# Patient Record
Sex: FEMALE | Race: WHITE | Employment: OTHER | URBAN - NONMETROPOLITAN AREA
[De-identification: names, ages, dates, MRNs, and addresses within clinical notes are randomized per-mention and may not be internally consistent; named-entity substitution may affect disease eponyms.]

---

## 2017-01-10 ENCOUNTER — OFFICE VISIT (OUTPATIENT)
Dept: INTERNAL MEDICINE | Age: 82
End: 2017-01-10

## 2017-01-10 VITALS
HEART RATE: 80 BPM | DIASTOLIC BLOOD PRESSURE: 44 MMHG | HEIGHT: 61 IN | SYSTOLIC BLOOD PRESSURE: 156 MMHG | WEIGHT: 216.6 LBS | TEMPERATURE: 97.8 F | BODY MASS INDEX: 40.89 KG/M2

## 2017-01-10 DIAGNOSIS — K20.90 ESOPHAGITIS: ICD-10-CM

## 2017-01-10 DIAGNOSIS — I27.20 PULMONARY HTN (HCC): ICD-10-CM

## 2017-01-10 DIAGNOSIS — I51.89 DIASTOLIC DYSFUNCTION: ICD-10-CM

## 2017-01-10 DIAGNOSIS — I34.0 NON-RHEUMATIC MITRAL REGURGITATION: ICD-10-CM

## 2017-01-10 DIAGNOSIS — R10.11 RIGHT UPPER QUADRANT ABDOMINAL PAIN: Primary | ICD-10-CM

## 2017-01-10 DIAGNOSIS — E11.21 TYPE 2 DIABETES WITH NEPHROPATHY (HCC): ICD-10-CM

## 2017-01-10 DIAGNOSIS — M48.061 NEURAL FORAMINAL STENOSIS OF LUMBAR SPINE: ICD-10-CM

## 2017-01-10 DIAGNOSIS — D50.0 IRON DEFICIENCY ANEMIA DUE TO CHRONIC BLOOD LOSS: ICD-10-CM

## 2017-01-10 DIAGNOSIS — M35.3 PMR (POLYMYALGIA RHEUMATICA) (HCC): ICD-10-CM

## 2017-01-10 DIAGNOSIS — E78.5 DYSLIPIDEMIA: ICD-10-CM

## 2017-01-10 DIAGNOSIS — E66.01 OBESITY, MORBID, BMI 40.0-49.9 (HCC): ICD-10-CM

## 2017-01-10 DIAGNOSIS — G47.33 OSA (OBSTRUCTIVE SLEEP APNEA): ICD-10-CM

## 2017-01-10 DIAGNOSIS — E55.9 VITAMIN D DEFICIENCY: ICD-10-CM

## 2017-01-10 DIAGNOSIS — I10 ESSENTIAL HYPERTENSION: ICD-10-CM

## 2017-01-10 LAB
-: NORMAL
AMORPHOUS: NORMAL
BACTERIA: NORMAL
CASTS UA: NORMAL /LPF (ref 0–2)
CRYSTALS, UA: NORMAL /HPF
EPITHELIAL CELLS UA: NORMAL /HPF (ref 0–5)
MUCUS: NORMAL
OTHER OBSERVATIONS UA: NORMAL
RBC UA: NORMAL /HPF (ref 0–4)
RENAL EPITHELIAL, UA: NORMAL /HPF
TRICHOMONAS: NORMAL
WBC UA: NORMAL /HPF (ref 0–4)
YEAST: NORMAL

## 2017-01-10 PROCEDURE — 99214 OFFICE O/P EST MOD 30 MIN: CPT | Performed by: INTERNAL MEDICINE

## 2017-01-10 ASSESSMENT — ENCOUNTER SYMPTOMS
COUGH: 0
BLOOD IN STOOL: 0
BLURRED VISION: 0
HEARTBURN: 0
SHORTNESS OF BREATH: 0
EYE PAIN: 0
CONSTIPATION: 0
DOUBLE VISION: 0
WHEEZING: 0
DIARRHEA: 1
EYE DISCHARGE: 0
NAUSEA: 0
VOMITING: 0
ABDOMINAL PAIN: 1
SORE THROAT: 0

## 2017-01-12 ENCOUNTER — TELEPHONE (OUTPATIENT)
Dept: INTERNAL MEDICINE | Age: 82
End: 2017-01-12

## 2017-04-04 RX ORDER — INSULIN LISPRO 100 [IU]/ML
INJECTION, SOLUTION INTRAVENOUS; SUBCUTANEOUS
Qty: 75 ML | Refills: 3 | Status: SHIPPED | OUTPATIENT
Start: 2017-04-04 | End: 2018-06-22 | Stop reason: SDUPTHER

## 2017-04-10 RX ORDER — PEN NEEDLE, DIABETIC 31 GX5/16"
NEEDLE, DISPOSABLE MISCELLANEOUS
Qty: 270 EACH | Refills: 3 | Status: SHIPPED | OUTPATIENT
Start: 2017-04-10 | End: 2017-08-30 | Stop reason: SDUPTHER

## 2017-04-28 ENCOUNTER — TELEPHONE (OUTPATIENT)
Dept: INTERNAL MEDICINE | Age: 82
End: 2017-04-28

## 2017-04-28 DIAGNOSIS — D50.0 IRON DEFICIENCY ANEMIA DUE TO CHRONIC BLOOD LOSS: ICD-10-CM

## 2017-04-28 DIAGNOSIS — E11.21 TYPE 2 DIABETES WITH NEPHROPATHY (HCC): ICD-10-CM

## 2017-04-28 DIAGNOSIS — E55.9 VITAMIN D DEFICIENCY: Primary | ICD-10-CM

## 2017-04-28 DIAGNOSIS — E78.5 DYSLIPIDEMIA: ICD-10-CM

## 2017-05-18 RX ORDER — FUROSEMIDE 20 MG/1
TABLET ORAL
Qty: 270 TABLET | Refills: 3 | Status: SHIPPED | OUTPATIENT
Start: 2017-05-18 | End: 2018-06-22 | Stop reason: SDUPTHER

## 2017-06-12 ENCOUNTER — OFFICE VISIT (OUTPATIENT)
Dept: INTERNAL MEDICINE | Age: 82
End: 2017-06-12
Payer: MEDICARE

## 2017-06-12 VITALS
WEIGHT: 222.8 LBS | DIASTOLIC BLOOD PRESSURE: 52 MMHG | TEMPERATURE: 98.1 F | SYSTOLIC BLOOD PRESSURE: 126 MMHG | BODY MASS INDEX: 42.06 KG/M2 | HEART RATE: 76 BPM | HEIGHT: 61 IN

## 2017-06-12 DIAGNOSIS — E55.9 VITAMIN D DEFICIENCY: ICD-10-CM

## 2017-06-12 DIAGNOSIS — E11.21 TYPE 2 DIABETES WITH NEPHROPATHY (HCC): ICD-10-CM

## 2017-06-12 DIAGNOSIS — K20.90 ESOPHAGITIS: ICD-10-CM

## 2017-06-12 DIAGNOSIS — I10 ESSENTIAL HYPERTENSION: ICD-10-CM

## 2017-06-12 DIAGNOSIS — I34.0 NON-RHEUMATIC MITRAL REGURGITATION: ICD-10-CM

## 2017-06-12 DIAGNOSIS — M35.3 PMR (POLYMYALGIA RHEUMATICA) (HCC): ICD-10-CM

## 2017-06-12 DIAGNOSIS — E78.5 DYSLIPIDEMIA: ICD-10-CM

## 2017-06-12 DIAGNOSIS — D50.0 IRON DEFICIENCY ANEMIA DUE TO CHRONIC BLOOD LOSS: ICD-10-CM

## 2017-06-12 DIAGNOSIS — J20.8 ACUTE BRONCHITIS DUE TO OTHER SPECIFIED ORGANISMS: Primary | ICD-10-CM

## 2017-06-12 PROCEDURE — 4040F PNEUMOC VAC/ADMIN/RCVD: CPT | Performed by: INTERNAL MEDICINE

## 2017-06-12 PROCEDURE — G8399 PT W/DXA RESULTS DOCUMENT: HCPCS | Performed by: INTERNAL MEDICINE

## 2017-06-12 PROCEDURE — 1090F PRES/ABSN URINE INCON ASSESS: CPT | Performed by: INTERNAL MEDICINE

## 2017-06-12 PROCEDURE — 1123F ACP DISCUSS/DSCN MKR DOCD: CPT | Performed by: INTERNAL MEDICINE

## 2017-06-12 PROCEDURE — 1036F TOBACCO NON-USER: CPT | Performed by: INTERNAL MEDICINE

## 2017-06-12 PROCEDURE — G8427 DOCREV CUR MEDS BY ELIG CLIN: HCPCS | Performed by: INTERNAL MEDICINE

## 2017-06-12 PROCEDURE — G8417 CALC BMI ABV UP PARAM F/U: HCPCS | Performed by: INTERNAL MEDICINE

## 2017-06-12 PROCEDURE — 99214 OFFICE O/P EST MOD 30 MIN: CPT | Performed by: INTERNAL MEDICINE

## 2017-06-12 RX ORDER — DOXYCYCLINE HYCLATE 50 MG/1
CAPSULE ORAL
Qty: 22 CAPSULE | Refills: 0 | Status: SHIPPED | OUTPATIENT
Start: 2017-06-12 | End: 2017-06-26 | Stop reason: ALTCHOICE

## 2017-06-12 RX ORDER — AMLODIPINE BESYLATE 5 MG/1
TABLET ORAL
Qty: 90 TABLET | Refills: 3 | Status: SHIPPED | OUTPATIENT
Start: 2017-06-12 | End: 2018-06-29 | Stop reason: SDUPTHER

## 2017-06-13 ASSESSMENT — ENCOUNTER SYMPTOMS
BLURRED VISION: 0
CONSTIPATION: 0
SHORTNESS OF BREATH: 1
ABDOMINAL PAIN: 0
DOUBLE VISION: 0
DIARRHEA: 0
EYE PAIN: 0
VOMITING: 0
EYE DISCHARGE: 0
COUGH: 1
WHEEZING: 0
SORE THROAT: 1
SPUTUM PRODUCTION: 1
NAUSEA: 0
BLOOD IN STOOL: 0

## 2017-06-26 ENCOUNTER — OFFICE VISIT (OUTPATIENT)
Dept: INTERNAL MEDICINE | Age: 82
End: 2017-06-26
Payer: MEDICARE

## 2017-06-26 VITALS
SYSTOLIC BLOOD PRESSURE: 152 MMHG | DIASTOLIC BLOOD PRESSURE: 60 MMHG | HEIGHT: 61 IN | TEMPERATURE: 97.8 F | WEIGHT: 219 LBS | BODY MASS INDEX: 41.35 KG/M2 | HEART RATE: 80 BPM

## 2017-06-26 DIAGNOSIS — I10 ESSENTIAL HYPERTENSION: ICD-10-CM

## 2017-06-26 DIAGNOSIS — R05.9 COUGH: Primary | ICD-10-CM

## 2017-06-26 DIAGNOSIS — K20.90 ESOPHAGITIS: ICD-10-CM

## 2017-06-26 DIAGNOSIS — E55.9 VITAMIN D DEFICIENCY: ICD-10-CM

## 2017-06-26 DIAGNOSIS — E66.01 OBESITY, MORBID, BMI 40.0-49.9 (HCC): ICD-10-CM

## 2017-06-26 DIAGNOSIS — I34.0 NON-RHEUMATIC MITRAL REGURGITATION: ICD-10-CM

## 2017-06-26 DIAGNOSIS — D50.0 IRON DEFICIENCY ANEMIA DUE TO CHRONIC BLOOD LOSS: ICD-10-CM

## 2017-06-26 DIAGNOSIS — E11.21 TYPE 2 DIABETES WITH NEPHROPATHY (HCC): ICD-10-CM

## 2017-06-26 DIAGNOSIS — E78.5 DYSLIPIDEMIA: ICD-10-CM

## 2017-06-26 DIAGNOSIS — M35.3 PMR (POLYMYALGIA RHEUMATICA) (HCC): ICD-10-CM

## 2017-06-26 PROCEDURE — G8399 PT W/DXA RESULTS DOCUMENT: HCPCS | Performed by: INTERNAL MEDICINE

## 2017-06-26 PROCEDURE — 1123F ACP DISCUSS/DSCN MKR DOCD: CPT | Performed by: INTERNAL MEDICINE

## 2017-06-26 PROCEDURE — G8427 DOCREV CUR MEDS BY ELIG CLIN: HCPCS | Performed by: INTERNAL MEDICINE

## 2017-06-26 PROCEDURE — 4040F PNEUMOC VAC/ADMIN/RCVD: CPT | Performed by: INTERNAL MEDICINE

## 2017-06-26 PROCEDURE — 1090F PRES/ABSN URINE INCON ASSESS: CPT | Performed by: INTERNAL MEDICINE

## 2017-06-26 PROCEDURE — G8417 CALC BMI ABV UP PARAM F/U: HCPCS | Performed by: INTERNAL MEDICINE

## 2017-06-26 PROCEDURE — 1036F TOBACCO NON-USER: CPT | Performed by: INTERNAL MEDICINE

## 2017-06-26 PROCEDURE — 99214 OFFICE O/P EST MOD 30 MIN: CPT | Performed by: INTERNAL MEDICINE

## 2017-06-27 ASSESSMENT — ENCOUNTER SYMPTOMS
BLURRED VISION: 0
BLOOD IN STOOL: 0
SORE THROAT: 0
EYE DISCHARGE: 0
DOUBLE VISION: 0
COUGH: 1
CONSTIPATION: 0
VOMITING: 0
SPUTUM PRODUCTION: 1
DIARRHEA: 0
SHORTNESS OF BREATH: 0
NAUSEA: 0
WHEEZING: 0
ABDOMINAL PAIN: 0
EYE PAIN: 0

## 2017-06-28 RX ORDER — LOSARTAN POTASSIUM 100 MG/1
TABLET ORAL
Qty: 90 TABLET | Refills: 3 | Status: SHIPPED | OUTPATIENT
Start: 2017-06-28 | End: 2018-06-06 | Stop reason: SDUPTHER

## 2017-07-03 ENCOUNTER — TELEPHONE (OUTPATIENT)
Dept: INTERNAL MEDICINE | Age: 82
End: 2017-07-03

## 2017-07-03 RX ORDER — AZITHROMYCIN 250 MG/1
TABLET, FILM COATED ORAL
Qty: 1 PACKET | Refills: 0 | Status: SHIPPED | OUTPATIENT
Start: 2017-07-03 | End: 2017-07-13

## 2017-07-10 RX ORDER — POTASSIUM CHLORIDE 750 MG/1
TABLET, EXTENDED RELEASE ORAL
Qty: 90 TABLET | Refills: 3 | Status: SHIPPED | OUTPATIENT
Start: 2017-07-10 | End: 2018-08-06 | Stop reason: SDUPTHER

## 2017-07-13 ENCOUNTER — HOSPITAL ENCOUNTER (OUTPATIENT)
Age: 82
Setting detail: SPECIMEN
Discharge: HOME | End: 2017-07-13
Payer: MEDICARE

## 2017-07-13 ENCOUNTER — TELEPHONE (OUTPATIENT)
Dept: INTERNAL MEDICINE | Age: 82
End: 2017-07-13

## 2017-07-13 LAB — VITAMIN D 25-HYDROXY: 41.2 NG/ML (ref 30–100)

## 2017-07-20 ENCOUNTER — OFFICE VISIT (OUTPATIENT)
Dept: INTERNAL MEDICINE | Age: 82
End: 2017-07-20
Payer: MEDICARE

## 2017-07-20 VITALS
SYSTOLIC BLOOD PRESSURE: 136 MMHG | RESPIRATION RATE: 17 BRPM | HEIGHT: 61 IN | DIASTOLIC BLOOD PRESSURE: 62 MMHG | BODY MASS INDEX: 41.16 KG/M2 | WEIGHT: 218 LBS | HEART RATE: 68 BPM

## 2017-07-20 DIAGNOSIS — M35.3 PMR (POLYMYALGIA RHEUMATICA) (HCC): ICD-10-CM

## 2017-07-20 DIAGNOSIS — E66.01 OBESITY, MORBID, BMI 40.0-49.9 (HCC): ICD-10-CM

## 2017-07-20 DIAGNOSIS — K20.90 ESOPHAGITIS: ICD-10-CM

## 2017-07-20 DIAGNOSIS — E78.5 DYSLIPIDEMIA: ICD-10-CM

## 2017-07-20 DIAGNOSIS — R30.0 DYSURIA: ICD-10-CM

## 2017-07-20 DIAGNOSIS — E11.21 TYPE 2 DIABETES WITH NEPHROPATHY (HCC): Primary | ICD-10-CM

## 2017-07-20 DIAGNOSIS — D50.0 IRON DEFICIENCY ANEMIA DUE TO CHRONIC BLOOD LOSS: ICD-10-CM

## 2017-07-20 DIAGNOSIS — K58.0 IRRITABLE BOWEL SYNDROME WITH DIARRHEA: ICD-10-CM

## 2017-07-20 DIAGNOSIS — Z00.00 ROUTINE GENERAL MEDICAL EXAMINATION AT A HEALTH CARE FACILITY: ICD-10-CM

## 2017-07-20 DIAGNOSIS — I34.0 NON-RHEUMATIC MITRAL REGURGITATION: ICD-10-CM

## 2017-07-20 DIAGNOSIS — E55.9 VITAMIN D DEFICIENCY: ICD-10-CM

## 2017-07-20 DIAGNOSIS — I10 ESSENTIAL HYPERTENSION: ICD-10-CM

## 2017-07-20 LAB
-: ABNORMAL
AMORPHOUS: ABNORMAL
BACTERIA: ABNORMAL
CASTS UA: ABNORMAL /LPF (ref 0–2)
CRYSTALS, UA: ABNORMAL /HPF
EPITHELIAL CELLS UA: ABNORMAL /HPF (ref 0–5)
MUCUS: ABNORMAL
OTHER OBSERVATIONS UA: ABNORMAL
RBC UA: ABNORMAL /HPF (ref 0–4)
RENAL EPITHELIAL, UA: ABNORMAL /HPF
TRICHOMONAS: ABNORMAL
WBC UA: ABNORMAL /HPF (ref 0–4)
YEAST: ABNORMAL

## 2017-07-20 PROCEDURE — G8427 DOCREV CUR MEDS BY ELIG CLIN: HCPCS | Performed by: INTERNAL MEDICINE

## 2017-07-20 PROCEDURE — 1123F ACP DISCUSS/DSCN MKR DOCD: CPT | Performed by: INTERNAL MEDICINE

## 2017-07-20 PROCEDURE — G8417 CALC BMI ABV UP PARAM F/U: HCPCS | Performed by: INTERNAL MEDICINE

## 2017-07-20 PROCEDURE — 4040F PNEUMOC VAC/ADMIN/RCVD: CPT | Performed by: INTERNAL MEDICINE

## 2017-07-20 PROCEDURE — G0439 PPPS, SUBSEQ VISIT: HCPCS | Performed by: INTERNAL MEDICINE

## 2017-07-20 PROCEDURE — G8399 PT W/DXA RESULTS DOCUMENT: HCPCS | Performed by: INTERNAL MEDICINE

## 2017-07-20 PROCEDURE — 1090F PRES/ABSN URINE INCON ASSESS: CPT | Performed by: INTERNAL MEDICINE

## 2017-07-20 PROCEDURE — 1036F TOBACCO NON-USER: CPT | Performed by: INTERNAL MEDICINE

## 2017-07-20 PROCEDURE — 99214 OFFICE O/P EST MOD 30 MIN: CPT | Performed by: INTERNAL MEDICINE

## 2017-07-20 ASSESSMENT — LIFESTYLE VARIABLES: HOW OFTEN DO YOU HAVE A DRINK CONTAINING ALCOHOL: 0

## 2017-07-20 ASSESSMENT — ANXIETY QUESTIONNAIRES: GAD7 TOTAL SCORE: 1

## 2017-07-20 ASSESSMENT — PATIENT HEALTH QUESTIONNAIRE - PHQ9: SUM OF ALL RESPONSES TO PHQ QUESTIONS 1-9: 2

## 2017-07-21 DIAGNOSIS — R30.0 DYSURIA: Primary | ICD-10-CM

## 2017-07-23 ASSESSMENT — ENCOUNTER SYMPTOMS
ABDOMINAL PAIN: 0
CONSTIPATION: 0
NAUSEA: 0
VOMITING: 0
SORE THROAT: 0
COUGH: 0
BLURRED VISION: 0
BLOOD IN STOOL: 0
DIARRHEA: 1
SHORTNESS OF BREATH: 0
DOUBLE VISION: 0
EYE DISCHARGE: 0
WHEEZING: 0
EYE PAIN: 0

## 2017-08-24 RX ORDER — HYDRALAZINE HYDROCHLORIDE 10 MG/1
TABLET, FILM COATED ORAL
Qty: 270 TABLET | Refills: 3 | Status: SHIPPED | OUTPATIENT
Start: 2017-08-24 | End: 2017-10-24 | Stop reason: SDUPTHER

## 2017-08-30 ENCOUNTER — OFFICE VISIT (OUTPATIENT)
Dept: INTERNAL MEDICINE | Age: 82
End: 2017-08-30
Payer: MEDICARE

## 2017-08-30 ENCOUNTER — TELEPHONE (OUTPATIENT)
Dept: INTERNAL MEDICINE | Age: 82
End: 2017-08-30

## 2017-08-30 VITALS
HEIGHT: 61 IN | RESPIRATION RATE: 17 BRPM | DIASTOLIC BLOOD PRESSURE: 68 MMHG | OXYGEN SATURATION: 98 % | BODY MASS INDEX: 41.01 KG/M2 | TEMPERATURE: 97.6 F | SYSTOLIC BLOOD PRESSURE: 122 MMHG | WEIGHT: 217.2 LBS | HEART RATE: 72 BPM

## 2017-08-30 DIAGNOSIS — J40 BRONCHITIS: ICD-10-CM

## 2017-08-30 PROCEDURE — 1036F TOBACCO NON-USER: CPT | Performed by: NURSE PRACTITIONER

## 2017-08-30 PROCEDURE — 1090F PRES/ABSN URINE INCON ASSESS: CPT | Performed by: NURSE PRACTITIONER

## 2017-08-30 PROCEDURE — 99213 OFFICE O/P EST LOW 20 MIN: CPT | Performed by: NURSE PRACTITIONER

## 2017-08-30 PROCEDURE — G8427 DOCREV CUR MEDS BY ELIG CLIN: HCPCS | Performed by: NURSE PRACTITIONER

## 2017-08-30 PROCEDURE — 1123F ACP DISCUSS/DSCN MKR DOCD: CPT | Performed by: NURSE PRACTITIONER

## 2017-08-30 PROCEDURE — 4040F PNEUMOC VAC/ADMIN/RCVD: CPT | Performed by: NURSE PRACTITIONER

## 2017-08-30 PROCEDURE — G8399 PT W/DXA RESULTS DOCUMENT: HCPCS | Performed by: NURSE PRACTITIONER

## 2017-08-30 PROCEDURE — G8417 CALC BMI ABV UP PARAM F/U: HCPCS | Performed by: NURSE PRACTITIONER

## 2017-08-30 RX ORDER — ALBUTEROL SULFATE 90 UG/1
2 AEROSOL, METERED RESPIRATORY (INHALATION) EVERY 6 HOURS PRN
Qty: 1 INHALER | Refills: 1 | Status: SHIPPED | OUTPATIENT
Start: 2017-08-30 | End: 2017-09-05

## 2017-08-30 RX ORDER — AZITHROMYCIN 250 MG/1
TABLET, FILM COATED ORAL
Qty: 6 TABLET | Refills: 0 | Status: SHIPPED | OUTPATIENT
Start: 2017-08-30 | End: 2017-09-04

## 2017-08-31 ASSESSMENT — ENCOUNTER SYMPTOMS
SORE THROAT: 0
VOMITING: 0
COUGH: 1
NAUSEA: 0
TROUBLE SWALLOWING: 0
ABDOMINAL PAIN: 0
CONSTIPATION: 0
VOICE CHANGE: 0
STRIDOR: 0
CHEST TIGHTNESS: 0
SINUS PRESSURE: 0
RHINORRHEA: 1
SHORTNESS OF BREATH: 0
ABDOMINAL DISTENTION: 0
DIARRHEA: 0
WHEEZING: 1

## 2017-09-05 ENCOUNTER — TELEPHONE (OUTPATIENT)
Dept: INTERNAL MEDICINE | Age: 82
End: 2017-09-05

## 2017-09-05 RX ORDER — ALBUTEROL SULFATE 2.5 MG/3ML
2.5 SOLUTION RESPIRATORY (INHALATION) EVERY 4 HOURS PRN
Qty: 30 EACH | Refills: 3 | Status: SHIPPED | OUTPATIENT
Start: 2017-09-05 | End: 2017-10-24

## 2017-09-12 RX ORDER — METOPROLOL SUCCINATE 50 MG/1
TABLET, EXTENDED RELEASE ORAL
Qty: 90 TABLET | Refills: 3 | Status: SHIPPED | OUTPATIENT
Start: 2017-09-12 | End: 2018-09-14 | Stop reason: SDUPTHER

## 2017-09-12 RX ORDER — INSULIN GLARGINE 100 [IU]/ML
INJECTION, SOLUTION SUBCUTANEOUS
Qty: 90 ML | Refills: 3 | Status: SHIPPED | OUTPATIENT
Start: 2017-09-12 | End: 2018-09-26 | Stop reason: SDUPTHER

## 2017-10-17 ENCOUNTER — HOSPITAL ENCOUNTER (OUTPATIENT)
Dept: LAB | Age: 82
Setting detail: SPECIMEN
Discharge: HOME | End: 2017-10-17
Payer: MEDICARE

## 2017-10-17 DIAGNOSIS — E11.21 TYPE 2 DIABETES WITH NEPHROPATHY (HCC): ICD-10-CM

## 2017-10-17 LAB
ESTIMATED AVERAGE GLUCOSE: 137 MG/DL
HBA1C MFR BLD: 6.4 % (ref 4.8–5.9)

## 2017-10-17 PROCEDURE — 83036 HEMOGLOBIN GLYCOSYLATED A1C: CPT

## 2017-10-17 PROCEDURE — 36415 COLL VENOUS BLD VENIPUNCTURE: CPT

## 2017-10-20 ENCOUNTER — TELEPHONE (OUTPATIENT)
Dept: PHARMACY | Facility: CLINIC | Age: 82
End: 2017-10-20

## 2017-10-20 NOTE — TELEPHONE ENCOUNTER
CLINICAL PHARMACY NOTE - Medication Review    Nella Beebe is a 81 y.o. female referred to a clinical pharmacy specialist given their history of HF and number of home medications. First attempt made to reach patient by telephone for medication review. Spoke with patient - refused complete medication review, states no questions or concerns at this time.      Roys JohnsonD  Clinical Pharmacy Specialist  O: [phone]  C: [address] [place] [address]  [phone], Option 7    =======================================================    For Pharmacy Admin Tracking Only  PHSO: Yes  Time Spent (min): 5

## 2017-10-24 ENCOUNTER — OFFICE VISIT (OUTPATIENT)
Dept: INTERNAL MEDICINE | Age: 82
End: 2017-10-24
Payer: MEDICARE

## 2017-10-24 VITALS
SYSTOLIC BLOOD PRESSURE: 148 MMHG | DIASTOLIC BLOOD PRESSURE: 70 MMHG | HEIGHT: 61 IN | WEIGHT: 221 LBS | HEART RATE: 68 BPM | BODY MASS INDEX: 41.72 KG/M2

## 2017-10-24 DIAGNOSIS — M35.3 PMR (POLYMYALGIA RHEUMATICA) (HCC): ICD-10-CM

## 2017-10-24 DIAGNOSIS — I34.0 NON-RHEUMATIC MITRAL REGURGITATION: ICD-10-CM

## 2017-10-24 DIAGNOSIS — K58.0 IRRITABLE BOWEL SYNDROME WITH DIARRHEA: ICD-10-CM

## 2017-10-24 DIAGNOSIS — D50.0 IRON DEFICIENCY ANEMIA DUE TO CHRONIC BLOOD LOSS: ICD-10-CM

## 2017-10-24 DIAGNOSIS — E66.01 OBESITY, MORBID, BMI 40.0-49.9 (HCC): ICD-10-CM

## 2017-10-24 DIAGNOSIS — I10 ESSENTIAL HYPERTENSION: Primary | ICD-10-CM

## 2017-10-24 DIAGNOSIS — E78.5 DYSLIPIDEMIA: ICD-10-CM

## 2017-10-24 DIAGNOSIS — M54.16 LUMBAR RADICULOPATHY: ICD-10-CM

## 2017-10-24 DIAGNOSIS — E11.21 TYPE 2 DIABETES WITH NEPHROPATHY (HCC): ICD-10-CM

## 2017-10-24 DIAGNOSIS — E55.9 VITAMIN D DEFICIENCY: ICD-10-CM

## 2017-10-24 PROCEDURE — G8399 PT W/DXA RESULTS DOCUMENT: HCPCS | Performed by: INTERNAL MEDICINE

## 2017-10-24 PROCEDURE — 90662 IIV NO PRSV INCREASED AG IM: CPT | Performed by: INTERNAL MEDICINE

## 2017-10-24 PROCEDURE — 99214 OFFICE O/P EST MOD 30 MIN: CPT | Performed by: INTERNAL MEDICINE

## 2017-10-24 PROCEDURE — 4040F PNEUMOC VAC/ADMIN/RCVD: CPT | Performed by: INTERNAL MEDICINE

## 2017-10-24 PROCEDURE — G8427 DOCREV CUR MEDS BY ELIG CLIN: HCPCS | Performed by: INTERNAL MEDICINE

## 2017-10-24 PROCEDURE — 1036F TOBACCO NON-USER: CPT | Performed by: INTERNAL MEDICINE

## 2017-10-24 PROCEDURE — G8484 FLU IMMUNIZE NO ADMIN: HCPCS | Performed by: INTERNAL MEDICINE

## 2017-10-24 PROCEDURE — 1090F PRES/ABSN URINE INCON ASSESS: CPT | Performed by: INTERNAL MEDICINE

## 2017-10-24 PROCEDURE — 1123F ACP DISCUSS/DSCN MKR DOCD: CPT | Performed by: INTERNAL MEDICINE

## 2017-10-24 PROCEDURE — G0008 ADMIN INFLUENZA VIRUS VAC: HCPCS | Performed by: INTERNAL MEDICINE

## 2017-10-24 PROCEDURE — G8417 CALC BMI ABV UP PARAM F/U: HCPCS | Performed by: INTERNAL MEDICINE

## 2017-10-24 RX ORDER — HYDRALAZINE HYDROCHLORIDE 25 MG/1
25 TABLET, FILM COATED ORAL 3 TIMES DAILY
Qty: 270 TABLET | Refills: 3 | Status: SHIPPED | OUTPATIENT
Start: 2017-10-24 | End: 2018-10-30 | Stop reason: SDUPTHER

## 2017-10-24 NOTE — PROGRESS NOTES
Sussy Monaco received counseling on the following healthy behaviors: exercise  Reviewed prior labs and health maintenance  Continue current medications except where noted below, diet and exercise. Discussed use, benefit, and side effects of prescribed medications. Barriers to medication compliance addressed. Patient given educational materials - see patient instructions  Was a self-tracking handout given in paper form or via [employer]? Yes    Requested Prescriptions      No prescriptions requested or ordered in this encounter       All patient questions answered. Patient voiced understanding. Quality Measures    Body mass index is 41.76 kg/m². Elevated. Weight control planned discussed: healthy diet and regular exercise. BP: (!) 142/66. Blood pressure is high. Treatment plan consists of: see progress note below. Fall Risk 7/20/2017 7/1/2016 6/3/2015 4/23/2014   2 or more falls in past year? no no no no   Fall with injury in past year? no no no no     The patient does not have a history of falls. I did not - not indicated , complete a risk assessment for falls.  A plan of care for falls No Treatment plan indicated    Lab Results   Component Value Date    LDLCHOLESTEROL 84 07/13/2017    (goal LDL reduction with dx if diabetes is 50% LDL reduction)    PHQ Scores 7/20/2017 7/1/2016 1/11/2016 1/19/2015 2/12/2014   PHQ2 Score 2 1 2 2 2   PHQ9 Score 2 1 2 2 2     Interpretation of Total Score Depression Severity: 1-4 = Minimal depression, 5-9 = Mild depression, 10-14 = Moderate depression, 15-19 = Moderately severe depression, 20-27 = Severe depression

## 2017-10-24 NOTE — PROGRESS NOTES
Chronic Disease Visit Information    BP Readings from Last 3 Encounters:   10/24/17 (!) 142/66   08/30/17 122/68   07/20/17 136/62          Hemoglobin A1C (%)   Date Value   10/17/2017 6.4 (H)   07/13/2017 6.1 (H)   01/10/2017 5.7     Microalb/Crt. Ratio (mcg/mg creat)   Date Value   04/21/2014 0     LDL Cholesterol (mg/dL)   Date Value   07/13/2017 84     HDL (mg/dL)   Date Value   07/13/2017 65     BUN (mg/dL)   Date Value   07/13/2017 15     CREATININE (mg/dL)   Date Value   07/13/2017 0.79     Glucose (mg/dL)   Date Value   07/13/2017 104 (H)            Have you changed or started any medications since your last visit including any over-the-counter medicines, vitamins, or herbal medicines? no   Are you having any side effects from any of your medications? -  no  Have you stopped taking any of your medications? Is so, why? -  no    Have you seen any other physician or provider since your last visit? Yes - Records Obtained  Have you had any other diagnostic tests since your last visit? Yes - Records Obtained  Have you been seen in the emergency room and/or had an admission to a hospital since we last saw you? No  Have you had your annual diabetic retinal (eye) exam? Yes - Records Requested  Have you had your routine dental cleaning in the past 6 months? yes -     Have you activated your [employer] account? If not, what are your barriers?  Yes     Patient Care Team:  Henri Nuñez MD as PCP - General (Internal Medicine)  Henri Nuñez MD as PCP - [facility] Attributed Provider         Medical History Review  Past Medical, Family, and Social History reviewed and does contribute to the patient presenting condition    Health Maintenance   Topic Date Due    DTaP/Tdap/Td vaccine (1 - Tdap) 03/12/2010    Diabetic foot exam  02/12/2017    Diabetic retinal exam  07/13/2017    Flu vaccine (1) 09/01/2017    Diabetic hemoglobin A1C test  04/17/2018    Lipid screen  07/13/2018    Zostavax vaccine  Completed    DEXA (modify frequency per FRAX score)  Addressed    Pneumococcal low/med risk  Completed

## 2017-10-25 NOTE — PROGRESS NOTES
Dayanna Rodriguez  YOB: 1935    Date of Service:  10/24/2017      HPI:  Silvestre Montesinos was seen in the internal medicine office today for   Chief Complaint   Patient presents with    Diabetes    Hyperlipidemia    Hypertension      · and continued evaluation and management of chronic medical problems. We addressed the following new, acute or uncontrolled/unstable issues:    1. Blood pressure is a concern. We reviewed the old data. It looks like she is generally tending to run between about 135 and 150. We reviewed her medication. She is on the Lasix, Norvasc, Cozaar, hydralazine and Toprol. Her pulse has been about as low as we want it I think. She has had trouble with lower extremity swelling and I do not think it is going to be good to increase the Norvasc. We talked about bumping up the hydralazine which I think she would tolerate and would probably help with this. It is mildly elevated but I think we can do a little bit better. She is not having chest pain or dyspnea. She is not having change of vision or change in speech. She actually feels she has been breathing better. 2.  Her IBS unfortunately is not better. We tried the Metamucil. It really did not help. We discussed GI consultation and she does not want to pursue this. She feels that she has pursued it extensively with other specialists and that she has had this for now what amounts to a number of decades and does not really feel that she is going to see improvement and she is tolerating her current level of symptoms. We addressed the following chronic issues:    · Diabetes Mellitus  - She has not had trouble with her sugars,  No hypoglycemia. No polyuria or polydipsia. Trying to watch diet and be as active as possible. · Vitamin D deficiency  - No trouble with the vitamin D supplements, and recent level reviewed.     · Dyslipidemia  - She is not having any difficulty with her cholesterol medications. No significant myalgias. · Obesity  - Discussed diet, exercise,  and various strategies for weight loss. · The bronchitis has cleared up. Review of Systems:  Constitutional:  Negative for chills, fever, and weight loss. HENT:  Negative for congestion, ear pain, and sore throat. Eyes:  Negative for blurred vision, double vision, pain and discharge. Respiratory:  Negative for cough, shortness of breath, and wheezing. Cardiovascular:  Negative for chest pain, palpitations, and PND. Gastrointestinal:  Negative for abdominal pain, blood in stool, constipation, nausea and vomiting. Positive for diarrhea. Genitourinary:  Negative for dysuria, frequency, and hematuria. Musculoskeletal:  Negative for myalgias. Skin:  Negative for rash. Neurological:  Negative for tingling, sensory change, speech change, focal weakness, seizures, and headaches. Endo/Heme/Allergies:  Does not bruise/bleed easily. Psychiatric/Behavioral:  Negative for hallucinations and suicidal ideas.       Patient Active Problem List   Diagnosis    Dyslipidemia    Osteoarthritis    Esophagitis    Vitamin D deficiency    PM[facility]algia [facility]a) [facility])    Central artery occlusion of retina    Diastolic dysfunction    Pulmonary HTN    Iron deficiency anemia due to chronic blood loss    Type 2 diabetes with nephropathy ([facility])    CHICO- intolerant CPAP    Obesity, morbid, BMI 40.0-49.9 ([facility])    Foraminal stenosis of lumbar region    Essential hypertension    Lumbar radiculopathy    Neural foraminal stenosis of lumbar spine    Facet arthritis of lumbar region ([facility] [address]    Spinal stenosis at L4-L5 level    Mitral regurgitation    Frequent PVCs       Medications:    Current Outpatient Prescriptions   Medication Sig Dispense Refill    hydrALAZINE (APRESOLINE) 25 MG tablet Take 1 tablet by mouth 3 times daily 270 tablet 3    Liraglutide (VICTOZA) 18 MG/3ML SOPN SC injection Inject 1.2 mg into the skin daily 18 mL 3 intact. Lymphadenopathy:    She has no cervical adenopathy. Neurological:  She is alert. She has normal strength. She displays normal reflexes. No cranial nerve deficit or sensory deficit. She exhibits normal muscle tone. Skin:  Skin is warm and dry. No rash noted. She is not diaphoretic. No pallor. Psychiatric:  She has a normal mood and affect. Her behavior is normal.  Judgment normal.  Vitals reviewed. Vitals:    10/24/17 0832 10/24/17 0844   BP: (!) 142/66 (!) 148/70   Site: Left Arm    Position: Sitting    Cuff Size: Large Adult    Pulse: 68    Weight: 221 lb (100.2 kg)    Height: 5' 1\" (1.549 m)      Body mass index is 41.76 kg/m². Wt Readings from Last 3 Encounters:   10/24/17 221 lb (100.2 kg)   08/30/17 217 lb 3.2 oz (98.5 kg)   07/20/17 218 lb (98.9 kg)     BP Readings from Last 3 Encounters:   10/24/17 (!) 148/70   08/30/17 122/68   07/20/17 136/62         Lab Results   Component Value Date    K 4.0 07/13/2017    CREATININE 0.79 07/13/2017    AST 14 07/13/2017    ALT 16 07/13/2017    TSH 2.05 04/11/2016    HCT 40.8 07/13/2017    LABA1C 6.4 10/17/2017    MICROALBUR 14 04/21/2014    GLUCOSE 104 07/13/2017    MG 2.2 01/10/2015    CALCIUM 9.7 07/13/2017    XIQKSOOS88 320 01/11/2015    VITD25 41.2 07/13/2017    FERRITIN 54 07/13/2017    TIBC 324 07/13/2017    IRON 57 07/13/2017      Lab Results   Component Value Date    CHOL 182 07/13/2017    TRIG 165 07/13/2017    HDL 65 07/13/2017    LDLCHOLESTEROL 84 07/13/2017       Assessment/Plan:  1. Essential hypertension  Continue the Lasix, Norvasc, Cozaar, Toprol and increase the hydralazine to 25 mg p.o. t.i.d. Follow up BP. Call with any issues. - hydrALAZINE (APRESOLINE) 25 MG tablet; Take 1 tablet by mouth 3 times daily  Dispense: 270 tablet; Refill: 3    2. Dyslipidemia  Doing well on her current regimen. No change. Recent lipids reviewed. 3. Vitamin D deficiency  Continue supplement. Continue to monitor.     4. PMR (polymyalgia

## 2017-12-13 ENCOUNTER — OFFICE VISIT (OUTPATIENT)
Dept: PAIN MANAGEMENT | Age: 82
End: 2017-12-13
Payer: MEDICARE

## 2017-12-13 VITALS
DIASTOLIC BLOOD PRESSURE: 64 MMHG | WEIGHT: 218 LBS | HEIGHT: 60 IN | BODY MASS INDEX: 42.8 KG/M2 | HEART RATE: 76 BPM | SYSTOLIC BLOOD PRESSURE: 154 MMHG

## 2017-12-13 DIAGNOSIS — M48.061 FORAMINAL STENOSIS OF LUMBAR REGION: Primary | ICD-10-CM

## 2017-12-13 DIAGNOSIS — M47.816 FACET ARTHRITIS OF LUMBAR REGION: ICD-10-CM

## 2017-12-13 DIAGNOSIS — M48.061 SPINAL STENOSIS AT L4-L5 LEVEL: ICD-10-CM

## 2017-12-13 PROCEDURE — 1090F PRES/ABSN URINE INCON ASSESS: CPT | Performed by: NURSE PRACTITIONER

## 2017-12-13 PROCEDURE — G8417 CALC BMI ABV UP PARAM F/U: HCPCS | Performed by: NURSE PRACTITIONER

## 2017-12-13 PROCEDURE — G8399 PT W/DXA RESULTS DOCUMENT: HCPCS | Performed by: NURSE PRACTITIONER

## 2017-12-13 PROCEDURE — 99212 OFFICE O/P EST SF 10 MIN: CPT | Performed by: NURSE PRACTITIONER

## 2017-12-13 PROCEDURE — G8427 DOCREV CUR MEDS BY ELIG CLIN: HCPCS | Performed by: NURSE PRACTITIONER

## 2017-12-13 PROCEDURE — 1123F ACP DISCUSS/DSCN MKR DOCD: CPT | Performed by: NURSE PRACTITIONER

## 2017-12-13 PROCEDURE — 1036F TOBACCO NON-USER: CPT | Performed by: NURSE PRACTITIONER

## 2017-12-13 PROCEDURE — G8484 FLU IMMUNIZE NO ADMIN: HCPCS | Performed by: NURSE PRACTITIONER

## 2017-12-13 PROCEDURE — 4040F PNEUMOC VAC/ADMIN/RCVD: CPT | Performed by: NURSE PRACTITIONER

## 2017-12-14 ASSESSMENT — ENCOUNTER SYMPTOMS: BACK PAIN: 1

## 2017-12-14 NOTE — PROGRESS NOTES
Subjective:      Patient ID: Ruiz Warren is a 81 y.o. female. Chief Complaint   Patient presents with    Lower Back Pain     back pain radiates down into Lt leg/discuss injection     HPI Increased pain flare, made an appt, pain has now subsided. Pain Assessment  Location of Pain: Back  Location Modifiers: Left (pain radiates down Lt leg)  Severity of Pain: 2  Quality of Pain: Aching  Duration of Pain: Persistent  Frequency of Pain: Intermittent  Aggravating Factors: Standing, Walking  Limiting Behavior: Yes  Relieving Factors: Nsaids  Are there other pain locations you wish to document?: No    Allergies   Allergen Reactions    Codeine      Confusion      Erythromycin      GI upset    Exenatide Nausea Only    Levofloxacin      GI upset    Verapamil Other (See Comments)     Junctional bradycardia    Clonidine Derivatives Rash     2009, catapres    Other Rash     Levbid    Penicillins Rash       Outpatient Prescriptions Marked as Taking for the 12/13/17 encounter (Office Visit) with Conrad Loredo NP   Medication Sig Dispense Refill    hydrALAZINE (APRESOLINE) 25 MG tablet Take 1 tablet by mouth 3 times daily 270 tablet 3    Liraglutide (VICTOZA) 18 MG/3ML SOPN SC injection Inject 1.2 mg into the skin daily 18 mL 3    Insulin Pen Needle (B-D ULTRAFINE III SHORT PEN) 31G X 8 MM MISC USE 7 DAILY 300 each 3    LANTUS SOLOSTAR 100 UNIT/ML injection pen INJECT 50 UNITS IN THE MORNING AND 48 UNITS AT BEDTIME 90 mL 3    metoprolol succinate (TOPROL XL) 50 MG extended release tablet TAKE 1 TABLET DAILY 90 tablet 3    glucose blood VI test strips (ASCENSIA AUTODISC VI;ONE TOUCH ULTRA TEST VI) strip 1 each by In Vitro route 3 times daily As directed.  100 each 5    KLOR-CON M10 10 MEQ extended release tablet TAKE 1 TABLET DAILY 90 tablet 3    losartan (COZAAR) 100 MG tablet TAKE 1 TABLET DAILY 90 tablet 3    predniSONE (DELTASONE) 5 MG tablet Take 1 tablet by mouth daily 90 tablet 3    amLODIPine Dr Marek Rao following. MRI 2/16 [place]. Moderate foraminal stenosis mild to moderate central canal stenosis    Hypertension     IBS (irritable bowel syndrome)     GI consultation with Dr. Nicolasa Martel, GI specialist in [facility], felt that she probably has chronic functional diarrhea and recommended empiric Imodium, Pepto-Bismol or Questran first. Sprue test Neg 2011    Iron deficiency anemia     Percent sat iron 5, January 2015, ferritin 40 1 /15, FOBT positive  EGD 6/15  Gastritis, IV iron 9/15x3 effective,  colonoscopy deferred by Dr. Nanci Morrison. --Prior colonoscopy 2011 with severe diverticulosis and telangiectasia. Trial iron solution in Orange juice 12/16    Junctional bradycardia     Symptomatic, resolved with discontinuation of Verapamil, 05/09. 1.1) Echocardiogram: LAE, LVH, EF 50%, mild MR, diastolic. 1.2) Dr. Mark Peres evaluation. 1.3.) Persantine stress test negative, 05/09.  Migraine     Mild CAD     cath 10/15    Mitral regurgitation     Moderate to severe on echocardiogram September 2015.   Right pressure 76 grade 2 diastolic dysfunction,  echo 95/87 grade 2 diastolic dysfunction mild to moderate MR RVSP 56 aortic sclerosis    Obesity     CHICO (obstructive sleep apnea)     CPAP 14 2/15 initiation  AHI 7  mild CHICO intolerant CPAP    Osteoarthritis     PMR (polymyalgia rheumatica) ([facility])     Premature atrial contractions     Pulmonary hypertension     -Moderate on echo November 2014    Restrictive lung disease     Mild on PFTs 11/14    Type II or unspecified type diabetes mellitus without mention of complication, not stated as uncontrolled     Vitreous floaters        Past Surgical History:   Procedure Laterality Date    APPENDECTOMY  1952    CARDIAC CATHETERIZATION  2014    CATARACT REMOVAL Bilateral 08/10    CHOLECYSTECTOMY      COLONOSCOPY  05/16/2011    ENDOSCOPY, COLON, DIAGNOSTIC      EYE SURGERY      HYSTERECTOMY  Approx 1983    MALIGNANT SKIN LESION EXCISION Right 12/10 and

## 2018-01-08 DIAGNOSIS — E78.5 DYSLIPIDEMIA: ICD-10-CM

## 2018-01-08 RX ORDER — SIMVASTATIN 20 MG
TABLET ORAL
Qty: 90 TABLET | Refills: 3 | Status: SHIPPED | OUTPATIENT
Start: 2018-01-08 | End: 2018-12-27 | Stop reason: SDUPTHER

## 2018-01-16 ENCOUNTER — HOSPITAL ENCOUNTER (OUTPATIENT)
Dept: LAB | Age: 83
Setting detail: SPECIMEN
Discharge: HOME | End: 2018-01-16
Payer: MEDICARE

## 2018-01-16 DIAGNOSIS — E11.21 TYPE 2 DIABETES WITH NEPHROPATHY (HCC): ICD-10-CM

## 2018-01-16 DIAGNOSIS — M35.3 PMR (POLYMYALGIA RHEUMATICA) (HCC): ICD-10-CM

## 2018-01-16 LAB
ABSOLUTE EOS #: 0.2 K/UL (ref 0–0.4)
ABSOLUTE IMMATURE GRANULOCYTE: ABNORMAL K/UL (ref 0–0.3)
ABSOLUTE LYMPH #: 2.5 K/UL (ref 1–4.8)
ABSOLUTE MONO #: 1.1 K/UL (ref 0.1–1.2)
ANION GAP SERPL CALCULATED.3IONS-SCNC: 14 MMOL/L (ref 9–17)
BASOPHILS # BLD: 1 % (ref 0–1)
BASOPHILS ABSOLUTE: 0.1 K/UL (ref 0–0.2)
BUN BLDV-MCNC: 17 MG/DL (ref 8–23)
BUN/CREAT BLD: 18 (ref 9–20)
CALCIUM SERPL-MCNC: 9.2 MG/DL (ref 8.6–10.4)
CHLORIDE BLD-SCNC: 106 MMOL/L (ref 98–107)
CO2: 26 MMOL/L (ref 20–31)
CREAT SERPL-MCNC: 0.92 MG/DL (ref 0.5–0.9)
DIFFERENTIAL TYPE: ABNORMAL
EOSINOPHILS RELATIVE PERCENT: 2 % (ref 1–7)
ESTIMATED AVERAGE GLUCOSE: 137 MG/DL
GFR AFRICAN AMERICAN: >60 ML/MIN
GFR NON-AFRICAN AMERICAN: 59 ML/MIN
GFR SERPL CREATININE-BSD FRML MDRD: ABNORMAL ML/MIN/{1.73_M2}
GFR SERPL CREATININE-BSD FRML MDRD: ABNORMAL ML/MIN/{1.73_M2}
GLUCOSE BLD-MCNC: 103 MG/DL (ref 70–99)
HBA1C MFR BLD: 6.4 % (ref 4.8–5.9)
HCT VFR BLD CALC: 39.7 % (ref 36–46)
HEMOGLOBIN: 12.5 G/DL (ref 12–16)
IMMATURE GRANULOCYTES: ABNORMAL %
LYMPHOCYTES # BLD: 21 % (ref 16–46)
MCH RBC QN AUTO: 27.2 PG (ref 26–34)
MCHC RBC AUTO-ENTMCNC: 31.6 G/DL (ref 31–37)
MCV RBC AUTO: 86.1 FL (ref 80–100)
MONOCYTES # BLD: 10 % (ref 4–11)
NRBC AUTOMATED: ABNORMAL PER 100 WBC
PDW BLD-RTO: 15.7 % (ref 11–14.5)
PLATELET # BLD: 219 K/UL (ref 140–450)
PLATELET ESTIMATE: ABNORMAL
PMV BLD AUTO: 10.2 FL (ref 6–12)
POTASSIUM SERPL-SCNC: 4.1 MMOL/L (ref 3.7–5.3)
RBC # BLD: 4.61 M/UL (ref 4–5.2)
RBC # BLD: ABNORMAL 10*6/UL
SEDIMENTATION RATE, ERYTHROCYTE: 43 MM (ref 0–30)
SEG NEUTROPHILS: 66 % (ref 43–77)
SEGMENTED NEUTROPHILS ABSOLUTE COUNT: 8.1 K/UL (ref 1.8–7.7)
SODIUM BLD-SCNC: 146 MMOL/L (ref 135–144)
WBC # BLD: 12 K/UL (ref 3.5–11)
WBC # BLD: ABNORMAL 10*3/UL

## 2018-01-16 PROCEDURE — 83036 HEMOGLOBIN GLYCOSYLATED A1C: CPT

## 2018-01-16 PROCEDURE — 80048 BASIC METABOLIC PNL TOTAL CA: CPT

## 2018-01-16 PROCEDURE — 85651 RBC SED RATE NONAUTOMATED: CPT

## 2018-01-16 PROCEDURE — 85025 COMPLETE CBC W/AUTO DIFF WBC: CPT

## 2018-01-16 PROCEDURE — 36415 COLL VENOUS BLD VENIPUNCTURE: CPT

## 2018-01-26 ENCOUNTER — OFFICE VISIT (OUTPATIENT)
Dept: INTERNAL MEDICINE | Age: 83
End: 2018-01-26
Payer: MEDICARE

## 2018-01-26 VITALS
WEIGHT: 219.6 LBS | DIASTOLIC BLOOD PRESSURE: 72 MMHG | HEIGHT: 61 IN | TEMPERATURE: 97 F | HEART RATE: 72 BPM | SYSTOLIC BLOOD PRESSURE: 136 MMHG | BODY MASS INDEX: 41.46 KG/M2

## 2018-01-26 DIAGNOSIS — M35.3 PMR (POLYMYALGIA RHEUMATICA) (HCC): ICD-10-CM

## 2018-01-26 DIAGNOSIS — E11.21 TYPE 2 DIABETES WITH NEPHROPATHY (HCC): Primary | ICD-10-CM

## 2018-01-26 DIAGNOSIS — I10 ESSENTIAL HYPERTENSION: ICD-10-CM

## 2018-01-26 DIAGNOSIS — I34.0 NON-RHEUMATIC MITRAL REGURGITATION: ICD-10-CM

## 2018-01-26 DIAGNOSIS — E55.9 VITAMIN D DEFICIENCY: ICD-10-CM

## 2018-01-26 DIAGNOSIS — J45.21 MILD INTERMITTENT ASTHMA WITH ACUTE EXACERBATION: ICD-10-CM

## 2018-01-26 DIAGNOSIS — D50.0 IRON DEFICIENCY ANEMIA DUE TO CHRONIC BLOOD LOSS: ICD-10-CM

## 2018-01-26 DIAGNOSIS — E78.5 DYSLIPIDEMIA: ICD-10-CM

## 2018-01-26 DIAGNOSIS — M47.816 FACET ARTHRITIS OF LUMBAR REGION: ICD-10-CM

## 2018-01-26 DIAGNOSIS — E66.01 OBESITY, MORBID, BMI 40.0-49.9 (HCC): ICD-10-CM

## 2018-01-26 PROCEDURE — 99214 OFFICE O/P EST MOD 30 MIN: CPT | Performed by: INTERNAL MEDICINE

## 2018-01-26 RX ORDER — AZITHROMYCIN 250 MG/1
TABLET, FILM COATED ORAL
Qty: 6 TABLET | Refills: 0 | Status: SHIPPED | OUTPATIENT
Start: 2018-01-26 | End: 2018-01-31

## 2018-01-29 NOTE — PROGRESS NOTES
eventually it started upsetting her stomach so she has actually now stopped it. Review of Systems:  Constitutional:  Negative for chills, fever, and weight loss. HENT:  Negative for congestion, ear pain, and sore throat. Eyes:  Negative for blurred vision, double vision, pain and discharge. Respiratory:  Negative for shortness of breath. Positive for cough and wheezing. Cardiovascular:  Negative for chest pain, palpitations, and PND. Gastrointestinal:  Negative for abdominal pain, blood in stool, constipation, diarrhea, nausea and vomiting. Genitourinary:  Negative for dysuria, frequency, and hematuria. Musculoskeletal:  Negative for myalgias. Skin:  Negative for rash. Neurological:  Negative for tingling, sensory change, speech change, focal weakness, seizures, and headaches. Endo/Heme/Allergies:  Does not bruise/bleed easily. Psychiatric/Behavioral:  Negative for hallucinations and suicidal ideas.       Patient Active Problem List   Diagnosis    Dyslipidemia    Osteoarthritis    Esophagitis    Vitamin D deficiency    [facility]a) ([facility])    Central artery occlusion of retina    Diastolic dysfunction    Pulmonary HTN    Iron deficiency anemia due to chronic blood loss    Type 2 diabetes with nephropathy ([facility])    CHICO- intolerant CPAP    Obesity, morbid, BMI 40.0-49.9 ([facility])    Foraminal stenosis of lumbar region    Essential hypertension    Lumbar radiculopathy    Neural foraminal stenosis of lumbar spine    Facet arthritis of lumbar region ([facility] [address]    Spinal stenosis at L4-L5 level    Mitral regurgitation    Frequent PVCs    Asthma       Medications:    Current Outpatient Prescriptions   Medication Sig Dispense Refill    azithromycin (ZITHROMAX) 250 MG tablet Take 2 tabs PO on day 1, then One PO QD on day 2-5 6 tablet 0    simvastatin (ZOCOR) 20 MG tablet TAKE 1 TABLET AT BEDTIME 90 tablet 3    hydrALAZINE (APRESOLINE) 25 MG tablet Take 1 tablet by mouth 3 times daily 270 tablet 3    Liraglutide (VICTOZA) 18 MG/3ML SOPN SC injection Inject 1.2 mg into the skin daily 18 mL 3    Insulin Pen Needle (B-D ULTRAFINE III SHORT PEN) 31G X 8 MM MISC USE 7 DAILY 300 each 3    LANTUS SOLOSTAR 100 UNIT/ML injection pen INJECT 50 UNITS IN THE MORNING AND 48 UNITS AT BEDTIME 90 mL 3    metoprolol succinate (TOPROL XL) 50 MG extended release tablet TAKE 1 TABLET DAILY 90 tablet 3    glucose blood VI test strips (ASCENSIA AUTODISC VI;ONE TOUCH ULTRA TEST VI) strip 1 each by In Vitro route 3 times daily As directed. 100 each 5    KLOR-CON M10 10 MEQ extended release tablet TAKE 1 TABLET DAILY 90 tablet 3    losartan (COZAAR) 100 MG tablet TAKE 1 TABLET DAILY 90 tablet 3    predniSONE (DELTASONE) 5 MG tablet Take 1 tablet by mouth daily 90 tablet 3    amLODIPine (NORVASC) 5 MG tablet TAKE 1 TABLET DAILY 90 tablet 3    furosemide (LASIX) 20 MG tablet TAKE 3 TABLETS DAILY 270 tablet 3    HUMALOG KWIKPEN 100 UNIT/ML pen INJECT 10 UNITS WITH BREAKFAST, 16 UNITS AT NOON, 24 UNITS WITH SUPPER AND 10 UNITS AT NIGHT AS NEEDED 75 mL 3    acetaminophen (TYLENOL) 500 MG tablet Take 500 mg by mouth daily      PATADAY 0.2 % SOLN ophthalmic solution Place 1 drop into both eyes daily as needed   0    omeprazole (PRILOSEC) 20 MG capsule Take 20 mg by mouth Daily  30 capsule 3    aspirin 325 MG EC tablet Take 1 tablet by mouth daily. 30 tablet 3    Cholecalciferol (VITAMIN D3) 2000 UNITS CAPS Take 2,000 Units by mouth daily        No current facility-administered medications for this visit. Past Medical History:        Diagnosis Date    Allergic rhinitis     Asthma     PFT's, 04/06, actually showed mild restrictive defect.  Basal cell carcinoma of cheek 2007    Right cheek    Basal cell carcinoma of leg     right leg    CAD (coronary artery disease)     With 40% stenosis of the LAD, 07/10, ejection fraction 60%.   Repeat heart cath9/14 with 4050 percent LAD lesion first diagonal

## 2018-04-05 RX ORDER — PEN NEEDLE, DIABETIC 31 GX5/16"
NEEDLE, DISPOSABLE MISCELLANEOUS
Qty: 270 EACH | Refills: 3 | Status: SHIPPED | OUTPATIENT
Start: 2018-04-05 | End: 2018-09-19 | Stop reason: SDUPTHER

## 2018-04-13 ENCOUNTER — HOSPITAL ENCOUNTER (OUTPATIENT)
Dept: LAB | Age: 83
Setting detail: SPECIMEN
Discharge: HOME | End: 2018-04-13
Payer: MEDICARE

## 2018-04-13 DIAGNOSIS — E11.21 TYPE 2 DIABETES WITH NEPHROPATHY (HCC): ICD-10-CM

## 2018-04-13 LAB
ESTIMATED AVERAGE GLUCOSE: 148 MG/DL
HBA1C MFR BLD: 6.8 % (ref 4.8–5.9)

## 2018-04-13 PROCEDURE — 83036 HEMOGLOBIN GLYCOSYLATED A1C: CPT

## 2018-04-13 PROCEDURE — 36415 COLL VENOUS BLD VENIPUNCTURE: CPT

## 2018-04-20 ENCOUNTER — HOSPITAL ENCOUNTER (OUTPATIENT)
Dept: LAB | Age: 83
Setting detail: SPECIMEN
Discharge: HOME | End: 2018-04-20
Payer: MEDICARE

## 2018-04-20 ENCOUNTER — OFFICE VISIT (OUTPATIENT)
Dept: INTERNAL MEDICINE | Age: 83
End: 2018-04-20
Payer: MEDICARE

## 2018-04-20 VITALS
SYSTOLIC BLOOD PRESSURE: 108 MMHG | WEIGHT: 221.4 LBS | BODY MASS INDEX: 41.8 KG/M2 | HEART RATE: 72 BPM | HEIGHT: 61 IN | DIASTOLIC BLOOD PRESSURE: 60 MMHG

## 2018-04-20 DIAGNOSIS — E66.01 OBESITY, MORBID, BMI 40.0-49.9 (HCC): ICD-10-CM

## 2018-04-20 DIAGNOSIS — E11.21 TYPE 2 DIABETES WITH NEPHROPATHY (HCC): Primary | ICD-10-CM

## 2018-04-20 DIAGNOSIS — R30.0 DYSURIA: ICD-10-CM

## 2018-04-20 DIAGNOSIS — I10 ESSENTIAL HYPERTENSION: ICD-10-CM

## 2018-04-20 DIAGNOSIS — M35.3 PMR (POLYMYALGIA RHEUMATICA) (HCC): ICD-10-CM

## 2018-04-20 DIAGNOSIS — D50.0 IRON DEFICIENCY ANEMIA DUE TO CHRONIC BLOOD LOSS: ICD-10-CM

## 2018-04-20 DIAGNOSIS — I34.0 NON-RHEUMATIC MITRAL REGURGITATION: ICD-10-CM

## 2018-04-20 DIAGNOSIS — J45.20 MILD INTERMITTENT ASTHMA WITHOUT COMPLICATION: ICD-10-CM

## 2018-04-20 DIAGNOSIS — E55.9 VITAMIN D DEFICIENCY: ICD-10-CM

## 2018-04-20 DIAGNOSIS — E78.5 DYSLIPIDEMIA: ICD-10-CM

## 2018-04-20 LAB
-: ABNORMAL
AMORPHOUS: ABNORMAL
BACTERIA: ABNORMAL
BILIRUBIN URINE: NEGATIVE
CASTS UA: ABNORMAL /LPF (ref 0–2)
COLOR: ABNORMAL
COMMENT UA: ABNORMAL
CRYSTALS, UA: ABNORMAL /HPF
EPITHELIAL CELLS UA: ABNORMAL /HPF (ref 0–5)
GLUCOSE URINE: NEGATIVE
KETONES, URINE: NEGATIVE
LEUKOCYTE ESTERASE, URINE: ABNORMAL
MUCUS: ABNORMAL
NITRITE, URINE: NEGATIVE
OTHER OBSERVATIONS UA: ABNORMAL
PH UA: 5 (ref 5–6)
PROTEIN UA: NEGATIVE
RBC UA: ABNORMAL /HPF (ref 0–4)
RENAL EPITHELIAL, UA: ABNORMAL /HPF
SPECIFIC GRAVITY UA: 1.01 (ref 1.01–1.02)
TRICHOMONAS: ABNORMAL
TURBIDITY: ABNORMAL
URINE HGB: NEGATIVE
UROBILINOGEN, URINE: NORMAL
WBC UA: ABNORMAL /HPF (ref 0–4)
YEAST: ABNORMAL

## 2018-04-20 PROCEDURE — 81001 URINALYSIS AUTO W/SCOPE: CPT

## 2018-04-20 PROCEDURE — 99214 OFFICE O/P EST MOD 30 MIN: CPT | Performed by: INTERNAL MEDICINE

## 2018-04-20 RX ORDER — PREDNISOLONE ACETATE 10 MG/ML
SUSPENSION/ DROPS OPHTHALMIC
Refills: 0 | COMMUNITY
Start: 2018-02-12 | End: 2018-07-17

## 2018-04-20 RX ORDER — ASPIRIN 81 MG/1
81 TABLET ORAL DAILY
COMMUNITY
End: 2021-03-08 | Stop reason: HOSPADM

## 2018-05-09 ENCOUNTER — TELEPHONE (OUTPATIENT)
Dept: INTERNAL MEDICINE | Age: 83
End: 2018-05-09

## 2018-05-10 ENCOUNTER — HOSPITAL ENCOUNTER (OUTPATIENT)
Dept: NON INVASIVE DIAGNOSTICS | Age: 83
Discharge: HOME | End: 2018-05-10
Payer: MEDICARE

## 2018-06-06 RX ORDER — LOSARTAN POTASSIUM 100 MG/1
TABLET ORAL
Qty: 90 TABLET | Refills: 3 | Status: ON HOLD | OUTPATIENT
Start: 2018-06-06 | End: 2019-06-01 | Stop reason: HOSPADM

## 2018-06-06 RX ORDER — PREDNISONE 1 MG/1
TABLET ORAL
Qty: 90 TABLET | Refills: 3 | Status: SHIPPED | OUTPATIENT
Start: 2018-06-06 | End: 2019-04-18 | Stop reason: SDUPTHER

## 2018-06-22 RX ORDER — FUROSEMIDE 20 MG/1
TABLET ORAL
Qty: 270 TABLET | Refills: 3 | Status: ON HOLD | OUTPATIENT
Start: 2018-06-22 | End: 2019-02-12 | Stop reason: HOSPADM

## 2018-06-22 RX ORDER — INSULIN LISPRO 100 [IU]/ML
INJECTION, SOLUTION INTRAVENOUS; SUBCUTANEOUS
Qty: 75 ML | Refills: 3 | Status: SHIPPED | OUTPATIENT
Start: 2018-06-22 | End: 2019-06-07

## 2018-06-29 RX ORDER — AMLODIPINE BESYLATE 5 MG/1
TABLET ORAL
Qty: 90 TABLET | Refills: 3 | Status: SHIPPED | OUTPATIENT
Start: 2018-06-29 | End: 2019-06-11 | Stop reason: SDUPTHER

## 2018-07-10 ENCOUNTER — HOSPITAL ENCOUNTER (OUTPATIENT)
Dept: LAB | Age: 83
Setting detail: SPECIMEN
Discharge: HOME | End: 2018-07-10
Payer: MEDICARE

## 2018-07-10 DIAGNOSIS — E11.21 TYPE 2 DIABETES WITH NEPHROPATHY (HCC): ICD-10-CM

## 2018-07-10 DIAGNOSIS — M35.3 PMR (POLYMYALGIA RHEUMATICA) (HCC): ICD-10-CM

## 2018-07-10 DIAGNOSIS — E78.5 DYSLIPIDEMIA: ICD-10-CM

## 2018-07-10 DIAGNOSIS — E55.9 VITAMIN D DEFICIENCY: ICD-10-CM

## 2018-07-10 LAB
ABSOLUTE EOS #: 0.1 K/UL (ref 0–0.4)
ABSOLUTE IMMATURE GRANULOCYTE: ABNORMAL K/UL (ref 0–0.3)
ABSOLUTE LYMPH #: 2.69 K/UL (ref 1–4.8)
ABSOLUTE MONO #: 0.77 K/UL (ref 0.1–1.2)
ALT SERPL-CCNC: 15 U/L (ref 5–33)
ANION GAP SERPL CALCULATED.3IONS-SCNC: 14 MMOL/L (ref 9–17)
AST SERPL-CCNC: 15 U/L
BASOPHILS # BLD: 1 % (ref 0–1)
BASOPHILS ABSOLUTE: 0.1 K/UL (ref 0–0.2)
BUN BLDV-MCNC: 16 MG/DL (ref 8–23)
BUN/CREAT BLD: 16 (ref 9–20)
CALCIUM SERPL-MCNC: 9.7 MG/DL (ref 8.6–10.4)
CHLORIDE BLD-SCNC: 102 MMOL/L (ref 98–107)
CHOLESTEROL/HDL RATIO: 2.5
CHOLESTEROL: 173 MG/DL
CO2: 29 MMOL/L (ref 20–31)
CREAT SERPL-MCNC: 1.01 MG/DL (ref 0.5–0.9)
DIFFERENTIAL TYPE: ABNORMAL
EOSINOPHILS RELATIVE PERCENT: 1 % (ref 1–7)
ESTIMATED AVERAGE GLUCOSE: 140 MG/DL
GFR AFRICAN AMERICAN: >60 ML/MIN
GFR NON-AFRICAN AMERICAN: 52 ML/MIN
GFR SERPL CREATININE-BSD FRML MDRD: ABNORMAL ML/MIN/{1.73_M2}
GFR SERPL CREATININE-BSD FRML MDRD: ABNORMAL ML/MIN/{1.73_M2}
GLUCOSE BLD-MCNC: 110 MG/DL (ref 70–99)
HBA1C MFR BLD: 6.5 % (ref 4.8–5.9)
HCT VFR BLD CALC: 35.1 % (ref 36–46)
HDLC SERPL-MCNC: 69 MG/DL
HEMOGLOBIN: 11 G/DL (ref 12–16)
IMMATURE GRANULOCYTES: ABNORMAL %
LDL CHOLESTEROL: 77 MG/DL (ref 0–130)
LYMPHOCYTES # BLD: 28 % (ref 16–46)
MCH RBC QN AUTO: 24.7 PG (ref 26–34)
MCHC RBC AUTO-ENTMCNC: 31.4 G/DL (ref 31–37)
MCV RBC AUTO: 78.8 FL (ref 80–100)
MONOCYTES # BLD: 8 % (ref 4–11)
MORPHOLOGY: ABNORMAL
NRBC AUTOMATED: ABNORMAL PER 100 WBC
PDW BLD-RTO: 18.1 % (ref 11–14.5)
PLATELET # BLD: 242 K/UL (ref 140–450)
PLATELET ESTIMATE: ABNORMAL
PMV BLD AUTO: 10 FL (ref 6–12)
POTASSIUM SERPL-SCNC: 4.2 MMOL/L (ref 3.7–5.3)
RBC # BLD: 4.46 M/UL (ref 4–5.2)
RBC # BLD: ABNORMAL 10*6/UL
SEDIMENTATION RATE, ERYTHROCYTE: 34 MM (ref 0–30)
SEG NEUTROPHILS: 62 % (ref 43–77)
SEGMENTED NEUTROPHILS ABSOLUTE COUNT: 5.94 K/UL (ref 1.8–7.7)
SODIUM BLD-SCNC: 145 MMOL/L (ref 135–144)
TOTAL CK: 73 U/L (ref 26–192)
TRIGL SERPL-MCNC: 136 MG/DL
VITAMIN D 25-HYDROXY: 36.7 NG/ML (ref 30–100)
VLDLC SERPL CALC-MCNC: NORMAL MG/DL (ref 1–30)
WBC # BLD: 9.6 K/UL (ref 3.5–11)
WBC # BLD: ABNORMAL 10*3/UL

## 2018-07-10 PROCEDURE — 85025 COMPLETE CBC W/AUTO DIFF WBC: CPT

## 2018-07-10 PROCEDURE — 84460 ALANINE AMINO (ALT) (SGPT): CPT

## 2018-07-10 PROCEDURE — 83036 HEMOGLOBIN GLYCOSYLATED A1C: CPT

## 2018-07-10 PROCEDURE — 84450 TRANSFERASE (AST) (SGOT): CPT

## 2018-07-10 PROCEDURE — 80048 BASIC METABOLIC PNL TOTAL CA: CPT

## 2018-07-10 PROCEDURE — 36415 COLL VENOUS BLD VENIPUNCTURE: CPT

## 2018-07-10 PROCEDURE — 82550 ASSAY OF CK (CPK): CPT

## 2018-07-10 PROCEDURE — 80061 LIPID PANEL: CPT

## 2018-07-10 PROCEDURE — 85651 RBC SED RATE NONAUTOMATED: CPT

## 2018-07-10 PROCEDURE — 82306 VITAMIN D 25 HYDROXY: CPT

## 2018-07-17 ENCOUNTER — OFFICE VISIT (OUTPATIENT)
Dept: INTERNAL MEDICINE | Age: 83
End: 2018-07-17
Payer: MEDICARE

## 2018-07-17 VITALS
HEIGHT: 61 IN | SYSTOLIC BLOOD PRESSURE: 136 MMHG | HEART RATE: 76 BPM | WEIGHT: 217 LBS | BODY MASS INDEX: 40.97 KG/M2 | DIASTOLIC BLOOD PRESSURE: 56 MMHG

## 2018-07-17 DIAGNOSIS — J45.20 MILD INTERMITTENT ASTHMA WITHOUT COMPLICATION: ICD-10-CM

## 2018-07-17 DIAGNOSIS — E78.5 DYSLIPIDEMIA: ICD-10-CM

## 2018-07-17 DIAGNOSIS — E55.9 VITAMIN D DEFICIENCY: ICD-10-CM

## 2018-07-17 DIAGNOSIS — D50.0 IRON DEFICIENCY ANEMIA DUE TO CHRONIC BLOOD LOSS: Primary | ICD-10-CM

## 2018-07-17 DIAGNOSIS — I10 ESSENTIAL HYPERTENSION: ICD-10-CM

## 2018-07-17 DIAGNOSIS — M35.3 PMR (POLYMYALGIA RHEUMATICA) (HCC): ICD-10-CM

## 2018-07-17 DIAGNOSIS — D50.0 IRON DEFICIENCY ANEMIA DUE TO CHRONIC BLOOD LOSS: ICD-10-CM

## 2018-07-17 DIAGNOSIS — E11.21 TYPE 2 DIABETES WITH NEPHROPATHY (HCC): ICD-10-CM

## 2018-07-17 DIAGNOSIS — I34.0 NON-RHEUMATIC MITRAL REGURGITATION: ICD-10-CM

## 2018-07-17 DIAGNOSIS — E66.01 OBESITY, MORBID, BMI 40.0-49.9 (HCC): ICD-10-CM

## 2018-07-17 PROCEDURE — 99214 OFFICE O/P EST MOD 30 MIN: CPT | Performed by: INTERNAL MEDICINE

## 2018-07-17 RX ORDER — FERROUS SULFATE 220 (44)/5
440 ELIXIR ORAL DAILY
Qty: 473 ML | Refills: 5
Start: 2018-07-17 | End: 2018-11-28 | Stop reason: SDUPTHER

## 2018-07-23 NOTE — PROGRESS NOTES
· Asthma  - Breathing has been stable. No current symptoms of exacerbation. No issues with medications. · Vitamin D deficiency  - No trouble with the vitamin D supplements, and recent level reviewed. · Dyslipidemia  - She is not having any difficulty with her cholesterol medications. No significant myalgias. · We talked about weight loss. Review of Systems:  Constitutional:  Negative for chills, fever, and weight loss. HENT:  Negative for congestion, ear pain, and sore throat. Eyes:  Negative for blurred vision, double vision, pain and discharge. Respiratory:  Negative for cough, shortness of breath, and wheezing. Cardiovascular:  Negative for chest pain, palpitations, and PND. Gastrointestinal:  Negative for abdominal pain, blood in stool, constipation, diarrhea, nausea and vomiting. Genitourinary:  Negative for dysuria, frequency, and hematuria. Musculoskeletal:  Negative for myalgias. Skin:  Negative for rash. Neurological:  Negative for tingling, sensory change, speech change, focal weakness, seizures, and headaches. Endo/Heme/Allergies:  Does not bruise/bleed easily. Psychiatric/Behavioral:  Negative for hallucinations and suicidal ideas.      Patient Active Problem List   Diagnosis    Dyslipidemia    Osteoarthritis    Esophagitis    Vitamin D deficiency    PM[facility]ym[facility]algia [facility]a) [facility]    Central artery occlusion of retina    Diastolic dysfunction    Pulmonary HTN    Iron deficiency anemia due to chronic blood loss    Type 2 diabetes with nephropathy ([facility])    CHICO- intolerant CPAP    Obesity, morbid, BMI 40.0-49.9 ([facility])    Essential hypertension    Lumbar radiculopathy    Neural foraminal stenosis of lumbar spine    Spinal stenosis at L4-L5 level    Mitral regurgitation    Frequent PVCs    Asthma       Medications:    Current Outpatient Prescriptions   Medication Sig Dispense Refill    amLODIPine (NORVASC) 5 MG tablet TAKE 1 TABLET DAILY 90 tablet 3    furosemide (LASIX) 20 MG tablet TAKE 3 TABLETS DAILY 270 tablet 3    HUMALOG KWIKPEN 100 UNIT/ML pen INJECT 10 UNITS WITH BREAKFAST, 16 UNITS AT NOON, 24 UNITS WITH SUPPER AND 10 UNITS AT NIGHT AS NEEDED 75 mL 3    predniSONE (DELTASONE) 5 MG tablet TAKE 1 TABLET DAILY 90 tablet 3    losartan (COZAAR) 100 MG tablet TAKE 1 TABLET DAILY 90 tablet 3    aspirin 81 MG EC tablet Take 81 mg by mouth daily      simvastatin (ZOCOR) 20 MG tablet TAKE 1 TABLET AT BEDTIME 90 tablet 3    hydrALAZINE (APRESOLINE) 25 MG tablet Take 1 tablet by mouth 3 times daily 270 tablet 3    Liraglutide (VICTOZA) 18 MG/3ML SOPN SC injection Inject 1.2 mg into the skin daily 18 mL 3    LANTUS SOLOSTAR 100 UNIT/ML injection pen INJECT 50 UNITS IN THE MORNING AND 48 UNITS AT BEDTIME 90 mL 3    metoprolol succinate (TOPROL XL) 50 MG extended release tablet TAKE 1 TABLET DAILY 90 tablet 3    KLOR-CON M10 10 MEQ extended release tablet TAKE 1 TABLET DAILY 90 tablet 3    acetaminophen (TYLENOL) 500 MG tablet Take 500 mg by mouth daily      omeprazole (PRILOSEC) 20 MG capsule Take 20 mg by mouth Daily  30 capsule 3    Cholecalciferol (VITAMIN D3) 2000 UNITS CAPS Take 2,000 Units by mouth daily       ferrous sulfate (FEROSUL) 220 (44 Fe) MG/5ML solution Take 10 mLs by mouth daily Mix with 2 oz Orange juice 473 mL 5    Handicap Placard MISC by Does not apply route EXP: 6/12/2020 1 each 0    B-D ULTRAFINE III SHORT PEN 31G X 8 MM MISC USE 7 DAILY 270 each 3    glucose blood VI test strips (ASCENSIA AUTODISC VI;ONE TOUCH ULTRA TEST VI) strip 1 each by In Vitro route 3 times daily As directed. 100 each 5    PATADAY 0.2 % SOLN ophthalmic solution Place 1 drop into both eyes daily as needed   0     No current facility-administered medications for this visit. Past Medical History:        Diagnosis Date    Allergic rhinitis     Asthma     PFT's, 04/06, actually showed mild restrictive defect.     Basal cell carcinoma of cheek 2007 Right cheek    Basal cell carcinoma of leg     right leg    CAD (coronary artery disease)     With 40% stenosis of the LAD, 07/10, ejection fraction 60%. Repeat heart cath9/14 with 4050 percent LAD lesion first diagonal 50% lesion rec med Rx    Central retinal artery occlusion     Right side November 2014 status post TPA    Cerebral artery occlusion with cerebral infarction ([facility] [address] 11/24/2014    Cerebrovascular disease     50-79% stenosis on left on ultrasound 11/14    CHF (congestive heart failure) ([facility])     Echo 1/15 moderate MR, severe TR, RVSP 76, grade 2 diastolic dysfunction    Diverticulosis     AVM on colonoscopy, 05/11    Dyslipidemia     Elevated antinuclear antibody (ZAIRA) level     History of    Esophagitis 05/2011    Gastritis/esophagitis on EGD, Dr. Colt Aguilar. Repeat EGD6/15 Gastritis    Foraminal stenosis of lumbar region     Moderate  Right L4/L5 neuroforaminal stenosis, Dr Bimal Sousa following. MRI 2/16 [place]. Moderate foraminal stenosis mild to moderate central canal stenosis    Hypertension     IBS (irritable bowel syndrome)     GI consultation with Dr. Ameya Jaimes, GI specialist in [facility], felt that she probably has chronic functional diarrhea and recommended empiric Imodium, Pepto-Bismol or Questran first. Sprue test Neg 2011    Iron deficiency anemia     Percent sat iron 5, January 2015, ferritin 40 1 /15, FOBT positive  EGD 6/15  Gastritis, IV iron 9/15x3 effective,  colonoscopy deferred by Dr. Colt Aguilar. --Prior colonoscopy 2011 with severe diverticulosis and telangiectasia. Trial iron solution in Orange juice 12/16    Junctional bradycardia     Symptomatic, resolved with discontinuation of Verapamil, 05/09. 1.1) Echocardiogram: LAE, LVH, EF 50%, mild MR, diastolic. 1.2) Dr. Shaista Carver evaluation. 1.3.) Persantine stress test negative, 05/09.  Migraine     Mild CAD     cath 10/15    Mitral regurgitation     Moderate to severe on echocardiogram September 2015.   Right pressure 76 grade 2 diastolic dysfunction,  echo 76/47 grade 2 diastolic dysfunction mild to moderate MR RVSP 56 aortic sclerosis    Obesity     CHICO (obstructive sleep apnea)     CPAP 14 2/15 initiation  AHI 7  mild CHICO intolerant CPAP    Osteoarthritis     PMR (polymyalgia rheumatica) (HCC)     Premature atrial contractions     Pulmonary hypertension     -Moderate on echo November 2014    Restrictive lung disease     Mild on PFTs 11/14    Type II or unspecified type diabetes mellitus without mention of complication, not stated as uncontrolled     Vitreous floaters        Family Hx & Social HX    family history includes Heart Disease in an other family member; High Blood Pressure in an other family member; Stroke in an other family member; Uterine Cancer in her mother. History   Smoking Status    Never Smoker   Smokeless Tobacco    Never Used     History   Alcohol Use No       Vitals:    07/17/18 0913   BP: (!) 136/56   Site: Right Arm   Position: Sitting   Cuff Size: Large Adult   Pulse: 76   Weight: 217 lb (98.4 kg)   Height: 5' 1\" (1.549 m)        Vitals Reviewed. Body mass index is 41 kg/m². Wt Readings from Last 3 Encounters:   07/17/18 217 lb (98.4 kg)   04/20/18 221 lb 6.4 oz (100.4 kg)   01/26/18 219 lb 9.6 oz (99.6 kg)     BP Readings from Last 3 Encounters:   07/17/18 (!) 136/56   04/20/18 108/60   01/26/18 136/72     Physical Examination:  Constitutional:  She appears well-developed and well-nourished. No distress. HENT:  Head: Normocephalic and atraumatic. Right Ear:  External ear normal.  Left Ear:  External ear normal.  Nose:  Nose normal.  Mouth/Throat:  Oropharynx is clear and moist.  Eyes: Conjunctivae and EOM are normal.  Pupils are equal, round, and reactive to light. Right eye exhibits no discharge. Left eye exhibits no discharge. No scleral icterus. Neck:  Normal range of motion. Neck supple. No JVD present. No tracheal deviation present. No thyromegaly present.   Cardiovascular:

## 2018-08-06 RX ORDER — POTASSIUM CHLORIDE 750 MG/1
TABLET, EXTENDED RELEASE ORAL
Qty: 90 TABLET | Refills: 3 | Status: SHIPPED | OUTPATIENT
Start: 2018-08-06 | End: 2019-08-01 | Stop reason: SDUPTHER

## 2018-09-17 RX ORDER — METOPROLOL SUCCINATE 50 MG/1
TABLET, EXTENDED RELEASE ORAL
Qty: 90 TABLET | Refills: 3 | Status: SHIPPED | OUTPATIENT
Start: 2018-09-17 | End: 2019-06-06

## 2018-09-19 RX ORDER — PEN NEEDLE, DIABETIC 31 GX5/16"
NEEDLE, DISPOSABLE MISCELLANEOUS
Qty: 270 EACH | Refills: 3 | Status: SHIPPED | OUTPATIENT
Start: 2018-09-19 | End: 2019-03-28 | Stop reason: SDUPTHER

## 2018-09-26 RX ORDER — INSULIN GLARGINE 100 [IU]/ML
INJECTION, SOLUTION SUBCUTANEOUS
Qty: 90 ML | Refills: 3 | Status: SHIPPED | OUTPATIENT
Start: 2018-09-26 | End: 2019-06-07

## 2018-10-12 RX ORDER — LIRAGLUTIDE 6 MG/ML
1.2 INJECTION SUBCUTANEOUS DAILY
Qty: 18 ML | Refills: 3 | Status: SHIPPED | OUTPATIENT
Start: 2018-10-12 | End: 2019-12-09 | Stop reason: SDUPTHER

## 2018-10-19 ENCOUNTER — HOSPITAL ENCOUNTER (OUTPATIENT)
Dept: LAB | Age: 83
Discharge: HOME | End: 2018-10-19
Payer: MEDICARE

## 2018-10-19 ENCOUNTER — HOSPITAL ENCOUNTER (OUTPATIENT)
Dept: NON INVASIVE DIAGNOSTICS | Age: 83
Discharge: HOME | End: 2018-10-19
Payer: MEDICARE

## 2018-10-19 DIAGNOSIS — D50.0 IRON DEFICIENCY ANEMIA DUE TO CHRONIC BLOOD LOSS: ICD-10-CM

## 2018-10-19 DIAGNOSIS — E11.21 TYPE 2 DIABETES WITH NEPHROPATHY (HCC): ICD-10-CM

## 2018-10-19 LAB
ABSOLUTE EOS #: 0.2 K/UL (ref 0–0.4)
ABSOLUTE IMMATURE GRANULOCYTE: ABNORMAL K/UL (ref 0–0.3)
ABSOLUTE LYMPH #: 2.7 K/UL (ref 1–4.8)
ABSOLUTE MONO #: 0.9 K/UL (ref 0.1–1.2)
BASOPHILS # BLD: 1 % (ref 0–1)
BASOPHILS ABSOLUTE: 0.1 K/UL (ref 0–0.2)
DIFFERENTIAL TYPE: ABNORMAL
EOSINOPHILS RELATIVE PERCENT: 2 % (ref 1–7)
ESTIMATED AVERAGE GLUCOSE: 137 MG/DL
HBA1C MFR BLD: 6.4 % (ref 4.8–5.9)
HCT VFR BLD CALC: 38.5 % (ref 36–46)
HEMOGLOBIN: 12.1 G/DL (ref 12–16)
IMMATURE GRANULOCYTES: ABNORMAL %
LV EF: 55 %
LVEF MODALITY: NORMAL
LYMPHOCYTES # BLD: 29 % (ref 16–46)
MCH RBC QN AUTO: 26.2 PG (ref 26–34)
MCHC RBC AUTO-ENTMCNC: 31.4 G/DL (ref 31–37)
MCV RBC AUTO: 83.4 FL (ref 80–100)
MONOCYTES # BLD: 10 % (ref 4–11)
NRBC AUTOMATED: ABNORMAL PER 100 WBC
PDW BLD-RTO: 18.4 % (ref 11–14.5)
PLATELET # BLD: 215 K/UL (ref 140–450)
PLATELET ESTIMATE: ABNORMAL
PMV BLD AUTO: 10.1 FL (ref 6–12)
RBC # BLD: 4.62 M/UL (ref 4–5.2)
RBC # BLD: ABNORMAL 10*6/UL
SEG NEUTROPHILS: 58 % (ref 43–77)
SEGMENTED NEUTROPHILS ABSOLUTE COUNT: 5.4 K/UL (ref 1.8–7.7)
WBC # BLD: 9.3 K/UL (ref 3.5–11)
WBC # BLD: ABNORMAL 10*3/UL

## 2018-10-19 PROCEDURE — 85025 COMPLETE CBC W/AUTO DIFF WBC: CPT

## 2018-10-19 PROCEDURE — 36415 COLL VENOUS BLD VENIPUNCTURE: CPT

## 2018-10-19 PROCEDURE — 93306 TTE W/DOPPLER COMPLETE: CPT

## 2018-10-19 PROCEDURE — 83036 HEMOGLOBIN GLYCOSYLATED A1C: CPT

## 2018-10-26 ENCOUNTER — OFFICE VISIT (OUTPATIENT)
Dept: INTERNAL MEDICINE | Age: 83
End: 2018-10-26
Payer: MEDICARE

## 2018-10-26 VITALS
HEIGHT: 61 IN | BODY MASS INDEX: 41.27 KG/M2 | WEIGHT: 218.6 LBS | SYSTOLIC BLOOD PRESSURE: 120 MMHG | HEART RATE: 80 BPM | DIASTOLIC BLOOD PRESSURE: 72 MMHG

## 2018-10-26 DIAGNOSIS — R26.9 GAIT ABNORMALITY: ICD-10-CM

## 2018-10-26 DIAGNOSIS — I51.89 DIASTOLIC DYSFUNCTION: ICD-10-CM

## 2018-10-26 DIAGNOSIS — Z13.6 SCREENING FOR CARDIOVASCULAR CONDITION: ICD-10-CM

## 2018-10-26 DIAGNOSIS — I10 ESSENTIAL HYPERTENSION: ICD-10-CM

## 2018-10-26 DIAGNOSIS — D50.0 IRON DEFICIENCY ANEMIA DUE TO CHRONIC BLOOD LOSS: ICD-10-CM

## 2018-10-26 DIAGNOSIS — E11.21 TYPE 2 DIABETES WITH NEPHROPATHY (HCC): ICD-10-CM

## 2018-10-26 DIAGNOSIS — E78.5 DYSLIPIDEMIA: ICD-10-CM

## 2018-10-26 DIAGNOSIS — E55.9 VITAMIN D DEFICIENCY: ICD-10-CM

## 2018-10-26 DIAGNOSIS — M35.3 PMR (POLYMYALGIA RHEUMATICA) (HCC): ICD-10-CM

## 2018-10-26 DIAGNOSIS — Z00.00 ROUTINE GENERAL MEDICAL EXAMINATION AT A HEALTH CARE FACILITY: Primary | ICD-10-CM

## 2018-10-26 DIAGNOSIS — K52.9 CHRONIC DIARRHEA: ICD-10-CM

## 2018-10-26 DIAGNOSIS — I34.0 NON-RHEUMATIC MITRAL REGURGITATION: ICD-10-CM

## 2018-10-26 DIAGNOSIS — I27.20 PULMONARY HTN (HCC): ICD-10-CM

## 2018-10-26 DIAGNOSIS — J45.20 MILD INTERMITTENT ASTHMA WITHOUT COMPLICATION: ICD-10-CM

## 2018-10-26 PROCEDURE — 99213 OFFICE O/P EST LOW 20 MIN: CPT | Performed by: INTERNAL MEDICINE

## 2018-10-26 PROCEDURE — 90662 IIV NO PRSV INCREASED AG IM: CPT | Performed by: INTERNAL MEDICINE

## 2018-10-26 PROCEDURE — G0446 INTENS BEHAVE THER CARDIO DX: HCPCS | Performed by: INTERNAL MEDICINE

## 2018-10-26 PROCEDURE — G0008 ADMIN INFLUENZA VIRUS VAC: HCPCS | Performed by: INTERNAL MEDICINE

## 2018-10-26 PROCEDURE — G0439 PPPS, SUBSEQ VISIT: HCPCS | Performed by: INTERNAL MEDICINE

## 2018-10-26 ASSESSMENT — ANXIETY QUESTIONNAIRES: GAD7 TOTAL SCORE: 1

## 2018-10-26 ASSESSMENT — PATIENT HEALTH QUESTIONNAIRE - PHQ9
SUM OF ALL RESPONSES TO PHQ QUESTIONS 1-9: 1
SUM OF ALL RESPONSES TO PHQ QUESTIONS 1-9: 1

## 2018-10-26 ASSESSMENT — LIFESTYLE VARIABLES: HOW OFTEN DO YOU HAVE A DRINK CONTAINING ALCOHOL: 0

## 2018-10-26 NOTE — PROGRESS NOTES
route EXP: 6/12/2020 Yes Fe Frausto MD   predniSONE (DELTASONE) 5 MG tablet TAKE 1 TABLET DAILY Yes Fe Frausto MD   losartan (COZAAR) 100 MG tablet TAKE 1 TABLET DAILY Yes Fe Frausto MD   aspirin 81 MG EC tablet Take 81 mg by mouth daily Yes Historical Provider, MD   simvastatin (ZOCOR) 20 MG tablet TAKE 1 TABLET AT BEDTIME Yes Fe Frausto MD   hydrALAZINE (APRESOLINE) 25 MG tablet Take 1 tablet by mouth 3 times daily Yes Fe Frausto MD   glucose blood VI test strips (ASCENSIA AUTODISC VI;ONE TOUCH ULTRA TEST VI) strip 1 each by In Vitro route 3 times daily As directed. Yes Gaurang Cherry DO   acetaminophen (TYLENOL) 500 MG tablet Take 500 mg by mouth daily Yes Historical Provider, MD   PATADAY 0.2 % SOLN ophthalmic solution Place 1 drop into both eyes daily as needed  Yes Historical Provider, MD   omeprazole (PRILOSEC) 20 MG capsule Take 20 mg by mouth Daily  Yes Eluterio Rama   Cholecalciferol (VITAMIN D3) 2000 UNITS CAPS Take 2,000 Units by mouth daily  Yes Historical Provider, MD     Past Medical History:   Diagnosis Date    Allergic rhinitis     Asthma     PFT's, 04/06, actually showed mild restrictive defect.  Basal cell carcinoma of cheek 2007    Right cheek    Basal cell carcinoma of leg     right leg    CAD (coronary artery disease)     With 40% stenosis of the LAD, 07/10, ejection fraction 60%.   Repeat heart cath9/14 with 40-50 percent LAD lesion first diagonal 50% lesion rec med Rx    Central retinal artery occlusion     Right side November 2014 status post TPA    Cerebral artery occlusion with cerebral infarction [facility]) 11/24/2014    Cerebrovascular disease     50-79% stenosis on left on ultrasound 11/14    CHF (congestive heart failure) (HCC)     Echo 1/15 moderate MR, severe TR, RVSP 76, grade 2 diastolic dysfunction    Diverticulosis     AVM on colonoscopy, 05/11    Dyslipidemia     Elevated antinuclear antibody (ZAIRA) level     History of    walking/balance is gradually worsening. Personalized Preventive Plan   Current Health Maintenance Status  Immunization History   Administered Date(s) Administered    DT 03/11/2010    Influenza Virus Vaccine 10/21/2010, 10/05/2011, 10/19/2012    Influenza, High Dose (Fluzone 65 yrs and older) 10/14/2013, 10/09/2014, 10/02/2015, 09/02/2016, 10/24/2017    Pneumococcal 13-valent Conjugate ([id number]) 04/02/2015    Pneumococcal Polysaccharide ([id number]) 11/04/1997, 11/12/2008    Zoster Live (Zostavax) 01/05/2012        Health Maintenance   Topic Date Due    DTaP/Tdap/Td vaccine (1 - Tdap) 03/12/2010    Shingles Vaccine (1 of 2 - 2 Dose Series) 03/05/2012    Flu vaccine (1) 09/01/2018    Diabetic foot exam  10/24/2018    Diabetic retinal exam  02/12/2019    A1C test (Diabetic or Prediabetic)  04/19/2019    Lipid screen  07/10/2019    Potassium monitoring  07/10/2019    Creatinine monitoring  07/10/2019    DEXA (modify frequency per FRAX score)  Addressed    Pneumococcal low/med risk  Completed     Recommendations for Preventive Services Due: see orders and patient instructions/AVS.  . Recommended screening schedule for the next 5-10 years is provided to the patient in written form: see Patient Instructions/AVS.      Cardiovascular Disease Risk Counseling: Assessed the patient's risk to develop cardiovascular disease and reviewed main risk factors.    Reviewed steps to reduce disease risk including:   · Quitting tobacco use, reducing amount smoked, or not starting the habit  · Making healthy food choices  · Being physically active and gradualy increasing activity levels   · Reduce weight and determine a healthy BMI goal  · Monitor blood pressure and treat if higher than 140/90 mmHg  · Maintain blood total cholesterol levels under 5 mmol/l or 190 mg/dl  · Maintain LDL cholesterol levels under 3.0 mmol/l or 115 mg/dl   · Control blood glucose levels  · Consider taking aspirin (75 mg daily), once blood

## 2018-10-26 NOTE — PROGRESS NOTES
Have you had an allergic reaction to the flu (influenza) shot? no  Are you allergic to eggs or any component of the flu vaccine? no  Do you have a history of Guillain-[place] Syndrome (GBS), which is paralysis after receiving the flu vaccine? no  Are you feeling well today? yes  Flu vaccine given as ordered. Patient tolerated it well. No questions re: VIS information.

## 2018-10-28 PROBLEM — R26.9 GAIT ABNORMALITY: Status: ACTIVE | Noted: 2018-10-28

## 2018-10-30 ENCOUNTER — HOSPITAL ENCOUNTER (OUTPATIENT)
Age: 83
Setting detail: SPECIMEN
Discharge: HOME | End: 2018-10-30
Payer: MEDICARE

## 2018-10-30 DIAGNOSIS — K52.9 CHRONIC DIARRHEA: ICD-10-CM

## 2018-10-30 DIAGNOSIS — I10 ESSENTIAL HYPERTENSION: ICD-10-CM

## 2018-10-30 PROCEDURE — 83520 IMMUNOASSAY QUANT NOS NONAB: CPT

## 2018-10-30 RX ORDER — HYDRALAZINE HYDROCHLORIDE 25 MG/1
TABLET, FILM COATED ORAL
Qty: 270 TABLET | Refills: 3 | Status: SHIPPED | OUTPATIENT
Start: 2018-10-30 | End: 2019-09-05 | Stop reason: DRUGHIGH

## 2018-10-31 NOTE — PROGRESS NOTES
Yes    Assessment/Plan:    1. Routine general medical examination at a health care facility    2. Screening for cardiovascular condition  - TN Intens behave ther cardio dx, 15 minutes []    3. Type 2 diabetes with nephropathy ([facility] [place] [address]  HA1c reviewed at 6.4. Continue current regimen. Continue to monitor. Try to work on diet. -  DIABETES FOOT EXAM  - Hemoglobin A1C; Future    4. Chronic diarrhea  Previous evaluation noted. Check stool for fecal elastase to help assess for the possibility of exocrine pancreatic insufficiency. - Pancreatic Elastase, Fecal; Future    5. Dyslipidemia  Doing well on the statin. Recent lipids reviewed. 6. Essential hypertension  Well controlled. No change. 7. Non-rheumatic mitral regurgitation  8. Diastolic dysfunction  Echocardiogram from 10/2018 shows mild to moderate mitral regurgitation, grade 2 diastolic dysfunction. 9. Pulmonary HTN ([facility] [place] [address]  Reviewed on recent echo, characterized as mild based on those numbers. Stable. Monitor. 10. Iron deficiency anemia due to chronic blood loss  Fortunately, this looks considerably better on the iron. I have encouraged her to stick with this. We had referred her back to Dr. Oleg Maxwell for evaluation 2 or 3 years ago but they deferred colonoscopy because they felt that it was likely the AVMs or telangectasia on the colonic exam explained this. EGD was done at that time. She will continue the iron and we will continue to monitor. 11. PMR (polymyalgia rheumatica) (HCC)  Stable. Continue to monitor. 12. Vitamin D deficiency  Continue supplement. Continue to monitor. 13. Mild intermittent asthma without complication  Stable. Continue to monitor. 14. Gait abnormality  She feels she is getting along okay with the walker and she declines intervention with physical therapy at this point although she may consider that later. 15. She declines shingles shot but was given the flu shot today.     She will call if any problems prior to return. No orders of the defined types were placed in this encounter.        Jp Workman am scribing for Harsh Arellano MD 10/31/2018 at 10:50 AM.

## 2018-11-03 LAB — FECAL PANCREATIC ELASTASE-1: 338 UG/G

## 2018-11-28 DIAGNOSIS — D50.0 IRON DEFICIENCY ANEMIA DUE TO CHRONIC BLOOD LOSS: ICD-10-CM

## 2018-11-28 RX ORDER — FERROUS SULFATE 220 (44)/5
ELIXIR ORAL
Qty: 473 ML | Refills: 5 | Status: SHIPPED | OUTPATIENT
Start: 2018-11-28 | End: 2019-07-03

## 2018-12-03 ENCOUNTER — OFFICE VISIT (OUTPATIENT)
Dept: OBGYN | Age: 83
End: 2018-12-03
Payer: MEDICARE

## 2018-12-03 ENCOUNTER — HOSPITAL ENCOUNTER (OUTPATIENT)
Age: 83
Setting detail: SPECIMEN
Discharge: HOME | End: 2018-12-03
Payer: MEDICARE

## 2018-12-03 VITALS
BODY MASS INDEX: 40.78 KG/M2 | WEIGHT: 216 LBS | HEART RATE: 88 BPM | DIASTOLIC BLOOD PRESSURE: 60 MMHG | HEIGHT: 61 IN | SYSTOLIC BLOOD PRESSURE: 150 MMHG

## 2018-12-03 DIAGNOSIS — N94.9 VAGINAL BURNING: Primary | ICD-10-CM

## 2018-12-03 DIAGNOSIS — B37.31 MONILIAL VAGINITIS: ICD-10-CM

## 2018-12-03 DIAGNOSIS — N94.9 VAGINAL BURNING: ICD-10-CM

## 2018-12-03 DIAGNOSIS — Z12.31 VISIT FOR SCREENING MAMMOGRAM: ICD-10-CM

## 2018-12-03 DIAGNOSIS — Z12.72 SCREENING FOR VAGINAL CANCER: ICD-10-CM

## 2018-12-03 DIAGNOSIS — Z01.419 WELL FEMALE EXAM WITH ROUTINE GYNECOLOGICAL EXAM: ICD-10-CM

## 2018-12-03 LAB
CLUE CELLS: NEGATIVE
HYPHAL YEAST: ABNORMAL
KOH RESULT: NEGATIVE
TRICHOMONAS: NEGATIVE
WET PREP: ABNORMAL
WHITE BLOOD CELLS: NEGATIVE

## 2018-12-03 PROCEDURE — G0101 CA SCREEN;PELVIC/BREAST EXAM: HCPCS | Performed by: NURSE PRACTITIONER

## 2018-12-03 PROCEDURE — 87070 CULTURE OTHR SPECIMN AEROBIC: CPT

## 2018-12-03 PROCEDURE — 86403 PARTICLE AGGLUT ANTBDY SCRN: CPT

## 2018-12-03 PROCEDURE — 99214 OFFICE O/P EST MOD 30 MIN: CPT | Performed by: NURSE PRACTITIONER

## 2018-12-03 PROCEDURE — P3000 SCREEN PAP BY TECH W MD SUPV: HCPCS

## 2018-12-03 PROCEDURE — 87510 GARDNER VAG DNA DIR PROBE: CPT

## 2018-12-03 PROCEDURE — 87210 SMEAR WET MOUNT SALINE/INK: CPT | Performed by: NURSE PRACTITIONER

## 2018-12-03 PROCEDURE — 87660 TRICHOMONAS VAGIN DIR PROBE: CPT

## 2018-12-03 PROCEDURE — 87480 CANDIDA DNA DIR PROBE: CPT

## 2018-12-03 NOTE — PROGRESS NOTES
supple. There were no masses   The thyroid was not enlarged and had no masses. PERRLA, Nares Patent No Masses    Respiratory: The lungs were auscultated and found to be clear. There were no rales, rhonchi or wheezes. There was a good respiratory effort. Cardiovascular: The heart was in a regular rate and rhythm. . No S3 or S4. There was no murmur appreciated. Extremities: The patients extremities were without calf tenderness, edema, or varicosities. There was full range of motion in all four extremities. Pulses in all four extremities    Abdomen: The abdomen was soft and non-tender. There were good bowel sounds in all quadrants and there was no guarding, rebound or rigidity. On evaluation there was no evidence of hepatosplenomegaly and there was no costal vertebral hemal tenderness bilaterally. No hernias were appreciated. Psych: The patient had a normal Orientation to: Time, Place, Person, and Situation  Mood and affect appropriate    Breast:  (Chest)  normal appearance, no masses or tenderness, Inspection negative, No nipple retraction or dimpling, No nipple discharge or bleeding, No axillary or supraclavicular adenopathy, Normal to palpation without dominant masses, negative findings: normal in size and symmetry, normal contour with no evidence of flattening or dimpling, skin normal, nipples everted without rashes or discharge, palpation negative for masses or nodules, no palpable axillary lymphadenopathy, dense tissue bilaterally      Pelvic Exam:  External genitalia: normal general appearance  Urinary system: urethral meatus normal  Vaginal: atrophic mucosa and discharge, white  Cervix: absent and removed surgically  Adnexa: normal bimanual exam and non palpable  Uterus: absent and removed surgically  Thin prep Pap obtained from vaginal apex   Exam findings compromised by patient's body habitus.    Saline prop positive for monilia  Vaginal culture and vaginitis DNA swab hypertension    Lumbar radiculopathy    Neural foraminal stenosis of lumbar spine    Spinal stenosis at L4-L5 level    Mitral regurgitation    Frequent PVCs    Asthma    Gait abnormality          Hereditary Breast, Ovarian, Colon and Uterine Cancer screening Done. Tobacco & Secondary smoke risks reviewed; instructed on cessation and avoidance    PLAN:  No Follow-up on file. Repeat pap per ASCCP 2013 guidelines  Return for annual exams  Mammograms every 1 year. If 38 yo and last mammogram was negative. Routine health maintenance per patients PCP. Orders Placed This Encounter   Procedures    VAGINITIS DNA PROBE     Standing Status:   Future     Standing Expiration Date:   1/3/2019    Culture, Genital     Standing Status:   Future     Standing Expiration Date:   1/3/2019    DEMARCUS DIGITAL SCREEN W CAD BILATERAL     Standing Status:   Future     Standing Expiration Date:   7/25/2020     Scheduling Instructions: On the day of the exam, the patient should not wear deodorant, lotion, or powder on the breast or underarm area. Wear a two piece outfit and be prepared to give information about prior breast procedures including mammograms, biopsies, or surgeries. If you've had mammograms done elsewhere, please request any previous mammogram films from those organizations prior to your appointment.      Order Specific Question:   Reason for exam:     Answer:   SCREENING MAMMOGRAM    DEMARCUS DIGITAL SCREEN W CAD BILATERAL     Standing Status:   Future     Standing Expiration Date:   3/13/2019     Scheduling Instructions:      Screeing     Order Specific Question:   Reason for exam:     Answer:   screening    PAP SMEAR     Standing Status:   Future     Standing Expiration Date:   2/3/2019     Order Specific Question:   Collection Type     Answer:   Conventional     Order Specific Question:   Prior Abnormal Pap Test     Answer:   No     Order Specific Question:   Screening or Diagnostic     Answer:

## 2018-12-04 LAB
DIRECT EXAM: ABNORMAL
Lab: ABNORMAL
SPECIMEN DESCRIPTION: ABNORMAL
STATUS: ABNORMAL

## 2018-12-05 DIAGNOSIS — N76.0 BV (BACTERIAL VAGINOSIS): Primary | ICD-10-CM

## 2018-12-05 DIAGNOSIS — B96.89 BV (BACTERIAL VAGINOSIS): Primary | ICD-10-CM

## 2018-12-05 RX ORDER — METRONIDAZOLE 500 MG/1
500 TABLET ORAL 2 TIMES DAILY
Qty: 14 TABLET | Refills: 0 | Status: SHIPPED | OUTPATIENT
Start: 2018-12-05 | End: 2018-12-12

## 2018-12-07 LAB
CULTURE: ABNORMAL
Lab: ABNORMAL
SPECIMEN DESCRIPTION: ABNORMAL
STATUS: ABNORMAL

## 2018-12-10 ENCOUNTER — HOSPITAL ENCOUNTER (OUTPATIENT)
Dept: MAMMOGRAPHY | Age: 83
Discharge: HOME | End: 2018-12-12
Payer: MEDICARE

## 2018-12-10 DIAGNOSIS — Z12.31 VISIT FOR SCREENING MAMMOGRAM: ICD-10-CM

## 2018-12-10 PROCEDURE — 77063 BREAST TOMOSYNTHESIS BI: CPT

## 2018-12-17 LAB — CYTOLOGY REPORT: NORMAL

## 2018-12-27 DIAGNOSIS — E78.5 DYSLIPIDEMIA: ICD-10-CM

## 2018-12-27 RX ORDER — SIMVASTATIN 20 MG
20 TABLET ORAL NIGHTLY
Qty: 90 TABLET | Refills: 3 | Status: SHIPPED | OUTPATIENT
Start: 2018-12-27 | End: 2020-01-13

## 2019-01-16 ENCOUNTER — HOSPITAL ENCOUNTER (OUTPATIENT)
Dept: LAB | Age: 84
Discharge: HOME | End: 2019-01-16
Payer: MEDICARE

## 2019-01-16 DIAGNOSIS — E11.21 TYPE 2 DIABETES WITH NEPHROPATHY (HCC): ICD-10-CM

## 2019-01-16 LAB
ESTIMATED AVERAGE GLUCOSE: 137 MG/DL
HBA1C MFR BLD: 6.4 % (ref 4.8–5.9)

## 2019-01-16 PROCEDURE — 83036 HEMOGLOBIN GLYCOSYLATED A1C: CPT

## 2019-01-16 PROCEDURE — 36415 COLL VENOUS BLD VENIPUNCTURE: CPT

## 2019-01-21 ENCOUNTER — TELEPHONE (OUTPATIENT)
Dept: INTERNAL MEDICINE | Age: 84
End: 2019-01-21

## 2019-01-21 ENCOUNTER — OFFICE VISIT (OUTPATIENT)
Dept: INTERNAL MEDICINE | Age: 84
End: 2019-01-21
Payer: MEDICARE

## 2019-01-21 ENCOUNTER — HOSPITAL ENCOUNTER (OUTPATIENT)
Dept: LAB | Age: 84
Discharge: HOME | End: 2019-01-21
Payer: MEDICARE

## 2019-01-21 VITALS
HEIGHT: 61 IN | WEIGHT: 214 LBS | SYSTOLIC BLOOD PRESSURE: 134 MMHG | HEART RATE: 76 BPM | DIASTOLIC BLOOD PRESSURE: 56 MMHG | BODY MASS INDEX: 40.4 KG/M2

## 2019-01-21 DIAGNOSIS — M79.10 MUSCLE ACHE: ICD-10-CM

## 2019-01-21 DIAGNOSIS — E55.9 VITAMIN D DEFICIENCY: ICD-10-CM

## 2019-01-21 DIAGNOSIS — I10 ESSENTIAL HYPERTENSION: ICD-10-CM

## 2019-01-21 DIAGNOSIS — R26.9 GAIT ABNORMALITY: ICD-10-CM

## 2019-01-21 DIAGNOSIS — I51.89 DIASTOLIC DYSFUNCTION: ICD-10-CM

## 2019-01-21 DIAGNOSIS — I34.0 NON-RHEUMATIC MITRAL REGURGITATION: ICD-10-CM

## 2019-01-21 DIAGNOSIS — M35.3 PMR (POLYMYALGIA RHEUMATICA) (HCC): ICD-10-CM

## 2019-01-21 DIAGNOSIS — R30.0 DYSURIA: ICD-10-CM

## 2019-01-21 DIAGNOSIS — I27.20 PULMONARY HTN (HCC): ICD-10-CM

## 2019-01-21 DIAGNOSIS — E78.5 DYSLIPIDEMIA: ICD-10-CM

## 2019-01-21 DIAGNOSIS — D50.0 IRON DEFICIENCY ANEMIA DUE TO CHRONIC BLOOD LOSS: ICD-10-CM

## 2019-01-21 DIAGNOSIS — E66.01 OBESITY, MORBID, BMI 40.0-49.9 (HCC): ICD-10-CM

## 2019-01-21 DIAGNOSIS — M19.91 PRIMARY OSTEOARTHRITIS, UNSPECIFIED SITE: ICD-10-CM

## 2019-01-21 DIAGNOSIS — E11.21 TYPE 2 DIABETES WITH NEPHROPATHY (HCC): Primary | ICD-10-CM

## 2019-01-21 DIAGNOSIS — M48.061 NEURAL FORAMINAL STENOSIS OF LUMBAR SPINE: ICD-10-CM

## 2019-01-21 LAB
-: ABNORMAL
AMORPHOUS: ABNORMAL
BACTERIA: ABNORMAL
BILIRUBIN URINE: NEGATIVE
CASTS UA: ABNORMAL /LPF (ref 0–2)
COLOR: ABNORMAL
COMMENT UA: ABNORMAL
CRYSTALS, UA: ABNORMAL /HPF
EPITHELIAL CELLS UA: ABNORMAL /HPF (ref 0–5)
GLUCOSE URINE: NEGATIVE
KETONES, URINE: NEGATIVE
LEUKOCYTE ESTERASE, URINE: ABNORMAL
MUCUS: ABNORMAL
NITRITE, URINE: NEGATIVE
OTHER OBSERVATIONS UA: ABNORMAL
PH UA: 6 (ref 5–6)
PROTEIN UA: NEGATIVE
RBC UA: ABNORMAL /HPF (ref 0–4)
RENAL EPITHELIAL, UA: ABNORMAL /HPF
SEDIMENTATION RATE, ERYTHROCYTE: 47 MM (ref 0–30)
SPECIFIC GRAVITY UA: 1.01 (ref 1.01–1.02)
TOTAL CK: 71 U/L (ref 26–192)
TRICHOMONAS: ABNORMAL
TURBIDITY: ABNORMAL
URINE HGB: ABNORMAL
UROBILINOGEN, URINE: NORMAL
WBC UA: ABNORMAL /HPF (ref 0–4)
YEAST: ABNORMAL

## 2019-01-21 PROCEDURE — 85651 RBC SED RATE NONAUTOMATED: CPT

## 2019-01-21 PROCEDURE — 99214 OFFICE O/P EST MOD 30 MIN: CPT | Performed by: INTERNAL MEDICINE

## 2019-01-21 PROCEDURE — 87086 URINE CULTURE/COLONY COUNT: CPT

## 2019-01-21 PROCEDURE — 36415 COLL VENOUS BLD VENIPUNCTURE: CPT

## 2019-01-21 PROCEDURE — 82550 ASSAY OF CK (CPK): CPT

## 2019-01-21 PROCEDURE — 81001 URINALYSIS AUTO W/SCOPE: CPT

## 2019-01-21 PROCEDURE — 87077 CULTURE AEROBIC IDENTIFY: CPT

## 2019-01-21 PROCEDURE — 87186 SC STD MICRODIL/AGAR DIL: CPT

## 2019-01-21 RX ORDER — SULFAMETHOXAZOLE AND TRIMETHOPRIM 800; 160 MG/1; MG/1
1 TABLET ORAL 2 TIMES DAILY
Qty: 10 TABLET | Refills: 0 | Status: SHIPPED | OUTPATIENT
Start: 2019-01-21 | End: 2019-01-26

## 2019-01-21 ASSESSMENT — PATIENT HEALTH QUESTIONNAIRE - PHQ9
SUM OF ALL RESPONSES TO PHQ QUESTIONS 1-9: 0
2. FEELING DOWN, DEPRESSED OR HOPELESS: 0
SUM OF ALL RESPONSES TO PHQ9 QUESTIONS 1 & 2: 0
SUM OF ALL RESPONSES TO PHQ QUESTIONS 1-9: 0
1. LITTLE INTEREST OR PLEASURE IN DOING THINGS: 0

## 2019-01-23 LAB
CULTURE: ABNORMAL
CULTURE: ABNORMAL
Lab: ABNORMAL
ORGANISM: ABNORMAL
ORGANISM: ABNORMAL
SPECIMEN DESCRIPTION: ABNORMAL
STATUS: ABNORMAL

## 2019-02-01 ENCOUNTER — OFFICE VISIT (OUTPATIENT)
Dept: INTERNAL MEDICINE | Age: 84
End: 2019-02-01
Payer: MEDICARE

## 2019-02-01 VITALS
HEIGHT: 61 IN | OXYGEN SATURATION: 95 % | RESPIRATION RATE: 16 BRPM | WEIGHT: 209 LBS | DIASTOLIC BLOOD PRESSURE: 78 MMHG | SYSTOLIC BLOOD PRESSURE: 136 MMHG | HEART RATE: 88 BPM | BODY MASS INDEX: 39.46 KG/M2 | TEMPERATURE: 98.5 F

## 2019-02-01 DIAGNOSIS — J45.21 MILD INTERMITTENT ASTHMA WITH ACUTE EXACERBATION: ICD-10-CM

## 2019-02-01 PROCEDURE — 99213 OFFICE O/P EST LOW 20 MIN: CPT | Performed by: INTERNAL MEDICINE

## 2019-02-01 RX ORDER — DOXYCYCLINE HYCLATE 50 MG/1
CAPSULE ORAL
Qty: 22 CAPSULE | Refills: 0 | Status: ON HOLD | OUTPATIENT
Start: 2019-02-01 | End: 2019-02-12 | Stop reason: HOSPADM

## 2019-02-01 RX ORDER — PREDNISONE 10 MG/1
40 TABLET ORAL DAILY
Qty: 20 TABLET | Refills: 0 | Status: ON HOLD | OUTPATIENT
Start: 2019-02-01 | End: 2019-02-12 | Stop reason: HOSPADM

## 2019-02-05 ENCOUNTER — APPOINTMENT (OUTPATIENT)
Dept: GENERAL RADIOLOGY | Age: 84
DRG: 660 | End: 2019-02-05
Payer: MEDICARE

## 2019-02-05 ENCOUNTER — HOSPITAL ENCOUNTER (INPATIENT)
Age: 84
LOS: 7 days | Discharge: HOME | DRG: 660 | End: 2019-02-12
Attending: EMERGENCY MEDICINE | Admitting: FAMILY MEDICINE
Payer: MEDICARE

## 2019-02-05 ENCOUNTER — OFFICE VISIT (OUTPATIENT)
Dept: INTERNAL MEDICINE | Age: 84
End: 2019-02-05
Payer: MEDICARE

## 2019-02-05 ENCOUNTER — APPOINTMENT (OUTPATIENT)
Dept: CT IMAGING | Age: 84
DRG: 660 | End: 2019-02-05
Payer: MEDICARE

## 2019-02-05 VITALS
OXYGEN SATURATION: 94 % | HEART RATE: 78 BPM | BODY MASS INDEX: 41.03 KG/M2 | RESPIRATION RATE: 20 BRPM | SYSTOLIC BLOOD PRESSURE: 130 MMHG | TEMPERATURE: 98.4 F | HEIGHT: 60 IN | DIASTOLIC BLOOD PRESSURE: 72 MMHG | WEIGHT: 209 LBS

## 2019-02-05 DIAGNOSIS — N12 PYELONEPHRITIS: Primary | ICD-10-CM

## 2019-02-05 DIAGNOSIS — I49.9 IRREGULAR HEART RATE: ICD-10-CM

## 2019-02-05 DIAGNOSIS — N13.1 HYDRONEPHROSIS DUE TO OBSTRUCTION OF URETER: ICD-10-CM

## 2019-02-05 DIAGNOSIS — R53.1 WEAKNESS: ICD-10-CM

## 2019-02-05 DIAGNOSIS — I49.9 IRREGULAR HEART RATE: Primary | ICD-10-CM

## 2019-02-05 DIAGNOSIS — N20.0 KIDNEY STONE: ICD-10-CM

## 2019-02-05 LAB
-: ABNORMAL
ABSOLUTE BANDS #: 0.78 K/UL
ABSOLUTE EOS #: 0 K/UL (ref 0–0.4)
ABSOLUTE IMMATURE GRANULOCYTE: ABNORMAL K/UL (ref 0–0.3)
ABSOLUTE LYMPH #: 1.18 K/UL (ref 1–4.8)
ABSOLUTE MONO #: 0.39 K/UL (ref 0.1–1.2)
ALBUMIN SERPL-MCNC: 3.8 G/DL (ref 3.5–5.2)
ALBUMIN/GLOBULIN RATIO: 1.1 (ref 1–2.5)
ALP BLD-CCNC: 90 U/L (ref 35–104)
ALT SERPL-CCNC: 18 U/L (ref 5–33)
AMORPHOUS: ABNORMAL
ANION GAP SERPL CALCULATED.3IONS-SCNC: 18 MMOL/L (ref 9–17)
AST SERPL-CCNC: 22 U/L
BACTERIA: ABNORMAL
BANDS: 4 %
BASOPHILS # BLD: 0 % (ref 0–1)
BASOPHILS ABSOLUTE: 0 K/UL (ref 0–0.2)
BILIRUB SERPL-MCNC: 0.51 MG/DL (ref 0.3–1.2)
BILIRUBIN URINE: NEGATIVE
BUN BLDV-MCNC: 27 MG/DL (ref 8–23)
BUN/CREAT BLD: 21 (ref 9–20)
CALCIUM SERPL-MCNC: 9.6 MG/DL (ref 8.6–10.4)
CASTS UA: ABNORMAL /LPF (ref 0–2)
CHLORIDE BLD-SCNC: 101 MMOL/L (ref 98–107)
CO2: 28 MMOL/L (ref 20–31)
COLOR: ABNORMAL
COMMENT UA: ABNORMAL
CREAT SERPL-MCNC: 1.3 MG/DL (ref 0.5–0.9)
CRYSTALS, UA: ABNORMAL /HPF
DIFFERENTIAL TYPE: ABNORMAL
DIRECT EXAM: NORMAL
DIRECT EXAM: NORMAL
EKG ATRIAL RATE: 102 BPM
EKG P AXIS: 65 DEGREES
EKG P-R INTERVAL: 112 MS
EKG Q-T INTERVAL: 354 MS
EKG QRS DURATION: 92 MS
EKG QTC CALCULATION (BAZETT): 461 MS
EKG R AXIS: 38 DEGREES
EKG T AXIS: 108 DEGREES
EKG VENTRICULAR RATE: 102 BPM
EOSINOPHILS RELATIVE PERCENT: 0 % (ref 1–7)
EPITHELIAL CELLS UA: ABNORMAL /HPF (ref 0–5)
ESTIMATED AVERAGE GLUCOSE: 143 MG/DL
GFR AFRICAN AMERICAN: 48 ML/MIN
GFR NON-AFRICAN AMERICAN: 39 ML/MIN
GFR SERPL CREATININE-BSD FRML MDRD: ABNORMAL ML/MIN/{1.73_M2}
GFR SERPL CREATININE-BSD FRML MDRD: ABNORMAL ML/MIN/{1.73_M2}
GLUCOSE BLD-MCNC: 131 MG/DL (ref 70–99)
GLUCOSE BLD-MCNC: 269 MG/DL (ref 65–105)
GLUCOSE URINE: NEGATIVE
HBA1C MFR BLD: 6.6 % (ref 4.8–5.9)
HCT VFR BLD CALC: 40.3 % (ref 36–46)
HEMOGLOBIN: 12.6 G/DL (ref 12–16)
IMMATURE GRANULOCYTES: ABNORMAL %
KETONES, URINE: NEGATIVE
LACTIC ACID: 2.7 MMOL/L (ref 0.5–2.2)
LACTIC ACID: 4.8 MMOL/L (ref 0.5–2.2)
LEUKOCYTE ESTERASE, URINE: ABNORMAL
LIPASE: 13 U/L (ref 13–60)
LYMPHOCYTES # BLD: 6 % (ref 16–46)
Lab: NORMAL
MCH RBC QN AUTO: 26.1 PG (ref 26–34)
MCHC RBC AUTO-ENTMCNC: 31.2 G/DL (ref 31–37)
MCV RBC AUTO: 83.5 FL (ref 80–100)
MONOCYTES # BLD: 2 % (ref 4–11)
MORPHOLOGY: ABNORMAL
MUCUS: ABNORMAL
NITRITE, URINE: NEGATIVE
NRBC AUTOMATED: ABNORMAL PER 100 WBC
OTHER OBSERVATIONS UA: ABNORMAL
PDW BLD-RTO: 18.1 % (ref 11–14.5)
PH UA: 5.5 (ref 5–6)
PLATELET # BLD: 250 K/UL (ref 140–450)
PLATELET ESTIMATE: ABNORMAL
PMV BLD AUTO: 9.9 FL (ref 6–12)
POTASSIUM SERPL-SCNC: 3.4 MMOL/L (ref 3.7–5.3)
PROTEIN UA: NEGATIVE
RBC # BLD: 4.82 M/UL (ref 4–5.2)
RBC # BLD: ABNORMAL 10*6/UL
RBC UA: ABNORMAL /HPF (ref 0–4)
RENAL EPITHELIAL, UA: ABNORMAL /HPF
SEG NEUTROPHILS: 88 % (ref 43–77)
SEGMENTED NEUTROPHILS ABSOLUTE COUNT: 17.25 K/UL (ref 1.8–7.7)
SODIUM BLD-SCNC: 147 MMOL/L (ref 135–144)
SPECIFIC GRAVITY UA: 1.01 (ref 1.01–1.02)
SPECIMEN DESCRIPTION: NORMAL
STATUS: NORMAL
TOTAL PROTEIN: 7.2 G/DL (ref 6.4–8.3)
TRICHOMONAS: ABNORMAL
TROPONIN INTERP: ABNORMAL
TROPONIN T: ABNORMAL NG/ML
TROPONIN, HIGH SENSITIVITY: 37 NG/L (ref 0–14)
TURBIDITY: ABNORMAL
URINE HGB: ABNORMAL
UROBILINOGEN, URINE: NORMAL
WBC # BLD: 19.6 K/UL (ref 3.5–11)
WBC # BLD: ABNORMAL 10*3/UL
WBC UA: >50 /HPF (ref 0–4)
YEAST: ABNORMAL

## 2019-02-05 PROCEDURE — 87804 INFLUENZA ASSAY W/OPTIC: CPT

## 2019-02-05 PROCEDURE — 94664 DEMO&/EVAL PT USE INHALER: CPT

## 2019-02-05 PROCEDURE — 93000 ELECTROCARDIOGRAM COMPLETE: CPT | Performed by: INTERNAL MEDICINE

## 2019-02-05 PROCEDURE — 87088 URINE BACTERIA CULTURE: CPT

## 2019-02-05 PROCEDURE — 94640 AIRWAY INHALATION TREATMENT: CPT

## 2019-02-05 PROCEDURE — 85025 COMPLETE CBC W/AUTO DIFF WBC: CPT

## 2019-02-05 PROCEDURE — 83036 HEMOGLOBIN GLYCOSYLATED A1C: CPT

## 2019-02-05 PROCEDURE — 87186 SC STD MICRODIL/AGAR DIL: CPT

## 2019-02-05 PROCEDURE — 6370000000 HC RX 637 (ALT 250 FOR IP): Performed by: EMERGENCY MEDICINE

## 2019-02-05 PROCEDURE — 6360000002 HC RX W HCPCS: Performed by: EMERGENCY MEDICINE

## 2019-02-05 PROCEDURE — 1200000000 HC SEMI PRIVATE

## 2019-02-05 PROCEDURE — 87040 BLOOD CULTURE FOR BACTERIA: CPT

## 2019-02-05 PROCEDURE — 81001 URINALYSIS AUTO W/SCOPE: CPT

## 2019-02-05 PROCEDURE — 36415 COLL VENOUS BLD VENIPUNCTURE: CPT

## 2019-02-05 PROCEDURE — 71046 X-RAY EXAM CHEST 2 VIEWS: CPT

## 2019-02-05 PROCEDURE — 80053 COMPREHEN METABOLIC PANEL: CPT

## 2019-02-05 PROCEDURE — 82947 ASSAY GLUCOSE BLOOD QUANT: CPT

## 2019-02-05 PROCEDURE — 6360000002 HC RX W HCPCS: Performed by: NURSE PRACTITIONER

## 2019-02-05 PROCEDURE — 74176 CT ABD & PELVIS W/O CONTRAST: CPT

## 2019-02-05 PROCEDURE — 93005 ELECTROCARDIOGRAM TRACING: CPT

## 2019-02-05 PROCEDURE — 99285 EMERGENCY DEPT VISIT HI MDM: CPT

## 2019-02-05 PROCEDURE — 2580000003 HC RX 258: Performed by: EMERGENCY MEDICINE

## 2019-02-05 PROCEDURE — 83874 ASSAY OF MYOGLOBIN: CPT

## 2019-02-05 PROCEDURE — 83690 ASSAY OF LIPASE: CPT

## 2019-02-05 PROCEDURE — 82365 CALCULUS SPECTROSCOPY: CPT

## 2019-02-05 PROCEDURE — 87086 URINE CULTURE/COLONY COUNT: CPT

## 2019-02-05 PROCEDURE — 99213 OFFICE O/P EST LOW 20 MIN: CPT | Performed by: NURSE PRACTITIONER

## 2019-02-05 PROCEDURE — 96365 THER/PROPH/DIAG IV INF INIT: CPT

## 2019-02-05 PROCEDURE — 94150 VITAL CAPACITY TEST: CPT

## 2019-02-05 PROCEDURE — 83605 ASSAY OF LACTIC ACID: CPT

## 2019-02-05 PROCEDURE — 94761 N-INVAS EAR/PLS OXIMETRY MLT: CPT

## 2019-02-05 PROCEDURE — 2580000003 HC RX 258: Performed by: NURSE PRACTITIONER

## 2019-02-05 PROCEDURE — 84484 ASSAY OF TROPONIN QUANT: CPT

## 2019-02-05 PROCEDURE — 2700000000 HC OXYGEN THERAPY PER DAY

## 2019-02-05 PROCEDURE — 6370000000 HC RX 637 (ALT 250 FOR IP): Performed by: NURSE PRACTITIONER

## 2019-02-05 RX ORDER — HYDROCODONE BITARTRATE AND ACETAMINOPHEN 5; 325 MG/1; MG/1
1 TABLET ORAL EVERY 4 HOURS PRN
Status: DISCONTINUED | OUTPATIENT
Start: 2019-02-05 | End: 2019-02-12 | Stop reason: HOSPADM

## 2019-02-05 RX ORDER — IPRATROPIUM BROMIDE AND ALBUTEROL SULFATE 2.5; .5 MG/3ML; MG/3ML
1 SOLUTION RESPIRATORY (INHALATION) ONCE
Status: COMPLETED | OUTPATIENT
Start: 2019-02-05 | End: 2019-02-05

## 2019-02-05 RX ORDER — POTASSIUM CHLORIDE 750 MG/1
10 TABLET, EXTENDED RELEASE ORAL DAILY
Status: DISCONTINUED | OUTPATIENT
Start: 2019-02-06 | End: 2019-02-12 | Stop reason: HOSPADM

## 2019-02-05 RX ORDER — HYDRALAZINE HYDROCHLORIDE 25 MG/1
25 TABLET, FILM COATED ORAL EVERY 8 HOURS SCHEDULED
Status: DISCONTINUED | OUTPATIENT
Start: 2019-02-05 | End: 2019-02-12 | Stop reason: HOSPADM

## 2019-02-05 RX ORDER — NICOTINE POLACRILEX 4 MG
15 LOZENGE BUCCAL PRN
Status: DISCONTINUED | OUTPATIENT
Start: 2019-02-05 | End: 2019-02-12 | Stop reason: HOSPADM

## 2019-02-05 RX ORDER — AMLODIPINE BESYLATE 5 MG/1
5 TABLET ORAL DAILY
Status: DISCONTINUED | OUTPATIENT
Start: 2019-02-06 | End: 2019-02-10

## 2019-02-05 RX ORDER — BISACODYL 10 MG
10 SUPPOSITORY, RECTAL RECTAL DAILY PRN
Status: DISCONTINUED | OUTPATIENT
Start: 2019-02-05 | End: 2019-02-12 | Stop reason: HOSPADM

## 2019-02-05 RX ORDER — HYDROCODONE BITARTRATE AND ACETAMINOPHEN 5; 325 MG/1; MG/1
2 TABLET ORAL EVERY 4 HOURS PRN
Status: DISCONTINUED | OUTPATIENT
Start: 2019-02-05 | End: 2019-02-12 | Stop reason: HOSPADM

## 2019-02-05 RX ORDER — INSULIN GLARGINE 100 [IU]/ML
50 INJECTION, SOLUTION SUBCUTANEOUS 2 TIMES DAILY
Status: DISCONTINUED | OUTPATIENT
Start: 2019-02-05 | End: 2019-02-12

## 2019-02-05 RX ORDER — 0.9 % SODIUM CHLORIDE 0.9 %
1000 INTRAVENOUS SOLUTION INTRAVENOUS ONCE
Status: COMPLETED | OUTPATIENT
Start: 2019-02-05 | End: 2019-02-05

## 2019-02-05 RX ORDER — NICOTINE 21 MG/24HR
1 PATCH, TRANSDERMAL 24 HOURS TRANSDERMAL DAILY PRN
Status: DISCONTINUED | OUTPATIENT
Start: 2019-02-05 | End: 2019-02-11

## 2019-02-05 RX ORDER — LOSARTAN POTASSIUM 50 MG/1
100 TABLET ORAL DAILY
Status: DISCONTINUED | OUTPATIENT
Start: 2019-02-06 | End: 2019-02-12 | Stop reason: HOSPADM

## 2019-02-05 RX ORDER — FUROSEMIDE 20 MG/1
20 TABLET ORAL DAILY
Status: DISCONTINUED | OUTPATIENT
Start: 2019-02-06 | End: 2019-02-12 | Stop reason: HOSPADM

## 2019-02-05 RX ORDER — GUAIFENESIN 100 MG/5ML
200 SOLUTION ORAL EVERY 4 HOURS PRN
Status: DISCONTINUED | OUTPATIENT
Start: 2019-02-05 | End: 2019-02-12 | Stop reason: HOSPADM

## 2019-02-05 RX ORDER — TAMSULOSIN HYDROCHLORIDE 0.4 MG/1
0.4 CAPSULE ORAL DAILY
Status: DISCONTINUED | OUTPATIENT
Start: 2019-02-06 | End: 2019-02-12 | Stop reason: HOSPADM

## 2019-02-05 RX ORDER — PANTOPRAZOLE SODIUM 40 MG/1
40 TABLET, DELAYED RELEASE ORAL
Status: DISCONTINUED | OUTPATIENT
Start: 2019-02-06 | End: 2019-02-12 | Stop reason: HOSPADM

## 2019-02-05 RX ORDER — DEXTROSE MONOHYDRATE 50 MG/ML
100 INJECTION, SOLUTION INTRAVENOUS PRN
Status: DISCONTINUED | OUTPATIENT
Start: 2019-02-05 | End: 2019-02-12 | Stop reason: HOSPADM

## 2019-02-05 RX ORDER — MORPHINE SULFATE 2 MG/ML
4 INJECTION, SOLUTION INTRAMUSCULAR; INTRAVENOUS
Status: DISCONTINUED | OUTPATIENT
Start: 2019-02-05 | End: 2019-02-12 | Stop reason: HOSPADM

## 2019-02-05 RX ORDER — ALBUTEROL SULFATE 2.5 MG/3ML
2.5 SOLUTION RESPIRATORY (INHALATION)
Status: DISCONTINUED | OUTPATIENT
Start: 2019-02-05 | End: 2019-02-12 | Stop reason: HOSPADM

## 2019-02-05 RX ORDER — SODIUM CHLORIDE 0.9 % (FLUSH) 0.9 %
10 SYRINGE (ML) INJECTION EVERY 12 HOURS SCHEDULED
Status: DISCONTINUED | OUTPATIENT
Start: 2019-02-05 | End: 2019-02-12 | Stop reason: HOSPADM

## 2019-02-05 RX ORDER — DEXTROSE MONOHYDRATE 25 G/50ML
12.5 INJECTION, SOLUTION INTRAVENOUS PRN
Status: DISCONTINUED | OUTPATIENT
Start: 2019-02-05 | End: 2019-02-12 | Stop reason: HOSPADM

## 2019-02-05 RX ORDER — METOPROLOL SUCCINATE 50 MG/1
50 TABLET, EXTENDED RELEASE ORAL DAILY
Status: DISCONTINUED | OUTPATIENT
Start: 2019-02-06 | End: 2019-02-09

## 2019-02-05 RX ORDER — SIMVASTATIN 10 MG
20 TABLET ORAL NIGHTLY
Status: DISCONTINUED | OUTPATIENT
Start: 2019-02-05 | End: 2019-02-12 | Stop reason: HOSPADM

## 2019-02-05 RX ORDER — SODIUM CHLORIDE 9 MG/ML
INJECTION, SOLUTION INTRAVENOUS CONTINUOUS
Status: DISCONTINUED | OUTPATIENT
Start: 2019-02-05 | End: 2019-02-07

## 2019-02-05 RX ORDER — SODIUM CHLORIDE 0.9 % (FLUSH) 0.9 %
10 SYRINGE (ML) INJECTION PRN
Status: DISCONTINUED | OUTPATIENT
Start: 2019-02-05 | End: 2019-02-12 | Stop reason: HOSPADM

## 2019-02-05 RX ORDER — ACETAMINOPHEN 325 MG/1
650 TABLET ORAL EVERY 4 HOURS PRN
Status: DISCONTINUED | OUTPATIENT
Start: 2019-02-05 | End: 2019-02-12 | Stop reason: HOSPADM

## 2019-02-05 RX ORDER — MORPHINE SULFATE 2 MG/ML
2 INJECTION, SOLUTION INTRAMUSCULAR; INTRAVENOUS
Status: DISCONTINUED | OUTPATIENT
Start: 2019-02-05 | End: 2019-02-12 | Stop reason: HOSPADM

## 2019-02-05 RX ORDER — ONDANSETRON 2 MG/ML
4 INJECTION INTRAMUSCULAR; INTRAVENOUS EVERY 6 HOURS PRN
Status: DISCONTINUED | OUTPATIENT
Start: 2019-02-05 | End: 2019-02-12 | Stop reason: HOSPADM

## 2019-02-05 RX ADMIN — HYDROCODONE BITARTRATE AND ACETAMINOPHEN 2 TABLET: 5; 325 TABLET ORAL at 21:39

## 2019-02-05 RX ADMIN — SODIUM CHLORIDE 1000 ML: 9 INJECTION, SOLUTION INTRAVENOUS at 17:34

## 2019-02-05 RX ADMIN — SIMVASTATIN 20 MG: 10 TABLET, FILM COATED ORAL at 21:39

## 2019-02-05 RX ADMIN — HYDRALAZINE HYDROCHLORIDE 25 MG: 25 TABLET, FILM COATED ORAL at 21:40

## 2019-02-05 RX ADMIN — CEFTRIAXONE 1 G: 1 INJECTION, POWDER, FOR SOLUTION INTRAMUSCULAR; INTRAVENOUS at 17:34

## 2019-02-05 RX ADMIN — IPRATROPIUM BROMIDE AND ALBUTEROL SULFATE 1 AMPULE: .5; 3 SOLUTION RESPIRATORY (INHALATION) at 20:49

## 2019-02-05 RX ADMIN — SODIUM CHLORIDE: 9 INJECTION, SOLUTION INTRAVENOUS at 21:37

## 2019-02-05 RX ADMIN — INSULIN GLARGINE 50 UNITS: 100 INJECTION, SOLUTION SUBCUTANEOUS at 21:51

## 2019-02-05 RX ADMIN — IPRATROPIUM BROMIDE AND ALBUTEROL SULFATE 1 AMPULE: .5; 3 SOLUTION RESPIRATORY (INHALATION) at 16:36

## 2019-02-05 RX ADMIN — ONDANSETRON 4 MG: 2 INJECTION INTRAMUSCULAR; INTRAVENOUS at 21:37

## 2019-02-05 RX ADMIN — SODIUM CHLORIDE 1000 ML: 9 INJECTION, SOLUTION INTRAVENOUS at 19:36

## 2019-02-05 ASSESSMENT — ENCOUNTER SYMPTOMS
SORE THROAT: 0
VOMITING: 1
VOMITING: 0
CHEST TIGHTNESS: 0
WHEEZING: 1
NAUSEA: 1
DIARRHEA: 0
NAUSEA: 1
COUGH: 1
DIARRHEA: 0
ABDOMINAL PAIN: 1
BACK PAIN: 0
SHORTNESS OF BREATH: 0
SHORTNESS OF BREATH: 0
CONSTIPATION: 0
COUGH: 1
BLOOD IN STOOL: 0
EYE PAIN: 0
WHEEZING: 1

## 2019-02-05 ASSESSMENT — PAIN DESCRIPTION - PAIN TYPE
TYPE: ACUTE PAIN

## 2019-02-05 ASSESSMENT — PAIN DESCRIPTION - DESCRIPTORS
DESCRIPTORS: CRAMPING
DESCRIPTORS: ACHING
DESCRIPTORS: CRAMPING

## 2019-02-05 ASSESSMENT — PAIN DESCRIPTION - ORIENTATION
ORIENTATION: MID
ORIENTATION: LOWER

## 2019-02-05 ASSESSMENT — PAIN SCALES - GENERAL
PAINLEVEL_OUTOF10: 10
PAINLEVEL_OUTOF10: 4
PAINLEVEL_OUTOF10: 10
PAINLEVEL_OUTOF10: 8

## 2019-02-05 ASSESSMENT — PAIN DESCRIPTION - LOCATION
LOCATION: ABDOMEN
LOCATION: ABDOMEN
LOCATION: ABDOMEN;BACK

## 2019-02-05 ASSESSMENT — PAIN DESCRIPTION - FREQUENCY
FREQUENCY: CONTINUOUS
FREQUENCY: CONTINUOUS

## 2019-02-05 ASSESSMENT — PAIN DESCRIPTION - ONSET: ONSET: ON-GOING

## 2019-02-05 ASSESSMENT — PAIN DESCRIPTION - PROGRESSION: CLINICAL_PROGRESSION: NOT CHANGED

## 2019-02-06 ENCOUNTER — APPOINTMENT (OUTPATIENT)
Dept: GENERAL RADIOLOGY | Age: 84
DRG: 660 | End: 2019-02-06
Payer: MEDICARE

## 2019-02-06 ENCOUNTER — ANESTHESIA (OUTPATIENT)
Dept: OPERATING ROOM | Age: 84
DRG: 660 | End: 2019-02-06
Payer: MEDICARE

## 2019-02-06 ENCOUNTER — ANESTHESIA EVENT (OUTPATIENT)
Dept: OPERATING ROOM | Age: 84
DRG: 660 | End: 2019-02-06
Payer: MEDICARE

## 2019-02-06 VITALS
OXYGEN SATURATION: 96 % | RESPIRATION RATE: 10 BRPM | DIASTOLIC BLOOD PRESSURE: 45 MMHG | SYSTOLIC BLOOD PRESSURE: 91 MMHG

## 2019-02-06 PROBLEM — N20.0 RIGHT KIDNEY STONE: Status: ACTIVE | Noted: 2019-02-06

## 2019-02-06 PROBLEM — N30.00 ACUTE CYSTITIS WITHOUT HEMATURIA: Status: ACTIVE | Noted: 2019-02-06

## 2019-02-06 LAB
ANION GAP SERPL CALCULATED.3IONS-SCNC: 13 MMOL/L (ref 9–17)
BUN BLDV-MCNC: 26 MG/DL (ref 8–23)
BUN/CREAT BLD: 17 (ref 9–20)
CALCIUM SERPL-MCNC: 8.5 MG/DL (ref 8.6–10.4)
CHLORIDE BLD-SCNC: 101 MMOL/L (ref 98–107)
CO2: 29 MMOL/L (ref 20–31)
CREAT SERPL-MCNC: 1.56 MG/DL (ref 0.5–0.9)
GFR AFRICAN AMERICAN: 39 ML/MIN
GFR NON-AFRICAN AMERICAN: 32 ML/MIN
GFR SERPL CREATININE-BSD FRML MDRD: ABNORMAL ML/MIN/{1.73_M2}
GFR SERPL CREATININE-BSD FRML MDRD: ABNORMAL ML/MIN/{1.73_M2}
GLUCOSE BLD-MCNC: 116 MG/DL (ref 65–105)
GLUCOSE BLD-MCNC: 134 MG/DL (ref 65–105)
GLUCOSE BLD-MCNC: 165 MG/DL (ref 70–99)
GLUCOSE BLD-MCNC: 166 MG/DL (ref 65–105)
HCT VFR BLD CALC: 37.8 % (ref 36–46)
HEMOGLOBIN: 11.7 G/DL (ref 12–16)
LACTIC ACID: 1.2 MMOL/L (ref 0.5–2.2)
LACTIC ACID: 1.3 MMOL/L (ref 0.5–2.2)
MCH RBC QN AUTO: 25.8 PG (ref 26–34)
MCHC RBC AUTO-ENTMCNC: 30.8 G/DL (ref 31–37)
MCV RBC AUTO: 83.5 FL (ref 80–100)
NRBC AUTOMATED: ABNORMAL PER 100 WBC
PDW BLD-RTO: 18.2 % (ref 11–14.5)
PLATELET # BLD: 209 K/UL (ref 140–450)
PMV BLD AUTO: 9.4 FL (ref 6–12)
POTASSIUM SERPL-SCNC: 4.3 MMOL/L (ref 3.7–5.3)
RBC # BLD: 4.53 M/UL (ref 4–5.2)
SODIUM BLD-SCNC: 143 MMOL/L (ref 135–144)
TROPONIN INTERP: ABNORMAL
TROPONIN T: ABNORMAL NG/ML
TROPONIN, HIGH SENSITIVITY: 28 NG/L (ref 0–14)
TROPONIN, HIGH SENSITIVITY: 31 NG/L (ref 0–14)
TROPONIN, HIGH SENSITIVITY: 35 NG/L (ref 0–14)
TROPONIN, HIGH SENSITIVITY: 37 NG/L (ref 0–14)
TROPONIN, HIGH SENSITIVITY: 40 NG/L (ref 0–14)
WBC # BLD: 12.1 K/UL (ref 3.5–11)

## 2019-02-06 PROCEDURE — 2580000003 HC RX 258: Performed by: UROLOGY

## 2019-02-06 PROCEDURE — 80048 BASIC METABOLIC PNL TOTAL CA: CPT

## 2019-02-06 PROCEDURE — 6370000000 HC RX 637 (ALT 250 FOR IP): Performed by: UROLOGY

## 2019-02-06 PROCEDURE — 85027 COMPLETE CBC AUTOMATED: CPT

## 2019-02-06 PROCEDURE — 6360000002 HC RX W HCPCS: Performed by: INTERNAL MEDICINE

## 2019-02-06 PROCEDURE — 3700000001 HC ADD 15 MINUTES (ANESTHESIA): Performed by: UROLOGY

## 2019-02-06 PROCEDURE — 82947 ASSAY GLUCOSE BLOOD QUANT: CPT

## 2019-02-06 PROCEDURE — 6360000002 HC RX W HCPCS

## 2019-02-06 PROCEDURE — 2700000000 HC OXYGEN THERAPY PER DAY

## 2019-02-06 PROCEDURE — 83605 ASSAY OF LACTIC ACID: CPT

## 2019-02-06 PROCEDURE — 2580000003 HC RX 258: Performed by: NURSE ANESTHETIST, CERTIFIED REGISTERED

## 2019-02-06 PROCEDURE — 2580000003 HC RX 258: Performed by: NURSE PRACTITIONER

## 2019-02-06 PROCEDURE — 7100000011 HC PHASE II RECOVERY - ADDTL 15 MIN: Performed by: UROLOGY

## 2019-02-06 PROCEDURE — 87086 URINE CULTURE/COLONY COUNT: CPT

## 2019-02-06 PROCEDURE — 36415 COLL VENOUS BLD VENIPUNCTURE: CPT

## 2019-02-06 PROCEDURE — 3700000000 HC ANESTHESIA ATTENDED CARE: Performed by: UROLOGY

## 2019-02-06 PROCEDURE — 7100000010 HC PHASE II RECOVERY - FIRST 15 MIN: Performed by: UROLOGY

## 2019-02-06 PROCEDURE — 2500000003 HC RX 250 WO HCPCS

## 2019-02-06 PROCEDURE — 74018 RADEX ABDOMEN 1 VIEW: CPT

## 2019-02-06 PROCEDURE — 6360000002 HC RX W HCPCS: Performed by: NURSE PRACTITIONER

## 2019-02-06 PROCEDURE — 2500000003 HC RX 250 WO HCPCS: Performed by: NURSE ANESTHETIST, CERTIFIED REGISTERED

## 2019-02-06 PROCEDURE — 6370000000 HC RX 637 (ALT 250 FOR IP): Performed by: NURSE PRACTITIONER

## 2019-02-06 PROCEDURE — 2709999900 HC NON-CHARGEABLE SUPPLY: Performed by: UROLOGY

## 2019-02-06 PROCEDURE — 6360000002 HC RX W HCPCS: Performed by: NURSE ANESTHETIST, CERTIFIED REGISTERED

## 2019-02-06 PROCEDURE — 3600000012 HC SURGERY LEVEL 2 ADDTL 15MIN: Performed by: UROLOGY

## 2019-02-06 PROCEDURE — 1200000000 HC SEMI PRIVATE

## 2019-02-06 PROCEDURE — 3209999900 FLUORO FOR SURGICAL PROCEDURES

## 2019-02-06 PROCEDURE — C2617 STENT, NON-COR, TEM W/O DEL: HCPCS | Performed by: UROLOGY

## 2019-02-06 PROCEDURE — 0T768DZ DILATION OF RIGHT URETER WITH INTRALUMINAL DEVICE, VIA NATURAL OR ARTIFICIAL OPENING ENDOSCOPIC: ICD-10-PCS | Performed by: UROLOGY

## 2019-02-06 PROCEDURE — 00910 ANES TRANSURETHRAL PX NOS: CPT | Performed by: NURSE ANESTHETIST, CERTIFIED REGISTERED

## 2019-02-06 PROCEDURE — 94761 N-INVAS EAR/PLS OXIMETRY MLT: CPT

## 2019-02-06 PROCEDURE — 0TP98DZ REMOVAL OF INTRALUMINAL DEVICE FROM URETER, VIA NATURAL OR ARTIFICIAL OPENING ENDOSCOPIC: ICD-10-PCS | Performed by: UROLOGY

## 2019-02-06 PROCEDURE — 99223 1ST HOSP IP/OBS HIGH 75: CPT | Performed by: INTERNAL MEDICINE

## 2019-02-06 PROCEDURE — 94640 AIRWAY INHALATION TREATMENT: CPT

## 2019-02-06 PROCEDURE — 3600000002 HC SURGERY LEVEL 2 BASE: Performed by: UROLOGY

## 2019-02-06 DEVICE — URETERAL STENT
Type: IMPLANTABLE DEVICE | Site: URETER | Status: FUNCTIONAL
Brand: CONTOUR™

## 2019-02-06 RX ORDER — LIDOCAINE HYDROCHLORIDE 20 MG/ML
INJECTION, SOLUTION EPIDURAL; INFILTRATION; INTRACAUDAL; PERINEURAL PRN
Status: DISCONTINUED | OUTPATIENT
Start: 2019-02-06 | End: 2019-02-06 | Stop reason: SDUPTHER

## 2019-02-06 RX ORDER — ALBUTEROL SULFATE 2.5 MG/3ML
2.5 SOLUTION RESPIRATORY (INHALATION)
Status: DISCONTINUED | OUTPATIENT
Start: 2019-02-06 | End: 2019-02-06

## 2019-02-06 RX ORDER — SODIUM CHLORIDE 9 MG/ML
INJECTION, SOLUTION INTRAVENOUS CONTINUOUS PRN
Status: DISCONTINUED | OUTPATIENT
Start: 2019-02-06 | End: 2019-02-06 | Stop reason: SDUPTHER

## 2019-02-06 RX ORDER — 0.9 % SODIUM CHLORIDE 0.9 %
500 INTRAVENOUS SOLUTION INTRAVENOUS ONCE
Status: COMPLETED | OUTPATIENT
Start: 2019-02-06 | End: 2019-02-06

## 2019-02-06 RX ORDER — ACETAMINOPHEN 500 MG
500 TABLET ORAL DAILY
Status: DISCONTINUED | OUTPATIENT
Start: 2019-02-06 | End: 2019-02-12 | Stop reason: HOSPADM

## 2019-02-06 RX ORDER — ALBUTEROL SULFATE 2.5 MG/3ML
2.5 SOLUTION RESPIRATORY (INHALATION)
Status: DISCONTINUED | OUTPATIENT
Start: 2019-02-06 | End: 2019-02-11

## 2019-02-06 RX ORDER — POTASSIUM CHLORIDE 7.45 MG/ML
10 INJECTION INTRAVENOUS PRN
Status: DISCONTINUED | OUTPATIENT
Start: 2019-02-06 | End: 2019-02-12 | Stop reason: HOSPADM

## 2019-02-06 RX ORDER — SODIUM CHLORIDE FOR INHALATION 0.9 %
3 VIAL, NEBULIZER (ML) INHALATION
Status: DISCONTINUED | OUTPATIENT
Start: 2019-02-06 | End: 2019-02-06 | Stop reason: ALTCHOICE

## 2019-02-06 RX ORDER — PROPOFOL 10 MG/ML
INJECTION, EMULSION INTRAVENOUS PRN
Status: DISCONTINUED | OUTPATIENT
Start: 2019-02-06 | End: 2019-02-06 | Stop reason: SDUPTHER

## 2019-02-06 RX ORDER — OLOPATADINE HYDROCHLORIDE 2 MG/ML
1 SOLUTION/ DROPS OPHTHALMIC DAILY
Status: DISCONTINUED | OUTPATIENT
Start: 2019-02-06 | End: 2019-02-12 | Stop reason: HOSPADM

## 2019-02-06 RX ORDER — ASPIRIN 81 MG/1
81 TABLET ORAL DAILY
Status: DISCONTINUED | OUTPATIENT
Start: 2019-02-06 | End: 2019-02-12 | Stop reason: HOSPADM

## 2019-02-06 RX ORDER — FENTANYL CITRATE 50 UG/ML
INJECTION, SOLUTION INTRAMUSCULAR; INTRAVENOUS PRN
Status: DISCONTINUED | OUTPATIENT
Start: 2019-02-06 | End: 2019-02-06 | Stop reason: SDUPTHER

## 2019-02-06 RX ORDER — PREDNISONE 20 MG/1
40 TABLET ORAL DAILY
Status: DISCONTINUED | OUTPATIENT
Start: 2019-02-06 | End: 2019-02-06

## 2019-02-06 RX ORDER — PREDNISONE 1 MG/1
5 TABLET ORAL DAILY
Status: DISCONTINUED | OUTPATIENT
Start: 2019-02-06 | End: 2019-02-12 | Stop reason: HOSPADM

## 2019-02-06 RX ADMIN — LIDOCAINE HYDROCHLORIDE 20 MG: 20 INJECTION, SOLUTION EPIDURAL; INFILTRATION; INTRACAUDAL; PERINEURAL at 08:07

## 2019-02-06 RX ADMIN — ALBUTEROL SULFATE 2.5 MG: 2.5 SOLUTION RESPIRATORY (INHALATION) at 19:43

## 2019-02-06 RX ADMIN — ALBUTEROL SULFATE 2.5 MG: 2.5 SOLUTION RESPIRATORY (INHALATION) at 13:37

## 2019-02-06 RX ADMIN — SODIUM CHLORIDE: 9 INJECTION, SOLUTION INTRAVENOUS at 09:11

## 2019-02-06 RX ADMIN — SODIUM CHLORIDE: 9 INJECTION, SOLUTION INTRAVENOUS at 21:01

## 2019-02-06 RX ADMIN — INSULIN LISPRO 24 UNITS: 100 INJECTION, SOLUTION INTRAVENOUS; SUBCUTANEOUS at 17:13

## 2019-02-06 RX ADMIN — FENTANYL CITRATE 25 MCG: 50 INJECTION, SOLUTION INTRAMUSCULAR; INTRAVENOUS at 07:58

## 2019-02-06 RX ADMIN — INSULIN LISPRO 16 UNITS: 100 INJECTION, SOLUTION INTRAVENOUS; SUBCUTANEOUS at 13:26

## 2019-02-06 RX ADMIN — HYDRALAZINE HYDROCHLORIDE 25 MG: 25 TABLET, FILM COATED ORAL at 04:51

## 2019-02-06 RX ADMIN — PROPOFOL 30 MG: 10 INJECTION, EMULSION INTRAVENOUS at 08:07

## 2019-02-06 RX ADMIN — ALBUTEROL SULFATE 2.5 MG: 2.5 SOLUTION RESPIRATORY (INHALATION) at 23:45

## 2019-02-06 RX ADMIN — ASPIRIN 81 MG: 81 TABLET, COATED ORAL at 11:28

## 2019-02-06 RX ADMIN — SIMVASTATIN 20 MG: 10 TABLET, FILM COATED ORAL at 21:01

## 2019-02-06 RX ADMIN — PREDNISONE 5 MG: 5 TABLET ORAL at 11:28

## 2019-02-06 RX ADMIN — Medication 10 ML: at 21:05

## 2019-02-06 RX ADMIN — PROPOFOL 30 MG: 10 INJECTION, EMULSION INTRAVENOUS at 08:12

## 2019-02-06 RX ADMIN — POTASSIUM CHLORIDE 10 MEQ: 10 INJECTION, SOLUTION INTRAVENOUS at 01:57

## 2019-02-06 RX ADMIN — HYDROCODONE BITARTRATE AND ACETAMINOPHEN 2 TABLET: 5; 325 TABLET ORAL at 04:51

## 2019-02-06 RX ADMIN — ALBUTEROL SULFATE 2.5 MG: 2.5 SOLUTION RESPIRATORY (INHALATION) at 11:46

## 2019-02-06 RX ADMIN — SODIUM CHLORIDE: 9 INJECTION, SOLUTION INTRAVENOUS at 13:28

## 2019-02-06 RX ADMIN — VITAMIN D, TAB 1000IU (100/BT) 2000 UNITS: 25 TAB at 11:28

## 2019-02-06 RX ADMIN — ACETAMINOPHEN 500 MG: 500 TABLET ORAL at 11:28

## 2019-02-06 RX ADMIN — INSULIN GLARGINE 50 UNITS: 100 INJECTION, SOLUTION SUBCUTANEOUS at 21:13

## 2019-02-06 RX ADMIN — CEFTRIAXONE 1 G: 1 INJECTION, POWDER, FOR SOLUTION INTRAMUSCULAR; INTRAVENOUS at 17:12

## 2019-02-06 RX ADMIN — ALBUTEROL SULFATE 2.5 MG: 2.5 SOLUTION RESPIRATORY (INHALATION) at 15:37

## 2019-02-06 RX ADMIN — HYDRALAZINE HYDROCHLORIDE 25 MG: 25 TABLET, FILM COATED ORAL at 21:02

## 2019-02-06 RX ADMIN — SODIUM CHLORIDE 500 ML: 0.9 INJECTION, SOLUTION INTRAVENOUS at 13:00

## 2019-02-06 RX ADMIN — INSULIN LISPRO 2 UNITS: 100 INJECTION, SOLUTION INTRAVENOUS; SUBCUTANEOUS at 17:12

## 2019-02-06 RX ADMIN — GUAIFENESIN 200 MG: 200 SOLUTION ORAL at 21:54

## 2019-02-06 RX ADMIN — HYDRALAZINE HYDROCHLORIDE 25 MG: 25 TABLET, FILM COATED ORAL at 13:25

## 2019-02-06 RX ADMIN — SODIUM CHLORIDE: 9 INJECTION, SOLUTION INTRAVENOUS at 13:49

## 2019-02-06 RX ADMIN — ALBUTEROL SULFATE 2.5 MG: 2.5 SOLUTION RESPIRATORY (INHALATION) at 05:03

## 2019-02-06 RX ADMIN — ENOXAPARIN SODIUM 40 MG: 40 INJECTION SUBCUTANEOUS at 21:02

## 2019-02-06 RX ADMIN — SODIUM CHLORIDE: 9 INJECTION, SOLUTION INTRAVENOUS at 07:47

## 2019-02-06 RX ADMIN — ONDANSETRON 4 MG: 2 INJECTION INTRAMUSCULAR; INTRAVENOUS at 04:52

## 2019-02-06 RX ADMIN — SODIUM CHLORIDE: 9 INJECTION, SOLUTION INTRAVENOUS at 04:52

## 2019-02-06 ASSESSMENT — PAIN SCALES - GENERAL
PAINLEVEL_OUTOF10: 0
PAINLEVEL_OUTOF10: 7
PAINLEVEL_OUTOF10: 0

## 2019-02-06 ASSESSMENT — PAIN DESCRIPTION - PAIN TYPE: TYPE: ACUTE PAIN

## 2019-02-06 ASSESSMENT — PAIN DESCRIPTION - LOCATION: LOCATION: FLANK

## 2019-02-06 ASSESSMENT — PAIN DESCRIPTION - ORIENTATION: ORIENTATION: RIGHT

## 2019-02-06 ASSESSMENT — ENCOUNTER SYMPTOMS: SHORTNESS OF BREATH: 1

## 2019-02-06 ASSESSMENT — PAIN DESCRIPTION - DESCRIPTORS: DESCRIPTORS: SHOOTING

## 2019-02-07 ENCOUNTER — APPOINTMENT (OUTPATIENT)
Dept: GENERAL RADIOLOGY | Age: 84
DRG: 660 | End: 2019-02-07
Payer: MEDICARE

## 2019-02-07 LAB
ABSOLUTE EOS #: 0.2 K/UL (ref 0–0.4)
ABSOLUTE IMMATURE GRANULOCYTE: ABNORMAL K/UL (ref 0–0.3)
ABSOLUTE LYMPH #: 1.7 K/UL (ref 1–4.8)
ABSOLUTE MONO #: 1.1 K/UL (ref 0.1–1.2)
ANION GAP SERPL CALCULATED.3IONS-SCNC: 12 MMOL/L (ref 9–17)
BASOPHILS # BLD: 0 % (ref 0–1)
BASOPHILS ABSOLUTE: 0 K/UL (ref 0–0.2)
BUN BLDV-MCNC: 22 MG/DL (ref 8–23)
BUN/CREAT BLD: 17 (ref 9–20)
CALCIUM SERPL-MCNC: 7.7 MG/DL (ref 8.6–10.4)
CHLORIDE BLD-SCNC: 111 MMOL/L (ref 98–107)
CO2: 23 MMOL/L (ref 20–31)
CREAT SERPL-MCNC: 1.32 MG/DL (ref 0.5–0.9)
CULTURE: ABNORMAL
CULTURE: NO GROWTH
DIFFERENTIAL TYPE: ABNORMAL
EOSINOPHILS RELATIVE PERCENT: 1 % (ref 1–7)
GFR AFRICAN AMERICAN: 47 ML/MIN
GFR NON-AFRICAN AMERICAN: 39 ML/MIN
GFR SERPL CREATININE-BSD FRML MDRD: ABNORMAL ML/MIN/{1.73_M2}
GFR SERPL CREATININE-BSD FRML MDRD: ABNORMAL ML/MIN/{1.73_M2}
GLUCOSE BLD-MCNC: 166 MG/DL (ref 65–105)
GLUCOSE BLD-MCNC: 262 MG/DL (ref 65–105)
GLUCOSE BLD-MCNC: 82 MG/DL (ref 70–99)
GLUCOSE BLD-MCNC: 91 MG/DL (ref 65–105)
HCT VFR BLD CALC: 31.7 % (ref 36–46)
HEMOGLOBIN: 9.7 G/DL (ref 12–16)
IMMATURE GRANULOCYTES: ABNORMAL %
LYMPHOCYTES # BLD: 13 % (ref 16–46)
Lab: ABNORMAL
Lab: NORMAL
MCH RBC QN AUTO: 25.8 PG (ref 26–34)
MCHC RBC AUTO-ENTMCNC: 30.5 G/DL (ref 31–37)
MCV RBC AUTO: 84.6 FL (ref 80–100)
MONOCYTES # BLD: 8 % (ref 4–11)
NRBC AUTOMATED: ABNORMAL PER 100 WBC
ORGANISM: ABNORMAL
PDW BLD-RTO: 18.3 % (ref 11–14.5)
PLATELET # BLD: 140 K/UL (ref 140–450)
PLATELET ESTIMATE: ABNORMAL
PMV BLD AUTO: 9.9 FL (ref 6–12)
POTASSIUM SERPL-SCNC: 3.5 MMOL/L (ref 3.7–5.3)
RBC # BLD: 3.74 M/UL (ref 4–5.2)
RBC # BLD: ABNORMAL 10*6/UL
SEG NEUTROPHILS: 78 % (ref 43–77)
SEGMENTED NEUTROPHILS ABSOLUTE COUNT: 10 K/UL (ref 1.8–7.7)
SODIUM BLD-SCNC: 146 MMOL/L (ref 135–144)
SPECIMEN DESCRIPTION: ABNORMAL
SPECIMEN DESCRIPTION: NORMAL
STATUS: ABNORMAL
STATUS: NORMAL
TROPONIN INTERP: ABNORMAL
TROPONIN T: ABNORMAL NG/ML
TROPONIN, HIGH SENSITIVITY: 39 NG/L (ref 0–14)
TROPONIN, HIGH SENSITIVITY: 41 NG/L (ref 0–14)
TROPONIN, HIGH SENSITIVITY: 42 NG/L (ref 0–14)
TROPONIN, HIGH SENSITIVITY: 42 NG/L (ref 0–14)
WBC # BLD: 13 K/UL (ref 3.5–11)
WBC # BLD: ABNORMAL 10*3/UL

## 2019-02-07 PROCEDURE — 36415 COLL VENOUS BLD VENIPUNCTURE: CPT

## 2019-02-07 PROCEDURE — 2580000003 HC RX 258: Performed by: NURSE PRACTITIONER

## 2019-02-07 PROCEDURE — 94150 VITAL CAPACITY TEST: CPT

## 2019-02-07 PROCEDURE — 6370000000 HC RX 637 (ALT 250 FOR IP): Performed by: UROLOGY

## 2019-02-07 PROCEDURE — 94640 AIRWAY INHALATION TREATMENT: CPT

## 2019-02-07 PROCEDURE — 2700000000 HC OXYGEN THERAPY PER DAY

## 2019-02-07 PROCEDURE — 6370000000 HC RX 637 (ALT 250 FOR IP): Performed by: INTERNAL MEDICINE

## 2019-02-07 PROCEDURE — 6360000002 HC RX W HCPCS: Performed by: INTERNAL MEDICINE

## 2019-02-07 PROCEDURE — 6370000000 HC RX 637 (ALT 250 FOR IP): Performed by: NURSE PRACTITIONER

## 2019-02-07 PROCEDURE — 80048 BASIC METABOLIC PNL TOTAL CA: CPT

## 2019-02-07 PROCEDURE — 2580000003 HC RX 258: Performed by: INTERNAL MEDICINE

## 2019-02-07 PROCEDURE — 6360000002 HC RX W HCPCS: Performed by: NURSE PRACTITIONER

## 2019-02-07 PROCEDURE — 99232 SBSQ HOSP IP/OBS MODERATE 35: CPT | Performed by: INTERNAL MEDICINE

## 2019-02-07 PROCEDURE — 94761 N-INVAS EAR/PLS OXIMETRY MLT: CPT

## 2019-02-07 PROCEDURE — 1200000000 HC SEMI PRIVATE

## 2019-02-07 PROCEDURE — 2500000003 HC RX 250 WO HCPCS: Performed by: INTERNAL MEDICINE

## 2019-02-07 PROCEDURE — 94664 DEMO&/EVAL PT USE INHALER: CPT

## 2019-02-07 PROCEDURE — 82947 ASSAY GLUCOSE BLOOD QUANT: CPT

## 2019-02-07 PROCEDURE — 2500000003 HC RX 250 WO HCPCS: Performed by: NURSE PRACTITIONER

## 2019-02-07 PROCEDURE — 85025 COMPLETE CBC W/AUTO DIFF WBC: CPT

## 2019-02-07 PROCEDURE — 6370000000 HC RX 637 (ALT 250 FOR IP)

## 2019-02-07 PROCEDURE — 84484 ASSAY OF TROPONIN QUANT: CPT

## 2019-02-07 PROCEDURE — 71046 X-RAY EXAM CHEST 2 VIEWS: CPT

## 2019-02-07 RX ORDER — FLUTICASONE PROPIONATE 110 UG/1
2 AEROSOL, METERED RESPIRATORY (INHALATION) 2 TIMES DAILY
Status: DISCONTINUED | OUTPATIENT
Start: 2019-02-07 | End: 2019-02-07 | Stop reason: CLARIF

## 2019-02-07 RX ORDER — GUAIFENESIN 600 MG/1
600 TABLET, EXTENDED RELEASE ORAL 2 TIMES DAILY
Status: DISCONTINUED | OUTPATIENT
Start: 2019-02-07 | End: 2019-02-12 | Stop reason: HOSPADM

## 2019-02-07 RX ORDER — FUROSEMIDE 10 MG/ML
20 INJECTION INTRAMUSCULAR; INTRAVENOUS ONCE
Status: COMPLETED | OUTPATIENT
Start: 2019-02-07 | End: 2019-02-07

## 2019-02-07 RX ORDER — BUDESONIDE 0.5 MG/2ML
0.5 INHALANT ORAL 2 TIMES DAILY
Status: DISCONTINUED | OUTPATIENT
Start: 2019-02-07 | End: 2019-02-12 | Stop reason: HOSPADM

## 2019-02-07 RX ORDER — POTASSIUM CHLORIDE 20 MEQ/1
40 TABLET, EXTENDED RELEASE ORAL ONCE
Status: COMPLETED | OUTPATIENT
Start: 2019-02-07 | End: 2019-02-07

## 2019-02-07 RX ORDER — GUAIFENESIN 600 MG/1
TABLET, EXTENDED RELEASE ORAL
Status: COMPLETED
Start: 2019-02-07 | End: 2019-02-07

## 2019-02-07 RX ORDER — BENZONATATE 100 MG/1
100 CAPSULE ORAL 3 TIMES DAILY PRN
Status: DISCONTINUED | OUTPATIENT
Start: 2019-02-07 | End: 2019-02-08

## 2019-02-07 RX ADMIN — METOPROLOL SUCCINATE 50 MG: 50 TABLET, EXTENDED RELEASE ORAL at 09:32

## 2019-02-07 RX ADMIN — ALBUTEROL SULFATE 2.5 MG: 2.5 SOLUTION RESPIRATORY (INHALATION) at 04:22

## 2019-02-07 RX ADMIN — CALCIUM CHLORIDE 1 G: 100 INJECTION INTRAVENOUS; INTRAVENTRICULAR at 10:58

## 2019-02-07 RX ADMIN — ALBUTEROL SULFATE 2.5 MG: 2.5 SOLUTION RESPIRATORY (INHALATION) at 15:29

## 2019-02-07 RX ADMIN — FUROSEMIDE 20 MG: 20 TABLET ORAL at 09:12

## 2019-02-07 RX ADMIN — GUAIFENESIN 600 MG: 600 TABLET, EXTENDED RELEASE ORAL at 21:28

## 2019-02-07 RX ADMIN — TAMSULOSIN HYDROCHLORIDE 0.4 MG: 0.4 CAPSULE ORAL at 09:12

## 2019-02-07 RX ADMIN — ASPIRIN 81 MG: 81 TABLET, COATED ORAL at 09:13

## 2019-02-07 RX ADMIN — POTASSIUM CHLORIDE 40 MEQ: 20 TABLET, EXTENDED RELEASE ORAL at 10:58

## 2019-02-07 RX ADMIN — PREDNISONE 5 MG: 5 TABLET ORAL at 09:12

## 2019-02-07 RX ADMIN — POTASSIUM CHLORIDE 10 MEQ: 10 TABLET, EXTENDED RELEASE ORAL at 09:13

## 2019-02-07 RX ADMIN — PANTOPRAZOLE SODIUM 40 MG: 40 TABLET, DELAYED RELEASE ORAL at 05:47

## 2019-02-07 RX ADMIN — Medication 10 ML: at 21:29

## 2019-02-07 RX ADMIN — INSULIN LISPRO 16 UNITS: 100 INJECTION, SOLUTION INTRAVENOUS; SUBCUTANEOUS at 12:57

## 2019-02-07 RX ADMIN — BUDESONIDE 500 MCG: 0.5 SUSPENSION RESPIRATORY (INHALATION) at 20:44

## 2019-02-07 RX ADMIN — GUAIFENESIN 200 MG: 200 SOLUTION ORAL at 01:25

## 2019-02-07 RX ADMIN — INSULIN GLARGINE 50 UNITS: 100 INJECTION, SOLUTION SUBCUTANEOUS at 09:13

## 2019-02-07 RX ADMIN — ACETAMINOPHEN 500 MG: 500 TABLET ORAL at 09:13

## 2019-02-07 RX ADMIN — AMLODIPINE BESYLATE 5 MG: 5 TABLET ORAL at 09:12

## 2019-02-07 RX ADMIN — ALBUTEROL SULFATE 2.5 MG: 2.5 SOLUTION RESPIRATORY (INHALATION) at 20:44

## 2019-02-07 RX ADMIN — INSULIN LISPRO 24 UNITS: 100 INJECTION, SOLUTION INTRAVENOUS; SUBCUTANEOUS at 17:04

## 2019-02-07 RX ADMIN — LOSARTAN POTASSIUM 100 MG: 50 TABLET ORAL at 09:12

## 2019-02-07 RX ADMIN — BENZONATATE 100 MG: 100 CAPSULE ORAL at 02:51

## 2019-02-07 RX ADMIN — GUAIFENESIN 600 MG: 600 TABLET, EXTENDED RELEASE ORAL at 13:55

## 2019-02-07 RX ADMIN — SIMVASTATIN 20 MG: 10 TABLET, FILM COATED ORAL at 21:28

## 2019-02-07 RX ADMIN — VITAMIN D, TAB 1000IU (100/BT) 2000 UNITS: 25 TAB at 09:12

## 2019-02-07 RX ADMIN — INSULIN LISPRO 6 UNITS: 100 INJECTION, SOLUTION INTRAVENOUS; SUBCUTANEOUS at 17:04

## 2019-02-07 RX ADMIN — CEFTRIAXONE 1 G: 1 INJECTION, POWDER, FOR SOLUTION INTRAMUSCULAR; INTRAVENOUS at 17:04

## 2019-02-07 RX ADMIN — HYDRALAZINE HYDROCHLORIDE 25 MG: 25 TABLET, FILM COATED ORAL at 13:54

## 2019-02-07 RX ADMIN — SODIUM CHLORIDE: 9 INJECTION, SOLUTION INTRAVENOUS at 10:58

## 2019-02-07 RX ADMIN — DOXYCYCLINE 100 MG: 100 INJECTION, POWDER, LYOPHILIZED, FOR SOLUTION INTRAVENOUS at 13:54

## 2019-02-07 RX ADMIN — FUROSEMIDE 20 MG: 10 INJECTION, SOLUTION INTRAVENOUS at 15:54

## 2019-02-07 RX ADMIN — HYDRALAZINE HYDROCHLORIDE 25 MG: 25 TABLET, FILM COATED ORAL at 21:28

## 2019-02-07 RX ADMIN — SODIUM CHLORIDE: 9 INJECTION, SOLUTION INTRAVENOUS at 03:27

## 2019-02-07 RX ADMIN — INSULIN GLARGINE 50 UNITS: 100 INJECTION, SOLUTION SUBCUTANEOUS at 21:28

## 2019-02-07 RX ADMIN — ALBUTEROL SULFATE 2.5 MG: 2.5 SOLUTION RESPIRATORY (INHALATION) at 08:29

## 2019-02-07 RX ADMIN — INSULIN LISPRO 2 UNITS: 100 INJECTION, SOLUTION INTRAVENOUS; SUBCUTANEOUS at 12:57

## 2019-02-07 RX ADMIN — ENOXAPARIN SODIUM 40 MG: 40 INJECTION SUBCUTANEOUS at 09:13

## 2019-02-07 RX ADMIN — ACETAMINOPHEN 650 MG: 325 TABLET ORAL at 03:49

## 2019-02-07 RX ADMIN — ALBUTEROL SULFATE 2.5 MG: 2.5 SOLUTION RESPIRATORY (INHALATION) at 11:49

## 2019-02-07 ASSESSMENT — PAIN SCALES - GENERAL
PAINLEVEL_OUTOF10: 0

## 2019-02-08 ENCOUNTER — APPOINTMENT (OUTPATIENT)
Dept: GENERAL RADIOLOGY | Age: 84
DRG: 660 | End: 2019-02-08
Payer: MEDICARE

## 2019-02-08 LAB
ABSOLUTE EOS #: 0.2 K/UL (ref 0–0.4)
ABSOLUTE IMMATURE GRANULOCYTE: ABNORMAL K/UL (ref 0–0.3)
ABSOLUTE LYMPH #: 1.4 K/UL (ref 1–4.8)
ABSOLUTE MONO #: 0.7 K/UL (ref 0.1–1.2)
ANION GAP SERPL CALCULATED.3IONS-SCNC: 9 MMOL/L (ref 9–17)
BASOPHILS # BLD: 0 % (ref 0–1)
BASOPHILS ABSOLUTE: 0 K/UL (ref 0–0.2)
BUN BLDV-MCNC: 13 MG/DL (ref 8–23)
BUN/CREAT BLD: 12 (ref 9–20)
CALCIUM SERPL-MCNC: 8.9 MG/DL (ref 8.6–10.4)
CHLORIDE BLD-SCNC: 111 MMOL/L (ref 98–107)
CO2: 26 MMOL/L (ref 20–31)
CREAT SERPL-MCNC: 1.08 MG/DL (ref 0.5–0.9)
DIFFERENTIAL TYPE: ABNORMAL
EOSINOPHILS RELATIVE PERCENT: 2 % (ref 1–7)
GFR AFRICAN AMERICAN: 59 ML/MIN
GFR NON-AFRICAN AMERICAN: 49 ML/MIN
GFR SERPL CREATININE-BSD FRML MDRD: ABNORMAL ML/MIN/{1.73_M2}
GFR SERPL CREATININE-BSD FRML MDRD: ABNORMAL ML/MIN/{1.73_M2}
GLUCOSE BLD-MCNC: 138 MG/DL (ref 65–105)
GLUCOSE BLD-MCNC: 58 MG/DL (ref 65–105)
GLUCOSE BLD-MCNC: 76 MG/DL (ref 65–105)
GLUCOSE BLD-MCNC: 84 MG/DL (ref 70–99)
GLUCOSE BLD-MCNC: 86 MG/DL (ref 65–105)
GLUCOSE BLD-MCNC: 86 MG/DL (ref 65–105)
HCT VFR BLD CALC: 29.7 % (ref 36–46)
HEMOGLOBIN: 9.4 G/DL (ref 12–16)
IMMATURE GRANULOCYTES: ABNORMAL %
LYMPHOCYTES # BLD: 16 % (ref 16–46)
MCH RBC QN AUTO: 26.3 PG (ref 26–34)
MCHC RBC AUTO-ENTMCNC: 31.4 G/DL (ref 31–37)
MCV RBC AUTO: 83.8 FL (ref 80–100)
MONOCYTES # BLD: 8 % (ref 4–11)
NRBC AUTOMATED: ABNORMAL PER 100 WBC
PDW BLD-RTO: 18 % (ref 11–14.5)
PLATELET # BLD: 143 K/UL (ref 140–450)
PLATELET ESTIMATE: ABNORMAL
PMV BLD AUTO: 9.8 FL (ref 6–12)
POTASSIUM SERPL-SCNC: 3.8 MMOL/L (ref 3.7–5.3)
RBC # BLD: 3.55 M/UL (ref 4–5.2)
RBC # BLD: ABNORMAL 10*6/UL
SEG NEUTROPHILS: 74 % (ref 43–77)
SEGMENTED NEUTROPHILS ABSOLUTE COUNT: 6.8 K/UL (ref 1.8–7.7)
SODIUM BLD-SCNC: 146 MMOL/L (ref 135–144)
WBC # BLD: 9.2 K/UL (ref 3.5–11)
WBC # BLD: ABNORMAL 10*3/UL

## 2019-02-08 PROCEDURE — 6370000000 HC RX 637 (ALT 250 FOR IP): Performed by: NURSE PRACTITIONER

## 2019-02-08 PROCEDURE — 99232 SBSQ HOSP IP/OBS MODERATE 35: CPT | Performed by: INTERNAL MEDICINE

## 2019-02-08 PROCEDURE — 71046 X-RAY EXAM CHEST 2 VIEWS: CPT

## 2019-02-08 PROCEDURE — 2500000003 HC RX 250 WO HCPCS: Performed by: NURSE PRACTITIONER

## 2019-02-08 PROCEDURE — 80048 BASIC METABOLIC PNL TOTAL CA: CPT

## 2019-02-08 PROCEDURE — 6360000002 HC RX W HCPCS: Performed by: INTERNAL MEDICINE

## 2019-02-08 PROCEDURE — 94761 N-INVAS EAR/PLS OXIMETRY MLT: CPT

## 2019-02-08 PROCEDURE — 6370000000 HC RX 637 (ALT 250 FOR IP): Performed by: UROLOGY

## 2019-02-08 PROCEDURE — 85025 COMPLETE CBC W/AUTO DIFF WBC: CPT

## 2019-02-08 PROCEDURE — 6370000000 HC RX 637 (ALT 250 FOR IP): Performed by: INTERNAL MEDICINE

## 2019-02-08 PROCEDURE — 2580000003 HC RX 258: Performed by: NURSE PRACTITIONER

## 2019-02-08 PROCEDURE — 94640 AIRWAY INHALATION TREATMENT: CPT

## 2019-02-08 PROCEDURE — 82947 ASSAY GLUCOSE BLOOD QUANT: CPT

## 2019-02-08 PROCEDURE — 1200000000 HC SEMI PRIVATE

## 2019-02-08 PROCEDURE — 6360000002 HC RX W HCPCS: Performed by: NURSE PRACTITIONER

## 2019-02-08 PROCEDURE — 36415 COLL VENOUS BLD VENIPUNCTURE: CPT

## 2019-02-08 RX ORDER — BENZONATATE 100 MG/1
200 CAPSULE ORAL 3 TIMES DAILY PRN
Status: DISCONTINUED | OUTPATIENT
Start: 2019-02-08 | End: 2019-02-09

## 2019-02-08 RX ORDER — FUROSEMIDE 10 MG/ML
20 INJECTION INTRAMUSCULAR; INTRAVENOUS ONCE
Status: COMPLETED | OUTPATIENT
Start: 2019-02-08 | End: 2019-02-08

## 2019-02-08 RX ADMIN — HYDRALAZINE HYDROCHLORIDE 25 MG: 25 TABLET, FILM COATED ORAL at 13:20

## 2019-02-08 RX ADMIN — BUDESONIDE 500 MCG: 0.5 SUSPENSION RESPIRATORY (INHALATION) at 19:46

## 2019-02-08 RX ADMIN — CEFTRIAXONE 1 G: 1 INJECTION, POWDER, FOR SOLUTION INTRAMUSCULAR; INTRAVENOUS at 17:13

## 2019-02-08 RX ADMIN — ALBUTEROL SULFATE 2.5 MG: 2.5 SOLUTION RESPIRATORY (INHALATION) at 15:59

## 2019-02-08 RX ADMIN — HYDRALAZINE HYDROCHLORIDE 25 MG: 25 TABLET, FILM COATED ORAL at 05:41

## 2019-02-08 RX ADMIN — INSULIN LISPRO 16 UNITS: 100 INJECTION, SOLUTION INTRAVENOUS; SUBCUTANEOUS at 13:20

## 2019-02-08 RX ADMIN — METOPROLOL SUCCINATE 50 MG: 50 TABLET, EXTENDED RELEASE ORAL at 08:13

## 2019-02-08 RX ADMIN — AMLODIPINE BESYLATE 5 MG: 5 TABLET ORAL at 08:13

## 2019-02-08 RX ADMIN — GUAIFENESIN 600 MG: 600 TABLET, EXTENDED RELEASE ORAL at 21:13

## 2019-02-08 RX ADMIN — Medication 10 ML: at 21:14

## 2019-02-08 RX ADMIN — HYDRALAZINE HYDROCHLORIDE 25 MG: 25 TABLET, FILM COATED ORAL at 21:13

## 2019-02-08 RX ADMIN — INSULIN LISPRO 10 UNITS: 100 INJECTION, SOLUTION INTRAVENOUS; SUBCUTANEOUS at 08:11

## 2019-02-08 RX ADMIN — Medication 10 ML: at 09:03

## 2019-02-08 RX ADMIN — FUROSEMIDE 20 MG: 20 TABLET ORAL at 08:13

## 2019-02-08 RX ADMIN — INSULIN LISPRO 24 UNITS: 100 INJECTION, SOLUTION INTRAVENOUS; SUBCUTANEOUS at 17:13

## 2019-02-08 RX ADMIN — ALBUTEROL SULFATE 2.5 MG: 2.5 SOLUTION RESPIRATORY (INHALATION) at 11:41

## 2019-02-08 RX ADMIN — ALBUTEROL SULFATE 2.5 MG: 2.5 SOLUTION RESPIRATORY (INHALATION) at 19:46

## 2019-02-08 RX ADMIN — Medication 10 ML: at 11:34

## 2019-02-08 RX ADMIN — PREDNISONE 5 MG: 5 TABLET ORAL at 08:13

## 2019-02-08 RX ADMIN — BUDESONIDE 500 MCG: 0.5 SUSPENSION RESPIRATORY (INHALATION) at 09:06

## 2019-02-08 RX ADMIN — DOXYCYCLINE 100 MG: 100 INJECTION, POWDER, LYOPHILIZED, FOR SOLUTION INTRAVENOUS at 13:14

## 2019-02-08 RX ADMIN — POTASSIUM CHLORIDE 10 MEQ: 10 TABLET, EXTENDED RELEASE ORAL at 08:13

## 2019-02-08 RX ADMIN — DOXYCYCLINE 100 MG: 100 INJECTION, POWDER, LYOPHILIZED, FOR SOLUTION INTRAVENOUS at 00:38

## 2019-02-08 RX ADMIN — LOSARTAN POTASSIUM 100 MG: 50 TABLET ORAL at 08:13

## 2019-02-08 RX ADMIN — BENZONATATE 100 MG: 100 CAPSULE ORAL at 08:12

## 2019-02-08 RX ADMIN — SIMVASTATIN 20 MG: 10 TABLET, FILM COATED ORAL at 21:13

## 2019-02-08 RX ADMIN — PANTOPRAZOLE SODIUM 40 MG: 40 TABLET, DELAYED RELEASE ORAL at 05:41

## 2019-02-08 RX ADMIN — ALBUTEROL SULFATE 2.5 MG: 2.5 SOLUTION RESPIRATORY (INHALATION) at 23:55

## 2019-02-08 RX ADMIN — VITAMIN D, TAB 1000IU (100/BT) 2000 UNITS: 25 TAB at 08:14

## 2019-02-08 RX ADMIN — FUROSEMIDE 20 MG: 10 INJECTION, SOLUTION INTRAVENOUS at 11:34

## 2019-02-08 RX ADMIN — TAMSULOSIN HYDROCHLORIDE 0.4 MG: 0.4 CAPSULE ORAL at 08:14

## 2019-02-08 RX ADMIN — ENOXAPARIN SODIUM 40 MG: 40 INJECTION SUBCUTANEOUS at 08:12

## 2019-02-08 RX ADMIN — ALBUTEROL SULFATE 2.5 MG: 2.5 SOLUTION RESPIRATORY (INHALATION) at 00:15

## 2019-02-08 RX ADMIN — ALBUTEROL SULFATE 2.5 MG: 2.5 SOLUTION RESPIRATORY (INHALATION) at 05:13

## 2019-02-08 RX ADMIN — INSULIN GLARGINE 50 UNITS: 100 INJECTION, SOLUTION SUBCUTANEOUS at 08:12

## 2019-02-08 RX ADMIN — ACETAMINOPHEN 500 MG: 500 TABLET ORAL at 08:13

## 2019-02-08 RX ADMIN — ALBUTEROL SULFATE 2.5 MG: 2.5 SOLUTION RESPIRATORY (INHALATION) at 09:05

## 2019-02-08 RX ADMIN — ASPIRIN 81 MG: 81 TABLET, COATED ORAL at 08:13

## 2019-02-08 RX ADMIN — GUAIFENESIN 600 MG: 600 TABLET, EXTENDED RELEASE ORAL at 08:13

## 2019-02-08 ASSESSMENT — PAIN SCALES - GENERAL
PAINLEVEL_OUTOF10: 3
PAINLEVEL_OUTOF10: 0

## 2019-02-09 ENCOUNTER — APPOINTMENT (OUTPATIENT)
Dept: GENERAL RADIOLOGY | Age: 84
DRG: 660 | End: 2019-02-09
Payer: MEDICARE

## 2019-02-09 PROBLEM — E87.6 HYPOKALEMIA: Status: ACTIVE | Noted: 2019-02-09

## 2019-02-09 LAB
ABSOLUTE EOS #: 0.3 K/UL (ref 0–0.4)
ABSOLUTE IMMATURE GRANULOCYTE: ABNORMAL K/UL (ref 0–0.3)
ABSOLUTE LYMPH #: 1.5 K/UL (ref 1–4.8)
ABSOLUTE MONO #: 0.8 K/UL (ref 0.1–1.2)
ANION GAP SERPL CALCULATED.3IONS-SCNC: 10 MMOL/L (ref 9–17)
BASOPHILS # BLD: 0 % (ref 0–1)
BASOPHILS ABSOLUTE: 0 K/UL (ref 0–0.2)
BUN BLDV-MCNC: 10 MG/DL (ref 8–23)
BUN/CREAT BLD: 10 (ref 9–20)
CALCIUM SERPL-MCNC: 9.4 MG/DL (ref 8.6–10.4)
CHLORIDE BLD-SCNC: 108 MMOL/L (ref 98–107)
CO2: 28 MMOL/L (ref 20–31)
CREAT SERPL-MCNC: 1 MG/DL (ref 0.5–0.9)
DIFFERENTIAL TYPE: ABNORMAL
EKG ATRIAL RATE: 105 BPM
EKG P AXIS: 89 DEGREES
EKG P-R INTERVAL: 144 MS
EKG Q-T INTERVAL: 326 MS
EKG QRS DURATION: 78 MS
EKG QTC CALCULATION (BAZETT): 430 MS
EKG R AXIS: 44 DEGREES
EKG T AXIS: -163 DEGREES
EKG VENTRICULAR RATE: 105 BPM
EOSINOPHILS RELATIVE PERCENT: 3 % (ref 1–7)
GFR AFRICAN AMERICAN: >60 ML/MIN
GFR NON-AFRICAN AMERICAN: 53 ML/MIN
GFR SERPL CREATININE-BSD FRML MDRD: ABNORMAL ML/MIN/{1.73_M2}
GFR SERPL CREATININE-BSD FRML MDRD: ABNORMAL ML/MIN/{1.73_M2}
GLUCOSE BLD-MCNC: 118 MG/DL (ref 65–105)
GLUCOSE BLD-MCNC: 166 MG/DL (ref 65–105)
GLUCOSE BLD-MCNC: 170 MG/DL (ref 65–105)
GLUCOSE BLD-MCNC: 90 MG/DL (ref 65–105)
GLUCOSE BLD-MCNC: 90 MG/DL (ref 70–99)
HCT VFR BLD CALC: 32.1 % (ref 36–46)
HEMOGLOBIN: 10.1 G/DL (ref 12–16)
IMMATURE GRANULOCYTES: ABNORMAL %
LYMPHOCYTES # BLD: 16 % (ref 16–46)
MCH RBC QN AUTO: 26.1 PG (ref 26–34)
MCHC RBC AUTO-ENTMCNC: 31.4 G/DL (ref 31–37)
MCV RBC AUTO: 83.3 FL (ref 80–100)
MONOCYTES # BLD: 9 % (ref 4–11)
NRBC AUTOMATED: ABNORMAL PER 100 WBC
PDW BLD-RTO: 17.7 % (ref 11–14.5)
PLATELET # BLD: 175 K/UL (ref 140–450)
PLATELET ESTIMATE: ABNORMAL
PMV BLD AUTO: 10.4 FL (ref 6–12)
POTASSIUM SERPL-SCNC: 3.6 MMOL/L (ref 3.7–5.3)
RBC # BLD: 3.86 M/UL (ref 4–5.2)
RBC # BLD: ABNORMAL 10*6/UL
SEG NEUTROPHILS: 72 % (ref 43–77)
SEGMENTED NEUTROPHILS ABSOLUTE COUNT: 6.4 K/UL (ref 1.8–7.7)
SODIUM BLD-SCNC: 146 MMOL/L (ref 135–144)
WBC # BLD: 9.1 K/UL (ref 3.5–11)
WBC # BLD: ABNORMAL 10*3/UL

## 2019-02-09 PROCEDURE — 82947 ASSAY GLUCOSE BLOOD QUANT: CPT

## 2019-02-09 PROCEDURE — 6370000000 HC RX 637 (ALT 250 FOR IP): Performed by: INTERNAL MEDICINE

## 2019-02-09 PROCEDURE — 93005 ELECTROCARDIOGRAM TRACING: CPT

## 2019-02-09 PROCEDURE — 6360000002 HC RX W HCPCS

## 2019-02-09 PROCEDURE — 2500000003 HC RX 250 WO HCPCS: Performed by: NURSE PRACTITIONER

## 2019-02-09 PROCEDURE — 99233 SBSQ HOSP IP/OBS HIGH 50: CPT | Performed by: INTERNAL MEDICINE

## 2019-02-09 PROCEDURE — 80048 BASIC METABOLIC PNL TOTAL CA: CPT

## 2019-02-09 PROCEDURE — 6360000002 HC RX W HCPCS: Performed by: INTERNAL MEDICINE

## 2019-02-09 PROCEDURE — 71045 X-RAY EXAM CHEST 1 VIEW: CPT

## 2019-02-09 PROCEDURE — 1200000000 HC SEMI PRIVATE

## 2019-02-09 PROCEDURE — 6370000000 HC RX 637 (ALT 250 FOR IP)

## 2019-02-09 PROCEDURE — 94640 AIRWAY INHALATION TREATMENT: CPT

## 2019-02-09 PROCEDURE — 6370000000 HC RX 637 (ALT 250 FOR IP): Performed by: UROLOGY

## 2019-02-09 PROCEDURE — 2580000003 HC RX 258: Performed by: NURSE PRACTITIONER

## 2019-02-09 PROCEDURE — 6370000000 HC RX 637 (ALT 250 FOR IP): Performed by: NURSE PRACTITIONER

## 2019-02-09 PROCEDURE — 85025 COMPLETE CBC W/AUTO DIFF WBC: CPT

## 2019-02-09 PROCEDURE — 94761 N-INVAS EAR/PLS OXIMETRY MLT: CPT

## 2019-02-09 PROCEDURE — 6360000002 HC RX W HCPCS: Performed by: NURSE PRACTITIONER

## 2019-02-09 PROCEDURE — 36415 COLL VENOUS BLD VENIPUNCTURE: CPT

## 2019-02-09 RX ORDER — METHYLPREDNISOLONE SODIUM SUCCINATE 40 MG/ML
INJECTION, POWDER, LYOPHILIZED, FOR SOLUTION INTRAMUSCULAR; INTRAVENOUS
Status: COMPLETED
Start: 2019-02-09 | End: 2019-02-09

## 2019-02-09 RX ORDER — METOPROLOL TARTRATE 50 MG/1
50 TABLET, FILM COATED ORAL 2 TIMES DAILY
Status: DISCONTINUED | OUTPATIENT
Start: 2019-02-10 | End: 2019-02-12 | Stop reason: HOSPADM

## 2019-02-09 RX ORDER — METHYLPREDNISOLONE SODIUM SUCCINATE 40 MG/ML
40 INJECTION, POWDER, LYOPHILIZED, FOR SOLUTION INTRAMUSCULAR; INTRAVENOUS EVERY 12 HOURS
Status: DISCONTINUED | OUTPATIENT
Start: 2019-02-09 | End: 2019-02-12 | Stop reason: HOSPADM

## 2019-02-09 RX ORDER — METOPROLOL TARTRATE 50 MG/1
50 TABLET, FILM COATED ORAL 2 TIMES DAILY
Status: DISCONTINUED | OUTPATIENT
Start: 2019-02-09 | End: 2019-02-09

## 2019-02-09 RX ADMIN — AMLODIPINE BESYLATE 5 MG: 5 TABLET ORAL at 08:32

## 2019-02-09 RX ADMIN — METOPROLOL TARTRATE 25 MG: 25 TABLET ORAL at 13:00

## 2019-02-09 RX ADMIN — ALBUTEROL SULFATE 2.5 MG: 2.5 SOLUTION RESPIRATORY (INHALATION) at 23:29

## 2019-02-09 RX ADMIN — ALBUTEROL SULFATE 2.5 MG: 2.5 SOLUTION RESPIRATORY (INHALATION) at 13:31

## 2019-02-09 RX ADMIN — Medication 10 ML: at 20:42

## 2019-02-09 RX ADMIN — FUROSEMIDE 20 MG: 20 TABLET ORAL at 08:32

## 2019-02-09 RX ADMIN — CEFTRIAXONE 1 G: 1 INJECTION, POWDER, FOR SOLUTION INTRAMUSCULAR; INTRAVENOUS at 17:02

## 2019-02-09 RX ADMIN — HYDRALAZINE HYDROCHLORIDE 25 MG: 25 TABLET, FILM COATED ORAL at 22:10

## 2019-02-09 RX ADMIN — GUAIFENESIN 600 MG: 600 TABLET, EXTENDED RELEASE ORAL at 20:40

## 2019-02-09 RX ADMIN — GUAIFENESIN 600 MG: 600 TABLET, EXTENDED RELEASE ORAL at 08:32

## 2019-02-09 RX ADMIN — INSULIN LISPRO 1 UNITS: 100 INJECTION, SOLUTION INTRAVENOUS; SUBCUTANEOUS at 20:44

## 2019-02-09 RX ADMIN — HYDRALAZINE HYDROCHLORIDE 25 MG: 25 TABLET, FILM COATED ORAL at 13:01

## 2019-02-09 RX ADMIN — Medication 10 ML: at 00:56

## 2019-02-09 RX ADMIN — BUDESONIDE 500 MCG: 0.5 SUSPENSION RESPIRATORY (INHALATION) at 19:41

## 2019-02-09 RX ADMIN — HYDRALAZINE HYDROCHLORIDE 25 MG: 25 TABLET, FILM COATED ORAL at 06:27

## 2019-02-09 RX ADMIN — METOPROLOL TARTRATE 25 MG: 25 TABLET ORAL at 20:41

## 2019-02-09 RX ADMIN — VITAMIN D, TAB 1000IU (100/BT) 2000 UNITS: 25 TAB at 08:31

## 2019-02-09 RX ADMIN — HYDROCODONE BITARTRATE AND ACETAMINOPHEN 1 TABLET: 5; 325 TABLET ORAL at 22:10

## 2019-02-09 RX ADMIN — INSULIN GLARGINE 50 UNITS: 100 INJECTION, SOLUTION SUBCUTANEOUS at 20:43

## 2019-02-09 RX ADMIN — DOXYCYCLINE 100 MG: 100 INJECTION, POWDER, LYOPHILIZED, FOR SOLUTION INTRAVENOUS at 13:01

## 2019-02-09 RX ADMIN — POTASSIUM CHLORIDE 10 MEQ: 10 TABLET, EXTENDED RELEASE ORAL at 08:32

## 2019-02-09 RX ADMIN — ASPIRIN 81 MG: 81 TABLET, COATED ORAL at 08:32

## 2019-02-09 RX ADMIN — METHYLPREDNISOLONE SODIUM SUCCINATE 40 MG: 125 INJECTION, POWDER, FOR SOLUTION INTRAMUSCULAR; INTRAVENOUS at 12:59

## 2019-02-09 RX ADMIN — ALBUTEROL SULFATE 2.5 MG: 2.5 SOLUTION RESPIRATORY (INHALATION) at 04:09

## 2019-02-09 RX ADMIN — BUDESONIDE 500 MCG: 0.5 SUSPENSION RESPIRATORY (INHALATION) at 09:14

## 2019-02-09 RX ADMIN — METOPROLOL SUCCINATE 50 MG: 50 TABLET, EXTENDED RELEASE ORAL at 08:32

## 2019-02-09 RX ADMIN — DOXYCYCLINE 100 MG: 100 INJECTION, POWDER, LYOPHILIZED, FOR SOLUTION INTRAVENOUS at 00:56

## 2019-02-09 RX ADMIN — TAMSULOSIN HYDROCHLORIDE 0.4 MG: 0.4 CAPSULE ORAL at 08:32

## 2019-02-09 RX ADMIN — INSULIN LISPRO 16 UNITS: 100 INJECTION, SOLUTION INTRAVENOUS; SUBCUTANEOUS at 12:40

## 2019-02-09 RX ADMIN — SIMVASTATIN 20 MG: 10 TABLET, FILM COATED ORAL at 20:40

## 2019-02-09 RX ADMIN — INSULIN LISPRO 20 UNITS: 100 INJECTION, SOLUTION INTRAVENOUS; SUBCUTANEOUS at 17:01

## 2019-02-09 RX ADMIN — ENOXAPARIN SODIUM 40 MG: 40 INJECTION SUBCUTANEOUS at 08:32

## 2019-02-09 RX ADMIN — HYDROCODONE BITARTRATE AND ACETAMINOPHEN 2 TABLET: 5; 325 TABLET ORAL at 13:25

## 2019-02-09 RX ADMIN — HYDROCODONE BITARTRATE AND ACETAMINOPHEN 2 TABLET: 5; 325 TABLET ORAL at 08:33

## 2019-02-09 RX ADMIN — METHYLPREDNISOLONE SODIUM SUCCINATE 40 MG: 40 INJECTION, POWDER, FOR SOLUTION INTRAMUSCULAR; INTRAVENOUS at 12:59

## 2019-02-09 RX ADMIN — PREDNISONE 5 MG: 5 TABLET ORAL at 08:32

## 2019-02-09 RX ADMIN — PANTOPRAZOLE SODIUM 40 MG: 40 TABLET, DELAYED RELEASE ORAL at 06:27

## 2019-02-09 RX ADMIN — LOSARTAN POTASSIUM 100 MG: 50 TABLET ORAL at 08:32

## 2019-02-09 RX ADMIN — Medication 10 ML: at 08:40

## 2019-02-09 RX ADMIN — ALBUTEROL SULFATE 2.5 MG: 2.5 SOLUTION RESPIRATORY (INHALATION) at 19:41

## 2019-02-09 RX ADMIN — ALBUTEROL SULFATE 2.5 MG: 2.5 SOLUTION RESPIRATORY (INHALATION) at 09:13

## 2019-02-09 ASSESSMENT — PAIN SCALES - GENERAL
PAINLEVEL_OUTOF10: 5
PAINLEVEL_OUTOF10: 0
PAINLEVEL_OUTOF10: 7
PAINLEVEL_OUTOF10: 0
PAINLEVEL_OUTOF10: 0
PAINLEVEL_OUTOF10: 5
PAINLEVEL_OUTOF10: 3

## 2019-02-10 PROBLEM — I47.1 MULTIFOCAL ATRIAL TACHYCARDIA (HCC): Status: ACTIVE | Noted: 2019-02-10

## 2019-02-10 PROBLEM — I47.19 MULTIFOCAL ATRIAL TACHYCARDIA: Status: ACTIVE | Noted: 2019-02-10

## 2019-02-10 LAB
ANION GAP SERPL CALCULATED.3IONS-SCNC: 13 MMOL/L (ref 9–17)
BUN BLDV-MCNC: 13 MG/DL (ref 8–23)
BUN/CREAT BLD: 15 (ref 9–20)
CALCIUM SERPL-MCNC: 9.3 MG/DL (ref 8.6–10.4)
CHLORIDE BLD-SCNC: 103 MMOL/L (ref 98–107)
CO2: 26 MMOL/L (ref 20–31)
CREAT SERPL-MCNC: 0.86 MG/DL (ref 0.5–0.9)
EKG ATRIAL RATE: 117 BPM
EKG Q-T INTERVAL: 310 MS
EKG QRS DURATION: 80 MS
EKG QTC CALCULATION (BAZETT): 430 MS
EKG R AXIS: 34 DEGREES
EKG T AXIS: 131 DEGREES
EKG VENTRICULAR RATE: 116 BPM
GFR AFRICAN AMERICAN: >60 ML/MIN
GFR NON-AFRICAN AMERICAN: >60 ML/MIN
GFR SERPL CREATININE-BSD FRML MDRD: ABNORMAL ML/MIN/{1.73_M2}
GFR SERPL CREATININE-BSD FRML MDRD: ABNORMAL ML/MIN/{1.73_M2}
GLUCOSE BLD-MCNC: 112 MG/DL (ref 65–105)
GLUCOSE BLD-MCNC: 132 MG/DL (ref 65–105)
GLUCOSE BLD-MCNC: 134 MG/DL (ref 65–105)
GLUCOSE BLD-MCNC: 176 MG/DL (ref 70–99)
GLUCOSE BLD-MCNC: 195 MG/DL (ref 65–105)
HCT VFR BLD CALC: 34.5 % (ref 36–46)
HEMOGLOBIN: 10.8 G/DL (ref 12–16)
MCH RBC QN AUTO: 25.8 PG (ref 26–34)
MCHC RBC AUTO-ENTMCNC: 31.3 G/DL (ref 31–37)
MCV RBC AUTO: 82.3 FL (ref 80–100)
NRBC AUTOMATED: ABNORMAL PER 100 WBC
PDW BLD-RTO: 17.9 % (ref 11–14.5)
PLATELET # BLD: 201 K/UL (ref 140–450)
PMV BLD AUTO: 10.7 FL (ref 6–12)
POTASSIUM SERPL-SCNC: 3.9 MMOL/L (ref 3.7–5.3)
RBC # BLD: 4.2 M/UL (ref 4–5.2)
SODIUM BLD-SCNC: 142 MMOL/L (ref 135–144)
WBC # BLD: 9.5 K/UL (ref 3.5–11)

## 2019-02-10 PROCEDURE — 6370000000 HC RX 637 (ALT 250 FOR IP): Performed by: UROLOGY

## 2019-02-10 PROCEDURE — 2580000003 HC RX 258: Performed by: NURSE PRACTITIONER

## 2019-02-10 PROCEDURE — 2500000003 HC RX 250 WO HCPCS: Performed by: NURSE PRACTITIONER

## 2019-02-10 PROCEDURE — 6370000000 HC RX 637 (ALT 250 FOR IP): Performed by: INTERNAL MEDICINE

## 2019-02-10 PROCEDURE — 6370000000 HC RX 637 (ALT 250 FOR IP): Performed by: NURSE PRACTITIONER

## 2019-02-10 PROCEDURE — 94150 VITAL CAPACITY TEST: CPT

## 2019-02-10 PROCEDURE — 85027 COMPLETE CBC AUTOMATED: CPT

## 2019-02-10 PROCEDURE — 2500000003 HC RX 250 WO HCPCS: Performed by: INTERNAL MEDICINE

## 2019-02-10 PROCEDURE — 6360000002 HC RX W HCPCS: Performed by: NURSE PRACTITIONER

## 2019-02-10 PROCEDURE — 82947 ASSAY GLUCOSE BLOOD QUANT: CPT

## 2019-02-10 PROCEDURE — 36415 COLL VENOUS BLD VENIPUNCTURE: CPT

## 2019-02-10 PROCEDURE — 6360000002 HC RX W HCPCS: Performed by: INTERNAL MEDICINE

## 2019-02-10 PROCEDURE — 80048 BASIC METABOLIC PNL TOTAL CA: CPT

## 2019-02-10 PROCEDURE — 1200000000 HC SEMI PRIVATE

## 2019-02-10 PROCEDURE — 99233 SBSQ HOSP IP/OBS HIGH 50: CPT | Performed by: INTERNAL MEDICINE

## 2019-02-10 PROCEDURE — 2580000003 HC RX 258: Performed by: INTERNAL MEDICINE

## 2019-02-10 PROCEDURE — 93005 ELECTROCARDIOGRAM TRACING: CPT

## 2019-02-10 PROCEDURE — 94640 AIRWAY INHALATION TREATMENT: CPT

## 2019-02-10 PROCEDURE — 94664 DEMO&/EVAL PT USE INHALER: CPT

## 2019-02-10 PROCEDURE — 94761 N-INVAS EAR/PLS OXIMETRY MLT: CPT

## 2019-02-10 RX ORDER — SODIUM CHLORIDE FOR INHALATION 0.9 %
3 VIAL, NEBULIZER (ML) INHALATION EVERY 8 HOURS PRN
Status: DISCONTINUED | OUTPATIENT
Start: 2019-02-10 | End: 2019-02-10

## 2019-02-10 RX ORDER — LEVALBUTEROL 1.25 MG/.5ML
1.25 SOLUTION, CONCENTRATE RESPIRATORY (INHALATION) EVERY 4 HOURS PRN
Status: DISCONTINUED | OUTPATIENT
Start: 2019-02-10 | End: 2019-02-10

## 2019-02-10 RX ORDER — METOPROLOL TARTRATE 5 MG/5ML
5 INJECTION INTRAVENOUS ONCE
Status: COMPLETED | OUTPATIENT
Start: 2019-02-10 | End: 2019-02-10

## 2019-02-10 RX ORDER — LEVALBUTEROL INHALATION SOLUTION 0.63 MG/3ML
0.63 SOLUTION RESPIRATORY (INHALATION) EVERY 8 HOURS PRN
Status: DISCONTINUED | OUTPATIENT
Start: 2019-02-10 | End: 2019-02-10

## 2019-02-10 RX ORDER — SODIUM CHLORIDE FOR INHALATION 0.9 %
3 VIAL, NEBULIZER (ML) INHALATION
Status: DISCONTINUED | OUTPATIENT
Start: 2019-02-10 | End: 2019-02-11

## 2019-02-10 RX ORDER — LEVALBUTEROL 1.25 MG/.5ML
1.25 SOLUTION, CONCENTRATE RESPIRATORY (INHALATION) EVERY 4 HOURS
Status: DISCONTINUED | OUTPATIENT
Start: 2019-02-10 | End: 2019-02-11

## 2019-02-10 RX ADMIN — HYDRALAZINE HYDROCHLORIDE 25 MG: 25 TABLET, FILM COATED ORAL at 22:14

## 2019-02-10 RX ADMIN — Medication 10 ML: at 10:06

## 2019-02-10 RX ADMIN — METHYLPREDNISOLONE SODIUM SUCCINATE 40 MG: 125 INJECTION, POWDER, FOR SOLUTION INTRAMUSCULAR; INTRAVENOUS at 00:26

## 2019-02-10 RX ADMIN — INSULIN LISPRO 24 UNITS: 100 INJECTION, SOLUTION INTRAVENOUS; SUBCUTANEOUS at 17:02

## 2019-02-10 RX ADMIN — SIMVASTATIN 20 MG: 10 TABLET, FILM COATED ORAL at 22:15

## 2019-02-10 RX ADMIN — ACETAMINOPHEN 650 MG: 325 TABLET ORAL at 08:26

## 2019-02-10 RX ADMIN — ALBUTEROL SULFATE 2.5 MG: 2.5 SOLUTION RESPIRATORY (INHALATION) at 09:09

## 2019-02-10 RX ADMIN — ALBUTEROL SULFATE 2.5 MG: 2.5 SOLUTION RESPIRATORY (INHALATION) at 12:40

## 2019-02-10 RX ADMIN — INSULIN GLARGINE 50 UNITS: 100 INJECTION, SOLUTION SUBCUTANEOUS at 08:25

## 2019-02-10 RX ADMIN — ALBUTEROL SULFATE 2.5 MG: 2.5 SOLUTION RESPIRATORY (INHALATION) at 16:23

## 2019-02-10 RX ADMIN — PANTOPRAZOLE SODIUM 40 MG: 40 TABLET, DELAYED RELEASE ORAL at 06:00

## 2019-02-10 RX ADMIN — DOXYCYCLINE 100 MG: 100 INJECTION, POWDER, LYOPHILIZED, FOR SOLUTION INTRAVENOUS at 00:26

## 2019-02-10 RX ADMIN — FUROSEMIDE 20 MG: 20 TABLET ORAL at 08:26

## 2019-02-10 RX ADMIN — BUDESONIDE 500 MCG: 0.5 SUSPENSION RESPIRATORY (INHALATION) at 19:31

## 2019-02-10 RX ADMIN — CEFTRIAXONE 1 G: 1 INJECTION, POWDER, FOR SOLUTION INTRAMUSCULAR; INTRAVENOUS at 17:30

## 2019-02-10 RX ADMIN — DILTIAZEM HYDROCHLORIDE 5 MG/HR: 5 INJECTION INTRAVENOUS at 13:38

## 2019-02-10 RX ADMIN — PREDNISONE 5 MG: 5 TABLET ORAL at 08:26

## 2019-02-10 RX ADMIN — POTASSIUM CHLORIDE 10 MEQ: 10 TABLET, EXTENDED RELEASE ORAL at 08:26

## 2019-02-10 RX ADMIN — ISODIUM CHLORIDE 3 ML: 0.03 SOLUTION RESPIRATORY (INHALATION) at 23:26

## 2019-02-10 RX ADMIN — INSULIN LISPRO 2 UNITS: 100 INJECTION, SOLUTION INTRAVENOUS; SUBCUTANEOUS at 08:29

## 2019-02-10 RX ADMIN — METOPROLOL TARTRATE 50 MG: 50 TABLET ORAL at 22:14

## 2019-02-10 RX ADMIN — LEVALBUTEROL 1.25 MG: 1.25 SOLUTION, CONCENTRATE RESPIRATORY (INHALATION) at 23:25

## 2019-02-10 RX ADMIN — GUAIFENESIN 600 MG: 600 TABLET, EXTENDED RELEASE ORAL at 08:25

## 2019-02-10 RX ADMIN — TAMSULOSIN HYDROCHLORIDE 0.4 MG: 0.4 CAPSULE ORAL at 08:26

## 2019-02-10 RX ADMIN — AMLODIPINE BESYLATE 5 MG: 5 TABLET ORAL at 08:28

## 2019-02-10 RX ADMIN — HYDRALAZINE HYDROCHLORIDE 25 MG: 25 TABLET, FILM COATED ORAL at 06:00

## 2019-02-10 RX ADMIN — METHYLPREDNISOLONE SODIUM SUCCINATE 40 MG: 125 INJECTION, POWDER, FOR SOLUTION INTRAMUSCULAR; INTRAVENOUS at 12:16

## 2019-02-10 RX ADMIN — LOSARTAN POTASSIUM 100 MG: 50 TABLET ORAL at 08:25

## 2019-02-10 RX ADMIN — METOPROLOL TARTRATE 50 MG: 50 TABLET ORAL at 08:26

## 2019-02-10 RX ADMIN — DOXYCYCLINE 100 MG: 100 INJECTION, POWDER, LYOPHILIZED, FOR SOLUTION INTRAVENOUS at 12:16

## 2019-02-10 RX ADMIN — METOPROLOL TARTRATE 5 MG: 5 INJECTION INTRAVENOUS at 20:30

## 2019-02-10 RX ADMIN — ALBUTEROL SULFATE 2.5 MG: 2.5 SOLUTION RESPIRATORY (INHALATION) at 04:24

## 2019-02-10 RX ADMIN — BUDESONIDE 500 MCG: 0.5 SUSPENSION RESPIRATORY (INHALATION) at 09:10

## 2019-02-10 RX ADMIN — GUAIFENESIN 600 MG: 600 TABLET, EXTENDED RELEASE ORAL at 22:14

## 2019-02-10 RX ADMIN — INSULIN LISPRO 2 UNITS: 100 INJECTION, SOLUTION INTRAVENOUS; SUBCUTANEOUS at 12:16

## 2019-02-10 RX ADMIN — INSULIN LISPRO 10 UNITS: 100 INJECTION, SOLUTION INTRAVENOUS; SUBCUTANEOUS at 08:25

## 2019-02-10 RX ADMIN — ASPIRIN 81 MG: 81 TABLET, COATED ORAL at 08:25

## 2019-02-10 RX ADMIN — ALBUTEROL SULFATE 2.5 MG: 2.5 SOLUTION RESPIRATORY (INHALATION) at 19:30

## 2019-02-10 RX ADMIN — VITAMIN D, TAB 1000IU (100/BT) 2000 UNITS: 25 TAB at 08:26

## 2019-02-10 RX ADMIN — ENOXAPARIN SODIUM 40 MG: 40 INJECTION SUBCUTANEOUS at 08:24

## 2019-02-10 RX ADMIN — Medication 10 ML: at 00:27

## 2019-02-10 RX ADMIN — INSULIN LISPRO 16 UNITS: 100 INJECTION, SOLUTION INTRAVENOUS; SUBCUTANEOUS at 12:17

## 2019-02-10 ASSESSMENT — PAIN SCALES - GENERAL
PAINLEVEL_OUTOF10: 0
PAINLEVEL_OUTOF10: 4
PAINLEVEL_OUTOF10: 0

## 2019-02-11 LAB
ABSOLUTE BANDS #: 0.1 K/UL
ABSOLUTE EOS #: 0 K/UL (ref 0–0.4)
ABSOLUTE IMMATURE GRANULOCYTE: ABNORMAL K/UL (ref 0–0.3)
ABSOLUTE LYMPH #: 0.61 K/UL (ref 1–4.8)
ABSOLUTE MONO #: 0 K/UL (ref 0.1–1.2)
ANION GAP SERPL CALCULATED.3IONS-SCNC: 11 MMOL/L (ref 9–17)
BANDS: 1 %
BASOPHILS # BLD: 0 % (ref 0–1)
BASOPHILS ABSOLUTE: 0 K/UL (ref 0–0.2)
BUN BLDV-MCNC: 24 MG/DL (ref 8–23)
BUN/CREAT BLD: 21 (ref 9–20)
CALCIUM SERPL-MCNC: 9.2 MG/DL (ref 8.6–10.4)
CHLORIDE BLD-SCNC: 102 MMOL/L (ref 98–107)
CO2: 28 MMOL/L (ref 20–31)
CREAT SERPL-MCNC: 1.13 MG/DL (ref 0.5–0.9)
CULTURE: NORMAL
CULTURE: NORMAL
DIFFERENTIAL TYPE: ABNORMAL
EOSINOPHILS RELATIVE PERCENT: 0 % (ref 1–7)
GFR AFRICAN AMERICAN: 56 ML/MIN
GFR NON-AFRICAN AMERICAN: 46 ML/MIN
GFR SERPL CREATININE-BSD FRML MDRD: ABNORMAL ML/MIN/{1.73_M2}
GFR SERPL CREATININE-BSD FRML MDRD: ABNORMAL ML/MIN/{1.73_M2}
GLUCOSE BLD-MCNC: 201 MG/DL (ref 65–105)
GLUCOSE BLD-MCNC: 206 MG/DL (ref 65–105)
GLUCOSE BLD-MCNC: 206 MG/DL (ref 65–105)
GLUCOSE BLD-MCNC: 232 MG/DL (ref 70–99)
HCT VFR BLD CALC: 34 % (ref 36–46)
HEMOGLOBIN: 10.8 G/DL (ref 12–16)
IMMATURE GRANULOCYTES: ABNORMAL %
LYMPHOCYTES # BLD: 6 % (ref 16–46)
Lab: NORMAL
Lab: NORMAL
MCH RBC QN AUTO: 26.2 PG (ref 26–34)
MCHC RBC AUTO-ENTMCNC: 31.7 G/DL (ref 31–37)
MCV RBC AUTO: 82.7 FL (ref 80–100)
METAMYELOCYTES ABSOLUTE COUNT: 0.1 K/UL
METAMYELOCYTES: 1 %
MONOCYTES # BLD: 0 % (ref 4–11)
MORPHOLOGY: ABNORMAL
NRBC AUTOMATED: ABNORMAL PER 100 WBC
PDW BLD-RTO: 17.7 % (ref 11–14.5)
PLATELET # BLD: 220 K/UL (ref 140–450)
PLATELET ESTIMATE: ABNORMAL
PMV BLD AUTO: 10.1 FL (ref 6–12)
POTASSIUM SERPL-SCNC: 4 MMOL/L (ref 3.7–5.3)
RBC # BLD: 4.11 M/UL (ref 4–5.2)
RBC # BLD: ABNORMAL 10*6/UL
SEG NEUTROPHILS: 92 % (ref 43–77)
SEGMENTED NEUTROPHILS ABSOLUTE COUNT: 9.29 K/UL (ref 1.8–7.7)
SODIUM BLD-SCNC: 141 MMOL/L (ref 135–144)
SPECIMEN DESCRIPTION: NORMAL
SPECIMEN DESCRIPTION: NORMAL
WBC # BLD: 10.1 K/UL (ref 3.5–11)
WBC # BLD: ABNORMAL 10*3/UL

## 2019-02-11 PROCEDURE — 2500000003 HC RX 250 WO HCPCS: Performed by: INTERNAL MEDICINE

## 2019-02-11 PROCEDURE — 2500000003 HC RX 250 WO HCPCS: Performed by: NURSE PRACTITIONER

## 2019-02-11 PROCEDURE — 94761 N-INVAS EAR/PLS OXIMETRY MLT: CPT

## 2019-02-11 PROCEDURE — 85025 COMPLETE CBC W/AUTO DIFF WBC: CPT

## 2019-02-11 PROCEDURE — 94640 AIRWAY INHALATION TREATMENT: CPT

## 2019-02-11 PROCEDURE — 6370000000 HC RX 637 (ALT 250 FOR IP): Performed by: NURSE PRACTITIONER

## 2019-02-11 PROCEDURE — 82947 ASSAY GLUCOSE BLOOD QUANT: CPT

## 2019-02-11 PROCEDURE — 6360000002 HC RX W HCPCS: Performed by: INTERNAL MEDICINE

## 2019-02-11 PROCEDURE — 99232 SBSQ HOSP IP/OBS MODERATE 35: CPT | Performed by: INTERNAL MEDICINE

## 2019-02-11 PROCEDURE — 93005 ELECTROCARDIOGRAM TRACING: CPT

## 2019-02-11 PROCEDURE — 94150 VITAL CAPACITY TEST: CPT

## 2019-02-11 PROCEDURE — 6360000002 HC RX W HCPCS: Performed by: NURSE PRACTITIONER

## 2019-02-11 PROCEDURE — 6370000000 HC RX 637 (ALT 250 FOR IP): Performed by: UROLOGY

## 2019-02-11 PROCEDURE — 80048 BASIC METABOLIC PNL TOTAL CA: CPT

## 2019-02-11 PROCEDURE — 1200000000 HC SEMI PRIVATE

## 2019-02-11 PROCEDURE — 6370000000 HC RX 637 (ALT 250 FOR IP): Performed by: INTERNAL MEDICINE

## 2019-02-11 PROCEDURE — 99222 1ST HOSP IP/OBS MODERATE 55: CPT | Performed by: INTERNAL MEDICINE

## 2019-02-11 PROCEDURE — 2580000003 HC RX 258: Performed by: NURSE PRACTITIONER

## 2019-02-11 PROCEDURE — 2580000003 HC RX 258: Performed by: INTERNAL MEDICINE

## 2019-02-11 PROCEDURE — 94664 DEMO&/EVAL PT USE INHALER: CPT

## 2019-02-11 PROCEDURE — 36415 COLL VENOUS BLD VENIPUNCTURE: CPT

## 2019-02-11 RX ORDER — SODIUM CHLORIDE FOR INHALATION 0.9 %
3 VIAL, NEBULIZER (ML) INHALATION 4 TIMES DAILY
Status: DISCONTINUED | OUTPATIENT
Start: 2019-02-11 | End: 2019-02-12 | Stop reason: HOSPADM

## 2019-02-11 RX ORDER — LEVALBUTEROL 1.25 MG/.5ML
1.25 SOLUTION, CONCENTRATE RESPIRATORY (INHALATION)
Status: DISCONTINUED | OUTPATIENT
Start: 2019-02-11 | End: 2019-02-11

## 2019-02-11 RX ORDER — DOXYCYCLINE 100 MG/1
100 CAPSULE ORAL EVERY 12 HOURS SCHEDULED
Status: DISCONTINUED | OUTPATIENT
Start: 2019-02-11 | End: 2019-02-12 | Stop reason: HOSPADM

## 2019-02-11 RX ORDER — LEVALBUTEROL 1.25 MG/.5ML
1.25 SOLUTION, CONCENTRATE RESPIRATORY (INHALATION) 4 TIMES DAILY
Status: DISCONTINUED | OUTPATIENT
Start: 2019-02-11 | End: 2019-02-12 | Stop reason: HOSPADM

## 2019-02-11 RX ADMIN — ASPIRIN 81 MG: 81 TABLET, COATED ORAL at 08:37

## 2019-02-11 RX ADMIN — AMIODARONE HYDROCHLORIDE 300 MG: 50 INJECTION, SOLUTION INTRAVENOUS at 13:47

## 2019-02-11 RX ADMIN — HYDRALAZINE HYDROCHLORIDE 25 MG: 25 TABLET, FILM COATED ORAL at 14:04

## 2019-02-11 RX ADMIN — INSULIN LISPRO 24 UNITS: 100 INJECTION, SOLUTION INTRAVENOUS; SUBCUTANEOUS at 17:32

## 2019-02-11 RX ADMIN — APIXABAN 5 MG: 2.5 TABLET, FILM COATED ORAL at 17:32

## 2019-02-11 RX ADMIN — HYDRALAZINE HYDROCHLORIDE 25 MG: 25 TABLET, FILM COATED ORAL at 05:07

## 2019-02-11 RX ADMIN — INSULIN LISPRO 4 UNITS: 100 INJECTION, SOLUTION INTRAVENOUS; SUBCUTANEOUS at 13:23

## 2019-02-11 RX ADMIN — LOSARTAN POTASSIUM 100 MG: 50 TABLET ORAL at 08:37

## 2019-02-11 RX ADMIN — METHYLPREDNISOLONE SODIUM SUCCINATE 40 MG: 125 INJECTION, POWDER, FOR SOLUTION INTRAMUSCULAR; INTRAVENOUS at 00:28

## 2019-02-11 RX ADMIN — ISODIUM CHLORIDE 3 ML: 0.03 SOLUTION RESPIRATORY (INHALATION) at 04:23

## 2019-02-11 RX ADMIN — LEVALBUTEROL 1.25 MG: 1.25 SOLUTION, CONCENTRATE RESPIRATORY (INHALATION) at 15:44

## 2019-02-11 RX ADMIN — INSULIN GLARGINE 50 UNITS: 100 INJECTION, SOLUTION SUBCUTANEOUS at 08:38

## 2019-02-11 RX ADMIN — INSULIN LISPRO 4 UNITS: 100 INJECTION, SOLUTION INTRAVENOUS; SUBCUTANEOUS at 17:32

## 2019-02-11 RX ADMIN — LEVALBUTEROL 1.25 MG: 1.25 SOLUTION, CONCENTRATE RESPIRATORY (INHALATION) at 20:05

## 2019-02-11 RX ADMIN — ENOXAPARIN SODIUM 40 MG: 40 INJECTION SUBCUTANEOUS at 08:41

## 2019-02-11 RX ADMIN — LEVALBUTEROL 1.25 MG: 1.25 SOLUTION, CONCENTRATE RESPIRATORY (INHALATION) at 08:59

## 2019-02-11 RX ADMIN — INSULIN LISPRO 4 UNITS: 100 INJECTION, SOLUTION INTRAVENOUS; SUBCUTANEOUS at 08:51

## 2019-02-11 RX ADMIN — DOXYCYCLINE 100 MG: 100 CAPSULE ORAL at 21:46

## 2019-02-11 RX ADMIN — BUDESONIDE 500 MCG: 0.5 SUSPENSION RESPIRATORY (INHALATION) at 20:06

## 2019-02-11 RX ADMIN — DILTIAZEM HYDROCHLORIDE 2.5 MG/HR: 5 INJECTION INTRAVENOUS at 05:07

## 2019-02-11 RX ADMIN — ACETAMINOPHEN 500 MG: 500 TABLET ORAL at 08:37

## 2019-02-11 RX ADMIN — BUDESONIDE 500 MCG: 0.5 SUSPENSION RESPIRATORY (INHALATION) at 08:59

## 2019-02-11 RX ADMIN — HYDRALAZINE HYDROCHLORIDE 25 MG: 25 TABLET, FILM COATED ORAL at 21:46

## 2019-02-11 RX ADMIN — Medication 10 ML: at 12:13

## 2019-02-11 RX ADMIN — METOPROLOL TARTRATE 50 MG: 50 TABLET ORAL at 08:37

## 2019-02-11 RX ADMIN — ISODIUM CHLORIDE 3 ML: 0.03 SOLUTION RESPIRATORY (INHALATION) at 12:21

## 2019-02-11 RX ADMIN — DOXYCYCLINE 100 MG: 100 CAPSULE ORAL at 08:37

## 2019-02-11 RX ADMIN — INSULIN LISPRO 16 UNITS: 100 INJECTION, SOLUTION INTRAVENOUS; SUBCUTANEOUS at 13:24

## 2019-02-11 RX ADMIN — Medication 10 ML: at 08:43

## 2019-02-11 RX ADMIN — POTASSIUM CHLORIDE 10 MEQ: 10 TABLET, EXTENDED RELEASE ORAL at 08:37

## 2019-02-11 RX ADMIN — LEVALBUTEROL 1.25 MG: 1.25 SOLUTION, CONCENTRATE RESPIRATORY (INHALATION) at 12:21

## 2019-02-11 RX ADMIN — TAMSULOSIN HYDROCHLORIDE 0.4 MG: 0.4 CAPSULE ORAL at 08:38

## 2019-02-11 RX ADMIN — INSULIN GLARGINE 50 UNITS: 100 INJECTION, SOLUTION SUBCUTANEOUS at 21:45

## 2019-02-11 RX ADMIN — AMIODARONE HYDROCHLORIDE 1 MG/MIN: 50 INJECTION, SOLUTION INTRAVENOUS at 23:00

## 2019-02-11 RX ADMIN — Medication 10 ML: at 21:47

## 2019-02-11 RX ADMIN — GUAIFENESIN 600 MG: 600 TABLET, EXTENDED RELEASE ORAL at 21:46

## 2019-02-11 RX ADMIN — PREDNISONE 5 MG: 5 TABLET ORAL at 08:36

## 2019-02-11 RX ADMIN — INSULIN LISPRO 2 UNITS: 100 INJECTION, SOLUTION INTRAVENOUS; SUBCUTANEOUS at 21:46

## 2019-02-11 RX ADMIN — ISODIUM CHLORIDE 3 ML: 0.03 SOLUTION RESPIRATORY (INHALATION) at 15:44

## 2019-02-11 RX ADMIN — METHYLPREDNISOLONE SODIUM SUCCINATE 40 MG: 125 INJECTION, POWDER, FOR SOLUTION INTRAMUSCULAR; INTRAVENOUS at 12:12

## 2019-02-11 RX ADMIN — VITAMIN D, TAB 1000IU (100/BT) 2000 UNITS: 25 TAB at 08:33

## 2019-02-11 RX ADMIN — Medication 10 ML: at 13:48

## 2019-02-11 RX ADMIN — LEVALBUTEROL 1.25 MG: 1.25 SOLUTION, CONCENTRATE RESPIRATORY (INHALATION) at 04:22

## 2019-02-11 RX ADMIN — PANTOPRAZOLE SODIUM 40 MG: 40 TABLET, DELAYED RELEASE ORAL at 05:07

## 2019-02-11 RX ADMIN — METOPROLOL TARTRATE 50 MG: 50 TABLET ORAL at 21:47

## 2019-02-11 RX ADMIN — DOXYCYCLINE 100 MG: 100 INJECTION, POWDER, LYOPHILIZED, FOR SOLUTION INTRAVENOUS at 00:30

## 2019-02-11 RX ADMIN — GUAIFENESIN 600 MG: 600 TABLET, EXTENDED RELEASE ORAL at 08:33

## 2019-02-11 RX ADMIN — CEFTRIAXONE 1 G: 1 INJECTION, POWDER, FOR SOLUTION INTRAMUSCULAR; INTRAVENOUS at 17:32

## 2019-02-11 RX ADMIN — SIMVASTATIN 20 MG: 10 TABLET, FILM COATED ORAL at 21:47

## 2019-02-11 RX ADMIN — AMIODARONE HYDROCHLORIDE 1 MG/MIN: 50 INJECTION, SOLUTION INTRAVENOUS at 14:16

## 2019-02-11 RX ADMIN — ISODIUM CHLORIDE 3 ML: 0.03 SOLUTION RESPIRATORY (INHALATION) at 20:06

## 2019-02-11 RX ADMIN — INSULIN LISPRO 10 UNITS: 100 INJECTION, SOLUTION INTRAVENOUS; SUBCUTANEOUS at 08:38

## 2019-02-11 RX ADMIN — FUROSEMIDE 20 MG: 20 TABLET ORAL at 08:38

## 2019-02-11 ASSESSMENT — PAIN SCALES - GENERAL
PAINLEVEL_OUTOF10: 0

## 2019-02-12 VITALS
WEIGHT: 220.6 LBS | DIASTOLIC BLOOD PRESSURE: 67 MMHG | TEMPERATURE: 98.1 F | RESPIRATION RATE: 20 BRPM | SYSTOLIC BLOOD PRESSURE: 150 MMHG | HEIGHT: 60 IN | HEART RATE: 69 BPM | OXYGEN SATURATION: 97 % | BODY MASS INDEX: 43.31 KG/M2

## 2019-02-12 LAB
ABSOLUTE BANDS #: 0.16 K/UL
ABSOLUTE EOS #: 0 K/UL (ref 0–0.4)
ABSOLUTE IMMATURE GRANULOCYTE: ABNORMAL K/UL (ref 0–0.3)
ABSOLUTE LYMPH #: 0.79 K/UL (ref 1–4.8)
ABSOLUTE MONO #: 0.31 K/UL (ref 0.1–1.2)
ANION GAP SERPL CALCULATED.3IONS-SCNC: 11 MMOL/L (ref 9–17)
BANDS: 1 %
BASOPHILS # BLD: 0 % (ref 0–1)
BASOPHILS ABSOLUTE: 0 K/UL (ref 0–0.2)
BLASTS ABSOLUTE COUNT: 0.16 K/UL
BLASTS: 1 %
BUN BLDV-MCNC: 27 MG/DL (ref 8–23)
BUN/CREAT BLD: 24 (ref 9–20)
CALCIUM SERPL-MCNC: 9.4 MG/DL (ref 8.6–10.4)
CHLORIDE BLD-SCNC: 102 MMOL/L (ref 98–107)
CO2: 30 MMOL/L (ref 20–31)
CREAT SERPL-MCNC: 1.13 MG/DL (ref 0.5–0.9)
DIFFERENTIAL TYPE: ABNORMAL
EKG ATRIAL RATE: 441 BPM
EKG ATRIAL RATE: 80 BPM
EKG P AXIS: 71 DEGREES
EKG P-R INTERVAL: 134 MS
EKG Q-T INTERVAL: 388 MS
EKG Q-T INTERVAL: 398 MS
EKG QRS DURATION: 82 MS
EKG QRS DURATION: 84 MS
EKG QTC CALCULATION (BAZETT): 447 MS
EKG QTC CALCULATION (BAZETT): 473 MS
EKG R AXIS: 24 DEGREES
EKG R AXIS: 40 DEGREES
EKG T AXIS: 166 DEGREES
EKG T AXIS: 97 DEGREES
EKG VENTRICULAR RATE: 80 BPM
EKG VENTRICULAR RATE: 85 BPM
EOSINOPHILS RELATIVE PERCENT: 0 % (ref 1–7)
GFR AFRICAN AMERICAN: 56 ML/MIN
GFR NON-AFRICAN AMERICAN: 46 ML/MIN
GFR SERPL CREATININE-BSD FRML MDRD: ABNORMAL ML/MIN/{1.73_M2}
GFR SERPL CREATININE-BSD FRML MDRD: ABNORMAL ML/MIN/{1.73_M2}
GLUCOSE BLD-MCNC: 151 MG/DL (ref 65–105)
GLUCOSE BLD-MCNC: 162 MG/DL (ref 70–99)
HCT VFR BLD CALC: 34.6 % (ref 36–46)
HEMOGLOBIN: 10.7 G/DL (ref 12–16)
IMMATURE GRANULOCYTES: ABNORMAL %
LYMPHOCYTES # BLD: 5 % (ref 16–46)
MCH RBC QN AUTO: 25.6 PG (ref 26–34)
MCHC RBC AUTO-ENTMCNC: 31 G/DL (ref 31–37)
MCV RBC AUTO: 82.5 FL (ref 80–100)
METAMYELOCYTES ABSOLUTE COUNT: 0.16 K/UL
METAMYELOCYTES: 1 %
MONOCYTES # BLD: 2 % (ref 4–11)
MORPHOLOGY: ABNORMAL
NRBC AUTOMATED: ABNORMAL PER 100 WBC
PDW BLD-RTO: 18 % (ref 11–14.5)
PLATELET # BLD: 212 K/UL (ref 140–450)
PLATELET ESTIMATE: ABNORMAL
PMV BLD AUTO: 10.4 FL (ref 6–12)
POTASSIUM SERPL-SCNC: 4.2 MMOL/L (ref 3.7–5.3)
RBC # BLD: 4.19 M/UL (ref 4–5.2)
RBC # BLD: ABNORMAL 10*6/UL
SEG NEUTROPHILS: 90 % (ref 43–77)
SEGMENTED NEUTROPHILS ABSOLUTE COUNT: 14.12 K/UL (ref 1.8–7.7)
SODIUM BLD-SCNC: 143 MMOL/L (ref 135–144)
WBC # BLD: 15.7 K/UL (ref 3.5–11)
WBC # BLD: ABNORMAL 10*3/UL

## 2019-02-12 PROCEDURE — 6360000002 HC RX W HCPCS: Performed by: INTERNAL MEDICINE

## 2019-02-12 PROCEDURE — 2580000003 HC RX 258: Performed by: NURSE PRACTITIONER

## 2019-02-12 PROCEDURE — 94640 AIRWAY INHALATION TREATMENT: CPT

## 2019-02-12 PROCEDURE — 6370000000 HC RX 637 (ALT 250 FOR IP): Performed by: UROLOGY

## 2019-02-12 PROCEDURE — 85025 COMPLETE CBC W/AUTO DIFF WBC: CPT

## 2019-02-12 PROCEDURE — 6370000000 HC RX 637 (ALT 250 FOR IP): Performed by: INTERNAL MEDICINE

## 2019-02-12 PROCEDURE — 99232 SBSQ HOSP IP/OBS MODERATE 35: CPT | Performed by: INTERNAL MEDICINE

## 2019-02-12 PROCEDURE — 6370000000 HC RX 637 (ALT 250 FOR IP): Performed by: NURSE PRACTITIONER

## 2019-02-12 PROCEDURE — 93005 ELECTROCARDIOGRAM TRACING: CPT

## 2019-02-12 PROCEDURE — 80048 BASIC METABOLIC PNL TOTAL CA: CPT

## 2019-02-12 PROCEDURE — 99238 HOSP IP/OBS DSCHRG MGMT 30/<: CPT | Performed by: INTERNAL MEDICINE

## 2019-02-12 PROCEDURE — 82947 ASSAY GLUCOSE BLOOD QUANT: CPT

## 2019-02-12 PROCEDURE — 2580000003 HC RX 258: Performed by: INTERNAL MEDICINE

## 2019-02-12 PROCEDURE — 36415 COLL VENOUS BLD VENIPUNCTURE: CPT

## 2019-02-12 PROCEDURE — 94761 N-INVAS EAR/PLS OXIMETRY MLT: CPT

## 2019-02-12 RX ORDER — INSULIN GLARGINE 100 [IU]/ML
55 INJECTION, SOLUTION SUBCUTANEOUS 2 TIMES DAILY
Status: DISCONTINUED | OUTPATIENT
Start: 2019-02-12 | End: 2019-02-12 | Stop reason: HOSPADM

## 2019-02-12 RX ORDER — SACCHAROMYCES BOULARDII 250 MG
250 CAPSULE ORAL 2 TIMES DAILY
Qty: 20 CAPSULE | Refills: 0 | COMMUNITY
Start: 2019-02-12 | End: 2019-02-22

## 2019-02-12 RX ORDER — GUAIFENESIN 600 MG/1
600 TABLET, EXTENDED RELEASE ORAL 2 TIMES DAILY
Qty: 14 TABLET | Refills: 0 | Status: SHIPPED | OUTPATIENT
Start: 2019-02-12 | End: 2019-02-20 | Stop reason: ALTCHOICE

## 2019-02-12 RX ORDER — AMIODARONE HYDROCHLORIDE 200 MG/1
TABLET ORAL
Qty: 30 TABLET | Refills: 0 | Status: SHIPPED | OUTPATIENT
Start: 2019-02-12 | End: 2019-03-04 | Stop reason: SDUPTHER

## 2019-02-12 RX ORDER — AMIODARONE HYDROCHLORIDE 200 MG/1
200 TABLET ORAL 2 TIMES DAILY
Status: DISCONTINUED | OUTPATIENT
Start: 2019-02-12 | End: 2019-02-12 | Stop reason: HOSPADM

## 2019-02-12 RX ORDER — TAMSULOSIN HYDROCHLORIDE 0.4 MG/1
0.4 CAPSULE ORAL DAILY
Qty: 30 CAPSULE | Refills: 0 | Status: SHIPPED | OUTPATIENT
Start: 2019-02-13 | End: 2019-02-20 | Stop reason: ALTCHOICE

## 2019-02-12 RX ORDER — FUROSEMIDE 40 MG/1
40 TABLET ORAL DAILY
Qty: 30 TABLET | Refills: 0 | Status: SHIPPED | OUTPATIENT
Start: 2019-02-13 | End: 2019-07-03

## 2019-02-12 RX ORDER — AMIODARONE HYDROCHLORIDE 200 MG/1
200 TABLET ORAL DAILY
Status: DISCONTINUED | OUTPATIENT
Start: 2019-02-19 | End: 2019-02-12 | Stop reason: HOSPADM

## 2019-02-12 RX ORDER — GREEN TEA/HOODIA GORDONII 315-12.5MG
1 CAPSULE ORAL 3 TIMES DAILY
Qty: 30 TABLET | Refills: 0 | COMMUNITY
Start: 2019-02-12 | End: 2019-02-22

## 2019-02-12 RX ADMIN — FUROSEMIDE 20 MG: 20 TABLET ORAL at 08:25

## 2019-02-12 RX ADMIN — APIXABAN 5 MG: 2.5 TABLET, FILM COATED ORAL at 08:24

## 2019-02-12 RX ADMIN — ISODIUM CHLORIDE 3 ML: 0.03 SOLUTION RESPIRATORY (INHALATION) at 11:29

## 2019-02-12 RX ADMIN — ASPIRIN 81 MG: 81 TABLET, COATED ORAL at 08:26

## 2019-02-12 RX ADMIN — HYDRALAZINE HYDROCHLORIDE 25 MG: 25 TABLET, FILM COATED ORAL at 13:23

## 2019-02-12 RX ADMIN — LEVALBUTEROL 1.25 MG: 1.25 SOLUTION, CONCENTRATE RESPIRATORY (INHALATION) at 07:51

## 2019-02-12 RX ADMIN — VITAMIN D, TAB 1000IU (100/BT) 2000 UNITS: 25 TAB at 08:27

## 2019-02-12 RX ADMIN — TAMSULOSIN HYDROCHLORIDE 0.4 MG: 0.4 CAPSULE ORAL at 08:27

## 2019-02-12 RX ADMIN — POTASSIUM CHLORIDE 10 MEQ: 10 TABLET, EXTENDED RELEASE ORAL at 08:25

## 2019-02-12 RX ADMIN — INSULIN LISPRO 16 UNITS: 100 INJECTION, SOLUTION INTRAVENOUS; SUBCUTANEOUS at 12:12

## 2019-02-12 RX ADMIN — INSULIN LISPRO 2 UNITS: 100 INJECTION, SOLUTION INTRAVENOUS; SUBCUTANEOUS at 12:12

## 2019-02-12 RX ADMIN — PREDNISONE 5 MG: 5 TABLET ORAL at 08:24

## 2019-02-12 RX ADMIN — GUAIFENESIN 600 MG: 600 TABLET, EXTENDED RELEASE ORAL at 08:26

## 2019-02-12 RX ADMIN — ISODIUM CHLORIDE 3 ML: 0.03 SOLUTION RESPIRATORY (INHALATION) at 07:50

## 2019-02-12 RX ADMIN — AMIODARONE HYDROCHLORIDE 1 MG/MIN: 50 INJECTION, SOLUTION INTRAVENOUS at 06:51

## 2019-02-12 RX ADMIN — ACETAMINOPHEN 500 MG: 500 TABLET ORAL at 08:26

## 2019-02-12 RX ADMIN — INSULIN LISPRO 2 UNITS: 100 INJECTION, SOLUTION INTRAVENOUS; SUBCUTANEOUS at 08:28

## 2019-02-12 RX ADMIN — LEVALBUTEROL 1.25 MG: 1.25 SOLUTION, CONCENTRATE RESPIRATORY (INHALATION) at 11:29

## 2019-02-12 RX ADMIN — BUDESONIDE 500 MCG: 0.5 SUSPENSION RESPIRATORY (INHALATION) at 07:52

## 2019-02-12 RX ADMIN — INSULIN LISPRO 10 UNITS: 100 INJECTION, SOLUTION INTRAVENOUS; SUBCUTANEOUS at 08:29

## 2019-02-12 RX ADMIN — Medication 10 ML: at 01:51

## 2019-02-12 RX ADMIN — METHYLPREDNISOLONE SODIUM SUCCINATE 40 MG: 125 INJECTION, POWDER, FOR SOLUTION INTRAMUSCULAR; INTRAVENOUS at 01:51

## 2019-02-12 RX ADMIN — AMIODARONE HYDROCHLORIDE 200 MG: 200 TABLET ORAL at 13:23

## 2019-02-12 RX ADMIN — HYDRALAZINE HYDROCHLORIDE 25 MG: 25 TABLET, FILM COATED ORAL at 06:06

## 2019-02-12 RX ADMIN — METHYLPREDNISOLONE SODIUM SUCCINATE 40 MG: 125 INJECTION, POWDER, FOR SOLUTION INTRAMUSCULAR; INTRAVENOUS at 12:12

## 2019-02-12 RX ADMIN — PANTOPRAZOLE SODIUM 40 MG: 40 TABLET, DELAYED RELEASE ORAL at 06:06

## 2019-02-12 RX ADMIN — DOXYCYCLINE 100 MG: 100 CAPSULE ORAL at 08:26

## 2019-02-12 RX ADMIN — INSULIN GLARGINE 55 UNITS: 100 INJECTION, SOLUTION SUBCUTANEOUS at 08:29

## 2019-02-12 RX ADMIN — LOSARTAN POTASSIUM 100 MG: 50 TABLET ORAL at 08:26

## 2019-02-12 RX ADMIN — METOPROLOL TARTRATE 50 MG: 50 TABLET ORAL at 08:27

## 2019-02-12 ASSESSMENT — PAIN SCALES - GENERAL
PAINLEVEL_OUTOF10: 0
PAINLEVEL_OUTOF10: 3

## 2019-02-13 ENCOUNTER — TELEPHONE (OUTPATIENT)
Dept: INTERNAL MEDICINE | Age: 84
End: 2019-02-13

## 2019-02-13 ENCOUNTER — CARE COORDINATION (OUTPATIENT)
Dept: CASE MANAGEMENT | Age: 84
End: 2019-02-13

## 2019-02-13 DIAGNOSIS — I48.0 PAF (PAROXYSMAL ATRIAL FIBRILLATION) (HCC): Primary | ICD-10-CM

## 2019-02-14 ENCOUNTER — CARE COORDINATION (OUTPATIENT)
Dept: CASE MANAGEMENT | Age: 84
End: 2019-02-14

## 2019-02-14 DIAGNOSIS — N30.00 ACUTE CYSTITIS WITHOUT HEMATURIA: Primary | ICD-10-CM

## 2019-02-14 PROCEDURE — 1111F DSCHRG MED/CURRENT MED MERGE: CPT | Performed by: INTERNAL MEDICINE

## 2019-02-15 ENCOUNTER — TELEPHONE (OUTPATIENT)
Dept: INTERNAL MEDICINE | Age: 84
End: 2019-02-15

## 2019-02-15 DIAGNOSIS — R39.89 URINE DISCOLORATION: Primary | ICD-10-CM

## 2019-02-19 ENCOUNTER — OFFICE VISIT (OUTPATIENT)
Dept: INTERNAL MEDICINE | Age: 84
End: 2019-02-19
Payer: MEDICARE

## 2019-02-19 ENCOUNTER — HOSPITAL ENCOUNTER (OUTPATIENT)
Dept: NON INVASIVE DIAGNOSTICS | Age: 84
Discharge: HOME | End: 2019-02-19
Payer: MEDICARE

## 2019-02-19 ENCOUNTER — CARE COORDINATION (OUTPATIENT)
Dept: CASE MANAGEMENT | Age: 84
End: 2019-02-19

## 2019-02-19 ENCOUNTER — HOSPITAL ENCOUNTER (OUTPATIENT)
Dept: LAB | Age: 84
Discharge: HOME | End: 2019-02-19
Payer: MEDICARE

## 2019-02-19 ENCOUNTER — HOSPITAL ENCOUNTER (OUTPATIENT)
Age: 84
Setting detail: SPECIMEN
Discharge: HOME | End: 2019-02-19
Payer: MEDICARE

## 2019-02-19 VITALS
BODY MASS INDEX: 39.76 KG/M2 | DIASTOLIC BLOOD PRESSURE: 60 MMHG | TEMPERATURE: 97.6 F | HEIGHT: 61 IN | OXYGEN SATURATION: 98 % | SYSTOLIC BLOOD PRESSURE: 140 MMHG | WEIGHT: 210.6 LBS | HEART RATE: 68 BPM | RESPIRATION RATE: 18 BRPM

## 2019-02-19 DIAGNOSIS — E11.21 TYPE 2 DIABETES WITH NEPHROPATHY (HCC): ICD-10-CM

## 2019-02-19 DIAGNOSIS — E87.6 HYPOKALEMIA: ICD-10-CM

## 2019-02-19 DIAGNOSIS — I48.0 PAF (PAROXYSMAL ATRIAL FIBRILLATION) (HCC): ICD-10-CM

## 2019-02-19 DIAGNOSIS — J45.20 MILD INTERMITTENT ASTHMA WITHOUT COMPLICATION: ICD-10-CM

## 2019-02-19 DIAGNOSIS — D50.0 IRON DEFICIENCY ANEMIA DUE TO CHRONIC BLOOD LOSS: ICD-10-CM

## 2019-02-19 DIAGNOSIS — I51.89 DIASTOLIC DYSFUNCTION: ICD-10-CM

## 2019-02-19 DIAGNOSIS — R53.83 OTHER FATIGUE: ICD-10-CM

## 2019-02-19 DIAGNOSIS — N20.0 NEPHROLITHIASIS: Primary | ICD-10-CM

## 2019-02-19 DIAGNOSIS — E66.01 OBESITY, MORBID, BMI 40.0-49.9 (HCC): ICD-10-CM

## 2019-02-19 DIAGNOSIS — N30.00 ACUTE CYSTITIS WITHOUT HEMATURIA: ICD-10-CM

## 2019-02-19 DIAGNOSIS — R05.9 COUGH: ICD-10-CM

## 2019-02-19 DIAGNOSIS — I10 ESSENTIAL HYPERTENSION: ICD-10-CM

## 2019-02-19 LAB
ANION GAP SERPL CALCULATED.3IONS-SCNC: 13 MMOL/L (ref 9–17)
BUN BLDV-MCNC: 19 MG/DL (ref 8–23)
BUN/CREAT BLD: 17 (ref 9–20)
CALCIUM SERPL-MCNC: 9.2 MG/DL (ref 8.6–10.4)
CHLORIDE BLD-SCNC: 102 MMOL/L (ref 98–107)
CO2: 30 MMOL/L (ref 20–31)
CREAT SERPL-MCNC: 1.12 MG/DL (ref 0.5–0.9)
DIRECT EXAM: NORMAL
GFR AFRICAN AMERICAN: 56 ML/MIN
GFR NON-AFRICAN AMERICAN: 47 ML/MIN
GFR SERPL CREATININE-BSD FRML MDRD: ABNORMAL ML/MIN/{1.73_M2}
GFR SERPL CREATININE-BSD FRML MDRD: ABNORMAL ML/MIN/{1.73_M2}
GLUCOSE BLD-MCNC: 106 MG/DL (ref 70–99)
HCT VFR BLD CALC: 33.1 % (ref 36–46)
HEMOGLOBIN: 10.4 G/DL (ref 12–16)
Lab: NORMAL
MCH RBC QN AUTO: 26.6 PG (ref 26–34)
MCHC RBC AUTO-ENTMCNC: 31.4 G/DL (ref 31–37)
MCV RBC AUTO: 84.7 FL (ref 80–100)
NRBC AUTOMATED: ABNORMAL PER 100 WBC
PDW BLD-RTO: 18.9 % (ref 11–14.5)
PLATELET # BLD: 243 K/UL (ref 140–450)
PMV BLD AUTO: 10.2 FL (ref 6–12)
POTASSIUM SERPL-SCNC: 4.2 MMOL/L (ref 3.7–5.3)
RBC # BLD: 3.9 M/UL (ref 4–5.2)
SODIUM BLD-SCNC: 145 MMOL/L (ref 135–144)
SPECIMEN DESCRIPTION: NORMAL
WBC # BLD: 10.5 K/UL (ref 3.5–11)

## 2019-02-19 PROCEDURE — 85027 COMPLETE CBC AUTOMATED: CPT

## 2019-02-19 PROCEDURE — 80048 BASIC METABOLIC PNL TOTAL CA: CPT

## 2019-02-19 PROCEDURE — 93226 XTRNL ECG REC<48 HR SCAN A/R: CPT

## 2019-02-19 PROCEDURE — 36415 COLL VENOUS BLD VENIPUNCTURE: CPT

## 2019-02-19 PROCEDURE — 87804 INFLUENZA ASSAY W/OPTIC: CPT

## 2019-02-19 PROCEDURE — 99495 TRANSJ CARE MGMT MOD F2F 14D: CPT | Performed by: NURSE PRACTITIONER

## 2019-02-19 PROCEDURE — 93225 XTRNL ECG REC<48 HRS REC: CPT

## 2019-02-20 ENCOUNTER — OFFICE VISIT (OUTPATIENT)
Dept: UROLOGY | Age: 84
End: 2019-02-20
Payer: MEDICARE

## 2019-02-20 ENCOUNTER — HOSPITAL ENCOUNTER (OUTPATIENT)
Dept: NON INVASIVE DIAGNOSTICS | Age: 84
Discharge: HOME | End: 2019-02-20
Payer: MEDICARE

## 2019-02-20 ENCOUNTER — TELEPHONE (OUTPATIENT)
Dept: UROLOGY | Age: 84
End: 2019-02-20

## 2019-02-20 ENCOUNTER — HOSPITAL ENCOUNTER (OUTPATIENT)
Age: 84
Setting detail: SPECIMEN
Discharge: HOME | End: 2019-02-20
Payer: MEDICARE

## 2019-02-20 ENCOUNTER — TELEPHONE (OUTPATIENT)
Dept: INTERNAL MEDICINE | Age: 84
End: 2019-02-20

## 2019-02-20 VITALS
SYSTOLIC BLOOD PRESSURE: 160 MMHG | HEIGHT: 61 IN | BODY MASS INDEX: 39.27 KG/M2 | HEART RATE: 76 BPM | DIASTOLIC BLOOD PRESSURE: 60 MMHG | WEIGHT: 208 LBS

## 2019-02-20 DIAGNOSIS — N20.1 URETERAL STONE: ICD-10-CM

## 2019-02-20 DIAGNOSIS — N20.1 URETERAL STONE: Primary | ICD-10-CM

## 2019-02-20 DIAGNOSIS — R10.9 FLANK PAIN: ICD-10-CM

## 2019-02-20 PROCEDURE — 87086 URINE CULTURE/COLONY COUNT: CPT

## 2019-02-20 PROCEDURE — 99215 OFFICE O/P EST HI 40 MIN: CPT | Performed by: UROLOGY

## 2019-02-20 RX ORDER — SULFAMETHOXAZOLE AND TRIMETHOPRIM 800; 160 MG/1; MG/1
1 TABLET ORAL 2 TIMES DAILY
Qty: 14 TABLET | Refills: 0 | Status: SHIPPED | OUTPATIENT
Start: 2019-02-20 | End: 2019-02-27

## 2019-02-20 RX ORDER — SULFAMETHOXAZOLE AND TRIMETHOPRIM 800; 160 MG/1; MG/1
1 TABLET ORAL 2 TIMES DAILY
Qty: 14 TABLET | Refills: 0 | Status: SHIPPED | OUTPATIENT
Start: 2019-02-20 | End: 2019-04-25 | Stop reason: ALTCHOICE

## 2019-02-22 ENCOUNTER — CARE COORDINATION (OUTPATIENT)
Dept: CASE MANAGEMENT | Age: 84
End: 2019-02-22

## 2019-02-22 LAB
CULTURE: NORMAL
Lab: NORMAL
SPECIMEN DESCRIPTION: NORMAL

## 2019-02-27 ENCOUNTER — HOSPITAL ENCOUNTER (OUTPATIENT)
Age: 84
Setting detail: OUTPATIENT SURGERY
Discharge: HOME | End: 2019-02-27
Attending: UROLOGY | Admitting: UROLOGY
Payer: MEDICARE

## 2019-02-27 ENCOUNTER — APPOINTMENT (OUTPATIENT)
Dept: GENERAL RADIOLOGY | Age: 84
End: 2019-02-27
Attending: UROLOGY
Payer: MEDICARE

## 2019-02-27 ENCOUNTER — ANESTHESIA EVENT (OUTPATIENT)
Dept: OPERATING ROOM | Age: 84
End: 2019-02-27
Payer: MEDICARE

## 2019-02-27 ENCOUNTER — ANESTHESIA (OUTPATIENT)
Dept: OPERATING ROOM | Age: 84
End: 2019-02-27
Payer: MEDICARE

## 2019-02-27 VITALS
WEIGHT: 208 LBS | RESPIRATION RATE: 16 BRPM | TEMPERATURE: 97.2 F | SYSTOLIC BLOOD PRESSURE: 163 MMHG | HEIGHT: 60 IN | OXYGEN SATURATION: 93 % | HEART RATE: 72 BPM | DIASTOLIC BLOOD PRESSURE: 67 MMHG | BODY MASS INDEX: 40.84 KG/M2

## 2019-02-27 VITALS
SYSTOLIC BLOOD PRESSURE: 99 MMHG | OXYGEN SATURATION: 97 % | DIASTOLIC BLOOD PRESSURE: 53 MMHG | RESPIRATION RATE: 5 BRPM | TEMPERATURE: 94.1 F

## 2019-02-27 LAB
GLUCOSE BLD-MCNC: 73 MG/DL (ref 65–105)
GLUCOSE BLD-MCNC: 83 MG/DL (ref 65–105)
GLUCOSE BLD-MCNC: 97 MG/DL (ref 65–105)

## 2019-02-27 PROCEDURE — 6360000002 HC RX W HCPCS: Performed by: NURSE ANESTHETIST, CERTIFIED REGISTERED

## 2019-02-27 PROCEDURE — C2617 STENT, NON-COR, TEM W/O DEL: HCPCS | Performed by: UROLOGY

## 2019-02-27 PROCEDURE — 6370000000 HC RX 637 (ALT 250 FOR IP): Performed by: UROLOGY

## 2019-02-27 PROCEDURE — 3700000001 HC ADD 15 MINUTES (ANESTHESIA): Performed by: UROLOGY

## 2019-02-27 PROCEDURE — 6360000002 HC RX W HCPCS: Performed by: UROLOGY

## 2019-02-27 PROCEDURE — 94640 AIRWAY INHALATION TREATMENT: CPT

## 2019-02-27 PROCEDURE — 2580000003 HC RX 258: Performed by: UROLOGY

## 2019-02-27 PROCEDURE — 3700000000 HC ANESTHESIA ATTENDED CARE: Performed by: UROLOGY

## 2019-02-27 PROCEDURE — 3209999900 FLUORO FOR SURGICAL PROCEDURES

## 2019-02-27 PROCEDURE — C1894 INTRO/SHEATH, NON-LASER: HCPCS | Performed by: UROLOGY

## 2019-02-27 PROCEDURE — 7100000000 HC PACU RECOVERY - FIRST 15 MIN: Performed by: UROLOGY

## 2019-02-27 PROCEDURE — 7100000001 HC PACU RECOVERY - ADDTL 15 MIN: Performed by: UROLOGY

## 2019-02-27 PROCEDURE — 74018 RADEX ABDOMEN 1 VIEW: CPT

## 2019-02-27 PROCEDURE — 7100000010 HC PHASE II RECOVERY - FIRST 15 MIN: Performed by: UROLOGY

## 2019-02-27 PROCEDURE — 82947 ASSAY GLUCOSE BLOOD QUANT: CPT

## 2019-02-27 PROCEDURE — 2709999900 HC NON-CHARGEABLE SUPPLY: Performed by: UROLOGY

## 2019-02-27 PROCEDURE — 3600000014 HC SURGERY LEVEL 4 ADDTL 15MIN: Performed by: UROLOGY

## 2019-02-27 PROCEDURE — 00910 ANES TRANSURETHRAL PX NOS: CPT | Performed by: NURSE ANESTHETIST, CERTIFIED REGISTERED

## 2019-02-27 PROCEDURE — 3600000004 HC SURGERY LEVEL 4 BASE: Performed by: UROLOGY

## 2019-02-27 PROCEDURE — 6360000002 HC RX W HCPCS

## 2019-02-27 PROCEDURE — 7100000011 HC PHASE II RECOVERY - ADDTL 15 MIN: Performed by: UROLOGY

## 2019-02-27 PROCEDURE — C1773 RET DEV, INSERTABLE: HCPCS | Performed by: UROLOGY

## 2019-02-27 PROCEDURE — 2580000003 HC RX 258: Performed by: NURSE ANESTHETIST, CERTIFIED REGISTERED

## 2019-02-27 PROCEDURE — 2720000010 HC SURG SUPPLY STERILE: Performed by: UROLOGY

## 2019-02-27 DEVICE — URETERAL STENT
Type: IMPLANTABLE DEVICE | Site: URETER | Status: FUNCTIONAL
Brand: CONTOUR™

## 2019-02-27 RX ORDER — DEXTROSE MONOHYDRATE 50 MG/ML
INJECTION, SOLUTION INTRAVENOUS CONTINUOUS PRN
Status: DISCONTINUED | OUTPATIENT
Start: 2019-02-27 | End: 2019-02-27 | Stop reason: SDUPTHER

## 2019-02-27 RX ORDER — IPRATROPIUM BROMIDE AND ALBUTEROL SULFATE 2.5; .5 MG/3ML; MG/3ML
1 SOLUTION RESPIRATORY (INHALATION) ONCE
Status: COMPLETED | OUTPATIENT
Start: 2019-02-27 | End: 2019-02-27

## 2019-02-27 RX ORDER — SODIUM CHLORIDE, SODIUM LACTATE, POTASSIUM CHLORIDE, CALCIUM CHLORIDE 600; 310; 30; 20 MG/100ML; MG/100ML; MG/100ML; MG/100ML
INJECTION, SOLUTION INTRAVENOUS CONTINUOUS
Status: DISCONTINUED | OUTPATIENT
Start: 2019-02-27 | End: 2019-02-27 | Stop reason: HOSPADM

## 2019-02-27 RX ORDER — ONDANSETRON 2 MG/ML
INJECTION INTRAMUSCULAR; INTRAVENOUS PRN
Status: DISCONTINUED | OUTPATIENT
Start: 2019-02-27 | End: 2019-02-27 | Stop reason: SDUPTHER

## 2019-02-27 RX ORDER — FENTANYL CITRATE 50 UG/ML
INJECTION, SOLUTION INTRAMUSCULAR; INTRAVENOUS PRN
Status: DISCONTINUED | OUTPATIENT
Start: 2019-02-27 | End: 2019-02-27 | Stop reason: SDUPTHER

## 2019-02-27 RX ORDER — PROPOFOL 10 MG/ML
INJECTION, EMULSION INTRAVENOUS PRN
Status: DISCONTINUED | OUTPATIENT
Start: 2019-02-27 | End: 2019-02-27 | Stop reason: SDUPTHER

## 2019-02-27 RX ORDER — DEXAMETHASONE SODIUM PHOSPHATE 4 MG/ML
INJECTION, SOLUTION INTRA-ARTICULAR; INTRALESIONAL; INTRAMUSCULAR; INTRAVENOUS; SOFT TISSUE PRN
Status: DISCONTINUED | OUTPATIENT
Start: 2019-02-27 | End: 2019-02-27 | Stop reason: SDUPTHER

## 2019-02-27 RX ORDER — KETOROLAC TROMETHAMINE 30 MG/ML
INJECTION, SOLUTION INTRAMUSCULAR; INTRAVENOUS PRN
Status: DISCONTINUED | OUTPATIENT
Start: 2019-02-27 | End: 2019-02-27 | Stop reason: SDUPTHER

## 2019-02-27 RX ADMIN — DEXTROSE MONOHYDRATE: 50 INJECTION, SOLUTION INTRAVENOUS at 10:32

## 2019-02-27 RX ADMIN — DEXAMETHASONE SODIUM PHOSPHATE 4 MG: 4 INJECTION, SOLUTION INTRAMUSCULAR; INTRAVENOUS at 10:24

## 2019-02-27 RX ADMIN — FENTANYL CITRATE 25 MCG: 50 INJECTION, SOLUTION INTRAMUSCULAR; INTRAVENOUS at 11:01

## 2019-02-27 RX ADMIN — PROPOFOL 130 MG: 10 INJECTION, EMULSION INTRAVENOUS at 10:20

## 2019-02-27 RX ADMIN — KETOROLAC TROMETHAMINE 30 MG: 30 INJECTION, SOLUTION INTRAMUSCULAR; INTRAVENOUS at 11:17

## 2019-02-27 RX ADMIN — ONDANSETRON 4 MG: 2 INJECTION INTRAMUSCULAR; INTRAVENOUS at 11:02

## 2019-02-27 RX ADMIN — SODIUM CHLORIDE, POTASSIUM CHLORIDE, SODIUM LACTATE AND CALCIUM CHLORIDE: 600; 310; 30; 20 INJECTION, SOLUTION INTRAVENOUS at 09:28

## 2019-02-27 RX ADMIN — FENTANYL CITRATE 25 MCG: 50 INJECTION, SOLUTION INTRAMUSCULAR; INTRAVENOUS at 10:27

## 2019-02-27 RX ADMIN — GENTAMICIN SULFATE 80 MG: 40 INJECTION, SOLUTION INTRAMUSCULAR; INTRAVENOUS at 10:18

## 2019-02-27 RX ADMIN — IPRATROPIUM BROMIDE AND ALBUTEROL SULFATE 1 AMPULE: .5; 3 SOLUTION RESPIRATORY (INHALATION) at 12:01

## 2019-02-27 ASSESSMENT — PAIN SCALES - GENERAL
PAINLEVEL_OUTOF10: 0
PAINLEVEL_OUTOF10: 2
PAINLEVEL_OUTOF10: 2
PAINLEVEL_OUTOF10: 0

## 2019-02-27 ASSESSMENT — PULMONARY FUNCTION TESTS
PIF_VALUE: 11
PIF_VALUE: 4
PIF_VALUE: 10
PIF_VALUE: 1
PIF_VALUE: 3
PIF_VALUE: 10
PIF_VALUE: 10
PIF_VALUE: 1
PIF_VALUE: 20
PIF_VALUE: 2
PIF_VALUE: 11
PIF_VALUE: 1
PIF_VALUE: 17
PIF_VALUE: 10
PIF_VALUE: 5
PIF_VALUE: 10
PIF_VALUE: 1
PIF_VALUE: 10
PIF_VALUE: 11
PIF_VALUE: 10
PIF_VALUE: 1
PIF_VALUE: 10
PIF_VALUE: 1
PIF_VALUE: 10
PIF_VALUE: 1
PIF_VALUE: 22
PIF_VALUE: 1
PIF_VALUE: 10
PIF_VALUE: 2
PIF_VALUE: 1
PIF_VALUE: 11
PIF_VALUE: 1
PIF_VALUE: 10
PIF_VALUE: 3
PIF_VALUE: 10
PIF_VALUE: 11
PIF_VALUE: 10
PIF_VALUE: 14
PIF_VALUE: 3
PIF_VALUE: 1
PIF_VALUE: 1
PIF_VALUE: 10
PIF_VALUE: 10
PIF_VALUE: 3
PIF_VALUE: 10
PIF_VALUE: 10
PIF_VALUE: 17
PIF_VALUE: 10
PIF_VALUE: 11
PIF_VALUE: 1
PIF_VALUE: 3
PIF_VALUE: 10
PIF_VALUE: 1
PIF_VALUE: 17
PIF_VALUE: 1
PIF_VALUE: 11
PIF_VALUE: 11
PIF_VALUE: 1

## 2019-02-27 ASSESSMENT — PAIN DESCRIPTION - PAIN TYPE: TYPE: SURGICAL PAIN

## 2019-02-27 ASSESSMENT — PAIN - FUNCTIONAL ASSESSMENT: PAIN_FUNCTIONAL_ASSESSMENT: 0-10

## 2019-02-28 ENCOUNTER — CARE COORDINATION (OUTPATIENT)
Dept: CASE MANAGEMENT | Age: 84
End: 2019-02-28

## 2019-03-04 ENCOUNTER — NURSE ONLY (OUTPATIENT)
Dept: UROLOGY | Age: 84
End: 2019-03-04

## 2019-03-04 VITALS — OXYGEN SATURATION: 98 % | DIASTOLIC BLOOD PRESSURE: 60 MMHG | HEART RATE: 78 BPM | SYSTOLIC BLOOD PRESSURE: 126 MMHG

## 2019-03-04 DIAGNOSIS — N20.1 URETERAL STONE: Primary | ICD-10-CM

## 2019-03-04 PROCEDURE — 99999 PR OFFICE/OUTPT VISIT,PROCEDURE ONLY: CPT | Performed by: UROLOGY

## 2019-03-07 ENCOUNTER — OFFICE VISIT (OUTPATIENT)
Dept: CARDIOLOGY | Age: 84
End: 2019-03-07
Payer: MEDICARE

## 2019-03-07 VITALS
HEIGHT: 60 IN | DIASTOLIC BLOOD PRESSURE: 60 MMHG | WEIGHT: 205 LBS | HEART RATE: 67 BPM | BODY MASS INDEX: 40.25 KG/M2 | SYSTOLIC BLOOD PRESSURE: 144 MMHG

## 2019-03-07 DIAGNOSIS — I10 ESSENTIAL HYPERTENSION: ICD-10-CM

## 2019-03-07 DIAGNOSIS — I47.1 MULTIFOCAL ATRIAL TACHYCARDIA (HCC): Primary | ICD-10-CM

## 2019-03-07 DIAGNOSIS — E78.5 DYSLIPIDEMIA: ICD-10-CM

## 2019-03-07 PROCEDURE — 99214 OFFICE O/P EST MOD 30 MIN: CPT | Performed by: INTERNAL MEDICINE

## 2019-03-07 PROCEDURE — 93000 ELECTROCARDIOGRAM COMPLETE: CPT | Performed by: INTERNAL MEDICINE

## 2019-03-08 RX ORDER — AMIODARONE HYDROCHLORIDE 200 MG/1
200 TABLET ORAL DAILY
Qty: 7 TABLET | Refills: 0 | Status: SHIPPED | OUTPATIENT
Start: 2019-03-08 | End: 2019-06-06

## 2019-03-08 RX ORDER — AMIODARONE HYDROCHLORIDE 200 MG/1
200 TABLET ORAL DAILY
Qty: 90 TABLET | Refills: 3 | Status: SHIPPED | OUTPATIENT
Start: 2019-03-08 | End: 2019-03-08 | Stop reason: SDUPTHER

## 2019-03-13 ENCOUNTER — TELEPHONE (OUTPATIENT)
Dept: INTERNAL MEDICINE | Age: 84
End: 2019-03-13

## 2019-03-13 RX ORDER — TAMSULOSIN HYDROCHLORIDE 0.4 MG/1
0.4 CAPSULE ORAL DAILY
Qty: 28 CAPSULE | Refills: 0 | Status: SHIPPED | OUTPATIENT
Start: 2019-03-13 | End: 2019-03-27

## 2019-03-27 ENCOUNTER — HOSPITAL ENCOUNTER (OUTPATIENT)
Age: 84
Setting detail: SPECIMEN
Discharge: HOME | End: 2019-03-27
Payer: MEDICARE

## 2019-03-27 ENCOUNTER — OFFICE VISIT (OUTPATIENT)
Dept: UROLOGY | Age: 84
End: 2019-03-27
Payer: MEDICARE

## 2019-03-27 VITALS
BODY MASS INDEX: 41.78 KG/M2 | WEIGHT: 212.8 LBS | SYSTOLIC BLOOD PRESSURE: 136 MMHG | DIASTOLIC BLOOD PRESSURE: 64 MMHG | HEIGHT: 60 IN | HEART RATE: 82 BPM

## 2019-03-27 DIAGNOSIS — R10.2 PELVIC PAIN: ICD-10-CM

## 2019-03-27 DIAGNOSIS — R10.9 FLANK PAIN: ICD-10-CM

## 2019-03-27 DIAGNOSIS — N20.1 URETERAL STONE: ICD-10-CM

## 2019-03-27 DIAGNOSIS — N20.0 NEPHROLITHIASIS: Primary | ICD-10-CM

## 2019-03-27 LAB
-: ABNORMAL
AMORPHOUS: ABNORMAL
BACTERIA: ABNORMAL
BILIRUBIN URINE: NEGATIVE
CASTS UA: ABNORMAL /LPF (ref 0–2)
COLOR: ABNORMAL
COMMENT UA: ABNORMAL
CRYSTALS, UA: ABNORMAL /HPF
EPITHELIAL CELLS UA: ABNORMAL /HPF (ref 0–5)
GLUCOSE URINE: NEGATIVE
KETONES, URINE: NEGATIVE
LEUKOCYTE ESTERASE, URINE: ABNORMAL
MUCUS: ABNORMAL
NITRITE, URINE: NEGATIVE
OTHER OBSERVATIONS UA: ABNORMAL
PH UA: 5.5 (ref 5–6)
PROTEIN UA: NEGATIVE
RBC UA: ABNORMAL /HPF (ref 0–4)
RENAL EPITHELIAL, UA: ABNORMAL /HPF
SPECIFIC GRAVITY UA: 1.02 (ref 1.01–1.02)
TRICHOMONAS: ABNORMAL
TURBIDITY: ABNORMAL
URINE HGB: NEGATIVE
UROBILINOGEN, URINE: NORMAL
WBC UA: ABNORMAL /HPF (ref 0–4)
YEAST: ABNORMAL

## 2019-03-27 PROCEDURE — 87077 CULTURE AEROBIC IDENTIFY: CPT

## 2019-03-27 PROCEDURE — 87186 SC STD MICRODIL/AGAR DIL: CPT

## 2019-03-27 PROCEDURE — 99214 OFFICE O/P EST MOD 30 MIN: CPT | Performed by: UROLOGY

## 2019-03-27 PROCEDURE — 87086 URINE CULTURE/COLONY COUNT: CPT

## 2019-03-27 PROCEDURE — 81001 URINALYSIS AUTO W/SCOPE: CPT

## 2019-03-28 DIAGNOSIS — E11.42 DIABETIC POLYNEUROPATHY ASSOCIATED WITH TYPE 2 DIABETES MELLITUS (HCC): Primary | ICD-10-CM

## 2019-03-28 DIAGNOSIS — E11.21 DIABETIC NEPHROPATHY ASSOCIATED WITH TYPE 2 DIABETES MELLITUS (HCC): ICD-10-CM

## 2019-03-28 RX ORDER — PEN NEEDLE, DIABETIC 31 GX5/16"
NEEDLE, DISPOSABLE MISCELLANEOUS
Qty: 270 EACH | Refills: 3 | Status: SHIPPED | OUTPATIENT
Start: 2019-03-28 | End: 2019-10-18 | Stop reason: SDUPTHER

## 2019-03-29 ENCOUNTER — TELEPHONE (OUTPATIENT)
Dept: FAMILY MEDICINE CLINIC | Age: 84
End: 2019-03-29

## 2019-03-29 DIAGNOSIS — N39.0 URINARY TRACT INFECTION WITHOUT HEMATURIA, SITE UNSPECIFIED: Primary | ICD-10-CM

## 2019-03-29 LAB
CULTURE: ABNORMAL
Lab: ABNORMAL
SPECIMEN DESCRIPTION: ABNORMAL

## 2019-03-29 RX ORDER — CIPROFLOXACIN 500 MG/1
500 TABLET, FILM COATED ORAL 2 TIMES DAILY
Qty: 20 TABLET | Refills: 0 | Status: SHIPPED | OUTPATIENT
Start: 2019-03-29 | End: 2019-04-08

## 2019-04-18 RX ORDER — PREDNISONE 1 MG/1
TABLET ORAL
Qty: 90 TABLET | Refills: 3 | Status: SHIPPED | OUTPATIENT
Start: 2019-04-18 | End: 2020-05-21 | Stop reason: SDUPTHER

## 2019-04-23 ENCOUNTER — HOSPITAL ENCOUNTER (OUTPATIENT)
Dept: LAB | Age: 84
Discharge: HOME | End: 2019-04-23
Payer: MEDICARE

## 2019-04-23 DIAGNOSIS — E55.9 VITAMIN D DEFICIENCY: ICD-10-CM

## 2019-04-23 DIAGNOSIS — E11.21 TYPE 2 DIABETES WITH NEPHROPATHY (HCC): ICD-10-CM

## 2019-04-23 LAB
ABSOLUTE EOS #: 0.2 K/UL (ref 0–0.4)
ABSOLUTE IMMATURE GRANULOCYTE: ABNORMAL K/UL (ref 0–0.3)
ABSOLUTE LYMPH #: 2.2 K/UL (ref 1–4.8)
ABSOLUTE MONO #: 0.9 K/UL (ref 0.1–1.2)
ANION GAP SERPL CALCULATED.3IONS-SCNC: 13 MMOL/L (ref 9–17)
BASOPHILS # BLD: 1 % (ref 0–1)
BASOPHILS ABSOLUTE: 0.1 K/UL (ref 0–0.2)
BUN BLDV-MCNC: 29 MG/DL (ref 8–23)
BUN/CREAT BLD: 25 (ref 9–20)
CALCIUM SERPL-MCNC: 9.6 MG/DL (ref 8.6–10.4)
CHLORIDE BLD-SCNC: 103 MMOL/L (ref 98–107)
CO2: 25 MMOL/L (ref 20–31)
CREAT SERPL-MCNC: 1.16 MG/DL (ref 0.5–0.9)
CREATININE URINE: 23.7 MG/DL (ref 28–217)
DIFFERENTIAL TYPE: ABNORMAL
EOSINOPHILS RELATIVE PERCENT: 3 % (ref 1–7)
ESTIMATED AVERAGE GLUCOSE: 126 MG/DL
GFR AFRICAN AMERICAN: 54 ML/MIN
GFR NON-AFRICAN AMERICAN: 45 ML/MIN
GFR SERPL CREATININE-BSD FRML MDRD: ABNORMAL ML/MIN/{1.73_M2}
GFR SERPL CREATININE-BSD FRML MDRD: ABNORMAL ML/MIN/{1.73_M2}
GLUCOSE BLD-MCNC: 128 MG/DL (ref 70–99)
HBA1C MFR BLD: 6 % (ref 4.8–5.9)
HCT VFR BLD CALC: 35.4 % (ref 36–46)
HEMOGLOBIN: 10.7 G/DL (ref 12–16)
IMMATURE GRANULOCYTES: ABNORMAL %
LYMPHOCYTES # BLD: 24 % (ref 16–46)
MCH RBC QN AUTO: 24.9 PG (ref 26–34)
MCHC RBC AUTO-ENTMCNC: 30.1 G/DL (ref 31–37)
MCV RBC AUTO: 82.6 FL (ref 80–100)
MICROALBUMIN/CREAT 24H UR: <12 MG/L
MICROALBUMIN/CREAT UR-RTO: ABNORMAL MCG/MG CREAT
MONOCYTES # BLD: 10 % (ref 4–11)
NRBC AUTOMATED: ABNORMAL PER 100 WBC
PDW BLD-RTO: 18.6 % (ref 11–14.5)
PLATELET # BLD: 237 K/UL (ref 140–450)
PLATELET ESTIMATE: ABNORMAL
PMV BLD AUTO: 10.1 FL (ref 6–12)
POTASSIUM SERPL-SCNC: 4.3 MMOL/L (ref 3.7–5.3)
RBC # BLD: 4.28 M/UL (ref 4–5.2)
RBC # BLD: ABNORMAL 10*6/UL
SEG NEUTROPHILS: 62 % (ref 43–77)
SEGMENTED NEUTROPHILS ABSOLUTE COUNT: 5.8 K/UL (ref 1.8–7.7)
SODIUM BLD-SCNC: 141 MMOL/L (ref 135–144)
VITAMIN D 25-HYDROXY: 41.6 NG/ML (ref 30–100)
WBC # BLD: 9.2 K/UL (ref 3.5–11)
WBC # BLD: ABNORMAL 10*3/UL

## 2019-04-23 PROCEDURE — 82570 ASSAY OF URINE CREATININE: CPT

## 2019-04-23 PROCEDURE — 82043 UR ALBUMIN QUANTITATIVE: CPT

## 2019-04-23 PROCEDURE — 82306 VITAMIN D 25 HYDROXY: CPT

## 2019-04-23 PROCEDURE — 83036 HEMOGLOBIN GLYCOSYLATED A1C: CPT

## 2019-04-23 PROCEDURE — 80048 BASIC METABOLIC PNL TOTAL CA: CPT

## 2019-04-23 PROCEDURE — 85025 COMPLETE CBC W/AUTO DIFF WBC: CPT

## 2019-04-23 PROCEDURE — 36415 COLL VENOUS BLD VENIPUNCTURE: CPT

## 2019-04-25 ENCOUNTER — OFFICE VISIT (OUTPATIENT)
Dept: INTERNAL MEDICINE | Age: 84
End: 2019-04-25
Payer: MEDICARE

## 2019-04-25 ENCOUNTER — HOSPITAL ENCOUNTER (OUTPATIENT)
Dept: GENERAL RADIOLOGY | Age: 84
Discharge: HOME | End: 2019-04-27
Payer: MEDICARE

## 2019-04-25 VITALS
HEIGHT: 61 IN | BODY MASS INDEX: 40.02 KG/M2 | DIASTOLIC BLOOD PRESSURE: 58 MMHG | WEIGHT: 212 LBS | HEART RATE: 84 BPM | SYSTOLIC BLOOD PRESSURE: 166 MMHG

## 2019-04-25 DIAGNOSIS — I10 ESSENTIAL HYPERTENSION: Primary | ICD-10-CM

## 2019-04-25 DIAGNOSIS — E55.9 VITAMIN D DEFICIENCY: ICD-10-CM

## 2019-04-25 DIAGNOSIS — I34.0 NON-RHEUMATIC MITRAL REGURGITATION: ICD-10-CM

## 2019-04-25 DIAGNOSIS — E11.42 DIABETIC POLYNEUROPATHY ASSOCIATED WITH TYPE 2 DIABETES MELLITUS (HCC): ICD-10-CM

## 2019-04-25 DIAGNOSIS — J45.20 MILD INTERMITTENT ASTHMA WITHOUT COMPLICATION: ICD-10-CM

## 2019-04-25 DIAGNOSIS — E78.5 DYSLIPIDEMIA: ICD-10-CM

## 2019-04-25 DIAGNOSIS — I27.20 PULMONARY HTN (HCC): ICD-10-CM

## 2019-04-25 DIAGNOSIS — N30.00 ACUTE CYSTITIS WITHOUT HEMATURIA: ICD-10-CM

## 2019-04-25 DIAGNOSIS — I51.89 DIASTOLIC DYSFUNCTION: ICD-10-CM

## 2019-04-25 DIAGNOSIS — M35.3 PMR (POLYMYALGIA RHEUMATICA) (HCC): ICD-10-CM

## 2019-04-25 DIAGNOSIS — E66.01 OBESITY, MORBID, BMI 40.0-49.9 (HCC): ICD-10-CM

## 2019-04-25 DIAGNOSIS — I49.3 FREQUENT PVCS: ICD-10-CM

## 2019-04-25 DIAGNOSIS — N20.0 NEPHROLITHIASIS: ICD-10-CM

## 2019-04-25 DIAGNOSIS — D50.0 IRON DEFICIENCY ANEMIA DUE TO CHRONIC BLOOD LOSS: ICD-10-CM

## 2019-04-25 DIAGNOSIS — R05.9 COUGH: ICD-10-CM

## 2019-04-25 PROCEDURE — 71046 X-RAY EXAM CHEST 2 VIEWS: CPT

## 2019-04-25 PROCEDURE — 99214 OFFICE O/P EST MOD 30 MIN: CPT | Performed by: INTERNAL MEDICINE

## 2019-04-25 NOTE — PROGRESS NOTES
Vimal Munoz received counseling on the following healthy behaviors: exercise  Reviewed prior labs and health maintenance  Continue current medications except where noted below, diet and exercise. Discussed use, benefit, and side effects of prescribed medications. Barriers to medication compliance addressed. Patient given educational materials - see patient instructions  Was a self-tracking handout given in paper form or via [employer]? Yes    Requested Prescriptions      No prescriptions requested or ordered in this encounter       All patient questions answered. Patient voiced understanding. Quality Measures    Body mass index is 40.06 kg/m². Elevated. Weight control planned discussed: healthy diet and regular exercise. BP: (!) 166/58. Blood pressure is high. Treatment plan consists of: see progress note below. Fall Risk 10/26/2018 7/20/2017 7/1/2016 6/3/2015 4/23/2014   2 or more falls in past year? no no no no no   Fall with injury in past year? no no no no no     The patient does not have a history of falls. I did not - not indicated , complete a risk assessment for falls.  A plan of care for falls No Treatment plan indicated    Lab Results   Component Value Date    LDLCHOLESTEROL 77 07/10/2018    (goal LDL reduction with dx if diabetes is 50% LDL reduction)    PHQ Scores 1/21/2019 10/26/2018 7/20/2017 7/1/2016 1/11/2016 1/19/2015 2/12/2014   PHQ2 Score 0 1 2 1 2 2 2   PHQ9 Score 0 1 2 1 2 2 2     Interpretation of Total Score Depression Severity: 1-4 = Minimal depression, 5-9 = Mild depression, 10-14 = Moderate depression, 15-19 = Moderately severe depression, 20-27 = Severe depression

## 2019-04-25 NOTE — PATIENT INSTRUCTIONS
Patient Education        Learning About Diabetes Food Guidelines  Your Care Instructions    Meal planning is important to manage diabetes. It helps keep your blood sugar at a target level (which you set with your doctor). You don't have to eat special foods. You can eat what your family eats, including sweets once in a while. But you do have to pay attention to how often you eat and how much you eat of certain foods. You may want to work with a dietitian or a certified diabetes educator (CDE) to help you plan meals and snacks. A dietitian or CDE can also help you lose weight if that is one of your goals. What should you know about eating carbs? Managing the amount of carbohydrate (carbs) you eat is an important part of healthy meals when you have diabetes. Carbohydrate is found in many foods. · Learn which foods have carbs. And learn the amounts of carbs in different foods. ? Bread, cereal, pasta, and rice have about 15 grams of carbs in a serving. A serving is 1 slice of bread (1 ounce), ½ cup of cooked cereal, or 1/3 cup of cooked pasta or rice. ? Fruits have 15 grams of carbs in a serving. A serving is 1 small fresh fruit, such as an apple or orange; ½ of a banana; ½ cup of cooked or canned fruit; ½ cup of fruit juice; 1 cup of melon or raspberries; or 2 tablespoons of dried fruit. ? Milk and no-sugar-added yogurt have 15 grams of carbs in a serving. A serving is 1 cup of milk or 2/3 cup of no-sugar-added yogurt. ? Starchy vegetables have 15 grams of carbs in a serving. A serving is ½ cup of mashed potatoes or sweet potato; 1 cup winter squash; ½ of a small baked potato; ½ cup of cooked beans; or ½ cup cooked corn or green peas. · Learn how much carbs to eat each day and at each meal. A dietitian or CDE can teach you how to keep track of the amount of carbs you eat. This is called carbohydrate counting. · If you are not sure how to count carbohydrate grams, use the Plate Method to plan meals.  It is a good, quick way to make sure that you have a balanced meal. It also helps you spread carbs throughout the day. ? Divide your plate by types of foods. Put non-starchy vegetables on half the plate, meat or other protein food on one-quarter of the plate, and a grain or starchy vegetable in the final quarter of the plate. To this you can add a small piece of fruit and 1 cup of milk or yogurt, depending on how many carbs you are supposed to eat at a meal.  · Try to eat about the same amount of carbs at each meal. Do not \"save up\" your daily allowance of carbs to eat at one meal.  · Proteins have very little or no carbs per serving. Examples of proteins are beef, chicken, turkey, fish, eggs, tofu, cheese, cottage cheese, and peanut butter. A serving size of meat is 3 ounces, which is about the size of a deck of cards. Examples of meat substitute serving sizes (equal to 1 ounce of meat) are 1/4 cup of cottage cheese, 1 egg, 1 tablespoon of peanut butter, and ½ cup of tofu. How can you eat out and still eat healthy? · Learn to estimate the serving sizes of foods that have carbohydrate. If you measure food at home, it will be easier to estimate the amount in a serving of restaurant food. · If the meal you order has too much carbohydrate (such as potatoes, corn, or baked beans), ask to have a low-carbohydrate food instead. Ask for a salad or green vegetables. · If you use insulin, check your blood sugar before and after eating out to help you plan how much to eat in the future. · If you eat more carbohydrate at a meal than you had planned, take a walk or do other exercise. This will help lower your blood sugar. What else should you know? · Limit saturated fat, such as the fat from meat and dairy products. This is a healthy choice because people who have diabetes are at higher risk of heart disease. So choose lean cuts of meat and nonfat or low-fat dairy products.  Use olive or canola oil instead of butter or shortening when cooking. · Don't skip meals. Your blood sugar may drop too low if you skip meals and take insulin or certain medicines for diabetes. · Check with your doctor before you drink alcohol. Alcohol can cause your blood sugar to drop too low. Alcohol can also cause a bad reaction if you take certain diabetes medicines. Follow-up care is a key part of your treatment and safety. Be sure to make and go to all appointments, and call your doctor if you are having problems. It's also a good idea to know your test results and keep a list of the medicines you take. Where can you learn more? Go to https://"[employer]pepiceweb.[employer]. org and sign in to your [employer] account. Enter N053 in the [employer] box to learn more about \"Learning About Diabetes Food Guidelines. \"     If you do not have an account, please click on the \"Sign Up Now\" link. Current as of: July 25, 2018  Content Version: 11.9  © 3988-9684 [employer]. Care instructions adapted under license by [facility] [place] ([facility]). If you have questions about a medical condition or this instruction, always ask your healthcare professional. [place] [address] any warranty or liability for your use of this information.

## 2019-04-26 DIAGNOSIS — R06.02 SHORTNESS OF BREATH: Primary | ICD-10-CM

## 2019-04-26 DIAGNOSIS — J45.20 MILD INTERMITTENT ASTHMA WITHOUT COMPLICATION: ICD-10-CM

## 2019-04-26 DIAGNOSIS — I27.20 PULMONARY HTN (HCC): ICD-10-CM

## 2019-04-26 NOTE — PROGRESS NOTES
Medications   Medication Sig Dispense Refill    Polyethylene Glycol 400 (BLINK TEARS) 0.25 % SOLN Apply to eye as needed      predniSONE (DELTASONE) 5 MG tablet TAKE 1 TABLET DAILY 90 tablet 3    amiodarone (CORDARONE) 200 MG tablet Take 1 tablet by mouth daily 7 tablet 0    furosemide (LASIX) 40 MG tablet Take 1 tablet by mouth daily 30 tablet 0    simvastatin (ZOCOR) 20 MG tablet Take 1 tablet by mouth nightly 90 tablet 3    hydrALAZINE (APRESOLINE) 25 MG tablet TAKE 1 TABLET THREE TIMES A  tablet 3    VICTOZA 18 MG/3ML SOPN SC injection INJECT 1.2 MG INTO THE SKIN DAILY 18 mL 3    LANTUS SOLOSTAR 100 UNIT/ML injection pen INJECT 50 UNITS IN THE MORNING AND 48 UNITS AT BEDTIME (Patient taking differently: INJECT 45 UNITS IN THE MORNING AND 45 UNITS AT BEDTIME) 90 mL 3    metoprolol succinate (TOPROL XL) 50 MG extended release tablet TAKE 1 TABLET DAILY 90 tablet 3    potassium chloride (KLOR-CON M10) 10 MEQ extended release tablet TAKE 1 TABLET DAILY 90 tablet 3    amLODIPine (NORVASC) 5 MG tablet TAKE 1 TABLET DAILY 90 tablet 3    HUMALOG KWIKPEN 100 UNIT/ML pen INJECT 10 UNITS WITH BREAKFAST, 16 UNITS AT NOON, 24 UNITS WITH SUPPER AND 10 UNITS AT NIGHT AS NEEDED (Patient taking differently: INJECT 10 UNITS WITH BREAKFAST, 16 UNITS AT NOON, 20 UNITS WITH SUPPER AND 10 UNITS AT NIGHT AS NEEDED) 75 mL 3    losartan (COZAAR) 100 MG tablet TAKE 1 TABLET DAILY 90 tablet 3    aspirin 81 MG EC tablet Take 81 mg by mouth daily      acetaminophen (TYLENOL) 500 MG tablet Take 500 mg by mouth daily      omeprazole (PRILOSEC) 20 MG capsule Take 20 mg by mouth Daily  30 capsule 3    Cholecalciferol (VITAMIN D3) 2000 UNITS CAPS Take 2,000 Units by mouth daily       B-D ULTRAFINE III SHORT PEN 31G X 8 MM MISC USE 7 DAILY 270 each 3    ferrous sulfate 220 (44 Fe) MG/5ML solution TAKE 10 MLS BY MOUTH DAILY. MIX WITH 2 OUNCES OF ORANGE JUICE (Patient taking differently: TAKE 10 MLS BY MOUTH DAILY.  MIX WITH 2 OUNCES OF ORANGE JUICE - Takes about every 3rd day) 473 mL 5    Handicap Placard MISC by Does not apply route EXP: 6/12/2020 1 each 0    glucose blood VI test strips (ASCENSIA AUTODISC VI;ONE TOUCH ULTRA TEST VI) strip 1 each by In Vitro route 3 times daily As directed. 100 each 5     No current facility-administered medications for this visit. Past Medical History:        Diagnosis Date    Allergic rhinitis     Asthma     PFT's, 04/06, actually showed mild restrictive defect.  Basal cell carcinoma of cheek 2007    Right cheek    Basal cell carcinoma of leg     right leg    CAD (coronary artery disease)     With 40% stenosis of the LAD, 07/10, ejection fraction 60%. Repeat heart cath9/14 with 40-50 percent LAD lesion first diagonal 50% lesion rec med Rx    Central retinal artery occlusion     Right side November 2014 status post TPA    Cerebral artery occlusion with cerebral infarction ([facility] [address] 11/24/2014    Cerebrovascular disease     50-79% stenosis on left on ultrasound 11/14    CHF [facility]congestive heart failure) ([facility])     Echo 1/15 moderate MR, severe TR, RVSP 76, grade 2 diastolic dysfunction    Diverticulosis     AVM on colonoscopy, 05/11    Dyslipidemia     Elevated antinuclear antibody (ZAIRA) level     History of    Esophagitis 05/2011    Gastritis/esophagitis on EGD, Dr. Retaan Numbers. Repeat EGD6/15 Gastritis    Foraminal stenosis of lumbar region     Moderate  Right L4/L5 neuroforaminal stenosis, Dr Bryce Waggoner following. MRI 2/16 [place].   Moderate foraminal stenosis mild to moderate central canal stenosis    Hypertension     IBS (irritable bowel syndrome)     GI consultation with Dr. Marita Tubbs, GI specialist in [facility], felt that she probably has chronic functional diarrhea and recommended empiric Imodium, Pepto-Bismol or Questran first. Sprue test Neg 2011    Iron deficiency anemia     Percent sat iron 5, January 2015, ferritin 40 1 /15, FOBT positive  EGD 6/15  Gastritis, IV iron 9/15x3 effective,  colonoscopy deferred by Dr. Petra Chua. --Prior colonoscopy 2011 with severe diverticulosis and telangiectasia. Trial iron solution in Orange juice 12/16    Junctional bradycardia     Symptomatic, resolved with discontinuation of Verapamil, 05/09. 1.1) Echocardiogram: LAE, LVH, EF 50%, mild MR, diastolic. 1.2) Dr. Juan Ramirez evaluation. 1.3.) Persantine stress test negative, 05/09.  Migraine     Mild CAD     cath 10/15    Mitral regurgitation     Moderate to severe on echocardiogram September 2015. Right pressure 76 grade 2 diastolic dysfunction,  echo 87/99 grade 2 diastolic dysfunction mild to moderate MR RVSP 56 aortic sclerosis    Obesity     CHICO (obstructive sleep apnea)     CPAP 14 2/15 initiation  AHI 7  mild CHICO intolerant CPAP    Osteoarthritis     PMR (polymyalgia rheumatica) (HCC)     Premature atrial contractions     Pulmonary hypertension (HCC)     -Moderate on echo November 2014    Restrictive lung disease     Mild on PFTs 11/14    Type II or unspecified type diabetes mellitus without mention of complication, not stated as uncontrolled     Vitreous floaters        Family Hx & Social HX    family history includes Heart Attack (age of onset: 48) in her child; Heart Disease in an other family member; High Blood Pressure in an other family member; Stroke in an other family member; Uterine Cancer in her mother. Social History     Tobacco Use   Smoking Status Never Smoker   Smokeless Tobacco Never Used     Social History     Substance and Sexual Activity   Alcohol Use No    Alcohol/week: 0.0 oz       Vitals:    04/25/19 1012 04/25/19 1018   BP: (!) 156/62 (!) 166/58   Site: Left Upper Arm Left Upper Arm   Position: Sitting Sitting   Cuff Size: Large Adult Large Adult   Pulse: 84    Weight: 212 lb (96.2 kg)    Height: 5' 1\" (1.549 m)         Vitals Reviewed. Body mass index is 40.06 kg/m².      Wt Readings from Last 3 Encounters:   04/25/19 212 lb (96.2 kg)   03/27/19 212 lb 12.8 oz (96.5 kg)   03/07/19 205 lb (93 kg)     BP Readings from Last 3 Encounters:   04/25/19 (!) 166/58   03/27/19 136/64   03/07/19 (!) 144/60     Physical Examination:  Constitutional:  She appears well-developed and well-nourished. No distress. HENT:  Head: Normocephalic and atraumatic. Right Ear:  External ear normal.  Left Ear:  External ear normal.  Nose:  Nose normal.  Mouth/Throat:  Oropharynx is clear and moist.  Eyes: Conjunctivae and EOM are normal.  Pupils are equal, round, and reactive to light. Right eye exhibits no discharge. Left eye exhibits no discharge. No scleral icterus. Neck:  Normal range of motion. Neck supple. No JVD present. No tracheal deviation present. No thyromegaly present. No carotid bruit. Cardiovascular:  Regular rate and rhythm. Normal heart sounds, and intact distal pulses. Exam reveals no gallop. Pulmonary/Chest:  Effort normal and breath sounds normal.  No respiratory distress. She has no wheezes. She has no rales. Abdominal:  Soft. Normal aorta and bowel sounds are normal.  She exhibits no distension and no mass. There is no hepatosplenomegaly. There is no tenderness. There is no rebound and no guarding. Musculoskeletal:  She exhibits no tenderness. Extremities have no significant edema. Lymphadenopathy:    She has no cervical adenopathy. Neurological:  She is alert. She has normal strength. She displays normal reflexes. No cranial nerve deficit or sensory deficit. She exhibits normal muscle tone. Skin:  Skin is warm and dry. No rash noted. She is not diaphoretic. No pallor. Psychiatric:  She has a normal mood and affect.   Her behavior is normal.  Judgment normal.       Lab Results   Component Value Date    K 4.3 04/23/2019    CREATININE 1.16 04/23/2019    AST 22 02/05/2019    ALT 18 02/05/2019    TSH 2.05 04/11/2016    HCT 35.4 04/23/2019    LABA1C 6.0 04/23/2019    MICROALBUR <12 04/23/2019    GLUCOSE 128 04/23/2019    MG 2.2 01/10/2015 CALCIUM 9.6 04/23/2019    QFUYHJJR29 320 01/11/2015    VITD25 41.6 04/23/2019    FERRITIN 54 07/13/2017    TIBC 324 07/13/2017    IRON 57 07/13/2017      Lab Results   Component Value Date    CHOL 173 07/10/2018    TRIG 136 07/10/2018    HDL 69 07/10/2018    LDLCHOLESTEROL 77 07/10/2018     Labs reviewed and discussed with the patient? Yes. Microalbumin, HA1c, vitamin D, Chem-7, CBC all reviewed from just prior to this visit. Assessment/Plan:    1. Essential hypertension  Well controlled on current regimen. No change. It is actually a little high today but she has run more often lower so we are going to hold off on making a change and reassess her short term. 2. Diabetic polyneuropathy associated with type 2 diabetes mellitus (HCC)  Decrease the Lantus to 45 units BID. Decrease Humalog at the evening meal to 20 units. She uses the as needed bedtime insulin infrequently. Call with any low sugars and then we will reassess this moving forward. I think we could let that QR9Z certainly drift up closer to 6.5 or even 7.0 to try to protect her from problems with low sugars. We are also going to set up her with PAR[facility] Meza. 3. Nephrolithiasis  4. Acute cystitis without hematuria  She is following with Dr. Irvin Benavides and seems to be doing well following the complex problem this winter. 5. Frequent PVCs  6. Diastolic dysfunction  7. Non-rheumatic mitral regurgitation  8. Pulmonary HTN ([address]  It does not look like she had atrial fibrillation. She still is on amiodarone. I would like to speak with Dr. Natacha Lauren if we could potentially stop that. She thinks that she is not tolerating it particularly well. We talked about getting a chest x-ray and I had a chance to review that. It raises the possibility of interstitial lung disease. With the amiodarone use I think we should get PFTs and I am going to order a high resolution CT of the chest as well.   We will make a decision on the amiodarone after discussion

## 2019-05-08 ENCOUNTER — HOSPITAL ENCOUNTER (OUTPATIENT)
Dept: CT IMAGING | Age: 84
Discharge: HOME | End: 2019-05-10
Payer: MEDICARE

## 2019-05-08 ENCOUNTER — HOSPITAL ENCOUNTER (OUTPATIENT)
Dept: PULMONOLOGY | Age: 84
Discharge: HOME | End: 2019-05-08
Payer: MEDICARE

## 2019-05-08 DIAGNOSIS — J45.20 MILD INTERMITTENT ASTHMA WITHOUT COMPLICATION: ICD-10-CM

## 2019-05-08 DIAGNOSIS — I27.20 PULMONARY HTN (HCC): ICD-10-CM

## 2019-05-08 DIAGNOSIS — R06.02 SHORTNESS OF BREATH: ICD-10-CM

## 2019-05-08 PROCEDURE — 94729 DIFFUSING CAPACITY: CPT

## 2019-05-08 PROCEDURE — 94726 PLETHYSMOGRAPHY LUNG VOLUMES: CPT

## 2019-05-08 PROCEDURE — 94060 EVALUATION OF WHEEZING: CPT

## 2019-05-08 PROCEDURE — 71250 CT THORAX DX C-: CPT

## 2019-05-08 PROCEDURE — 94640 AIRWAY INHALATION TREATMENT: CPT

## 2019-05-08 PROCEDURE — 6360000002 HC RX W HCPCS: Performed by: INTERNAL MEDICINE

## 2019-05-08 RX ORDER — ALBUTEROL SULFATE 2.5 MG/3ML
2.5 SOLUTION RESPIRATORY (INHALATION) ONCE
Status: COMPLETED | OUTPATIENT
Start: 2019-05-08 | End: 2019-05-08

## 2019-05-08 RX ADMIN — ALBUTEROL SULFATE 2.5 MG: 2.5 SOLUTION RESPIRATORY (INHALATION) at 10:36

## 2019-05-09 NOTE — PROCEDURES
[address]                 [address] [place] [id number]                               PULMONARY FUNCTION    PATIENT NAME: Inna Deng                 :        1935  MED REC NO:   [id number]                             ROOM:  ACCOUNT NO:   [de-identified]                           ADMIT DATE: 2019  PROVIDER:     Jeanette Katz    DATE OF PROCEDURE:  2019    The patient's flow volume loop shows an restrictive defect. FEV1 69%  predicted with 11% bronchodilator change. FVC 64% predicted with 5%  bronchodilator change. FEV1/FVC ratio 83. Lung volumes by body box,  total lung capacity 95% predicted. RV 97% predicted. Diffusion  capacity 52% predicted, corrected for alveolar volume 86% predicted, but  alveolar volume by diffusion capacity is 60% predicted, airway  resistance is increased. IMPRESSION:  This study shows a restrictive ventilatory dysfunction of  moderate severity with a bronchodilator response that is 11%, but a  clinical trial of bronchodilator is recommended. The patient's lung  volumes are normal, but diffusion capacity decreased and so is the  alveolar volume. This could be due to underlying interstitial lung  disease or pulmonary vascular disease or anemia. Clinical correlation  advised. Dieter Stubbs    D: 2019 12:03:50       T: 2019 13:28:49     [id number]  Job#: [id number]     Doc#: [id number]    CC:  Chrissy Brewer

## 2019-05-24 ENCOUNTER — OFFICE VISIT (OUTPATIENT)
Dept: INTERNAL MEDICINE | Age: 84
End: 2019-05-24
Payer: MEDICARE

## 2019-05-24 ENCOUNTER — APPOINTMENT (OUTPATIENT)
Dept: CT IMAGING | Age: 84
DRG: 291 | End: 2019-05-24
Payer: MEDICARE

## 2019-05-24 ENCOUNTER — HOSPITAL ENCOUNTER (EMERGENCY)
Age: 84
Discharge: HOME | DRG: 291 | End: 2019-05-24
Attending: EMERGENCY MEDICINE
Payer: MEDICARE

## 2019-05-24 VITALS
HEART RATE: 71 BPM | DIASTOLIC BLOOD PRESSURE: 53 MMHG | SYSTOLIC BLOOD PRESSURE: 131 MMHG | HEIGHT: 60 IN | OXYGEN SATURATION: 91 % | TEMPERATURE: 98.6 F | WEIGHT: 214 LBS | RESPIRATION RATE: 20 BRPM | BODY MASS INDEX: 42.01 KG/M2

## 2019-05-24 VITALS
WEIGHT: 214 LBS | DIASTOLIC BLOOD PRESSURE: 50 MMHG | TEMPERATURE: 98.6 F | BODY MASS INDEX: 40.4 KG/M2 | HEART RATE: 72 BPM | SYSTOLIC BLOOD PRESSURE: 168 MMHG | HEIGHT: 61 IN

## 2019-05-24 DIAGNOSIS — K57.32 DIVERTICULITIS OF COLON: Primary | ICD-10-CM

## 2019-05-24 DIAGNOSIS — R10.9 ABDOMINAL PAIN, UNSPECIFIED ABDOMINAL LOCATION: Primary | ICD-10-CM

## 2019-05-24 LAB
-: NORMAL
ABSOLUTE EOS #: 0.2 K/UL (ref 0–0.4)
ABSOLUTE IMMATURE GRANULOCYTE: ABNORMAL K/UL (ref 0–0.3)
ABSOLUTE LYMPH #: 1.3 K/UL (ref 1–4.8)
ABSOLUTE MONO #: 1 K/UL (ref 0.1–1.2)
AMORPHOUS: NORMAL
AMYLASE: 34 U/L (ref 28–100)
ANION GAP SERPL CALCULATED.3IONS-SCNC: 12 MMOL/L (ref 9–17)
BACTERIA: NORMAL
BASOPHILS # BLD: 1 % (ref 0–1)
BASOPHILS ABSOLUTE: 0.1 K/UL (ref 0–0.2)
BILIRUBIN URINE: NEGATIVE
BUN BLDV-MCNC: 20 MG/DL (ref 8–23)
BUN/CREAT BLD: 19 (ref 9–20)
CALCIUM SERPL-MCNC: 9.4 MG/DL (ref 8.6–10.4)
CASTS UA: NORMAL /LPF (ref 0–2)
CHLORIDE BLD-SCNC: 104 MMOL/L (ref 98–107)
CO2: 27 MMOL/L (ref 20–31)
COLOR: ABNORMAL
COMMENT UA: ABNORMAL
CREAT SERPL-MCNC: 1.05 MG/DL (ref 0.5–0.9)
CRYSTALS, UA: NORMAL /HPF
DIFFERENTIAL TYPE: ABNORMAL
EOSINOPHILS RELATIVE PERCENT: 1 % (ref 1–7)
EPITHELIAL CELLS UA: NORMAL /HPF (ref 0–5)
GFR AFRICAN AMERICAN: >60 ML/MIN
GFR NON-AFRICAN AMERICAN: 50 ML/MIN
GFR SERPL CREATININE-BSD FRML MDRD: ABNORMAL ML/MIN/{1.73_M2}
GFR SERPL CREATININE-BSD FRML MDRD: ABNORMAL ML/MIN/{1.73_M2}
GLUCOSE BLD-MCNC: 104 MG/DL (ref 65–105)
GLUCOSE BLD-MCNC: 126 MG/DL (ref 70–99)
GLUCOSE URINE: NEGATIVE
HCT VFR BLD CALC: 33 % (ref 36–46)
HEMOGLOBIN: 10 G/DL (ref 12–16)
IMMATURE GRANULOCYTES: ABNORMAL %
KETONES, URINE: NEGATIVE
LEUKOCYTE ESTERASE, URINE: NEGATIVE
LIPASE: 19 U/L (ref 13–60)
LYMPHOCYTES # BLD: 11 % (ref 16–46)
MCH RBC QN AUTO: 24.1 PG (ref 26–34)
MCHC RBC AUTO-ENTMCNC: 30.3 G/DL (ref 31–37)
MCV RBC AUTO: 79.3 FL (ref 80–100)
MONOCYTES # BLD: 9 % (ref 4–11)
MUCUS: NORMAL
NITRITE, URINE: NEGATIVE
NRBC AUTOMATED: ABNORMAL PER 100 WBC
OTHER OBSERVATIONS UA: NORMAL
PDW BLD-RTO: 18.1 % (ref 11–14.5)
PH UA: 5.5 (ref 5–6)
PLATELET # BLD: 229 K/UL (ref 140–450)
PLATELET ESTIMATE: ABNORMAL
PMV BLD AUTO: 9.2 FL (ref 6–12)
POTASSIUM SERPL-SCNC: 4.2 MMOL/L (ref 3.7–5.3)
PROTEIN UA: NEGATIVE
RBC # BLD: 4.16 M/UL (ref 4–5.2)
RBC # BLD: ABNORMAL 10*6/UL
RBC UA: NORMAL /HPF (ref 0–4)
RENAL EPITHELIAL, UA: NORMAL /HPF
SEG NEUTROPHILS: 78 % (ref 43–77)
SEGMENTED NEUTROPHILS ABSOLUTE COUNT: 8.9 K/UL (ref 1.8–7.7)
SODIUM BLD-SCNC: 143 MMOL/L (ref 135–144)
SPECIFIC GRAVITY UA: 1 (ref 1.01–1.02)
TRICHOMONAS: NORMAL
TURBIDITY: ABNORMAL
URINE HGB: NEGATIVE
UROBILINOGEN, URINE: NORMAL
WBC # BLD: 11.4 K/UL (ref 3.5–11)
WBC # BLD: ABNORMAL 10*3/UL
WBC UA: NORMAL /HPF (ref 0–4)
YEAST: NORMAL

## 2019-05-24 PROCEDURE — 99284 EMERGENCY DEPT VISIT MOD MDM: CPT

## 2019-05-24 PROCEDURE — 83690 ASSAY OF LIPASE: CPT

## 2019-05-24 PROCEDURE — 2580000003 HC RX 258: Performed by: EMERGENCY MEDICINE

## 2019-05-24 PROCEDURE — 99213 OFFICE O/P EST LOW 20 MIN: CPT | Performed by: NURSE PRACTITIONER

## 2019-05-24 PROCEDURE — 74177 CT ABD & PELVIS W/CONTRAST: CPT

## 2019-05-24 PROCEDURE — 82150 ASSAY OF AMYLASE: CPT

## 2019-05-24 PROCEDURE — 80048 BASIC METABOLIC PNL TOTAL CA: CPT

## 2019-05-24 PROCEDURE — 81001 URINALYSIS AUTO W/SCOPE: CPT

## 2019-05-24 PROCEDURE — 6360000002 HC RX W HCPCS: Performed by: EMERGENCY MEDICINE

## 2019-05-24 PROCEDURE — 96375 TX/PRO/DX INJ NEW DRUG ADDON: CPT

## 2019-05-24 PROCEDURE — 6360000004 HC RX CONTRAST MEDICATION: Performed by: EMERGENCY MEDICINE

## 2019-05-24 PROCEDURE — 96374 THER/PROPH/DIAG INJ IV PUSH: CPT

## 2019-05-24 PROCEDURE — 36415 COLL VENOUS BLD VENIPUNCTURE: CPT

## 2019-05-24 PROCEDURE — 82947 ASSAY GLUCOSE BLOOD QUANT: CPT

## 2019-05-24 PROCEDURE — 85025 COMPLETE CBC W/AUTO DIFF WBC: CPT

## 2019-05-24 RX ORDER — SULFAMETHOXAZOLE AND TRIMETHOPRIM 800; 160 MG/1; MG/1
1 TABLET ORAL 2 TIMES DAILY
Qty: 20 TABLET | Refills: 0 | Status: ON HOLD | OUTPATIENT
Start: 2019-05-24 | End: 2019-06-01 | Stop reason: HOSPADM

## 2019-05-24 RX ORDER — 0.9 % SODIUM CHLORIDE 0.9 %
1000 INTRAVENOUS SOLUTION INTRAVENOUS ONCE
Status: COMPLETED | OUTPATIENT
Start: 2019-05-24 | End: 2019-05-24

## 2019-05-24 RX ORDER — HYDROCODONE BITARTRATE AND ACETAMINOPHEN 5; 325 MG/1; MG/1
1 TABLET ORAL EVERY 6 HOURS PRN
Qty: 10 TABLET | Refills: 0 | Status: SHIPPED | OUTPATIENT
Start: 2019-05-24 | End: 2019-05-27

## 2019-05-24 RX ORDER — METRONIDAZOLE 500 MG/1
500 TABLET ORAL 3 TIMES DAILY
Qty: 30 TABLET | Refills: 0 | Status: ON HOLD | OUTPATIENT
Start: 2019-05-24 | End: 2019-06-01 | Stop reason: HOSPADM

## 2019-05-24 RX ORDER — ONDANSETRON 2 MG/ML
4 INJECTION INTRAMUSCULAR; INTRAVENOUS ONCE
Status: COMPLETED | OUTPATIENT
Start: 2019-05-24 | End: 2019-05-24

## 2019-05-24 RX ADMIN — HYDROMORPHONE HYDROCHLORIDE 0.5 MG: 1 INJECTION, SOLUTION INTRAMUSCULAR; INTRAVENOUS; SUBCUTANEOUS at 09:50

## 2019-05-24 RX ADMIN — SODIUM CHLORIDE 1000 ML: 9 INJECTION, SOLUTION INTRAVENOUS at 09:48

## 2019-05-24 RX ADMIN — IOHEXOL 50 ML: 240 INJECTION, SOLUTION INTRATHECAL; INTRAVASCULAR; INTRAVENOUS; ORAL at 11:33

## 2019-05-24 RX ADMIN — IOPAMIDOL 100 ML: 755 INJECTION, SOLUTION INTRAVENOUS at 11:33

## 2019-05-24 RX ADMIN — ONDANSETRON 4 MG: 2 INJECTION INTRAMUSCULAR; INTRAVENOUS at 09:48

## 2019-05-24 ASSESSMENT — ENCOUNTER SYMPTOMS
NAUSEA: 0
VOMITING: 0
SORE THROAT: 0
CHEST TIGHTNESS: 0
DIARRHEA: 0

## 2019-05-24 ASSESSMENT — PAIN SCALES - GENERAL
PAINLEVEL_OUTOF10: 6
PAINLEVEL_OUTOF10: 0
PAINLEVEL_OUTOF10: 6

## 2019-05-24 ASSESSMENT — PAIN DESCRIPTION - ORIENTATION: ORIENTATION: LEFT;RIGHT;LOWER

## 2019-05-24 ASSESSMENT — PAIN DESCRIPTION - LOCATION: LOCATION: ABDOMEN

## 2019-05-24 NOTE — ED PROVIDER NOTES
smokeless tobacco. She reports that she does not drink alcohol or use drugs. PHYSICAL EXAM     INITIAL VITALS:  height is 5' (1.524 m) and weight is 214 lb (97.1 kg). Her tympanic temperature is 98.6 °F (37 °C). Her blood pressure is 131/53 (abnormal) and her pulse is 71. Her respiration is 20 and oxygen saturation is 91%. Constitutional: The patient is alert and well-developed, vital signs as noted. Psychiatric: Oriented to time, place and person, affect is appropriate for age. Eyes: Pupils equal and reactive to light. EOMI. Ears, nose, and throat: Oropharynx clear  Neck: No masses, trachea midline, and no thyromegaly. Respiratory: Clear to auscultation, full aeration throughout all fields. Cardiovascular: No murmurs, heart sounds normal, no thrills. Gastrointestinal: No masses, no hepatosplenomegaly, bowel sounds positive. Moderate lower quadrant tenderness. No rebound rigidity or guarding. Haynes's and McBurney's are negative. Skin: No rashes or lesions on exposed surfaces, good skin turgor. Appears moderately dehydrated with dry mucous membranes  Extremities: Good range of motion, no edema. DIFFERENTIAL DIAGNOSIS/ MEDICAL DECISION MAKING:     IV fluids Dilaudid and Zofran with some improvement    No evidence of perforation or abscess on CT exam    Bactrim and Flagyl to be used as directed    Follow Exit Care instructions closely. I have reviewed the disposition diagnosis with the patient and or their family/guardian. I have answered their questions and given discharge instructions. They voiced understanding of these instructions and did not have any further questions or complaints.     DIAGNOSTIC RESULTS     RADIOLOGY:   Non-plain film images such as CT, Ultrasound and MRI are read by the radiologist. Plain radiographic images are visualized and preliminarily interpreted by the emergency physician with the below findings:  Gabino Broussard   Final Result

## 2019-05-24 NOTE — PROGRESS NOTES
[facility]  [address] [place], [address] Veronica Morin  [phone]      Jeffrey Limb c/o of Abdominal Pain (lower abdominal pain and left side lower back pain x 1 day) and Nausea (x 1 day)      HPI:     HPI     Patient presents for evaluation of abdominal pain. She started experiencing some lower abdominal pain yesterday, and has been radiating to her back. She rates the pain as an 8 out of 10, and states she has been unable to sleep due to the pain. She denies fever, chills, vomiting, diarrhea, but she has had some significant nausea since her symptoms started. She denies constipation, and has not noticed any blood in her stool. She does have a history of irritable bowel syndrome. She has noticed some decreased urine volume. Of note, she does have a history of obstructive renal calculi and pyelonephritis and was hospitalized for this in February 2019. Current Outpatient Medications   Medication Sig Dispense Refill    Polyethylene Glycol 400 (BLINK TEARS) 0.25 % SOLN Apply to eye as needed      predniSONE (DELTASONE) 5 MG tablet TAKE 1 TABLET DAILY 90 tablet 3    amiodarone (CORDARONE) 200 MG tablet Take 1 tablet by mouth daily 7 tablet 0    furosemide (LASIX) 40 MG tablet Take 1 tablet by mouth daily 30 tablet 0    simvastatin (ZOCOR) 20 MG tablet Take 1 tablet by mouth nightly 90 tablet 3    ferrous sulfate 220 (44 Fe) MG/5ML solution TAKE 10 MLS BY MOUTH DAILY. MIX WITH 2 OUNCES OF ORANGE JUICE (Patient taking differently: TAKE 10 MLS BY MOUTH DAILY.  MIX WITH 2 OUNCES OF ORANGE JUICE - Takes about every 3rd day) 473 mL 5    hydrALAZINE (APRESOLINE) 25 MG tablet TAKE 1 TABLET THREE TIMES A  tablet 3    VICTOZA 18 MG/3ML SOPN SC injection INJECT 1.2 MG INTO THE SKIN DAILY 18 mL 3    LANTUS SOLOSTAR 100 UNIT/ML injection pen INJECT 50 UNITS IN THE MORNING AND 48 UNITS AT BEDTIME (Patient taking differently: INJECT 45 UNITS IN THE MORNING AND 45 UNITS AT BEDTIME) 90 mL 3    metoprolol succinate (TOPROL XL) 50 MG extended release tablet TAKE 1 TABLET DAILY 90 tablet 3    potassium chloride (KLOR-CON M10) 10 MEQ extended release tablet TAKE 1 TABLET DAILY 90 tablet 3    amLODIPine (NORVASC) 5 MG tablet TAKE 1 TABLET DAILY 90 tablet 3    HUMALOG KWIKPEN 100 UNIT/ML pen INJECT 10 UNITS WITH BREAKFAST, 16 UNITS AT NOON, 24 UNITS WITH SUPPER AND 10 UNITS AT NIGHT AS NEEDED (Patient taking differently: INJECT 10 UNITS WITH BREAKFAST, 16 UNITS AT NOON, 20 UNITS WITH SUPPER AND 10 UNITS AT NIGHT AS NEEDED) 75 mL 3    losartan (COZAAR) 100 MG tablet TAKE 1 TABLET DAILY 90 tablet 3    aspirin 81 MG EC tablet Take 81 mg by mouth daily      acetaminophen (TYLENOL) 500 MG tablet Take 500 mg by mouth daily      omeprazole (PRILOSEC) 20 MG capsule Take 20 mg by mouth Daily  30 capsule 3    Cholecalciferol (VITAMIN D3) 2000 UNITS CAPS Take 2,000 Units by mouth daily       B-D ULTRAFINE III SHORT PEN 31G X 8 MM MISC USE 7 DAILY 270 each 3    Handicap Placard MISC by Does not apply route EXP: 6/12/2020 1 each 0    glucose blood VI test strips (ASCENSIA AUTODISC VI;ONE TOUCH ULTRA TEST VI) strip 1 each by In Vitro route 3 times daily As directed. 100 each 5     No current facility-administered medications for this visit.       Allergies   Allergen Reactions    Codeine      Confusion      Erythromycin      GI upset    Exenatide Nausea Only    Levofloxacin      GI upset    Verapamil Other (See Comments)     Junctional bradycardia    Clonidine Derivatives Rash     2009, catapres    Other Rash     Levbid    Penicillins Rash       Health Maintenance   Topic Date Due    TSH testing  04/11/2017    Diabetic retinal exam  02/12/2019    Shingles Vaccine (2 of 3) 01/22/2020 (Originally 3/1/2012)    Lipid screen  07/10/2019    A1C test (Diabetic or Prediabetic)  10/23/2019    Diabetic foot exam  10/26/2019    DTaP/Tdap/Td vaccine (2 - Tdap) 03/11/2020    Potassium monitoring  04/23/2020    Creatinine monitoring  04/23/2020    Flu vaccine  Completed    Pneumococcal 65+ years Vaccine  Completed    DEXA (modify frequency per FRAX score)  Addressed       Subjective:      Review of Systems   Constitutional: Negative for chills and fever. HENT: Negative for congestion and sore throat. Respiratory: Negative for chest tightness. Stable dyspnea   Cardiovascular: Negative for chest pain and leg swelling. Gastrointestinal: Negative for diarrhea, nausea and vomiting. Genitourinary: Positive for decreased urine volume. Negative for difficulty urinating and dysuria. Musculoskeletal: Negative for gait problem and myalgias. Neurological: Negative for dizziness and headaches. Psychiatric/Behavioral: Negative for confusion and decreased concentration. Objective:     Vitals:    05/24/19 0854 05/24/19 0858   BP: (!) 152/54 (!) 168/50   Site: Right Upper Arm Right Upper Arm   Position: Sitting Sitting   Cuff Size: Large Adult Large Adult   Pulse: 72    Temp: 98.6 °F (37 °C)    TempSrc: Tympanic    Weight: 214 lb (97.1 kg)    Height: 5' 1\" (1.549 m)      Physical Exam   Constitutional: She appears well-developed and well-nourished. Cardiovascular: Normal rate and regular rhythm. Pulmonary/Chest: Effort normal and breath sounds normal.   Abdominal: Soft. Bowel sounds are normal. There is no tenderness. Tenderness to palpation RLQ LLQ   Musculoskeletal: She exhibits no edema. Lymphadenopathy:     She has no cervical adenopathy. Psychiatric: She has a normal mood and affect. Her behavior is normal.   Nursing note and vitals reviewed. Assessment/Plan:        1. Abdominal pain, unspecified abdominal location  - Given the location and severity of patient's pain, and her history of obstructive renal calculi, patient was transferred to ER for prompt evaluation. Report called. No follow-ups on file.     No orders of the defined types were

## 2019-05-27 ENCOUNTER — APPOINTMENT (OUTPATIENT)
Dept: GENERAL RADIOLOGY | Age: 84
DRG: 291 | End: 2019-05-27
Payer: MEDICARE

## 2019-05-27 ENCOUNTER — HOSPITAL ENCOUNTER (INPATIENT)
Age: 84
LOS: 2 days | Discharge: ANOTHER HOSPITAL | DRG: 291 | End: 2019-05-29
Attending: SPECIALIST | Admitting: INTERNAL MEDICINE
Payer: MEDICARE

## 2019-05-27 DIAGNOSIS — I50.33 ACUTE ON CHRONIC DIASTOLIC CONGESTIVE HEART FAILURE (HCC): Primary | ICD-10-CM

## 2019-05-27 PROBLEM — R06.02 SOB (SHORTNESS OF BREATH): Status: ACTIVE | Noted: 2019-05-27

## 2019-05-27 LAB
ABSOLUTE EOS #: 0.3 K/UL (ref 0–0.4)
ABSOLUTE IMMATURE GRANULOCYTE: ABNORMAL K/UL (ref 0–0.3)
ABSOLUTE LYMPH #: 3.1 K/UL (ref 1–4.8)
ABSOLUTE MONO #: 1 K/UL (ref 0.1–1.2)
ANION GAP SERPL CALCULATED.3IONS-SCNC: 14 MMOL/L (ref 9–17)
BASOPHILS # BLD: 1 % (ref 0–1)
BASOPHILS ABSOLUTE: 0.1 K/UL (ref 0–0.2)
BNP INTERPRETATION: ABNORMAL
BUN BLDV-MCNC: 29 MG/DL (ref 8–23)
BUN/CREAT BLD: 16 (ref 9–20)
CALCIUM SERPL-MCNC: 8.7 MG/DL (ref 8.6–10.4)
CHLORIDE BLD-SCNC: 107 MMOL/L (ref 98–107)
CO2: 22 MMOL/L (ref 20–31)
CREAT SERPL-MCNC: 1.76 MG/DL (ref 0.5–0.9)
D DIMER: 1030 NG/ML
DIFFERENTIAL TYPE: ABNORMAL
EOSINOPHILS RELATIVE PERCENT: 3 % (ref 1–7)
GFR AFRICAN AMERICAN: 33 ML/MIN
GFR NON-AFRICAN AMERICAN: 28 ML/MIN
GFR SERPL CREATININE-BSD FRML MDRD: ABNORMAL ML/MIN/{1.73_M2}
GFR SERPL CREATININE-BSD FRML MDRD: ABNORMAL ML/MIN/{1.73_M2}
GLUCOSE BLD-MCNC: 179 MG/DL (ref 70–99)
HCT VFR BLD CALC: 30.3 % (ref 36–46)
HEMOGLOBIN: 9.2 G/DL (ref 12–16)
IMMATURE GRANULOCYTES: ABNORMAL %
LACTIC ACID: 2.5 MMOL/L (ref 0.5–2.2)
LACTIC ACID: 2.9 MMOL/L (ref 0.5–2.2)
LYMPHOCYTES # BLD: 26 % (ref 16–46)
MCH RBC QN AUTO: 24.2 PG (ref 26–34)
MCHC RBC AUTO-ENTMCNC: 30.2 G/DL (ref 31–37)
MCV RBC AUTO: 80.1 FL (ref 80–100)
MONOCYTES # BLD: 9 % (ref 4–11)
NRBC AUTOMATED: ABNORMAL PER 100 WBC
PDW BLD-RTO: 19.2 % (ref 11–14.5)
PLATELET # BLD: 224 K/UL (ref 140–450)
PLATELET ESTIMATE: ABNORMAL
PMV BLD AUTO: 10.3 FL (ref 6–12)
POTASSIUM SERPL-SCNC: 4.4 MMOL/L (ref 3.7–5.3)
PRO-BNP: 863 PG/ML
RBC # BLD: 3.78 M/UL (ref 4–5.2)
RBC # BLD: ABNORMAL 10*6/UL
SEG NEUTROPHILS: 61 % (ref 43–77)
SEGMENTED NEUTROPHILS ABSOLUTE COUNT: 7.5 K/UL (ref 1.8–7.7)
SODIUM BLD-SCNC: 143 MMOL/L (ref 135–144)
TROPONIN INTERP: ABNORMAL
TROPONIN T: ABNORMAL NG/ML
TROPONIN, HIGH SENSITIVITY: 24 NG/L (ref 0–14)
WBC # BLD: 12 K/UL (ref 3.5–11)
WBC # BLD: ABNORMAL 10*3/UL

## 2019-05-27 PROCEDURE — 36415 COLL VENOUS BLD VENIPUNCTURE: CPT

## 2019-05-27 PROCEDURE — 6370000000 HC RX 637 (ALT 250 FOR IP): Performed by: SPECIALIST

## 2019-05-27 PROCEDURE — 94150 VITAL CAPACITY TEST: CPT

## 2019-05-27 PROCEDURE — 83880 ASSAY OF NATRIURETIC PEPTIDE: CPT

## 2019-05-27 PROCEDURE — 2060000000 HC ICU INTERMEDIATE R&B

## 2019-05-27 PROCEDURE — 94664 DEMO&/EVAL PT USE INHALER: CPT

## 2019-05-27 PROCEDURE — 85379 FIBRIN DEGRADATION QUANT: CPT

## 2019-05-27 PROCEDURE — 80048 BASIC METABOLIC PNL TOTAL CA: CPT

## 2019-05-27 PROCEDURE — 2700000000 HC OXYGEN THERAPY PER DAY

## 2019-05-27 PROCEDURE — 93005 ELECTROCARDIOGRAM TRACING: CPT

## 2019-05-27 PROCEDURE — 71046 X-RAY EXAM CHEST 2 VIEWS: CPT

## 2019-05-27 PROCEDURE — 94640 AIRWAY INHALATION TREATMENT: CPT

## 2019-05-27 PROCEDURE — 96375 TX/PRO/DX INJ NEW DRUG ADDON: CPT

## 2019-05-27 PROCEDURE — 84484 ASSAY OF TROPONIN QUANT: CPT

## 2019-05-27 PROCEDURE — 94760 N-INVAS EAR/PLS OXIMETRY 1: CPT

## 2019-05-27 PROCEDURE — 96374 THER/PROPH/DIAG INJ IV PUSH: CPT

## 2019-05-27 PROCEDURE — 83605 ASSAY OF LACTIC ACID: CPT

## 2019-05-27 PROCEDURE — 99285 EMERGENCY DEPT VISIT HI MDM: CPT

## 2019-05-27 PROCEDURE — 6360000002 HC RX W HCPCS: Performed by: SPECIALIST

## 2019-05-27 PROCEDURE — 83036 HEMOGLOBIN GLYCOSYLATED A1C: CPT

## 2019-05-27 PROCEDURE — 87040 BLOOD CULTURE FOR BACTERIA: CPT

## 2019-05-27 PROCEDURE — 85025 COMPLETE CBC W/AUTO DIFF WBC: CPT

## 2019-05-27 RX ORDER — INSULIN GLARGINE 100 [IU]/ML
50 INJECTION, SOLUTION SUBCUTANEOUS 2 TIMES DAILY
Status: DISCONTINUED | OUTPATIENT
Start: 2019-05-27 | End: 2019-05-28

## 2019-05-27 RX ORDER — FUROSEMIDE 10 MG/ML
40 INJECTION INTRAMUSCULAR; INTRAVENOUS ONCE
Status: COMPLETED | OUTPATIENT
Start: 2019-05-27 | End: 2019-05-27

## 2019-05-27 RX ORDER — FUROSEMIDE 10 MG/ML
40 INJECTION INTRAMUSCULAR; INTRAVENOUS 2 TIMES DAILY
Status: DISCONTINUED | OUTPATIENT
Start: 2019-05-28 | End: 2019-05-28

## 2019-05-27 RX ORDER — PREDNISONE 1 MG/1
5 TABLET ORAL DAILY
Status: DISCONTINUED | OUTPATIENT
Start: 2019-05-28 | End: 2019-05-29 | Stop reason: HOSPADM

## 2019-05-27 RX ORDER — DEXTROSE MONOHYDRATE 50 MG/ML
100 INJECTION, SOLUTION INTRAVENOUS PRN
Status: DISCONTINUED | OUTPATIENT
Start: 2019-05-27 | End: 2019-05-29 | Stop reason: HOSPADM

## 2019-05-27 RX ORDER — PANTOPRAZOLE SODIUM 40 MG/1
40 TABLET, DELAYED RELEASE ORAL
Status: DISCONTINUED | OUTPATIENT
Start: 2019-05-28 | End: 2019-05-29 | Stop reason: HOSPADM

## 2019-05-27 RX ORDER — SIMVASTATIN 10 MG
20 TABLET ORAL NIGHTLY
Status: DISCONTINUED | OUTPATIENT
Start: 2019-05-27 | End: 2019-05-29 | Stop reason: HOSPADM

## 2019-05-27 RX ORDER — NICOTINE POLACRILEX 4 MG
15 LOZENGE BUCCAL PRN
Status: DISCONTINUED | OUTPATIENT
Start: 2019-05-27 | End: 2019-05-29 | Stop reason: HOSPADM

## 2019-05-27 RX ORDER — METHYLPREDNISOLONE SODIUM SUCCINATE 125 MG/2ML
125 INJECTION, POWDER, LYOPHILIZED, FOR SOLUTION INTRAMUSCULAR; INTRAVENOUS ONCE
Status: COMPLETED | OUTPATIENT
Start: 2019-05-27 | End: 2019-05-27

## 2019-05-27 RX ORDER — HYDRALAZINE HYDROCHLORIDE 25 MG/1
25 TABLET, FILM COATED ORAL EVERY 8 HOURS SCHEDULED
Status: DISCONTINUED | OUTPATIENT
Start: 2019-05-27 | End: 2019-05-29 | Stop reason: HOSPADM

## 2019-05-27 RX ORDER — HEPARIN SODIUM 5000 [USP'U]/ML
5000 INJECTION, SOLUTION INTRAVENOUS; SUBCUTANEOUS EVERY 8 HOURS SCHEDULED
Status: DISCONTINUED | OUTPATIENT
Start: 2019-05-27 | End: 2019-05-29 | Stop reason: HOSPADM

## 2019-05-27 RX ORDER — DEXTROSE MONOHYDRATE 25 G/50ML
12.5 INJECTION, SOLUTION INTRAVENOUS PRN
Status: DISCONTINUED | OUTPATIENT
Start: 2019-05-27 | End: 2019-05-29 | Stop reason: HOSPADM

## 2019-05-27 RX ORDER — AMLODIPINE BESYLATE 5 MG/1
5 TABLET ORAL DAILY
Status: DISCONTINUED | OUTPATIENT
Start: 2019-05-28 | End: 2019-05-29 | Stop reason: HOSPADM

## 2019-05-27 RX ORDER — SODIUM CHLORIDE 0.9 % (FLUSH) 0.9 %
10 SYRINGE (ML) INJECTION PRN
Status: DISCONTINUED | OUTPATIENT
Start: 2019-05-27 | End: 2019-05-29 | Stop reason: HOSPADM

## 2019-05-27 RX ORDER — SODIUM CHLORIDE 0.9 % (FLUSH) 0.9 %
10 SYRINGE (ML) INJECTION EVERY 12 HOURS SCHEDULED
Status: DISCONTINUED | OUTPATIENT
Start: 2019-05-27 | End: 2019-05-29 | Stop reason: HOSPADM

## 2019-05-27 RX ORDER — IPRATROPIUM BROMIDE AND ALBUTEROL SULFATE 2.5; .5 MG/3ML; MG/3ML
1 SOLUTION RESPIRATORY (INHALATION) ONCE
Status: COMPLETED | OUTPATIENT
Start: 2019-05-27 | End: 2019-05-27

## 2019-05-27 RX ORDER — LOSARTAN POTASSIUM 50 MG/1
100 TABLET ORAL DAILY
Status: DISCONTINUED | OUTPATIENT
Start: 2019-05-28 | End: 2019-05-29 | Stop reason: HOSPADM

## 2019-05-27 RX ORDER — ALBUTEROL SULFATE 2.5 MG/3ML
2.5 SOLUTION RESPIRATORY (INHALATION)
Status: DISCONTINUED | OUTPATIENT
Start: 2019-05-27 | End: 2019-05-29 | Stop reason: HOSPADM

## 2019-05-27 RX ORDER — ONDANSETRON 2 MG/ML
4 INJECTION INTRAMUSCULAR; INTRAVENOUS EVERY 6 HOURS PRN
Status: DISCONTINUED | OUTPATIENT
Start: 2019-05-27 | End: 2019-05-29 | Stop reason: HOSPADM

## 2019-05-27 RX ORDER — ASPIRIN 81 MG/1
81 TABLET ORAL DAILY
Status: DISCONTINUED | OUTPATIENT
Start: 2019-05-28 | End: 2019-05-29 | Stop reason: HOSPADM

## 2019-05-27 RX ORDER — AMIODARONE HYDROCHLORIDE 200 MG/1
200 TABLET ORAL DAILY
Status: DISCONTINUED | OUTPATIENT
Start: 2019-05-28 | End: 2019-05-28

## 2019-05-27 RX ORDER — METOPROLOL SUCCINATE 50 MG/1
50 TABLET, EXTENDED RELEASE ORAL DAILY
Status: DISCONTINUED | OUTPATIENT
Start: 2019-05-28 | End: 2019-05-28

## 2019-05-27 RX ORDER — IPRATROPIUM BROMIDE AND ALBUTEROL SULFATE 2.5; .5 MG/3ML; MG/3ML
SOLUTION RESPIRATORY (INHALATION)
Status: DISCONTINUED
Start: 2019-05-27 | End: 2019-05-27

## 2019-05-27 RX ORDER — ACETAMINOPHEN 325 MG/1
650 TABLET ORAL EVERY 4 HOURS PRN
Status: DISCONTINUED | OUTPATIENT
Start: 2019-05-27 | End: 2019-05-29 | Stop reason: HOSPADM

## 2019-05-27 RX ORDER — NICOTINE 21 MG/24HR
1 PATCH, TRANSDERMAL 24 HOURS TRANSDERMAL DAILY PRN
Status: DISCONTINUED | OUTPATIENT
Start: 2019-05-27 | End: 2019-05-28

## 2019-05-27 RX ADMIN — FUROSEMIDE 40 MG: 10 INJECTION, SOLUTION INTRAMUSCULAR; INTRAVENOUS at 21:28

## 2019-05-27 RX ADMIN — IPRATROPIUM BROMIDE AND ALBUTEROL SULFATE 1 AMPULE: .5; 3 SOLUTION RESPIRATORY (INHALATION) at 20:09

## 2019-05-27 RX ADMIN — IPRATROPIUM BROMIDE AND ALBUTEROL SULFATE 1 AMPULE: .5; 3 SOLUTION RESPIRATORY (INHALATION) at 22:00

## 2019-05-27 RX ADMIN — METHYLPREDNISOLONE SODIUM SUCCINATE 125 MG: 125 INJECTION, POWDER, FOR SOLUTION INTRAMUSCULAR; INTRAVENOUS at 20:13

## 2019-05-27 ASSESSMENT — PAIN SCALES - GENERAL: PAINLEVEL_OUTOF10: 0

## 2019-05-28 ENCOUNTER — APPOINTMENT (OUTPATIENT)
Dept: GENERAL RADIOLOGY | Age: 84
DRG: 291 | End: 2019-05-28
Payer: MEDICARE

## 2019-05-28 LAB
-: ABNORMAL
AMORPHOUS: ABNORMAL
ANION GAP SERPL CALCULATED.3IONS-SCNC: 18 MMOL/L (ref 9–17)
BACTERIA: ABNORMAL
BILIRUBIN URINE: NEGATIVE
BNP INTERPRETATION: ABNORMAL
BUN BLDV-MCNC: 32 MG/DL (ref 8–23)
BUN/CREAT BLD: 18 (ref 9–20)
CALCIUM SERPL-MCNC: 9.1 MG/DL (ref 8.6–10.4)
CASTS UA: ABNORMAL /LPF (ref 0–2)
CASTS UA: ABNORMAL /LPF (ref 0–2)
CHLORIDE BLD-SCNC: 103 MMOL/L (ref 98–107)
CHOLESTEROL/HDL RATIO: 1.9
CHOLESTEROL: 142 MG/DL
CO2: 19 MMOL/L (ref 20–31)
COLOR: ABNORMAL
COMMENT UA: ABNORMAL
CREAT SERPL-MCNC: 1.8 MG/DL (ref 0.5–0.9)
CRYSTALS, UA: ABNORMAL /HPF
EKG ATRIAL RATE: 89 BPM
EKG P AXIS: 65 DEGREES
EKG P-R INTERVAL: 152 MS
EKG Q-T INTERVAL: 350 MS
EKG QRS DURATION: 82 MS
EKG QTC CALCULATION (BAZETT): 425 MS
EKG R AXIS: 45 DEGREES
EKG T AXIS: 96 DEGREES
EKG VENTRICULAR RATE: 89 BPM
EPITHELIAL CELLS UA: ABNORMAL /HPF (ref 0–5)
ESTIMATED AVERAGE GLUCOSE: 120 MG/DL
GFR AFRICAN AMERICAN: 33 ML/MIN
GFR NON-AFRICAN AMERICAN: 27 ML/MIN
GFR SERPL CREATININE-BSD FRML MDRD: ABNORMAL ML/MIN/{1.73_M2}
GFR SERPL CREATININE-BSD FRML MDRD: ABNORMAL ML/MIN/{1.73_M2}
GLUCOSE BLD-MCNC: 232 MG/DL (ref 65–105)
GLUCOSE BLD-MCNC: 245 MG/DL (ref 65–105)
GLUCOSE BLD-MCNC: 269 MG/DL (ref 65–105)
GLUCOSE BLD-MCNC: 372 MG/DL (ref 70–99)
GLUCOSE BLD-MCNC: 373 MG/DL (ref 65–105)
GLUCOSE URINE: NEGATIVE
HBA1C MFR BLD: 5.8 % (ref 4.8–5.9)
HCT VFR BLD CALC: 28.2 % (ref 36–46)
HDLC SERPL-MCNC: 74 MG/DL
HEMOGLOBIN: 8.6 G/DL (ref 12–16)
KETONES, URINE: ABNORMAL
LACTIC ACID: 5.1 MMOL/L (ref 0.5–2.2)
LDL CHOLESTEROL: 56 MG/DL (ref 0–130)
LEUKOCYTE ESTERASE, URINE: NEGATIVE
LV EF: 60 %
LVEF MODALITY: NORMAL
MAGNESIUM: 2.2 MG/DL (ref 1.6–2.6)
MCH RBC QN AUTO: 24.2 PG (ref 26–34)
MCHC RBC AUTO-ENTMCNC: 30.6 G/DL (ref 31–37)
MCV RBC AUTO: 79.1 FL (ref 80–100)
MUCUS: ABNORMAL
NITRITE, URINE: NEGATIVE
NRBC AUTOMATED: ABNORMAL PER 100 WBC
OTHER OBSERVATIONS UA: ABNORMAL
PDW BLD-RTO: 18.5 % (ref 11–14.5)
PH UA: 5 (ref 5–6)
PLATELET # BLD: 197 K/UL (ref 140–450)
PMV BLD AUTO: 10.2 FL (ref 6–12)
POTASSIUM SERPL-SCNC: 4.8 MMOL/L (ref 3.7–5.3)
PRO-BNP: 1341 PG/ML
PROTEIN UA: NEGATIVE
RBC # BLD: 3.56 M/UL (ref 4–5.2)
RBC UA: ABNORMAL /HPF (ref 0–4)
RENAL EPITHELIAL, UA: ABNORMAL /HPF
SODIUM BLD-SCNC: 140 MMOL/L (ref 135–144)
SPECIFIC GRAVITY UA: 1.03 (ref 1.01–1.02)
TRICHOMONAS: ABNORMAL
TRIGL SERPL-MCNC: 59 MG/DL
TSH SERPL DL<=0.05 MIU/L-ACNC: 0.6 MIU/L (ref 0.3–5)
TURBIDITY: ABNORMAL
URINE HGB: NEGATIVE
UROBILINOGEN, URINE: NORMAL
VLDLC SERPL CALC-MCNC: NORMAL MG/DL (ref 1–30)
WBC # BLD: 11.6 K/UL (ref 3.5–11)
WBC UA: ABNORMAL /HPF (ref 0–4)
YEAST: ABNORMAL

## 2019-05-28 PROCEDURE — 6360000002 HC RX W HCPCS: Performed by: NURSE PRACTITIONER

## 2019-05-28 PROCEDURE — 99223 1ST HOSP IP/OBS HIGH 75: CPT | Performed by: INTERNAL MEDICINE

## 2019-05-28 PROCEDURE — 6370000000 HC RX 637 (ALT 250 FOR IP): Performed by: FAMILY MEDICINE

## 2019-05-28 PROCEDURE — 6370000000 HC RX 637 (ALT 250 FOR IP): Performed by: NURSE PRACTITIONER

## 2019-05-28 PROCEDURE — 84443 ASSAY THYROID STIM HORMONE: CPT

## 2019-05-28 PROCEDURE — 94640 AIRWAY INHALATION TREATMENT: CPT

## 2019-05-28 PROCEDURE — 83735 ASSAY OF MAGNESIUM: CPT

## 2019-05-28 PROCEDURE — 2580000003 HC RX 258: Performed by: NURSE PRACTITIONER

## 2019-05-28 PROCEDURE — 6370000000 HC RX 637 (ALT 250 FOR IP): Performed by: INTERNAL MEDICINE

## 2019-05-28 PROCEDURE — 80061 LIPID PANEL: CPT

## 2019-05-28 PROCEDURE — 93306 TTE W/DOPPLER COMPLETE: CPT

## 2019-05-28 PROCEDURE — 83880 ASSAY OF NATRIURETIC PEPTIDE: CPT

## 2019-05-28 PROCEDURE — 94761 N-INVAS EAR/PLS OXIMETRY MLT: CPT

## 2019-05-28 PROCEDURE — 81001 URINALYSIS AUTO W/SCOPE: CPT

## 2019-05-28 PROCEDURE — 85027 COMPLETE CBC AUTOMATED: CPT

## 2019-05-28 PROCEDURE — 2060000000 HC ICU INTERMEDIATE R&B

## 2019-05-28 PROCEDURE — 6370000000 HC RX 637 (ALT 250 FOR IP)

## 2019-05-28 PROCEDURE — 82947 ASSAY GLUCOSE BLOOD QUANT: CPT

## 2019-05-28 PROCEDURE — 2700000000 HC OXYGEN THERAPY PER DAY

## 2019-05-28 PROCEDURE — 71045 X-RAY EXAM CHEST 1 VIEW: CPT

## 2019-05-28 PROCEDURE — 6360000002 HC RX W HCPCS: Performed by: INTERNAL MEDICINE

## 2019-05-28 PROCEDURE — 80048 BASIC METABOLIC PNL TOTAL CA: CPT

## 2019-05-28 PROCEDURE — 51702 INSERT TEMP BLADDER CATH: CPT

## 2019-05-28 PROCEDURE — 36415 COLL VENOUS BLD VENIPUNCTURE: CPT

## 2019-05-28 PROCEDURE — 83605 ASSAY OF LACTIC ACID: CPT

## 2019-05-28 PROCEDURE — 97161 PT EVAL LOW COMPLEX 20 MIN: CPT | Performed by: PHYSICAL THERAPIST

## 2019-05-28 PROCEDURE — 2500000003 HC RX 250 WO HCPCS: Performed by: INTERNAL MEDICINE

## 2019-05-28 RX ORDER — SULFAMETHOXAZOLE AND TRIMETHOPRIM 800; 160 MG/1; MG/1
1 TABLET ORAL EVERY 12 HOURS SCHEDULED
Status: DISCONTINUED | OUTPATIENT
Start: 2019-05-28 | End: 2019-05-28 | Stop reason: DRUGHIGH

## 2019-05-28 RX ORDER — INSULIN GLARGINE 100 [IU]/ML
10 INJECTION, SOLUTION SUBCUTANEOUS ONCE
Status: COMPLETED | OUTPATIENT
Start: 2019-05-28 | End: 2019-05-28

## 2019-05-28 RX ORDER — INSULIN GLARGINE 100 [IU]/ML
40 INJECTION, SOLUTION SUBCUTANEOUS 2 TIMES DAILY
Status: DISCONTINUED | OUTPATIENT
Start: 2019-05-28 | End: 2019-05-28

## 2019-05-28 RX ORDER — METOPROLOL TARTRATE 50 MG/1
50 TABLET, FILM COATED ORAL 2 TIMES DAILY
Status: DISCONTINUED | OUTPATIENT
Start: 2019-05-28 | End: 2019-05-29 | Stop reason: HOSPADM

## 2019-05-28 RX ORDER — FUROSEMIDE 10 MG/ML
40 INJECTION INTRAMUSCULAR; INTRAVENOUS DAILY
Status: DISCONTINUED | OUTPATIENT
Start: 2019-05-29 | End: 2019-05-29 | Stop reason: HOSPADM

## 2019-05-28 RX ORDER — METHYLPREDNISOLONE SODIUM SUCCINATE 125 MG/2ML
60 INJECTION, POWDER, LYOPHILIZED, FOR SOLUTION INTRAMUSCULAR; INTRAVENOUS EVERY 6 HOURS
Status: DISCONTINUED | OUTPATIENT
Start: 2019-05-28 | End: 2019-05-29 | Stop reason: HOSPADM

## 2019-05-28 RX ORDER — CIPROFLOXACIN 2 MG/ML
400 INJECTION, SOLUTION INTRAVENOUS EVERY 12 HOURS
Status: DISCONTINUED | OUTPATIENT
Start: 2019-05-28 | End: 2019-05-28

## 2019-05-28 RX ORDER — INSULIN GLARGINE 100 [IU]/ML
50 INJECTION, SOLUTION SUBCUTANEOUS 2 TIMES DAILY
Status: DISCONTINUED | OUTPATIENT
Start: 2019-05-28 | End: 2019-05-29

## 2019-05-28 RX ORDER — INSULIN GLARGINE 100 [IU]/ML
INJECTION, SOLUTION SUBCUTANEOUS
Status: DISPENSED
Start: 2019-05-28 | End: 2019-05-28

## 2019-05-28 RX ORDER — SULFAMETHOXAZOLE AND TRIMETHOPRIM 800; 160 MG/1; MG/1
1 TABLET ORAL DAILY
Status: DISCONTINUED | OUTPATIENT
Start: 2019-05-28 | End: 2019-05-29

## 2019-05-28 RX ORDER — IPRATROPIUM BROMIDE AND ALBUTEROL SULFATE 2.5; .5 MG/3ML; MG/3ML
1 SOLUTION RESPIRATORY (INHALATION) EVERY 4 HOURS
Status: DISCONTINUED | OUTPATIENT
Start: 2019-05-28 | End: 2019-05-29 | Stop reason: HOSPADM

## 2019-05-28 RX ORDER — MECLIZINE HCL 12.5 MG/1
25 TABLET ORAL EVERY 6 HOURS SCHEDULED
Status: DISCONTINUED | OUTPATIENT
Start: 2019-05-28 | End: 2019-05-29 | Stop reason: HOSPADM

## 2019-05-28 RX ADMIN — METHYLPREDNISOLONE SODIUM SUCCINATE 60 MG: 125 INJECTION, POWDER, FOR SOLUTION INTRAMUSCULAR; INTRAVENOUS at 10:30

## 2019-05-28 RX ADMIN — INSULIN LISPRO 8 UNITS: 100 INJECTION, SOLUTION INTRAVENOUS; SUBCUTANEOUS at 07:58

## 2019-05-28 RX ADMIN — IPRATROPIUM BROMIDE AND ALBUTEROL SULFATE 1 AMPULE: .5; 3 SOLUTION RESPIRATORY (INHALATION) at 11:53

## 2019-05-28 RX ADMIN — SIMVASTATIN 20 MG: 10 TABLET, FILM COATED ORAL at 00:44

## 2019-05-28 RX ADMIN — METOPROLOL TARTRATE 50 MG: 50 TABLET ORAL at 21:22

## 2019-05-28 RX ADMIN — METOPROLOL SUCCINATE 50 MG: 50 TABLET, EXTENDED RELEASE ORAL at 07:58

## 2019-05-28 RX ADMIN — INSULIN LISPRO 4 UNITS: 100 INJECTION, SOLUTION INTRAVENOUS; SUBCUTANEOUS at 17:51

## 2019-05-28 RX ADMIN — IPRATROPIUM BROMIDE AND ALBUTEROL SULFATE 1 AMPULE: .5; 3 SOLUTION RESPIRATORY (INHALATION) at 08:03

## 2019-05-28 RX ADMIN — IPRATROPIUM BROMIDE AND ALBUTEROL SULFATE 1 AMPULE: .5; 3 SOLUTION RESPIRATORY (INHALATION) at 19:58

## 2019-05-28 RX ADMIN — INSULIN GLARGINE 10 UNITS: 100 INJECTION, SOLUTION SUBCUTANEOUS at 10:31

## 2019-05-28 RX ADMIN — AMIODARONE HYDROCHLORIDE 200 MG: 200 TABLET ORAL at 07:57

## 2019-05-28 RX ADMIN — HEPARIN SODIUM 5000 UNITS: 5000 INJECTION, SOLUTION INTRAVENOUS; SUBCUTANEOUS at 13:40

## 2019-05-28 RX ADMIN — IPRATROPIUM BROMIDE AND ALBUTEROL SULFATE 1 AMPULE: .5; 3 SOLUTION RESPIRATORY (INHALATION) at 23:07

## 2019-05-28 RX ADMIN — INSULIN LISPRO 2 UNITS: 100 INJECTION, SOLUTION INTRAVENOUS; SUBCUTANEOUS at 21:23

## 2019-05-28 RX ADMIN — PANTOPRAZOLE SODIUM 40 MG: 40 TABLET, DELAYED RELEASE ORAL at 05:50

## 2019-05-28 RX ADMIN — Medication 10 ML: at 10:32

## 2019-05-28 RX ADMIN — HEPARIN SODIUM 5000 UNITS: 5000 INJECTION, SOLUTION INTRAVENOUS; SUBCUTANEOUS at 00:44

## 2019-05-28 RX ADMIN — METRONIDAZOLE 500 MG: 500 INJECTION, SOLUTION INTRAVENOUS at 17:51

## 2019-05-28 RX ADMIN — ASPIRIN 81 MG: 81 TABLET, COATED ORAL at 07:58

## 2019-05-28 RX ADMIN — IPRATROPIUM BROMIDE AND ALBUTEROL SULFATE 1 AMPULE: .5; 3 SOLUTION RESPIRATORY (INHALATION) at 15:51

## 2019-05-28 RX ADMIN — HEPARIN SODIUM 5000 UNITS: 5000 INJECTION, SOLUTION INTRAVENOUS; SUBCUTANEOUS at 21:22

## 2019-05-28 RX ADMIN — HYDRALAZINE HYDROCHLORIDE 25 MG: 25 TABLET, FILM COATED ORAL at 00:44

## 2019-05-28 RX ADMIN — IPRATROPIUM BROMIDE AND ALBUTEROL SULFATE 1 AMPULE: .5; 3 SOLUTION RESPIRATORY (INHALATION) at 04:17

## 2019-05-28 RX ADMIN — HYDRALAZINE HYDROCHLORIDE 25 MG: 25 TABLET, FILM COATED ORAL at 05:50

## 2019-05-28 RX ADMIN — HYDRALAZINE HYDROCHLORIDE 25 MG: 25 TABLET, FILM COATED ORAL at 13:40

## 2019-05-28 RX ADMIN — MECLIZINE 25 MG: 12.5 TABLET ORAL at 23:59

## 2019-05-28 RX ADMIN — MECLIZINE 25 MG: 12.5 TABLET ORAL at 17:50

## 2019-05-28 RX ADMIN — Medication 10 ML: at 00:49

## 2019-05-28 RX ADMIN — INSULIN LISPRO 10 UNITS: 100 INJECTION, SOLUTION INTRAVENOUS; SUBCUTANEOUS at 10:31

## 2019-05-28 RX ADMIN — INSULIN GLARGINE 50 UNITS: 100 INJECTION, SOLUTION SUBCUTANEOUS at 21:23

## 2019-05-28 RX ADMIN — INSULIN LISPRO 10 UNITS: 100 INJECTION, SOLUTION INTRAVENOUS; SUBCUTANEOUS at 12:20

## 2019-05-28 RX ADMIN — SULFAMETHOXAZOLE AND TRIMETHOPRIM 1 TABLET: 800; 160 TABLET ORAL at 10:30

## 2019-05-28 RX ADMIN — HEPARIN SODIUM 5000 UNITS: 5000 INJECTION, SOLUTION INTRAVENOUS; SUBCUTANEOUS at 05:51

## 2019-05-28 RX ADMIN — METRONIDAZOLE 500 MG: 500 INJECTION, SOLUTION INTRAVENOUS at 10:30

## 2019-05-28 RX ADMIN — METHYLPREDNISOLONE SODIUM SUCCINATE 60 MG: 125 INJECTION, POWDER, FOR SOLUTION INTRAMUSCULAR; INTRAVENOUS at 17:51

## 2019-05-28 RX ADMIN — INSULIN GLARGINE 40 UNITS: 100 INJECTION, SOLUTION SUBCUTANEOUS at 00:54

## 2019-05-28 RX ADMIN — Medication 10 ML: at 21:24

## 2019-05-28 RX ADMIN — HYDRALAZINE HYDROCHLORIDE 25 MG: 25 TABLET, FILM COATED ORAL at 21:24

## 2019-05-28 RX ADMIN — AMLODIPINE BESYLATE 5 MG: 5 TABLET ORAL at 07:58

## 2019-05-28 RX ADMIN — IPRATROPIUM BROMIDE AND ALBUTEROL SULFATE: .5; 2.5 SOLUTION RESPIRATORY (INHALATION) at 03:24

## 2019-05-28 RX ADMIN — VITAMIN D, TAB 1000IU (100/BT) 2000 UNITS: 25 TAB at 10:32

## 2019-05-28 RX ADMIN — METHYLPREDNISOLONE SODIUM SUCCINATE 60 MG: 125 INJECTION, POWDER, FOR SOLUTION INTRAMUSCULAR; INTRAVENOUS at 21:23

## 2019-05-28 RX ADMIN — FUROSEMIDE 40 MG: 10 INJECTION, SOLUTION INTRAVENOUS at 10:31

## 2019-05-28 RX ADMIN — PREDNISONE 5 MG: 5 TABLET ORAL at 07:57

## 2019-05-28 RX ADMIN — INSULIN LISPRO 3 UNITS: 100 INJECTION, SOLUTION INTRAVENOUS; SUBCUTANEOUS at 00:48

## 2019-05-28 RX ADMIN — INSULIN GLARGINE 40 UNITS: 100 INJECTION, SOLUTION SUBCUTANEOUS at 07:58

## 2019-05-28 RX ADMIN — INSULIN LISPRO 16 UNITS: 100 INJECTION, SOLUTION INTRAVENOUS; SUBCUTANEOUS at 12:20

## 2019-05-28 RX ADMIN — METRONIDAZOLE 500 MG: 500 INJECTION, SOLUTION INTRAVENOUS at 23:59

## 2019-05-28 RX ADMIN — SIMVASTATIN 20 MG: 10 TABLET, FILM COATED ORAL at 21:22

## 2019-05-28 RX ADMIN — LOSARTAN POTASSIUM 100 MG: 50 TABLET ORAL at 07:58

## 2019-05-28 ASSESSMENT — PAIN SCALES - GENERAL
PAINLEVEL_OUTOF10: 0

## 2019-05-28 ASSESSMENT — ENCOUNTER SYMPTOMS
COUGH: 1
SHORTNESS OF BREATH: 1
DIARRHEA: 0
WHEEZING: 1
SORE THROAT: 0
NAUSEA: 0
ABDOMINAL PAIN: 0

## 2019-05-28 NOTE — CONSULTS
(6/22/15); other surgical history (3/22/16); other surgical history (Right, 4/26/16); Cystoscopy (Right, 2/6/2019); and Cystoscopy (Right, 2/27/2019). Home Medications:    Prior to Admission medications    Medication Sig Start Date End Date Taking?  Authorizing Provider   metroNIDAZOLE (FLAGYL) 500 MG tablet Take 1 tablet by mouth 3 times daily 5/24/19  Yes Niesha Bradford,    sulfamethoxazole-trimethoprim (BACTRIM DS) 800-160 MG per tablet Take 1 tablet by mouth 2 times daily for 10 days 5/24/19 6/3/19 Yes Kamaljit Bradford DO   predniSONE (DELTASONE) 5 MG tablet TAKE 1 TABLET DAILY 4/18/19  Yes Mohsen Alejo MD   amiodarone (CORDARONE) 200 MG tablet Take 1 tablet by mouth daily 3/8/19  Yes Mohsen Alejo MD   furosemide (LASIX) 40 MG tablet Take 1 tablet by mouth daily 2/13/19  Yes Na Olivo   simvastatin (ZOCOR) 20 MG tablet Take 1 tablet by mouth nightly 12/27/18  Yes Carmell Lung, APRN - CNP   hydrALAZINE (APRESOLINE) 25 MG tablet TAKE 1 TABLET THREE TIMES A DAY 10/30/18  Yes Mohsen Alejo MD   VICTOZA 18 MG/3ML SOPN SC injection INJECT 1.2 MG INTO THE SKIN DAILY 10/12/18  Yes Mohsen Alejo MD   LANTUS SOLOSTAR 100 UNIT/ML injection pen INJECT 50 UNITS IN THE MORNING AND 48 UNITS AT BEDTIME  Patient taking differently: INJECT 45 UNITS IN THE MORNING AND 45 UNITS AT BEDTIME 9/26/18  Yes Mohsen Alejo MD   metoprolol succinate (TOPROL XL) 50 MG extended release tablet TAKE 1 TABLET DAILY 9/17/18  Yes Mohsen Alejo MD   potassium chloride (KLOR-CON M10) 10 MEQ extended release tablet TAKE 1 TABLET DAILY 8/6/18  Yes Mohsen Alejo MD   amLODIPine (NORVASC) 5 MG tablet TAKE 1 TABLET DAILY 6/29/18  Yes Mohsen Alejo MD   HUMALOG KWIKPEN 100 UNIT/ML pen INJECT 10 UNITS WITH BREAKFAST, 16 UNITS AT NOON, 24 UNITS WITH SUPPER AND 10 UNITS AT NIGHT AS NEEDED  Patient taking differently: INJECT 10 UNITS WITH BREAKFAST, 16 UNITS AT NOON, 20 UNITS WITH SUPPER AND 10 UNITS AT NIGHT AS NEEDED succinate  50 mg Oral Daily    pantoprazole  40 mg Oral QAM AC    predniSONE  5 mg Oral Daily    simvastatin  20 mg Oral Nightly    Liraglutide  1.2 mg Subcutaneous Daily    sodium chloride flush  10 mL Intravenous 2 times per day    heparin (porcine)  5,000 Units Subcutaneous 3 times per day    insulin lispro  0-12 Units Subcutaneous TID WC    insulin lispro  0-6 Units Subcutaneous Nightly     Continuous Infusions:   dextrose       PRN Meds:.sodium chloride flush, magnesium hydroxide, ondansetron, acetaminophen, glucose, dextrose, glucagon (rDNA), dextrose, albuterol     Allergies:  Codeine; Erythromycin; Exenatide; Levofloxacin; Verapamil; Clonidine derivatives; Other; and Penicillins    Social History:   reports that she has never smoked. She has never used smokeless tobacco. She reports that she does not drink alcohol or use drugs. Family History: family history includes Heart Attack (age of onset: 48) in her child; Heart Disease in an other family member; High Blood Pressure in an other family member; Stroke in an other family member; Uterine Cancer in her mother. Review of Systems   CONSTITUTIONAL:  negative for fevers, chills, fatigue and malaise    EYES:  negative for discharge    HEENT:  negative for epistaxis and sore throat    RESPIRATORY:  positive for cough, shortness of breath, wheezing    CARDIOVASCULAR:  negative for chest pain, palpitations, syncope, edema    GASTROINTESTINAL:  negative for nausea, vomiting, diarrhea, constipation, abdominal pain    GENITOURINARY:  negative for incontinence    MUSCULOSKELETAL:  negative for neck or back pain    NEUROLOGICAL:  negative for headaches, seizures and double vision   PSYCHIATRIC:  negative               PHYSICAL EXAM:    Blood pressure 135/72, pulse 90, temperature 97.3 °F (36.3 °C), temperature source Oral, resp. rate 22, height 5' (1.524 m), weight 218 lb 7.6 oz (99.1 kg), SpO2 97 %.     CONSTITUTIONAL: AOx4, no apparent distress,

## 2019-05-28 NOTE — H&P
(APRESOLINE) 25 MG tablet, TAKE 1 TABLET THREE TIMES A DAY  VICTOZA 18 MG/3ML SOPN SC injection, INJECT 1.2 MG INTO THE SKIN DAILY  LANTUS SOLOSTAR 100 UNIT/ML injection pen, INJECT 50 UNITS IN THE MORNING AND 48 UNITS AT BEDTIME (Patient taking differently: INJECT 45 UNITS IN THE MORNING AND 45 UNITS AT BEDTIME)  metoprolol succinate (TOPROL XL) 50 MG extended release tablet, TAKE 1 TABLET DAILY  potassium chloride (KLOR-CON M10) 10 MEQ extended release tablet, TAKE 1 TABLET DAILY  amLODIPine (NORVASC) 5 MG tablet, TAKE 1 TABLET DAILY  HUMALOG KWIKPEN 100 UNIT/ML pen, INJECT 10 UNITS WITH BREAKFAST, 16 UNITS AT NOON, 24 UNITS WITH SUPPER AND 10 UNITS AT NIGHT AS NEEDED (Patient taking differently: INJECT 10 UNITS WITH BREAKFAST, 16 UNITS AT NOON, 20 UNITS WITH SUPPER AND 10 UNITS AT NIGHT AS NEEDED)  losartan (COZAAR) 100 MG tablet, TAKE 1 TABLET DAILY  aspirin 81 MG EC tablet, Take 81 mg by mouth daily  omeprazole (PRILOSEC) 20 MG capsule, Take 20 mg by mouth Daily   Cholecalciferol (VITAMIN D3) 2000 UNITS CAPS, Take 2,000 Units by mouth daily   [] HYDROcodone-acetaminophen (NORCO) 5-325 MG per tablet, Take 1 tablet by mouth every 6 hours as needed for Pain for up to 3 days. Polyethylene Glycol 400 (BLINK TEARS) 0.25 % SOLN, Apply to eye as needed  B-D ULTRAFINE III SHORT PEN 31G X 8 MM MISC, USE 7 DAILY  ferrous sulfate 220 (44 Fe) MG/5ML solution, TAKE 10 MLS BY MOUTH DAILY. MIX WITH 2 OUNCES OF ORANGE JUICE (Patient taking differently: TAKE 10 MLS BY MOUTH DAILY. MIX WITH 2 OUNCES OF ORANGE JUICE - Takes about every 3rd day)  Handicap Placard MISC, by Does not apply route EXP: 2020  glucose blood VI test strips (ASCENSIA AUTODISC VI;ONE TOUCH ULTRA TEST VI) strip, 1 each by In Vitro route 3 times daily As directed. acetaminophen (TYLENOL) 500 MG tablet, Take 500 mg by mouth daily    Allergies:    Codeine; Erythromycin; Exenatide; Levofloxacin; Verapamil; Clonidine derivatives;  Other; and Penicillins    Social History:    reports that she has never smoked. She has never used smokeless tobacco. She reports that she does not drink alcohol or use drugs. Family History:   family history includes Heart Attack (age of onset: 48) in her child; Heart Disease in an other family member; High Blood Pressure in an other family member; Stroke in an other family member; Uterine Cancer in her mother. REVIEW OF SYSTEMS:  See HPI and problem list; otherwise no other new complaints with respect to eyes, ENT, neck, pulmonary, coronary, chest, GI, , endocrine, musculoskeletal, immune system/connective tissue disease, hematologic, neurologic, psychiatric, skin, lymphatics, or malignancies. PHYSICAL EXAM:  Vitals:  BP (!) 123/58   Pulse 82   Temp 97.3 °F (36.3 °C) (Oral)   Resp 18   Ht 5' (1.524 m)   Wt 218 lb 7.6 oz (99.1 kg)   SpO2 93%   BMI 42.67 kg/m²     General: awake, alert and cooperative  HEENT: External nose normal, Normocephalic and Atraumatic  Neck: Supple, No Masses, Tenderness, Nodularity and No Lymphadenopathy  Chest/Lungs: with tachypnea at rest (RR 22-24), with dypsnea at rest, Rales Present, Expiratory Wheezes and Distant Breath Sounds, without use of accessory muscles  Cardiac: Regular Rate and Rhythm and Pedal Pulses Palpable Bilaterally  GI/Abdomen:  Bowel Sounds Present and Soft, Non-tender, without Guarding or Rebound Tenderness  : Not examined and villareal catheter present  Extremities/Musculoskeletal: generalized weakness--BLE with 1+ edema  Skin: No Cyanosis, No rash and Skin warm and dry  Neuro: gait instability at baseline, Alert and Oriented, to Person, to Time, to Place, to Situation and No Localizing Signs/Symptoms      LABS:    CBC with Differential:    Lab Results   Component Value Date    WBC 11.6 05/28/2019    RBC 3.56 05/28/2019    HGB 8.6 05/28/2019    HCT 28.2 05/28/2019     05/28/2019    MCV 79.1 05/28/2019    MCH 24.2 05/28/2019    MCHC 30.6 05/28/2019

## 2019-05-28 NOTE — FLOWSHEET NOTE
Assessment: Patient is alone in room, She does have strong family support and is well connected to her Mu-ism. Florian Bedoya entered as this visit was ended. Patient expresses a strong jackson and has faced the grief of loosing a son several years ago. She is well able express her jackson. Intervention: Engaged in conversation and provided a listening presence. Prayed with her. Plan: Chaplains remain available for spiritual care. Follow up visits as possible. 05/28/19 1230   Encounter Summary   Services provided to: Patient   Referral/Consult From: [address] [place] [address] Family members; Confucianist/jackson community   Place of [facility]   ([facility]   Contact Confucianist Completed   Continue Visiting   (5/28/19)   Complexity of Encounter Moderate   Length of Encounter 15 minutes   Spiritual Assessment Completed Yes   Spiritual/Anabaptism   Type Spiritual support   Assessment Approachable   Intervention Active listening;Prayer   Outcome Expressed gratitude;Expressed feelings of ric, peace, and/or awe;Engaged in conversation

## 2019-05-28 NOTE — ED PROVIDER NOTES
Resolution    Addendum Date: 5/8/2019    ADDENDUM: Please note that when comparing to the 2015 CT study, findings have minimally progressed. Result Date: 5/8/2019  EXAMINATION: CT IMAGES OF THE CHEST WITHOUT CONTRAST, HIGH RESOLUTION 5/8/2019 TECHNIQUE: CT of the chest was performed without the administration of intravenous contrast. High resolution CT imaging was performed of the lungs. Multiplanar reformatted images are provided for review. Dose modulation, iterative reconstruction, and/or weight based adjustment of the mA/kV was utilized to reduce the radiation dose to as low as reasonably achievable. COMPARISON: CT chest January 8, 2015. Chest x-ray April 25, 2019. HISTORY: ORDERING SYSTEM PROVIDED HISTORY: Shortness of breath TECHNOLOGIST PROVIDED HISTORY: Ordering Physician Provided Reason for Exam: New dyspnea with exertion, history of asthma. Acuity: Acute Type of Exam: Initial FINDINGS: Mediastinum: Visualized thyroid parenchyma appears unremarkable. Thoracic aorta demonstrates mild calcification without aneurysm. Pulmonary trunk appears nondilated. Heart appears at the upper limits normal in size without pericardial effusion. No lymphadenopathy. The esophagus is grossly unremarkable. HRCT Findings/Lungs/pleura: No focal consolidation, pneumothorax or pleural effusion. Trachea and distal airways appear patent. No suspicious noncalcified lung nodule or mass. Mild peripheral reticular changes are seen throughout both lungs with no definitive honeycombing. There is mild bronchiectasis involving the lower lobes. No ground-glass opacities. No micro nodules are noted. Expiratory imaging demonstrates some degree of air trapping. Upper Abdomen: Visualized adrenal glands appear grossly unremarkable. No acute findings. Soft Tissues/Bones: Visualized soft tissues surrounding the chest wall demonstrate no acute findings. Osseous structures demonstrate degenerative change.      1. Mild diffuse DISPOSITION/PLAN   DISPOSITION Decision To Admit    Condition on Disposition    Stable    PATIENT REFERRED TO:  Cathryn Humphries MD  [facility] [place]  [phone]            DISCHARGE MEDICATIONS:  Current Discharge Medication List          (Please note that portions of this note were completed with a voice recognition program.  Efforts were made to edit the dictations but occasionally words are mis-transcribed.)    Xie MD, F.A.C.E.P.   Attending Emergency Physician                          Tomás Ayers MD  05/28/19 1792

## 2019-05-28 NOTE — PLAN OF CARE
Problem: Falls - Risk of:  Goal: Will remain free from falls  Description  Will remain free from falls  Outcome: Ongoing  Goal: Absence of physical injury  Description  Absence of physical injury  Outcome: Ongoing     Problem: Activity:  Goal: Ability to tolerate increased activity will improve  Description  Ability to tolerate increased activity will improve  Outcome: Ongoing     Problem: Gas Exchange - Impaired:  Goal: Levels of oxygenation will improve  Description  Levels of oxygenation will improve  Outcome: Ongoing     Problem:  Activity:  Intervention: Encourage scheduling of activities to allow for periods of rest  Note:   Will be able to increase activity tolerance by discharge

## 2019-05-28 NOTE — PROGRESS NOTES
[facility]ilie Hobbs, [facility] SOB (shortness of breath) [R06.02] . Vitals:    05/27/19 2342   BP:    Pulse:    Resp: 24   Temp:    SpO2: 98%   . Patients home meds are   Prior to Admission medications    Medication Sig Start Date End Date Taking?  Authorizing Provider   metroNIDAZOLE (FLAGYL) 500 MG tablet Take 1 tablet by mouth 3 times daily 5/24/19  Yes Carmen Bradford,    sulfamethoxazole-trimethoprim (BACTRIM DS) 800-160 MG per tablet Take 1 tablet by mouth 2 times daily for 10 days 5/24/19 6/3/19 Yes Kamaljit Bradford DO   predniSONE (DELTASONE) 5 MG tablet TAKE 1 TABLET DAILY 4/18/19  Yes Yuriy Bellamy MD   amiodarone (CORDARONE) 200 MG tablet Take 1 tablet by mouth daily 3/8/19  Yes Yuriy Bellamy MD   furosemide (LASIX) 40 MG tablet Take 1 tablet by mouth daily 2/13/19  Yes Sommer Olivo   simvastatin (ZOCOR) 20 MG tablet Take 1 tablet by mouth nightly 12/27/18  Yes INGRID Piedra CNP   hydrALAZINE (APRESOLINE) 25 MG tablet TAKE 1 TABLET THREE TIMES A DAY 10/30/18  Yes Yuriy Bellamy MD   VICTOZA 18 MG/3ML SOPN SC injection INJECT 1.2 MG INTO THE SKIN DAILY 10/12/18  Yes Yuriy Bellamy MD   LANTUS SOLOSTAR 100 UNIT/ML injection pen INJECT 50 UNITS IN THE MORNING AND 48 UNITS AT BEDTIME  Patient taking differently: INJECT 45 UNITS IN THE MORNING AND 45 UNITS AT BEDTIME 9/26/18  Yes Yuriy Bellamy MD   metoprolol succinate (TOPROL XL) 50 MG extended release tablet TAKE 1 TABLET DAILY 9/17/18  Yes Yuriy Bellamy MD   potassium chloride (KLOR-CON M10) 10 MEQ extended release tablet TAKE 1 TABLET DAILY 8/6/18  Yes Yuriy Bellamy MD   amLODIPine (NORVASC) 5 MG tablet TAKE 1 TABLET DAILY 6/29/18  Yes Yuriy Bellamy MD   HUMALOG KWIKPEN 100 UNIT/ML pen INJECT 10 UNITS WITH BREAKFAST, 16 UNITS AT NOON, 24 UNITS WITH SUPPER AND 10 UNITS AT NIGHT AS NEEDED  Patient taking differently: INJECT 10 UNITS WITH BREAKFAST, 16 UNITS AT NOON, 20 UNITS WITH SUPPER AND 10 UNITS AT NIGHT AS NEEDED 6/22/18  Yes Rashel Collier MD   losartan (COZAAR) 100 MG tablet TAKE 1 TABLET DAILY 6/6/18  Yes Rashel Collier MD   aspirin 81 MG EC tablet Take 81 mg by mouth daily   Yes Historical Provider, MD   omeprazole (PRILOSEC) 20 MG capsule Take 20 mg by mouth Daily  1/14/15  Yes [place] Seed   Cholecalciferol (VITAMIN D3) 2000 UNITS CAPS Take 2,000 Units by mouth daily    Yes Historical Provider, MD   HYDROcodone-acetaminophen (NORCO) 5-325 MG per tablet Take 1 tablet by mouth every 6 hours as needed for Pain for up to 3 days. 5/24/19 5/27/19  Kamaljit Bradford,    Polyethylene Glycol 400 (BLINK TEARS) 0.25 % SOLN Apply to eye as needed    Historical Provider, MD HURTADO ULTRAFINE III SHORT PEN 31G X 8 MM MISC USE 7 DAILY 3/28/19   Gaurang Cherry DO   ferrous sulfate 220 (44 Fe) MG/5ML solution TAKE 10 MLS BY MOUTH DAILY. MIX WITH 2 OUNCES OF ORANGE JUICE  Patient taking differently: TAKE 10 MLS BY MOUTH DAILY. MIX WITH 2 OUNCES OF ORANGE JUICE - Takes about every 3rd day 11/28/18   Rashel Collier MD   Handicap Placard MISC by Does not apply route EXP: 6/12/2020 6/12/18   Rashel Collier MD   glucose blood VI test strips (ASCENSIA AUTODISC VI;ONE TOUCH ULTRA TEST VI) strip 1 each by In Vitro route 3 times daily As directed.  7/13/17   Gaurang Cherry DO   acetaminophen (TYLENOL) 500 MG tablet Take 500 mg by mouth daily    Historical Provider, MD   .  Recent Surgical History: None = 0     Assessment     Peak Flow (asthma only)    Predicted: 282  Personal Best: 131    % Predicted 46%  Peak Flow : Less than 50% = 4    FEV1/FVC    FEV1 Predicted 1.82      FEV1 0.7    FEV1 % Predicted 38%  FVC 0.9  IS volume 750  IBW na    RR 24  Breath Sounds: diminished/wheezes      · Bronchodilator assessment at level  4  · Hyperinflation assessment at level   1  · Secretion Management assessment at level   1  ·   · [x]    Bronchodilator Assessment  BRONCHODILATOR ASSESSMENT SCORE  Score 0 1 2 3 4 5 FEMALE                                  MALE                            FEV1 Predicted Normal Values                        FEV1 Predicted Normal Values          Age                                     Height in Feet and Inches       Age                                     Height in Feet and Inches       4' 11\" 5' 1\" 5' 3\" 5' 5\" 5' 7\" 5' 9\" 5' 11\" 6' 1\"  4' 11\" 5' 1\" 5' 3\" 5' 5\" 5' 7\" 5' 9\" 5' 11\" 6' 1\"   43 - 45 2.49 2.66 2.84 3.03 3.22 3.42 3.62 3.83 42 - 45 2.82 3.03 3.26 3.49 3.72 3.96 4.22 4.47   46 - 49 2.40 2.57 2.76 2.94 3.14 3.33 3.54 3.75 46 - 49 2.70 2.92 3.14 3.37 3.61 3.85 4.10 4.36   50 - 53 2.31 2.48 2.66 2.85 3.04 3.24 3.45 3.66 50 - 53 2.58 2.80 3.02 3.25 3.49 3.73 3.98 4.24   54 - 57 2.21 2.38 2.57 2.75 2.95 3.14 3.35 3.56 54 - 57 2.46 2.67 2.89 3.12 3.36 3.60 3.85 4.11   58 - 61 2.10 2.28 2.46 2.65 2.84 3.04 3.24 3.45 58 - 61 2.32 2.54 2.76 2.99 3.23 3.47 3.72 3.98   62 - 65 1.99 2.17 2.35 2.54 2.73 2.93 3.13 3.34 62 - 65 2.19 2.40 2.62 2.85 3.09 3.33 3.58 3.84   66 - 69 1.88 2.05 2.23 2.42 2.61 2.81 3.02 3.23 66 - 69 2.04 2.26 2.48 2.71 2.95 3.19 3.44 3.70   70+ 1.82 1.99 2.17 2.36 2.55 2.75 2.95 3.16 70+ 1.97 2.19 2.41 2.64 2.87 3.12 3.37 3.62             Predicted Peak Expiratory Flow Rate                                       Height (in)  Female       Height (in) Male           Age 57 99 08 60 41 87 78 74 Age            17 491 133 618 094 364 035 611 117  06 57 84 79 68 70 72 74 76   25 337 352 366 381 396 411 426 441 25 447 476 505 533 [phone] 677   30 329 344 359 374 389 404 419 434 30 437 466 494 523 552 580 609 638 667   35 322 337 351 366 381 396 411 426 35 426 455 484 512 541 570 598 627 657   40 314 329 344 359 374 389 404 419 40 416 445 473 502 531 559 588 617 647   45 307 322 336 351 366 381 396 411 45 405 434 463 491 520 549 577 606 636   50 299 314 329 344 359 374 389 404 50 395 424 452 481 510 538 567 596 625   55 292 307 321 336 351 366 381 396 55 384 413 442 470 499 528 0681 664 48 54 269 284 299 314 329 344 359 374 70 353 382 410 439 468 496 525 554 583   75 261 274 289 305 319 334 348 364 75 344 372 400 429 458 487 515 544 573   80 253 266 282 296 312 327 342 356 80 335 362 390 419 448 [phone] 562

## 2019-05-29 ENCOUNTER — APPOINTMENT (OUTPATIENT)
Dept: GENERAL RADIOLOGY | Age: 84
DRG: 291 | End: 2019-05-29
Payer: MEDICARE

## 2019-05-29 ENCOUNTER — HOSPITAL ENCOUNTER (INPATIENT)
Age: 84
LOS: 3 days | Discharge: HOME | DRG: 291 | End: 2019-06-01
Attending: INTERNAL MEDICINE | Admitting: INTERNAL MEDICINE
Payer: MEDICARE

## 2019-05-29 ENCOUNTER — APPOINTMENT (OUTPATIENT)
Dept: NUCLEAR MEDICINE | Age: 84
DRG: 291 | End: 2019-05-29
Payer: MEDICARE

## 2019-05-29 VITALS
BODY MASS INDEX: 42.98 KG/M2 | HEART RATE: 74 BPM | TEMPERATURE: 97.2 F | SYSTOLIC BLOOD PRESSURE: 136 MMHG | OXYGEN SATURATION: 94 % | WEIGHT: 218.92 LBS | RESPIRATION RATE: 18 BRPM | HEIGHT: 60 IN | DIASTOLIC BLOOD PRESSURE: 63 MMHG

## 2019-05-29 PROBLEM — I50.9 ACUTE HEART FAILURE (HCC): Status: ACTIVE | Noted: 2019-05-29

## 2019-05-29 LAB
ABSOLUTE BANDS #: 0.88 K/UL
ABSOLUTE EOS #: 0 K/UL (ref 0–0.4)
ABSOLUTE IMMATURE GRANULOCYTE: ABNORMAL K/UL (ref 0–0.3)
ABSOLUTE LYMPH #: 0.88 K/UL (ref 1–4.8)
ABSOLUTE MONO #: 0.35 K/UL (ref 0.1–1.2)
ANION GAP SERPL CALCULATED.3IONS-SCNC: 12 MMOL/L (ref 9–17)
BANDS: 5 %
BASOPHILS # BLD: 0 % (ref 0–1)
BASOPHILS ABSOLUTE: 0 K/UL (ref 0–0.2)
BUN BLDV-MCNC: 47 MG/DL (ref 8–23)
BUN/CREAT BLD: 21 (ref 9–20)
CALCIUM SERPL-MCNC: 9.3 MG/DL (ref 8.6–10.4)
CHLORIDE BLD-SCNC: 104 MMOL/L (ref 98–107)
CO2: 22 MMOL/L (ref 20–31)
CREAT SERPL-MCNC: 2.23 MG/DL (ref 0.5–0.9)
DIFFERENTIAL TYPE: ABNORMAL
EOSINOPHILS RELATIVE PERCENT: 0 % (ref 1–7)
GFR AFRICAN AMERICAN: 25 ML/MIN
GFR NON-AFRICAN AMERICAN: 21 ML/MIN
GFR SERPL CREATININE-BSD FRML MDRD: ABNORMAL ML/MIN/{1.73_M2}
GFR SERPL CREATININE-BSD FRML MDRD: ABNORMAL ML/MIN/{1.73_M2}
GLUCOSE BLD-MCNC: 196 MG/DL (ref 65–105)
GLUCOSE BLD-MCNC: 241 MG/DL (ref 70–99)
GLUCOSE BLD-MCNC: 252 MG/DL (ref 65–105)
GLUCOSE BLD-MCNC: 379 MG/DL (ref 65–105)
HCT VFR BLD CALC: 27.6 % (ref 36–46)
HEMOGLOBIN: 8.3 G/DL (ref 12–16)
IMMATURE GRANULOCYTES: ABNORMAL %
LYMPHOCYTES # BLD: 5 % (ref 16–46)
MCH RBC QN AUTO: 23.9 PG (ref 26–34)
MCHC RBC AUTO-ENTMCNC: 30.2 G/DL (ref 31–37)
MCV RBC AUTO: 79.1 FL (ref 80–100)
MONOCYTES # BLD: 2 % (ref 4–11)
MORPHOLOGY: ABNORMAL
NRBC AUTOMATED: ABNORMAL PER 100 WBC
PDW BLD-RTO: 19.1 % (ref 11–14.5)
PLATELET # BLD: 210 K/UL (ref 140–450)
PLATELET ESTIMATE: ABNORMAL
PMV BLD AUTO: 10.1 FL (ref 6–12)
POTASSIUM SERPL-SCNC: 4.7 MMOL/L (ref 3.7–5.3)
PROCALCITONIN: 0.11 NG/ML
RBC # BLD: 3.49 M/UL (ref 4–5.2)
RBC # BLD: ABNORMAL 10*6/UL
SEG NEUTROPHILS: 88 % (ref 43–77)
SEGMENTED NEUTROPHILS ABSOLUTE COUNT: 15.49 K/UL (ref 1.8–7.7)
SODIUM BLD-SCNC: 138 MMOL/L (ref 135–144)
TROPONIN INTERP: ABNORMAL
TROPONIN T: ABNORMAL NG/ML
TROPONIN, HIGH SENSITIVITY: 81 NG/L (ref 0–14)
TROPONIN, HIGH SENSITIVITY: 83 NG/L (ref 0–14)
TROPONIN, HIGH SENSITIVITY: 85 NG/L (ref 0–14)
TROPONIN, HIGH SENSITIVITY: 92 NG/L (ref 0–14)
WBC # BLD: 17.6 K/UL (ref 3.5–11)
WBC # BLD: ABNORMAL 10*3/UL

## 2019-05-29 PROCEDURE — 6370000000 HC RX 637 (ALT 250 FOR IP): Performed by: NURSE PRACTITIONER

## 2019-05-29 PROCEDURE — 6370000000 HC RX 637 (ALT 250 FOR IP): Performed by: FAMILY MEDICINE

## 2019-05-29 PROCEDURE — 6360000002 HC RX W HCPCS: Performed by: NURSE PRACTITIONER

## 2019-05-29 PROCEDURE — 71045 X-RAY EXAM CHEST 1 VIEW: CPT

## 2019-05-29 PROCEDURE — 6360000002 HC RX W HCPCS: Performed by: PHYSICIAN ASSISTANT

## 2019-05-29 PROCEDURE — 82947 ASSAY GLUCOSE BLOOD QUANT: CPT

## 2019-05-29 PROCEDURE — A9540 TC99M MAA: HCPCS | Performed by: INTERNAL MEDICINE

## 2019-05-29 PROCEDURE — 6360000002 HC RX W HCPCS: Performed by: INTERNAL MEDICINE

## 2019-05-29 PROCEDURE — 3430000000 HC RX DIAGNOSTIC RADIOPHARMACEUTICAL: Performed by: INTERNAL MEDICINE

## 2019-05-29 PROCEDURE — 84484 ASSAY OF TROPONIN QUANT: CPT

## 2019-05-29 PROCEDURE — 36415 COLL VENOUS BLD VENIPUNCTURE: CPT

## 2019-05-29 PROCEDURE — 2500000003 HC RX 250 WO HCPCS: Performed by: PHYSICIAN ASSISTANT

## 2019-05-29 PROCEDURE — 2060000000 HC ICU INTERMEDIATE R&B

## 2019-05-29 PROCEDURE — 6370000000 HC RX 637 (ALT 250 FOR IP): Performed by: INTERNAL MEDICINE

## 2019-05-29 PROCEDURE — A9539 TC99M PENTETATE: HCPCS | Performed by: INTERNAL MEDICINE

## 2019-05-29 PROCEDURE — 97110 THERAPEUTIC EXERCISES: CPT | Performed by: PHYSICAL THERAPY ASSISTANT

## 2019-05-29 PROCEDURE — 2500000003 HC RX 250 WO HCPCS: Performed by: INTERNAL MEDICINE

## 2019-05-29 PROCEDURE — 99233 SBSQ HOSP IP/OBS HIGH 50: CPT | Performed by: INTERNAL MEDICINE

## 2019-05-29 PROCEDURE — 2580000003 HC RX 258: Performed by: PHYSICIAN ASSISTANT

## 2019-05-29 PROCEDURE — 94761 N-INVAS EAR/PLS OXIMETRY MLT: CPT

## 2019-05-29 PROCEDURE — 80048 BASIC METABOLIC PNL TOTAL CA: CPT

## 2019-05-29 PROCEDURE — 94660 CPAP INITIATION&MGMT: CPT

## 2019-05-29 PROCEDURE — 94640 AIRWAY INHALATION TREATMENT: CPT

## 2019-05-29 PROCEDURE — 6370000000 HC RX 637 (ALT 250 FOR IP): Performed by: PHYSICIAN ASSISTANT

## 2019-05-29 PROCEDURE — 99238 HOSP IP/OBS DSCHRG MGMT 30/<: CPT | Performed by: INTERNAL MEDICINE

## 2019-05-29 PROCEDURE — 78582 LUNG VENTILAT&PERFUS IMAGING: CPT

## 2019-05-29 PROCEDURE — 2580000003 HC RX 258: Performed by: NURSE PRACTITIONER

## 2019-05-29 PROCEDURE — 85025 COMPLETE CBC W/AUTO DIFF WBC: CPT

## 2019-05-29 PROCEDURE — 84145 PROCALCITONIN (PCT): CPT

## 2019-05-29 RX ORDER — METOPROLOL SUCCINATE 50 MG/1
50 TABLET, EXTENDED RELEASE ORAL DAILY
Status: DISCONTINUED | OUTPATIENT
Start: 2019-05-30 | End: 2019-06-01 | Stop reason: HOSPADM

## 2019-05-29 RX ORDER — HEPARIN SODIUM 5000 [USP'U]/ML
5000 INJECTION, SOLUTION INTRAVENOUS; SUBCUTANEOUS EVERY 8 HOURS SCHEDULED
Status: DISCONTINUED | OUTPATIENT
Start: 2019-05-29 | End: 2019-06-01 | Stop reason: HOSPADM

## 2019-05-29 RX ORDER — NICOTINE 21 MG/24HR
1 PATCH, TRANSDERMAL 24 HOURS TRANSDERMAL DAILY PRN
Status: DISCONTINUED | OUTPATIENT
Start: 2019-05-29 | End: 2019-06-01 | Stop reason: HOSPADM

## 2019-05-29 RX ORDER — FUROSEMIDE 40 MG/1
40 TABLET ORAL DAILY
Status: DISCONTINUED | OUTPATIENT
Start: 2019-05-30 | End: 2019-05-30

## 2019-05-29 RX ORDER — POTASSIUM CHLORIDE 20 MEQ/1
10 TABLET, EXTENDED RELEASE ORAL DAILY
Status: DISCONTINUED | OUTPATIENT
Start: 2019-05-30 | End: 2019-05-30

## 2019-05-29 RX ORDER — ACETAMINOPHEN 325 MG/1
650 TABLET ORAL EVERY 4 HOURS PRN
Status: DISCONTINUED | OUTPATIENT
Start: 2019-05-29 | End: 2019-06-01 | Stop reason: HOSPADM

## 2019-05-29 RX ORDER — AMIODARONE HYDROCHLORIDE 200 MG/1
200 TABLET ORAL DAILY
Status: DISCONTINUED | OUTPATIENT
Start: 2019-05-30 | End: 2019-05-30

## 2019-05-29 RX ORDER — AMLODIPINE BESYLATE 5 MG/1
5 TABLET ORAL DAILY
Status: DISCONTINUED | OUTPATIENT
Start: 2019-05-30 | End: 2019-06-01 | Stop reason: HOSPADM

## 2019-05-29 RX ORDER — POTASSIUM CHLORIDE 7.45 MG/ML
10 INJECTION INTRAVENOUS PRN
Status: DISCONTINUED | OUTPATIENT
Start: 2019-05-29 | End: 2019-05-30

## 2019-05-29 RX ORDER — INSULIN GLARGINE 100 [IU]/ML
48 INJECTION, SOLUTION SUBCUTANEOUS 2 TIMES DAILY
Status: DISCONTINUED | OUTPATIENT
Start: 2019-05-29 | End: 2019-06-01 | Stop reason: HOSPADM

## 2019-05-29 RX ORDER — KIT FOR THE PREPARATION OF TECHNETIUM TC 99M PENTETATE 20 MG/1
30 INJECTION, POWDER, LYOPHILIZED, FOR SOLUTION INTRAVENOUS; RESPIRATORY (INHALATION)
Status: COMPLETED | OUTPATIENT
Start: 2019-05-29 | End: 2019-05-29

## 2019-05-29 RX ORDER — ALBUTEROL SULFATE 2.5 MG/3ML
2.5 SOLUTION RESPIRATORY (INHALATION) EVERY 4 HOURS PRN
Status: DISCONTINUED | OUTPATIENT
Start: 2019-05-29 | End: 2019-06-01 | Stop reason: HOSPADM

## 2019-05-29 RX ORDER — PANTOPRAZOLE SODIUM 40 MG/1
40 TABLET, DELAYED RELEASE ORAL
Status: DISCONTINUED | OUTPATIENT
Start: 2019-05-30 | End: 2019-06-01 | Stop reason: HOSPADM

## 2019-05-29 RX ORDER — PREDNISONE 1 MG/1
5 TABLET ORAL DAILY
Status: DISCONTINUED | OUTPATIENT
Start: 2019-05-30 | End: 2019-06-01 | Stop reason: HOSPADM

## 2019-05-29 RX ORDER — CIPROFLOXACIN 2 MG/ML
400 INJECTION, SOLUTION INTRAVENOUS EVERY 12 HOURS
Status: DISCONTINUED | OUTPATIENT
Start: 2019-05-29 | End: 2019-05-29 | Stop reason: DRUGHIGH

## 2019-05-29 RX ORDER — POTASSIUM CHLORIDE 20 MEQ/1
40 TABLET, EXTENDED RELEASE ORAL PRN
Status: DISCONTINUED | OUTPATIENT
Start: 2019-05-29 | End: 2019-05-30

## 2019-05-29 RX ORDER — ACETAMINOPHEN 500 MG
500 TABLET ORAL DAILY
Status: DISCONTINUED | OUTPATIENT
Start: 2019-05-30 | End: 2019-06-01 | Stop reason: HOSPADM

## 2019-05-29 RX ORDER — INSULIN GLARGINE 100 [IU]/ML
55 INJECTION, SOLUTION SUBCUTANEOUS 2 TIMES DAILY
Status: DISCONTINUED | OUTPATIENT
Start: 2019-05-29 | End: 2019-05-29 | Stop reason: HOSPADM

## 2019-05-29 RX ORDER — ONDANSETRON 2 MG/ML
4 INJECTION INTRAMUSCULAR; INTRAVENOUS EVERY 6 HOURS PRN
Status: DISCONTINUED | OUTPATIENT
Start: 2019-05-29 | End: 2019-06-01 | Stop reason: HOSPADM

## 2019-05-29 RX ORDER — FERROUS SULFATE 300 MG/5ML
220 LIQUID (ML) ORAL DAILY
Status: DISCONTINUED | OUTPATIENT
Start: 2019-05-30 | End: 2019-06-01 | Stop reason: HOSPADM

## 2019-05-29 RX ORDER — LOSARTAN POTASSIUM 50 MG/1
100 TABLET ORAL DAILY
Status: DISCONTINUED | OUTPATIENT
Start: 2019-05-30 | End: 2019-05-30

## 2019-05-29 RX ORDER — SODIUM CHLORIDE 0.9 % (FLUSH) 0.9 %
10 SYRINGE (ML) INJECTION PRN
Status: DISCONTINUED | OUTPATIENT
Start: 2019-05-29 | End: 2019-06-01 | Stop reason: HOSPADM

## 2019-05-29 RX ORDER — SODIUM CHLORIDE 0.9 % (FLUSH) 0.9 %
10 SYRINGE (ML) INJECTION EVERY 12 HOURS SCHEDULED
Status: DISCONTINUED | OUTPATIENT
Start: 2019-05-29 | End: 2019-06-01 | Stop reason: HOSPADM

## 2019-05-29 RX ORDER — HYDRALAZINE HYDROCHLORIDE 25 MG/1
25 TABLET, FILM COATED ORAL EVERY 8 HOURS SCHEDULED
Status: DISCONTINUED | OUTPATIENT
Start: 2019-05-29 | End: 2019-06-01 | Stop reason: HOSPADM

## 2019-05-29 RX ORDER — CIPROFLOXACIN 2 MG/ML
400 INJECTION, SOLUTION INTRAVENOUS EVERY 24 HOURS
Status: DISCONTINUED | OUTPATIENT
Start: 2019-05-29 | End: 2019-06-01 | Stop reason: HOSPADM

## 2019-05-29 RX ORDER — SIMVASTATIN 20 MG
20 TABLET ORAL NIGHTLY
Status: DISCONTINUED | OUTPATIENT
Start: 2019-05-29 | End: 2019-06-01 | Stop reason: HOSPADM

## 2019-05-29 RX ORDER — MAGNESIUM SULFATE 1 G/100ML
1 INJECTION INTRAVENOUS PRN
Status: DISCONTINUED | OUTPATIENT
Start: 2019-05-29 | End: 2019-05-30

## 2019-05-29 RX ORDER — ASPIRIN 81 MG/1
81 TABLET ORAL DAILY
Status: DISCONTINUED | OUTPATIENT
Start: 2019-05-30 | End: 2019-06-01 | Stop reason: HOSPADM

## 2019-05-29 RX ADMIN — CIPROFLOXACIN 400 MG: 2 INJECTION, SOLUTION INTRAVENOUS at 21:56

## 2019-05-29 RX ADMIN — IPRATROPIUM BROMIDE AND ALBUTEROL SULFATE 1 AMPULE: .5; 3 SOLUTION RESPIRATORY (INHALATION) at 04:00

## 2019-05-29 RX ADMIN — INSULIN LISPRO 10 UNITS: 100 INJECTION, SOLUTION INTRAVENOUS; SUBCUTANEOUS at 11:19

## 2019-05-29 RX ADMIN — HYDRALAZINE HYDROCHLORIDE 25 MG: 25 TABLET, FILM COATED ORAL at 20:05

## 2019-05-29 RX ADMIN — METRONIDAZOLE 500 MG: 500 INJECTION, SOLUTION INTRAVENOUS at 09:00

## 2019-05-29 RX ADMIN — HEPARIN SODIUM 5000 UNITS: 5000 INJECTION, SOLUTION INTRAVENOUS; SUBCUTANEOUS at 04:53

## 2019-05-29 RX ADMIN — IPRATROPIUM BROMIDE AND ALBUTEROL SULFATE 1 AMPULE: .5; 3 SOLUTION RESPIRATORY (INHALATION) at 08:30

## 2019-05-29 RX ADMIN — ASPIRIN 81 MG: 81 TABLET, COATED ORAL at 09:00

## 2019-05-29 RX ADMIN — HEPARIN SODIUM 5000 UNITS: 5000 INJECTION INTRAVENOUS; SUBCUTANEOUS at 20:05

## 2019-05-29 RX ADMIN — METHYLPREDNISOLONE SODIUM SUCCINATE 60 MG: 125 INJECTION, POWDER, FOR SOLUTION INTRAMUSCULAR; INTRAVENOUS at 04:51

## 2019-05-29 RX ADMIN — METHYLPREDNISOLONE SODIUM SUCCINATE 60 MG: 125 INJECTION, POWDER, FOR SOLUTION INTRAMUSCULAR; INTRAVENOUS at 15:26

## 2019-05-29 RX ADMIN — MECLIZINE 25 MG: 12.5 TABLET ORAL at 11:19

## 2019-05-29 RX ADMIN — LOSARTAN POTASSIUM 100 MG: 50 TABLET ORAL at 09:00

## 2019-05-29 RX ADMIN — INSULIN GLARGINE 55 UNITS: 100 INJECTION, SOLUTION SUBCUTANEOUS at 09:00

## 2019-05-29 RX ADMIN — SULFAMETHOXAZOLE AND TRIMETHOPRIM 1 TABLET: 800; 160 TABLET ORAL at 09:00

## 2019-05-29 RX ADMIN — INSULIN LISPRO 3 UNITS: 100 INJECTION, SOLUTION INTRAVENOUS; SUBCUTANEOUS at 21:57

## 2019-05-29 RX ADMIN — INSULIN LISPRO 4 UNITS: 100 INJECTION, SOLUTION INTRAVENOUS; SUBCUTANEOUS at 09:00

## 2019-05-29 RX ADMIN — INSULIN LISPRO 2 UNITS: 100 INJECTION, SOLUTION INTRAVENOUS; SUBCUTANEOUS at 18:55

## 2019-05-29 RX ADMIN — METRONIDAZOLE 500 MG: 500 INJECTION, SOLUTION INTRAVENOUS at 19:51

## 2019-05-29 RX ADMIN — INSULIN LISPRO 20 UNITS: 100 INJECTION, SOLUTION INTRAVENOUS; SUBCUTANEOUS at 19:52

## 2019-05-29 RX ADMIN — METHYLPREDNISOLONE SODIUM SUCCINATE 60 MG: 125 INJECTION, POWDER, FOR SOLUTION INTRAMUSCULAR; INTRAVENOUS at 09:00

## 2019-05-29 RX ADMIN — PREDNISONE 5 MG: 5 TABLET ORAL at 09:00

## 2019-05-29 RX ADMIN — Medication 10 ML: at 20:00

## 2019-05-29 RX ADMIN — METOPROLOL TARTRATE 50 MG: 50 TABLET ORAL at 08:59

## 2019-05-29 RX ADMIN — Medication 6 MILLICURIE: at 14:45

## 2019-05-29 RX ADMIN — MECLIZINE 25 MG: 12.5 TABLET ORAL at 04:51

## 2019-05-29 RX ADMIN — AMLODIPINE BESYLATE 5 MG: 5 TABLET ORAL at 09:00

## 2019-05-29 RX ADMIN — HYDRALAZINE HYDROCHLORIDE 25 MG: 25 TABLET, FILM COATED ORAL at 04:52

## 2019-05-29 RX ADMIN — HYDRALAZINE HYDROCHLORIDE 25 MG: 25 TABLET, FILM COATED ORAL at 15:26

## 2019-05-29 RX ADMIN — IPRATROPIUM BROMIDE AND ALBUTEROL SULFATE 1 AMPULE: .5; 3 SOLUTION RESPIRATORY (INHALATION) at 15:33

## 2019-05-29 RX ADMIN — PANTOPRAZOLE SODIUM 40 MG: 40 TABLET, DELAYED RELEASE ORAL at 04:52

## 2019-05-29 RX ADMIN — IPRATROPIUM BROMIDE AND ALBUTEROL SULFATE 1 AMPULE: .5; 3 SOLUTION RESPIRATORY (INHALATION) at 11:50

## 2019-05-29 RX ADMIN — SIMVASTATIN 20 MG: 20 TABLET, FILM COATED ORAL at 20:00

## 2019-05-29 RX ADMIN — Medication 10 ML: at 04:52

## 2019-05-29 RX ADMIN — VITAMIN D, TAB 1000IU (100/BT) 2000 UNITS: 25 TAB at 09:00

## 2019-05-29 RX ADMIN — INSULIN LISPRO 20 UNITS: 100 INJECTION, SOLUTION INTRAVENOUS; SUBCUTANEOUS at 11:21

## 2019-05-29 RX ADMIN — INSULIN GLARGINE 48 UNITS: 100 INJECTION, SOLUTION SUBCUTANEOUS at 21:57

## 2019-05-29 RX ADMIN — HEPARIN SODIUM 5000 UNITS: 5000 INJECTION, SOLUTION INTRAVENOUS; SUBCUTANEOUS at 15:26

## 2019-05-29 RX ADMIN — Medication 10 ML: at 09:01

## 2019-05-29 RX ADMIN — KIT FOR THE PREPARATION OF TECHNETIUM TC 99M PENTETATE 30 MILLICURIE: 20 INJECTION, POWDER, LYOPHILIZED, FOR SOLUTION INTRAVENOUS; RESPIRATORY (INHALATION) at 14:44

## 2019-05-29 ASSESSMENT — PAIN SCALES - GENERAL
PAINLEVEL_OUTOF10: 0

## 2019-05-29 NOTE — PROGRESS NOTES
[facility]   Progress Note                   Date:   5/29/2019  Patient name: Eduardo Gore  Date of admission:  5/27/2019  7:46 PM  MRN:   [id number]  YOB: 1935  PCP: Seema Frausto MD    Reason for Admission:     Subjective:       Clinical Changes / Abnormalities: Still SOB worse with exertion and LE edema.        Medications:   Scheduled Meds:   insulin glargine  55 Units Subcutaneous BID    [START ON 5/30/2019] insulin lispro  15 Units Subcutaneous Daily with breakfast    insulin lispro  20 Units Subcutaneous Lunch    insulin lispro  25 Units Subcutaneous Dinner    ipratropium-albuterol  1 ampule Inhalation Q4H    methylPREDNISolone  60 mg Intravenous Q6H    metroNIDAZOLE  500 mg Intravenous Q8H    sulfamethoxazole-trimethoprim  1 tablet Oral Daily    [Held by provider] furosemide  40 mg Intravenous Daily    metoprolol tartrate  50 mg Oral BID    meclizine  25 mg Oral 4 times per day    amLODIPine  5 mg Oral Daily    aspirin  81 mg Oral Daily    vitamin D  2,000 Units Oral Daily    hydrALAZINE  25 mg Oral 3 times per day    losartan  100 mg Oral Daily    pantoprazole  40 mg Oral QAM AC    predniSONE  5 mg Oral Daily    simvastatin  20 mg Oral Nightly    Liraglutide  1.2 mg Subcutaneous Daily    sodium chloride flush  10 mL Intravenous 2 times per day    heparin (porcine)  5,000 Units Subcutaneous 3 times per day    insulin lispro  0-12 Units Subcutaneous TID WC    insulin lispro  0-6 Units Subcutaneous Nightly     Continuous Infusions:   dextrose       CBC:   Recent Labs     05/27/19 1953 05/28/19 0455 05/29/19  0507   WBC 12.0* 11.6* 17.6*   HGB 9.2* 8.6* 8.3*    197 210     BMP:    Recent Labs     05/27/19 1953 05/28/19 0455 05/29/19  0507    140 138   K 4.4 4.8 4.7    103 104   CO2 22 19* 22   BUN 29* 32* 47*   CREATININE 1.76* 1.80* 2.23*   GLUCOSE 179* 372* 241*     Hepatic: No results for input(s): AST, ALT, ALB, BILITOT, PM  ----------------------------------------------------------------------------    FINDINGS  Left Atrium  Left atrium is mildly dilated. Left Ventricle  Normal left ventricular diameter. Mild left ventricular hypertrophy. Left ventricular systolic function is normal.  No segmental wall motion abnormalities seen. Left ventricular ejection fraction 60 %. Grade II (moderate) left ventricular diastolic dysfunction. Right Atrium  Right atrial dilatation. Right Ventricle  Normal right ventricular size and function. Mitral Valve  Mitral annular calcification. Moderate mitral regurgitation. Aortic Valve  Aortic valve is sclerotic but opens well. Tricuspid Valve  Normal tricuspid valve leaflets. Moderate tricuspid regurgitation. Estimated right ventricular systolic pressure is 67 mmHg. Moderate pulmonary hypertension. Pulmonic Valve  Pulmonic valve not well visualized but Doppler velocities are normal.  Pericardial Effusion  No significant pericardial effusion is seen. Miscellaneous  Normal aortic root dimension.     M-mode / 2D Measurements & Calculations:      LVIDd:4.45 cm(3.7 - 5.6 cm)      Diastolic EHXLYB:13.5 ml   LVIDs:2.77 cm(2.2 - 4.0 cm)      Systolic UNLKVI:07.3 ml   IVSd:1.19 cm(0.6 - 1.1 cm)       Aortic Root:3.3 cm(2.0 - 3.7 cm)   LVPWd:1.2 cm(0.6 - 1.1 cm)       LA Dimension: 4.3 cm(1.9 - 4.0 cm)   Fractional Shortenin.75 %   Calculated LVEF (%): 59.04 %      Mitral:                                 Aortic      Peak E-Wave: 1.47 m/s                   Peak Velocity: 1.67 m/s   Peak A-Wave: 0.90 m/s                   Peak Gradient: 11.16 mmHg   E/A Ratio: 1.63   Peak Gradient: 8.64 mmHg   Deceleration Time: 208 msec      Tricuspid:                              Pulmonic:      Peak TR Velocity: 3.61 m/s              Peak Velocity: 1.12 m/s   Peak TR Gradient: 52.1284 mmHg          Peak Gradient: 5.02 mmHg     Septal Wall E' velocity:0.06 m/s  Lateral Wall E' velocity:0.05 m/s       Assessment

## 2019-05-29 NOTE — PROGRESS NOTES
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  Sigmoid diverticulitis---5.24.2019             CT abdomen-pelvis---5.24.2019--mild sigmoid diverticulitis  Pulmonary hypertension----mild  Hypertension  Hyperlipidemia  Diabetes Mellitus Type 2  Asthma   Pulmonary HTN  ASCVD        EKG---2.5.2019--sinus tachycardia--102--PACs--NSSTTCs        Cardiac catheterization--2014---normal  Morbid obesity  CHICO--obstructive sleep apnea---CPAP intolerant   PMR---polymyalgia rheumatica  Gait-balance instability  Urinary retention----villareal----5.27.2019  PMH:  central retinal occlusion, DD, esophagitis, lumbar radiculopathy,              lumbar spinal stenosis, iron deficiency anemia---chronic GI blood              loss, OA, frequent PVCs, MR, Vitamin D deficiency, obstructing              right  renal calculi---2. 5.2019, UTI--- 2.5.2019---fever--leukocytosis,             lactic acidosis----2. 5.2019,  TEJ---2. 5.2019 superposed on CKD--Stage 3,              hypokalemia---2. 5.2019, bronchitis---cough----POA--2. 6.2019,              elevated troponin---flat peak---renal, migraine headaches, increased             ZAIRA, OA--BCC right cheek, irritable bowel syndrome    PSH:  see above, appendectomy---1952, hysterectomy---cervix unknown---            1983, bilateral cataract---IOL--2010, [facility]-right neck--[id number],              [facility] right leg--2012, colonoscopy--2011, capsule endoscopy---2011,             laparoscopic cholecystectomy--2003, eye--type?, bronchitis--2014,              EGD---2011--2015 mild gastritis, right             L4-5 TFE  x 2--2016, cystoscopy--- right JJ stent---2. 6.2019    Allergies:        codeine, erythromycin, , verapamil,                          clonidine---rash, penicillin--rash, Levbid--rash  Intolerances:  exenatide---nausea, Levaquin--levofloxacin--GI upset        Plan:  1. To [facility] [place]---for Cardiology and Nephrology  2.  VQ scan---5.29.2019  3.   See troponin above and

## 2019-05-29 NOTE — PROGRESS NOTES
Pharmacy Note     Renal Dose Adjustment    Jorge Moreno is a 81 y.o. female. Pharmacist assessment of renally cleared medications. Recent Labs     05/28/19  0455 05/29/19  0507   BUN 32* 47*       Recent Labs     05/28/19  0455 05/29/19  0507   CREATININE 1.80* 2.23*       Estimated Creatinine Clearance: 20 mL/min (A) (based on SCr of 2.23 mg/dL (H)). Height:   Ht Readings from Last 1 Encounters:   05/29/19 5' (1.524 m)     Weight:  Wt Readings from Last 1 Encounters:   05/29/19 219 lb 6.4 oz (99.5 kg)       The following medication dose has been adjusted based upon renal function per P&T Guidelines:             Ciprofloxacin 400mg IV bid changed to 400mg IV daily for estimated CrCl of 20ml/min. [id number] SER[facility] COREA[facility]  5/29/2019  6:36 PM

## 2019-05-30 ENCOUNTER — APPOINTMENT (OUTPATIENT)
Dept: ULTRASOUND IMAGING | Age: 84
DRG: 291 | End: 2019-05-30
Attending: INTERNAL MEDICINE
Payer: MEDICARE

## 2019-05-30 PROBLEM — K57.32 SIGMOID DIVERTICULITIS: Status: ACTIVE | Noted: 2019-05-30

## 2019-05-30 PROBLEM — N17.9 AKI (ACUTE KIDNEY INJURY) (HCC): Status: ACTIVE | Noted: 2019-05-30

## 2019-05-30 PROBLEM — I50.33 ACUTE ON CHRONIC DIASTOLIC HEART FAILURE (HCC): Status: ACTIVE | Noted: 2019-05-29

## 2019-05-30 PROBLEM — N30.00 ACUTE CYSTITIS WITHOUT HEMATURIA: Status: RESOLVED | Noted: 2019-02-06 | Resolved: 2019-05-30

## 2019-05-30 LAB
ANION GAP SERPL CALCULATED.3IONS-SCNC: 17 MMOL/L (ref 9–17)
BUN BLDV-MCNC: 63 MG/DL (ref 8–23)
BUN/CREAT BLD: ABNORMAL (ref 9–20)
CALCIUM SERPL-MCNC: 9.6 MG/DL (ref 8.6–10.4)
CHLORIDE BLD-SCNC: 103 MMOL/L (ref 98–107)
CHOLESTEROL/HDL RATIO: 2.3
CHOLESTEROL: 142 MG/DL
CO2: 18 MMOL/L (ref 20–31)
CREAT SERPL-MCNC: 2.28 MG/DL (ref 0.5–0.9)
CREATININE URINE: 12.6 MG/DL (ref 28–217)
FREE KAPPA/LAMBDA RATIO: 0.73 (ref 0.26–1.65)
GFR AFRICAN AMERICAN: 25 ML/MIN
GFR NON-AFRICAN AMERICAN: 20 ML/MIN
GFR SERPL CREATININE-BSD FRML MDRD: ABNORMAL ML/MIN/{1.73_M2}
GFR SERPL CREATININE-BSD FRML MDRD: ABNORMAL ML/MIN/{1.73_M2}
GLUCOSE BLD-MCNC: 151 MG/DL (ref 65–105)
GLUCOSE BLD-MCNC: 162 MG/DL (ref 70–99)
GLUCOSE BLD-MCNC: 239 MG/DL (ref 65–105)
GLUCOSE BLD-MCNC: 97 MG/DL (ref 65–105)
HCT VFR BLD CALC: 29.9 % (ref 36.3–47.1)
HDLC SERPL-MCNC: 62 MG/DL
HEMOGLOBIN: 8.8 G/DL (ref 11.9–15.1)
KAPPA FREE LIGHT CHAINS QNT: 1.03 MG/DL (ref 0.37–1.94)
LAMBDA FREE LIGHT CHAINS QNT: 1.42 MG/DL (ref 0.57–2.63)
LDL CHOLESTEROL: 57 MG/DL (ref 0–130)
MAGNESIUM: 2.6 MG/DL (ref 1.6–2.6)
MCH RBC QN AUTO: 24.1 PG (ref 25.2–33.5)
MCHC RBC AUTO-ENTMCNC: 29.4 G/DL (ref 28.4–34.8)
MCV RBC AUTO: 81.9 FL (ref 82.6–102.9)
NRBC AUTOMATED: 0.3 PER 100 WBC
PDW BLD-RTO: 18 % (ref 11.8–14.4)
PLATELET # BLD: ABNORMAL K/UL (ref 138–453)
PLATELET, FLUORESCENCE: 230 K/UL (ref 138–453)
PLATELET, IMMATURE FRACTION: 9.6 % (ref 1.1–10.3)
PMV BLD AUTO: ABNORMAL FL (ref 8.1–13.5)
POTASSIUM SERPL-SCNC: 5.3 MMOL/L (ref 3.7–5.3)
RBC # BLD: 3.65 M/UL (ref 3.95–5.11)
SODIUM BLD-SCNC: 138 MMOL/L (ref 135–144)
SODIUM,UR: 120 MMOL/L
TOTAL PROTEIN, URINE: <4 MG/DL
TRIGL SERPL-MCNC: 114 MG/DL
TROPONIN INTERP: ABNORMAL
TROPONIN T: ABNORMAL NG/ML
TROPONIN, HIGH SENSITIVITY: 83 NG/L (ref 0–14)
TSH SERPL DL<=0.05 MIU/L-ACNC: 0.35 MIU/L (ref 0.3–5)
VLDLC SERPL CALC-MCNC: NORMAL MG/DL (ref 1–30)
WBC # BLD: 15.5 K/UL (ref 3.5–11.3)

## 2019-05-30 PROCEDURE — 86334 IMMUNOFIX E-PHORESIS SERUM: CPT

## 2019-05-30 PROCEDURE — 82947 ASSAY GLUCOSE BLOOD QUANT: CPT

## 2019-05-30 PROCEDURE — 84443 ASSAY THYROID STIM HORMONE: CPT

## 2019-05-30 PROCEDURE — 76770 US EXAM ABDO BACK WALL COMP: CPT

## 2019-05-30 PROCEDURE — 82570 ASSAY OF URINE CREATININE: CPT

## 2019-05-30 PROCEDURE — 85027 COMPLETE CBC AUTOMATED: CPT

## 2019-05-30 PROCEDURE — 99223 1ST HOSP IP/OBS HIGH 75: CPT | Performed by: INTERNAL MEDICINE

## 2019-05-30 PROCEDURE — 84300 ASSAY OF URINE SODIUM: CPT

## 2019-05-30 PROCEDURE — 6360000002 HC RX W HCPCS: Performed by: PHYSICIAN ASSISTANT

## 2019-05-30 PROCEDURE — 85055 RETICULATED PLATELET ASSAY: CPT

## 2019-05-30 PROCEDURE — 6360000002 HC RX W HCPCS: Performed by: INTERNAL MEDICINE

## 2019-05-30 PROCEDURE — 83735 ASSAY OF MAGNESIUM: CPT

## 2019-05-30 PROCEDURE — 6370000000 HC RX 637 (ALT 250 FOR IP): Performed by: PHYSICIAN ASSISTANT

## 2019-05-30 PROCEDURE — 36415 COLL VENOUS BLD VENIPUNCTURE: CPT

## 2019-05-30 PROCEDURE — 97535 SELF CARE MNGMENT TRAINING: CPT

## 2019-05-30 PROCEDURE — 80048 BASIC METABOLIC PNL TOTAL CA: CPT

## 2019-05-30 PROCEDURE — 97530 THERAPEUTIC ACTIVITIES: CPT

## 2019-05-30 PROCEDURE — 80061 LIPID PANEL: CPT

## 2019-05-30 PROCEDURE — 84484 ASSAY OF TROPONIN QUANT: CPT

## 2019-05-30 PROCEDURE — 97162 PT EVAL MOD COMPLEX 30 MIN: CPT

## 2019-05-30 PROCEDURE — 84156 ASSAY OF PROTEIN URINE: CPT

## 2019-05-30 PROCEDURE — 2580000003 HC RX 258: Performed by: PHYSICIAN ASSISTANT

## 2019-05-30 PROCEDURE — 83883 ASSAY NEPHELOMETRY NOT SPEC: CPT

## 2019-05-30 PROCEDURE — 97166 OT EVAL MOD COMPLEX 45 MIN: CPT

## 2019-05-30 PROCEDURE — 2060000000 HC ICU INTERMEDIATE R&B

## 2019-05-30 PROCEDURE — 2500000003 HC RX 250 WO HCPCS: Performed by: PHYSICIAN ASSISTANT

## 2019-05-30 RX ORDER — DEXTROSE MONOHYDRATE 50 MG/ML
100 INJECTION, SOLUTION INTRAVENOUS PRN
Status: DISCONTINUED | OUTPATIENT
Start: 2019-05-30 | End: 2019-06-01 | Stop reason: HOSPADM

## 2019-05-30 RX ORDER — NICOTINE POLACRILEX 4 MG
15 LOZENGE BUCCAL PRN
Status: DISCONTINUED | OUTPATIENT
Start: 2019-05-30 | End: 2019-06-01 | Stop reason: HOSPADM

## 2019-05-30 RX ORDER — DEXTROSE MONOHYDRATE 25 G/50ML
12.5 INJECTION, SOLUTION INTRAVENOUS PRN
Status: DISCONTINUED | OUTPATIENT
Start: 2019-05-30 | End: 2019-06-01 | Stop reason: HOSPADM

## 2019-05-30 RX ORDER — FUROSEMIDE 10 MG/ML
40 INJECTION INTRAMUSCULAR; INTRAVENOUS DAILY
Status: COMPLETED | OUTPATIENT
Start: 2019-05-30 | End: 2019-05-31

## 2019-05-30 RX ORDER — AMIODARONE HYDROCHLORIDE 200 MG/1
200 TABLET ORAL DAILY
Status: DISCONTINUED | OUTPATIENT
Start: 2019-05-30 | End: 2019-06-01 | Stop reason: HOSPADM

## 2019-05-30 RX ADMIN — CIPROFLOXACIN 400 MG: 2 INJECTION, SOLUTION INTRAVENOUS at 21:14

## 2019-05-30 RX ADMIN — FUROSEMIDE 40 MG: 40 TABLET ORAL at 10:00

## 2019-05-30 RX ADMIN — METRONIDAZOLE 500 MG: 500 INJECTION, SOLUTION INTRAVENOUS at 13:00

## 2019-05-30 RX ADMIN — Medication 10 ML: at 21:13

## 2019-05-30 RX ADMIN — INSULIN LISPRO 16 UNITS: 100 INJECTION, SOLUTION INTRAVENOUS; SUBCUTANEOUS at 13:00

## 2019-05-30 RX ADMIN — ASPIRIN 81 MG: 81 TABLET ORAL at 09:59

## 2019-05-30 RX ADMIN — FUROSEMIDE 40 MG: 10 INJECTION, SOLUTION INTRAMUSCULAR; INTRAVENOUS at 16:13

## 2019-05-30 RX ADMIN — METRONIDAZOLE 500 MG: 500 INJECTION, SOLUTION INTRAVENOUS at 05:55

## 2019-05-30 RX ADMIN — AMIODARONE HYDROCHLORIDE 200 MG: 200 TABLET ORAL at 09:58

## 2019-05-30 RX ADMIN — POTASSIUM CHLORIDE 10 MEQ: 1500 TABLET, EXTENDED RELEASE ORAL at 10:00

## 2019-05-30 RX ADMIN — INSULIN GLARGINE 48 UNITS: 100 INJECTION, SOLUTION SUBCUTANEOUS at 21:12

## 2019-05-30 RX ADMIN — HYDRALAZINE HYDROCHLORIDE 25 MG: 25 TABLET, FILM COATED ORAL at 05:57

## 2019-05-30 RX ADMIN — METRONIDAZOLE 500 MG: 500 INJECTION, SOLUTION INTRAVENOUS at 21:12

## 2019-05-30 RX ADMIN — SIMVASTATIN 20 MG: 20 TABLET, FILM COATED ORAL at 21:13

## 2019-05-30 RX ADMIN — HEPARIN SODIUM 5000 UNITS: 5000 INJECTION INTRAVENOUS; SUBCUTANEOUS at 13:20

## 2019-05-30 RX ADMIN — HEPARIN SODIUM 5000 UNITS: 5000 INJECTION INTRAVENOUS; SUBCUTANEOUS at 05:57

## 2019-05-30 RX ADMIN — HEPARIN SODIUM 5000 UNITS: 5000 INJECTION INTRAVENOUS; SUBCUTANEOUS at 21:14

## 2019-05-30 RX ADMIN — PANTOPRAZOLE SODIUM 40 MG: 40 TABLET, DELAYED RELEASE ORAL at 09:59

## 2019-05-30 RX ADMIN — INSULIN LISPRO 4 UNITS: 100 INJECTION, SOLUTION INTRAVENOUS; SUBCUTANEOUS at 13:21

## 2019-05-30 RX ADMIN — MINERAL SUPPLEMENT IRON 300 MG / 5 ML STRENGTH LIQUID 100 PER BOX UNFLAVORED 220 MG: at 10:00

## 2019-05-30 RX ADMIN — INSULIN GLARGINE 48 UNITS: 100 INJECTION, SOLUTION SUBCUTANEOUS at 10:00

## 2019-05-30 RX ADMIN — PREDNISONE 5 MG: 5 TABLET ORAL at 10:00

## 2019-05-30 RX ADMIN — INSULIN LISPRO 10 UNITS: 100 INJECTION, SOLUTION INTRAVENOUS; SUBCUTANEOUS at 10:00

## 2019-05-30 RX ADMIN — Medication 10 ML: at 10:00

## 2019-05-30 RX ADMIN — HYDRALAZINE HYDROCHLORIDE 25 MG: 25 TABLET, FILM COATED ORAL at 21:13

## 2019-05-30 ASSESSMENT — PAIN SCALES - GENERAL
PAINLEVEL_OUTOF10: 0

## 2019-05-30 NOTE — H&P
[facility]    HISTORY AND PHYSICAL EXAMINATION            Date:   5/30/2019  Patient name:  Jina Rodriguez  Date of admission:  5/29/2019  6:21 PM  MRN:   [id number]  Account:  [de-identified]  YOB: 1935  PCP:    Kierra Cheney MD  Room:   300/3001-  Code Status:    Full Code    Chief Complaint:     Dyspnea, acute kidney injury    History Obtained From:     patient    History of Present Illness: The patient is a 81 y.o. Non-/non  female who presents with No chief complaint on file. and she is admitted to the hospital for the management of  acute CHF with acute kidney injury. This is a 78-year-old white female who presents as a transfer from [place] with heart failure and developed acute kidney injury. She is admitted with a several-day history of worsening shortness of breath primarily with exertion and orthopnea. She was found have acute on chronic diastolic heart failure was treated with IV diuretics. With diuresis she's had a rising BUN and creatinine and was transferred here for further management is no in house nephrologist in [place]. She reports no improvement with diuretics while hospitalized in [place]. Denies any chest pain, cough or sputum production symptoms. Her symptoms have progressive over several days. Past Medical History:     Past Medical History:   Diagnosis Date    Allergic rhinitis     Asthma     PFT's, 04/06, actually showed mild restrictive defect.  Basal cell carcinoma of cheek 2007    Right cheek    Basal cell carcinoma of leg     right leg    CAD (coronary artery disease)     With 40% stenosis of the LAD, 07/10, ejection fraction 60%.   Repeat heart cath9/14 with 40-50 percent LAD lesion first diagonal 50% lesion rec med Rx    Central retinal artery occlusion     Right side November 2014 status post TPA    Cerebral artery occlusion with cerebral infarction ([facility] [address] 11/24/2014    Cerebrovascular disease     50-79% stenosis on left on ultrasound 11/14    CHF (congestive heart failure) ([facility])     Echo 1/15 moderate MR, severe TR, RVSP 76, grade 2 diastolic dysfunction    Diverticulosis     AVM on colonoscopy, 05/11    Dyslipidemia     Elevated antinuclear antibody (ZAIRA) level     History of    Esophagitis 05/2011    Gastritis/esophagitis on EGD, Dr. Hussain Matthews. Repeat EGD6/15 Gastritis    Foraminal stenosis of lumbar region     Moderate  Right L4/L5 neuroforaminal stenosis, Dr Danyell Acevedo following. MRI 2/16 [place]. Moderate foraminal stenosis mild to moderate central canal stenosis    Hyperlipidemia     Hypertension     IBS (irritable bowel syndrome)     GI consultation with Dr. Jose Luis Hale, GI specialist in [facility], felt that she probably has chronic functional diarrhea and recommended empiric Imodium, Pepto-Bismol or Questran first. Sprue test Neg 2011    Iron deficiency anemia     Percent sat iron 5, January 2015, ferritin 40 1 /15, FOBT positive  EGD 6/15  Gastritis, IV iron 9/15x3 effective,  colonoscopy deferred by Dr. Hussain Matthews. --Prior colonoscopy 2011 with severe diverticulosis and telangiectasia. Trial iron solution in Orange juice 12/16    Junctional bradycardia     Symptomatic, resolved with discontinuation of Verapamil, 05/09. 1.1) Echocardiogram: LAE, LVH, EF 50%, mild MR, diastolic. 1.2) Dr. Roland Willam evaluation. 1.3.) Persantine stress test negative, 05/09.  Migraine     Mild CAD     cath 10/15    Mitral regurgitation     Moderate to severe on echocardiogram September 2015.   Right pressure 76 grade 2 diastolic dysfunction,  echo 04/71 grade 2 diastolic dysfunction mild to moderate MR RVSP 56 aortic sclerosis    Obesity     CHICO (obstructive sleep apnea)     CPAP 14 2/15 initiation  AHI 7  mild CHICO intolerant CPAP    Osteoarthritis     PMR (polymyalgia rheumatica) (HCC)     Pneumonia     Premature atrial contractions     Pulmonary hypertension ([facility] [address]     -Moderate on echo November 2014    Restrictive lung disease     Mild on PFTs 11/14    Type II or unspecified type diabetes mellitus without mention of complication, not stated as uncontrolled     Vitreous floaters         Past Surgical History:     Past Surgical History:   Procedure Laterality Date    APPENDECTOMY  1952    CARDIAC CATHETERIZATION  2014    CATARACT REMOVAL Bilateral 08/10    CHOLECYSTECTOMY      COLONOSCOPY  05/16/2011    CYSTOSCOPY Right 2/6/2019    Cysto with right stent insertion and villareal catheter performed by Facundo Romano MD at [address] [place] [address] Right 2/27/2019    CYSTO right stent removal  Right Ureteroscopy Holmium Laser right stent exchange performed by Facundo Romano MD at [facility], DIAGNOSTIC      EYE SURGERY      HYSTERECTOMY  Approx 1983    MALIGNANT SKIN LESION EXCISION Right 12/10 and 04/11    Basal CellRemoved from right neck    MALIGNANT SKIN LESION EXCISION Right 02/2012    Basal Cell Removed from right leg    OTHER SURGICAL HISTORY  09/06/2011    capsule endoscopy    OTHER SURGICAL HISTORY  3/22/16    right L4 and L5 TFE    OTHER SURGICAL HISTORY Right 4/26/16    L4, L5 TFE    UPPER GASTROINTESTINAL ENDOSCOPY  05/16/2011    UPPER GASTROINTESTINAL ENDOSCOPY  6/22/15    mild gastritis (Dr. Nick Maldonado)        Medications Prior to Admission:     Prior to Admission medications    Medication Sig Start Date End Date Taking?  Authorizing Provider   metroNIDAZOLE (FLAGYL) 500 MG tablet Take 1 tablet by mouth 3 times daily 5/24/19   Sathya Bradford,    sulfamethoxazole-trimethoprim (BACTRIM DS) 800-160 MG per tablet Take 1 tablet by mouth 2 times daily for 10 days 5/24/19 6/3/19  Sathya Bradford DO   Polyethylene Glycol 400 (BLINK TEARS) 0.25 % SOLN Apply to eye as needed    Historical Provider, MD   predniSONE (DELTASONE) 5 MG tablet TAKE 1 TABLET DAILY 4/18/19   Triston Martel MD   B-D ULTRAFINE III SHORT PEN 31G X 8 MM MISC USE 7 DAILY 3/28/19   Luz Maria Cherry DO   amiodarone (CORDARONE) 200 MG tablet Take 1 tablet by mouth daily 3/8/19   Ruthie Chavez MD   furosemide (LASIX) 40 MG tablet Take 1 tablet by mouth daily 2/13/19   Mitzy Felix   simvastatin (ZOCOR) 20 MG tablet Take 1 tablet by mouth nightly 12/27/18   INGRID Wesley - CNP   ferrous sulfate 220 (44 Fe) MG/5ML solution TAKE 10 MLS BY MOUTH DAILY. MIX WITH 2 OUNCES OF ORANGE JUICE  Patient taking differently: TAKE 10 MLS BY MOUTH DAILY.  MIX WITH 2 OUNCES OF ORANGE JUICE - Takes about every 3rd day 11/28/18   Ruthie Chavez MD   hydrALAZINE (APRESOLINE) 25 MG tablet TAKE 1 TABLET THREE TIMES A DAY 10/30/18   Ruthie Chavez MD   VICTOZA 18 MG/3ML SOPN SC injection INJECT 1.2 MG INTO THE SKIN DAILY 10/12/18   Ruthie Chavez MD   LANTUS SOLOSTAR 100 UNIT/ML injection pen INJECT 50 UNITS IN THE MORNING AND 48 UNITS AT BEDTIME  Patient taking differently: INJECT 45 UNITS IN THE MORNING AND 45 UNITS AT BEDTIME 9/26/18   Ruthie Chavez MD   metoprolol succinate (TOPROL XL) 50 MG extended release tablet TAKE 1 TABLET DAILY 9/17/18   Ruthie Chavez MD   potassium chloride (KLOR-CON M10) 10 MEQ extended release tablet TAKE 1 TABLET DAILY 8/6/18   Ruthie Chavez MD   amLODIPine (NORVASC) 5 MG tablet TAKE 1 TABLET DAILY 6/29/18   Ruthie Chavez MD   HUMALOG KWIKPEN 100 UNIT/ML pen INJECT 10 UNITS WITH BREAKFAST, 16 UNITS AT NOON, 24 UNITS WITH SUPPER AND 10 UNITS AT NIGHT AS NEEDED  Patient taking differently: INJECT 10 UNITS WITH BREAKFAST, 16 UNITS AT NOON, 20 UNITS WITH SUPPER AND 10 UNITS AT NIGHT AS NEEDED 6/22/18   Ruthie Chavez MD   Handicap Placard MISC by Does not apply route EXP: 6/12/2020 6/12/18   Ruthie Chavez MD   losartan (COZAAR) 100 MG tablet TAKE 1 TABLET DAILY 6/6/18   Ruthie Chavez MD   aspirin 81 MG EC tablet Take 81 mg by mouth daily    Historical Provider, MD   glucose blood VI test strips (ASCENSIA AUTODISC VI;ONE TOUCH ULTRA TEST VI) strip 1 each by In Vitro route 3 times daily As directed. 7/13/17   Magdiel Coltonhalima Cherry DO   acetaminophen (TYLENOL) 500 MG tablet Take 500 mg by mouth daily    Historical Provider, MD   omeprazole (PRILOSEC) 20 MG capsule Take 20 mg by mouth Daily  1/14/15   Ramon Nichols   Cholecalciferol (VITAMIN D3) 2000 UNITS CAPS Take 2,000 Units by mouth daily     Historical Provider, MD        Allergies:     Codeine; Erythromycin; Exenatide; Levofloxacin; Verapamil; Clonidine derivatives; Other; and Penicillins    Social History:     Tobacco:    reports that she has never smoked. She has never used smokeless tobacco.  Alcohol:      reports that she does not drink alcohol. Drug Use:  reports that she does not use drugs. Family History:     Family History   Problem Relation Age of Onset    Uterine Cancer Mother     Stroke Other         Apparently from a vascular malformation    Heart Disease Other         Positive family history    High Blood Pressure Other         Positive family history    Heart Attack Child 48        Son       Review of Systems:     Positive and Negative as described in HPI. CONSTITUTIONAL:  negative for fevers, chills, sweats, fatigue, weight loss  HEENT:  negative for vision, hearing changes, runny nose, throat pain  RESPIRATORY:  Positive for shortness of breath, negative for cough, congestion, wheezing.   CARDIOVASCULAR:  negative for chest pain, palpitations, positive for orthopnea and peripheral edema  GASTROINTESTINAL:  negative for nausea, vomiting, diarrhea, constipation, change in bowel habits, abdominal pain   GENITOURINARY:  negative for difficulty of urination, burning with urination, frequency   INTEGUMENT:  negative for rash, skin lesions, easy bruising   HEMATOLOGIC/LYMPHATIC:  negative for swelling/edema   ALLERGIC/IMMUNOLOGIC:  negative for urticaria , itching  ENDOCRINE:  negative increase in drinking, increase in urination, hot or cold intolerance  MUSCULOSKELETAL: negative joint pains, muscle aches, swelling of joints  NEUROLOGICAL:  negative for headaches, dizziness, lightheadedness, numbness, pain, tingling extremities  BEHAVIOR/PSYCH:  negative for depression, anxiety    Physical Exam:   BP (!) 144/51   Pulse 77   Temp 99 °F (37.2 °C) (Oral)   Resp 18   Ht 5' (1.524 m)   Wt 219 lb 6.4 oz (99.5 kg)   SpO2 98%   BMI 42.85 kg/m²   Temp (24hrs), Av.1 °F (36.7 °C), Min:97.2 °F (36.2 °C), Max:99 °F (37.2 °C)    Recent Labs     19  1107 19  1849 19  2120 19  1219   POCGLU 379* 196* 252* 239*       Intake/Output Summary (Last 24 hours) at 2019 1443  Last data filed at 2019 0713  Gross per 24 hour   Intake --   Output 500 ml   Net -500 ml       General Appearance:  alert, acutely ill appearing, and in no acute distress  Mental status: oriented to person, place, and time with normal affect  Head:  normocephalic, atraumatic. Eye: no icterus, redness, pupils equal and reactive, extraocular eye movements intact, conjunctiva clear  Ear: normal external ear, no discharge, hearing intact  Nose:  no drainage noted  Mouth: mucous membranes moist  Neck: supple, no carotid bruits, thyroid not palpable  Lungs: Bilateral equal air entry, bibasilar crackles, normal effort  Cardiovascular: normal rate, regular rhythm, no murmur, gallop, rub.   Abdomen: Soft, nontender, nondistended, normal bowel sounds, no hepatomegaly or splenomegaly  Neurologic: There are no new focal motor or sensory deficits, normal muscle tone and bulk, no abnormal sensation, normal speech, cranial nerves II through XII grossly intact  Skin: No gross lesions, rashes, bruising or bleeding on exposed skin area  Extremities:  peripheral pulses palpable, no calf pain with palpation, 1-2+ edema bilateral lower extremities  Psych: normal affect    Investigations:      Laboratory Testing:  Recent Results (from the past 24 hour(s))   Troponin    Collection Time: 19  3:21 PM   Result Value Ref Range    Troponin, High Sensitivity 92 (HH) 0 - 14 ng/L    Troponin T NOT REPORTED <0.03 ng/mL    Troponin Interp NOT REPORTED    POC Glucose Fingerstick    Collection Time: 05/29/19  6:49 PM   Result Value Ref Range    POC Glucose 196 (H) 65 - 105 mg/dL   Troponin    Collection Time: 05/29/19  7:50 PM   Result Value Ref Range    Troponin, High Sensitivity 85 (HH) 0 - 14 ng/L    Troponin T NOT REPORTED <0.03 ng/mL    Troponin Interp NOT REPORTED    Procalcitonin    Collection Time: 05/29/19  7:50 PM   Result Value Ref Range    Procalcitonin 0.11 (H) <0.09 ng/mL   POC Glucose Fingerstick    Collection Time: 05/29/19  9:20 PM   Result Value Ref Range    POC Glucose 252 (H) 65 - 105 mg/dL   Troponin    Collection Time: 05/29/19 10:58 PM   Result Value Ref Range    Troponin, High Sensitivity 83 (HH) 0 - 14 ng/L    Troponin T NOT REPORTED <0.03 ng/mL    Troponin Interp NOT REPORTED    Basic Metabolic Panel w/ Reflex to MG    Collection Time: 05/30/19  4:46 AM   Result Value Ref Range    Glucose 162 (H) 70 - 99 mg/dL    BUN 63 (H) 8 - 23 mg/dL    CREATININE 2.28 (H) 0.50 - 0.90 mg/dL    Bun/Cre Ratio NOT REPORTED 9 - 20    Calcium 9.6 8.6 - 10.4 mg/dL    Sodium 138 135 - 144 mmol/L    Potassium 5.3 3.7 - 5.3 mmol/L    Chloride 103 98 - 107 mmol/L    CO2 18 (L) 20 - 31 mmol/L    Anion Gap 17 9 - 17 mmol/L    GFR Non-African American 20 (L) >60 mL/min    GFR  25 (L) >60 mL/min    GFR Comment          GFR Staging NOT REPORTED    Magnesium    Collection Time: 05/30/19  4:46 AM   Result Value Ref Range    Magnesium 2.6 1.6 - 2.6 mg/dL   TSH without Reflex    Collection Time: 05/30/19  4:46 AM   Result Value Ref Range    TSH 0.35 0.30 - 5.00 mIU/L   Lipid panel - fasting    Collection Time: 05/30/19  4:46 AM   Result Value Ref Range    Cholesterol 142 <200 mg/dL    HDL 62 >40 mg/dL    LDL Cholesterol 57 0 - 130 mg/dL    Chol/HDL Ratio 2.3 <5    Triglycerides 114 <150 mg/dL    VLDL NOT REPORTED 1 - 30 mg/dL   CBC    Collection Time: 05/30/19  4:46 AM   Result Value Ref Range    WBC 15.5 (H) 3.5 - 11.3 k/uL    RBC 3.65 (L) 3.95 - 5.11 m/uL    Hemoglobin 8.8 (L) 11.9 - 15.1 g/dL    Hematocrit 29.9 (L) 36.3 - 47.1 %    MCV 81.9 (L) 82.6 - 102.9 fL    MCH 24.1 (L) 25.2 - 33.5 pg    MCHC 29.4 28.4 - 34.8 g/dL    RDW 18.0 (H) 11.8 - 14.4 %    Platelets See Reflexed IPF Result 138 - 453 k/uL    MPV NOT REPORTED 8.1 - 13.5 fL    NRBC Automated 0.3 (H) 0.0 per 100 WBC   Troponin    Collection Time: 05/30/19  4:46 AM   Result Value Ref Range    Troponin, High Sensitivity 83 (HH) 0 - 14 ng/L    Troponin T NOT REPORTED <0.03 ng/mL    Troponin Interp NOT REPORTED    Immature Platelet Fraction    Collection Time: 05/30/19  4:46 AM   Result Value Ref Range    Platelet, Immature Fraction 9.6 1.1 - 10.3 %    Platelet, Fluorescence 230 138 - 453 k/uL   POC Glucose Fingerstick    Collection Time: 05/30/19 12:19 PM   Result Value Ref Range    POC Glucose 239 (H) 65 - 105 mg/dL   [place]/Lambda Free Lt Chains, Serum Quant    Collection Time: 05/30/19  1:47 PM   Result Value Ref Range    Kappa Free Light Chains QNT 1.03 0.37 - 1.94 mg/dL    Lambda Free Light Chains QNT 1.42 0.57 - 2.63 mg/dL    Free Kappa/Lambda Ratio 0.73 0.26 - 1.65       Imaging/Diagnostics:    Xr Chest (single View Frontal)    Result Date: 5/29/2019  Mild central vascular congestion. Volume overload is suspected. Xr Chest Standard (2 Vw)    Result Date: 5/27/2019  Mild to moderate pulmonary edema with cardiomegaly suggesting CHF decompensation. Nm Lung Vent/perfusion (vq)    Result Date: 5/29/2019  Overall, low probability V/Q scan for pulmonary embolus. Us Renal Complete    Result Date: 5/30/2019  Unremarkable ultrasound of the kidneys.      Ct Abdomen Pelvis W Iv Contrast    Result Date: 5/24/2019  Inflammatory changes seen surrounding the descending colon with associated colonic wall thickening, suggesting acute diverticulitis Dominant hypodense nodule in the pancreas, slightly increased in size from more remote studies. Consider MRI of the abdomen, with and without contrast, pancreatic protocol Small calcification projects in the region of the distal common duct. This could be within the pancreas or represent a retained common duct stone. Appearance is similar Subtle septal thickening at the lung bases. This raises the question of mild fluid overload. Mosaic attenuation is seen suggesting small airways disease or air trapping     Xr Chest Portable    Result Date: 5/28/2019  Cardiomegaly and perihilar edema without significant change from prior exam.       Assessment :      Primary Problem  Acute on chronic diastolic heart failure [facility])    Active Hospital Problems    Diagnosis Date Noted    TEJ (acute kidney injury) ([facility] [address] [N17.9] 05/30/2019    Sigmoid diverticulitis [K57.32] 05/30/2019    [facility] [I50.33] 05/29/2019    Essential hypertension [I10]     CHICO- intolerant CPAP [G47.33]     Type 2 diabetes with nephropathy ([facility] [address] [E11.21]     Dyslipidemia [E78.5]        Plan:     Patient status Admit as inpatient in the  Progressive Unit/Step down    1. Admit inpatient  2. Nephrology evaluation for direct recommendations  3. Follow labs, monitor renal function, supplement electrolytes as needed  4. Monitor and control blood pressure  5. Continue Cipro and Flagyl for acute diverticulitis  6. Insulin scale for glycemic control  7. Strict I's and O's, daily weights  8. GI and DVT prophylaxis  9. Oxygen and aerosols as needed  10. Cardiology evaluation  11. PT and OT as needed  12.  Hypoglycemia orders    Consultations:   [id number] NURSE/COORDINATOR  IP CONSULT TO DIETITIAN  IP CONSULT TO CARDIOLOGY  IP CONSULT TO SOCIAL WORK  IP CONSULT TO NEPHROLOGY     Patient is admitted as inpatient status because of co-morbidities listed above, severity of signs and symptoms as outlined, requirement for current medical therapies and most importantly because of direct risk to patient if care not provided in a hospital setting.     Sergey Sargent DO  5/30/2019  2:43 PM    Copy sent to Dr. Emily Renteria MD

## 2019-05-30 NOTE — PLAN OF CARE
AAOx3 with respirations even and unlabored. Pt denies chest pain. SOB noted upon exertion. IV antibiotic infused without difficulty. Lasix 40 mg given IV as ordered. Pt diuresed well. [place] to BSB intact and patent. PT/OT evaluated pt today. Pt up to chair without difficulty. No acute distress noted.

## 2019-05-30 NOTE — CONSULTS
Consults   NEPHROLOGY     REASON FOR CONSULT:     TEJ (BUN/Creat  63/2.28          With baseline creatinine 1.2 -1.3               )    Risk Factors for TEJ:  CHF    HISTORY OF PRESENTING ILLNESS                 This is a 81 y.o. female who has been transferred from [place] [facility] where she presented with the worsening exertional shortness of breath last 2 weeks in spite of being compliant with diuretics and fluid restriction. She has a history of chronic orthopnea and reports no paroxysmal nocturnal dyspnea. Does complain of lower extremity swelling. Denied any history of fever/cough or hemoptysis. Assessment in the ER showed stable vitals with the imaging studies consistent with vascular congestion. VQ scan showed low probability of pulmonary embolism. She's been hospitalized with a diagnosis of diastolic congestive heart failure based upon the echo findings. We have  been consulted in view of creatinine worsening. Review of data reveals that her baseline creatinine seems to be in the range of 1.2-1.3. Never seen a nephrologist as an outpatient. Had a history of ureteric stone in 2019 February which was extracted. Follows up with urology. On questioning,  Urine output decent   No history of contrast exposure  No history of hypotension  No history of NSAID/nephrotoxic agents  No history of nausea/vomiting/diarrhoea  No history of TEJ in past  No history of recurrent UTI infection/surgeries to [facility]          PAST MEDICAL HISTORY         Diagnosis Date    Allergic rhinitis     Asthma     PFT's, 04/06, actually showed mild restrictive defect.  Basal cell carcinoma of cheek 2007    Right cheek    Basal cell carcinoma of leg     right leg    CAD (coronary artery disease)     With 40% stenosis of the LAD, 07/10, ejection fraction 60%.   Repeat heart cath9/14 with 40-50 percent LAD lesion first diagonal 50% lesion rec med Rx    Central retinal artery occlusion     Right side November 2014 status post TPA  Cerebral artery occlusion with cerebral infarction ([facility] [address] 11/24/2014    Cerebrovascular disease     50-79% stenosis on left on ultrasound 11/14    CHF (congestive heart failure) ([facility])     Echo 1/15 moderate MR, severe TR, RVSP 76, grade 2 diastolic dysfunction    Diverticulosis     AVM on colonoscopy, 05/11    Dyslipidemia     Elevated antinuclear antibody (ZAIRA) level     History of    Esophagitis 05/2011    Gastritis/esophagitis on EGD, Dr. Sanam Ventura. Repeat EGD6/15 Gastritis    Foraminal stenosis of lumbar region     Moderate  Right L4/L5 neuroforaminal stenosis, Dr Zack Moya following. MRI 2/16 [place]. Moderate foraminal stenosis mild to moderate central canal stenosis    Hyperlipidemia     Hypertension     IBS (irritable bowel syndrome)     GI consultation with Dr. Tasha Ding, GI specialist in [facility], felt that she probably has chronic functional diarrhea and recommended empiric Imodium, Pepto-Bismol or Questran first. Sprue test Neg 2011    Iron deficiency anemia     Percent sat iron 5, January 2015, ferritin 40 1 /15, FOBT positive  EGD 6/15  Gastritis, IV iron 9/15x3 effective,  colonoscopy deferred by Dr. Sanam Ventura. --Prior colonoscopy 2011 with severe diverticulosis and telangiectasia. Trial iron solution in Orange juice 12/16    Junctional bradycardia     Symptomatic, resolved with discontinuation of Verapamil, 05/09. 1.1) Echocardiogram: LAE, LVH, EF 50%, mild MR, diastolic. 1.2) Dr. Shante Edward evaluation. 1.3.) Persantine stress test negative, 05/09.  Migraine     Mild CAD     cath 10/15    Mitral regurgitation     Moderate to severe on echocardiogram September 2015.   Right pressure 76 grade 2 diastolic dysfunction,  echo 52/10 grade 2 diastolic dysfunction mild to moderate MR RVSP 56 aortic sclerosis    Obesity     CHICO (obstructive sleep apnea)     CPAP 14 2/15 initiation  AHI 7  mild CHICO intolerant CPAP    Osteoarthritis     PMR (polymyalgia rheumatica) ([facility])     Pneumonia     Premature atrial contractions     Pulmonary hypertension (HCC)     -Moderate on echo November 2014    Restrictive lung disease     Mild on PFTs 11/14    Type II or unspecified type diabetes mellitus without mention of complication, not stated as uncontrolled     Vitreous floaters        PAST SURGICAL HISTORY          Procedure Laterality Date    APPENDECTOMY  1951    CARDIAC CATHETERIZATION  2014    CATARACT REMOVAL Bilateral 08/10    CHOLECYSTECTOMY      COLONOSCOPY  05/16/2011    CYSTOSCOPY Right 2/6/2019    Cysto with right stent insertion and villareal catheter performed by Venancio Benson MD at [address] Right 2/27/2019    CYSTO right stent removal  Right Ureteroscopy Holmium Laser right stent exchange performed by Venancio Benson MD at  [address], COLON, DIAGNOSTIC      EYE SURGERY      HYSTERECTOMY  Approx 1983    MALIGNANT SKIN LESION EXCISION Right 12/10 and 04/11    Basal CellRemoved from right neck    MALIGNANT SKIN LESION EXCISION Right 02/2012    Basal Cell Removed from right leg    OTHER SURGICAL HISTORY  09/06/2011    capsule endoscopy    OTHER SURGICAL HISTORY  3/22/16    right L4 and L5 TFE    OTHER SURGICAL HISTORY Right 4/26/16    L4, L5 TFE    UPPER GASTROINTESTINAL ENDOSCOPY  05/16/2011    UPPER GASTROINTESTINAL ENDOSCOPY  6/22/15    mild gastritis (Dr. Petra Chua)       MEDICATIONS     Home Meds:                Medications Prior to Admission: metroNIDAZOLE (FLAGYL) 500 MG tablet, Take 1 tablet by mouth 3 times daily  sulfamethoxazole-trimethoprim (BACTRIM DS) 800-160 MG per tablet, Take 1 tablet by mouth 2 times daily for 10 days  Polyethylene Glycol 400 (BLINK TEARS) 0.25 % SOLN, Apply to eye as needed  predniSONE (DELTASONE) 5 MG tablet, TAKE 1 TABLET DAILY  B-D ULTRAFINE III SHORT PEN 31G X 8 MM MISC, USE 7 DAILY  amiodarone (CORDARONE) 200 MG tablet, Take 1 tablet by mouth daily  furosemide (LASIX) 40 MG tablet, Take 1 tablet by mouth daily  simvastatin (ZOCOR) 20 MG tablet, Take 1 tablet by mouth nightly  ferrous sulfate 220 (44 Fe) MG/5ML solution, TAKE 10 MLS BY MOUTH DAILY. MIX WITH 2 OUNCES OF ORANGE JUICE (Patient taking differently: TAKE 10 MLS BY MOUTH DAILY. MIX WITH 2 OUNCES OF ORANGE JUICE - Takes about every 3rd day)  hydrALAZINE (APRESOLINE) 25 MG tablet, TAKE 1 TABLET THREE TIMES A DAY  VICTOZA 18 MG/3ML SOPN SC injection, INJECT 1.2 MG INTO THE SKIN DAILY  LANTUS SOLOSTAR 100 UNIT/ML injection pen, INJECT 50 UNITS IN THE MORNING AND 48 UNITS AT BEDTIME (Patient taking differently: INJECT 45 UNITS IN THE MORNING AND 45 UNITS AT BEDTIME)  metoprolol succinate (TOPROL XL) 50 MG extended release tablet, TAKE 1 TABLET DAILY  potassium chloride (KLOR-CON M10) 10 MEQ extended release tablet, TAKE 1 TABLET DAILY  amLODIPine (NORVASC) 5 MG tablet, TAKE 1 TABLET DAILY  HUMALOG KWIKPEN 100 UNIT/ML pen, INJECT 10 UNITS WITH BREAKFAST, 16 UNITS AT NOON, 24 UNITS WITH SUPPER AND 10 UNITS AT NIGHT AS NEEDED (Patient taking differently: INJECT 10 UNITS WITH BREAKFAST, 16 UNITS AT NOON, 20 UNITS WITH SUPPER AND 10 UNITS AT NIGHT AS NEEDED)  Handicap Placard MISC, by Does not apply route EXP: 6/12/2020  losartan (COZAAR) 100 MG tablet, TAKE 1 TABLET DAILY  aspirin 81 MG EC tablet, Take 81 mg by mouth daily  glucose blood VI test strips (ASCENSIA AUTODISC VI;ONE TOUCH ULTRA TEST VI) strip, 1 each by In Vitro route 3 times daily As directed.   acetaminophen (TYLENOL) 500 MG tablet, Take 500 mg by mouth daily  omeprazole (PRILOSEC) 20 MG capsule, Take 20 mg by mouth Daily   Cholecalciferol (VITAMIN D3) 2000 UNITS CAPS, Take 2,000 Units by mouth daily   Scheduled Meds:    amiodarone  200 mg Oral Daily    furosemide  40 mg Intravenous Daily    acetaminophen  500 mg Oral Daily    amLODIPine  5 mg Oral Daily    aspirin  81 mg Oral Daily    ferrous sulfate  220 mg Oral Daily    hydrALAZINE  25 mg Oral 3 times per day    insulin glargine  48 Units Subcutaneous BID    metoprolol succinate  50 mg Oral Daily    pantoprazole  40 mg Oral QAM AC    predniSONE  5 mg Oral Daily    simvastatin  20 mg Oral Nightly    sodium chloride flush  10 mL Intravenous 2 times per day    heparin (porcine)  5,000 Units Subcutaneous 3 times per day    insulin lispro  10 Units Subcutaneous Daily with breakfast    insulin lispro  16 Units Subcutaneous Lunch    insulin lispro  20 Units Subcutaneous Dinner    insulin lispro  0-12 Units Subcutaneous TID WC    insulin lispro  0-6 Units Subcutaneous Nightly    metroNIDAZOLE  500 mg Intravenous Q8H    ciprofloxacin  400 mg Intravenous Q24H     Continuous Infusions:   PRN Meds:  sodium chloride flush, ondansetron, nicotine, acetaminophen, albuterol    ALLERGY     Codeine; Erythromycin; Exenatide; Levofloxacin; Verapamil; Clonidine derivatives;  Other; and Penicillins       SOCIAL HISTORY     Social History     Socioeconomic History    Marital status:      Spouse name: Not on file    Number of children: 3    Years of education: Not on file    Highest education level: Not on file   Occupational History    Not on file   Social Needs    Financial resource strain: Not on file    Food insecurity:     Worry: Not on file     Inability: Not on file    Transportation needs:     Medical: Not on file     Non-medical: Not on file   Tobacco Use    Smoking status: Never Smoker    Smokeless tobacco: Never Used    Tobacco comment: amish rrt 5/28/2019   Substance and Sexual Activity    Alcohol use: No     Alcohol/week: 0.0 oz    Drug use: No    Sexual activity: Not on file   Lifestyle    Physical activity:     Days per week: Not on file     Minutes per session: Not on file    Stress: Not on file   Relationships    Social connections:     Talks on phone: Not on file     Gets together: Not on file     Attends Taoist service: Not on file     Active member of club or organization: Not on file     Attends meetings of clubs or organizations: Not on file     Relationship status: Not on file    Intimate partner violence:     Fear of current or ex partner: Not on file     Emotionally abused: Not on file     Physically abused: Not on file     Forced sexual activity: Not on file   Other Topics Concern    Not on file   Social History Narrative    Not on file       FAMILY HISTORY     Family History   Problem Relation Age of Onset    Uterine Cancer Mother     Stroke Other         Apparently from a vascular malformation    Heart Disease Other         Positive family history    High Blood Pressure Other         Positive family history    Heart Attack Child 48        Son          REVIEW OF SYSTEM     Constitutional: No asthenia/weight loss/anorexia    HEENT : No epistaxis/visual blurriness/rhinorrhoea/sorethroat/trauma  Cardiovascular:No chest pain/palpitation/PRESENT SOB  Respiratory: No cough/fever    Gastrointestinal: No abdominal pain/nausea/vomiting/diarrhoea/constipation  Genitourinary: No dysuria/pyuria/hematuria/incomplete emptying of bladder  Musculoskeletal:  No gait disturbance/weakness or joint complaints  Integumentary: No rash or pruritis. Neurological: No headache/diplopia/change in muscle strength/numbness or tingling. No change in gait, balance, coordination, mood, affect, memory, mentation, behavior. Psychiatric: No anxiety/depression. Endocrine: No temperature intolerance. No excessive thirst, fluid intake, or urination. No tremor. Hematologic/Lymphatic: No abnormal bruising or bleeding, blood clots or swolle lymph nodes.   Allergic/Immunologic: No nasal congestion or hives      PHYSICAL EXAM     Vitals:    05/30/19 0411 05/30/19 0730 05/30/19 1000 05/30/19 1216   BP: (!) 154/54 (!) 141/55 (!) 152/54 (!) 144/51   Pulse:  76 73 77   Resp:  18  18   Temp: 97.8 °F (36.6 °C) 98.1 °F (36.7 °C)  99 °F (37.2 °C)   TempSrc: Oral Oral  Oral   SpO2:  97%  98%   Weight:       Height:         24HR INTAKE/OUTPUT:      Intake/Output Summary (Last 24 hours) at 5/30/2019 1314  Last data filed at 5/30/2019 3247  Gross per 24 hour   Intake --   Output 500 ml   Net -500 ml       General appearance:Awake, alert, in no acute distress  Skin: warm and dry, no rash or erythema  Eyes: conjunctivae normal and sclera anicteric  ENT: no thrush no pharyngeal congestion  orodental hygiene   Neck: No JVD, Lymphadenopathty or thyromegaly  Respiratory: vesicular breath sounds,present crackles  Cardiovascular: S1 S2 normal,no gallop or organic murmur. No carotid bruit  Abdomen:Non tender/non distended. Bowel sounds present  Extremities: No Cyanosis or Clubbing,present Lower extremity edema  Neurological:Alert and oriented. No abnormalities of mood, affect, memory, mentation, or behavior are noted    INVESTIGATIONS      CBC:   Recent Labs     05/28/19 0455 05/29/19 0507 05/30/19 0446   WBC 11.6* 17.6* 15.5*   RBC 3.56* 3.49* 3.65*   HGB 8.6* 8.3* 8.8*   HCT 28.2* 27.6* 29.9*   MCV 79.1* 79.1* 81.9*   MCH 24.2* 23.9* 24.1*   MCHC 30.6* 30.2* 29.4   RDW 18.5* 19.1* 18.0*    210 See Reflexed IPF Result   MPV 10.2 10.1 NOT REPORTED      BMP:   Recent Labs     05/28/19 0455 05/29/19  0507 05/30/19 0446    138 138   K 4.8 4.7 5.3    104 103   CO2 19* 22 18*   BUN 32* 47* 63*   CREATININE 1.80* 2.23* 2.28*   GLUCOSE 372* 241* 162*   CALCIUM 9.1 9.3 9.6        Phosphorus:  No results for input(s): PHOS in the last 72 hours. Magnesium:   Recent Labs     05/28/19 0455 05/30/19  0446   MG 2.2 2.6     Albumin: No results for input(s): LABALBU in the last 72 hours. Radiology:  Reviewed as available. ASSESSMENT     1. Acute kidney injury nonoliguric secondary to prerenal injury related to  reduced effective arterial blood volume from CHF - Plateau  2. Chronic kidney disease stage III with baseline creatinine 1.2-1.3.  3.  Decompressed  left ventricular diastole dysfunction  4.   Moderate mitral regurgitation/tricuspid regurgitation/moderate pulmonary hypertension with RVSP 67  5. History of essential hypertension  6. CHICO on CPAP  7. Nephrolithiasis - ureteric stone treated with Holmium laser    PLAN:     1. Fluid/salt restriction  2. Iv Lasix   3. Hold ARB  4. Urine studies/USS  5. Will follow    Thank you for the consultation. Please do not hesitate to call with questions. This note is created with the assistance of a speech-recognition program. While intending to generate a document that actually reflects the content of the visit, no guarantees can be provided that every mistake has been identified and corrected by editing.     Napoleon Mayer MD, MRCP Flako Kelly, FACP   5/30/2019 1:14 PM    [facility]

## 2019-05-30 NOTE — CONSULTS
Vidal Pillow and want to discontinue amiodarone as patient has no A.fib as per Dr. Marck Dey. As pe rpatient her urine output is decreasing from  Past few days. Patient is HS, on the HTNsive side. CXR shows mild cardiomegaly, vascular congestion, small left sided pleural effusion. V/Q scan showed low probability for PE. Serum creatinine is  2.28    Past Medical History:   has a past medical history of Allergic rhinitis, Asthma, Basal cell carcinoma of cheek, Basal cell carcinoma of leg, CAD (coronary artery disease), Central retinal artery occlusion, Cerebral artery occlusion with cerebral infarction [facility]), Cerebrovascular disease, CHF (congestive heart failure) ([facility] [address] Diverticulosis, Dyslipidemia, Elevated antinuclear antibody (ZAIRA) level, Esophagitis, Foraminal stenosis of lumbar region, Hyperlipidemia, Hypertension, IBS (irritable bowel syndrome), Iron deficiency anemia, Junctional bradycardia, Migraine, Mild CAD, Mitral regurgitation, Obesity, CHICO (obstructive sleep apnea), Osteoarthritis, PMR (polymyalgia rheumatica) ([address] Pneumonia, Premature atrial contractions, Pulmonary hypertension ([address] Restrictive lung disease, Type II or unspecified type diabetes mellitus without mention of complication, not stated as uncontrolled, and Vitreous floaters. Past Surgical History:   has a past surgical history that includes Appendectomy (1952); Hysterectomy (Approx 1983); Cataract removal (Bilateral, 08/10); malignant skin lesion excision (Right, 12/10 and 04/11); malignant skin lesion excision (Right, 02/2012); Colonoscopy (05/16/2011); other surgical history (09/06/2011); Cholecystectomy; Endoscopy, colon, diagnostic; eye surgery; Cardiac catheterization (2014); Upper gastrointestinal endoscopy (05/16/2011); Upper gastrointestinal endoscopy (6/22/15); other surgical history (3/22/16); other surgical history (Right, 4/26/16); Cystoscopy (Right, 2/6/2019); and Cystoscopy (Right, 2/27/2019).      Home Medications: Prior to Admission medications    Medication Sig Start Date End Date Taking? Authorizing Provider   metroNIDAZOLE (FLAGYL) 500 MG tablet Take 1 tablet by mouth 3 times daily 5/24/19   Ok SOTELO Fobrittni, DO   sulfamethoxazole-trimethoprim (BACTRIM DS) 800-160 MG per tablet Take 1 tablet by mouth 2 times daily for 10 days 5/24/19 6/3/19  Ok SOTELO Fought, DO   Polyethylene Glycol 400 (BLINK TEARS) 0.25 % SOLN Apply to eye as needed    Historical Provider, MD   predniSONE (DELTASONE) 5 MG tablet TAKE 1 TABLET DAILY 4/18/19   Ludwin Singh MD   B-D ULTRAFINE III SHORT PEN 31G X 8 MM MISC USE 7 DAILY 3/28/19   Nanci Bright DO   amiodarone (CORDARONE) 200 MG tablet Take 1 tablet by mouth daily 3/8/19   Ludwin Singh MD   furosemide (LASIX) 40 MG tablet Take 1 tablet by mouth daily 2/13/19   Kat Master   simvastatin (ZOCOR) 20 MG tablet Take 1 tablet by mouth nightly 12/27/18   INGRID Sommers - CNP   ferrous sulfate 220 (44 Fe) MG/5ML solution TAKE 10 MLS BY MOUTH DAILY. MIX WITH 2 OUNCES OF ORANGE JUICE  Patient taking differently: TAKE 10 MLS BY MOUTH DAILY.  MIX WITH 2 OUNCES OF ORANGE JUICE - Takes about every 3rd day 11/28/18   Ludwin Singh MD   hydrALAZINE (APRESOLINE) 25 MG tablet TAKE 1 TABLET THREE TIMES A DAY 10/30/18   Ludwin Singh MD   VICTOZA 18 MG/3ML SOPN SC injection INJECT 1.2 MG INTO THE SKIN DAILY 10/12/18   Ludwin Singh MD   LANTUS SOLOSTAR 100 UNIT/ML injection pen INJECT 50 UNITS IN THE MORNING AND 48 UNITS AT BEDTIME  Patient taking differently: INJECT 45 UNITS IN THE MORNING AND 45 UNITS AT BEDTIME 9/26/18   Ludwin Singh MD   metoprolol succinate (TOPROL XL) 50 MG extended release tablet TAKE 1 TABLET DAILY 9/17/18   Ludwin Singh MD   potassium chloride (KLOR-CON M10) 10 MEQ extended release tablet TAKE 1 TABLET DAILY 8/6/18   Ludwin Singh MD   amLODIPine (NORVASC) 5 MG tablet TAKE 1 TABLET DAILY 6/29/18   Ludwin Singh MD   HUMALOG KWIKPEN 100 UNIT/ML pen INJECT 10 UNITS WITH BREAKFAST, 16 UNITS AT NOON, 24 UNITS WITH SUPPER AND 10 UNITS AT NIGHT AS NEEDED  Patient taking differently: INJECT 10 UNITS WITH BREAKFAST, 16 UNITS AT NOON, 20 UNITS WITH SUPPER AND 10 UNITS AT NIGHT AS NEEDED 6/22/18   Venice Britt MD   Handicap Placard MISC by Does not apply route EXP: 6/12/2020 6/12/18   Venice Britt MD   losartan (COZAAR) 100 MG tablet TAKE 1 TABLET DAILY 6/6/18   Venice Britt MD   aspirin 81 MG EC tablet Take 81 mg by mouth daily    Historical Provider, MD   glucose blood VI test strips (ASCENSIA AUTODISC VI;ONE TOUCH ULTRA TEST VI) strip 1 each by In Vitro route 3 times daily As directed.  7/13/17   Shirlene Cherry DO   acetaminophen (TYLENOL) 500 MG tablet Take 500 mg by mouth daily    Historical Provider, MD   omeprazole (PRILOSEC) 20 MG capsule Take 20 mg by mouth Daily  1/14/15   Melburn Poll   Cholecalciferol (VITAMIN D3) 2000 UNITS CAPS Take 2,000 Units by mouth daily     Historical Provider, MD      Current Facility-Administered Medications: acetaminophen (TYLENOL) tablet 500 mg, 500 mg, Oral, Daily  amLODIPine (NORVASC) tablet 5 mg, 5 mg, Oral, Daily  aspirin EC tablet 81 mg, 81 mg, Oral, Daily  ferrous sulfate 300 (60 Fe) MG/5ML syrup 220 mg, 220 mg, Oral, Daily  furosemide (LASIX) tablet 40 mg, 40 mg, Oral, Daily  hydrALAZINE (APRESOLINE) tablet 25 mg, 25 mg, Oral, 3 times per day  insulin glargine (LANTUS) injection vial 48 Units, 48 Units, Subcutaneous, BID  metoprolol succinate (TOPROL XL) extended release tablet 50 mg, 50 mg, Oral, Daily  pantoprazole (PROTONIX) tablet 40 mg, 40 mg, Oral, QAM AC  potassium chloride (KLOR-CON M) extended release tablet 10 mEq, 10 mEq, Oral, Daily  predniSONE (DELTASONE) tablet 5 mg, 5 mg, Oral, Daily  simvastatin (ZOCOR) tablet 20 mg, 20 mg, Oral, Nightly  sodium chloride flush 0.9 % injection 10 mL, 10 mL, Intravenous, 2 times per day  sodium chloride flush 0.9 % injection 10 mL, 10 mL, Intravenous, PRN  ondansetron (ZOFRAN) injection 4 mg, 4 mg, Intravenous, Q6H PRN  nicotine (NICODERM CQ) 21 MG/24HR 1 patch, 1 patch, Transdermal, Daily PRN  acetaminophen (TYLENOL) tablet 650 mg, 650 mg, Oral, Q4H PRN  potassium chloride (KLOR-CON M) extended release tablet 40 mEq, 40 mEq, Oral, PRN **OR** potassium bicarb-citric acid (EFFER-K) effervescent tablet 40 mEq, 40 mEq, Oral, PRN **OR** potassium chloride 10 mEq/100 mL IVPB (Peripheral Line), 10 mEq, Intravenous, PRN  magnesium sulfate 1 g in dextrose 5% 100 mL IVPB, 1 g, Intravenous, PRN  heparin (porcine) injection 5,000 Units, 5,000 Units, Subcutaneous, 3 times per day  insulin lispro (HUMALOG) injection vial 10 Units, 10 Units, Subcutaneous, Daily with breakfast  insulin lispro (HUMALOG) injection vial 16 Units, 16 Units, Subcutaneous, Lunch  insulin lispro (HUMALOG) injection vial 20 Units, 20 Units, Subcutaneous, Dinner  insulin lispro (HUMALOG) injection vial 0-12 Units, 0-12 Units, Subcutaneous, TID WC  insulin lispro (HUMALOG) injection vial 0-6 Units, 0-6 Units, Subcutaneous, Nightly  metronidazole (FLAGYL) 500 mg in NaCl 100 mL IVPB premix, 500 mg, Intravenous, Q8H  albuterol (PROVENTIL) nebulizer solution 2.5 mg, 2.5 mg, Nebulization, Q4H PRN  ciprofloxacin (CIPRO) IVPB 400 mg, 400 mg, Intravenous, Q24H    Allergies:  Codeine; Erythromycin; Exenatide; Levofloxacin; Verapamil; Clonidine derivatives; Other; and Penicillins    Social History:   reports that she has never smoked. She has never used smokeless tobacco. She reports that she does not drink alcohol or use drugs. Family History: family history includes Heart Attack (age of onset: 48) in her child; Heart Disease in an other family member; High Blood Pressure in an other family member; Stroke in an other family member; Uterine Cancer in her mother. No h/o sudden cardiac death. No for premature CAD    REVIEW OF SYSTEMS:    · Constitutional: there has been no unanticipated weight loss. There's been No change in energy level, No change in activity level. · Eyes: No visual changes or diplopia. No scleral icterus. · ENT: No Headaches  · Cardiovascular: SOB, orthopnea, PND, leg swelling. · Respiratory: No previous pulmonary problems, No cough  · Gastrointestinal: No abdominal pain. No change in bowel or bladder habits. · Genitourinary: No dysuria, trouble voiding, or hematuria. · Musculoskeletal:  No gait disturbance, No weakness or joint complaints. · Integumentary: No rash or pruritis. · Neurological: No headache, diplopia, change in muscle strength, numbness or tingling. No change in gait, balance, coordination, mood, affect, memory, mentation, behavior. · Psychiatric: No anxiety, or depression. · Endocrine: No temperature intolerance. No excessive thirst, fluid intake, or urination. No tremor. · Hematologic/Lymphatic: No abnormal bruising or bleeding, blood clots or swollen lymph nodes. · Allergic/Immunologic: No nasal congestion or hives. PHYSICAL EXAM:      BP (!) 154/54   Pulse 76   Temp 97.8 °F (36.6 °C) (Oral)   Resp 19   Ht 5' (1.524 m)   Wt 219 lb 6.4 oz (99.5 kg)   SpO2 96%   BMI 42.85 kg/m²    Constitutional and General Appearance: alert, cooperative, no distress and appears stated age  HEENT: PERRL, no cervical lymphadenopathy. No masses palpable. Normal oral mucosa  Respiratory:  · Normal excursion and expansion without use of accessory muscles  · Resp Auscultation:  B/L basal crackles o/e.    Cardiovascular:  · The apical impulse is not displaced  · Heart tones are crisp and normal. regular S1 and S2.  · Jugular venous pulsation Normal  · The carotid upstroke is normal in amplitude and contour without delay or bruit  · Peripheral pulses are symmetrical and full   Abdomen:   · No masses or tenderness  · Bowel sounds present  Extremities:  ·  No Cyanosis or Clubbing  ·  Lower extremity edema: No  ·  Skin: Warm and dry  Neurological:  · Alert and oriented. · Moves all extremities well  · No abnormalities of mood, affect, memory, mentation, or behavior are noted    DATA:    Diagnostics:        ECHO:   Normal left ventricular diameter. Mild left ventricular hypertrophy. Left ventricular systolic function is normal.  No segmental wall motion abnormalities seen. Left ventricular ejection fraction 60 %. Grade II (moderate) left ventricular diastolic dysfunction. Left atrium is mildly dilated. Right atrial dilatation. Aortic valve is sclerotic but opens well. Mitral annular calcification. Moderate mitral regurgitation. Normal tricuspid valve leaflets. Moderate tricuspid regurgitation. Estimated right ventricular systolic pressure is 67 mmHg. Moderate pulmonary hypertension. Cardiac Angiography:    Angiographic Findings      Cardiac Arteries and Lesion Findings     LMCA: Normal 0% stenosis.     LAD: Mild irregularities 20-30%.     LCx: Normal 0% stenosis.     RCA: Normal 0% stenosis. NORMAL LEFT MAIN . LAD MINIMAL 20-30% . NORMAL LCX, RCA   LVEF 70%. PCWP 23/37 23 M , SAT 98%   PA 44/23 32 MEAN SAT 77.6 %   RV 53/11 17 M SAT 75.5 %   RA 18/17 14 M SAT 77.6%   /50 , /11 18 , SAT 96.2 %   CO 5.78L/MIN, CI 2.9 L/MIN. M 2  Labs:     CBC:   Recent Labs     05/29/19  0507 05/30/19  0446   WBC 17.6* 15.5*   HGB 8.3* 8.8*   HCT 27.6* 29.9*    See Reflexed IPF Result     BMP:   Recent Labs     05/29/19  0507 05/30/19  0446    138   K 4.7 5.3   CO2 22 18*   BUN 47* 63*   CREATININE 2.23* 2.28*   LABGLOM 21* 20*   GLUCOSE 241* 162*     BNP: No results for input(s): BNP in the last 72 hours. PT/INR: No results for input(s): PROTIME, INR in the last 72 hours. APTT:No results for input(s): APTT in the last 72 hours. CARDIAC ENZYMES:No results for input(s): CKTOTAL, CKMB, CKMBINDEX, TROPONINI in the last 72 hours.   FASTING LIPID PANEL:  Lab Results   Component Value Date    HDL 62 05/30/2019    TRIG 114 05/30/2019     LIVER PROFILE:No results for input(s): AST, ALT, LABALBU in the last 72 hours. IMPRESSION:    1. Patient Active Problem List   Diagnosis    Dyslipidemia    Osteoarthritis    Esophagitis    Vitamin D deficiency    PMR (polymyalgia rheumatica) (HCC)    Central artery occlusion of retina    Diastolic dysfunction    Pulmonary HTN (HCC)    Iron deficiency anemia due to chronic blood loss    Type 2 diabetes with nephropathy (HCC)    CHICO- intolerant CPAP    Obesity, morbid, BMI 40.0-49.9 (HCC)    Essential hypertension    Lumbar radiculopathy    Neural foraminal stenosis of lumbar spine    Spinal stenosis at L4-L5 level    Mitral regurgitation    Frequent PVCs    Asthma    Gait abnormality    Asthma    Nephrolithiasis    Right kidney stone    Acute cystitis without hematuria    Pyelonephritis    Hypokalemia    Multifocal atrial tachycardia (HCC)    SOB (shortness of breath)    Acute on chronic diastolic congestive heart failure (HCC)    Acute heart failure (HCC)       RECOMMENDATIONS:  1. HfPEF. Renal function worsening with diuresis. nephrology consult for diuresis  2. Fluid restriction to 1500 ml  3. Strict I/O, daily weights  4. Will discontinue cozaar in context of TEJ on CKD. 5. Will D/C lopressor and start coreg in context of HTN. 6. Will D/C amiodarone as pt has no A. FIB as per Dr. Francisca Carrero  7. Ischemic evaluation before D/C. Will discuss with rounding attending Dr. Dion Brizuela for final recommendations.     Phyllis Rogel MD  IM,PGY-1.

## 2019-05-30 NOTE — PROGRESS NOTES
Pt up in chair with feet elevated. Offered to assist pt back to bed. Pt stated \"I feel better sitting up\" \"I feel fine\". Pt refused to go back to bed at this time.

## 2019-05-30 NOTE — PROGRESS NOTES
Nutrition Assessment    Type and Reason for Visit: Initial, Positive Nutrition Screen (wt loss; po), Consult - Pt education (cardiac ed)    Nutrition Recommendations:   -Modify diet to cardiac and renal diet w/ 1500 ml fluid restriction.  -Recommend starting Magic Cup supplement BID per pt request.   -Recommend switch to Nepro supplement BID if renal lab values decline. Nutrition Assessment:  Pt admitted d/t SOB x 3 days, coughing, and wheezing. Pt w/ acute HF exacerbation and CKD stage 3. Pt nutritionally at risk aeb continued po intake 50-75% of meals. Pt w/ +1 pitting edema - pt has hx of wt flux from 208-218 lbs x 11 mo per EMR. Educated pt on CHF nutrition therapy and fluid/sodium restriction. Per pt request, will add Magic Cup supplement to avoid full liquid supplements. If renal lab values decline, suggest Nepro supplements instead. Will monitor po/supplement intake, renal labs, appetite, and weight. Malnutrition Assessment:  · Malnutrition Status: At risk for malnutrition  · Context: Acute illness or injury  · Findings of the 6 clinical characteristics of malnutrition (Minimum of 2 out of 6 clinical characteristics is required to make the diagnosis of moderate or severe Protein Calorie Malnutrition based on AND/ASPEN Guidelines):  1. Energy Intake-Less than or equal to 75% of estimated energy requirement, Greater than or equal to 5 days    2. Weight Loss-No significant weight loss,    3. Fat Loss-No significant subcutaneous fat loss,    4. Muscle Loss-No significant muscle mass loss,    5. Fluid Accumulation-Mild fluid accumulation(+1 pitting), Extremities  6.  Strength-Not measured    Nutrition Risk Level:  Moderate    Nutrient Needs:  · Estimated Daily Total Kcal: 3007-2110 kcals (1.1-1.3)  · Estimated Daily Protein (g): 60-70 grams (1.3-1.5)    Nutrition Diagnosis:   · Problem: Predicted suboptimal energy intake  · Etiology: related to Insufficient energy/nutrient consumption     Signs and symptoms:  as evidenced by Intake 50-75%, Diet history of poor intake, Patient report of    Objective Information:  · Wound Type: None  · Current Nutrition Therapies:  · Oral Diet Orders: Cardiac, Fluid Restriction(1500 ml restriction)   · Oral Diet intake: 51-75%  · Oral Nutrition Supplement (ONS) Orders: None  · Anthropometric Measures:  · Ht: 5' (152.4 cm)   · Current Body Wt: 219 lb (99.3 kg)  · Admission Body Wt: 219 lb (99.3 kg)  · Usual Body Wt: 214 lb (97.1 kg)(per pt, 1 wk ago before edema)  · % Weight Change:  ,  208-218 lbs x 11 mo per EMR   · Ideal Body Wt: 100 lb (45.4 kg), % Ideal Body 219% (adm/ideal)  · BMI Classification: BMI > or equal to 40.0 Obese Class III    Nutrition Interventions:   Start ONS, Modify current diet  Education Completed, Continued Inpatient Monitoring    Nutrition Evaluation:   · Evaluation: Goals set   · Goals: Meet at least 75% or more nutrient needs via PO intake    · Monitoring: Nutrition Progression, Meal Intake, Supplement Intake, Diet Tolerance, Weight, Monitor Bowel Function, I&O, Pertinent Labs      Electronically signed by Cathy Smith RD, LD on 5/30/19 at 12:19 PM    Contact Number: [phone]

## 2019-05-30 NOTE — PROGRESS NOTES
Physical Therapy    Facility/Department: [facility] 3  Initial Assessment    NAME: Kourtney Rae  : 1935  MRN: [id number]    Date of Service: 2019  The patient is a 81 y.o. female patient of Arcelia Logan MD who presents from the ER with c/o increased shortness of breath for the past 3 days. She reports an associated dry cough, wheezing, and bilateral lower leg edema. She denies any fevers, chest pain, leg pain, emesis, or diarrhea. She was diagnosed with diverticulitis on 2019 and states her abdominal pain has resolved.      In ER, she was hypoxic at 88% on room air. Chest xray showed mild to moderate pulmonary edema with cardiomegaly suggesting CHF decompensation. ProBNP 863. Troponin 24. EKG showed NSR with ST/T wave abnormality. D. Dimer elevated at 1,030. WBC 12, afebrile.      Discharge Recommendations:  Further therapy recommended at discharge. PT Equipment Recommendations  Equipment Needed: No    Assessment    Pt cooperative but anxious re: moving b/c she got very SOB earlier after walking to the bathroom. The pt moved from the bed to the bedside chair with little SOB and O2 sats staying above 95%. She was pleasantly surprised and agreeable to stay up in the chair. Pt is on O2 per nasal canula   Body structures, Functions, Activity limitations: Decreased functional mobility ; Decreased balance;Decreased endurance;Decreased strength  Prognosis: Fair  Decision Making: Medium Complexity  Patient Education: PT POC  Barriers to Learning: none  REQUIRES PT FOLLOW UP: Yes  Activity Tolerance  Activity Tolerance: Patient limited by fatigue;Patient limited by endurance; Other(pt limited by fear of not being able to breathe with activity)       Patient Diagnosis(es): There were no encounter diagnoses.      has a past medical history of Allergic rhinitis, Asthma, Basal cell carcinoma of cheek, Basal cell carcinoma of leg, CAD (coronary artery disease), Central retinal artery occlusion, Cerebral artery occlusion with cerebral infarction [facility]), Cerebrovascular disease, CHF (congestive heart failure) ([facility] [address] Diverticulosis, Dyslipidemia, Elevated antinuclear antibody (ZAIRA) level, Esophagitis, Foraminal stenosis of lumbar region, Hyperlipidemia, Hypertension, IBS (irritable bowel syndrome), Iron deficiency anemia, Junctional bradycardia, Migraine, Mild CAD, Mitral regurgitation, Obesity, CHICO (obstructive sleep apnea), Osteoarthritis, PMR (polymyalgia rheumatica) ([facility] [address] Pneumonia, Premature atrial contractions, Pulmonary hypertension ([facility] [address] Restrictive lung disease, Type II or unspecified type diabetes mellitus without mention of complication, not stated as uncontrolled, and Vitreous floaters. has a past surgical history that includes Appendectomy (1952); Hysterectomy (Approx 1983); Cataract removal (Bilateral, 08/10); malignant skin lesion excision (Right, 12/10 and 04/11); malignant skin lesion excision (Right, 02/2012); Colonoscopy (05/16/2011); other surgical history (09/06/2011); Cholecystectomy; Endoscopy, colon, diagnostic; eye surgery; Cardiac catheterization (2014); Upper gastrointestinal endoscopy (05/16/2011); Upper gastrointestinal endoscopy (6/22/15); other surgical history (3/22/16); other surgical history (Right, 4/26/16); Cystoscopy (Right, 2/6/2019); and Cystoscopy (Right, 2/27/2019).     Restrictions  Restrictions/Precautions  Restrictions/Precautions: Cardiac, General Precautions, Fall Risk, Up as Tolerated(needs to wear O2 with activity--SOB with movement)  Required Braces or Orthoses?: No  Vision/Hearing  Vision: Impaired  Vision Exceptions: Wears glasses at all times  Hearing: Within functional limits     Subjective  General  Patient assessed for rehabilitation services?: Yes  Response To Previous Treatment: Not applicable  Family / Caregiver Present: No  Follows Commands: Within Functional Limits  Pain Screening  Patient Currently in Pain: Denies  Vital Signs  Patient Currently in Pain: Denies       Orientation  Orientation  Overall Orientation Status: Within Normal Limits  Social/Functional History  Social/Functional History  Lives With: Spouse  Type of Home: House  Home Layout: One level  Home Access: Stairs to enter with rails  Entrance Stairs - Number of Steps: 3  Entrance Stairs - Rails: Right  Home Equipment: Rolling walker  Receives Help From: Family  ADL Assistance: Independent  Ambulation Assistance: Independent  Transfer Assistance: Independent  Occupation: Retired    Objective  AROM RLE (degrees)  RLE AROM: WFL  AROM LLE (degrees)  LLE AROM : WFL  AROM RUE (degrees)  RUE AROM : WFL  AROM LUE (degrees)  LUE AROM : WFL  Strength RLE  Strength RLE: WFL  Strength LLE  Strength LLE: WFL  Strength RUE  Strength RUE: WFL  Strength LUE  Strength LUE: WFL  Tone RLE  RLE Tone: Normotonic  Tone LLE  LLE Tone: Normotonic  Motor Control  Gross Motor?: WFL  Sensation  Overall Sensation Status: WFL  Bed mobility  Rolling to Right: Modified independent  Supine to Sit: Minimal assistance  Scooting: Stand by assistance  Transfers  Sit to Stand: Contact guard assistance  Stand to sit: Contact guard assistance  Bed to Chair: Contact guard assistance  Pt slow with all mobility; doesn't sleep in bed at home d/t difficulty breathing, per pt  Ambulation  Ambulation?: Yes  Ambulation 1  Surface: level tile  Device: No Device  Assistance: Minimal assistance  Comments: pt very anxious re: activity d/t getting very SOB when ambulating to the bathroom earlier.     Stairs/Curb  Stairs?: No     Balance  Posture: Good  Sitting - Static: Good  Sitting - Dynamic: Good  Standing - Static: Fair  Standing - Dynamic: [address]  Times per week: 5-6 visits weekly  Times per day: Daily  Current Treatment Recommendations: Strengthening, ROM, Balance Training, Functional Mobility Training, Transfer Training, Endurance Training, Gait Training, Stair training, Safety Education & Training  Safety Devices  Type of devices: Call light within reach, Gait belt, Patient at risk for falls, Left in chair, Nurse notified  Restraints  Initially in place: No    Goals  Short term goals  Time Frame for Short term goals: 12 visits  Short term goal 1: independent transfers  Short term goal 2: independent gait with rw x 50'  Short term goal 3: independent stair ambulation x 3 steps with 1 HR   Patient Goals   Patient goals : return home being able to urinate and breathe well       Therapy Time   Individual Concurrent Group Co-treatment   Time In  Tiara Stapleton         Time Out 1414         Minutes 26                 Naila Aragon, PT

## 2019-05-30 NOTE — PROGRESS NOTES
Occupational Therapy   Occupational Therapy Initial Assessment  Date: 2019   Patient Name: Kourtney Rae  MRN: [id number]     : 1935    Date of Service: 2019    Discharge Recommendations: Further therapy recommended at discharge. Equipment recommendations listed below are based on what the patient would need if they were able to return to prior living arrangements at the time of discharge. OT Equipment Recommendations  Equipment Needed: Yes  Mobility Devices: ADL Assistive Devices  ADL Assistive Devices: Shower Chair with back;Grab Bars - shower;Grab Bars - toilet  Other: CTA equipment needs. Copied from H&P: The patient is a 81 y.o. Non-/non  female who presents with No chief complaint on file. and she is admitted to the hospital for the management of  acute CHF with acute kidney injury. This is a 81-year-old white female who presents as a transfer from [place] with heart failure and developed acute kidney injury. She is admitted with a several-day history of worsening shortness of breath primarily with exertion and orthopnea. She was found have acute on chronic diastolic heart failure was treated with IV diuretics. With diuresis she's had a rising BUN and creatinine and was transferred here for further management is no in house nephrologist in [place]. She reports no improvement with diuretics while hospitalized in [place]. Denies any chest pain, cough or sputum production symptoms. Her symptoms have progressive over several days. Assessment   Performance deficits / Impairments: Decreased functional mobility ; Decreased ADL status; Decreased endurance;Decreased balance;Decreased high-level IADLs;Decreased vision/visual deficit  Assessment: Pt presents with deficits noted above, limiting ability to safely and independently participate in ADLs/functional activities.  Pt reports feeling very unsteady on feet and demo's increased SOB upon minimal exertion during Colonoscopy (05/16/2011); other surgical history (09/06/2011); Cholecystectomy; Endoscopy, colon, diagnostic; eye surgery; Cardiac catheterization (2014); Upper gastrointestinal endoscopy (05/16/2011); Upper gastrointestinal endoscopy (6/22/15); other surgical history (3/22/16); other surgical history (Right, 4/26/16); Cystoscopy (Right, 2/6/2019); and Cystoscopy (Right, 2/27/2019). Treatment Diagnosis: TEJ      Restrictions  Restrictions/Precautions  Restrictions/Precautions: Fall Risk, Up as Tolerated  Required Braces or Orthoses?: No  Position Activity Restriction  Other position/activity restrictions: up with assist    Subjective   General  Chart Reviewed: No  Patient assessed for rehabilitation services?: Yes  Family / Caregiver Present: No  Pain Assessment  Pain Assessment: 0-10  Pain Level: 0  Oxygen Therapy  SpO2: 99 %  O2 Device: Nasal cannula  O2 Flow Rate (L/min): 3 L/min    Social/Functional History  Social/Functional History  Lives With: Spouse  Type of Home: House  Home Layout: One level  Home Access: Stairs to enter with rails  Entrance Stairs - Number of Steps: 3  Entrance Stairs - Rails: Right  Bathroom Shower/Tub: Walk-in shower, Tub only(primarily takes baths in tub)  Bathroom Toilet: Standard  Bathroom Equipment: (none)  Home Equipment: 4 wheeled walker, [employer] Help From: Family  ADL Assistance: Independent  Homemaking Assistance: Independent  Homemaking Responsibilities: Yes(shares with )  Ambulation Assistance: Needs assistance(use of 4ww or cane)  Transfer Assistance: Independent  Active : Yes(pt rarely drives,  drives in town)  Mode of Transportation: Car  Occupation: Retired  Leisure & Hobbies: watch tv, go to [employer], watch sports games  IADL Comments: Pushes cart at grocery store, divides up list with  to decrease shopping time. Additional Comments: Pt reports SOB has been increasing and limiting ability to engage in ADLs/IADLs.  Pt reports receiving readjust sheets on chair, no AD used. Pt reports feeling unsteady after stand > sit back in chair. Min SOB noted after standing activity. Pt denied other grooming/ADL needs, left comfortably in chair, call light within reach, needs met, RN aware of current status. Tone RUE  RUE Tone: Normotonic  Tone LUE  LUE Tone: Normotonic  Coordination  Movements Are Fluid And Coordinated: Yes  Coordination and Movement description: Decreased speed     Bed mobility  Supine to Sit: Unable to assess  Sit to Supine: Unable to assess  Scooting: Unable to assess  Comment: Pt reclined in chair upon arrival and left in chair upon exit. Transfers  Stand Step Transfers: Minimal assistance  Sit to stand: Minimal assistance  Stand to sit: Contact guard assistance     Cognition  Overall Cognitive Status: WFL        Sensation  Overall Sensation Status: WFL        LUE AROM (degrees)  LUE AROM : Exceptions  L Shoulder Flexion 0-180: 0-90  Left Hand AROM (degrees)  Left Hand AROM: WFL  RUE AROM (degrees)  RUE AROM : Exceptions  R Shoulder Flexion 0-180: 0-90  Right Hand AROM (degrees)  Right Hand AROM: WFL  LUE Strength  Gross LUE Strength: WFL  L Hand Grasp: 4+/5  RUE Strength  Gross RUE Strength: WFL  R Hand Grasp: 4+/5      Plan   Plan  Times per week: 3-5x/wk  Current Treatment Recommendations: Safety Education & Training, Patient/Caregiver Education & Training, Self-Care / ADL, Functional Mobility Training, Endurance Training, Home Management Training    AM-[facility] Score   AM-[facility] Inpatient Daily Activity Raw Score: 17  AM-PAC Inpatient ADL T-Scale Score : 37.26  ADL Inpatient CMS 0-100% Score: 50.11  ADL Inpatient CMS G-Code Modifier : CK    Goals  Short term goals  Time Frame for Short term goals: By dc, pt will:  Short term goal 1: demo understanding EC techniques to promote (I) in ADLs/functional actvities. Short term goal 2: demo (I) in UB/grooming ADLs/functional activities using rest breaks prn.   Short term goal 3: demo mod IND in LB/toileting ADLs/functional activities using ADs and rest breaks prn. Short term goal 4: demo mod IND in func mob/transfers using LRD and rest breaks prn. Short term goal 5: demo functional activity tolerance 25+ minutes with rest breaks prn. Short term goal 6: demo dynamic standing activity tolerance 10+ minutes with rest breaks and ADs prn. Patient Goals   Patient goals : To breathe easier and go home.        Therapy Time   Individual Concurrent Group Co-treatment   Time In 2784         Time Out 1537         Minutes 30         Timed Code Treatment Minutes: 27 Minutes       Gavi Mann, S/OT

## 2019-05-30 NOTE — PROGRESS NOTES
Smoking Cessation - topics covered   []  Health Risks  []  Benefits of Quitting   []  Smoking Cessation  [x]  Patient has no history of tobacco use  []  Patient is former smoker. [x]  No need for tobacco cessation education. []  Booklet given  []  Patient verbalizes understanding. []  Patient denies need for tobacco cessation education. []  Unable to meet with patient today. Will follow up as able.   Ruddy Butron [address]  7:14 AM

## 2019-05-30 NOTE — PROGRESS NOTES
Cardiac Testing:    TTE 5/27/19: EF 60%. Grade II DD. BLAKE. Mod MR/TR. RVSP 67. Mod PHTN    CATH 10/12/15: Left main: NL  LAD: 20-30%  LCX: NL  RCA: NL  The LV gram was performed in the JACKSON 30 position. LVEF: 55%. LV Wall Motion: NO , Mild MR      RIGHTHEART   RA 18/17 , M 14 SAT 77.6%  RV 53/11, 17      SAT 75.5 %  PA  44/23, 32      SAT 77.6 %  PCWP 23/37, 23 SAT 98 %  CO 5.78 l/min .  CI 2.9 l/min.m2 (HA)

## 2019-05-30 NOTE — PROGRESS NOTES
PHARMACY NOTE:    The electrolyte replacement protocol for potassium/magnesium has been discontinued per P&T guidelines because the patient has reduced renal function (CrCl < 30 mL/min). The patient's most recent potassium & magnesium levels are:  Recent Labs     05/28/19  0455 05/29/19  0507 05/30/19  0446   K 4.8 4.7 5.3   MG 2.2  --  2.6     Estimated Creatinine Clearance: 20 mL/min (A) (based on SCr of 2.28 mg/dL (H)). For patients with decreased renal function (below 30ml/min) needing potassium/magnesium supplementation, please order individual bolus doses with appropriate monitoring. Please contact the inpatient pharmacy with any concerns. Thank you.   Rosy DaileyD BCPS  5/30/2019 12:27 PM

## 2019-05-30 NOTE — FLOWSHEET NOTE
Reason for Visit:  Routine rounds. Assessment:  Divina Og is an alert and oriented grieving 80yo  female. No family was present. Pt shared the loss of her morbid obese son who \" on his 53th birthday\" approximately 5 years ago; she's also grieving the loss of her and her 's independence. Pt said \"I'm ready\" meaning she is ready to go to heaven when God calls her. She said \"because everything is starting to stop working. \"  Pt also vented about the way her \"heart issues and kidney issues are causing her to have \"SOB. \" Pt is struggling with her 's parkinson and his refusal to come to terms with it. Intervention:  Provided a presence; explored pt's spiritual and emotional needs. Provided active and empathetic listening. Provided words of comfort, affirmation, encouragement and prayer.  provided a space for pt to vent feelings of grief and provide a life review. Outcomes:  Openly engaged in conversation about losses, grief and being ready to die to see her son in [facility]. Prayed for  and thanked  for support and prayer. Recommendation:  Chaplains remain available for spiritual or emotional support as needed and may be paged for a specific request visit at any time. 19 1442   Encounter Summary   Services provided to: Patient   Referral/Consult From: [facility];Spouse   Continue Visiting   (19)   Complexity of Encounter High   Length of Encounter 15 minutes   Spiritual Assessment Completed Yes   Spiritual/Sabianism   Type Spiritual support   Assessment Approachable;Grieving;Coping;Unresolved grief;Fearful   Intervention Active listening;Explored feelings, thoughts, concerns;Explored coping resources;Prayer;Scripture;Sustaining presence/ Ministry of presence; Discussed relationship with God;Discussed illness/injury and it's impact; Discussed belief system/Orthodox practices/jackson   Outcome Comfort;Expressed gratitude;Engaged in conversation; Shared life review;Expressed feelings/needs/concerns;Grieving;Venting emotion; Less anxious, less agitated; Connection/belonging

## 2019-05-31 LAB
ABSOLUTE EOS #: 0.03 K/UL (ref 0–0.44)
ABSOLUTE IMMATURE GRANULOCYTE: 0.22 K/UL (ref 0–0.3)
ABSOLUTE LYMPH #: 1.09 K/UL (ref 1.1–3.7)
ABSOLUTE MONO #: 1.17 K/UL (ref 0.1–1.2)
ANION GAP SERPL CALCULATED.3IONS-SCNC: 13 MMOL/L (ref 9–17)
BASOPHILS # BLD: 0 % (ref 0–2)
BASOPHILS ABSOLUTE: <0.03 K/UL (ref 0–0.2)
BUN BLDV-MCNC: 56 MG/DL (ref 8–23)
BUN/CREAT BLD: ABNORMAL (ref 9–20)
CALCIUM SERPL-MCNC: 9 MG/DL (ref 8.6–10.4)
CHLORIDE BLD-SCNC: 103 MMOL/L (ref 98–107)
CO2: 25 MMOL/L (ref 20–31)
CREAT SERPL-MCNC: 1.51 MG/DL (ref 0.5–0.9)
DIFFERENTIAL TYPE: ABNORMAL
EOSINOPHILS RELATIVE PERCENT: 0 % (ref 1–4)
GFR AFRICAN AMERICAN: 40 ML/MIN
GFR NON-AFRICAN AMERICAN: 33 ML/MIN
GFR SERPL CREATININE-BSD FRML MDRD: ABNORMAL ML/MIN/{1.73_M2}
GFR SERPL CREATININE-BSD FRML MDRD: ABNORMAL ML/MIN/{1.73_M2}
GLUCOSE BLD-MCNC: 100 MG/DL (ref 70–99)
GLUCOSE BLD-MCNC: 106 MG/DL (ref 65–105)
GLUCOSE BLD-MCNC: 123 MG/DL (ref 65–105)
GLUCOSE BLD-MCNC: 168 MG/DL (ref 65–105)
GLUCOSE BLD-MCNC: 53 MG/DL (ref 65–105)
GLUCOSE BLD-MCNC: 77 MG/DL (ref 65–105)
HCT VFR BLD CALC: 31.5 % (ref 36.3–47.1)
HEMOGLOBIN: 9.1 G/DL (ref 11.9–15.1)
IMMATURE GRANULOCYTES: 2 %
LYMPHOCYTES # BLD: 9 % (ref 24–43)
MCH RBC QN AUTO: 23.6 PG (ref 25.2–33.5)
MCHC RBC AUTO-ENTMCNC: 28.9 G/DL (ref 28.4–34.8)
MCV RBC AUTO: 81.6 FL (ref 82.6–102.9)
MONOCYTES # BLD: 10 % (ref 3–12)
NRBC AUTOMATED: 0.3 PER 100 WBC
PATHOLOGIST: NORMAL
PDW BLD-RTO: 17.5 % (ref 11.8–14.4)
PLATELET # BLD: 241 K/UL (ref 138–453)
PLATELET ESTIMATE: ABNORMAL
PMV BLD AUTO: 12.9 FL (ref 8.1–13.5)
POTASSIUM SERPL-SCNC: 4.4 MMOL/L (ref 3.7–5.3)
RBC # BLD: 3.86 M/UL (ref 3.95–5.11)
RBC # BLD: ABNORMAL 10*6/UL
SEG NEUTROPHILS: 79 % (ref 36–65)
SEGMENTED NEUTROPHILS ABSOLUTE COUNT: 9.43 K/UL (ref 1.5–8.1)
SERUM IFX INTERP: NORMAL
SODIUM BLD-SCNC: 141 MMOL/L (ref 135–144)
WBC # BLD: 12 K/UL (ref 3.5–11.3)
WBC # BLD: ABNORMAL 10*3/UL

## 2019-05-31 PROCEDURE — 6360000002 HC RX W HCPCS: Performed by: INTERNAL MEDICINE

## 2019-05-31 PROCEDURE — 97530 THERAPEUTIC ACTIVITIES: CPT

## 2019-05-31 PROCEDURE — 6360000002 HC RX W HCPCS: Performed by: PHYSICIAN ASSISTANT

## 2019-05-31 PROCEDURE — 99232 SBSQ HOSP IP/OBS MODERATE 35: CPT | Performed by: INTERNAL MEDICINE

## 2019-05-31 PROCEDURE — 80048 BASIC METABOLIC PNL TOTAL CA: CPT

## 2019-05-31 PROCEDURE — 6370000000 HC RX 637 (ALT 250 FOR IP): Performed by: PHYSICIAN ASSISTANT

## 2019-05-31 PROCEDURE — 6370000000 HC RX 637 (ALT 250 FOR IP): Performed by: STUDENT IN AN ORGANIZED HEALTH CARE EDUCATION/TRAINING PROGRAM

## 2019-05-31 PROCEDURE — 85025 COMPLETE CBC W/AUTO DIFF WBC: CPT

## 2019-05-31 PROCEDURE — 82947 ASSAY GLUCOSE BLOOD QUANT: CPT

## 2019-05-31 PROCEDURE — 2060000000 HC ICU INTERMEDIATE R&B

## 2019-05-31 PROCEDURE — 36415 COLL VENOUS BLD VENIPUNCTURE: CPT

## 2019-05-31 PROCEDURE — 6370000000 HC RX 637 (ALT 250 FOR IP): Performed by: INTERNAL MEDICINE

## 2019-05-31 PROCEDURE — 2500000003 HC RX 250 WO HCPCS: Performed by: PHYSICIAN ASSISTANT

## 2019-05-31 PROCEDURE — 2580000003 HC RX 258: Performed by: PHYSICIAN ASSISTANT

## 2019-05-31 RX ORDER — FUROSEMIDE 10 MG/ML
40 INJECTION INTRAMUSCULAR; INTRAVENOUS DAILY
Status: DISCONTINUED | OUTPATIENT
Start: 2019-06-01 | End: 2019-06-01

## 2019-05-31 RX ADMIN — HEPARIN SODIUM 5000 UNITS: 5000 INJECTION INTRAVENOUS; SUBCUTANEOUS at 13:00

## 2019-05-31 RX ADMIN — AMLODIPINE BESYLATE 5 MG: 5 TABLET ORAL at 09:52

## 2019-05-31 RX ADMIN — METRONIDAZOLE 500 MG: 500 INJECTION, SOLUTION INTRAVENOUS at 12:30

## 2019-05-31 RX ADMIN — PREDNISONE 5 MG: 5 TABLET ORAL at 09:53

## 2019-05-31 RX ADMIN — METOPROLOL SUCCINATE 50 MG: 50 TABLET, EXTENDED RELEASE ORAL at 09:53

## 2019-05-31 RX ADMIN — FUROSEMIDE 40 MG: 10 INJECTION, SOLUTION INTRAMUSCULAR; INTRAVENOUS at 10:00

## 2019-05-31 RX ADMIN — Medication 10 ML: at 09:55

## 2019-05-31 RX ADMIN — AMIODARONE HYDROCHLORIDE 200 MG: 200 TABLET ORAL at 09:52

## 2019-05-31 RX ADMIN — ASPIRIN 81 MG: 81 TABLET ORAL at 09:56

## 2019-05-31 RX ADMIN — METRONIDAZOLE 500 MG: 500 INJECTION, SOLUTION INTRAVENOUS at 04:08

## 2019-05-31 RX ADMIN — METRONIDAZOLE 500 MG: 500 INJECTION, SOLUTION INTRAVENOUS at 21:22

## 2019-05-31 RX ADMIN — SIMVASTATIN 20 MG: 20 TABLET, FILM COATED ORAL at 21:19

## 2019-05-31 RX ADMIN — DEXTROSE 15 G: 15 GEL ORAL at 09:45

## 2019-05-31 RX ADMIN — CIPROFLOXACIN 400 MG: 2 INJECTION, SOLUTION INTRAVENOUS at 21:19

## 2019-05-31 RX ADMIN — PANTOPRAZOLE SODIUM 40 MG: 40 TABLET, DELAYED RELEASE ORAL at 09:00

## 2019-05-31 RX ADMIN — ONDANSETRON 4 MG: 2 INJECTION INTRAMUSCULAR; INTRAVENOUS at 18:27

## 2019-05-31 RX ADMIN — HEPARIN SODIUM 5000 UNITS: 5000 INJECTION INTRAVENOUS; SUBCUTANEOUS at 05:19

## 2019-05-31 ASSESSMENT — PAIN SCALES - GENERAL
PAINLEVEL_OUTOF10: 0
PAINLEVEL_OUTOF10: 0

## 2019-05-31 NOTE — PROGRESS NOTES
[facility]  Progress Note                   Date:   5/31/2019  Patient name: Tae Sandoval  Date of admission:  5/29/2019  6:21 PM  MRN:   [id number]  YOB: 1935  PCP: Ludwin Singh MD    Reason for Admission: Acute heart failure, unspecified [facility] failure type [facility] [I50.9]    Subjective:       Clinical Changes /Abnormalities: Seen & examined alone in room. Denies CP or SOB. States she is feeling \"so much better. \" Tele reviewed - SR. No new cardiac issues/concerns overnight.      Review of Systems    Medications:   Scheduled Meds:   amiodarone  200 mg Oral Daily    acetaminophen  500 mg Oral Daily    amLODIPine  5 mg Oral Daily    aspirin  81 mg Oral Daily    ferrous sulfate  220 mg Oral Daily    hydrALAZINE  25 mg Oral 3 times per day    insulin glargine  48 Units Subcutaneous BID    metoprolol succinate  50 mg Oral Daily    pantoprazole  40 mg Oral QAM AC    predniSONE  5 mg Oral Daily    simvastatin  20 mg Oral Nightly    sodium chloride flush  10 mL Intravenous 2 times per day    heparin (porcine)  5,000 Units Subcutaneous 3 times per day    insulin lispro  10 Units Subcutaneous Daily with breakfast    insulin lispro  16 Units Subcutaneous Lunch    insulin lispro  20 Units Subcutaneous Dinner    insulin lispro  0-12 Units Subcutaneous TID WC    insulin lispro  0-6 Units Subcutaneous Nightly    metroNIDAZOLE  500 mg Intravenous Q8H    ciprofloxacin  400 mg Intravenous Q24H     Continuous Infusions:   dextrose       CBC:   Recent Labs     05/29/19  0507 05/30/19  0446 05/31/19  0448   WBC 17.6* 15.5* 12.0*   HGB 8.3* 8.8* 9.1*    See Reflexed IPF Result 241     BMP:    Recent Labs     05/29/19  0507 05/30/19  0446 05/31/19  0448    138 141   K 4.7 5.3 4.4    103 103   CO2 22 18* 25   BUN 47* 63* 56*   CREATININE 2.23* 2.28* 1.51*   GLUCOSE 241* 162* 100*     Hepatic:No results for input(s): AST, ALT, ALB, BILITOT, ALKPHOS in the last L4-L5 level     Mitral regurgitation     Frequent PVCs     Asthma     Gait abnormality     Asthma     Nephrolithiasis     Right kidney stone     Hypokalemia     Multifocal atrial tachycardia (HCC)     SOB (shortness of breath)     Acute on chronic diastolic congestive heart failure (HCC)     Acute on chronic diastolic heart failure (HCC)     TEJ (acute kidney injury) ([facility]     Sigmoid diverticulitis      Plan of Treatment:   1. Improved. Continue Diuretics per nephro recommendations. No plans for further ischemic work-up per patient request.   2. Discussed importance of medication complicance, diet, exercise, fluid restrictions. 3. OK for d/c from cardio standpoint. F/U in office as scheduled next week ([place]).      Electronically signed by INGRID Riley CNP on 5/31/2019 at 10:19 AM  [address]  [phone]

## 2019-05-31 NOTE — PROGRESS NOTES
NEPHROLOGY PROGRESS NOTE      SUBJECTIVE     No acute events overnight   Optimal diuresis. Urine output 5 L in 24 hours. Hemodynamically stable  PO Optimal  Shortness of breath much improved. She did ambulate in the hallway with the aid of a walker. Her strength is slowly improving. Creatinine is trending down. Sodium is 141, potassium is 4.4, chloride 103 and CO2 25 with a BUN of 56 and creatinine 1.5 on and a calcium of 9. OBJECTIVE     Vitals:    05/31/19 0600 05/31/19 0730 05/31/19 0745 05/31/19 0831   BP:  (!) 142/57     Pulse:  73  87   Resp:  18  22   Temp:  98.1 °F (36.7 °C)  97.8 °F (36.6 °C)   TempSrc:  Oral  Oral   SpO2:  97% 97%    Weight: 219 lb 3.2 oz (99.4 kg)      Height:         24HR INTAKE/OUTPUT:      Intake/Output Summary (Last 24 hours) at 5/31/2019 1008  Last data filed at 5/31/2019 1463  Gross per 24 hour   Intake 100 ml   Output 4600 ml   Net -4500 ml       General appearance: Awake, alert, in no acute distress  HEENT: PERRLA  Respiratory: Diminished bibasilar breath sounds with bibasilar crackles. No wheezing or rhonchi appreciated  Cardiovascular: S1 S2 normal,no gallop or organic murmur. Abdomen:Non tender/non distended. Bowel sounds present  Extremities: No Cyanosis or Clubbing,Lower extremity edema  Neurological:Alert and oriented. No abnormalities of mood, affect, memory, mentation, or behavior are noted      MEDICATIONS     Scheduled Meds:    amiodarone  200 mg Oral Daily    acetaminophen  500 mg Oral Daily    amLODIPine  5 mg Oral Daily    aspirin  81 mg Oral Daily    ferrous sulfate  220 mg Oral Daily    hydrALAZINE  25 mg Oral 3 times per day    insulin glargine  48 Units Subcutaneous BID    metoprolol succinate  50 mg Oral Daily    pantoprazole  40 mg Oral QAM AC    predniSONE  5 mg Oral Daily    simvastatin  20 mg Oral Nightly    sodium chloride flush  10 mL Intravenous 2 times per day    heparin (porcine)  5,000 Units Subcutaneous 3 times per day    insulin lispro  10 Units Subcutaneous Daily with breakfast    insulin lispro  16 Units Subcutaneous Lunch    insulin lispro  20 Units Subcutaneous Dinner    insulin lispro  0-12 Units Subcutaneous TID WC    insulin lispro  0-6 Units Subcutaneous Nightly    metroNIDAZOLE  500 mg Intravenous Q8H    ciprofloxacin  400 mg Intravenous Q24H     Continuous Infusions:    dextrose       PRN Meds:  glucose, dextrose, glucagon (rDNA), dextrose, sodium chloride flush, ondansetron, nicotine, acetaminophen, albuterol  Home Meds:                Medications Prior to Admission: metroNIDAZOLE (FLAGYL) 500 MG tablet, Take 1 tablet by mouth 3 times daily  sulfamethoxazole-trimethoprim (BACTRIM DS) 800-160 MG per tablet, Take 1 tablet by mouth 2 times daily for 10 days  Polyethylene Glycol 400 (BLINK TEARS) 0.25 % SOLN, Apply to eye as needed  predniSONE (DELTASONE) 5 MG tablet, TAKE 1 TABLET DAILY  B-D ULTRAFINE III SHORT PEN 31G X 8 MM MISC, USE 7 DAILY  amiodarone (CORDARONE) 200 MG tablet, Take 1 tablet by mouth daily  furosemide (LASIX) 40 MG tablet, Take 1 tablet by mouth daily  simvastatin (ZOCOR) 20 MG tablet, Take 1 tablet by mouth nightly  ferrous sulfate 220 (44 Fe) MG/5ML solution, TAKE 10 MLS BY MOUTH DAILY. MIX WITH 2 OUNCES OF ORANGE JUICE (Patient taking differently: TAKE 10 MLS BY MOUTH DAILY.  MIX WITH 2 OUNCES OF ORANGE JUICE - Takes about every 3rd day)  hydrALAZINE (APRESOLINE) 25 MG tablet, TAKE 1 TABLET THREE TIMES A DAY  VICTOZA 18 MG/3ML SOPN SC injection, INJECT 1.2 MG INTO THE SKIN DAILY  LANTUS SOLOSTAR 100 UNIT/ML injection pen, INJECT 50 UNITS IN THE MORNING AND 48 UNITS AT BEDTIME (Patient taking differently: INJECT 45 UNITS IN THE MORNING AND 45 UNITS AT BEDTIME)  metoprolol succinate (TOPROL XL) 50 MG extended release tablet, TAKE 1 TABLET DAILY  potassium chloride (KLOR-CON M10) 10 MEQ extended release tablet, TAKE 1 TABLET DAILY  amLODIPine (NORVASC) 5 MG tablet, TAKE 1 TABLET DAILY  HUMALOG restriction. 3. Daily weights. 4. Will follow labs as ordered  5. Goal is to change to oral Lasix soon once patient is more compensated with her CHF      Please do not hesitate to call with questions       Marcie Woodard MD  PGY-1, Internal medicine resident  [facility], [place], [place]  5/31/2019     Attending Physician Statement  I have discussed the care of Luly Joya, including pertinent history and exam findings with the resident/fellow. I have reviewed the key elements of all parts of the encounter with the resident/fellow and have edited the documentation as appropriate. I have seen and examined the patient with the resident/fellow. I agree with the assessment and plan and status of the problem list as documented.       Jose A Jiang MD  Nephrology Attending Physician  [facility]  10:09 AM

## 2019-05-31 NOTE — PROGRESS NOTES
Occupational Therapy Not Seen Note    DATE: 2019  Name: Randal Snyder  : 1935  MRN: [id number]    Patient not available for Occupational Therapy due to:    Patient Declined: Pt stating just returned from bathroom, completed PT this PM with increased dizziness and weakness. Pt reports increased fatigue and requesting to rest following activity.     Next Scheduled Treatment: Re-check 6/3/2019    Electronically signed by TANI Del Valle on 2019 at 3:33 PM

## 2019-05-31 NOTE — PROGRESS NOTES
Physical Therapy  Facility/Department: [facility] 3  Daily Treatment Note  NAME: Milla Zelaya  : 1935  MRN: [id number]    Date of Service: 2019    Discharge Recommendations:    Further therapy recommended at discharge. PT Equipment Recommendations  Equipment Needed: No    Patient Diagnosis(es): There were no encounter diagnoses. has a past medical history of Allergic rhinitis, Asthma, Basal cell carcinoma of cheek, Basal cell carcinoma of leg, CAD (coronary artery disease), Central retinal artery occlusion, Cerebral artery occlusion with cerebral infarction [facility]), Cerebrovascular disease, CHF (congestive heart failure) ([facility] Diverticulosis, Dyslipidemia, Elevated antinuclear antibody (ZAIRA) level, Esophagitis, Foraminal stenosis of lumbar region, Hyperlipidemia, Hypertension, IBS (irritable bowel syndrome), Iron deficiency anemia, Junctional bradycardia, Migraine, Mild CAD, Mitral regurgitation, Obesity, CHICO (obstructive sleep apnea), Osteoarthritis, PMR (polymyalgia rheumatica) ([facility] Pneumonia, Premature atrial contractions, Pulmonary hypertension ([facility] Restrictive lung disease, Type II or unspecified type diabetes mellitus without mention of complication, not stated as uncontrolled, and Vitreous floaters. has a past surgical history that includes Appendectomy (); Hysterectomy (Approx ); Cataract removal (Bilateral, 08/10); malignant skin lesion excision (Right, 12/10 and ); malignant skin lesion excision (Right, 2012); Colonoscopy (2011); other surgical history (2011); Cholecystectomy; Endoscopy, colon, diagnostic; eye surgery; Cardiac catheterization (); Upper gastrointestinal endoscopy (2011); Upper gastrointestinal endoscopy (6/22/15); other surgical history (3/22/16); other surgical history (Right, 16); Cystoscopy (Right, 2019); and Cystoscopy (Right, 2019).     Restrictions  Restrictions/Precautions  Restrictions/Precautions: Fall Risk, for all turns with decreased sydnee during trun. upon completion of amb pt standing at bedside c/o dizziness and lightheadedness required seated rest at EOB, symptoms subsided within 30 seconds    Stairs/Curb  Stairs?: No     Balance  Posture: Good  Sitting - Static: Good  Sitting - Dynamic: Good  Standing - Static: Good;-  Standing - Dynamic: Good;+  Comments: standing balance assess w/o AD      AROM RLE (degrees)  RLE AROM: WFL  AROM LLE (degrees)  LLE AROM : WFL  AROM RUE (degrees)  RUE AROM : WFL  AROM LUE (degrees)  LUE AROM : WFL  Strength RLE  Strength RLE: WFL  Strength LLE  Strength LLE: WFL  Strength RUE  Strength RUE: WFL  Strength LUE  Strength LUE: WFL    Seated LE exercise program: Long Arc Quads, hip abduction/adduction, heel/toe raises, and marches. Reps: 15-20 reps              Assessment   Body structures, Functions, Activity limitations: Decreased functional mobility ; Decreased balance;Decreased endurance;Decreased strength  Assessment: pt grossly CGA for amb, mod I-supervision bed mob/transfers, pt c/o of dizziness and lightheadedness during amb and exercises which limited pts distance, no LOB during amb, amb 100' RW 1L O2  Prognosis: Fair  Decision Making: Medium Complexity  Patient Education: PT POC, breathing exercises when feeling SOB  Activity Tolerance  Activity Tolerance: Patient limited by fatigue;Patient limited by endurance  Activity Tolerance: pt limited d/t dizziness and lightheadedness during amb and exercises      Goals  Short term goals  Time Frame for Short term goals: 12 visits  Short term goal 1: independent transfers  Short term goal 2: independent gait with rw x 50'  Short term goal 3: independent stair ambulation x 3 steps with 1 HR   Patient Goals   Patient goals : return home being able to urinate and breathe well    Plan    Plan  Times per week: 5-6 visits weekly  Times per day: Daily  Current Treatment Recommendations: Strengthening, ROM, Balance Training, Functional Mobility Training, Transfer Training, Endurance Training, Gait Training, Stair training, Safety Education & Training, Patient/Caregiver Education & Training, Equipment Evaluation, Education, & procurement  Safety Devices  Type of devices: Call light within reach, Gait belt, Patient at risk for falls, Left in chair, Nurse notified, All fall risk precautions in place  Restraints  Initially in place: No     Therapy Time   Individual Concurrent Group Co-treatment   Time In [address] [place] [address]         Time Out 1433         Minutes [address]    Patient was evaluated/treated by FIDEL Hoyt, and the above documentation was completed and checked for accuracy by the supervising Physical Therapist

## 2019-05-31 NOTE — CARE COORDINATION
[address] Transitions Interview   2019    Patient: Jorge Moreno Patient : 1935   MRN: [id number]  Reason for Admission: CHF  RARS: Readmission Risk Score: 34      Spoke with: patient    Care Transition will follow - patient receptive. CTC contact card given. Explained possible referral to ambulatory care coordinator after transitions completed. Readmission Risk  Patient Active Problem List   Diagnosis    Dyslipidemia    Osteoarthritis    Esophagitis    Vitamin D deficiency    PMR (polymyalgia rheumatica) (HCC)    Central artery occlusion of retina    Diastolic dysfunction    Pulmonary HTN (HCC)    Iron deficiency anemia due to chronic blood loss    Type 2 diabetes with nephropathy (HCC)    CIHCO- intolerant CPAP    Obesity, morbid, BMI 40.0-49.9 ([facility]    Essential hypertension    Lumbar radiculopathy    Neural foraminal stenosis of lumbar spine    Spinal stenosis at L4-L5 level    Mitral regurgitation    Frequent PVCs    Asthma    Gait abnormality    Asthma    Nephrolithiasis    Right kidney stone    Hypokalemia    Multifocal atrial tachycardia (HCC)    SOB (shortness of breath)    Acute on chronic diastolic congestive heart failure (HCC)    Acute on chronic diastolic heart failure (HCC)    TEJ (acute kidney injury) ([facility]    Sigmoid diverticulitis       Inpatient Assessment  Care Transitions Summary    Care Transitions Inpatient Review  Medication Review  Do you have all of your prescriptions and are they filled?:  Yes   Are you able to afford your medications?:  Yes  How often do you have difficulty taking your medications?:  I always take them as prescribed. Housing Review  Who do you live with?:  Partner/Spouse/SO  Social Support  Durable Medical Equipment  Functional Review  Ability to seek help/take action for Emergent/Urgent situations i.e. fire, crime, inclement weather or health crisis. :  Independent  Ability handle personal hygiene needs

## 2019-06-01 VITALS
BODY MASS INDEX: 42.82 KG/M2 | OXYGEN SATURATION: 94 % | WEIGHT: 218.1 LBS | SYSTOLIC BLOOD PRESSURE: 156 MMHG | TEMPERATURE: 97.9 F | HEART RATE: 66 BPM | HEIGHT: 60 IN | DIASTOLIC BLOOD PRESSURE: 46 MMHG | RESPIRATION RATE: 16 BRPM

## 2019-06-01 LAB
ABSOLUTE EOS #: 0.25 K/UL (ref 0–0.44)
ABSOLUTE IMMATURE GRANULOCYTE: 0.13 K/UL (ref 0–0.3)
ABSOLUTE LYMPH #: 2.12 K/UL (ref 1.1–3.7)
ABSOLUTE MONO #: 1.17 K/UL (ref 0.1–1.2)
ANION GAP SERPL CALCULATED.3IONS-SCNC: 8 MMOL/L (ref 9–17)
BASOPHILS # BLD: 0 % (ref 0–2)
BASOPHILS ABSOLUTE: 0.03 K/UL (ref 0–0.2)
BUN BLDV-MCNC: 37 MG/DL (ref 8–23)
BUN/CREAT BLD: ABNORMAL (ref 9–20)
CALCIUM SERPL-MCNC: 8.7 MG/DL (ref 8.6–10.4)
CHLORIDE BLD-SCNC: 104 MMOL/L (ref 98–107)
CO2: 29 MMOL/L (ref 20–31)
CREAT SERPL-MCNC: 1.26 MG/DL (ref 0.5–0.9)
DIFFERENTIAL TYPE: ABNORMAL
EOSINOPHILS RELATIVE PERCENT: 3 % (ref 1–4)
GFR AFRICAN AMERICAN: 49 ML/MIN
GFR NON-AFRICAN AMERICAN: 41 ML/MIN
GFR SERPL CREATININE-BSD FRML MDRD: ABNORMAL ML/MIN/{1.73_M2}
GFR SERPL CREATININE-BSD FRML MDRD: ABNORMAL ML/MIN/{1.73_M2}
GLUCOSE BLD-MCNC: 130 MG/DL (ref 65–105)
GLUCOSE BLD-MCNC: 56 MG/DL (ref 70–99)
HCT VFR BLD CALC: 32.4 % (ref 36.3–47.1)
HEMOGLOBIN: 9.3 G/DL (ref 11.9–15.1)
IMMATURE GRANULOCYTES: 1 %
LYMPHOCYTES # BLD: 23 % (ref 24–43)
MCH RBC QN AUTO: 23.7 PG (ref 25.2–33.5)
MCHC RBC AUTO-ENTMCNC: 28.7 G/DL (ref 28.4–34.8)
MCV RBC AUTO: 82.4 FL (ref 82.6–102.9)
MONOCYTES # BLD: 13 % (ref 3–12)
NRBC AUTOMATED: 0 PER 100 WBC
PDW BLD-RTO: 17.3 % (ref 11.8–14.4)
PLATELET # BLD: 243 K/UL (ref 138–453)
PLATELET ESTIMATE: ABNORMAL
PMV BLD AUTO: 11.9 FL (ref 8.1–13.5)
POTASSIUM SERPL-SCNC: 4.1 MMOL/L (ref 3.7–5.3)
RBC # BLD: 3.93 M/UL (ref 3.95–5.11)
RBC # BLD: ABNORMAL 10*6/UL
SEG NEUTROPHILS: 60 % (ref 36–65)
SEGMENTED NEUTROPHILS ABSOLUTE COUNT: 5.62 K/UL (ref 1.5–8.1)
SODIUM BLD-SCNC: 141 MMOL/L (ref 135–144)
WBC # BLD: 9.3 K/UL (ref 3.5–11.3)
WBC # BLD: ABNORMAL 10*3/UL

## 2019-06-01 PROCEDURE — 2580000003 HC RX 258: Performed by: PHYSICIAN ASSISTANT

## 2019-06-01 PROCEDURE — 6370000000 HC RX 637 (ALT 250 FOR IP): Performed by: PHYSICIAN ASSISTANT

## 2019-06-01 PROCEDURE — 6370000000 HC RX 637 (ALT 250 FOR IP): Performed by: STUDENT IN AN ORGANIZED HEALTH CARE EDUCATION/TRAINING PROGRAM

## 2019-06-01 PROCEDURE — 99239 HOSP IP/OBS DSCHRG MGMT >30: CPT | Performed by: INTERNAL MEDICINE

## 2019-06-01 PROCEDURE — 85025 COMPLETE CBC W/AUTO DIFF WBC: CPT

## 2019-06-01 PROCEDURE — 6360000002 HC RX W HCPCS: Performed by: INTERNAL MEDICINE

## 2019-06-01 PROCEDURE — 82947 ASSAY GLUCOSE BLOOD QUANT: CPT

## 2019-06-01 PROCEDURE — 80048 BASIC METABOLIC PNL TOTAL CA: CPT

## 2019-06-01 PROCEDURE — 2500000003 HC RX 250 WO HCPCS: Performed by: PHYSICIAN ASSISTANT

## 2019-06-01 PROCEDURE — 36415 COLL VENOUS BLD VENIPUNCTURE: CPT

## 2019-06-01 RX ORDER — FUROSEMIDE 40 MG/1
40 TABLET ORAL DAILY
Status: DISCONTINUED | OUTPATIENT
Start: 2019-06-02 | End: 2019-06-01 | Stop reason: HOSPADM

## 2019-06-01 RX ADMIN — FUROSEMIDE 40 MG: 10 INJECTION, SOLUTION INTRAMUSCULAR; INTRAVENOUS at 08:16

## 2019-06-01 RX ADMIN — METOPROLOL SUCCINATE 50 MG: 50 TABLET, EXTENDED RELEASE ORAL at 08:15

## 2019-06-01 RX ADMIN — PREDNISONE 5 MG: 5 TABLET ORAL at 08:16

## 2019-06-01 RX ADMIN — ASPIRIN 81 MG: 81 TABLET ORAL at 08:16

## 2019-06-01 RX ADMIN — PANTOPRAZOLE SODIUM 40 MG: 40 TABLET, DELAYED RELEASE ORAL at 08:16

## 2019-06-01 RX ADMIN — AMIODARONE HYDROCHLORIDE 200 MG: 200 TABLET ORAL at 08:15

## 2019-06-01 RX ADMIN — ACETAMINOPHEN 500 MG: 500 TABLET ORAL at 08:16

## 2019-06-01 RX ADMIN — Medication 10 ML: at 08:16

## 2019-06-01 RX ADMIN — AMLODIPINE BESYLATE 5 MG: 5 TABLET ORAL at 08:16

## 2019-06-01 RX ADMIN — METRONIDAZOLE 500 MG: 500 INJECTION, SOLUTION INTRAVENOUS at 05:02

## 2019-06-01 ASSESSMENT — PAIN SCALES - GENERAL: PAINLEVEL_OUTOF10: 7

## 2019-06-01 NOTE — PROGRESS NOTES
NEPHROLOGY PROGRESS NOTE      SUBJECTIVE   Following for TEJ/PreRenal on CKD III, admitted with acute diastolic CHF. Creatinine down to 1.26, from 1.51. Urine output excellent 3375. Oral Lasix 40mg daily ordered today per primary care. OBJECTIVE     Vitals:    06/01/19 0505 06/01/19 0506 06/01/19 0510 06/01/19 0802   BP:   (!) 137/48 (!) 156/46   Pulse: 62  60 66   Resp: 17  12 16   Temp:   98.3 °F (36.8 °C) 97.9 °F (36.6 °C)   TempSrc:   Oral Oral   SpO2: 97%  96% 96%   Weight:  218 lb 1.6 oz (98.9 kg)     Height:         24HR INTAKE/OUTPUT:      Intake/Output Summary (Last 24 hours) at 6/1/2019 0952  Last data filed at 6/1/2019 0516  Gross per 24 hour   Intake 100 ml   Output 3375 ml   Net -3275 ml       General appearance: Awake, alert, in no acute distress  HEENT: PERRLA  Respiratory: Diminished bibasilar breath sounds with bibasilar crackles. No wheezing or rhonchi appreciated  Cardiovascular: S1 S2 normal,no gallop or organic murmur. Abdomen:Non tender/non distended. Bowel sounds present  Extremities: No Cyanosis or Clubbing,Lower extremity edema  Neurological:Alert and oriented. No abnormalities of mood, affect, memory, mentation, or behavior are noted      MEDICATIONS     Scheduled Meds:    [START ON 6/2/2019] furosemide  40 mg Oral Daily    amiodarone  200 mg Oral Daily    acetaminophen  500 mg Oral Daily    amLODIPine  5 mg Oral Daily    aspirin  81 mg Oral Daily    ferrous sulfate  220 mg Oral Daily    hydrALAZINE  25 mg Oral 3 times per day    insulin glargine  48 Units Subcutaneous BID    metoprolol succinate  50 mg Oral Daily    pantoprazole  40 mg Oral QAM AC    predniSONE  5 mg Oral Daily    simvastatin  20 mg Oral Nightly    sodium chloride flush  10 mL Intravenous 2 times per day    heparin (porcine)  5,000 Units Subcutaneous 3 times per day    insulin lispro  10 Units Subcutaneous Daily with breakfast    insulin lispro  16 Units Subcutaneous Lunch    insulin lispro  20 Units Subcutaneous Dinner    insulin lispro  0-12 Units Subcutaneous TID WC    insulin lispro  0-6 Units Subcutaneous Nightly    metroNIDAZOLE  500 mg Intravenous Q8H    ciprofloxacin  400 mg Intravenous Q24H     Continuous Infusions:    dextrose       PRN Meds:  glucose, dextrose, glucagon (rDNA), dextrose, sodium chloride flush, ondansetron, nicotine, acetaminophen, albuterol  Home Meds:                Medications Prior to Admission: [DISCONTINUED] metroNIDAZOLE (FLAGYL) 500 MG tablet, Take 1 tablet by mouth 3 times daily  [DISCONTINUED] sulfamethoxazole-trimethoprim (BACTRIM DS) 800-160 MG per tablet, Take 1 tablet by mouth 2 times daily for 10 days  Polyethylene Glycol 400 (BLINK TEARS) 0.25 % SOLN, Apply to eye as needed  predniSONE (DELTASONE) 5 MG tablet, TAKE 1 TABLET DAILY  B-D ULTRAFINE III SHORT PEN 31G X 8 MM MISC, USE 7 DAILY  amiodarone (CORDARONE) 200 MG tablet, Take 1 tablet by mouth daily  furosemide (LASIX) 40 MG tablet, Take 1 tablet by mouth daily  simvastatin (ZOCOR) 20 MG tablet, Take 1 tablet by mouth nightly  ferrous sulfate 220 (44 Fe) MG/5ML solution, TAKE 10 MLS BY MOUTH DAILY. MIX WITH 2 OUNCES OF ORANGE JUICE (Patient taking differently: TAKE 10 MLS BY MOUTH DAILY.  MIX WITH 2 OUNCES OF ORANGE JUICE - Takes about every 3rd day)  hydrALAZINE (APRESOLINE) 25 MG tablet, TAKE 1 TABLET THREE TIMES A DAY  VICTOZA 18 MG/3ML SOPN SC injection, INJECT 1.2 MG INTO THE SKIN DAILY  LANTUS SOLOSTAR 100 UNIT/ML injection pen, INJECT 50 UNITS IN THE MORNING AND 48 UNITS AT BEDTIME (Patient taking differently: INJECT 45 UNITS IN THE MORNING AND 45 UNITS AT BEDTIME)  metoprolol succinate (TOPROL XL) 50 MG extended release tablet, TAKE 1 TABLET DAILY  potassium chloride (KLOR-CON M10) 10 MEQ extended release tablet, TAKE 1 TABLET DAILY  amLODIPine (NORVASC) 5 MG tablet, TAKE 1 TABLET DAILY  HUMALOG KWIKPEN 100 UNIT/ML pen, INJECT 10 UNITS WITH BREAKFAST, 16 UNITS AT NOON, 24 UNITS WITH SUPPER AND 10 UNITS left ventricular diastolic dysfunction  4. Moderate mitral regurgitation/tricuspid regurgitation/moderate pulmonary hypertension with RVSP 67  5. History of essential hypertension  6. CHICO on CPAP  7. Nephrolithiasis - ureter stone treated with Holmium laser    PLAN     1. Agree with daily PO Lasix 40 mg  2. Fluid restriction to 1500 mL per day. Salt restriction. 3. Daily weights. 4.   OK for discharge  5.    Our office will make arrangements for follow up    Please do not hesitate to call with questions

## 2019-06-01 NOTE — PROGRESS NOTES
Subcutaneous BID    metoprolol succinate  50 mg Oral Daily    pantoprazole  40 mg Oral QAM AC    predniSONE  5 mg Oral Daily    simvastatin  20 mg Oral Nightly    sodium chloride flush  10 mL Intravenous 2 times per day    heparin (porcine)  5,000 Units Subcutaneous 3 times per day    insulin lispro  10 Units Subcutaneous Daily with breakfast    insulin lispro  16 Units Subcutaneous Lunch    insulin lispro  20 Units Subcutaneous Dinner    insulin lispro  0-12 Units Subcutaneous TID WC    insulin lispro  0-6 Units Subcutaneous Nightly    metroNIDAZOLE  500 mg Intravenous Q8H    ciprofloxacin  400 mg Intravenous Q24H     Continuous Infusions:    dextrose       PRN Meds: glucose, dextrose, glucagon (rDNA), dextrose, sodium chloride flush, ondansetron, nicotine, acetaminophen, albuterol    Data:     Past Medical History:   has a past medical history of Allergic rhinitis, Asthma, Basal cell carcinoma of cheek, Basal cell carcinoma of leg, CAD (coronary artery disease), Central retinal artery occlusion, Cerebral artery occlusion with cerebral infarction [facility]), Cerebrovascular disease, CHF (congestive heart failure) ([facility]), Diverticulosis, Dyslipidemia, Elevated antinuclear antibody (ZAIRA) level, Esophagitis, Foraminal stenosis of lumbar region, Hyperlipidemia, Hypertension, IBS (irritable bowel syndrome), Iron deficiency anemia, Junctional bradycardia, Migraine, Mild CAD, Mitral regurgitation, Obesity, CHICO (obstructive sleep apnea), Osteoarthritis, PMR (polymyalgia rheumatica) ([facility]), Pneumonia, Premature atrial contractions, Pulmonary hypertension ([facility] Restrictive lung disease, Type II or unspecified type diabetes mellitus without mention of complication, not stated as uncontrolled, and Vitreous floaters. Social History:   reports that she has never smoked. She has never used smokeless tobacco. She reports that she does not drink alcohol or use drugs.      Family History:   Family History   Problem Relation Age of Onset    Uterine Cancer Mother     Stroke Other         Apparently from a vascular malformation    Heart Disease Other         Positive family history    High Blood Pressure Other         Positive family history    Heart Attack Child 48        Son       Vitals:  BP (!) 156/46   Pulse 66   Temp 97.9 °F (36.6 °C) (Oral)   Resp 16   Ht 5' (1.524 m)   Wt 218 lb 1.6 oz (98.9 kg)   SpO2 96%   BMI 42.59 kg/m²   Temp (24hrs), Av.3 °F (36.8 °C), Min:97.7 °F (36.5 °C), Max:98.6 °F (37 °C)    Recent Labs     19  1152 19  1612 199 19  0802   POCGLU 106* 123* 77 130*       I/O (24Hr):     Intake/Output Summary (Last 24 hours) at 2019 0917  Last data filed at 2019 0516  Gross per 24 hour   Intake 100 ml   Output 3375 ml   Net -3275 ml       Labs:    Hematology:  Recent Labs     19  0632   WBC 15.5* 12.0* 9.3   RBC 3.65* 3.86* 3.93*   HGB 8.8* 9.1* 9.3*   HCT 29.9* 31.5* 32.4*   MCV 81.9* 81.6* 82.4*   MCH 24.1* 23.6* 23.7*   MCHC 29.4 28.9 28.7   RDW 18.0* 17.5* 17.3*   PLT See Reflexed IPF Result 241 243   MPV NOT REPORTED 12.9 11.9     Chemistry:  Recent Labs     19  1950 198 19  0632   NA  --   --  138 141 141   K  --   --  5.3 4.4 4.1   CL  --   --  103 103 104   CO2  --   --  18* 25 29   GLUCOSE  --   --  162* 100* 56*   BUN  --   --  63* 56* 37*   CREATININE  --   --  2.28* 1.51* 1.26*   MG  --   --  2.6  --   --    ANIONGAP  --   --  17 13 8*   LABGLOM  --   --  20* 33* 41*   GFRAA  --   --  25* 40* 49*   CALCIUM  --   --  9.6 9.0 8.7   TROPHS 85* 83* 83*  --   --      Recent Labs     19  0446  19  0759 19  1008 19  1152 19  1612 19  2129 19  0802   TSH 0.35  --   --   --   --   --   --   --    CHOL 142  --   --   --   --   --   --   --    HDL 62  --   --   --   --   --   --   --    LDLCHOLESTEROL 57  --   --   --   --   -- --   --    CHOLHDLRATIO 2.3  --   --   --   --   --   --   --    TRIG 114  --   --   --   --   --   --   --    VLDL NOT REPORTED  --   --   --   --   --   --   --    POCGLU  --    < > 53* 168* 106* 123* 77 130*    < > = values in this interval not displayed. Lab Results   Component Value Date/Time    SPECIAL NOT REPORTED 05/27/2019 08:17 PM     Lab Results   Component Value Date/Time    CULTURE NO GROWTH 4 DAYS 05/27/2019 08:17 PM       Lab Results   Component Value Date    POCPH 7.39 11/26/2014    POCPCO2 40 11/26/2014    POCHCO3 24.3 11/26/2014    NBEA 1 11/26/2014    PBEA NOT REPORTED 11/26/2014    TJX0MFS 26 11/26/2014       Radiology:    Xr Chest (single View Frontal)    Result Date: 5/29/2019  Mild central vascular congestion. Volume overload is suspected. Xr Chest Standard (2 Vw)    Result Date: 5/27/2019  Mild to moderate pulmonary edema with cardiomegaly suggesting CHF decompensation. Nm Lung Vent/perfusion (vq)    Result Date: 5/29/2019  Overall, low probability V/Q scan for pulmonary embolus. Us Renal Complete    Result Date: 5/30/2019  Unremarkable ultrasound of the kidneys.      Xr Chest Portable    Result Date: 5/28/2019  Cardiomegaly and perihilar edema without significant change from prior exam.       Physical Examination:        General appearance:  alert, cooperative and no distress  Mental Status:  oriented to person, place and time and normal affect  Lungs:  clear to auscultation bilaterally, normal effort  Heart:  regular rate and rhythm, no murmur  Abdomen:  soft, nontender, nondistended, normal bowel sounds, no masses, hepatomegaly, splenomegaly  Extremities:  no redness, tenderness in the calves; trace pedal edema  Skin:  no gross lesions, rashes, induration    Assessment:        Primary Problem  Acute on chronic diastolic heart failure [facility])    Active Hospital Problems    Diagnosis Date Noted    [facility] [N17.9] 05/30/2019    Sigmoid diverticulitis [K57.32] 05/30/2019    Acute on chronic diastolic heart failure ([facility] [I50.33] 05/29/2019    Essential hypertension [I10]     CHICO- intolerant CPAP [G47.33]     Type 2 diabetes with nephropathy ([facility] [E11.21]     Dyslipidemia [E78.5]        Plan:        1. Change lasix to po  2. Dc villareal  3. Dc home with fluid restriction  4. Has completed a course of antibiotics  5.  D/w renal NP    Leonora Leblanc DO  6/1/2019  9:17 AM

## 2019-06-01 NOTE — DISCHARGE SUMMARY
Maryjane Crow [facility]    Discharge Summary     Patient ID: Jane Ramires  :  1935   MRN: [id number]     ACCOUNT:  [de-identified]   Patient's PCP: Floyd Moscoso MD  Admit Date: 2019   Discharge Date: 2019     Length of Stay: 3  Code Status:  Full Code  Admitting Physician: Mary Ellen Sesay,   Discharge Physician: Jeanine Leblanc DO     Active Discharge Diagnoses:     Hospital Problem Lists:  Principal Problem:    Acute on chronic diastolic heart failure ([facility]  Active Problems:    Dyslipidemia    Type 2 diabetes with nephropathy (HCC)    CHICO- intolerant CPAP    Essential hypertension    TEJ (acute kidney injury) ([facility]    Sigmoid diverticulitis  Resolved Problems:    * No resolved hospital problems. *      Admission Condition:  fair     Discharged Condition: good    Hospital Stay:     Hospital Course:  Jane Ramires is a 81 y.o. female who was admitted for the management of   Acute on chronic diastolic heart failure ([facility] , presented to ER with No chief complaint on file. [facility] a/c DHF. Diuresed, seen by renal and renal function improved with diuresis.   She improved with iv lasix aqnd it was then switched to po and she is discharged home with low salt diet and fluid restriction      Significant therapeutic interventions: see above    Significant Diagnostic Studies:   Labs / Micro:  CBC:   Lab Results   Component Value Date    WBC 9.3 2019    RBC 3.93 2019    HGB 9.3 2019    HCT 32.4 2019    MCV 82.4 2019    MCH 23.7 2019    MCHC 28.7 2019    RDW 17.3 2019     2019     CMP:    Lab Results   Component Value Date    GLUCOSE 56 2019     2019    K 4.1 2019     2019    CO2 29 2019    BUN 37 2019    CREATININE 1.26 2019    ANIONGAP 8 2019    ALKPHOS 90 2019    ALT 18 02/05/2019    AST 22 02/05/2019    BILITOT 0.51 02/05/2019    LABALBU 3.8 02/05/2019    ALBUMIN 1.1 02/05/2019    LABGLOM 41 06/01/2019    GFRAA 49 06/01/2019    GFR      06/01/2019    GFR NOT REPORTED 06/01/2019    PROT 7.2 02/05/2019    CALCIUM 8.7 06/01/2019        Radiology:    Xr Chest (single View Frontal)    Result Date: 5/29/2019  Mild central vascular congestion. Volume overload is suspected. Xr Chest Standard (2 Vw)    Result Date: 5/27/2019  Mild to moderate pulmonary edema with cardiomegaly suggesting CHF decompensation. Nm Lung Vent/perfusion (vq)    Result Date: 5/29/2019  Overall, low probability V/Q scan for pulmonary embolus. Us Renal Complete    Result Date: 5/30/2019  Unremarkable ultrasound of the kidneys. Xr Chest Portable    Result Date: 5/28/2019  Cardiomegaly and perihilar edema without significant change from prior exam.       Consultations:    Consults:     Final Specialist Recommendations/Findings:   IP CONSULT TO HEART FAILURE NURSE/COORDINATOR  IP CONSULT TO DIETITIAN  IP CONSULT TO CARDIOLOGY  IP CONSULT TO SOCIAL WORK  IP CONSULT TO NEPHROLOGY  IP CONSULT TO IV TEAM      The patient was seen and examined on day of discharge and this discharge summary is in conjunction with any daily progress note from day of discharge. Discharge plan:     Disposition: Home    Physician Follow Up:     Venice Britt MD  [address] [place] [address] Veronica Georgen  [phone]    In 1 week         Requiring Further Evaluation/Follow Up POST HOSPITALIZATION/Incidental Findings: DHF, renal function    Diet: cardiac diet and diabetic diet    Activity: As tolerated    Instructions to Patient: take medications as prescribed  Salt restriction and 1500mL restriction at home    Discharge Medications:      Medication List      CHANGE how you take these medications    ferrous sulfate 220 (44 Fe) MG/5ML solution  TAKE 10 MLS BY MOUTH DAILY.  MIX WITH 2 OUNCES OF ORANGE JUICE  What changed:  See COZAAR     metroNIDAZOLE 500 MG tablet  Commonly known as:  FLAGYL     sulfamethoxazole-trimethoprim 800-160 MG per tablet  Commonly known as:  BACTRIM DS            Time Spent on discharge is  34 mins in patient examination, evaluation, counseling as well as medication reconciliation, prescriptions for required medications, discharge plan and follow up. Electronically signed by   Christy Leblanc DO  6/1/2019  9:23 AM      Thank you Dr. Kimberlyn Adams MD for the opportunity to be involved in this patient's care.

## 2019-06-02 ENCOUNTER — CARE COORDINATION (OUTPATIENT)
Dept: CASE MANAGEMENT | Age: 84
End: 2019-06-02

## 2019-06-02 DIAGNOSIS — I50.33 ACUTE ON CHRONIC DIASTOLIC HEART FAILURE (HCC): Primary | ICD-10-CM

## 2019-06-02 PROCEDURE — 1111F DSCHRG MED/CURRENT MED MERGE: CPT | Performed by: INTERNAL MEDICINE

## 2019-06-02 NOTE — CARE COORDINATION
[facility] Transitions Initial Follow Up Call    Call within 2 business days of discharge: Yes    Patient: Yanci Asa Patient : 1935   MRN: [id number]  Reason for Admission: CHF  Discharge Date: 19 RARS: Readmission Risk Score: 29      Last Discharge [facility]       Complaint Diagnosis Description Type Department Provider    19   Admission (Discharged) [facility]           Spoke with: [id number]: SEAN    Was able to contact Efraín Galeas for initial transitional outreach. She stated that she is \"taking it easy. \"  She denied chest pain, N/V, palpitations, and the swelling is better. She stated she gets weak and shaky when she is up ambulating. She thinks that she is not getting enough oxygen when she is up and moving round. She does use a walker when up. She does have an occasional cough. She is weighing her self daily and her weight has not changed. She did voice some concern over a no salt diet. She has meals on wheels and they depend on prepared meals and soups for her other meals. She also voices concern that she unable to prepare meals at this time. Reviewed food choices, her fluid restriction, CHF zone and offered to consult dietician. She agreed to dietician consult. Medications reviewed and all medications in home. She did voice concern over her Cozaar being discontinued. Requested that she discuss this with the Cardiologist at her appointment .  1111F order completed. No services at discharge, but she is thinking she may want home care. Explained that  Her PCP would have to order the services. She will be calling PCP office for an appointment on Monday. Encouraged her to request order for home care. CTC role explained and she was agreeable to further outreach  Contact information given and encourage to call if need arises.     Non-face-to-face services provided:  Scheduled appointment with PCP-pt to call  Obtained and reviewed discharge summary and/or continuity of care documents  Education of patient/family/caregiver/guardian to support self-management-reviewed CHF zones, sodium restriction and fluid restriction  Assessment and support for treatment adherence and medication management-reviewed and educated    Care Transitions 24 Hour Call    Do you have any ongoing symptoms?:  Yes  Patient-reported symptoms:  Shortness of Breath  Interventions for patient-reported symptoms:  Other (Comment: has appointment  with cardio)  Do you have a copy of your discharge instructions?:  Yes  Do you have all of your prescriptions and are they filled?:  Yes  Have you been contacted by a [address]?:  No  Have you scheduled your follow up appointment?:  Yes  How are you going to get to your appointment?:  Car - family or friend to transport  Were you discharged with any Home Care or Post Acute Services:  No  Do you have support at home?:  Partner/Spouse/SO  Do you feel like you have everything you need to keep you well at home?:  Yes  Care Transitions Interventions         Follow Up  Future Appointments   Date Time Provider Loyda Guevarai   2019  2:30 PM [facility]   10/16/2019 10:00 AM MD CRICKET Lopez DPP   2019  9:30 AM Moi Guerrero, APRN - CNP  DPP   2019 10:30 AM [address], RN

## 2019-06-03 ENCOUNTER — TELEPHONE (OUTPATIENT)
Dept: INTERNAL MEDICINE | Age: 84
End: 2019-06-03

## 2019-06-03 LAB
CULTURE: NORMAL
CULTURE: NORMAL
GLUCOSE BLD-MCNC: 55 MG/DL (ref 65–105)
Lab: NORMAL
Lab: NORMAL
SPECIMEN DESCRIPTION: NORMAL
SPECIMEN DESCRIPTION: NORMAL

## 2019-06-03 NOTE — TELEPHONE ENCOUNTER
Marie from the [facility] called and stated that the patient told her she is to be on a \"cardiac\" diet per ER from 5/24/2019. Patient is already on low fat, low cholesterol and low sodium diet.  If there is any other changes they will need an rx faxed to [phone] with any questions (works 7-3)

## 2019-06-03 NOTE — TELEPHONE ENCOUNTER
.  Continue with current diet then.   Also encourage patient to keep 6/6/19  Cardiology follow-up appointment

## 2019-06-04 ENCOUNTER — CARE COORDINATION (OUTPATIENT)
Dept: CASE MANAGEMENT | Age: 84
End: 2019-06-04

## 2019-06-04 ENCOUNTER — OFFICE VISIT (OUTPATIENT)
Dept: INTERNAL MEDICINE | Age: 84
End: 2019-06-04
Payer: MEDICARE

## 2019-06-04 ENCOUNTER — HOSPITAL ENCOUNTER (OUTPATIENT)
Dept: LAB | Age: 84
Discharge: HOME | End: 2019-06-04
Payer: MEDICARE

## 2019-06-04 VITALS
TEMPERATURE: 97.7 F | HEIGHT: 61 IN | SYSTOLIC BLOOD PRESSURE: 136 MMHG | DIASTOLIC BLOOD PRESSURE: 58 MMHG | RESPIRATION RATE: 18 BRPM | WEIGHT: 202.8 LBS | BODY MASS INDEX: 38.29 KG/M2 | HEART RATE: 80 BPM | OXYGEN SATURATION: 98 %

## 2019-06-04 DIAGNOSIS — E11.21 TYPE 2 DIABETES WITH NEPHROPATHY (HCC): ICD-10-CM

## 2019-06-04 DIAGNOSIS — R42 DIZZINESS: ICD-10-CM

## 2019-06-04 DIAGNOSIS — E66.01 OBESITY, MORBID, BMI 40.0-49.9 (HCC): ICD-10-CM

## 2019-06-04 DIAGNOSIS — I27.20 PULMONARY HTN (HCC): ICD-10-CM

## 2019-06-04 DIAGNOSIS — D50.9 IRON DEFICIENCY ANEMIA, UNSPECIFIED IRON DEFICIENCY ANEMIA TYPE: ICD-10-CM

## 2019-06-04 DIAGNOSIS — E11.42 DIABETIC POLYNEUROPATHY ASSOCIATED WITH TYPE 2 DIABETES MELLITUS (HCC): ICD-10-CM

## 2019-06-04 DIAGNOSIS — I51.89 DIASTOLIC DYSFUNCTION: ICD-10-CM

## 2019-06-04 DIAGNOSIS — E55.9 VITAMIN D DEFICIENCY: ICD-10-CM

## 2019-06-04 DIAGNOSIS — I10 ESSENTIAL HYPERTENSION: ICD-10-CM

## 2019-06-04 DIAGNOSIS — D50.9 IRON DEFICIENCY ANEMIA, UNSPECIFIED IRON DEFICIENCY ANEMIA TYPE: Primary | ICD-10-CM

## 2019-06-04 LAB
ABSOLUTE RETIC #: 0.1 M/UL (ref 0.03–0.08)
ANION GAP SERPL CALCULATED.3IONS-SCNC: 13 MMOL/L (ref 9–17)
BUN BLDV-MCNC: 21 MG/DL (ref 8–23)
BUN/CREAT BLD: 17 (ref 9–20)
CALCIUM SERPL-MCNC: 9.9 MG/DL (ref 8.6–10.4)
CHLORIDE BLD-SCNC: 98 MMOL/L (ref 98–107)
CO2: 32 MMOL/L (ref 20–31)
CREAT SERPL-MCNC: 1.23 MG/DL (ref 0.5–0.9)
FERRITIN: 64 UG/L (ref 13–150)
FOLATE: 17.1 NG/ML
GFR AFRICAN AMERICAN: 51 ML/MIN
GFR NON-AFRICAN AMERICAN: 42 ML/MIN
GFR SERPL CREATININE-BSD FRML MDRD: ABNORMAL ML/MIN/{1.73_M2}
GFR SERPL CREATININE-BSD FRML MDRD: ABNORMAL ML/MIN/{1.73_M2}
GLUCOSE BLD-MCNC: 113 MG/DL (ref 70–99)
HCT VFR BLD CALC: 35.9 % (ref 36–46)
HEMOGLOBIN: 10.8 G/DL (ref 12–16)
IMMATURE RETIC FRACT: 35 % (ref 2.7–18.3)
MCH RBC QN AUTO: 23.6 PG (ref 26–34)
MCHC RBC AUTO-ENTMCNC: 30.2 G/DL (ref 31–37)
MCV RBC AUTO: 78.2 FL (ref 80–100)
NRBC AUTOMATED: ABNORMAL PER 100 WBC
PDW BLD-RTO: 18.3 % (ref 11–14.5)
PLATELET # BLD: 310 K/UL (ref 140–450)
PMV BLD AUTO: 9.4 FL (ref 6–12)
POTASSIUM SERPL-SCNC: 4.3 MMOL/L (ref 3.7–5.3)
RBC # BLD: 4.58 M/UL (ref 4–5.2)
RETIC %: 2.1 % (ref 0.5–1.9)
RETIC HEMOGLOBIN: 24.6 PG (ref 28.2–35.7)
SODIUM BLD-SCNC: 143 MMOL/L (ref 135–144)
VITAMIN B-12: 474 PG/ML (ref 232–1245)
WBC # BLD: 14.4 K/UL (ref 3.5–11)

## 2019-06-04 PROCEDURE — 82607 VITAMIN B-12: CPT

## 2019-06-04 PROCEDURE — 80048 BASIC METABOLIC PNL TOTAL CA: CPT

## 2019-06-04 PROCEDURE — 99495 TRANSJ CARE MGMT MOD F2F 14D: CPT | Performed by: NURSE PRACTITIONER

## 2019-06-04 PROCEDURE — 85045 AUTOMATED RETICULOCYTE COUNT: CPT

## 2019-06-04 PROCEDURE — 82746 ASSAY OF FOLIC ACID SERUM: CPT

## 2019-06-04 PROCEDURE — 82728 ASSAY OF FERRITIN: CPT

## 2019-06-04 PROCEDURE — 85027 COMPLETE CBC AUTOMATED: CPT

## 2019-06-04 PROCEDURE — 36415 COLL VENOUS BLD VENIPUNCTURE: CPT

## 2019-06-04 NOTE — CARE COORDINATION
[facility] Transitions Follow Up Call    2019    Patient: Armond Gordon  Patient : 1935   MRN: [id number]  Reason for Admission: CHF  Discharge Date: 19 RARS: Readmission Risk Score: 29         Spoke with: Dmitriyrejishiva WaddellYon    Was able to contact Vivek Marvin for transitional outreach. She stated that she feels a lot better about things. She said she saw her NP and she answered questions and explained things. She denied shortness of breath, swelling, cough or chest pain. Her weight with no changes. She does cont with dizziness and weakness when ambulating. Her oxygen saturation was checked while ambulating at the PCP office and she said it went do from 98 to 94%. No further questions or concerns. Care Transitions Subsequent and Final Call    Subsequent and Final Calls  Care Transitions Interventions  Other Interventions:             Follow Up  Future Appointments   Date Time Provider Loyda Foster   2019 10:00 AM MD CRICKET Salinas DP   2019  2:30 PM DO MICHELLE Garcia DP   2019  9:00 AM Chavez Pham APRN - CNP DDIAED DP   7/3/2019 10:30 AM INGRID Clancy - CNP DINT DP   7/3/2019  1:45 PM MD RASHAD AponteULM DP   10/16/2019 10:00 AM MD CRICKET Salinas DP   2019  9:30 AM INGRID Vigil - CNP  DP   2019 10:30 AM MARY Almaraz RN

## 2019-06-04 NOTE — PATIENT INSTRUCTIONS
May stop amiodarone- follow up with cardio in 2 days as directed  Follow up with Diabetic nurse practitioner in office   Will get st up with urology   Repeat labs today   Continue on the 20 units of Lantus in am   Use Humalog 8 units with meals   Continue on Victoza   Call blood sugars in one week  Sooner with concerns prior

## 2019-06-05 ENCOUNTER — OFFICE VISIT (OUTPATIENT)
Dept: UROLOGY | Age: 84
End: 2019-06-05
Payer: MEDICARE

## 2019-06-05 VITALS
HEART RATE: 78 BPM | HEIGHT: 61 IN | BODY MASS INDEX: 38.14 KG/M2 | SYSTOLIC BLOOD PRESSURE: 138 MMHG | WEIGHT: 202 LBS | DIASTOLIC BLOOD PRESSURE: 56 MMHG

## 2019-06-05 DIAGNOSIS — D50.9 IRON DEFICIENCY ANEMIA, UNSPECIFIED IRON DEFICIENCY ANEMIA TYPE: Primary | ICD-10-CM

## 2019-06-05 DIAGNOSIS — N20.0 NEPHROLITHIASIS: ICD-10-CM

## 2019-06-05 DIAGNOSIS — R10.9 FLANK PAIN: ICD-10-CM

## 2019-06-05 DIAGNOSIS — N20.1 URETERAL STONE: Primary | ICD-10-CM

## 2019-06-05 DIAGNOSIS — R10.2 PELVIC PAIN: ICD-10-CM

## 2019-06-05 PROCEDURE — 99214 OFFICE O/P EST MOD 30 MIN: CPT | Performed by: UROLOGY

## 2019-06-05 NOTE — PROGRESS NOTES
Transition Care Office Visit    Date of Face-to-Face: 6/4/2019  Persons at visit: family   Date of interactive contact/ attempted contact with the patient and/or caregiver: 6/3/2019 -See transitional care telephone note. HPI   81-year-old patient being seen in for post hospital follow-up from [facility]. [facility] where she was admitted 5/29/19 3 6/1/19 with acute on chronic diastolic heart failure. IV diuretics. Kidney functions improved with diuresis. Seen by nephrology. Changed over to by mouth Lasix with a 2 g sodium diet and feet 100 mL fluid restriction. Weight significantly decreased. Swelling decreased. Patient responded well and was discharged home. Overall she has done fairly well with the swelling and monitoring her weights. Does continue with dizziness and fatigue. States this has been an ongoing issue since she was diagnosed with atrial fibrillation and placed on amiodarone and anticoagulation. Has had multiple visits with PCP and cardiologist.  Note suggests no atrial fib. Anticoagulation was stopped however continued on the amiodarone. Patient feels she just does not tolerate this medicine. Also has had abnormal PFTs. Multiple calls to pulmonary due to abnormal pulmonary function tests. Unfortunately was not seen in the hospital by pulmonology. She is set up to see them July 3. Activity: activity as tolerated  Any medication changes since post-hospitalization phone  was diagnosed with amiodarone call? No  Any treatment changes since post-hospitalization phone call? No  Any further education needed on medications/treatment plan?  No    Current Medications   Outpatient Medications Marked as Taking for the 6/4/19 encounter (Office Visit) with INGRID Antonio CNP   Medication Sig Dispense Refill    Polyethylene Glycol 400 (BLINK TEARS) 0.25 % SOLN Apply to eye as needed      predniSONE (DELTASONE) 5 MG tablet TAKE 1 TABLET DAILY 90 tablet 3    B-D ULTRAFINE III SHORT PEN 31G X 8 MM MISC USE 7 DAILY 270 each 3    amiodarone (CORDARONE) 200 MG tablet Take 1 tablet by mouth daily 7 tablet 0    furosemide (LASIX) 40 MG tablet Take 1 tablet by mouth daily 30 tablet 0    simvastatin (ZOCOR) 20 MG tablet Take 1 tablet by mouth nightly 90 tablet 3    hydrALAZINE (APRESOLINE) 25 MG tablet TAKE 1 TABLET THREE TIMES A  tablet 3    VICTOZA 18 MG/3ML SOPN SC injection INJECT 1.2 MG INTO THE SKIN DAILY 18 mL 3    LANTUS SOLOSTAR 100 UNIT/ML injection pen INJECT 50 UNITS IN THE MORNING AND 48 UNITS AT BEDTIME (Patient taking differently: INJECT 45 UNITS IN THE MORNING AND 45 UNITS AT BEDTIME) 90 mL 3    metoprolol succinate (TOPROL XL) 50 MG extended release tablet TAKE 1 TABLET DAILY 90 tablet 3    potassium chloride (KLOR-CON M10) 10 MEQ extended release tablet TAKE 1 TABLET DAILY 90 tablet 3    amLODIPine (NORVASC) 5 MG tablet TAKE 1 TABLET DAILY 90 tablet 3    HUMALOG KWIKPEN 100 UNIT/ML pen INJECT 10 UNITS WITH BREAKFAST, 16 UNITS AT NOON, 24 UNITS WITH SUPPER AND 10 UNITS AT NIGHT AS NEEDED (Patient taking differently: INJECT 10 UNITS WITH BREAKFAST, 16 UNITS AT NOON, 20 UNITS WITH SUPPER AND 10 UNITS AT NIGHT AS NEEDED) 75 mL 3    Handicap Placard MISC by Does not apply route EXP: 6/12/2020 1 each 0    aspirin 81 MG EC tablet Take 81 mg by mouth daily      glucose blood VI test strips (ASCENSIA AUTODISC VI;ONE TOUCH ULTRA TEST VI) strip 1 each by In Vitro route 3 times daily As directed. 100 each 5    acetaminophen (TYLENOL) 500 MG tablet Take 500 mg by mouth daily      omeprazole (PRILOSEC) 20 MG capsule Take 20 mg by mouth Daily  30 capsule 3    Cholecalciferol (VITAMIN D3) 2000 UNITS CAPS Take 2,000 Units by mouth daily          Medication Reconciliation  Provider has reviewed medication record and it has been modified as necessary to accurately depict current medications since hospitalization. Shadi Stanton has been instructed on these changes.      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 Army Rea. Repeat EGD6/15 Gastritis    Foraminal stenosis of lumbar region     Moderate  Right L4/L5 neuroforaminal stenosis, Dr Cid Senior following. MRI 2/16 [place]. Moderate foraminal stenosis mild to moderate central canal stenosis    Hyperlipidemia     Hypertension     IBS (irritable bowel syndrome)     GI consultation with Dr. Silvana Silvestre, GI specialist in [facility], felt that she probably has chronic functional diarrhea and recommended empiric Imodium, Pepto-Bismol or Questran first. Sprue test Neg 2011    Iron deficiency anemia     Percent sat iron 5, January 2015, ferritin 40 1 /15, FOBT positive  EGD 6/15  Gastritis, IV iron 9/15x3 effective,  colonoscopy deferred by Dr. Army Rea. --Prior colonoscopy 2011 with severe diverticulosis and telangiectasia. Trial iron solution in Orange juice 12/16    Junctional bradycardia     Symptomatic, resolved with discontinuation of Verapamil, 05/09. 1.1) Echocardiogram: LAE, LVH, EF 50%, mild MR, diastolic. 1.2) Dr. Guilherme Webber evaluation. 1.3.) Persantine stress test negative, 05/09.  Migraine     Mild CAD     cath 10/15    Mitral regurgitation     Moderate to severe on echocardiogram September 2015.   Right pressure 76 grade 2 diastolic dysfunction,  echo 66/06 grade 2 diastolic dysfunction mild to moderate MR RVSP 56 aortic sclerosis    Obesity     CHICO (obstructive sleep apnea)     CPAP 14 2/15 initiation  AHI 7  mild CHICO intolerant CPAP    Osteoarthritis     PMR (polymyalgia rheumatica) (HCC)     Pneumonia     Premature atrial contractions     Pulmonary hypertension (HCC)     -Moderate on echo November 2014    Restrictive lung disease     Mild on PFTs 11/14    Type II or unspecified type diabetes mellitus without mention of complication, not stated as uncontrolled     Vitreous floaters        Family History   Problem Relation Age of Onset    Uterine Cancer Mother     Stroke Other         Apparently from a vascular malformation    Heart Disease Other         Positive family history    High Blood Pressure Other         Positive family history    Heart Attack Child 48        Son       Updated and reviewed pertinent [facility]    Allergies   Allergen Reactions    Codeine      Confusion      Erythromycin      GI upset    Exenatide Nausea Only    Levofloxacin      GI upset    Verapamil Other (See Comments)     Junctional bradycardia    Clonidine Derivatives Rash     2009, catapres    Other Rash     Levbid    Penicillins Rash       ROS   General: Denies fever, chills, night sweats, or recent weight changes. + fatigue   Skin: Denies any rashes/wounds. HEENT: Denies any headaches. Denies any blurred vision, double vision, or eye pain. Denies any tinnitus, vertigo, or dizziness. Denies discharge, stuffiness, obstruction, or epistaxis. Denies any hoarseness, dysphagia/ pain. Cardiovascular: Denies any chest pain, shortness of breath, dyspnea on exertion, orthopnea, or palpations. Respiratory: Denies cough, sputum production, hemoptysis, or wheezing. Gastrointestinal: Denies any nausea, vomiting, diarrhea, constipation, or melena. Genitourinary: Denies any dysuria, hematuria, frequency, urgency, or hesitancy. Endocrine:  Denies any heat or cold intolerances, polydipsia, polyuria, or polyphagia. Musculoskeletal: Denies any arthritis, arthralagias, myalgias, or muscle weakness. Neuropsychiatric: Denies any depression, syncope, tremors, seizures, anxiety or nervousness.   + dizziness     Physical Exam:  Vitals BP (!) 136/58 (Site: Left Upper Arm, Position: Sitting, Cuff Size: Large Adult)   Pulse 80   Temp 97.7 °F (36.5 °C) (Tympanic)   Resp 18   Ht 5' 1\" (1.549 m)   Wt 202 lb 12.8 oz (92 kg)   SpO2 98%   BMI 38.32 kg/m²   General Dizziness  Appears this is related to hypoglycemia. Please see #7    9. Diabetic polyneuropathy associated with type 2 diabetes mellitus ([facility] [address]  As noted above. Currently neuropathy well controlled. 10. PMR   Marco on low-dose of prednisone. Stopped she tends to develop increased symptoms. Continue to maintain on lower dose. 11. ? Of a fib  No documented strips t show a fib  Blood thinner was stopped by cardio previously  Due to weakness dizziness and no documentation of a fib stop amio and discuss  This with cardio later this week at [facility]       Decision making of moderate complexity   Hospital records reviewed.    Follow up for routine visit, call sooner with any concerns prior    Electronically signed by INGRID Lane CNP on 6/4/2019 at 10:29 PM

## 2019-06-06 ENCOUNTER — OFFICE VISIT (OUTPATIENT)
Dept: CARDIOLOGY | Age: 84
End: 2019-06-06
Payer: MEDICARE

## 2019-06-06 VITALS
WEIGHT: 198 LBS | SYSTOLIC BLOOD PRESSURE: 166 MMHG | DIASTOLIC BLOOD PRESSURE: 60 MMHG | HEART RATE: 70 BPM | HEIGHT: 61 IN | BODY MASS INDEX: 37.38 KG/M2

## 2019-06-06 DIAGNOSIS — I50.32 CHRONIC DIASTOLIC HEART FAILURE (HCC): Primary | ICD-10-CM

## 2019-06-06 PROCEDURE — 99214 OFFICE O/P EST MOD 30 MIN: CPT | Performed by: INTERNAL MEDICINE

## 2019-06-06 RX ORDER — CARVEDILOL 12.5 MG/1
12.5 TABLET ORAL 2 TIMES DAILY
Qty: 180 TABLET | Refills: 1 | Status: SHIPPED | OUTPATIENT
Start: 2019-06-06 | End: 2019-06-17

## 2019-06-06 RX ORDER — MECLIZINE HYDROCHLORIDE 25 MG/1
TABLET ORAL
Qty: 90 TABLET | Refills: 1 | Status: SHIPPED | OUTPATIENT
Start: 2019-06-06 | End: 2019-06-25 | Stop reason: ALTCHOICE

## 2019-06-06 NOTE — PROGRESS NOTES
Today's Date: 6/6/2019  Patient Name: Apurva Sanchez  Patient's age: 81 y.o., 1935          The patient is a 81 y.o.  female is in the office for f/u post hospital. Denies any CP, SOB, orthopnea, pnd.   Admitted due to HFpEF, complicated by TEJ. Transferred to [facility], diuresed with help of nephro. Improved, and later discharged. Denies any complaints today, but BP has been higher off cozar. Past Medical History:   has a past medical history of Allergic rhinitis, Asthma, Basal cell carcinoma of cheek, Basal cell carcinoma of leg, CAD (coronary artery disease), Central retinal artery occlusion, Cerebral artery occlusion with cerebral infarction [facility]), Cerebrovascular disease, CHF (congestive heart failure) ([address] Diverticulosis, Dyslipidemia, Elevated antinuclear antibody (ZAIRA) level, Esophagitis, Foraminal stenosis of lumbar region, Hyperlipidemia, Hypertension, IBS (irritable bowel syndrome), Iron deficiency anemia, Junctional bradycardia, Migraine, Mild CAD, Mitral regurgitation, Obesity, CHICO (obstructive sleep apnea), Osteoarthritis, PMR (polymyalgia rheumatica) ([address] Pneumonia, Premature atrial contractions, Pulmonary hypertension ([address] Restrictive lung disease, Type II or unspecified type diabetes mellitus without mention of complication, not stated as uncontrolled, and Vitreous floaters. Past Surgical History:   has a past surgical history that includes Appendectomy (1952); Hysterectomy (Approx 1983); Cataract removal (Bilateral, 08/10); malignant skin lesion excision (Right, 12/10 and 04/11); malignant skin lesion excision (Right, 02/2012); Colonoscopy (05/16/2011); other surgical history (09/06/2011); Cholecystectomy; Endoscopy, colon, diagnostic; eye surgery; Cardiac catheterization (2014); Upper gastrointestinal endoscopy (05/16/2011);  Upper gastrointestinal endoscopy (6/22/15); other surgical history (3/22/16); other surgical history (Right, 4/26/16); Cystoscopy (Right, 2/6/2019); and Cystoscopy (Right, 2/27/2019). Home Medications:    Prior to Admission medications    Medication Sig Start Date End Date Taking? Authorizing Provider   Polyethylene Glycol 400 (BLINK TEARS) 0.25 % SOLN Apply to eye as needed   Yes Historical Provider, MD   predniSONE (DELTASONE) 5 MG tablet TAKE 1 TABLET DAILY 4/18/19  Yes Arcelia Logan MD   B-D ULTRAFINE III SHORT PEN 31G X 8 MM MISC USE 7 DAILY 3/28/19  Yes Gaurang Cherry DO   amiodarone (CORDARONE) 200 MG tablet Take 1 tablet by mouth daily 3/8/19  Yes Arcelia Logan MD   furosemide (LASIX) 40 MG tablet Take 1 tablet by mouth daily 2/13/19  Yes Amy Innocent   simvastatin (ZOCOR) 20 MG tablet Take 1 tablet by mouth nightly 12/27/18  Yes Sam Nurse, APRN - CNP   ferrous sulfate 220 (44 Fe) MG/5ML solution TAKE 10 MLS BY MOUTH DAILY. MIX WITH 2 OUNCES OF ORANGE JUICE  Patient taking differently: TAKE 10 MLS BY MOUTH DAILY.  MIX WITH 2 OUNCES OF ORANGE JUICE - Takes about every 3rd day 11/28/18  Yes Arcelia Logan MD   hydrALAZINE (APRESOLINE) 25 MG tablet TAKE 1 TABLET THREE TIMES A DAY 10/30/18  Yes Arcelia Logan MD   VICTOZA 18 MG/3ML SOPN SC injection INJECT 1.2 MG INTO THE SKIN DAILY 10/12/18  Yes Arcelia Logan MD   LANTUS SOLOSTAR 100 UNIT/ML injection pen INJECT 50 UNITS IN THE MORNING AND 48 UNITS AT BEDTIME  Patient taking differently: INJECT 45 UNITS IN THE MORNING AND 45 UNITS AT BEDTIME 9/26/18  Yes Arcelia Logan MD   metoprolol succinate (TOPROL XL) 50 MG extended release tablet TAKE 1 TABLET DAILY 9/17/18  Yes Arcelia Logan MD   potassium chloride (KLOR-CON M10) 10 MEQ extended release tablet TAKE 1 TABLET DAILY 8/6/18  Yes Arcelia Logan MD   amLODIPine (NORVASC) 5 MG tablet TAKE 1 TABLET DAILY 6/29/18  Yes Arcelia Logan MD   HUMALOG KWIKPEN 100 UNIT/ML pen INJECT 10 UNITS WITH BREAKFAST, 16 UNITS AT NOON, 24 UNITS WITH SUPPER AND 10 UNITS AT NIGHT AS NEEDED  Patient taking differently: INJECT 10 UNITS WITH BREAKFAST, 16 UNITS AT NOON, 20 UNITS WITH SUPPER AND 10 UNITS AT NIGHT AS NEEDED 6/22/18  Yes Umm Muller MD   Handmichaela Christine MISC by Does not apply route EXP: 6/12/2020 6/12/18  Yes Umm Muller MD   aspirin 81 MG EC tablet Take 81 mg by mouth daily   Yes Historical Provider, MD   glucose blood VI test strips (ASCENSIA AUTODISC VI;ONE TOUCH ULTRA TEST VI) strip 1 each by In Vitro route 3 times daily As directed. 7/13/17  Yes Gaurang Cherry DO   acetaminophen (TYLENOL) 500 MG tablet Take 500 mg by mouth daily   Yes Historical Provider, MD   omeprazole (PRILOSEC) 20 MG capsule Take 20 mg by mouth Daily  1/14/15  Yes David Angeles   Cholecalciferol (VITAMIN D3) 2000 UNITS CAPS Take 2,000 Units by mouth daily    Yes Historical Provider, MD       Allergies:  Codeine; Erythromycin; Exenatide; Levofloxacin; Verapamil; Clonidine derivatives; Other; and Penicillins    Social History:   reports that she has never smoked. She has never used smokeless tobacco. She reports that she does not drink alcohol or use drugs. REVIEW OF SYSTEMS:  CONSTITUTIONAL:NEGATIVE  HEENT:NEG  Cardiovascular: Yes chest pain, Yes dyspnea on exertion, Yes palpitations. Lower extremity edema: No  RESPIRATORY: HILL  GASTROINTESTINAL:  positive for reflux  GENITOURINARY:  negative  INTEGUMENT:  negative  MUSCULOSKELETAL:  positive for  pain  NEUROLOGICAL:  negative    PHYSICAL EXAM:      BP (!) 166/60   Pulse 70   Ht 5' 1\" (1.549 m)   Wt 198 lb (89.8 kg)   BMI 37.41 kg/m²    HEENT: PERRL, no cervical lymphadenopathy. No masses palpable. Cardiovascular:  · The apical impulse is not displaced  · Heart  Sounds:RRR,   · Jugular venous pulsation Normal  · The carotid upstroke is NL  · Peripheral pulses are symmetrical and full  Respiratory: Good respiratory effort.  On auscultation: clear to auscultation bilaterally  Abdomen:  · No masses or tenderness  · Bowel sounds present  Extremities:  ·  No Cyanosis or Clubbing  ·  Lower extremity edema: No  Skin: Warm and dry    Cardiac data:        Echo (10/7/2016): FINDINGS:   1. Normal left ventricular dimension with mild concentric left ventricular hypertrophy. 2. Normal systolic left ventricular function. Ejection fraction is 55 to 60%. 3. Grade II diastolic dysfunction with increased left atrial filling pressure. 4. Biatrial enlargement. 5. Mildly dilated right ventricle with normal systolic function. 6. Mitral annular calcification. Mild to moderate mitral regurgitation. 7. Aortic sclerosis. 8. Mild tricuspid regurgitation. RVSP is 56 mm Hg. 9. Trivial pericardial effusion. Echo 5/28/19:  Mild left ventricular hypertrophy. Left ventricular systolic function is normal.  No segmental wall motion abnormalities seen. Left ventricular ejection fraction 60 %. Grade II (moderate) left ventricular diastolic dysfunction. Left atrium is mildly dilated. Right atrial dilatation. Aortic valve is sclerotic but opens well. Mitral annular calcification. Moderate mitral regurgitation. Normal tricuspid valve leaflets. Moderate tricuspid regurgitation. Estimated right ventricular systolic pressure is 67 mmHg. Moderate pulmonary hypertension. No significant pericardial effusion is seen.     Labs:     FASTING LIPID PANEL:  Lab Results   Component Value Date    HDL 62 05/30/2019    TRIG 114 05/30/2019     Cardiac cath 10/2015:  Minimal CAD with normal EF. IMPRESSION:     1. HFpEF recent admission- complicated by TEJ. Resolving. 2. H/o Multifocal atrial tachycardia:  2. PVCs/APCs with runs of PATs. 3. Mild to moderate MR.  4. Preserved LV systolic function. 5. Grade II diastolic dysfunction. 6. Minimal CAD on cardiac cath. 7. Mild PHT  8. CHICO  9. Hyperlipidemia: on Statins. 10. HTN- not controlled   11.  TEJ/CKD- fololwed by nephro     RECOMMENDATIONS:    - Stop amio (no AFib)   - continue lasix  - keep off ACE/ARB  - change toprol to coreg for better BP control  - continue other meds    Follow up in 3 months. Thank you for allowing me to participate in the care of this patient, please do not hesitate to call if you have any questions. Kirt North DO, [facility], [address] [facility]  [facility]. [facility]  [phone]

## 2019-06-07 ENCOUNTER — CARE COORDINATION (OUTPATIENT)
Dept: CASE MANAGEMENT | Age: 84
End: 2019-06-07

## 2019-06-07 NOTE — CARE COORDINATION
[facility] Transitions Follow Up Call    2019    Patient: Jeffrey Limb  Patient : 1935   MRN: [id number]  Reason for Admission: CHF  Discharge Date: 19 RARS: Readmission Risk Score: 29       Attempt to reach patient for Care Transitions. Needed remote access code, unable to leave VM or contact inforamtion    Care Transitions Subsequent and Final Call    Subsequent and Final Calls  Care Transitions Interventions  Other Interventions:             Follow Up  Future Appointments   Date Time Provider Loyda Foster   2019  8:00 AM Syd Seay MD [facility]   2019  9:00 AM INGRID Reno - CNP DDIAED [facility]   7/3/2019 10:30 AM INGRID Hernandez CNP DINT [facility]   7/3/2019  1:45 PM Porfirio Camacho MD DPULM [facility]   2019  2:00 PM Agustín Key MD DCARDIO [facility]   2019  9:30 AM INGRID Hodges - CNP  [facility]   2019 10:30 AM Lisbeth [address] [place]   6/10/2020  8:40 AM MD CRICKET Smith [facility]       Mishel Zuñiga, VANESSA
MD CRICKET Molina [facility]       Maurice Chu RN

## 2019-06-11 ENCOUNTER — CARE COORDINATION (OUTPATIENT)
Dept: CARE COORDINATION | Age: 84
End: 2019-06-11

## 2019-06-11 ENCOUNTER — CARE COORDINATION (OUTPATIENT)
Dept: CASE MANAGEMENT | Age: 84
End: 2019-06-11

## 2019-06-11 RX ORDER — AMLODIPINE BESYLATE 5 MG/1
TABLET ORAL
Qty: 90 TABLET | Refills: 3 | Status: SHIPPED | OUTPATIENT
Start: 2019-06-11 | End: 2019-06-17

## 2019-06-11 NOTE — CARE COORDINATION
Registered Dietitian Initial Assessment for Care Coordination      Name-Susan Ojeda  June 11, 2019    Initial Referral Reason: low sodium diet    Patient Care Team:  INGRID Lane CNP as PCP - General (Nurse Practitioner)  INGRID Lane CNP as PCP - [facility] Empaneled Provider  Donavan Landrum MA as Care Transition  Justin Govea RN as Care Transition  Lacy Ohara RN as Care Transition  Sharron Hanna RN as Care Transition  Fatoumata Arechiga RD, LD as Dietitian    Patient Active Problem List   Diagnosis    Dyslipidemia    Osteoarthritis    Esophagitis    Vitamin D deficiency    PMR (polymyalgia rheumatica) ([address]    Central artery occlusion of retina    Diastolic dysfunction    Pulmonary HTN ([address]    Iron deficiency anemia due to chronic blood loss    Type 2 diabetes with nephropathy ([address]    CHICO- intolerant CPAP    Obesity, morbid, BMI 40.0-49.9 ([address]    Essential hypertension    Lumbar radiculopathy    Neural foraminal stenosis of lumbar spine    Spinal stenosis at L4-L5 level    Mitral regurgitation    Frequent PVCs    Asthma    Gait abnormality    Asthma    Nephrolithiasis    Right kidney stone    Hypokalemia    Multifocal atrial tachycardia (HCC)    SOB (shortness of breath)    Acute on chronic diastolic congestive heart failure (HCC)    Acute on chronic diastolic heart failure (HCC)    TEJ (acute kidney injury) ([address]    Sigmoid diverticulitis       Current Outpatient Medications   Medication Sig Dispense Refill    insulin glargine (LANTUS SOLOSTAR) 100 UNIT/ML injection pen Inject 20 Units into the skin 2 times daily      insulin lispro (HUMALOG KWIKPEN) 100 UNIT/ML pen Inject 8 Units into the skin 4 times daily (with meals and nightly) Indications: pt takes before meals, and before HS snack      carvedilol (COREG) 12.5 MG tablet Take 1 tablet by mouth 2 times daily 180 tablet 1    meclizine (ANTIVERT) 25 MG tablet Take 1/2 tab TID 90 Date    CHOL 142 05/30/2019    CHOL 142 05/28/2019    CHOL 173 07/10/2018     Lab Results   Component Value Date    TRIG 114 05/30/2019    TRIG 59 05/28/2019    TRIG 136 07/10/2018     Lab Results   Component Value Date    HDL 62 05/30/2019    HDL 74 05/28/2019    HDL 69 07/10/2018     Lab Results   Component Value Date    LDLCHOLESTEROL 57 05/30/2019    LDLCHOLESTEROL 56 05/28/2019    LDLCHOLESTEROL 77 07/10/2018     Lab Results   Component Value Date    VLDL NOT REPORTED 05/30/2019    VLDL NOT REPORTED 05/28/2019    VLDL NOT REPORTED 07/10/2018     Lab Results   Component Value Date    CHOLHDLRATIO 2.3 05/30/2019    CHOLHDLRATIO 1.9 05/28/2019    CHOLHDLRATIO 2.5 07/10/2018       Lab Results   Component Value Date    WBC 14.4 (H) 06/04/2019    HGB 10.8 (L) 06/04/2019    HCT 35.9 (L) 06/04/2019    MCV 78.2 (L) 06/04/2019     06/04/2019       Lab Results   Component Value Date    CREATININE 1.23 (H) 06/04/2019    BUN 21 06/04/2019     06/04/2019    K 4.3 06/04/2019    CL 98 06/04/2019    CO2 32 (H) 06/04/2019         NUTRITION DIAGNOSIS    #1 Problem  Food and nutrition-related knowledge deficit       Etiology  related to high sodium foods       Signs/Symptoms  as evidenced by patient unsure what to eat on 2gm sodium diet    NUTRITION INTERVENTION  Nutrition Prescription: used adj. Wt. Of 130#    2gm sodium diet     Estimated daily CHO Needs: 45 gms/meal, 15gms/snack  Calorie needs: 1500cals/d  Protein needs: 50-59gms/d  Fluid needs: 1800cc/day      Patient Goals:  1. Patient will avoid/limit canned, processed and prepackaged foods. Discussed sauces, seasonings high in sodium to avoid. 2. Patient will shop the outer rim of the grocery store where fresh foods are found. 3. Patient will not add salt to foods. Patient will be provided with list of seasonings she can use that do not contain salt. 4. Patient will limit sodium intake by reading food labels to <2gm/day.  Encouraged patient to look at serving size on package and mg of sodium/serving. Goal is to choose foods with <140mg/serving. Nutrition Intervention Need:  Initial/Brief:  Comprehensive Nutrition Education: X  Coordination of other care during nutrition care:    Monitoring and Evaluation:  Ability to plan meals/snacks:  Ability to select healthy foods/meals: X  Food and Nutrition Knowledge: X  Self Monitoring:  Physical Activity:  Energy intake:  Fluid/beverage intake:  Fat/chol intake:   Carb intake: Other: sodium intake X    Plan:  1. Will continue to educate patient on avoiding processed, prepackaged and canned foods, reading food labels and limiting sodium intake to <2,000mg/day. Will also review seasonings that don't contain salt and shopping the outer rim of the grocery store. Patient will be mailed nutrition information on all the above discussed. 2. Will follow up with patient in 2-3 weeks to review nutrition information and answer questions.     SAJI Whatley

## 2019-06-11 NOTE — CARE COORDINATION
DCASHAMRILA [facility]   2019  9:30 AM Nicky Mcdonald, APRN - CNP  DPP   2019 10:30 AM MARY ALVARADO [facility] [place]   6/10/2020  8:40 AM MD CRICKET Lyles [facility]       Estefani Harrington RN

## 2019-06-12 DIAGNOSIS — D50.9 IRON DEFICIENCY ANEMIA, UNSPECIFIED IRON DEFICIENCY ANEMIA TYPE: Primary | ICD-10-CM

## 2019-06-12 DIAGNOSIS — I10 ESSENTIAL HYPERTENSION: ICD-10-CM

## 2019-06-13 ENCOUNTER — OFFICE VISIT (OUTPATIENT)
Dept: ONCOLOGY | Age: 84
End: 2019-06-13
Payer: MEDICARE

## 2019-06-13 ENCOUNTER — HOSPITAL ENCOUNTER (OUTPATIENT)
Dept: LAB | Age: 84
Discharge: HOME | End: 2019-06-13
Payer: MEDICARE

## 2019-06-13 VITALS
HEART RATE: 68 BPM | DIASTOLIC BLOOD PRESSURE: 48 MMHG | TEMPERATURE: 98 F | WEIGHT: 202.6 LBS | SYSTOLIC BLOOD PRESSURE: 114 MMHG | HEIGHT: 61 IN | BODY MASS INDEX: 38.25 KG/M2

## 2019-06-13 DIAGNOSIS — I10 ESSENTIAL HYPERTENSION: ICD-10-CM

## 2019-06-13 DIAGNOSIS — R71.8 MICROCYTOSIS: ICD-10-CM

## 2019-06-13 DIAGNOSIS — K86.89 MASS OF PANCREAS: ICD-10-CM

## 2019-06-13 DIAGNOSIS — N18.30 STAGE 3 CHRONIC KIDNEY DISEASE (HCC): ICD-10-CM

## 2019-06-13 DIAGNOSIS — D50.9 IRON DEFICIENCY ANEMIA, UNSPECIFIED IRON DEFICIENCY ANEMIA TYPE: ICD-10-CM

## 2019-06-13 DIAGNOSIS — D64.9 ANEMIA, UNSPECIFIED TYPE: ICD-10-CM

## 2019-06-13 DIAGNOSIS — D64.9 ANEMIA, UNSPECIFIED TYPE: Primary | ICD-10-CM

## 2019-06-13 LAB
ABSOLUTE EOS #: 0.1 K/UL (ref 0–0.4)
ABSOLUTE IMMATURE GRANULOCYTE: ABNORMAL K/UL (ref 0–0.3)
ABSOLUTE LYMPH #: 1.6 K/UL (ref 1–4.8)
ABSOLUTE MONO #: 0.8 K/UL (ref 0.1–1.2)
ABSOLUTE RETIC #: 0.08 M/UL (ref 0.03–0.08)
BASOPHILS # BLD: 1 % (ref 0–1)
BASOPHILS ABSOLUTE: 0.1 K/UL (ref 0–0.2)
DIFFERENTIAL TYPE: ABNORMAL
EOSINOPHILS RELATIVE PERCENT: 1 % (ref 1–7)
HCT VFR BLD CALC: 35 % (ref 36–46)
HEMOGLOBIN: 10.8 G/DL (ref 12–16)
IMMATURE GRANULOCYTES: ABNORMAL %
IMMATURE RETIC FRACT: 26.3 % (ref 2.7–18.3)
IRON SATURATION: 9 % (ref 20–55)
IRON SATURATION: 9 % (ref 20–55)
IRON: 29 UG/DL (ref 37–145)
IRON: 29 UG/DL (ref 37–145)
LYMPHOCYTES # BLD: 15 % (ref 16–46)
MCH RBC QN AUTO: 24.4 PG (ref 26–34)
MCHC RBC AUTO-ENTMCNC: 31 G/DL (ref 31–37)
MCV RBC AUTO: 78.6 FL (ref 80–100)
MONOCYTES # BLD: 7 % (ref 4–11)
NRBC AUTOMATED: ABNORMAL PER 100 WBC
PDW BLD-RTO: 19.6 % (ref 11–14.5)
PLATELET # BLD: 243 K/UL (ref 140–450)
PLATELET ESTIMATE: ABNORMAL
PMV BLD AUTO: 9.4 FL (ref 6–12)
RBC # BLD: 4.45 M/UL (ref 4–5.2)
RBC # BLD: ABNORMAL 10*6/UL
RETIC %: 1.8 % (ref 0.5–1.9)
RETIC HEMOGLOBIN: 26.5 PG (ref 28.2–35.7)
SEG NEUTROPHILS: 76 % (ref 43–77)
SEGMENTED NEUTROPHILS ABSOLUTE COUNT: 8 K/UL (ref 1.8–7.7)
TOTAL IRON BINDING CAPACITY: 336 UG/DL (ref 250–450)
TOTAL IRON BINDING CAPACITY: 336 UG/DL (ref 250–450)
UNSATURATED IRON BINDING CAPACITY: 307 UG/DL (ref 112–347)
UNSATURATED IRON BINDING CAPACITY: 307 UG/DL (ref 112–347)
WBC # BLD: 10.6 K/UL (ref 3.5–11)
WBC # BLD: ABNORMAL 10*3/UL

## 2019-06-13 PROCEDURE — 83550 IRON BINDING TEST: CPT

## 2019-06-13 PROCEDURE — 36415 COLL VENOUS BLD VENIPUNCTURE: CPT

## 2019-06-13 PROCEDURE — 83540 ASSAY OF IRON: CPT

## 2019-06-13 PROCEDURE — 85025 COMPLETE CBC W/AUTO DIFF WBC: CPT

## 2019-06-13 PROCEDURE — 85045 AUTOMATED RETICULOCYTE COUNT: CPT

## 2019-06-13 PROCEDURE — 99204 OFFICE O/P NEW MOD 45 MIN: CPT | Performed by: INTERNAL MEDICINE

## 2019-06-13 NOTE — PROGRESS NOTES
Faviola Carlson                                                                                                                  6/13/2019  MRN:   [id number]  YOB: 1935  PCP:                           INGRID Prajapati - CNP  Referring Physician: Melvina Whaley  Treating Physician Name: Renata Damon MD      Reason for consultation:  Anemia and pancreatic head mass    Current problems/ Active and recent treatments:  Microcytic anemia  Pancreatic head nodule  CKD  Bronchiectasis    Summary of Case/History:    Faviola Carlson a 81 y. o.female who presents to the office to establish care and for further evaluation treatment recommendations. History was obtained to the patient, her  and electronic medical record. Patient has multiple medical problems as listed below. She was recently hospitalized at [facility] with complaints of shortness of breath. Patient also has history of chronic anemia. She has been on iron supplements in the past but not tolerate oral iron supplements and stopped taking them. Patient anemia work-up in the hospital was unremarkable for TSH, paraproteinemia profile. Ferritin was also within normal range. Patient also had a CT abdomen pelvis done which showed a pancreatic head nodule which had been there for 2017 but has increased in size. Patient denies any work-up regarding the pancreatic head noted in the past.  Patient has not had a colonoscopy for multiple years. Denies any bloody bowel movements. Past Medical History:   Past Medical History:   Diagnosis Date    Allergic rhinitis     Asthma     PFT's, 04/06, actually showed mild restrictive defect.  Basal cell carcinoma of cheek 2007    Right cheek    Basal cell carcinoma of leg     right leg    CAD (coronary artery disease)     With 40% stenosis of the LAD, 07/10, ejection fraction 60%.   Repeat heart cath9/14 with 40-50 percent LAD lesion first diagonal 50% lesion rec med Rx  Osteoarthritis     PMR (polymyalgia rheumatica) (HCC)     Pneumonia     Premature atrial contractions     Pulmonary hypertension (HCC)     -Moderate on echo November 2014    Restrictive lung disease     Mild on PFTs 11/14    Type II or unspecified type diabetes mellitus without mention of complication, not stated as uncontrolled     Vitreous floaters        Past Surgical History:     Past Surgical History:   Procedure Laterality Date    APPENDECTOMY  1952    CARDIAC CATHETERIZATION  2014    CATARACT REMOVAL Bilateral 08/10    CHOLECYSTECTOMY      COLONOSCOPY  05/16/2011    CYSTOSCOPY Right 2/6/2019    Cysto with right stent insertion and villareal catheter performed by Greg Wang MD at [address] Right 2/27/2019    CYSTO right stent removal  Right Ureteroscopy Holmium Laser right stent exchange performed by Greg Wang MD at [address] [facility], [address]  [facility] Right 12/10 and 04/11    Basal CellRemoved from right neck    MALIGNANT SKIN LESION EXCISION Right 02/2012    Basal Cell Removed from right leg    OTHER SURGICAL HISTORY  09/06/2011    capsule endoscopy    OTHER SURGICAL HISTORY  3/22/16    right L4 and L5 TFE    OTHER SURGICAL HISTORY Right 4/26/16    L4, L5 TFE    UPPER GASTROINTESTINAL ENDOSCOPY  05/16/2011    UPPER GASTROINTESTINAL ENDOSCOPY  6/22/15    mild gastritis (Dr. Terrell Davidson)       Patient Family Social History:  Family History   Problem Relation Age of Onset    Uterine Cancer Mother     Stroke Other         Apparently from a vascular malformation    Heart Disease Other         Positive family history    High Blood Pressure Other         Positive family history    Heart Attack Child 48        Son      reports that she has never smoked. She has never used smokeless tobacco. She reports that she does not drink alcohol or use drugs.      Current Medications:     Current Outpatient Medications   Medication Sig Dispense Refill    amLODIPine (NORVASC) 5 MG tablet TAKE 1 TABLET DAILY 90 tablet 3    insulin glargine (LANTUS SOLOSTAR) 100 UNIT/ML injection pen Inject 20 Units into the skin 2 times daily      insulin lispro (HUMALOG KWIKPEN) 100 UNIT/ML pen Inject 8 Units into the skin 4 times daily (with meals and nightly) Indications: pt takes before meals, and before HS snack      carvedilol (COREG) 12.5 MG tablet Take 1 tablet by mouth 2 times daily 180 tablet 1    predniSONE (DELTASONE) 5 MG tablet TAKE 1 TABLET DAILY 90 tablet 3    B-D ULTRAFINE III SHORT PEN 31G X 8 MM MISC USE 7 DAILY 270 each 3    furosemide (LASIX) 40 MG tablet Take 1 tablet by mouth daily 30 tablet 0    simvastatin (ZOCOR) 20 MG tablet Take 1 tablet by mouth nightly 90 tablet 3    hydrALAZINE (APRESOLINE) 25 MG tablet TAKE 1 TABLET THREE TIMES A  tablet 3    VICTOZA 18 MG/3ML SOPN SC injection INJECT 1.2 MG INTO THE SKIN DAILY 18 mL 3    potassium chloride (KLOR-CON M10) 10 MEQ extended release tablet TAKE 1 TABLET DAILY 90 tablet 3    Handicap Placard MISC by Does not apply route EXP: 6/12/2020 1 each 0    aspirin 81 MG EC tablet Take 81 mg by mouth daily      glucose blood VI test strips (ASCENSIA AUTODISC VI;ONE TOUCH ULTRA TEST VI) strip 1 each by In Vitro route 3 times daily As directed. 100 each 5    acetaminophen (TYLENOL) 500 MG tablet Take 500 mg by mouth daily      omeprazole (PRILOSEC) 20 MG capsule Take 20 mg by mouth Daily  30 capsule 3    Cholecalciferol (VITAMIN D3) 2000 UNITS CAPS Take 2,000 Units by mouth daily       meclizine (ANTIVERT) 25 MG tablet Take 1/2 tab TID 90 tablet 1    Polyethylene Glycol 400 (BLINK TEARS) 0.25 % SOLN Apply to eye as needed      ferrous sulfate 220 (44 Fe) MG/5ML solution TAKE 10 MLS BY MOUTH DAILY. MIX WITH 2 OUNCES OF ORANGE JUICE (Patient taking differently: TAKE 10 MLS BY MOUTH DAILY.  MIX WITH 2 OUNCES OF ORANGE JUICE - Takes about every 3rd day) 473 mL 5     No current facility-administered medications for this visit. Allergies:   Codeine; Erythromycin; Exenatide; Levofloxacin; Verapamil; Clonidine derivatives; Other; and Penicillins    Review of Systems:    Constitutional: No fever or chills. No night sweats, no weight loss positive fatigue  Eyes: No eye discharge, double vision, or eye pain   HEENT: negative for sore mouth, sore throat, hoarseness and voice change   Respiratory: negative for cough , sputum, dyspnea, wheezing, hemoptysis, chest pain   Cardiovascular: negative for chest pain, dyspnea, palpitations, orthopnea, PND   Gastrointestinal: negative for nausea, vomiting, diarrhea, constipation, abdominal pain, Dysphagia, hematemesis and hematochezia   Genitourinary: negative for frequency, dysuria, nocturia, urinary incontinence, and hematuria   Integument: negative for rash, skin lesions, bruises.    Hematologic/Lymphatic: negative for easy bruising, bleeding, lymphadenopathy, or petechiae   Endocrine: negative for heat or cold intolerance,weight changes, change in bowel habits and hair loss   Musculoskeletal: negative for myalgias, arthralgias, pain, joint swelling,and bone pain   Neurological: negative for headaches, dizziness, seizures, weakness, numbness        Physical Exam:    Vitals: BP (!) 114/48   Pulse 68   Temp 98 °F (36.7 °C)   Ht 5' 1\" (1.549 m)   Wt 202 lb 9.6 oz (91.9 kg)   BMI 38.28 kg/m²   General appearance - well appearing, no in pain or distress  Mental status - AAO X3  Eyes - pupils equal and reactive, extraocular eye movements intact  Mouth - mucous membranes moist, pharynx normal without lesions  Neck - supple, no significant adenopathy  Lymphatics - no palpable lymphadenopathy, no hepatosplenomegaly  Chest - clear to auscultation, no wheezes, rales or rhonchi, symmetric air entry  Heart - normal rate, regular rhythm, normal S1, S2, no murmurs  Abdomen - soft, nontender, nondistended, no masses or organomegaly  Neurological - alert, oriented, normal speech, no focal findings or movement disorder noted  Extremities - peripheral pulses normal, no pedal edema, no clubbing or cyanosis  Skin - normal coloration and turgor, no rashes, no suspicious skin lesions noted       DATA:    Results for orders placed or performed during the hospital encounter of 06/04/19   Vitamin B12   Result Value Ref Range    Vitamin B-12 474 232 - 1245 pg/mL   Folate   Result Value Ref Range    Folate 17.1 >4.8 ng/mL   Ferritin   Result Value Ref Range    Ferritin 64 13 - 150 ug/L   Basic Metabolic Panel   Result Value Ref Range    Glucose 113 (H) 70 - 99 mg/dL    BUN 21 8 - 23 mg/dL    CREATININE 1.23 (H) 0.50 - 0.90 mg/dL    Bun/Cre Ratio 17 9 - 20    Calcium 9.9 8.6 - 10.4 mg/dL    Sodium 143 135 - 144 mmol/L    Potassium 4.3 3.7 - 5.3 mmol/L    Chloride 98 98 - 107 mmol/L    CO2 32 (H) 20 - 31 mmol/L    Anion Gap 13 9 - 17 mmol/L    GFR Non-African American 42 (L) >60 mL/min    GFR  51 (L) >60 mL/min    GFR Comment          GFR Staging NOT REPORTED    CBC   Result Value Ref Range    WBC 14.4 (H) 3.5 - 11.0 k/uL    RBC 4.58 4.0 - 5.2 m/uL    Hemoglobin 10.8 (L) 12.0 - 16.0 g/dL    Hematocrit 35.9 (L) 36 - 46 %    MCV 78.2 (L) 80 - 100 fL    MCH 23.6 (L) 26 - 34 pg    MCHC 30.2 (L) 31 - 37 g/dL    RDW 18.3 (H) 11.0 - 14.5 %    Platelets 181 541 - 281 k/uL    MPV 9.4 6.0 - 12.0 fL    NRBC Automated NOT REPORTED per 100 WBC   Reticulocytes   Result Value Ref Range    Retic % 2.1 (H) 0.5 - 1.9 %    Absolute Retic # 0.100 (H) 0.030 - 0.080 M/uL    Immature Retic Fract 35.000 (H) 2.7 - 18.3 %    Retic Hemoglobin 24.6 (L) 28.2 - 35.7 pg     Xr Chest (single View Frontal)    Result Date: 5/29/2019  EXAMINATION: ONE XRAY VIEW OF THE CHEST 5/29/2019 7:59 am COMPARISON: May 28, 2019 HISTORY: ORDERING SYSTEM PROVIDED HISTORY: chf TECHNOLOGIST PROVIDED HISTORY: chf Ordering Physician Provided Reason for Exam: Dizziness and dyspnea with exertion Acuity: Acute Type of Exam: Subsequent/Follow-up FINDINGS: Cardiac monitoring leads overlie the chest.  The heart is. Mild central vascular congestion. Hazy opacity base may represent effusion tissue attenuation. No.     Mild central vascular congestion. Volume overload is suspected. Xr Chest Standard (2 Vw)    Result Date: 5/27/2019  EXAMINATION: TWO XRAY VIEWS OF THE CHEST 5/27/2019 8:26 pm COMPARISON: April 25, 2019 HISTORY: ORDERING SYSTEM PROVIDED HISTORY: shortness of breath TECHNOLOGIST PROVIDED HISTORY: shortness of breath Ordering Physician Provided Reason for Exam: Shortness of breath for the last 2 days, hx of asthma, has had hx similar episodes over that last few years. Acuity: Acute FINDINGS: Cardiomegaly. Perihilar edema. Streaky opacities in the lung bases likely due to atelectasis. Small bilateral effusions. Hyperinflated lungs. No pneumothorax. Mild to moderate pulmonary edema with cardiomegaly suggesting CHF decompensation. Nm Lung Vent/perfusion (vq)    Result Date: 5/29/2019  EXAMINATION: NUCLEAR MEDICINE VENTILATION PERFUSION SCAN. 5/29/2019 TECHNIQUE: 67.1 millicuries aerosolized Tc99m DTPA was administered via mask prior to planar imaging of the lungs in multiple projections. Then, 6.3 millicuries of Tc 20D MAA was administered intravenously prior to planar imaging of the lungs in similar projections. COMPARISON: Chest radiograph from 05/29/2019. HISTORY: ORDERING SYSTEM PROVIDED HISTORY: SOB, Elevated D Dimer TECHNOLOGIST PROVIDED HISTORY: Ordering Physician Provided Reason for Exam: sob, elevated d dimer 81-year-old female with shortness of breath and elevated D-dimer FINDINGS: PERFUSION: Distribution of radiotracer is relatively homogeneous. No segmental perfusion defects identified. No mismatch perfusion defects identified. VENTILATION: Slight aggregation of radiotracer material within the region of the bronchi.  Ventilation images are otherwise with mild bronchiectasis Organs: No perisplenic fluid Right adrenal gland is normal.  There is mild thickening of left adrenal gland No hydronephrosis on the right. No hydronephrosis on left. Hypodense nodule is seen anteriorly in the right kidney, measuring 9-10 mm, likely cyst. Hydronephrosis seen previously on the right no longer noted. Stone seen in the region of the right UPJ no longer noted. There is trace intrahepatic biliary ductal dilatation. Focal fat seen along the falciform. Hypodense nodule is seen in the liver measuring 7- 8 mm, likely cyst or hemangioma Gallbladder is surgically absent. Small calcification projects in region of the distal common duct. Appearance is similar No peripancreatic inflammatory change Dominant hypodense nodule seen in the pancreatic head-uncinate process, measuring up to 2.4 cm in short axis. This appears slightly larger than 2017 GI/Bowel: Mild-to-moderate stool load seen throughout the colon. Scattered colonic diverticula are seen. No bowel obstruction There is mild injection of the fat surrounding the descending colon in left pericolic gutter. No drainable pericolonic abscess. There is colonic wall thickening of the descending colon. Pelvis: Bladder is incompletely distended, accentuating its wall thickness. Peritoneum/Retroperitoneum: Atherosclerotic change seen in aorta. No aortic aneurysm. No retroperitoneal adenopathy Bones/Soft Tissues: Tiny periumbilical hernia containing fat is seen. There is nodular injection of the subcutaneous fat in the anterior abdominal wall., similar to prior Degenerative changes are seen in the spine. Degenerative changes are seen in the hips     Inflammatory changes seen surrounding the descending colon with associated colonic wall thickening, suggesting acute diverticulitis Dominant hypodense nodule in the pancreas, slightly increased in size from more remote studies.   Consider MRI of the abdomen, with and without contrast, pancreatic protocol Small calcification projects in the region of the distal common duct. This could be within the pancreas or represent a retained common duct stone. Appearance is similar Subtle septal thickening at the lung bases. This raises the question of mild fluid overload. Mosaic attenuation is seen suggesting small airways disease or air trapping     Xr Chest Portable    Result Date: 5/28/2019  EXAMINATION: ONE XRAY VIEW OF THE CHEST 5/28/2019 7:01 am COMPARISON: 27 May 2019 HISTORY: ORDERING SYSTEM PROVIDED HISTORY: CHF r/o pneumonia TECHNOLOGIST PROVIDED HISTORY: CHF r/o pneumonia Ordering Physician Provided Reason for Exam: CHF, shortness of breath on exertion, rule out pneumonia Acuity: Acute Type of Exam: Subsequent/Follow-up FINDINGS: AP portable view of the chest time stamped at 640 hours demonstrates overlying cardiac monitoring electrodes. Mild cardiomegaly is noted. There is redemonstration of perihilar edema and probable basilar atelectasis. Tiny effusions are suggested. No extrapleural air or midline shift is noted. Cardiomegaly and perihilar edema without significant change from prior exam.         Impression:  Microcytic anemia  Pancreatic head nodule  CKD  Bronchiectasis    Plan:  I had a detailed discussion with the patient and her . Obviously went over results of her lab work-up at the relevant clinical data. Reviewed records from hospitalization. Patient has microcytic anemia. I will obtain iron saturation studies to rule out function and iron deficiency. Patient was noted to have ferritin however ferritin could be elevated due to acute illness as it is acute phase reactant. I will recheck the ferritin level as well. He was the patient has not tolerated oral iron. If patient is noted to be iron deficient we will give IV iron to the patient  Reviewed findings of CT abdomen pelvis which was a pancreatic head nodule.   We will obtain MRI with and without contrast using a pancreatic protocol. I believe CKD is also contributing to the anemia. Will consider IVETTE therapy if patient continues to have hemoglobin below 10 despite of repeating iron stores  Primary bone marrow disorder is also in the differential.  We will see patient back in office with the results of the above test.  Patient and her  had multiple questions which I answered to the best of my ability.         Andreina Ferrer

## 2019-06-14 ENCOUNTER — TELEPHONE (OUTPATIENT)
Dept: CARDIOLOGY | Age: 84
End: 2019-06-14

## 2019-06-14 LAB — SURGICAL PATHOLOGY REPORT: NORMAL

## 2019-06-14 NOTE — TELEPHONE ENCOUNTER
Patient called the office to report her BP reading. She has concerns, since the medication change from the last OV, her BP reading are 117/46, 114/46, 119/45. She has been feeling dizzy and some weakness.   Writer informed the patient to go to ER if those sx increase pt understood

## 2019-06-16 LAB — PATHOLOGIST REVIEW: NORMAL

## 2019-06-17 ENCOUNTER — CARE COORDINATION (OUTPATIENT)
Dept: CASE MANAGEMENT | Age: 84
End: 2019-06-17

## 2019-06-17 RX ORDER — CARVEDILOL 3.12 MG/1
3.12 TABLET ORAL 2 TIMES DAILY
Qty: 180 TABLET | Refills: 1 | Status: SHIPPED | OUTPATIENT
Start: 2019-06-17 | End: 2019-06-28

## 2019-06-17 NOTE — TELEPHONE ENCOUNTER
Spoke with pt, she has continued to have dizziness all weekend, states she barely gets out of her chair due to fear of passing out, also complains of weakness, stayed home all weekend.

## 2019-06-17 NOTE — TELEPHONE ENCOUNTER
Continue to monitor symptoms, those BPs alone shouldn't cause dizziness. If not improved after few days notify us.

## 2019-06-17 NOTE — CARE COORDINATION
[facility] Transitions Final Call    2019    Patient: Osiris Martínez  Patient : 1935   MRN: [id number]  Reason for Admission: Leg swelling/ CHF  Discharge Date: 19 RARS: Readmission Risk Score: 29         Spoke with: Osiris Martínez     Was able to contact Harjinder Mack for final outreach. She cont to have issues with dizziness and feeling like she might pass out. She has called her cardiologist today and he stopped her coreg. She said she is feeling a little better this afternoon. Informed of final call and consult to [id number]. She was in agreement. Episode ended. Care Transitions Subsequent and Final Call    Subsequent and Final Calls  Do you have any ongoing symptoms?:  Yes  Patient-reported symptoms:  Weakness  Interventions for patient-reported symptoms:  Notified PCP/Physician  Have your medications changed?:  Yes  Patient Reports:  stopped coreg  Do you have any questions related to your medications?:  No  Do you currently have any active services?:  No  Do you have any needs or concerns that I can assist you with?:  No  Identified Barriers:  Lack of Education  Care Transitions Interventions  Other Interventions:             Follow Up  Future Appointments   Date Time Provider Loyda Foster   2019  8:45 AM MD MICHELLE Cooper DPP   2019  9:00 AM Yolande Schwab, APRN - CNP DDIAED [facility]   2019  8:30 AM MARY MRI ROOM JOSE D MRI [facility] [place]   7/3/2019 10:30 AM INGRID Galeas CNP DINT DPP   7/3/2019  1:45 PM MD RASHAD BowenULM DPP   2019  3:20 PM [facility]   2019  2:00 PM MD MICHELLE Womack DPP   2019  9:30 AM INGRID Dougherty CNP  DPP   2019 10:30 AM MARY MAMMO ROOM JOSE D MAMMO [facility] [place]   6/10/2020  8:40 AM MD CRICKET Koo DP       Vu Liu RN

## 2019-06-18 ENCOUNTER — HOSPITAL ENCOUNTER (OUTPATIENT)
Age: 84
Setting detail: SPECIMEN
Discharge: HOME | End: 2019-06-18
Payer: MEDICARE

## 2019-06-18 DIAGNOSIS — R71.8 MICROCYTOSIS: ICD-10-CM

## 2019-06-18 DIAGNOSIS — N18.30 STAGE 3 CHRONIC KIDNEY DISEASE (HCC): ICD-10-CM

## 2019-06-18 DIAGNOSIS — D64.9 ANEMIA, UNSPECIFIED TYPE: ICD-10-CM

## 2019-06-18 DIAGNOSIS — K86.89 MASS OF PANCREAS: ICD-10-CM

## 2019-06-18 LAB
DATE, STOOL #1: ABNORMAL
DATE, STOOL #2: ABNORMAL
DATE, STOOL #3: ABNORMAL
DIABETIC RETINOPATHY: NEGATIVE
HEMOCCULT SP1 STL QL: POSITIVE
HEMOCCULT SP2 STL QL: ABNORMAL
HEMOCCULT SP3 STL QL: ABNORMAL
TIME, STOOL #1: 1340
TIME, STOOL #2: ABNORMAL
TIME, STOOL #3: ABNORMAL

## 2019-06-18 PROCEDURE — 82274 ASSAY TEST FOR BLOOD FECAL: CPT

## 2019-06-18 RX ORDER — LOSARTAN POTASSIUM 100 MG/1
TABLET ORAL
Qty: 90 TABLET | Refills: 3 | OUTPATIENT
Start: 2019-06-18

## 2019-06-20 ENCOUNTER — TELEPHONE (OUTPATIENT)
Dept: INTERNAL MEDICINE | Age: 84
End: 2019-06-20

## 2019-06-20 NOTE — TELEPHONE ENCOUNTER
Losartan was D/Cd on discharge summary on 05/29/19. Coreg was D/C'd on 6/14/19 ( see telephone encounter) Patient stated she was unaware to stop Losartan so has been taking all along. Appointment to discuss is scheduled with Dr Jen Jennings on 6/25/19. Instructed patient to bring her medications in the original bottles.

## 2019-06-20 NOTE — TELEPHONE ENCOUNTER
Patient called for refill on losartan 100. Did not find on med list.   Looking back, last I see on losartan it was discontinued upon discharge 6-6-18? Patient states she has been taking it. Sees cardiology and they stopped norvasc and added coreg but she has been having trouble with blood pressure and states they have now stopped both. I asked her if they were aware she has been taking Losartan and she doesn't know. Now she is wondering if she was supposed to even be taking it.     Please clarify and let her know [phone]

## 2019-06-25 ENCOUNTER — OFFICE VISIT (OUTPATIENT)
Dept: CARDIOLOGY | Age: 84
End: 2019-06-25
Payer: MEDICARE

## 2019-06-25 VITALS
BODY MASS INDEX: 37.19 KG/M2 | HEIGHT: 61 IN | SYSTOLIC BLOOD PRESSURE: 180 MMHG | WEIGHT: 197 LBS | DIASTOLIC BLOOD PRESSURE: 80 MMHG

## 2019-06-25 DIAGNOSIS — I10 ESSENTIAL HYPERTENSION: ICD-10-CM

## 2019-06-25 DIAGNOSIS — I50.32 CHRONIC DIASTOLIC HEART FAILURE (HCC): Primary | ICD-10-CM

## 2019-06-25 DIAGNOSIS — E78.5 DYSLIPIDEMIA: ICD-10-CM

## 2019-06-25 PROCEDURE — 99214 OFFICE O/P EST MOD 30 MIN: CPT | Performed by: INTERNAL MEDICINE

## 2019-06-25 RX ORDER — AMLODIPINE BESYLATE 5 MG/1
5 TABLET ORAL DAILY
Qty: 30 TABLET | Refills: 5 | Status: ON HOLD | OUTPATIENT
Start: 2019-06-25 | End: 2019-07-26 | Stop reason: HOSPADM

## 2019-06-25 NOTE — PROGRESS NOTES
other surgical history (3/22/16); other surgical history (Right, 4/26/16); Cystoscopy (Right, 2/6/2019); and Cystoscopy (Right, 2/27/2019). Home Medications:  Prior to Admission medications    Medication Sig Start Date End Date Taking? Authorizing Provider   insulin glargine (LANTUS SOLOSTAR) 100 UNIT/ML injection pen Inject 20 Units into the skin 2 times daily   Yes Historical Provider, MD   insulin lispro (HUMALOG KWIKPEN) 100 UNIT/ML pen Inject 8 Units into the skin 4 times daily (with meals and nightly) Indications: pt takes before meals, and before HS snack   Yes Historical Provider, MD   Polyethylene Glycol 400 (BLINK TEARS) 0.25 % SOLN Apply to eye as needed   Yes Historical Provider, MD   predniSONE (DELTASONE) 5 MG tablet TAKE 1 TABLET DAILY 4/18/19  Yes Katelyn Mejia MD   B-D ULTRAFINE III SHORT PEN 31G X 8 MM MISC USE 7 DAILY 3/28/19  Yes Gaurang Cherry DO   furosemide (LASIX) 40 MG tablet Take 1 tablet by mouth daily 2/13/19  Yes Kelby Olivo   simvastatin (ZOCOR) 20 MG tablet Take 1 tablet by mouth nightly 12/27/18  Yes INGRID Linares - CNP   ferrous sulfate 220 (44 Fe) MG/5ML solution TAKE 10 MLS BY MOUTH DAILY. MIX WITH 2 OUNCES OF ORANGE JUICE  Patient taking differently: TAKE 10 MLS BY MOUTH DAILY.  MIX WITH 2 OUNCES OF ORANGE JUICE - Takes about every 3rd day 11/28/18  Yes Katelyn Mejia MD   hydrALAZINE (APRESOLINE) 25 MG tablet TAKE 1 TABLET THREE TIMES A DAY 10/30/18  Yes Katelyn Mejia MD   VICTOZA 18 MG/3ML SOPN SC injection INJECT 1.2 MG INTO THE SKIN DAILY 10/12/18  Yes Katelyn Mejia MD   potassium chloride (KLOR-CON M10) 10 MEQ extended release tablet TAKE 1 TABLET DAILY 8/6/18  Yes Katelyn Mejia MD   Handicap Placard MISC by Does not apply route EXP: 6/12/2020 6/12/18  Yes Katelyn Mejia MD   aspirin 81 MG EC tablet Take 81 mg by mouth daily   Yes Historical Provider, MD   glucose blood VI test strips (ASCENSIA AUTODISC VI;ONE TOUCH ULTRA TEST VI) strip 1 each by In Vitro route 3 times daily As directed. 7/13/17  Yes Gaurang Cherry DO   acetaminophen (TYLENOL) 500 MG tablet Take 500 mg by mouth daily   Yes Historical Provider, MD   omeprazole (PRILOSEC) 20 MG capsule Take 20 mg by mouth Daily  1/14/15  Yes Dana Brown   Cholecalciferol (VITAMIN D3) 2000 UNITS CAPS Take 2,000 Units by mouth daily    Yes Historical Provider, MD   carvedilol (COREG) 3.125 MG tablet Take 1 tablet by mouth 2 times daily 6/17/19   Louie Mendoza DO       Allergies:  Codeine; Erythromycin; Exenatide; Levofloxacin; Verapamil; Clonidine derivatives; Other; and Penicillins    Social History:   reports that she has never smoked. She has never used smokeless tobacco. She reports that she does not drink alcohol or use drugs. Review of Systems:  · Constitutional: there has been no unanticipated weight loss. There's been No change in energy level, No change in activity level. · Eyes: No visual changes or diplopia. No scleral icterus. · ENT: No Headaches, hearing loss or vertigo. No mouth sores or sore throat. · Cardiovascular: As above. · Respiratory: No SOB, cough or hemoptysis. · Gastrointestinal: No abdominal pain, appetite loss, blood in stools. No change in bowel or bladder habits. · Genitourinary: No dysuria, trouble voiding, or hematuria. · Musculoskeletal:  No gait disturbance, No weakness or joint complaints. · Integumentary: No rash or pruritis. · Psychiatric: No anxiety, or depression. · Hematologic/Lymphatic: No abnormal bruising or bleeding, blood clots or swollen lymph nodes. · Allergic/Immunologic: No nasal congestion or hives. Physical Exam:  BP (!) 180/0   Ht 5' 1\" (1.549 m)   Wt 197 lb (89.4 kg)   BMI 37.22 kg/m²     Constitutional and General Appearance: alert, cooperative, no distress and appears stated age  [de-identified]: PERRL, no cervical lymphadenopathy. No masses palpable.  Normal oral mucosa  Respiratory:  · Normal excursion and expansion without use of accessory muscles  · Resp Auscultation: Good respiratory effort. No for increased work of breathing. On auscultation: clear to auscultation bilaterally  Cardiovascular:  · The apical impulse is not displaced  · Heart tones are crisp and normal. regular S1 and S2.  · Jugular venous pulsation Normal  · The carotid upstroke is normal in amplitude and contour without delay or bruit  · Peripheral pulses are symmetrical and full   Abdomen:   · No masses or tenderness  · Bowel sounds present  Extremities:  ·  No Cyanosis or Clubbing  ·  Lower extremity edema: No  ·  Skin: Warm and dry    Cardiac Data:  EKG:     Labs:     CBC: No results for input(s): WBC, HGB, HCT, PLT in the last 72 hours. BMP: No results for input(s): NA, K, CO2, BUN, CREATININE, LABGLOM, GLUCOSE in the last 72 hours. PT/INR: No results for input(s): PROTIME, INR in the last 72 hours. FASTING LIPID PANEL:  Lab Results   Component Value Date    CHOL 142 05/30/2019    HDL 62 05/30/2019    LDLCHOLESTEROL 57 05/30/2019    TRIG 114 05/30/2019    CHOLHDLRATIO 2.3 05/30/2019     LIVER PROFILE:No results for input(s): AST, ALT, LABALBU in the last 72 hours.       IMPRESSION:    Patient Active Problem List   Diagnosis    Dyslipidemia    Osteoarthritis    Esophagitis    Vitamin D deficiency    PMR (polymyalgia rheumatica) (HCC)    Central artery occlusion of retina    Diastolic dysfunction    Pulmonary HTN (HCC)    Iron deficiency anemia due to chronic blood loss    Type 2 diabetes with nephropathy (HCC)    CHICO- intolerant CPAP    Obesity, morbid, BMI 40.0-49.9 (HCC)    Essential hypertension    Lumbar radiculopathy    Neural foraminal stenosis of lumbar spine    Spinal stenosis at L4-L5 level    Mitral regurgitation    Frequent PVCs    Asthma    Gait abnormality    Asthma    Nephrolithiasis    Right kidney stone    Hypokalemia    Multifocal atrial tachycardia (HCC)    SOB (shortness of breath)    Acute on chronic diastolic congestive heart failure (HCC)    Acute on chronic diastolic heart failure (HCC)    TEJ (acute kidney injury) ([facility] [address]    Sigmoid diverticulitis       RECOMMENDATIONS:       HYPERTENSION- UNCONTROLLED,MOST LIKELY DUE TO NON-COMPLIANCE SLEEP APNEA , CPAP  ADVISE TO RECHECK AGAIN AND GETS CPAP BACK    WILL ADD NORVASC TO   CURRENT MEDICATIONS     HYPERLIPIDEMIA- ON STATIN, NEEDS TO WATCH LIPID PROFILE AND LFT'S    DIABETES MELITIS AS PER PRIMARY CARE PROVIDER    DISCUSSED IN DETAILS ABOUT RISK MODIFICATION    RETURN VISIT IN 2 MONTHS, IF ANY SYMPTOM CHANGE PATIENT ADVISED TO GO TO THE EMERGENCY ROOM.           Mayme Kawasaki, [address] Cardiology Consult           [phone]

## 2019-06-27 ENCOUNTER — TELEPHONE (OUTPATIENT)
Dept: INTERNAL MEDICINE | Age: 84
End: 2019-06-27

## 2019-06-27 ENCOUNTER — CARE COORDINATION (OUTPATIENT)
Dept: CARE COORDINATION | Age: 84
End: 2019-06-27

## 2019-06-27 ENCOUNTER — OFFICE VISIT (OUTPATIENT)
Dept: DIABETES SERVICES | Age: 84
End: 2019-06-27
Payer: MEDICARE

## 2019-06-27 VITALS
HEART RATE: 62 BPM | OXYGEN SATURATION: 94 % | WEIGHT: 200 LBS | DIASTOLIC BLOOD PRESSURE: 60 MMHG | BODY MASS INDEX: 37.79 KG/M2 | RESPIRATION RATE: 16 BRPM | SYSTOLIC BLOOD PRESSURE: 144 MMHG

## 2019-06-27 DIAGNOSIS — I10 ESSENTIAL HYPERTENSION: ICD-10-CM

## 2019-06-27 DIAGNOSIS — F41.9 ANXIETY: Primary | ICD-10-CM

## 2019-06-27 DIAGNOSIS — E11.21 TYPE 2 DIABETES WITH NEPHROPATHY (HCC): Primary | ICD-10-CM

## 2019-06-27 DIAGNOSIS — E78.5 DYSLIPIDEMIA: ICD-10-CM

## 2019-06-27 DIAGNOSIS — Z71.89 DIABETES EDUCATION, ENCOUNTER FOR: ICD-10-CM

## 2019-06-27 DIAGNOSIS — E66.01 CLASS 2 SEVERE OBESITY DUE TO EXCESS CALORIES WITH SERIOUS COMORBIDITY AND BODY MASS INDEX (BMI) OF 37.0 TO 37.9 IN ADULT (HCC): ICD-10-CM

## 2019-06-27 PROCEDURE — 99205 OFFICE O/P NEW HI 60 MIN: CPT | Performed by: NURSE PRACTITIONER

## 2019-06-27 RX ORDER — DIAZEPAM 5 MG/1
TABLET ORAL
Qty: 1 TABLET | Refills: 0 | OUTPATIENT
Start: 2019-06-27 | End: 2019-07-01

## 2019-06-27 ASSESSMENT — ENCOUNTER SYMPTOMS
SHORTNESS OF BREATH: 0
DIARRHEA: 0
ABDOMINAL PAIN: 0
RESPIRATORY NEGATIVE: 1

## 2019-06-27 NOTE — PROGRESS NOTES
was discussed with patient she has been working really hard at low sodium as a result she is tending to eat more potatoes and corn. She is currently on a set long and short acting insulin dose and would like to regulate her blood sugars better. She is however not willing to count carbohydrates that she feels it would be too overwhelming at this time. She tests her blood sugars at least twice daily some days more. Denies any hypoglycemia currently. Current Diabetic Medications  Victoza 1.2 mg daily Lantus 30 units twice daily short acting insulin 8 units 3 times a day with meals    DKA episodes: 0    Obesity- Working on weight loss. High cholesterol-  Takes Zocor and denies any adverse effects with its use. Watches diet and exercise. Hypertension-  Takes Norvasc, Coreg, and hydralazine and denies any adverse effects with their use. Watches diet and exercise. Denies any chest pain, dizziness or edema. Follows with cardiology:Yes. Past Medical History:   Diagnosis Date    Allergic rhinitis     Asthma     PFT's, 04/06, actually showed mild restrictive defect.  Basal cell carcinoma of cheek 2007    Right cheek    Basal cell carcinoma of leg     right leg    CAD (coronary artery disease)     With 40% stenosis of the LAD, 07/10, ejection fraction 60%. Repeat heart cath9/14 with 40-50 percent LAD lesion first diagonal 50% lesion rec med Rx    Central retinal artery occlusion     Right side November 2014 status post TPA    Cerebral artery occlusion with cerebral infarction ([facility] [address] 11/24/2014    Cerebrovascular disease     50-79% stenosis on left on ultrasound 11/14    CHF (congestive heart failure) ([facility])     Echo 1/15 moderate MR, severe TR, RVSP 76, grade 2 diastolic dysfunction    Diverticulosis     AVM on colonoscopy, 05/11    Dyslipidemia     Elevated antinuclear antibody (ZAIRA) level     History of    Esophagitis 05/2011    Gastritis/esophagitis on EGD, Dr. Sanam Ventura.   Repeat EGD6/15 Gastritis    Foraminal stenosis of lumbar region     Moderate  Right L4/L5 neuroforaminal stenosis, Dr Windy Merrill following. MRI 2/16 [facility]. Moderate foraminal stenosis mild to moderate central canal stenosis    Hyperlipidemia     Hypertension     IBS (irritable bowel syndrome)     [facility], felt that she probably has chronic functional diarrhea and recommended empiric Imodium, Pepto-Bismol or Questran first. Sprue test Neg 2011    Iron deficiency anemia     Percent sat iron 5, January 2015, ferritin 40 1 /15, FOBT positive  EGD 6/15  Gastritis, IV iron 9/15x3 effective,  colonoscopy deferred by Dr. Richie Otero. --Prior colonoscopy 2011 with severe diverticulosis and telangiectasia. Trial iron solution in Orange juice 12/16    Junctional bradycardia     Symptomatic, resolved with discontinuation of Verapamil, 05/09. 1.1) Echocardiogram: LAE, LVH, EF 50%, mild MR, diastolic. 1.2) Dr. Fabian Gracia evaluation. 1.3.) Persantine stress test negative, 05/09.  Migraine     Mild CAD     cath 10/15    Mitral regurgitation     Moderate to severe on echocardiogram September 2015.   Right pressure 76 grade 2 diastolic dysfunction,  echo 07/03 grade 2 diastolic dysfunction mild to moderate MR RVSP 56 aortic sclerosis    Obesity     CHICO (obstructive sleep apnea)     CPAP 14 2/15 initiation  AHI 7  mild CHICO intolerant CPAP    Osteoarthritis     PMR (polymyalgia rheumatica) (HCC)     Pneumonia     Premature atrial contractions     Pulmonary hypertension (HCC)     -Moderate on echo November 2014    Restrictive lung disease     Mild on PFTs 11/14    Type II or unspecified type diabetes mellitus without mention of complication, not stated as uncontrolled     Vitreous floaters      Family History   Problem Relation Age of Onset    Uterine Cancer Mother     Stroke Other         Apparently from a vascular malformation    Heart Disease Other         Positive family history    High Blood Pressure Other         Positive family history    Heart Attack Child 48        Son     Social History     Tobacco Use    Smoking status: Never Smoker    Smokeless tobacco: Never Used    Tobacco comment: amish rrt 5/28/2019   Substance Use Topics    Alcohol use: No     Alcohol/week: 0.0 oz    Drug use: No     Allergies   Allergen Reactions    Codeine      Confusion      Erythromycin      GI upset    Exenatide Nausea Only    Levofloxacin      GI upset    Verapamil Other (See Comments)     Junctional bradycardia    Clonidine Derivatives Rash     2009, catapres    Other Rash     Levbid    Penicillins Rash       MEDICATIONS:  Current Outpatient Medications   Medication Sig Dispense Refill    diazepam (VALIUM) 5 MG tablet Once 30 min prior to arrival for MRI 1 tablet 0    amLODIPine (NORVASC) 5 MG tablet Take 1 tablet by mouth daily 30 tablet 5    carvedilol (COREG) 3.125 MG tablet Take 1 tablet by mouth 2 times daily 180 tablet 1    insulin glargine (LANTUS SOLOSTAR) 100 UNIT/ML injection pen Inject 20 Units into the skin 2 times daily      insulin lispro (HUMALOG KWIKPEN) 100 UNIT/ML pen Inject 8 Units into the skin 4 times daily (with meals and nightly) Indications: pt takes before meals, and before HS snack      Polyethylene Glycol 400 (BLINK TEARS) 0.25 % SOLN Apply to eye as needed      predniSONE (DELTASONE) 5 MG tablet TAKE 1 TABLET DAILY 90 tablet 3    B-D ULTRAFINE III SHORT PEN 31G X 8 MM MISC USE 7 DAILY 270 each 3    furosemide (LASIX) 40 MG tablet Take 1 tablet by mouth daily 30 tablet 0    simvastatin (ZOCOR) 20 MG tablet Take 1 tablet by mouth nightly 90 tablet 3    ferrous sulfate 220 (44 Fe) MG/5ML solution TAKE 10 MLS BY MOUTH DAILY. MIX WITH 2 OUNCES OF ORANGE JUICE (Patient taking differently: TAKE 10 MLS BY MOUTH DAILY.  MIX WITH 2 OUNCES OF ORANGE JUICE - Takes about every 3rd day) 473 mL 5    hydrALAZINE (APRESOLINE) 25 MG tablet TAKE 1 TABLET THREE TIMES A  tablet 3    VICTOZA 18 MG/3ML SOPN SC injection INJECT 1.2 MG INTO THE SKIN DAILY 18 mL 3    potassium chloride (KLOR-CON M10) 10 MEQ extended release tablet TAKE 1 TABLET DAILY 90 tablet 3    Handicap Placard MISC by Does not apply route EXP: 6/12/2020 1 each 0    aspirin 81 MG EC tablet Take 81 mg by mouth daily      glucose blood VI test strips (ASCENSIA AUTODISC VI;ONE TOUCH ULTRA TEST VI) strip 1 each by In Vitro route 3 times daily As directed. 100 each 5    acetaminophen (TYLENOL) 500 MG tablet Take 500 mg by mouth daily      omeprazole (PRILOSEC) 20 MG capsule Take 20 mg by mouth Daily  30 capsule 3    Cholecalciferol (VITAMIN D3) 2000 UNITS CAPS Take 2,000 Units by mouth daily        No current facility-administered medications for this visit. Review ofSymptoms:  Review of Systems   Constitutional: Negative for unexpected weight change. Eyes: Negative for visual disturbance. Respiratory: Negative. Negative for shortness of breath. Cardiovascular: Negative for chest pain and leg swelling. Gastrointestinal: Negative for abdominal pain and diarrhea. Endocrine: Negative for polydipsia, polyphagia and polyuria. Genitourinary: Negative. Musculoskeletal: Negative. Skin: Negative for rash and wound. Neurological: Negative for dizziness, tremors, seizures and headaches. Psychiatric/Behavioral: Negative. Negative for confusion and decreased concentration. Theremainder of a complete 14-point review of systems is negative. Vital Signs: BP (!) 144/60 (Site: Left Upper Arm, Position: Sitting, Cuff Size: Medium Adult)   Pulse 62   Resp 16   Wt 200 lb (90.7 kg)   SpO2 94%   BMI 37.79 kg/m²      Wt Readings from Last 3 Encounters:   06/27/19 200 lb (90.7 kg)   06/25/19 197 lb (89.4 kg)   06/13/19 202 lb 9.6 oz (91.9 kg)     Body mass index is 37.79 kg/m².   LABS:  Hemoglobin A1C   Date Value Ref Range Status   05/27/2019 5.8 4.8 - 5.9 % Final   04/23/2019 6.0 (H) 4.8 - 5.9 % Final     Lab Results   Component Value Date    LABMICR CANNOT BE CALCULATED 04/23/2019     Lab Results   Component Value Date     06/04/2019    K 4.3 06/04/2019    CL 98 06/04/2019    CO2 32 (H) 06/04/2019    BUN 21 06/04/2019    CREATININE 1.23 (H) 06/04/2019    GLUCOSE 113 (H) 06/04/2019    CALCIUM 9.9 06/04/2019    PROT 7.2 02/05/2019    LABALBU 3.8 02/05/2019    BILITOT 0.51 02/05/2019    ALKPHOS 90 02/05/2019    AST 22 02/05/2019    ALT 18 02/05/2019    LABGLOM 42 (L) 06/04/2019    GFRAA 51 (L) 06/04/2019     Lab Results   Component Value Date    CHOL 142 05/30/2019    CHOL 142 05/28/2019    CHOL 173 07/10/2018     Lab Results   Component Value Date    TRIG 114 05/30/2019    TRIG 59 05/28/2019    TRIG 136 07/10/2018     Lab Results   Component Value Date    HDL 62 05/30/2019    HDL 74 05/28/2019    HDL 69 07/10/2018     Lab Results   Component Value Date    LDLCHOLESTEROL 57 05/30/2019    LDLCHOLESTEROL 56 05/28/2019    LDLCHOLESTEROL 77 07/10/2018     Lab Results   Component Value Date    VLDL NOT REPORTED 05/30/2019    VLDL NOT REPORTED 05/28/2019    VLDL NOT REPORTED 07/10/2018     Lab Results   Component Value Date    CHOLHDLRATIO 2.3 05/30/2019    CHOLHDLRATIO 1.9 05/28/2019    CHOLHDLRATIO 2.5 07/10/2018           Physical Exam   Constitutional: She is oriented to person, place, and time. She appears well-developed. Eyes: Pupils are equal, round, and reactive to light. Cardiovascular: Normal rate, regular rhythm, normal heart sounds and intact distal pulses. Pulmonary/Chest: Effort normal and breath sounds normal.   Abdominal: Soft. Bowel sounds are normal.   Musculoskeletal: She exhibits no edema (to lower extremites ). Neurological: She is alert and oriented to person, place, and time. Skin: Skin is warm and dry. Negative for open/nonhealing wounds. Negative for lipohypertrophy. Psychiatric: She has a normal mood and affect.        ASSESSMENT/PLAN:     Diagnosis Orders   1. Type 2 diabetes with nephropathy ([facility]     2. Diabetes education, encounter for     3. Class 2 severe obesity due to excess calories with serious comorbidity and body mass index (BMI) of 37.0 to 37.9 in adult ([facility] [address]     4. BMI 37.0-37.9, adult     5. Dyslipidemia     6. Essential hypertension       No orders of the defined types were placed in this encounter. No orders of the defined types were placed in this encounter. Requested Prescriptions     Pending Prescriptions Disp Refills    insulin glargine (LANTUS SOLOSTAR) 100 UNIT/ML injection pen 5 pen 3     Sig: Inject 30 Units into the skin 2 times daily       1. Type 2 diabetes with nephropathy ([facility]  2. Diabetes education, encounter for  - stable  HbA1C goal is less than 7% and blood sugars show no change. - Encouraged patient to check BS 4 times per day. Fasting blood glucose goal is 70-130mg/dl and postprandial blood sugar goal is less than 180 mg/dl. -Diabetic foot exam reviewed and is normal and is current?: Yes.   -Diabetic retinal exam reviewed and is current? :Yes showing No diabetic retinopathy.  - Labs reviewed includes: Most recent A1c 5.8% which I do anticipate there is some variations in highs and lows to achieve this low A1c creatinine 1.23 GFR 42. Repeat labs due in August.   -We discussed in great detail dietary modifications they can make to better improve their blood sugars. --Initial diabetic education completed. Discussed diabetes as a disease and how we can manage it to prevent complications associated with it.   -Blood sugar report reviewed and scanned into media tab. -  Patient Instructions   Check blood sugars 4 times per day Fasting and before meals or at least 2 hours after eating. Keep a log and bring them with you to the next appointment.      Low sodium diet and try to limit potatoes and corn, try adding fresh non starchy veggies     Medication: continue current lantus at 30 units twice a day, continue humalog 8 units three times a day with meals if you have a snack at bedtime and blood sugar is over 150 you can take an extra 8 units of humalog. Try to add protein at bed. Discussed signs and symptoms of hyper/hypoglycemia and how to treat. Encouraged 150 minutes of physical activity per week. Follow a low carbohydrate diet consuming 45 grams of carbohydrates at breakfast, lunch and dinner with two separate snacks [id number] grams of carbohydrates. Encouraged at least 7 hours of sleep. The patient was informed of the goals of diabetes management. This can only be accomplished by watching their diet and exercise levels. We certainly use medicines to help attain these goals. The consequences of not controlling blood sugars were discussed. These include blindness, heart disease, stroke, kidney disease, and possibly need for dialysis. They were told to be careful with their foot care as diabetics often have nerve damage, infections and risk for limb amutations . They also need a dilated eye exam yearly. We discussed the issues of diet, exercise, medication, complication avoidance, reviewed the signs and symptoms of diabetes, hypoglycemic episodes, significance of HbA1C.       3. Class 2 severe obesity due to excess calories with serious comorbidity and body mass index (BMI) of 37.0 to 37.9 in adult ([facility] [address]  4. BMI 37.0-37.9, adult  Reduce calories and increase physical activity to achieve a slow and steady weight loss to improve blood pressure, cholesterol and diabetes. 5. Dyslipidemia  stable, lipid panel reviewed, LDL 57 triglycerides 114 continue current medications. Diet and exercise      6. Essential hypertension   stable, continue current medications. Diet and exercise Seek emergent care if chest pain develops. Answered all patient questions. Agrees to follow plan of care and to follow up in 1 months, sooner if needed. Call office if unexplained blood sugars less than 70 occur or above 400.  [facility] questions or concerns. Be sure to bring glucometer/food log at next appointment. Patient was seen with total face to face time of 60 minutes. More than 50%  of this visit was counseling and education regarding her diabetes.     Electronically signed by INGRID Cuello CNP on 6/27/2019 at 11:40 AM      (Please note that portions of this note were completed with a voice-recognition program. Efforts were made to edit the dictation but occasionally words are mis-transcribed.)

## 2019-06-27 NOTE — CARE COORDINATION
Nutrition Care Coordinator Follow-Up visit:    Food Recall: eating 2-3 meals/d    Activity Level:  Sedentary:X  Lightly Active: Moderately Active:  Very Active:    Adult BMI:  Underweight (below 18.5)  Normal Weight (18.5-24.9)  Overweight (25-29. 9)  Obese (30-39. 9) X  Morbidly Obese (>40)    Weight Change: no change    Plan:  Plan was established with patient:  Increase dietary fiber by consuming whole grains, fruits and vegetables: X  Limit dietary cholesterol to >200mg/day: Increase water intake:  Avoid added sugar: X  Avoid sweetened beverages: X  Choose lean meats:  Limit sodium intake to <2gm/day: X    Monitoring: Will monitor weight:  Will monitor adherence to meal plan: X  Will monitor adherence to exercise plan: Will monitor HGA1c:    Handouts Provided :  Low Carb snacking:  Carb counting /individual meal plan:  Portion Control:  Food Labels:  Physical Activity:  Low Fat/Cholesterol:  Hypo/Hyperglycemia:  Calorie Controlled Meal Plan:  Low sodium guidelines: X    Goals: Increase water consumption to 8oz. 6-8 times daily:  Manage blood sugars by consuming 3 meals spaced every 4-5 hours with 2-3 snacks daily: discussed  Increase fiber and decrease fat intake by consuming 1-2 fruit servings and 2-3 vegetable servings per day. Increase physical activity by:  Consume less than 2,000mg of sodium/day: discussed  Avoid consumption of sweetened beverages and added sugar by reading food labels: discussed  Monitor blood sugars by using meter to check blood glucose before morning meal and 2 hours after a meal daily: Last A1c-5.8   Decrease risk of coronary heart disease by consuming fish that contains omega-3 fatty acids at least twice a week, avoiding partially hydrogenated oil/trans fats and limiting saturated fat intake by reading food labels: discussed    Patient goals set:  1. Reviewed foods to avoid- canned, processed and prepackaged foods. Reviewed sauces, seasonings high in sodium to avoid.   2. Reviewed shopping the outer rim of the grocery store where fresh foods are found. 3. Patient will not add salt to foods. Reviewed list of seasonings she can use that do not contain salt- patient relays this is very helpful. 4. Reviewed how to  read food labels and limit sodium intake to <2gm/day. Encouraged patient to look at serving size on package and mg of sodium/serving. Goal is to choose foods with <140mg/serving. Patient relays she has started to read labels and is also watching her carb intake, although sugar is good last A1c was 5.8 so focus is on limiting sodium intake. Patient relays nutrition info. I sent has been very helpful. Will follow up in 3-4 weeks to review and answer questions.   Angela Downs

## 2019-06-27 NOTE — PATIENT INSTRUCTIONS
Check blood sugars 4 times per day Fasting and before meals or at least 2 hours after eating. Keep a log and bring them with you to the next appointment. Low sodium diet and try to limit potatoes and corn, try adding fresh non starchy veggies     Medication: continue current lantus at 30 units twice a day, continue humalog 8 units three times a day with meals if you have a snack at bedtime and blood sugar is over 150 you can take an extra 8 units of humalog. Try to add protein at bed.

## 2019-06-28 ENCOUNTER — TELEPHONE (OUTPATIENT)
Dept: INTERNAL MEDICINE | Age: 84
End: 2019-06-28

## 2019-06-28 ENCOUNTER — TELEPHONE (OUTPATIENT)
Dept: CARDIOLOGY | Age: 84
End: 2019-06-28

## 2019-06-28 NOTE — TELEPHONE ENCOUNTER
I spoke to pt. She is aware of Dr. Natalie Schulz note below to stop coreg for now. She agrees. We confirmed her appt for 8/19/19.

## 2019-06-28 NOTE — TELEPHONE ENCOUNTER
Patient was asking about recipes for low sodium and low carb. I have recommended the Dash diet to start with.

## 2019-06-28 NOTE — TELEPHONE ENCOUNTER
Pt called stating she is still feeling dizzy from the coreg addition. Pt states she only wants to be on the norvasc since she wasn't dizzy when she was just taking that. Pt stated Norvasc is enough. Pt also stated Dr Alka Ramos wanted Dr Irene Garrett to address this questions since he is who she normally sees.   Please advise pt @ [phone] until 2 pm then call [place] # [phone] after 2 pm.

## 2019-07-01 ENCOUNTER — CARE COORDINATION (OUTPATIENT)
Dept: CARE COORDINATION | Age: 84
End: 2019-07-01

## 2019-07-01 NOTE — CARE COORDINATION
Yes     Current Housing:  Private Residence        Per the Fall Risk Screening, did the patient have 2 or more falls or 1 fall with injury in the past year?:  No     Frequent urination at night?:  No  Do you use rails/bars?:  No  Do you have a non-slip tub mat?:  Yes     Are you experiencing loss of meaning?:  No  Are you experiencing loss of hope and peace?:  No     Thinking about your patient's physical health needs, are there any symptoms or problems (risk indicators) you are unsure about that require further investigation?:  No identified areas of uncertainly or problems already being investigated   Are the patients physical health problems impacting on their mental well-being?:  No identified areas of concern   Are there any problems with your patients lifestyle behaviors (alcohol, drugs, diet, exercise) that are impacting on physical or mental well-being?:  No identified areas of concern   Do you have any other concerns about your patients mental well-being?  How would you rate their severity and impact on the patient?:  No identified areas of concern   How would you rate their home environment in terms of safety and stability (including domestic violence, insecure housing, neighbor harassment)?:  Consistently safe, supportive, stable, no identified problems   How do daily activities impact on the patient's well-being? (include current or anticipated unemployment, work, caregiving, access to transportation or other):  No identified problems or perceived positive benefits   How would you rate their social network (family, work, friends)?:  Good participation with social networks   How would you rate their financial resources (including ability to afford all required medical care)?:  Financially secure, resources adequate, no identified problems   How wells does the patient now understand their health and well-being (symptoms, signs or risk factors) and what they need to do to manage their health?:  Reasonable to good understanding and already engages in managing health or is willing to undertake better management   How well do you think your patient can engage in healthcare discussions? (Barriers include language, deafness, aphasia, alcohol or drug problems, learning difficulties, concentration):  Clear and open communication, no identified barriers   Do other services need to be involved to help this patient?:  Other care/services not required at this time   Are current services involved with this patient well-coordinated? (Include coordination with other services you are now recommendation): All required care/services in place and well-coordinated   Suggested Interventions and Community Resources  Diabetes Education:  Completed (Comment: follows Cole Addison CNP)    Meals on Wheels:  Completed   Registered Dietician:  Completed   Zone Management Tools: In Process         Set up/Review Goals, Set up/Review an Education Plan              Prior to Admission medications    Medication Sig Start Date End Date Taking?  Authorizing Provider   diazepam (VALIUM) 5 MG tablet Once 30 min prior to arrival for MRI 6/27/19 7/1/19  INGRID Abdi CNP   insulin glargine (LANTUS SOLOSTAR) 100 UNIT/ML injection pen Inject 30 Units into the skin 2 times daily 6/27/19   INGRID Hampton - CNP   amLODIPine (NORVASC) 5 MG tablet Take 1 tablet by mouth daily 6/25/19   Lainey Barclay MD   insulin lispro (HUMALOG KWIKPEN) 100 UNIT/ML pen Inject 8 Units into the skin 4 times daily (with meals and nightly) Indications: pt takes before meals, and before HS snack    Historical Provider, MD   Polyethylene Glycol 400 (BLINK TEARS) 0.25 % SOLN Apply to eye as needed    Historical Provider, MD   predniSONE (DELTASONE) 5 MG tablet TAKE 1 TABLET DAILY 4/18/19   Laura Sequeira MD   B-D ULTRAFINE III SHORT PEN 31G X 8 MM MISC USE 7 DAILY 3/28/19   Gaurang Cherry DO   furosemide (LASIX) 40 MG tablet Take 1 tablet by mouth daily [facility]omew [facility]

## 2019-07-02 ENCOUNTER — HOSPITAL ENCOUNTER (OUTPATIENT)
Dept: MRI IMAGING | Age: 84
Discharge: HOME | End: 2019-07-04
Payer: MEDICARE

## 2019-07-02 DIAGNOSIS — N18.30 STAGE 3 CHRONIC KIDNEY DISEASE (HCC): ICD-10-CM

## 2019-07-02 DIAGNOSIS — K86.89 MASS OF PANCREAS: ICD-10-CM

## 2019-07-02 DIAGNOSIS — R71.8 MICROCYTOSIS: ICD-10-CM

## 2019-07-02 DIAGNOSIS — D64.9 ANEMIA, UNSPECIFIED TYPE: ICD-10-CM

## 2019-07-02 PROCEDURE — 6360000004 HC RX CONTRAST MEDICATION: Performed by: INTERNAL MEDICINE

## 2019-07-02 PROCEDURE — A9577 INJ MULTIHANCE: HCPCS | Performed by: INTERNAL MEDICINE

## 2019-07-02 PROCEDURE — 2709999900 MRI ABDOMEN W WO CONTRAST

## 2019-07-02 RX ADMIN — GADOBENATE DIMEGLUMINE 10 ML: 529 INJECTION, SOLUTION INTRAVENOUS at 09:29

## 2019-07-03 ENCOUNTER — OFFICE VISIT (OUTPATIENT)
Dept: PULMONOLOGY | Age: 84
End: 2019-07-03
Payer: MEDICARE

## 2019-07-03 ENCOUNTER — HOSPITAL ENCOUNTER (OUTPATIENT)
Dept: LAB | Age: 84
Discharge: HOME | End: 2019-07-03
Payer: MEDICARE

## 2019-07-03 ENCOUNTER — OFFICE VISIT (OUTPATIENT)
Dept: INTERNAL MEDICINE | Age: 84
End: 2019-07-03
Payer: MEDICARE

## 2019-07-03 VITALS
RESPIRATION RATE: 16 BRPM | OXYGEN SATURATION: 96 % | WEIGHT: 201 LBS | BODY MASS INDEX: 37.95 KG/M2 | DIASTOLIC BLOOD PRESSURE: 64 MMHG | SYSTOLIC BLOOD PRESSURE: 146 MMHG | HEART RATE: 88 BPM | HEIGHT: 61 IN

## 2019-07-03 VITALS
SYSTOLIC BLOOD PRESSURE: 146 MMHG | RESPIRATION RATE: 14 BRPM | DIASTOLIC BLOOD PRESSURE: 64 MMHG | OXYGEN SATURATION: 98 % | WEIGHT: 201 LBS | BODY MASS INDEX: 37.95 KG/M2 | HEART RATE: 82 BPM | HEIGHT: 61 IN

## 2019-07-03 DIAGNOSIS — M35.3 PMR (POLYMYALGIA RHEUMATICA) (HCC): ICD-10-CM

## 2019-07-03 DIAGNOSIS — I50.32 CHRONIC DIASTOLIC CONGESTIVE HEART FAILURE (HCC): ICD-10-CM

## 2019-07-03 DIAGNOSIS — E11.21 TYPE 2 DIABETES WITH NEPHROPATHY (HCC): ICD-10-CM

## 2019-07-03 DIAGNOSIS — I27.20 PULMONARY HTN (HCC): ICD-10-CM

## 2019-07-03 DIAGNOSIS — I10 ESSENTIAL HYPERTENSION: Primary | ICD-10-CM

## 2019-07-03 DIAGNOSIS — E55.9 VITAMIN D DEFICIENCY: ICD-10-CM

## 2019-07-03 DIAGNOSIS — E78.5 DYSLIPIDEMIA: ICD-10-CM

## 2019-07-03 DIAGNOSIS — D50.9 IRON DEFICIENCY ANEMIA, UNSPECIFIED IRON DEFICIENCY ANEMIA TYPE: ICD-10-CM

## 2019-07-03 DIAGNOSIS — F41.9 ANXIETY: ICD-10-CM

## 2019-07-03 DIAGNOSIS — E66.01 OBESITY, MORBID, BMI 40.0-49.9 (HCC): ICD-10-CM

## 2019-07-03 DIAGNOSIS — K86.2 PANCREAS CYST: ICD-10-CM

## 2019-07-03 DIAGNOSIS — I51.89 DIASTOLIC DYSFUNCTION: ICD-10-CM

## 2019-07-03 DIAGNOSIS — G47.33 OSA (OBSTRUCTIVE SLEEP APNEA): ICD-10-CM

## 2019-07-03 DIAGNOSIS — J98.4 RESTRICTIVE LUNG DISEASE: Primary | ICD-10-CM

## 2019-07-03 DIAGNOSIS — E11.21 TYPE 2 DIABETES WITH NEPHROPATHY (HCC): Primary | ICD-10-CM

## 2019-07-03 LAB
ANION GAP SERPL CALCULATED.3IONS-SCNC: 12 MMOL/L (ref 9–17)
BUN BLDV-MCNC: 21 MG/DL (ref 8–23)
BUN/CREAT BLD: 20 (ref 9–20)
CALCIUM SERPL-MCNC: 10.1 MG/DL (ref 8.6–10.4)
CHLORIDE BLD-SCNC: 102 MMOL/L (ref 98–107)
CO2: 26 MMOL/L (ref 20–31)
CREAT SERPL-MCNC: 1.07 MG/DL (ref 0.5–0.9)
ESTIMATED AVERAGE GLUCOSE: 146 MG/DL
GFR AFRICAN AMERICAN: 59 ML/MIN
GFR NON-AFRICAN AMERICAN: 49 ML/MIN
GFR SERPL CREATININE-BSD FRML MDRD: ABNORMAL ML/MIN/{1.73_M2}
GFR SERPL CREATININE-BSD FRML MDRD: ABNORMAL ML/MIN/{1.73_M2}
GLUCOSE BLD-MCNC: 228 MG/DL (ref 70–99)
HBA1C MFR BLD: 6.7 % (ref 4.8–5.9)
POTASSIUM SERPL-SCNC: 3.6 MMOL/L (ref 3.7–5.3)
SODIUM BLD-SCNC: 140 MMOL/L (ref 135–144)

## 2019-07-03 PROCEDURE — 99214 OFFICE O/P EST MOD 30 MIN: CPT | Performed by: NURSE PRACTITIONER

## 2019-07-03 PROCEDURE — 99204 OFFICE O/P NEW MOD 45 MIN: CPT | Performed by: INTERNAL MEDICINE

## 2019-07-03 PROCEDURE — 80048 BASIC METABOLIC PNL TOTAL CA: CPT

## 2019-07-03 PROCEDURE — 83036 HEMOGLOBIN GLYCOSYLATED A1C: CPT

## 2019-07-03 PROCEDURE — 36415 COLL VENOUS BLD VENIPUNCTURE: CPT

## 2019-07-03 RX ORDER — FUROSEMIDE 40 MG/1
40 TABLET ORAL DAILY
Qty: 90 TABLET | Refills: 3 | Status: SHIPPED | OUTPATIENT
Start: 2019-07-03 | End: 2020-06-11

## 2019-07-03 RX ORDER — FUROSEMIDE 20 MG/1
60 TABLET ORAL DAILY
COMMUNITY
End: 2019-07-03 | Stop reason: DRUGHIGH

## 2019-07-03 RX ORDER — FUROSEMIDE 40 MG/1
40 TABLET ORAL DAILY
COMMUNITY
End: 2019-07-03 | Stop reason: SDUPTHER

## 2019-07-03 ASSESSMENT — ENCOUNTER SYMPTOMS
SHORTNESS OF BREATH: 0
SORE THROAT: 0
FACIAL SWELLING: 0
NAUSEA: 0
ABDOMINAL PAIN: 0
EYE PAIN: 0
CONSTIPATION: 0
COLOR CHANGE: 0
BLOOD IN STOOL: 0
CHEST TIGHTNESS: 0
RHINORRHEA: 0
COUGH: 0
SINUS PRESSURE: 0
DIARRHEA: 0
TROUBLE SWALLOWING: 0
VOMITING: 0
WHEEZING: 0

## 2019-07-03 NOTE — PROGRESS NOTES
All patient questions answered. Pt voiced understanding. I hope this updates you on my evaluation and clinical thinking. Thank you for allowing me to participate in his care. Sincerely,      Electronically signed by Lacy Chirinos MD on   7/3/19 at 11:21 AM       Please note that this chart was generated using voice recognition Dragon dictation software. Although every effort was made to ensure the accuracy of this automated transcription, some errors in transcription may have occurred.

## 2019-07-03 NOTE — PROGRESS NOTES
insecurity:     Worry: Not on file     Inability: Not on file    Transportation needs:     Medical: Not on file     Non-medical: Not on file   Tobacco Use    Smoking status: Never Smoker    Smokeless tobacco: Never Used    Tobacco comment: amish rrt 5/28/2019   Substance and Sexual Activity    Alcohol use: No     Alcohol/week: 0.0 oz    Drug use: No    Sexual activity: Not on file   Lifestyle    Physical activity:     Days per week: Not on file     Minutes per session: Not on file    Stress: Not on file   Relationships    Social connections:     Talks on phone: Not on file     Gets together: Not on file     Attends Synagogue service: Not on file     Active member of club or organization: Not on file     Attends meetings of clubs or organizations: Not on file     Relationship status: Not on file    Intimate partner violence:     Fear of current or ex partner: Not on file     Emotionally abused: Not on file     Physically abused: Not on file     Forced sexual activity: Not on file   Other Topics Concern    Not on file   Social History Narrative    Not on file       Review of Systems   Constitutional: Positive for fatigue. Negative for activity change, appetite change, chills, fever and unexpected weight change. HENT: Negative for congestion, dental problem, ear discharge, ear pain, facial swelling, hearing loss, postnasal drip, rhinorrhea, sinus pressure, sore throat and trouble swallowing. Eyes: Negative for pain and visual disturbance. Respiratory: Negative for cough, chest tightness, shortness of breath and wheezing. Cardiovascular: Negative for chest pain, palpitations and leg swelling. Gastrointestinal: Negative for abdominal pain, blood in stool, constipation, diarrhea, nausea and vomiting. Endocrine: Negative for cold intolerance, heat intolerance and polyuria. Genitourinary: Negative for difficulty urinating.    Musculoskeletal: Negative for arthralgias, gait problem, myalgias, neck

## 2019-07-11 ENCOUNTER — OFFICE VISIT (OUTPATIENT)
Dept: DIABETES SERVICES | Age: 84
End: 2019-07-11
Payer: MEDICARE

## 2019-07-11 ENCOUNTER — OFFICE VISIT (OUTPATIENT)
Dept: ONCOLOGY | Age: 84
End: 2019-07-11
Payer: MEDICARE

## 2019-07-11 VITALS
HEART RATE: 88 BPM | SYSTOLIC BLOOD PRESSURE: 138 MMHG | TEMPERATURE: 97.5 F | BODY MASS INDEX: 37.8 KG/M2 | OXYGEN SATURATION: 96 % | HEIGHT: 61 IN | WEIGHT: 200.2 LBS | DIASTOLIC BLOOD PRESSURE: 70 MMHG

## 2019-07-11 VITALS
WEIGHT: 201 LBS | SYSTOLIC BLOOD PRESSURE: 138 MMHG | OXYGEN SATURATION: 96 % | HEART RATE: 69 BPM | DIASTOLIC BLOOD PRESSURE: 62 MMHG | RESPIRATION RATE: 12 BRPM | BODY MASS INDEX: 37.98 KG/M2

## 2019-07-11 DIAGNOSIS — R71.8 MICROCYTOSIS: ICD-10-CM

## 2019-07-11 DIAGNOSIS — N18.30 STAGE 3 CHRONIC KIDNEY DISEASE (HCC): ICD-10-CM

## 2019-07-11 DIAGNOSIS — E66.01 CLASS 2 SEVERE OBESITY DUE TO EXCESS CALORIES WITH SERIOUS COMORBIDITY AND BODY MASS INDEX (BMI) OF 37.0 TO 37.9 IN ADULT (HCC): ICD-10-CM

## 2019-07-11 DIAGNOSIS — K86.89 MASS OF PANCREAS: ICD-10-CM

## 2019-07-11 DIAGNOSIS — D64.9 ANEMIA, UNSPECIFIED TYPE: Primary | ICD-10-CM

## 2019-07-11 DIAGNOSIS — I10 ESSENTIAL HYPERTENSION: ICD-10-CM

## 2019-07-11 DIAGNOSIS — E78.5 DYSLIPIDEMIA: ICD-10-CM

## 2019-07-11 DIAGNOSIS — Z71.89 DIABETES EDUCATION, ENCOUNTER FOR: ICD-10-CM

## 2019-07-11 DIAGNOSIS — E11.21 TYPE 2 DIABETES WITH NEPHROPATHY (HCC): Primary | ICD-10-CM

## 2019-07-11 PROCEDURE — 99214 OFFICE O/P EST MOD 30 MIN: CPT | Performed by: INTERNAL MEDICINE

## 2019-07-11 PROCEDURE — 99215 OFFICE O/P EST HI 40 MIN: CPT | Performed by: NURSE PRACTITIONER

## 2019-07-11 ASSESSMENT — ENCOUNTER SYMPTOMS
SHORTNESS OF BREATH: 0
VISUAL CHANGE: 0
ABDOMINAL PAIN: 0
DIARRHEA: 0
BLURRED VISION: 0
RESPIRATORY NEGATIVE: 1

## 2019-07-11 NOTE — PATIENT INSTRUCTIONS
Check blood sugars 4times per day Fasting and before meals or at least 2 hours after eating. Keep a log and bring them with you to the next appointment. Exercise: increase physical activity gradual up to 150 minutes per week. Incorporate strength and cardio. Chair exercises     Look for sugar substitute such as splenda     Continue dash diet and low sodium     Avoid combnation seasonings such as soy sauce and Sherrie's     Mrs. Damon Dorian is ok

## 2019-07-11 NOTE — PROGRESS NOTES
Removed from right leg    OTHER SURGICAL HISTORY  09/06/2011    capsule endoscopy    OTHER SURGICAL HISTORY  3/22/16    right L4 and L5 TFE    OTHER SURGICAL HISTORY Right 4/26/16    L4, L5 TFE    UPPER GASTROINTESTINAL ENDOSCOPY  05/16/2011    UPPER GASTROINTESTINAL ENDOSCOPY  6/22/15    mild gastritis (Dr. Sebas Martin)       Patient Family Social History:  Family History   Problem Relation Age of Onset    Uterine Cancer Mother     Stroke Other         Apparently from a vascular malformation    Heart Disease Other         Positive family history    High Blood Pressure Other         Positive family history    Heart Attack Child 48        Son      reports that she has never smoked. She has never used smokeless tobacco. She reports that she does not drink alcohol or use drugs.      Current Medications:     Current Outpatient Medications   Medication Sig Dispense Refill    furosemide (LASIX) 40 MG tablet Take 1 tablet by mouth daily 90 tablet 3    insulin glargine (LANTUS SOLOSTAR) 100 UNIT/ML injection pen Inject 30 Units into the skin 2 times daily 5 pen 3    amLODIPine (NORVASC) 5 MG tablet Take 1 tablet by mouth daily 30 tablet 5    insulin lispro (HUMALOG KWIKPEN) 100 UNIT/ML pen Inject 8 Units into the skin 4 times daily (with meals and nightly) Indications: pt takes before meals, and before HS snack      Polyethylene Glycol 400 (BLINK TEARS) 0.25 % SOLN Apply to eye as needed      predniSONE (DELTASONE) 5 MG tablet TAKE 1 TABLET DAILY 90 tablet 3    B-D ULTRAFINE III SHORT PEN 31G X 8 MM MISC USE 7 DAILY 270 each 3    simvastatin (ZOCOR) 20 MG tablet Take 1 tablet by mouth nightly 90 tablet 3    hydrALAZINE (APRESOLINE) 25 MG tablet TAKE 1 TABLET THREE TIMES A  tablet 3    VICTOZA 18 MG/3ML SOPN SC injection INJECT 1.2 MG INTO THE SKIN DAILY 18 mL 3    potassium chloride (KLOR-CON M10) 10 MEQ extended release tablet TAKE 1 TABLET DAILY 90 tablet 3    Handicap Placard MISC by Does not apply route EXP: 6/12/2020 1 each 0    aspirin 81 MG EC tablet Take 81 mg by mouth daily      glucose blood VI test strips (ASCENSIA AUTODISC VI;ONE TOUCH ULTRA TEST VI) strip 1 each by In Vitro route 3 times daily As directed. 100 each 5    acetaminophen (TYLENOL) 500 MG tablet Take 500 mg by mouth daily      omeprazole (PRILOSEC) 20 MG capsule Take 20 mg by mouth Daily  30 capsule 3    Cholecalciferol (VITAMIN D3) 2000 UNITS CAPS Take 2,000 Units by mouth daily        No current facility-administered medications for this visit. Allergies:   Codeine; Erythromycin; Exenatide; Levofloxacin; Verapamil; Clonidine derivatives; Other; and Penicillins    Review of Systems:    Constitutional: No fever or chills. No night sweats, no weight loss positive fatigue  Eyes: No eye discharge, double vision, or eye pain   HEENT: negative for sore mouth, sore throat, hoarseness and voice change   Respiratory: negative for cough , sputum, dyspnea, wheezing, hemoptysis, chest pain   Cardiovascular: negative for chest pain, dyspnea, palpitations, orthopnea, PND   Gastrointestinal: negative for nausea, vomiting, diarrhea, constipation, abdominal pain, Dysphagia, hematemesis and hematochezia   Genitourinary: negative for frequency, dysuria, nocturia, urinary incontinence, and hematuria   Integument: negative for rash, skin lesions, bruises.    Hematologic/Lymphatic: negative for easy bruising, bleeding, lymphadenopathy, or petechiae   Endocrine: negative for heat or cold intolerance,weight changes, change in bowel habits and hair loss   Musculoskeletal: negative for myalgias, arthralgias, pain, joint swelling,and bone pain   Neurological: negative for headaches, dizziness, seizures, weakness, numbness        Physical Exam:    Vitals: /70 (Site: Left Upper Arm, Position: Sitting, Cuff Size: Large Adult)   Pulse 88   Temp 97.5 °F (36.4 °C)   Ht 5' 0.98\" (1.549 m)   Wt 200 lb 3.2 oz (90.8 kg)   SpO2 96%   BMI 37.85 kg/m²

## 2019-07-11 NOTE — PROGRESS NOTES
[address] [facility] INTERNAL MED  [id number] [facility]  [phone]        HISTORY:    Evan Bautista presents today for evaluation and management of:  Chief Complaint   Patient presents with    Diabetes       Diabetes   She presents for her follow-up diabetic visit. She has type 2 diabetes mellitus. No MedicAlert identification noted. Her disease course has been improving. There are no hypoglycemic associated symptoms. Pertinent negatives for hypoglycemia include no confusion, dizziness, headaches, seizures or tremors. Pertinent negatives for diabetes include no blurred vision, no chest pain, no fatigue, no foot paresthesias, no foot ulcerations, no polydipsia, no polyphagia, no polyuria, no visual change, no weakness and no weight loss. There are no hypoglycemic complications. Diabetic complications include a CVA and heart disease. Pertinent negatives for diabetic complications include no nephropathy, peripheral neuropathy or retinopathy. Risk factors for coronary artery disease include diabetes mellitus, dyslipidemia, family history, hypertension and obesity. Current diabetic treatment includes insulin injections. She is compliant with treatment all of the time. Her weight is stable. She is following a diabetic, low fat/cholesterol, high fat/cholesterol and low salt diet. Meal planning includes avoidance of concentrated sweets. She has not had a previous visit with a dietitian. She never participates in exercise. She monitors blood glucose at home 5+ x per day. Blood glucose monitoring compliance is excellent. An ACE inhibitor/angiotensin II receptor blocker is being taken. She does not see a podiatrist.Eye exam is current. Patient is here for diabetic education she has been testing blood sugar for more times a day denies any hypoglycemia. She is taking her insulin as scheduled. She is working on low-sodium and low carb diet.   She has been using the DASH 07/03/2019     Lab Results   Component Value Date    CHOL 142 05/30/2019    CHOL 142 05/28/2019    CHOL 173 07/10/2018     Lab Results   Component Value Date    TRIG 114 05/30/2019    TRIG 59 05/28/2019    TRIG 136 07/10/2018     Lab Results   Component Value Date    HDL 62 05/30/2019    HDL 74 05/28/2019    HDL 69 07/10/2018     Lab Results   Component Value Date    LDLCHOLESTEROL 57 05/30/2019    LDLCHOLESTEROL 56 05/28/2019    LDLCHOLESTEROL 77 07/10/2018     Lab Results   Component Value Date    VLDL NOT REPORTED 05/30/2019    VLDL NOT REPORTED 05/28/2019    VLDL NOT REPORTED 07/10/2018     Lab Results   Component Value Date    CHOLHDLRATIO 2.3 05/30/2019    CHOLHDLRATIO 1.9 05/28/2019    CHOLHDLRATIO 2.5 07/10/2018           Physical Exam   Constitutional: She is oriented to person, place, and time. She appears well-developed. Eyes: Pupils are equal, round, and reactive to light. Cardiovascular: Normal rate, regular rhythm, normal heart sounds and intact distal pulses. Pulmonary/Chest: Effort normal and breath sounds normal.   Abdominal: Soft. Bowel sounds are normal.   Musculoskeletal: She exhibits no edema (to lower extremites ). Neurological: She is alert and oriented to person, place, and time. Skin: Skin is warm and dry. Negative for open/nonhealing wounds. Negative for lipohypertrophy. Psychiatric: She has a normal mood and affect. ASSESSMENT/PLAN:     Diagnosis Orders   1. [facility]     2. Diabetes education, encounter for     3. Dyslipidemia     4. Essential hypertension     5. Class 2 severe obesity due to excess calories with serious comorbidity and body mass index (BMI) of 37.0 to 37.9 in adult ([facility] [address]     6. BMI 37.0-37.9, adult       No orders of the defined types were placed in this encounter. No orders of the defined types were placed in this encounter.     Requested Prescriptions      No prescriptions requested or ordered in this encounter 1. Type 2 diabetes with nephropathy ([facility] [address]  2. Diabetes education, encounter for  - Stable  HbA1C goal is less than 7% and blood sugars are improving.  - Encouraged patient to check BS 4 times per day. Fasting blood glucose goal is 70-130mg/dl and postprandial blood sugar goal is less than 180 mg/dl. -Diabetic foot exam reviewed and is normal and is current?: Yes.   -Diabetic retinal exam reviewed and is current? :Yes showing No diabetic retinopathy.  - Labs reviewed includes: Most recent A1c of 6.7%. Repeat labs due in October.   -We discussed in great detail dietary modifications they can make to better improve their blood sugars. - -Blood sugar report reviewed and scanned into media tab.    -No medication adjustments will be made at this time encourage patient to continue to work on diet and exercise eating low-carb meals monitor weight blood pressure and blood sugars. Discussed hypoglycemia. Discussed signs and symptoms of hyper/hypoglycemia and how to treat. Encouraged 150 minutes of physical activity per week. Follow a low carbohydrate diet consuming 45 grams of carbohydrates at breakfast, lunch and dinner with two separate snacks [id number] grams of carbohydrates. Encouraged at least 7 hours of sleep. The patient was informed of the goals of diabetes management. This can only be accomplished by watching their diet and exercise levels. We certainly use medicines to help attain these goals. The consequences of not controlling blood sugars were discussed. These include blindness, heart disease, stroke, kidney disease, and possibly need for dialysis. They were told to be careful with their foot care as diabetics often have nerve damage, infections and risk for limb amutations . They also need a dilated eye exam yearly. We discussed the issues of diet, exercise, medication, complication avoidance, reviewed the signs and symptoms of diabetes, hypoglycemic episodes, significance of HbA1C.        3. Dyslipidemia  stable, lipid panel reviewed, continue current medications. Diet and exercise       4. Essential hypertension   stable, continue current medications. Diet and exercise Seek emergent care if chest pain develops. 5. Class 2 severe obesity due to excess calories with serious comorbidity and body mass index (BMI) of 37.0 to 37.9 in adult ([address]  6. BMI 37.0-37.9, adult  Reduce calories and increase physical activity to achieve a slow and steady weight loss to improve blood pressure, cholesterol and diabetes. Answered all patient questions. Agrees to follow plan of care and to follow up in 2 months, sooner if needed. Call office if unexplained blood sugars less than 70 occur or above 400. Call office or access [email or web address] with any further questions or concerns. Be sure to bring glucometer/food log at next appointment. Patient was seen with total face to face time of 60 minutes. More than 50%  of this visit was counseling and education regarding her diabetes.     Electronically signed by INGRID Marte CNP on 7/11/2019 at 12:23 PM      (Please note that portions of this note were completed with a voice-recognition program. Efforts were made to edit the dictation but occasionally words are mis-transcribed.)

## 2019-07-12 ENCOUNTER — CARE COORDINATION (OUTPATIENT)
Dept: CARE COORDINATION | Age: 84
End: 2019-07-12

## 2019-07-12 NOTE — CARE COORDINATION
extended release tablet TAKE 1 TABLET DAILY 18   Margot Almendarez MD   Handicap Placard MISC by Does not apply route EXP: 2020   Margot Almendarez MD   aspirin 81 MG EC tablet Take 81 mg by mouth daily    Historical Provider, MD   glucose blood VI test strips (ASCENSIA AUTODISC VI;ONE TOUCH ULTRA TEST VI) strip 1 each by In Vitro route 3 times daily As directed.  17   Gaurang Cherry DO   acetaminophen (TYLENOL) 500 MG tablet Take 500 mg by mouth daily    Historical Provider, MD   omeprazole (PRILOSEC) 20 MG capsule Take 20 mg by mouth Daily  1/14/15   Kuldeep Steven   Cholecalciferol (VITAMIN D3) 2000 UNITS CAPS Take 2,000 Units by mouth daily     Historical Provider, MD       Future Appointments   Date Time Provider Loyda Foster   2019 10:35 AM SCHEDULE, Hayden Carson [facility] LAB [facility] [place]   2019 10:50 AM Nadja Smith MD [facility] CTR [facility]   2019  9:45 AM Stephanie Carney DO DCARDIO [facility]   9/3/2019  1:30 PM Margarito Castle MD [facility]   2019  1:30 PM Emigdio Denise APRN - CNP DINT [facility]   2019 11:00 AM Manuel Villaseñor APRN - CNP DDIAED [facility]   2019  2:00 PM SCHEDULE, Hayden Anguianomar [address] LAB [facility] [place]   2019  3:00 PM Matt Laughlin MD [facility]   2019  9:30 AM Meeta Mcdonald APRN - CNP  [facility]   2019 10:30 AM [address] [place]   6/10/2020  8:40 AM MD CRICKET Tatum [facility]   2020 11:30 AM [facility]

## 2019-07-16 ENCOUNTER — HOSPITAL ENCOUNTER (OUTPATIENT)
Dept: INFUSION THERAPY | Age: 84
Discharge: HOME | End: 2019-07-16
Payer: MEDICARE

## 2019-07-16 VITALS
OXYGEN SATURATION: 97 % | RESPIRATION RATE: 14 BRPM | DIASTOLIC BLOOD PRESSURE: 57 MMHG | TEMPERATURE: 98.2 F | SYSTOLIC BLOOD PRESSURE: 162 MMHG | HEART RATE: 78 BPM

## 2019-07-16 DIAGNOSIS — D50.0 IRON DEFICIENCY ANEMIA DUE TO CHRONIC BLOOD LOSS: Primary | ICD-10-CM

## 2019-07-16 PROCEDURE — 2580000003 HC RX 258: Performed by: INTERNAL MEDICINE

## 2019-07-16 PROCEDURE — 6360000002 HC RX W HCPCS: Performed by: INTERNAL MEDICINE

## 2019-07-16 PROCEDURE — 96365 THER/PROPH/DIAG IV INF INIT: CPT

## 2019-07-16 RX ORDER — DIPHENHYDRAMINE HYDROCHLORIDE 50 MG/ML
50 INJECTION INTRAMUSCULAR; INTRAVENOUS ONCE
Status: CANCELLED | OUTPATIENT
Start: 2019-07-23

## 2019-07-16 RX ORDER — SODIUM CHLORIDE 9 MG/ML
INJECTION, SOLUTION INTRAVENOUS CONTINUOUS
Status: CANCELLED | OUTPATIENT
Start: 2019-07-18

## 2019-07-16 RX ORDER — DIPHENHYDRAMINE HYDROCHLORIDE 50 MG/ML
50 INJECTION INTRAMUSCULAR; INTRAVENOUS ONCE
Status: CANCELLED | OUTPATIENT
Start: 2019-07-18

## 2019-07-16 RX ORDER — SODIUM CHLORIDE 0.9 % (FLUSH) 0.9 %
5 SYRINGE (ML) INJECTION PRN
Status: CANCELLED | OUTPATIENT
Start: 2019-07-23

## 2019-07-16 RX ORDER — SODIUM CHLORIDE 0.9 % (FLUSH) 0.9 %
10 SYRINGE (ML) INJECTION PRN
Status: CANCELLED | OUTPATIENT
Start: 2019-07-23

## 2019-07-16 RX ORDER — HEPARIN SODIUM (PORCINE) LOCK FLUSH IV SOLN 100 UNIT/ML 100 UNIT/ML
500 SOLUTION INTRAVENOUS PRN
Status: CANCELLED | OUTPATIENT
Start: 2019-07-18

## 2019-07-16 RX ORDER — SODIUM CHLORIDE 9 MG/ML
INJECTION, SOLUTION INTRAVENOUS CONTINUOUS
Status: DISCONTINUED | OUTPATIENT
Start: 2019-07-16 | End: 2019-07-17 | Stop reason: HOSPADM

## 2019-07-16 RX ORDER — EPINEPHRINE 1 MG/ML
0.3 INJECTION, SOLUTION, CONCENTRATE INTRAVENOUS PRN
Status: CANCELLED | OUTPATIENT
Start: 2019-07-23

## 2019-07-16 RX ORDER — METHYLPREDNISOLONE SODIUM SUCCINATE 125 MG/2ML
125 INJECTION, POWDER, LYOPHILIZED, FOR SOLUTION INTRAMUSCULAR; INTRAVENOUS ONCE
Status: CANCELLED | OUTPATIENT
Start: 2019-07-18

## 2019-07-16 RX ORDER — SODIUM CHLORIDE 0.9 % (FLUSH) 0.9 %
10 SYRINGE (ML) INJECTION PRN
Status: CANCELLED | OUTPATIENT
Start: 2019-07-18

## 2019-07-16 RX ORDER — HEPARIN SODIUM (PORCINE) LOCK FLUSH IV SOLN 100 UNIT/ML 100 UNIT/ML
500 SOLUTION INTRAVENOUS PRN
Status: CANCELLED | OUTPATIENT
Start: 2019-07-23

## 2019-07-16 RX ORDER — METHYLPREDNISOLONE SODIUM SUCCINATE 125 MG/2ML
125 INJECTION, POWDER, LYOPHILIZED, FOR SOLUTION INTRAMUSCULAR; INTRAVENOUS ONCE
Status: CANCELLED | OUTPATIENT
Start: 2019-07-23

## 2019-07-16 RX ORDER — SODIUM CHLORIDE 9 MG/ML
INJECTION, SOLUTION INTRAVENOUS CONTINUOUS
Status: CANCELLED | OUTPATIENT
Start: 2019-07-23

## 2019-07-16 RX ORDER — SODIUM CHLORIDE 0.9 % (FLUSH) 0.9 %
5 SYRINGE (ML) INJECTION PRN
Status: CANCELLED | OUTPATIENT
Start: 2019-07-18

## 2019-07-16 RX ADMIN — FERRIC CARBOXYMALTOSE INJECTION 750 MG: 50 INJECTION, SOLUTION INTRAVENOUS at 13:21

## 2019-07-16 RX ADMIN — SODIUM CHLORIDE: 9 INJECTION, SOLUTION INTRAVENOUS at 12:48

## 2019-07-16 NOTE — PROGRESS NOTES
Anticoagulation clinic    Reminder voice message for patient regarding getting INR done ASAP for anticoagulation clinic.     Vinicio Schaeffer, PharmD                  Injectafer completely infused. Patient denies any complaints.

## 2019-07-16 NOTE — PROGRESS NOTES
Patient on unit for iron infusion. Sitting in chair resting IV started without difficulties and  infusing NSS. Patient briefly reviewed her health history.

## 2019-07-16 NOTE — PROGRESS NOTES
After 30 min observation patient states she feels fine. Vitals stable, denies complaints. IV d/c'd, coban to site. Patient discharged to home. Gait steady.

## 2019-07-17 ENCOUNTER — HOSPITAL ENCOUNTER (OUTPATIENT)
Dept: LAB | Age: 84
Discharge: HOME | End: 2019-07-17
Payer: MEDICARE

## 2019-07-17 DIAGNOSIS — D50.0 IRON DEFICIENCY ANEMIA DUE TO CHRONIC BLOOD LOSS: Primary | ICD-10-CM

## 2019-07-17 DIAGNOSIS — E11.21 TYPE 2 DIABETES WITH NEPHROPATHY (HCC): ICD-10-CM

## 2019-07-17 DIAGNOSIS — E11.21 TYPE 2 DIABETES WITH NEPHROPATHY (HCC): Primary | ICD-10-CM

## 2019-07-17 DIAGNOSIS — D64.9 ANEMIA, UNSPECIFIED TYPE: ICD-10-CM

## 2019-07-17 LAB
ESTIMATED AVERAGE GLUCOSE: 140 MG/DL
HBA1C MFR BLD: 6.5 % (ref 4.8–5.9)

## 2019-07-17 PROCEDURE — 36415 COLL VENOUS BLD VENIPUNCTURE: CPT

## 2019-07-17 PROCEDURE — 83036 HEMOGLOBIN GLYCOSYLATED A1C: CPT

## 2019-07-17 NOTE — TELEPHONE ENCOUNTER
Script for Humalog was sent to [facility] and it should have gone to [facility].   Please cancel [facility] script and send to [facility]

## 2019-07-18 ENCOUNTER — TELEPHONE (OUTPATIENT)
Dept: CARDIOLOGY | Age: 84
End: 2019-07-18

## 2019-07-18 RX ORDER — FUROSEMIDE 40 MG/1
40 TABLET ORAL DAILY PRN
Qty: 90 TABLET | Refills: 1 | Status: ON HOLD | OUTPATIENT
Start: 2019-07-18 | End: 2019-07-25 | Stop reason: HOSPADM

## 2019-07-18 NOTE — TELEPHONE ENCOUNTER
Pt called to report,as asked to do, swelling in ankles, started coughing in the night and BP is up  3 lb weight gain in past 6 days-current 198 on home scale    158/71-90 this am and glucose was 97    6/13-- 160/70-88    6/14 145/70-96    6/16 137/54-79    6/17 158/68-81    Please call to advise.     [phone] until noon today then [phone] until after 3pm.    Pt was told to call if these cahanges occurred

## 2019-07-19 ENCOUNTER — OFFICE VISIT (OUTPATIENT)
Dept: INTERNAL MEDICINE | Age: 84
End: 2019-07-19
Payer: MEDICARE

## 2019-07-19 ENCOUNTER — HOSPITAL ENCOUNTER (EMERGENCY)
Age: 84
Discharge: ANOTHER HOSPITAL | End: 2019-07-20
Attending: EMERGENCY MEDICINE
Payer: MEDICARE

## 2019-07-19 ENCOUNTER — APPOINTMENT (OUTPATIENT)
Dept: GENERAL RADIOLOGY | Age: 84
End: 2019-07-19
Payer: MEDICARE

## 2019-07-19 VITALS
SYSTOLIC BLOOD PRESSURE: 150 MMHG | BODY MASS INDEX: 39.1 KG/M2 | TEMPERATURE: 102.6 F | HEIGHT: 60 IN | RESPIRATION RATE: 18 BRPM | DIASTOLIC BLOOD PRESSURE: 60 MMHG | HEART RATE: 100 BPM

## 2019-07-19 DIAGNOSIS — R19.7 DIARRHEA, UNSPECIFIED TYPE: ICD-10-CM

## 2019-07-19 DIAGNOSIS — R05.9 COUGH: ICD-10-CM

## 2019-07-19 DIAGNOSIS — R19.7 NAUSEA VOMITING AND DIARRHEA: ICD-10-CM

## 2019-07-19 DIAGNOSIS — R11.2 NAUSEA AND VOMITING, INTRACTABILITY OF VOMITING NOT SPECIFIED, UNSPECIFIED VOMITING TYPE: Primary | ICD-10-CM

## 2019-07-19 DIAGNOSIS — J18.9 PNEUMONIA DUE TO ORGANISM: Primary | ICD-10-CM

## 2019-07-19 DIAGNOSIS — R09.02 HYPOXIA: ICD-10-CM

## 2019-07-19 DIAGNOSIS — R11.2 NAUSEA VOMITING AND DIARRHEA: ICD-10-CM

## 2019-07-19 PROBLEM — I50.43 CHF (CONGESTIVE HEART FAILURE), NYHA CLASS I, ACUTE ON CHRONIC, COMBINED (HCC): Status: ACTIVE | Noted: 2019-07-19

## 2019-07-19 LAB
ABSOLUTE EOS #: 0 K/UL (ref 0–0.4)
ABSOLUTE IMMATURE GRANULOCYTE: ABNORMAL K/UL (ref 0–0.3)
ABSOLUTE LYMPH #: 1.8 K/UL (ref 1–4.8)
ABSOLUTE MONO #: 2.1 K/UL (ref 0.1–1.2)
ALBUMIN SERPL-MCNC: 4 G/DL (ref 3.5–5.2)
ALBUMIN/GLOBULIN RATIO: 1.4 (ref 1–2.5)
ALP BLD-CCNC: 78 U/L (ref 35–104)
ALT SERPL-CCNC: 14 U/L (ref 5–33)
ANION GAP SERPL CALCULATED.3IONS-SCNC: 17 MMOL/L (ref 9–17)
AST SERPL-CCNC: 20 U/L
BASOPHILS # BLD: 1 % (ref 0–1)
BASOPHILS ABSOLUTE: 0.1 K/UL (ref 0–0.2)
BILIRUB SERPL-MCNC: 0.58 MG/DL (ref 0.3–1.2)
BNP INTERPRETATION: ABNORMAL
BUN BLDV-MCNC: 14 MG/DL (ref 8–23)
BUN/CREAT BLD: 12 (ref 9–20)
CALCIUM SERPL-MCNC: 9.3 MG/DL (ref 8.6–10.4)
CHLORIDE BLD-SCNC: 96 MMOL/L (ref 98–107)
CO2: 26 MMOL/L (ref 20–31)
CREAT SERPL-MCNC: 1.18 MG/DL (ref 0.5–0.9)
DIFFERENTIAL TYPE: ABNORMAL
EOSINOPHILS RELATIVE PERCENT: 0 % (ref 1–7)
GFR AFRICAN AMERICAN: 53 ML/MIN
GFR NON-AFRICAN AMERICAN: 44 ML/MIN
GFR SERPL CREATININE-BSD FRML MDRD: ABNORMAL ML/MIN/{1.73_M2}
GFR SERPL CREATININE-BSD FRML MDRD: ABNORMAL ML/MIN/{1.73_M2}
GLUCOSE BLD-MCNC: 170 MG/DL (ref 70–99)
HCT VFR BLD CALC: 33.1 % (ref 36–46)
HEMOGLOBIN: 10 G/DL (ref 12–16)
IMMATURE GRANULOCYTES: ABNORMAL %
LACTIC ACID: 3 MMOL/L (ref 0.5–2.2)
LIPASE: 9 U/L (ref 13–60)
LYMPHOCYTES # BLD: 10 % (ref 16–46)
MAGNESIUM: 1.8 MG/DL (ref 1.6–2.6)
MCH RBC QN AUTO: 23.2 PG (ref 26–34)
MCHC RBC AUTO-ENTMCNC: 30.3 G/DL (ref 31–37)
MCV RBC AUTO: 76.7 FL (ref 80–100)
MONOCYTES # BLD: 12 % (ref 4–11)
NRBC AUTOMATED: ABNORMAL PER 100 WBC
PDW BLD-RTO: 19.5 % (ref 11–14.5)
PLATELET # BLD: 243 K/UL (ref 140–450)
PLATELET ESTIMATE: ABNORMAL
PMV BLD AUTO: 9.3 FL (ref 6–12)
POTASSIUM SERPL-SCNC: 3.3 MMOL/L (ref 3.7–5.3)
PRO-BNP: 1367 PG/ML
RBC # BLD: 4.31 M/UL (ref 4–5.2)
RBC # BLD: ABNORMAL 10*6/UL
SEG NEUTROPHILS: 77 % (ref 43–77)
SEGMENTED NEUTROPHILS ABSOLUTE COUNT: 13.8 K/UL (ref 1.8–7.7)
SODIUM BLD-SCNC: 139 MMOL/L (ref 135–144)
TOTAL PROTEIN: 6.9 G/DL (ref 6.4–8.3)
TROPONIN INTERP: ABNORMAL
TROPONIN T: ABNORMAL NG/ML
TROPONIN, HIGH SENSITIVITY: 155 NG/L (ref 0–14)
WBC # BLD: 17.8 K/UL (ref 3.5–11)
WBC # BLD: ABNORMAL 10*3/UL

## 2019-07-19 PROCEDURE — 99285 EMERGENCY DEPT VISIT HI MDM: CPT

## 2019-07-19 PROCEDURE — 6360000002 HC RX W HCPCS: Performed by: EMERGENCY MEDICINE

## 2019-07-19 PROCEDURE — 99214 OFFICE O/P EST MOD 30 MIN: CPT | Performed by: INTERNAL MEDICINE

## 2019-07-19 PROCEDURE — 6370000000 HC RX 637 (ALT 250 FOR IP): Performed by: EMERGENCY MEDICINE

## 2019-07-19 PROCEDURE — 93005 ELECTROCARDIOGRAM TRACING: CPT | Performed by: EMERGENCY MEDICINE

## 2019-07-19 PROCEDURE — 85025 COMPLETE CBC W/AUTO DIFF WBC: CPT

## 2019-07-19 PROCEDURE — 96374 THER/PROPH/DIAG INJ IV PUSH: CPT

## 2019-07-19 PROCEDURE — 83690 ASSAY OF LIPASE: CPT

## 2019-07-19 PROCEDURE — 83735 ASSAY OF MAGNESIUM: CPT

## 2019-07-19 PROCEDURE — 2580000003 HC RX 258: Performed by: EMERGENCY MEDICINE

## 2019-07-19 PROCEDURE — 83605 ASSAY OF LACTIC ACID: CPT

## 2019-07-19 PROCEDURE — 2500000003 HC RX 250 WO HCPCS: Performed by: EMERGENCY MEDICINE

## 2019-07-19 PROCEDURE — 94640 AIRWAY INHALATION TREATMENT: CPT

## 2019-07-19 PROCEDURE — 87040 BLOOD CULTURE FOR BACTERIA: CPT

## 2019-07-19 PROCEDURE — 80053 COMPREHEN METABOLIC PANEL: CPT

## 2019-07-19 PROCEDURE — 84484 ASSAY OF TROPONIN QUANT: CPT

## 2019-07-19 PROCEDURE — 83880 ASSAY OF NATRIURETIC PEPTIDE: CPT

## 2019-07-19 PROCEDURE — 71046 X-RAY EXAM CHEST 2 VIEWS: CPT

## 2019-07-19 PROCEDURE — 36415 COLL VENOUS BLD VENIPUNCTURE: CPT

## 2019-07-19 RX ORDER — 0.9 % SODIUM CHLORIDE 0.9 %
1000 INTRAVENOUS SOLUTION INTRAVENOUS ONCE
Status: COMPLETED | OUTPATIENT
Start: 2019-07-19 | End: 2019-07-20

## 2019-07-19 RX ORDER — ONDANSETRON 2 MG/ML
4 INJECTION INTRAMUSCULAR; INTRAVENOUS ONCE
Status: COMPLETED | OUTPATIENT
Start: 2019-07-19 | End: 2019-07-19

## 2019-07-19 RX ORDER — ONDANSETRON 4 MG/1
4 TABLET, FILM COATED ORAL DAILY PRN
Qty: 10 TABLET | Refills: 1 | Status: ON HOLD | OUTPATIENT
Start: 2019-07-19 | End: 2019-09-26 | Stop reason: ALTCHOICE

## 2019-07-19 RX ORDER — ALBUTEROL SULFATE 2.5 MG/3ML
2.5 SOLUTION RESPIRATORY (INHALATION) ONCE
Status: COMPLETED | OUTPATIENT
Start: 2019-07-19 | End: 2019-07-19

## 2019-07-19 RX ORDER — ASPIRIN 81 MG/1
324 TABLET, CHEWABLE ORAL ONCE
Status: COMPLETED | OUTPATIENT
Start: 2019-07-19 | End: 2019-07-19

## 2019-07-19 RX ORDER — ACETAMINOPHEN 325 MG/1
650 TABLET ORAL ONCE
Status: COMPLETED | OUTPATIENT
Start: 2019-07-19 | End: 2019-07-19

## 2019-07-19 RX ADMIN — ASPIRIN 81 MG 324 MG: 81 TABLET ORAL at 22:06

## 2019-07-19 RX ADMIN — SODIUM CHLORIDE 1000 ML: 9 INJECTION, SOLUTION INTRAVENOUS at 20:51

## 2019-07-19 RX ADMIN — DOXYCYCLINE 100 MG: 100 INJECTION, POWDER, LYOPHILIZED, FOR SOLUTION INTRAVENOUS at 22:12

## 2019-07-19 RX ADMIN — ACETAMINOPHEN 650 MG: 325 TABLET ORAL at 21:23

## 2019-07-19 RX ADMIN — ONDANSETRON 4 MG: 2 INJECTION INTRAMUSCULAR; INTRAVENOUS at 20:51

## 2019-07-19 RX ADMIN — ALBUTEROL SULFATE 2.5 MG: 2.5 SOLUTION RESPIRATORY (INHALATION) at 21:55

## 2019-07-19 RX ADMIN — ENOXAPARIN SODIUM 90 MG: 100 INJECTION SUBCUTANEOUS at 22:06

## 2019-07-19 ASSESSMENT — ENCOUNTER SYMPTOMS
EYE PAIN: 0
DIARRHEA: 1
EYE PAIN: 0
NAUSEA: 1
VOMITING: 0
VOMITING: 1
SORE THROAT: 0
CONSTIPATION: 0
SHORTNESS OF BREATH: 0
DIARRHEA: 1
ABDOMINAL PAIN: 0
COUGH: 1
ABDOMINAL PAIN: 0
BLOOD IN STOOL: 0
RHINORRHEA: 0
COUGH: 1
NAUSEA: 1
BACK PAIN: 0
BACK PAIN: 0
SHORTNESS OF BREATH: 1

## 2019-07-19 NOTE — PROGRESS NOTES
Ange Valentin)       Family History   Problem Relation Age of Onset    Uterine Cancer Mother     Stroke Other         Apparently from a vascular malformation    Heart Disease Other         Positive family history    High Blood Pressure Other         Positive family history    Heart Attack Child 48        Son          Social History     Tobacco Use    Smoking status: Never Smoker    Smokeless tobacco: Never Used    Tobacco comment: amish rrt 5/28/2019   Substance Use Topics    Alcohol use: No     Alcohol/week: 0.0 standard drinks         Current Outpatient Medications   Medication Sig Dispense Refill    ondansetron (ZOFRAN) 4 MG tablet Take 1 tablet by mouth daily as needed for Nausea or Vomiting 10 tablet 1    furosemide (LASIX) 40 MG tablet Take 1 tablet by mouth daily as needed (Le edema, swelling, weight gain.) 90 tablet 1    insulin lispro (HUMALOG KWIKPEN) 100 UNIT/ML pen Inject 8 Units into the skin 4 times daily (with meals and nightly) Indications: pt takes before meals, and before HS snack 9.6 mL 3    furosemide (LASIX) 40 MG tablet Take 1 tablet by mouth daily 90 tablet 3    insulin glargine (LANTUS SOLOSTAR) 100 UNIT/ML injection pen Inject 30 Units into the skin 2 times daily 5 pen 3    amLODIPine (NORVASC) 5 MG tablet Take 1 tablet by mouth daily 30 tablet 5    Polyethylene Glycol 400 (BLINK TEARS) 0.25 % SOLN Apply to eye as needed      predniSONE (DELTASONE) 5 MG tablet TAKE 1 TABLET DAILY 90 tablet 3    B-D ULTRAFINE III SHORT PEN 31G X 8 MM MISC USE 7 DAILY 270 each 3    simvastatin (ZOCOR) 20 MG tablet Take 1 tablet by mouth nightly 90 tablet 3    hydrALAZINE (APRESOLINE) 25 MG tablet TAKE 1 TABLET THREE TIMES A  tablet 3    VICTOZA 18 MG/3ML SOPN SC injection INJECT 1.2 MG INTO THE SKIN DAILY 18 mL 3    potassium chloride (KLOR-CON M10) 10 MEQ extended release tablet TAKE 1 TABLET DAILY 90 tablet 3    Handicap Placard MISC by Does not apply route EXP: 6/12/2020 1 each 0    and told to advance diet slowly to crackers toast white rice etc. as tolerated patient also clearly told if she can keep fluids down and starts to feel dehydrated to go to emergency room immediately. Patient also told to hold Lasix x2 days. Patient also given Zofran for nausea and told to use Tylenol as needed fever       Plan:       Return if symptoms worsen or fail to improve. No orders of the defined types were placed in this encounter. Orders Placed This Encounter   Medications    ondansetron (ZOFRAN) 4 MG tablet     Sig: Take 1 tablet by mouth daily as needed for Nausea or Vomiting     Dispense:  10 tablet     Refill:  1       Patientgiven educational materials - see patient instructions. Discussed use, benefit,and side effects of prescribed medications. All patient questions answered. Ptvoiced understanding. Reviewed health maintenance. Instructed to continue currentmedications, diet and exercise. Patient agreed with treatment plan. Follow up asdirected.      Electronically signed by Sebastián Edwards MD on 7/19/2019 at 11:09 AM

## 2019-07-20 ENCOUNTER — HOSPITAL ENCOUNTER (INPATIENT)
Age: 84
LOS: 6 days | Discharge: HOME | DRG: 871 | End: 2019-07-26
Attending: INTERNAL MEDICINE | Admitting: FAMILY MEDICINE
Payer: MEDICARE

## 2019-07-20 VITALS
TEMPERATURE: 100.9 F | HEIGHT: 60 IN | SYSTOLIC BLOOD PRESSURE: 120 MMHG | DIASTOLIC BLOOD PRESSURE: 58 MMHG | OXYGEN SATURATION: 97 % | RESPIRATION RATE: 14 BRPM | BODY MASS INDEX: 37.3 KG/M2 | HEART RATE: 85 BPM | WEIGHT: 190 LBS

## 2019-07-20 DIAGNOSIS — E11.21 TYPE 2 DIABETES WITH NEPHROPATHY (HCC): ICD-10-CM

## 2019-07-20 LAB
CHOLESTEROL/HDL RATIO: 2.1
CHOLESTEROL: 137 MG/DL
CORTISOL COLLECTION INFO: ABNORMAL
CORTISOL: 21.4 UG/DL (ref 2.7–18.4)
GLUCOSE BLD-MCNC: 119 MG/DL (ref 65–105)
GLUCOSE BLD-MCNC: 151 MG/DL (ref 65–105)
GLUCOSE BLD-MCNC: 189 MG/DL (ref 65–105)
GLUCOSE BLD-MCNC: 98 MG/DL (ref 65–105)
HCT VFR BLD CALC: 36.1 % (ref 36.3–47.1)
HDLC SERPL-MCNC: 66 MG/DL
HEMOGLOBIN: 9.9 G/DL (ref 11.9–15.1)
LACTIC ACID, SEPSIS WHOLE BLOOD: 1.3 MMOL/L (ref 0.5–1.9)
LACTIC ACID, SEPSIS WHOLE BLOOD: 1.7 MMOL/L (ref 0.5–1.9)
LACTIC ACID, SEPSIS: NORMAL MMOL/L (ref 0.5–1.9)
LACTIC ACID, SEPSIS: NORMAL MMOL/L (ref 0.5–1.9)
LDL CHOLESTEROL: 46 MG/DL (ref 0–130)
MCH RBC QN AUTO: 23 PG (ref 25.2–33.5)
MCHC RBC AUTO-ENTMCNC: 27.4 G/DL (ref 28.4–34.8)
MCV RBC AUTO: 83.8 FL (ref 82.6–102.9)
NRBC AUTOMATED: 0 PER 100 WBC
PARTIAL THROMBOPLASTIN TIME: 27.2 SEC (ref 20.5–30.5)
PDW BLD-RTO: 19.8 % (ref 11.8–14.4)
PLATELET # BLD: 181 K/UL (ref 138–453)
PMV BLD AUTO: 11.9 FL (ref 8.1–13.5)
POTASSIUM SERPL-SCNC: 3 MMOL/L (ref 3.7–5.3)
RBC # BLD: 4.31 M/UL (ref 3.95–5.11)
TRIGL SERPL-MCNC: 126 MG/DL
TROPONIN INTERP: ABNORMAL
TROPONIN T: ABNORMAL NG/ML
TROPONIN, HIGH SENSITIVITY: 182 NG/L (ref 0–14)
TROPONIN, HIGH SENSITIVITY: 206 NG/L (ref 0–14)
TROPONIN, HIGH SENSITIVITY: 214 NG/L (ref 0–14)
VLDLC SERPL CALC-MCNC: NORMAL MG/DL (ref 1–30)
WBC # BLD: 13.5 K/UL (ref 3.5–11.3)

## 2019-07-20 PROCEDURE — 80061 LIPID PANEL: CPT

## 2019-07-20 PROCEDURE — 36415 COLL VENOUS BLD VENIPUNCTURE: CPT

## 2019-07-20 PROCEDURE — 84484 ASSAY OF TROPONIN QUANT: CPT

## 2019-07-20 PROCEDURE — 99223 1ST HOSP IP/OBS HIGH 75: CPT | Performed by: FAMILY MEDICINE

## 2019-07-20 PROCEDURE — 97530 THERAPEUTIC ACTIVITIES: CPT

## 2019-07-20 PROCEDURE — 85027 COMPLETE CBC AUTOMATED: CPT

## 2019-07-20 PROCEDURE — 85730 THROMBOPLASTIN TIME PARTIAL: CPT

## 2019-07-20 PROCEDURE — 97162 PT EVAL MOD COMPLEX 30 MIN: CPT

## 2019-07-20 PROCEDURE — 6360000002 HC RX W HCPCS: Performed by: NURSE PRACTITIONER

## 2019-07-20 PROCEDURE — 93005 ELECTROCARDIOGRAM TRACING: CPT | Performed by: NURSE PRACTITIONER

## 2019-07-20 PROCEDURE — 2060000000 HC ICU INTERMEDIATE R&B

## 2019-07-20 PROCEDURE — 2580000003 HC RX 258: Performed by: NURSE PRACTITIONER

## 2019-07-20 PROCEDURE — 6360000002 HC RX W HCPCS: Performed by: FAMILY MEDICINE

## 2019-07-20 PROCEDURE — 82533 TOTAL CORTISOL: CPT

## 2019-07-20 PROCEDURE — 2580000003 HC RX 258: Performed by: FAMILY MEDICINE

## 2019-07-20 PROCEDURE — 84132 ASSAY OF SERUM POTASSIUM: CPT

## 2019-07-20 PROCEDURE — 6370000000 HC RX 637 (ALT 250 FOR IP): Performed by: NURSE PRACTITIONER

## 2019-07-20 PROCEDURE — 82947 ASSAY GLUCOSE BLOOD QUANT: CPT

## 2019-07-20 PROCEDURE — 83605 ASSAY OF LACTIC ACID: CPT

## 2019-07-20 RX ORDER — POTASSIUM CHLORIDE 7.45 MG/ML
10 INJECTION INTRAVENOUS PRN
Status: DISCONTINUED | OUTPATIENT
Start: 2019-07-20 | End: 2019-07-26 | Stop reason: HOSPADM

## 2019-07-20 RX ORDER — INSULIN GLARGINE 100 [IU]/ML
30 INJECTION, SOLUTION SUBCUTANEOUS 2 TIMES DAILY
Status: DISCONTINUED | OUTPATIENT
Start: 2019-07-20 | End: 2019-07-24

## 2019-07-20 RX ORDER — HEPARIN SODIUM 10000 [USP'U]/100ML
1000 INJECTION, SOLUTION INTRAVENOUS CONTINUOUS
Status: DISCONTINUED | OUTPATIENT
Start: 2019-07-20 | End: 2019-07-20

## 2019-07-20 RX ORDER — SODIUM CHLORIDE 0.9 % (FLUSH) 0.9 %
10 SYRINGE (ML) INJECTION PRN
Status: DISCONTINUED | OUTPATIENT
Start: 2019-07-20 | End: 2019-07-26 | Stop reason: HOSPADM

## 2019-07-20 RX ORDER — DEXTROSE MONOHYDRATE 25 G/50ML
12.5 INJECTION, SOLUTION INTRAVENOUS PRN
Status: DISCONTINUED | OUTPATIENT
Start: 2019-07-20 | End: 2019-07-26 | Stop reason: HOSPADM

## 2019-07-20 RX ORDER — ONDANSETRON 2 MG/ML
4 INJECTION INTRAMUSCULAR; INTRAVENOUS EVERY 6 HOURS PRN
Status: DISCONTINUED | OUTPATIENT
Start: 2019-07-20 | End: 2019-07-26 | Stop reason: HOSPADM

## 2019-07-20 RX ORDER — PREDNISONE 1 MG/1
5 TABLET ORAL DAILY
Status: DISCONTINUED | OUTPATIENT
Start: 2019-07-20 | End: 2019-07-26 | Stop reason: HOSPADM

## 2019-07-20 RX ORDER — HEPARIN SODIUM 1000 [USP'U]/ML
2000 INJECTION, SOLUTION INTRAVENOUS; SUBCUTANEOUS PRN
Status: DISCONTINUED | OUTPATIENT
Start: 2019-07-20 | End: 2019-07-20

## 2019-07-20 RX ORDER — SODIUM CHLORIDE 0.9 % (FLUSH) 0.9 %
10 SYRINGE (ML) INJECTION PRN
Status: DISCONTINUED | OUTPATIENT
Start: 2019-07-20 | End: 2019-07-20 | Stop reason: SDUPTHER

## 2019-07-20 RX ORDER — FUROSEMIDE 10 MG/ML
40 INJECTION INTRAMUSCULAR; INTRAVENOUS 2 TIMES DAILY
Status: DISCONTINUED | OUTPATIENT
Start: 2019-07-20 | End: 2019-07-20

## 2019-07-20 RX ORDER — DEXTROSE MONOHYDRATE 50 MG/ML
100 INJECTION, SOLUTION INTRAVENOUS PRN
Status: DISCONTINUED | OUTPATIENT
Start: 2019-07-20 | End: 2019-07-26 | Stop reason: HOSPADM

## 2019-07-20 RX ORDER — POTASSIUM CHLORIDE 20 MEQ/1
10 TABLET, EXTENDED RELEASE ORAL DAILY
Status: DISCONTINUED | OUTPATIENT
Start: 2019-07-20 | End: 2019-07-26 | Stop reason: HOSPADM

## 2019-07-20 RX ORDER — SODIUM CHLORIDE 0.9 % (FLUSH) 0.9 %
10 SYRINGE (ML) INJECTION EVERY 12 HOURS SCHEDULED
Status: DISCONTINUED | OUTPATIENT
Start: 2019-07-20 | End: 2019-07-20 | Stop reason: SDUPTHER

## 2019-07-20 RX ORDER — SODIUM CHLORIDE 0.9 % (FLUSH) 0.9 %
10 SYRINGE (ML) INJECTION EVERY 12 HOURS SCHEDULED
Status: DISCONTINUED | OUTPATIENT
Start: 2019-07-20 | End: 2019-07-26 | Stop reason: HOSPADM

## 2019-07-20 RX ORDER — HYDRALAZINE HYDROCHLORIDE 25 MG/1
25 TABLET, FILM COATED ORAL EVERY 8 HOURS SCHEDULED
Status: DISCONTINUED | OUTPATIENT
Start: 2019-07-20 | End: 2019-07-26 | Stop reason: HOSPADM

## 2019-07-20 RX ORDER — NICOTINE 21 MG/24HR
1 PATCH, TRANSDERMAL 24 HOURS TRANSDERMAL DAILY PRN
Status: DISCONTINUED | OUTPATIENT
Start: 2019-07-20 | End: 2019-07-26 | Stop reason: HOSPADM

## 2019-07-20 RX ORDER — HEPARIN SODIUM 1000 [USP'U]/ML
4000 INJECTION, SOLUTION INTRAVENOUS; SUBCUTANEOUS ONCE
Status: COMPLETED | OUTPATIENT
Start: 2019-07-20 | End: 2019-07-20

## 2019-07-20 RX ORDER — POTASSIUM CHLORIDE 20 MEQ/1
40 TABLET, EXTENDED RELEASE ORAL PRN
Status: DISCONTINUED | OUTPATIENT
Start: 2019-07-20 | End: 2019-07-26 | Stop reason: HOSPADM

## 2019-07-20 RX ORDER — FUROSEMIDE 10 MG/ML
20 INJECTION INTRAMUSCULAR; INTRAVENOUS 2 TIMES DAILY
Status: DISCONTINUED | OUTPATIENT
Start: 2019-07-20 | End: 2019-07-20

## 2019-07-20 RX ORDER — PANTOPRAZOLE SODIUM 40 MG/1
40 TABLET, DELAYED RELEASE ORAL
Status: DISCONTINUED | OUTPATIENT
Start: 2019-07-20 | End: 2019-07-26 | Stop reason: HOSPADM

## 2019-07-20 RX ORDER — POTASSIUM CHLORIDE 7.45 MG/ML
10 INJECTION INTRAVENOUS PRN
Status: DISCONTINUED | OUTPATIENT
Start: 2019-07-20 | End: 2019-07-20 | Stop reason: SDUPTHER

## 2019-07-20 RX ORDER — SIMVASTATIN 20 MG
20 TABLET ORAL NIGHTLY
Status: DISCONTINUED | OUTPATIENT
Start: 2019-07-20 | End: 2019-07-26 | Stop reason: HOSPADM

## 2019-07-20 RX ORDER — ONDANSETRON 2 MG/ML
4 INJECTION INTRAMUSCULAR; INTRAVENOUS EVERY 6 HOURS PRN
Status: DISCONTINUED | OUTPATIENT
Start: 2019-07-20 | End: 2019-07-20 | Stop reason: SDUPTHER

## 2019-07-20 RX ORDER — ASPIRIN 81 MG/1
81 TABLET ORAL DAILY
Status: DISCONTINUED | OUTPATIENT
Start: 2019-07-20 | End: 2019-07-26 | Stop reason: HOSPADM

## 2019-07-20 RX ORDER — NICOTINE POLACRILEX 4 MG
15 LOZENGE BUCCAL PRN
Status: DISCONTINUED | OUTPATIENT
Start: 2019-07-20 | End: 2019-07-26 | Stop reason: HOSPADM

## 2019-07-20 RX ORDER — MAGNESIUM SULFATE 1 G/100ML
1 INJECTION INTRAVENOUS PRN
Status: DISCONTINUED | OUTPATIENT
Start: 2019-07-20 | End: 2019-07-26 | Stop reason: HOSPADM

## 2019-07-20 RX ORDER — HEPARIN SODIUM 1000 [USP'U]/ML
4000 INJECTION, SOLUTION INTRAVENOUS; SUBCUTANEOUS PRN
Status: DISCONTINUED | OUTPATIENT
Start: 2019-07-20 | End: 2019-07-20

## 2019-07-20 RX ORDER — AMLODIPINE BESYLATE 5 MG/1
5 TABLET ORAL DAILY
Status: DISCONTINUED | OUTPATIENT
Start: 2019-07-20 | End: 2019-07-20

## 2019-07-20 RX ORDER — ACETAMINOPHEN 325 MG/1
650 TABLET ORAL EVERY 4 HOURS PRN
Status: DISCONTINUED | OUTPATIENT
Start: 2019-07-20 | End: 2019-07-26 | Stop reason: HOSPADM

## 2019-07-20 RX ORDER — NITROGLYCERIN 0.4 MG/1
0.4 TABLET SUBLINGUAL EVERY 5 MIN PRN
Status: DISCONTINUED | OUTPATIENT
Start: 2019-07-20 | End: 2019-07-26 | Stop reason: HOSPADM

## 2019-07-20 RX ADMIN — SIMVASTATIN 20 MG: 20 TABLET, FILM COATED ORAL at 22:02

## 2019-07-20 RX ADMIN — INSULIN LISPRO 8 UNITS: 100 INJECTION, SOLUTION SUBCUTANEOUS at 17:25

## 2019-07-20 RX ADMIN — ASPIRIN 81 MG: 81 TABLET ORAL at 09:52

## 2019-07-20 RX ADMIN — Medication 10 MEQ: at 18:04

## 2019-07-20 RX ADMIN — PANTOPRAZOLE SODIUM 40 MG: 40 TABLET, DELAYED RELEASE ORAL at 06:38

## 2019-07-20 RX ADMIN — Medication 10 MEQ: at 10:05

## 2019-07-20 RX ADMIN — Medication 10 MEQ: at 08:18

## 2019-07-20 RX ADMIN — SODIUM CHLORIDE, PRESERVATIVE FREE 10 ML: 5 INJECTION INTRAVENOUS at 09:53

## 2019-07-20 RX ADMIN — HEPARIN SODIUM AND DEXTROSE 10 ML/HR: 10000; 5 INJECTION INTRAVENOUS at 09:48

## 2019-07-20 RX ADMIN — FUROSEMIDE 20 MG: 10 INJECTION, SOLUTION INTRAMUSCULAR; INTRAVENOUS at 09:52

## 2019-07-20 RX ADMIN — CEFTRIAXONE SODIUM 1 G: 1 INJECTION, POWDER, FOR SOLUTION INTRAMUSCULAR; INTRAVENOUS at 11:07

## 2019-07-20 RX ADMIN — Medication 10 MEQ: at 06:38

## 2019-07-20 RX ADMIN — PREDNISONE 5 MG: 5 TABLET ORAL at 09:52

## 2019-07-20 RX ADMIN — HEPARIN SODIUM 4000 UNITS: 1000 INJECTION, SOLUTION INTRAVENOUS; SUBCUTANEOUS at 09:47

## 2019-07-20 RX ADMIN — ONDANSETRON 4 MG: 2 INJECTION INTRAMUSCULAR; INTRAVENOUS at 08:18

## 2019-07-20 RX ADMIN — Medication 10 MEQ: at 21:59

## 2019-07-20 RX ADMIN — SODIUM CHLORIDE, PRESERVATIVE FREE 10 ML: 5 INJECTION INTRAVENOUS at 22:02

## 2019-07-20 RX ADMIN — Medication 10 MEQ: at 19:51

## 2019-07-20 RX ADMIN — AZITHROMYCIN DIHYDRATE 500 MG: 500 INJECTION, POWDER, LYOPHILIZED, FOR SOLUTION INTRAVENOUS at 13:00

## 2019-07-20 RX ADMIN — ACETAMINOPHEN 650 MG: 325 TABLET ORAL at 19:57

## 2019-07-20 RX ADMIN — ACETAMINOPHEN 650 MG: 325 TABLET ORAL at 09:59

## 2019-07-20 ASSESSMENT — ENCOUNTER SYMPTOMS
ABDOMINAL PAIN: 0
BLOOD IN STOOL: 0
SORE THROAT: 0
COUGH: 1
CONSTIPATION: 0
NAUSEA: 0
VOICE CHANGE: 0
VOMITING: 0
DIARRHEA: 0
SINUS PRESSURE: 0
WHEEZING: 0
SHORTNESS OF BREATH: 1

## 2019-07-20 ASSESSMENT — PAIN SCALES - GENERAL
PAINLEVEL_OUTOF10: 5
PAINLEVEL_OUTOF10: 0
PAINLEVEL_OUTOF10: 4

## 2019-07-20 NOTE — PROGRESS NOTES
(Right, 02/2012); Colonoscopy (05/16/2011); other surgical history (09/06/2011); Cholecystectomy; Endoscopy, colon, diagnostic; eye surgery; Cardiac catheterization (2014); Upper gastrointestinal endoscopy (05/16/2011); Upper gastrointestinal endoscopy (6/22/15); other surgical history (3/22/16); other surgical history (Right, 4/26/16); Cystoscopy (Right, 2/6/2019); and Cystoscopy (Right, 2/27/2019). Restrictions  Restrictions/Precautions  Restrictions/Precautions: Up as Tolerated, Fall Risk, General Precautions  Required Braces or Orthoses?: No  Position Activity Restriction  Other position/activity restrictions: up with assist  Vision/Hearing  Vision: Impaired  Vision Exceptions: Wears glasses for reading(Blind R eye s/p CVA ~4-5 years ago)  Hearing: Within functional limits     Subjective  General  Chart Reviewed: Yes  Patient assessed for rehabilitation services?: Yes  Response To Previous Treatment: Not applicable  Family / Caregiver Present: No  Follows Commands: Within Functional Limits  Subjective  Subjective: RN and pt agreeable to PT.  Pt alert in bed upon arrival.   Pain Screening  Patient Currently in Pain: Denies  Vital Signs  Patient Currently in Pain: Denies  Pre Treatment Pain Screening  Intervention List: Patient able to continue with treatment    Orientation  Orientation  Overall Orientation Status: Within Functional Limits  Social/Functional History  Social/Functional History  Lives With: Spouse  Type of Home: House  Home Layout: One level  Home Access: Stairs to enter with rails  Entrance Stairs - Number of Steps: 3 RAFAELA (plus small threshold)  Entrance Stairs - Rails: Left  Bathroom Shower/Tub: Tub only, Walk-in shower(primarily uses tub )  Bathroom Equipment: Shower chair(shower chair in walk in shower, was not using prior )  Home Equipment: 4 wheeled walker, Cane(rollator walker )  Receives Help From: Friend(s)  ADL Assistance: [address] [place] [address] Avenue: Independent  Homemaking

## 2019-07-20 NOTE — PROGRESS NOTES
during session     ADL  Feeding: Independent  Grooming: Contact guard assistance;Setup  UE Bathing: Contact guard assistance;Setup  LE Bathing: Moderate assistance;Setup  UE Dressing: Minimal assistance;Setup(to manage gown )  LE Dressing: Maximum assistance;Setup  Toileting: Minimal assistance  Additional Comments: Pt supine in bed on arrival, reports 2x falls yesterday and fear of falling. Pt assisted to complete bed mobility and sit at EOB. Pt assisted to complete sit > stand transfer and functional mobility to chair. Pt remained in chair, call light in reach and RN notified on therapist exit. Chair alarm armed.       Tone RUE  RUE Tone: Normotonic  Tone LUE  LUE Tone: Normotonic  Coordination  Movements Are Fluid And Coordinated: No  Coordination and Movement description: Right UE;Left UE;Decreased speed        Bed mobility  Supine to Sit: Minimal assistance;2 Person assistance  Sit to Supine: Unable to assess  Scooting: Minimal assistance  Comment: pt left up in chair on exit      Transfers  Stand Step Transfers: Contact guard assistance  Sit to stand: Minimal assistance  Stand to sit: Contact guard assistance  Transfer Comments: VCs for hand placement on RW during session     Cognition  Overall Cognitive Status: WFL     Perception  Overall Perceptual Status: WFL     Sensation  Overall Sensation Status: WFL(Pt denies any numbness/ tingling)    LUE AROM (degrees)  LUE AROM : WFL  RUE AROM (degrees)  RUE AROM : WFL  LUE Strength  Gross LUE Strength: WFL  RUE Strength  Gross RUE Strength: WFL     Plan   Plan  Times per week: 3-5x/wk   Current Treatment Recommendations: Functional Mobility Training, Safety Education & Training, Patient/Caregiver Education & Training, Equipment Evaluation, Education, & procurement, Self-Care / ADL, Endurance Training    AM-PAC Score  AM-[facility] Inpatient Daily Activity Raw Score: 17 (07/20/19 1531)  AM-PAC Inpatient ADL T-Scale Score : 37.26 (07/20/19 1531)  ADL Inpatient CMS 0-100%

## 2019-07-20 NOTE — ED PROVIDER NOTES
Nonspecific findings     Clinical Impression: Nonspecific ECG. Sinus rhythm. No acute infarction/ischemia noted. RADIOLOGY:   I directly visualized the following  images and reviewed the radiologist interpretations:  XR CHEST STANDARD (2 VW)   Final Result   Increased density in the left retrocardiac region is noted seen best on the   frontal view. This is concerning for a focal segment of atelectasis or   pneumonia             Mri Abdomen W Wo Contrast    Result Date: 7/2/2019  EXAMINATION: MRI OF THE ABDOMEN WITHOUT AND WITH CONTRAST, 7/2/2019 8:20 am TECHNIQUE: Multiplanar multisequence MRI of the abdomen was performed without and with the administration of intravenous contrast. COMPARISON: CT abdomen and pelvis 05/24/2019, 02/05/2019 and 01/13/2017. HISTORY: ORDERING SYSTEM PROVIDED HISTORY: Anemia, unspecified type TECHNOLOGIST PROVIDED HISTORY: pancreatic protocol , pancreatic nodule Specify organ?->Pancreas Ordering Physician Provided Reason for Exam: Follow up abnormality seen on CT scan in the pancreas Acuity: Unknown Type of Exam: Subsequent/Follow-up Additional signs and symptoms: Mass of pancreas; Pancreatic nodule FINDINGS: The visualized lung bases reveal no signal abnormality. No morphologic evidence for cirrhosis or steatosis. 1 cm cyst in the peripheral aspect of segment 8/5. No suspicious liver lesion identified. The hepatic and portal venous systems are patent. Status post cholecystectomy. No biliary dilatation. No evidence for choledocholithiasis. A lobulated cyst in the pancreatic head measuring 2.3 x 2.0 x 3.3 cm without internal solid components, enhancement or complexity. I measure this as 2.6 x 2.0 x 2.9 cm on the 2017 exam and otherwise similar morphology. No communication with the pancreatic duct, dilatation dilatation or signal abnormality otherwise appreciated in the pancreas. The spleen, adrenals and kidneys reveal no significant finding.  Subcentimeter right renal cyst Alkaline Phosphatase 78 35 - 104 U/L    ALT 14 5 - 33 U/L    AST 20 <32 U/L    Total Bilirubin 0.58 0.3 - 1.2 mg/dL    Total Protein 6.9 6.4 - 8.3 g/dL    Alb 4.0 3.5 - 5.2 g/dL    Albumin/Globulin Ratio 1.4 1.0 - 2.5    GFR Non- 44 (L) >60 mL/min    GFR  53 (L) >60 mL/min    GFR Comment          GFR Staging NOT REPORTED    Troponin   Result Value Ref Range    Troponin, High Sensitivity 155 (HH) 0 - 14 ng/L    Troponin T NOT REPORTED <0.03 ng/mL    Troponin Interp NOT REPORTED    Lipase   Result Value Ref Range    Lipase 9 (L) 13 - 60 U/L   Magnesium   Result Value Ref Range    Magnesium 1.8 1.6 - 2.6 mg/dL   Lactic Acid   Result Value Ref Range    Lactic Acid 3.0 (H) 0.5 - 2.2 mmol/L   Brain Natriuretic Peptide   Result Value Ref Range    Pro-BNP 1,367 (H) <300 pg/mL    BNP Interpretation Pro-BNP Reference Range:    EKG 12 Lead   Result Value Ref Range    Ventricular Rate 98 BPM    Atrial Rate 98 BPM    P-R Interval 158 ms    QRS Duration 82 ms    Q-T Interval 338 ms    QTc Calculation (Bazett) 431 ms    P Axis 68 degrees    R Axis 40 degrees    T Axis 118 degrees       EMERGENCY DEPARTMENT COURSE:     The patient was given the following medications:  Orders Placed This Encounter   Medications    0.9 % sodium chloride bolus    ondansetron (ZOFRAN) injection 4 mg    acetaminophen (TYLENOL) tablet 650 mg    doxycycline (VIBRAMYCIN) 100 mg in dextrose 5 % 100 mL IVPB    enoxaparin (LOVENOX) injection 90 mg    aspirin chewable tablet 324 mg    albuterol (PROVENTIL) nebulizer solution 2.5 mg        Vitals:    Vitals:    07/19/19 2031 07/19/19 2036 07/19/19 2126 07/19/19 2157   BP: (!) 144/64  (!) 154/66 (!) 144/67   Pulse: 103   91   Resp: 20   18   Temp: 102.4 °F (39.1 °C)   100.9 °F (38.3 °C)   TempSrc: Tympanic      SpO2: (!) 85% 96% 97% 95%   Weight: 190 lb (86.2 kg)      Height: 5' (1.524 m)        -------------------------  BP: (!) 144/67, Temp: 100.9 °F (38.3 °C), Pulse: 91,

## 2019-07-20 NOTE — CARE COORDINATION
Case Management Initial Discharge Plan  Beverly Birch,             Met with:patient to discuss discharge plans. Information verified: address, contacts, phone number, , insurance Yes  PCP: Izola Najjar, APRN - CNP  Date of last visit: 7/3/19 per [address] Provider: Didier Roland Medicare    Discharge Planning    Living Arrangements:  Spouse/Significant Other   Support Systems:  Spouse/Significant Other, Friends/Neighbors    Home has 1 stories  3 stairs to climb to get into front door, n/a stairs to climb to reach second floor  Location of bedroom/bathroom in home main level    Patient able to perform ADL's:Independent    Current Services (outpatient & in home) DME  DME equipment: Rollator, nebulizer, glucometer   DME provider:      Pharmacy: [address] [place] [address] in [facility]   Potential Assistance Purchasing Medications:  No  Does patient want to participate in local refill/ meds to beds program?  No    Potential Assistance Needed:  Home Care    Patient agreeable to home care: No  [place] of choice provided:  n/a    Prior SNF/Rehab Placement and Facility: No  Agreeable to SNF/Rehab: No  [place] of choice provided: n/a   Evaluation: n/a    Expected Discharge date:  19  Patient expects to be discharged to:  home  Follow Up Appointment: Best Day/ Time:      Transportation provider:  Spouse or friend  Transportation arrangements needed for discharge: No     Readmission Risk              Risk of Unplanned Readmission:        29             Does patient have a readmission risk score greater than 14?: Yes  If yes, follow-up appointment must be made within 7 days of discharge. Discharge Plan: Home with spouse, possible HC?      Multiple follow up appointments already scheduled:  19 - Nephro Dr Bubba Rhoades  19 - Shayla Arminda Sewell  9/3/19 - Gen Surg Dr Haily Bradley  19 - PCP Abeba Coronel CNP  19 - Diab Ed   19 - Fernanda Raygoza  19 - Mitzy Bailon CNP  Urology

## 2019-07-20 NOTE — ED NOTES
Called Access to check on status of bed assignment. States bed has been assigned and started to be cleaned 20 min ago. Asked if transport could be arranged due to at least 1-1.5hr drive time from our facility, spoke with Jeffrey Osuna who stated he needs to check with supervisor first before setting up transport and giving bed info. Awaiting call back.       Jess Tripathi RN  07/19/19 0384

## 2019-07-20 NOTE — CONSULTS
Ejection Fraction 60     LVEF MODALITY ECHO     Narrative    Transthoracic Echocardiography Report (TTE)     Patient Name Nimisha Simms    Date of Study             05/28/2019                [facility]      Date of      1935  Gender                    Female   Birth      Age          81 year(s)  Race                            Room Number  0210        Height:                   60 inch, 152.4 cm      Corporate ID [id number]    Weight:                   214 pounds, 97.1 kg   #      Patient Acct [de-identified]   BSA:         1.92 m^2     BMI:       41.79 kg/m^2   #      MR #         [id number]     Sonographer               Jose Juan Kirkpatrick, [facility]      Accession #  [id number]   Interpreting Physician    [address]      Fellow                   Referring Nurse                            Practitioner      Interpreting             Referring Physician       Hellen Pineda,   Fellow                                             DE[place]*     Type of Study      TTE procedure:2D Echocardiogram, M-Mode, Doppler, Color Doppler. Procedure Date  Date: 05/28/2019 Start: 11:14 AM    Study Location: [facility]  Technical Quality: Adequate visualization    Indications:Congestive heart failure, diastolic dysfunction. History / Tech. Comments:  Procedure explained to patient. Patient Status: Inpatient    Height: 60 inches Weight: 214 pounds BSA: 1.92 m^2 BMI: 41.79 kg/m^2    CONCLUSIONS    Summary  Normal left ventricular diameter. Mild left ventricular hypertrophy. Left ventricular systolic function is normal.  No segmental wall motion abnormalities seen. Left ventricular ejection fraction 60 %. Grade II (moderate) left ventricular diastolic dysfunction. Left atrium is mildly dilated. Right atrial dilatation. Aortic valve is sclerotic but opens well. Mitral annular calcification. Moderate mitral regurgitation. Normal tricuspid valve leaflets. Moderate tricuspid regurgitation.   Estimated right ventricular systolic pressure is 67 mmHg. Moderate pulmonary hypertension. No significant pericardial effusion is seen. Signature  ----------------------------------------------------------------------------   Electronically signed by Shailesh Montelongo(Interpreting physician) on 2019   12:31 PM  ----------------------------------------------------------------------------    FINDINGS  Left Atrium  Left atrium is mildly dilated. Left Ventricle  Normal left ventricular diameter. Mild left ventricular hypertrophy. Left ventricular systolic function is normal.  No segmental wall motion abnormalities seen. Left ventricular ejection fraction 60 %. Grade II (moderate) left ventricular diastolic dysfunction. Right Atrium  Right atrial dilatation. Right Ventricle  Normal right ventricular size and function. Mitral Valve  Mitral annular calcification. Moderate mitral regurgitation. Aortic Valve  Aortic valve is sclerotic but opens well. Tricuspid Valve  Normal tricuspid valve leaflets. Moderate tricuspid regurgitation. Estimated right ventricular systolic pressure is 67 mmHg. Moderate pulmonary hypertension. Pulmonic Valve  Pulmonic valve not well visualized but Doppler velocities are normal.  Pericardial Effusion  No significant pericardial effusion is seen. Miscellaneous  Normal aortic root dimension.     M-mode / 2D Measurements & Calculations:      LVIDd:4.45 cm(3.7 - 5.6 cm)      Diastolic JNWQGJ:20.4 ml   LVIDs:2.77 cm(2.2 - 4.0 cm)      Systolic CIHNOY:38.1 ml   IVSd:1.19 cm(0.6 - 1.1 cm)       Aortic Root:3.3 cm(2.0 - 3.7 cm)   LVPWd:1.2 cm(0.6 - 1.1 cm)       LA Dimension: 4.3 cm(1.9 - 4.0 cm)   Fractional Shortenin.75 %   Calculated LVEF (%): 59.04 %      Mitral:                                 Aortic      Peak E-Wave: 1.47 m/s                   Peak Velocity: 1.67 m/s   Peak A-Wave: 0.90 m/s                   Peak Gradient: 11.16 mmHg   E/A Ratio: 1.63   Peak Gradient: 8.64 mmHg   Deceleration Time:

## 2019-07-20 NOTE — H&P
Pneumonia     Premature atrial contractions     Pulmonary hypertension (HCC)     -Moderate on echo November 2014    Restrictive lung disease     Mild on PFTs 11/14    Type II or unspecified type diabetes mellitus without mention of complication, not stated as uncontrolled     Vitreous floaters         Past Surgical History:     Past Surgical History:   Procedure Laterality Date    APPENDECTOMY  1952    CARDIAC CATHETERIZATION  2014    CATARACT REMOVAL Bilateral 08/10    CHOLECYSTECTOMY      COLONOSCOPY  05/16/2011    CYSTOSCOPY Right 2/6/2019    Cysto with right stent insertion and villareal catheter performed by Reid Field MD at [facility] Right 2/27/2019    CYSTO right stent removal  Right Ureteroscopy Holmium Laser right stent exchange performed by Reid Field MD at [facility], DIAGNOSTIC      EYE SURGERY      HYSTERECTOMY  Approx 1983    MALIGNANT SKIN LESION EXCISION Right 12/10 and 04/11    Basal CellRemoved from right neck    MALIGNANT SKIN LESION EXCISION Right 02/2012    Basal Cell Removed from right leg    OTHER SURGICAL HISTORY  09/06/2011    capsule endoscopy    OTHER SURGICAL HISTORY  3/22/16    right L4 and L5 TFE    OTHER SURGICAL HISTORY Right 4/26/16    L4, L5 TFE    UPPER GASTROINTESTINAL ENDOSCOPY  05/16/2011    UPPER GASTROINTESTINAL ENDOSCOPY  6/22/15    mild gastritis (Dr. Jaylin Fraser)        Medications Prior to Admission:     Prior to Admission medications    Medication Sig Start Date End Date Taking?  Authorizing Provider   ondansetron (ZOFRAN) 4 MG tablet Take 1 tablet by mouth daily as needed for Nausea or Vomiting 7/19/19   Eric Zarco MD   furosemide (LASIX) 40 MG tablet Take 1 tablet by mouth daily as needed (Le edema, swelling, weight gain.) 7/18/19   Louie Mendoza DO   insulin lispro (HUMALOG KWIKPEN) 100 UNIT/ML pen Inject 8 Units into the skin 4 times daily (with meals and nightly) Indications: pt takes before meals, and before HS snack 7/17/19   INGRID Espitia - CNP   furosemide (LASIX) 40 MG tablet Take 1 tablet by mouth daily 7/3/19   INGRID Espitia - CNP   insulin glargine (LANTUS SOLOSTAR) 100 UNIT/ML injection pen Inject 30 Units into the skin 2 times daily 6/27/19   INGRID Reese CNP   amLODIPine (NORVASC) 5 MG tablet Take 1 tablet by mouth daily 6/25/19   Conrado Flower MD   Polyethylene Glycol 400 (BLINK TEARS) 0.25 % SOLN Apply to eye as needed    Historical Provider, MD   predniSONE (DELTASONE) 5 MG tablet TAKE 1 TABLET DAILY 4/18/19   Ada Bronson MD   B-D ULTRAFINE III SHORT PEN 31G X 8 MM MISC USE 7 DAILY 3/28/19   Chantal Cherry DO   simvastatin (ZOCOR) 20 MG tablet Take 1 tablet by mouth nightly 12/27/18   INGRID Givens CNP   hydrALAZINE (APRESOLINE) 25 MG tablet TAKE 1 TABLET THREE TIMES A DAY 10/30/18   Ada Bronson MD   VICTOZA 18 MG/3ML SOPN SC injection INJECT 1.2 MG INTO THE SKIN DAILY 10/12/18   Ada Bronson MD   potassium chloride (KLOR-CON M10) 10 MEQ extended release tablet TAKE 1 TABLET DAILY 8/6/18   Ada Bronson MD   Handicap Placard MISC by Does not apply route EXP: 6/12/2020 6/12/18   Ada Bronson MD   aspirin 81 MG EC tablet Take 81 mg by mouth daily    Historical Provider, MD   glucose blood VI test strips (ASCENSIA AUTODISC VI;ONE TOUCH ULTRA TEST VI) strip 1 each by In Vitro route 3 times daily As directed. 7/13/17   Chantal Cherry DO   acetaminophen (TYLENOL) 500 MG tablet Take 500 mg by mouth daily    Historical Provider, MD   omeprazole (PRILOSEC) 20 MG capsule Take 20 mg by mouth Daily  1/14/15   Yoav Small   Cholecalciferol (VITAMIN D3) 2000 UNITS CAPS Take 2,000 Units by mouth daily     Historical Provider, MD        Allergies:     Codeine; Erythromycin; Exenatide; Levofloxacin; Verapamil; Clonidine derivatives; Other; and Penicillins    Social History:     Tobacco:    reports that she has never smoked.  She has never used smokeless tobacco.  Alcohol:      reports that she does not drink alcohol. Drug Use:  reports that she does not use drugs. Family History:     Family History   Problem Relation Age of Onset    Uterine Cancer Mother     Stroke Other         Apparently from a vascular malformation    Heart Disease Other         Positive family history    High Blood Pressure Other         Positive family history    Heart Attack Child 48        Son       Review of Systems:     Positive and Negative as described in HPI. Review of Systems   Constitutional: Positive for activity change, appetite change, fatigue and fever. Negative for chills and unexpected weight change. HENT: Negative for congestion, mouth sores, postnasal drip, sinus pressure, sore throat and voice change. Eyes: Negative for visual disturbance. Respiratory: Positive for cough and shortness of breath. Negative for wheezing. Cardiovascular: Negative for chest pain and palpitations. Gastrointestinal: Negative for abdominal pain, blood in stool, constipation, diarrhea, nausea and vomiting. Endocrine: Negative for polyuria. Genitourinary: Negative for difficulty urinating, dysuria, frequency and urgency. Musculoskeletal: Negative for arthralgias, joint swelling and myalgias. Neurological: Negative for dizziness, tremors, speech difficulty, light-headedness and headaches. Physical Exam:   BP (!) 93/51   Pulse 99   Temp 98.4 °F (36.9 °C) (Oral)   Resp 27   Ht 5' 1\" (1.549 m)   Wt 195 lb 14.4 oz (88.9 kg)   SpO2 96%   BMI 37.01 kg/m²   Temp (24hrs), Av.2 °F (37.9 °C), Min:98.3 °F (36.8 °C), Max:102.6 °F (39.2 °C)    Recent Labs     19  0700   POCGLU 119*     No intake or output data in the 24 hours ending 19 0726  Physical Exam   Constitutional: She is oriented to person, place, and time. She is cooperative. She appears ill. No distress. Nasal cannula in place. HENT:   Head: Normocephalic and atraumatic.

## 2019-07-21 ENCOUNTER — APPOINTMENT (OUTPATIENT)
Dept: GENERAL RADIOLOGY | Age: 84
DRG: 871 | End: 2019-07-21
Attending: INTERNAL MEDICINE
Payer: MEDICARE

## 2019-07-21 LAB
ANION GAP SERPL CALCULATED.3IONS-SCNC: 13 MMOL/L (ref 9–17)
BNP INTERPRETATION: ABNORMAL
BUN BLDV-MCNC: 17 MG/DL (ref 8–23)
BUN/CREAT BLD: ABNORMAL (ref 9–20)
CALCIUM SERPL-MCNC: 8.7 MG/DL (ref 8.6–10.4)
CHLORIDE BLD-SCNC: 104 MMOL/L (ref 98–107)
CO2: 23 MMOL/L (ref 20–31)
CREAT SERPL-MCNC: 1.13 MG/DL (ref 0.5–0.9)
EKG ATRIAL RATE: 89 BPM
EKG ATRIAL RATE: 98 BPM
EKG P AXIS: 63 DEGREES
EKG P AXIS: 68 DEGREES
EKG P-R INTERVAL: 146 MS
EKG P-R INTERVAL: 158 MS
EKG Q-T INTERVAL: 338 MS
EKG Q-T INTERVAL: 470 MS
EKG QRS DURATION: 80 MS
EKG QRS DURATION: 82 MS
EKG QTC CALCULATION (BAZETT): 431 MS
EKG QTC CALCULATION (BAZETT): 571 MS
EKG R AXIS: 33 DEGREES
EKG R AXIS: 40 DEGREES
EKG T AXIS: 118 DEGREES
EKG T AXIS: 83 DEGREES
EKG VENTRICULAR RATE: 89 BPM
EKG VENTRICULAR RATE: 98 BPM
GFR AFRICAN AMERICAN: 56 ML/MIN
GFR NON-AFRICAN AMERICAN: 46 ML/MIN
GFR SERPL CREATININE-BSD FRML MDRD: ABNORMAL ML/MIN/{1.73_M2}
GFR SERPL CREATININE-BSD FRML MDRD: ABNORMAL ML/MIN/{1.73_M2}
GLUCOSE BLD-MCNC: 122 MG/DL (ref 70–99)
GLUCOSE BLD-MCNC: 131 MG/DL (ref 65–105)
GLUCOSE BLD-MCNC: 147 MG/DL (ref 65–105)
GLUCOSE BLD-MCNC: 148 MG/DL (ref 65–105)
HCT VFR BLD CALC: 32.4 % (ref 36.3–47.1)
HEMOGLOBIN: 8.8 G/DL (ref 11.9–15.1)
MCH RBC QN AUTO: 23 PG (ref 25.2–33.5)
MCHC RBC AUTO-ENTMCNC: 27.2 G/DL (ref 28.4–34.8)
MCV RBC AUTO: 84.6 FL (ref 82.6–102.9)
NRBC AUTOMATED: 0 PER 100 WBC
PDW BLD-RTO: 19.8 % (ref 11.8–14.4)
PLATELET # BLD: 176 K/UL (ref 138–453)
PMV BLD AUTO: 11.6 FL (ref 8.1–13.5)
POTASSIUM SERPL-SCNC: 3.7 MMOL/L (ref 3.7–5.3)
POTASSIUM SERPL-SCNC: 4.2 MMOL/L (ref 3.7–5.3)
PRO-BNP: 2483 PG/ML
RBC # BLD: 3.83 M/UL (ref 3.95–5.11)
SODIUM BLD-SCNC: 140 MMOL/L (ref 135–144)
TSH SERPL DL<=0.05 MIU/L-ACNC: 0.86 MIU/L (ref 0.3–5)
WBC # BLD: 11.6 K/UL (ref 3.5–11.3)

## 2019-07-21 PROCEDURE — 84443 ASSAY THYROID STIM HORMONE: CPT

## 2019-07-21 PROCEDURE — 71046 X-RAY EXAM CHEST 2 VIEWS: CPT

## 2019-07-21 PROCEDURE — 2580000003 HC RX 258: Performed by: FAMILY MEDICINE

## 2019-07-21 PROCEDURE — 82947 ASSAY GLUCOSE BLOOD QUANT: CPT

## 2019-07-21 PROCEDURE — 6370000000 HC RX 637 (ALT 250 FOR IP): Performed by: INTERNAL MEDICINE

## 2019-07-21 PROCEDURE — 36415 COLL VENOUS BLD VENIPUNCTURE: CPT

## 2019-07-21 PROCEDURE — 2580000003 HC RX 258: Performed by: NURSE PRACTITIONER

## 2019-07-21 PROCEDURE — 99232 SBSQ HOSP IP/OBS MODERATE 35: CPT | Performed by: FAMILY MEDICINE

## 2019-07-21 PROCEDURE — 6370000000 HC RX 637 (ALT 250 FOR IP): Performed by: NURSE PRACTITIONER

## 2019-07-21 PROCEDURE — 6360000002 HC RX W HCPCS: Performed by: NURSE PRACTITIONER

## 2019-07-21 PROCEDURE — 6370000000 HC RX 637 (ALT 250 FOR IP): Performed by: FAMILY MEDICINE

## 2019-07-21 PROCEDURE — 83880 ASSAY OF NATRIURETIC PEPTIDE: CPT

## 2019-07-21 PROCEDURE — 80048 BASIC METABOLIC PNL TOTAL CA: CPT

## 2019-07-21 PROCEDURE — 2700000000 HC OXYGEN THERAPY PER DAY

## 2019-07-21 PROCEDURE — 94762 N-INVAS EAR/PLS OXIMTRY CONT: CPT

## 2019-07-21 PROCEDURE — 85027 COMPLETE CBC AUTOMATED: CPT

## 2019-07-21 PROCEDURE — 84132 ASSAY OF SERUM POTASSIUM: CPT

## 2019-07-21 PROCEDURE — 6360000002 HC RX W HCPCS: Performed by: FAMILY MEDICINE

## 2019-07-21 PROCEDURE — 2060000000 HC ICU INTERMEDIATE R&B

## 2019-07-21 RX ORDER — GUAIFENESIN 600 MG/1
600 TABLET, EXTENDED RELEASE ORAL 2 TIMES DAILY
Status: DISCONTINUED | OUTPATIENT
Start: 2019-07-21 | End: 2019-07-26 | Stop reason: HOSPADM

## 2019-07-21 RX ORDER — FUROSEMIDE 40 MG/1
40 TABLET ORAL DAILY
Status: DISCONTINUED | OUTPATIENT
Start: 2019-07-21 | End: 2019-07-24

## 2019-07-21 RX ADMIN — INSULIN GLARGINE 30 UNITS: 100 INJECTION, SOLUTION SUBCUTANEOUS at 10:12

## 2019-07-21 RX ADMIN — CEFTRIAXONE SODIUM 1 G: 1 INJECTION, POWDER, FOR SOLUTION INTRAMUSCULAR; INTRAVENOUS at 09:00

## 2019-07-21 RX ADMIN — ACETAMINOPHEN 650 MG: 325 TABLET ORAL at 04:56

## 2019-07-21 RX ADMIN — ACETAMINOPHEN 650 MG: 325 TABLET ORAL at 17:10

## 2019-07-21 RX ADMIN — SODIUM CHLORIDE, PRESERVATIVE FREE 10 ML: 5 INJECTION INTRAVENOUS at 21:07

## 2019-07-21 RX ADMIN — AZITHROMYCIN DIHYDRATE 500 MG: 500 INJECTION, POWDER, LYOPHILIZED, FOR SOLUTION INTRAVENOUS at 10:00

## 2019-07-21 RX ADMIN — POTASSIUM CHLORIDE 10 MEQ: 1500 TABLET, EXTENDED RELEASE ORAL at 10:06

## 2019-07-21 RX ADMIN — SIMVASTATIN 20 MG: 20 TABLET, FILM COATED ORAL at 21:06

## 2019-07-21 RX ADMIN — SODIUM CHLORIDE, PRESERVATIVE FREE 10 ML: 5 INJECTION INTRAVENOUS at 10:11

## 2019-07-21 RX ADMIN — VITAMIN D, TAB 1000IU (100/BT) 2000 UNITS: 25 TAB at 10:06

## 2019-07-21 RX ADMIN — FUROSEMIDE 40 MG: 40 TABLET ORAL at 11:56

## 2019-07-21 RX ADMIN — ONDANSETRON 4 MG: 2 INJECTION INTRAMUSCULAR; INTRAVENOUS at 17:09

## 2019-07-21 RX ADMIN — PANTOPRAZOLE SODIUM 40 MG: 40 TABLET, DELAYED RELEASE ORAL at 06:44

## 2019-07-21 RX ADMIN — INSULIN GLARGINE 30 UNITS: 100 INJECTION, SOLUTION SUBCUTANEOUS at 21:54

## 2019-07-21 RX ADMIN — PREDNISONE 5 MG: 5 TABLET ORAL at 10:06

## 2019-07-21 RX ADMIN — INSULIN LISPRO 8 UNITS: 100 INJECTION, SOLUTION INTRAVENOUS; SUBCUTANEOUS at 11:52

## 2019-07-21 RX ADMIN — ASPIRIN 81 MG: 81 TABLET ORAL at 10:06

## 2019-07-21 ASSESSMENT — ENCOUNTER SYMPTOMS
ABDOMINAL PAIN: 0
CONSTIPATION: 0
SINUS PRESSURE: 0
SHORTNESS OF BREATH: 1
WHEEZING: 0
COUGH: 1
VOICE CHANGE: 0
BLOOD IN STOOL: 0
NAUSEA: 0
SORE THROAT: 0
DIARRHEA: 0
VOMITING: 0

## 2019-07-21 ASSESSMENT — PAIN SCALES - GENERAL
PAINLEVEL_OUTOF10: 3
PAINLEVEL_OUTOF10: 0
PAINLEVEL_OUTOF10: 8

## 2019-07-21 NOTE — CONSULTS
--   --  140   K 3.3* 3.0* 4.2 3.7   CL 96*  --   --  104   CO2 26  --   --  23   BUN 14  --   --  17   CREATININE 1.18*  --   --  1.13*   GLUCOSE 170*  --   --  122*     Hepatic:   Recent Labs     07/19/19 2035   AST 20   ALT 14   BILITOT 0.58   ALKPHOS 78     Troponin:  Recent Labs     07/20/19 0311 07/20/19  0635 07/20/19  1558   TROPHS 206* 214* 182*            No results for input(s): TROPONINI in the last 72 hours. Recent Labs     07/20/19 0311 07/20/19  0635 07/20/19  1558   TROPONINT NOT REPORTED NOT REPORTED NOT REPORTED     BNP:   Recent Labs     07/19/19 2035 07/21/19  0033   PROBNP 1,367* 2,483*               No results for input(s): BNP in the last 72 hours. Lipids:   Recent Labs     07/20/19 0635   CHOL 137   HDL 66     INR: No results for input(s): INR in the last 72 hours. Objective:   Vitals: BP (!) 123/45   Pulse 91   Temp 98.5 °F (36.9 °C) (Oral)   Resp 21   Ht 5' 1\" (1.549 m)   Wt 195 lb 14.4 oz (88.9 kg)   SpO2 96%   BMI 37.01 kg/m²      General appearance: awake, alert, in no apparent distress. HEENT: Head: Normocephalic, atraumatic without any obvious abnormalities. Eyes: pupils equal and reactive, extraocular eye movements intact. mucous membranes moist, pharynx normal without lesions  Cardiovascular:  regular rate and rhythm, S1, S2 normal, no S3 or S4, no click and no rub  Respiratory:  clear to auscultation bilaterally, no wheezes, no rales and no rhonchi  Gastrointestinal:  soft, non-tender; bowel sounds normal; no masses,  no organomegaly  Extremities: No calf assymetry. Peripheral pulses in b/l LE present  Musculoskeletal:  no joint tenderness, deformity or swelling, no muscular tenderness noted  Skin:  warm and dry and anicteric  Neurologic:  alert, oriented, normal speech, no focal findings or movement disorder noted  Psychiatric:  appropriate    Diagnostic Studies:     EKG: normal EKG, normal sinus rhythm, unchanged from previous tracings.   ECHO: ordered, but not yet obtained. Stress Test: not obtained. Cardiac Angiography: not obtained. Patient's Active Problem List   Principal Problem:    CHF (congestive heart failure), NYHA class I, acute on chronic, combined ([address]  Active Problems:    Dyslipidemia    CHICO- intolerant CPAP    Class 2 severe obesity due to excess calories with serious comorbidity and body mass index (BMI) of 37.0 to 37.9 in adult [facility])    Essential hypertension  Resolved Problems:    * No resolved hospital problems. *        Assessment / Acute Cardiac Problems:   1. Elevated troponin possibly due to NSTEMI type 2 due to infection  2. HSN downtrending  3. CHICO  4. HTN  5. Dyslipidemia   6. Diabetes mellitus        Plan of Treatment:   1. Limited 2d echo order active  2. Start PO Lasix 40 mg daily  3. Continue rest of the medical management         Eliceo Hernandez MD  Resident, [facility]        Please note that part of this chart were generated using voice recognition  dictation software. Although every effort was made to ensure the accuracy of this automated transcription, some errors in transcription may have occurred. Attending Cardiologist Addendum: I have reviewed and performed the history, physical, subjective, objective, assessment, and plan with the resident/fellow and agree with the note. I performed the history and physical personally. I have made changes to the note above as needed. Awaiting Echo     Thank you for allowing me to participate in the care of this patient, please do not hesitate to call if you have any questions. Fox Llanes DO, [facility] [place], [address] [facility]  [facility]  [phone]

## 2019-07-21 NOTE — PROGRESS NOTES
Chest Standard (2 Vw)    Result Date: 7/19/2019  Increased density in the left retrocardiac region is noted seen best on the frontal view. This is concerning for a focal segment of atelectasis or pneumonia      Echo 5/28/19   LVEF 60 % , grade II diastolic Dysfunction . RVSP 67 mm Hg   Clinical Course : stable  Assessment and Plan  :        1. Left Pneumonia - Rocephin and azithromycin, follow blood culture. Check sputum culture. Oxygen support . 2. Elevated trops likely demand ischemia . unlikely ACS . heparin infusion stopped - cardiology on board. - Aspirin , Lipitor . BB and hydralazine on hold due to hypotension. 3. Severe Sepsis secondary to pneumonia -   4. Type 2 DM  - A1C 6.5 - Humalog as needed. Continue lantus . 5. Essential hypertension . Hold medications due to hypotension. 6. Pulmonary hypertension - continue Oxygen ,   7. CHICO -       Continue to monitor vitals , Intake / output ,  Cell count , HGB , Kidney function, oxygenation  as indicated . Plan and updates discussed with patient ,  answers  explained to satisfaction.    Plan discussed with Staff  RN     (Please note that portions of this note were completed with a voice recognition program. Efforts were made to edit the dictations but occasionally words are mis-transcribed.)      Slime Mejia MD  7/21/2019

## 2019-07-22 LAB
ANION GAP SERPL CALCULATED.3IONS-SCNC: 10 MMOL/L (ref 9–17)
BUN BLDV-MCNC: 17 MG/DL (ref 8–23)
BUN/CREAT BLD: ABNORMAL (ref 9–20)
CALCIUM SERPL-MCNC: 8.7 MG/DL (ref 8.6–10.4)
CHLORIDE BLD-SCNC: 103 MMOL/L (ref 98–107)
CO2: 29 MMOL/L (ref 20–31)
CREAT SERPL-MCNC: 0.99 MG/DL (ref 0.5–0.9)
EKG ATRIAL RATE: 93 BPM
EKG P AXIS: 63 DEGREES
EKG P-R INTERVAL: 160 MS
EKG Q-T INTERVAL: 338 MS
EKG QRS DURATION: 84 MS
EKG QTC CALCULATION (BAZETT): 420 MS
EKG R AXIS: 47 DEGREES
EKG T AXIS: 119 DEGREES
EKG VENTRICULAR RATE: 93 BPM
GFR AFRICAN AMERICAN: >60 ML/MIN
GFR NON-AFRICAN AMERICAN: 54 ML/MIN
GFR SERPL CREATININE-BSD FRML MDRD: ABNORMAL ML/MIN/{1.73_M2}
GFR SERPL CREATININE-BSD FRML MDRD: ABNORMAL ML/MIN/{1.73_M2}
GLUCOSE BLD-MCNC: 108 MG/DL (ref 65–105)
GLUCOSE BLD-MCNC: 110 MG/DL (ref 70–99)
GLUCOSE BLD-MCNC: 139 MG/DL (ref 65–105)
GLUCOSE BLD-MCNC: 157 MG/DL (ref 65–105)
GLUCOSE BLD-MCNC: 164 MG/DL (ref 65–105)
GLUCOSE BLD-MCNC: 99 MG/DL (ref 65–105)
HCT VFR BLD CALC: 30.3 % (ref 36.3–47.1)
HEMOGLOBIN: 8.6 G/DL (ref 11.9–15.1)
MCH RBC QN AUTO: 23.1 PG (ref 25.2–33.5)
MCHC RBC AUTO-ENTMCNC: 28.4 G/DL (ref 28.4–34.8)
MCV RBC AUTO: 81.5 FL (ref 82.6–102.9)
NRBC AUTOMATED: 0 PER 100 WBC
PDW BLD-RTO: 19.4 % (ref 11.8–14.4)
PLATELET # BLD: ABNORMAL K/UL (ref 138–453)
PLATELET, FLUORESCENCE: 177 K/UL (ref 138–453)
PLATELET, IMMATURE FRACTION: 10.1 % (ref 1.1–10.3)
PMV BLD AUTO: ABNORMAL FL (ref 8.1–13.5)
POTASSIUM SERPL-SCNC: 3.6 MMOL/L (ref 3.7–5.3)
RBC # BLD: 3.72 M/UL (ref 3.95–5.11)
SODIUM BLD-SCNC: 142 MMOL/L (ref 135–144)
WBC # BLD: 8.2 K/UL (ref 3.5–11.3)

## 2019-07-22 PROCEDURE — 85027 COMPLETE CBC AUTOMATED: CPT

## 2019-07-22 PROCEDURE — 93308 TTE F-UP OR LMTD: CPT

## 2019-07-22 PROCEDURE — 85055 RETICULATED PLATELET ASSAY: CPT

## 2019-07-22 PROCEDURE — 6370000000 HC RX 637 (ALT 250 FOR IP): Performed by: FAMILY MEDICINE

## 2019-07-22 PROCEDURE — 2580000003 HC RX 258: Performed by: NURSE PRACTITIONER

## 2019-07-22 PROCEDURE — 6370000000 HC RX 637 (ALT 250 FOR IP): Performed by: INTERNAL MEDICINE

## 2019-07-22 PROCEDURE — 82947 ASSAY GLUCOSE BLOOD QUANT: CPT

## 2019-07-22 PROCEDURE — 99232 SBSQ HOSP IP/OBS MODERATE 35: CPT | Performed by: INTERNAL MEDICINE

## 2019-07-22 PROCEDURE — 2060000000 HC ICU INTERMEDIATE R&B

## 2019-07-22 PROCEDURE — 6360000002 HC RX W HCPCS: Performed by: INTERNAL MEDICINE

## 2019-07-22 PROCEDURE — 6360000002 HC RX W HCPCS: Performed by: FAMILY MEDICINE

## 2019-07-22 PROCEDURE — 87205 SMEAR GRAM STAIN: CPT

## 2019-07-22 PROCEDURE — 80048 BASIC METABOLIC PNL TOTAL CA: CPT

## 2019-07-22 PROCEDURE — 6370000000 HC RX 637 (ALT 250 FOR IP): Performed by: NURSE PRACTITIONER

## 2019-07-22 PROCEDURE — 36415 COLL VENOUS BLD VENIPUNCTURE: CPT

## 2019-07-22 PROCEDURE — 87070 CULTURE OTHR SPECIMN AEROBIC: CPT

## 2019-07-22 PROCEDURE — 83880 ASSAY OF NATRIURETIC PEPTIDE: CPT

## 2019-07-22 PROCEDURE — 2580000003 HC RX 258: Performed by: FAMILY MEDICINE

## 2019-07-22 RX ORDER — POTASSIUM CHLORIDE 20 MEQ/1
10 TABLET, EXTENDED RELEASE ORAL ONCE
Status: COMPLETED | OUTPATIENT
Start: 2019-07-22 | End: 2019-07-22

## 2019-07-22 RX ADMIN — POTASSIUM CHLORIDE 10 MEQ: 1500 TABLET, EXTENDED RELEASE ORAL at 09:23

## 2019-07-22 RX ADMIN — ASPIRIN 81 MG: 81 TABLET ORAL at 09:24

## 2019-07-22 RX ADMIN — ENOXAPARIN SODIUM 40 MG: 40 INJECTION SUBCUTANEOUS at 18:39

## 2019-07-22 RX ADMIN — FUROSEMIDE 40 MG: 40 TABLET ORAL at 09:24

## 2019-07-22 RX ADMIN — INSULIN GLARGINE 30 UNITS: 100 INJECTION, SOLUTION SUBCUTANEOUS at 23:19

## 2019-07-22 RX ADMIN — GUAIFENESIN 600 MG: 600 TABLET, EXTENDED RELEASE ORAL at 21:31

## 2019-07-22 RX ADMIN — PREDNISONE 5 MG: 5 TABLET ORAL at 09:22

## 2019-07-22 RX ADMIN — PANTOPRAZOLE SODIUM 40 MG: 40 TABLET, DELAYED RELEASE ORAL at 09:24

## 2019-07-22 RX ADMIN — VITAMIN D, TAB 1000IU (100/BT) 2000 UNITS: 25 TAB at 09:23

## 2019-07-22 RX ADMIN — CEFTRIAXONE SODIUM 1 G: 1 INJECTION, POWDER, FOR SOLUTION INTRAMUSCULAR; INTRAVENOUS at 10:42

## 2019-07-22 RX ADMIN — INSULIN LISPRO 8 UNITS: 100 INJECTION, SOLUTION INTRAVENOUS; SUBCUTANEOUS at 13:00

## 2019-07-22 RX ADMIN — GUAIFENESIN 600 MG: 600 TABLET, EXTENDED RELEASE ORAL at 09:24

## 2019-07-22 RX ADMIN — SODIUM CHLORIDE, PRESERVATIVE FREE 10 ML: 5 INJECTION INTRAVENOUS at 09:25

## 2019-07-22 RX ADMIN — POTASSIUM CHLORIDE 10 MEQ: 1500 TABLET, EXTENDED RELEASE ORAL at 10:42

## 2019-07-22 RX ADMIN — INSULIN GLARGINE 30 UNITS: 100 INJECTION, SOLUTION SUBCUTANEOUS at 09:27

## 2019-07-22 RX ADMIN — SIMVASTATIN 20 MG: 20 TABLET, FILM COATED ORAL at 21:31

## 2019-07-22 RX ADMIN — AZITHROMYCIN DIHYDRATE 500 MG: 500 INJECTION, POWDER, LYOPHILIZED, FOR SOLUTION INTRAVENOUS at 10:42

## 2019-07-22 ASSESSMENT — ENCOUNTER SYMPTOMS
CONSTIPATION: 0
SORE THROAT: 0
DIARRHEA: 1
VOMITING: 0
ABDOMINAL PAIN: 0
WHEEZING: 0
COUGH: 1
BLOOD IN STOOL: 0
SHORTNESS OF BREATH: 1
VOICE CHANGE: 0
NAUSEA: 0
SINUS PRESSURE: 0

## 2019-07-22 NOTE — PROGRESS NOTES
[place] [facility]  Progress Note                   Date:   7/22/2019  Patient name: Sneha Richard  Date of admission:  7/20/2019  2:45 AM  MRN:   [id number]  YOB: 1935  PCP: Franky Rose APRN - CNP    Reason for Admission: CHF (congestive heart failure), NYHA class I, acute on chronic, combined ([facility] [I50.43]    Subjective:       Clinical Changes /Abnormalities: Pt. Seen & examined alone in room sitting in bedside chair. Denies CP or SOB. States her stomach is \"still rolling around a lot but I do not have any more vomiting. \" Tele reviewed - SR. Awaiting limited echo. Review of Systems    Medications:   Scheduled Meds:   furosemide  40 mg Oral Daily    guaiFENesin  600 mg Oral BID    aspirin  81 mg Oral Daily    vitamin D  2,000 Units Oral Daily    [Held by provider] hydrALAZINE  25 mg Oral 3 times per day    insulin glargine  30 Units Subcutaneous BID    [Held by provider] insulin lispro  8 Units Subcutaneous 4x Daily WC    pantoprazole  40 mg Oral QAM AC    potassium chloride  10 mEq Oral Daily    predniSONE  5 mg Oral Daily    simvastatin  20 mg Oral Nightly    Liraglutide  1.2 mg Subcutaneous Daily    sodium chloride flush  10 mL Intravenous 2 times per day    cefTRIAXone (ROCEPHIN) IV  1 g Intravenous Q24H    azithromycin  500 mg Intravenous Q24H    insulin lispro  8 Units Subcutaneous TID WC     Continuous Infusions:   dextrose       CBC:   Recent Labs     07/20/19  0635 07/21/19  0647 07/22/19  0620   WBC 13.5* 11.6* 8.2   HGB 9.9* 8.8* 8.6*    176 See Reflexed IPF Result     BMP:    Recent Labs     07/19/19  2035  07/21/19  0033 07/21/19  0647 07/22/19  0620     --   --  140 142   K 3.3*   < > 4.2 3.7 3.6*   CL 96*  --   --  104 103   CO2 26  --   --  23 29   BUN 14  --   --  17 17   CREATININE 1.18*  --   --  1.13* 0.99*   GLUCOSE 170*  --   --  122* 110*    < > = values in this interval not displayed.      Hepatic:  Recent Labs

## 2019-07-22 NOTE — PROGRESS NOTES
grade 2 diastolic dysfunction mild to moderate MR RVSP 56 aortic sclerosis    Obesity     CHICO (obstructive sleep apnea)     CPAP 14 2/15 initiation  AHI 7  mild CHICO intolerant CPAP    Osteoarthritis     PMR (polymyalgia rheumatica) (HCC)     Pneumonia     Premature atrial contractions     Pulmonary hypertension (HCC)     -Moderate on echo November 2014    Restrictive lung disease     Mild on PFTs 11/14    Type II or unspecified type diabetes mellitus without mention of complication, not stated as uncontrolled     Vitreous floaters       Allergies: Allergies   Allergen Reactions    Codeine      Confusion      Erythromycin      GI upset  Received zithromax in past and tolerated    Exenatide Nausea Only    Levofloxacin      GI upset    Verapamil Other (See Comments)     Junctional bradycardia    Clonidine Derivatives Rash     2009, catapres    Other Rash     Levbid    Penicillins Rash     Received Rocephin in past and tolerated      Medications :    furosemide 40 mg Oral Daily   guaiFENesin 600 mg Oral BID   aspirin 81 mg Oral Daily   vitamin D 2,000 Units Oral Daily   [Held by provider] hydrALAZINE 25 mg Oral 3 times per day   insulin glargine 30 Units Subcutaneous BID   pantoprazole 40 mg Oral QAM AC   potassium chloride 10 mEq Oral Daily   predniSONE 5 mg Oral Daily   simvastatin 20 mg Oral Nightly   Liraglutide 1.2 mg Subcutaneous Daily   sodium chloride flush 10 mL Intravenous 2 times per day   cefTRIAXone (ROCEPHIN) IV 1 g Intravenous Q24H   azithromycin 500 mg Intravenous Q24H   insulin lispro 8 Units Subcutaneous TID WC       Review of Systems   Review of Systems   Constitutional: Positive for activity change, appetite change and fatigue. Negative for chills, fever and unexpected weight change. HENT: Negative for congestion, mouth sores, postnasal drip, sinus pressure, sore throat and voice change. Eyes: Negative for visual disturbance.    Respiratory: Positive for cough and shortness of breath. Negative for wheezing. Cardiovascular: Negative for chest pain and palpitations. Gastrointestinal: Positive for diarrhea. Negative for abdominal pain, blood in stool, constipation, nausea and vomiting. Endocrine: Negative for polyuria. Genitourinary: Negative for difficulty urinating, dysuria, frequency and urgency. Musculoskeletal: Negative for arthralgias, joint swelling and myalgias. Neurological: Negative for dizziness, tremors, speech difficulty, light-headedness and headaches. Objective :      Current Vitals : Temp: 98 °F (36.7 °C),  Pulse: 78, Resp: 16, BP: (!) 135/45, SpO2: 98 %  Last 24 Hrs Vitals   Patient Vitals for the past 24 hrs:   BP Temp Temp src Pulse Resp SpO2   07/22/19 0713 (!) 135/45 98 °F (36.7 °C) Oral 78 16 98 %   07/22/19 0346 (!) 128/48 98.4 °F (36.9 °C) Oral 78 17 98 %   07/22/19 0015 (!) 133/53 97.5 °F (36.4 °C) Oral 80 18 97 %   07/21/19 2022 (!) 141/52 98.1 °F (36.7 °C) Oral 91 19 93 %     Intake / output   07/21 1501 - 07/22 1500  In: 290 [P.O.:290]  Out: 400 [Urine:400]  Physical Exam:  Physical Exam   Constitutional: She is oriented to person, place, and time. No distress. Nasal cannula in place. HENT:   Head: Normocephalic and atraumatic. Mouth/Throat: Oropharynx is clear and moist. No oropharyngeal exudate. Eyes: Pupils are equal, round, and reactive to light. Conjunctivae and EOM are normal. No scleral icterus. Neck: No JVD present. No thyromegaly present. Cardiovascular: Normal rate, regular rhythm and normal heart sounds. No murmur heard. Pulmonary/Chest: Effort normal. She has no wheezes. She has rhonchi in the right middle field and the right lower field. She has no rales. Abdominal: Soft. She exhibits no mass. There is no tenderness. Lymphadenopathy:     She has no cervical adenopathy. Neurological: She is alert and oriented to person, place, and time. Skin: She is not diaphoretic. Nursing note and vitals reviewed.     Lower RVSP 67 mm Hg   Clinical Course : stable  Assessment and Plan  :        1. Left Pneumonia - Rocephin and azithromycin, follow blood culture. Upon the sputum culture, saturations are better, wean down on oxygen  2. Leukocytosis has resolved   3. Patient has anemia, no evidence of bleeding, check anemia work-up in the morning  4. elevated trops likely demand ischemia . unlikely ACS . heparin infusion stopped - cardiology on board. - Aspirin , Lipitor . BB and hydralazine on hold due to hypotension. 5. Her cardiogram does not show wall motion abnormalities  6. Replace potassium  7. Severe Sepsis secondary to pneumonia -   8. Type 2 DM  - A1C 6.5 - Humalog as needed. Continue lantus . 9. Essential hypertension . Hold medications due to hypotension. 10. Pulmonary hypertension - continue Oxygen ,   11. CHICO -   12.  Check C. difficile and CT of the abdomen,  use Imodium if C. difficile negative      (Please note that portions of this note were completed with a voice recognition program. Efforts were made to edit the dictations but occasionally words are mis-transcribed.)      Marcelino Brock MD  7/22/2019

## 2019-07-22 NOTE — CARE COORDINATION
Transition planning PT note reviewed met with patient to discuss she is concerned about her ability to return home.  has parkinson's and isn't much help she said. Discussed hc/snf list reviewed patient interested in referral to [facility].  Need updated pt/ot notes

## 2019-07-23 ENCOUNTER — APPOINTMENT (OUTPATIENT)
Dept: CT IMAGING | Age: 84
DRG: 871 | End: 2019-07-23
Attending: INTERNAL MEDICINE
Payer: MEDICARE

## 2019-07-23 ENCOUNTER — HOSPITAL ENCOUNTER (OUTPATIENT)
Dept: INFUSION THERAPY | Age: 84
End: 2019-07-23

## 2019-07-23 PROBLEM — K57.92 ACUTE DIVERTICULITIS: Status: ACTIVE | Noted: 2019-07-23

## 2019-07-23 LAB
ABSOLUTE RETIC #: 0.08 M/UL (ref 0.03–0.08)
ANION GAP SERPL CALCULATED.3IONS-SCNC: 10 MMOL/L (ref 9–17)
BNP INTERPRETATION: ABNORMAL
BUN BLDV-MCNC: 14 MG/DL (ref 8–23)
BUN/CREAT BLD: ABNORMAL (ref 9–20)
CALCIUM SERPL-MCNC: 8.3 MG/DL (ref 8.6–10.4)
CHLORIDE BLD-SCNC: 104 MMOL/L (ref 98–107)
CO2: 29 MMOL/L (ref 20–31)
CREAT SERPL-MCNC: 0.91 MG/DL (ref 0.5–0.9)
FERRITIN: 2622 UG/L (ref 13–150)
FOLATE: 8.3 NG/ML
GFR AFRICAN AMERICAN: >60 ML/MIN
GFR NON-AFRICAN AMERICAN: 59 ML/MIN
GFR SERPL CREATININE-BSD FRML MDRD: ABNORMAL ML/MIN/{1.73_M2}
GFR SERPL CREATININE-BSD FRML MDRD: ABNORMAL ML/MIN/{1.73_M2}
GLUCOSE BLD-MCNC: 143 MG/DL (ref 65–105)
GLUCOSE BLD-MCNC: 156 MG/DL (ref 65–105)
GLUCOSE BLD-MCNC: 162 MG/DL (ref 65–105)
GLUCOSE BLD-MCNC: 62 MG/DL (ref 70–99)
GLUCOSE BLD-MCNC: 66 MG/DL (ref 65–105)
HCT VFR BLD CALC: 29.6 % (ref 36.3–47.1)
HEMOGLOBIN: 8.4 G/DL (ref 11.9–15.1)
IMMATURE RETIC FRACT: 34.5 % (ref 2.7–18.3)
IRON SATURATION: 29 % (ref 20–55)
IRON: 62 UG/DL (ref 37–145)
MCH RBC QN AUTO: 23.4 PG (ref 25.2–33.5)
MCHC RBC AUTO-ENTMCNC: 28.4 G/DL (ref 28.4–34.8)
MCV RBC AUTO: 82.5 FL (ref 82.6–102.9)
NRBC AUTOMATED: 0.3 PER 100 WBC
PDW BLD-RTO: 19.4 % (ref 11.8–14.4)
PLATELET # BLD: 178 K/UL (ref 138–453)
PMV BLD AUTO: 12.3 FL (ref 8.1–13.5)
POTASSIUM SERPL-SCNC: 3.3 MMOL/L (ref 3.7–5.3)
PRO-BNP: 1837 PG/ML
RBC # BLD: 3.59 M/UL (ref 3.95–5.11)
RETIC %: 2.1 % (ref 0.5–1.9)
RETIC HEMOGLOBIN: 27.8 PG (ref 28.2–35.7)
SODIUM BLD-SCNC: 143 MMOL/L (ref 135–144)
TOTAL IRON BINDING CAPACITY: 211 UG/DL (ref 250–450)
UNSATURATED IRON BINDING CAPACITY: 149 UG/DL (ref 112–347)
VITAMIN B-12: 312 PG/ML (ref 232–1245)
WBC # BLD: 7 K/UL (ref 3.5–11.3)

## 2019-07-23 PROCEDURE — 6370000000 HC RX 637 (ALT 250 FOR IP): Performed by: INTERNAL MEDICINE

## 2019-07-23 PROCEDURE — 80048 BASIC METABOLIC PNL TOTAL CA: CPT

## 2019-07-23 PROCEDURE — 82728 ASSAY OF FERRITIN: CPT

## 2019-07-23 PROCEDURE — 6370000000 HC RX 637 (ALT 250 FOR IP): Performed by: NURSE PRACTITIONER

## 2019-07-23 PROCEDURE — 83550 IRON BINDING TEST: CPT

## 2019-07-23 PROCEDURE — 36415 COLL VENOUS BLD VENIPUNCTURE: CPT

## 2019-07-23 PROCEDURE — 2580000003 HC RX 258: Performed by: FAMILY MEDICINE

## 2019-07-23 PROCEDURE — 6360000002 HC RX W HCPCS: Performed by: FAMILY MEDICINE

## 2019-07-23 PROCEDURE — 74176 CT ABD & PELVIS W/O CONTRAST: CPT

## 2019-07-23 PROCEDURE — 82947 ASSAY GLUCOSE BLOOD QUANT: CPT

## 2019-07-23 PROCEDURE — 97530 THERAPEUTIC ACTIVITIES: CPT

## 2019-07-23 PROCEDURE — 2700000000 HC OXYGEN THERAPY PER DAY

## 2019-07-23 PROCEDURE — 2580000003 HC RX 258: Performed by: NURSE PRACTITIONER

## 2019-07-23 PROCEDURE — 2500000003 HC RX 250 WO HCPCS: Performed by: INTERNAL MEDICINE

## 2019-07-23 PROCEDURE — 97535 SELF CARE MNGMENT TRAINING: CPT

## 2019-07-23 PROCEDURE — 99232 SBSQ HOSP IP/OBS MODERATE 35: CPT | Performed by: INTERNAL MEDICINE

## 2019-07-23 PROCEDURE — 83540 ASSAY OF IRON: CPT

## 2019-07-23 PROCEDURE — 94640 AIRWAY INHALATION TREATMENT: CPT

## 2019-07-23 PROCEDURE — 82607 VITAMIN B-12: CPT

## 2019-07-23 PROCEDURE — 6360000002 HC RX W HCPCS: Performed by: INTERNAL MEDICINE

## 2019-07-23 PROCEDURE — 85045 AUTOMATED RETICULOCYTE COUNT: CPT

## 2019-07-23 PROCEDURE — 85027 COMPLETE CBC AUTOMATED: CPT

## 2019-07-23 PROCEDURE — 2060000000 HC ICU INTERMEDIATE R&B

## 2019-07-23 PROCEDURE — 82746 ASSAY OF FOLIC ACID SERUM: CPT

## 2019-07-23 PROCEDURE — 97110 THERAPEUTIC EXERCISES: CPT

## 2019-07-23 PROCEDURE — 6370000000 HC RX 637 (ALT 250 FOR IP): Performed by: FAMILY MEDICINE

## 2019-07-23 RX ORDER — IPRATROPIUM BROMIDE AND ALBUTEROL SULFATE 2.5; .5 MG/3ML; MG/3ML
1 SOLUTION RESPIRATORY (INHALATION)
Status: DISCONTINUED | OUTPATIENT
Start: 2019-07-23 | End: 2019-07-26 | Stop reason: HOSPADM

## 2019-07-23 RX ORDER — FUROSEMIDE 10 MG/ML
20 INJECTION INTRAMUSCULAR; INTRAVENOUS ONCE
Status: COMPLETED | OUTPATIENT
Start: 2019-07-23 | End: 2019-07-23

## 2019-07-23 RX ADMIN — GUAIFENESIN 600 MG: 600 TABLET, EXTENDED RELEASE ORAL at 09:09

## 2019-07-23 RX ADMIN — SIMVASTATIN 20 MG: 20 TABLET, FILM COATED ORAL at 22:13

## 2019-07-23 RX ADMIN — FUROSEMIDE 20 MG: 10 INJECTION, SOLUTION INTRAMUSCULAR; INTRAVENOUS at 13:36

## 2019-07-23 RX ADMIN — HYDRALAZINE HYDROCHLORIDE 25 MG: 25 TABLET, FILM COATED ORAL at 13:41

## 2019-07-23 RX ADMIN — PREDNISONE 5 MG: 5 TABLET ORAL at 09:09

## 2019-07-23 RX ADMIN — ASPIRIN 81 MG: 81 TABLET ORAL at 09:09

## 2019-07-23 RX ADMIN — POTASSIUM CHLORIDE 40 MEQ: 20 TABLET, EXTENDED RELEASE ORAL at 12:20

## 2019-07-23 RX ADMIN — INSULIN LISPRO 2 UNITS: 100 INJECTION, SOLUTION INTRAVENOUS; SUBCUTANEOUS at 17:21

## 2019-07-23 RX ADMIN — IPRATROPIUM BROMIDE AND ALBUTEROL SULFATE 1 AMPULE: .5; 3 SOLUTION RESPIRATORY (INHALATION) at 20:50

## 2019-07-23 RX ADMIN — HYDRALAZINE HYDROCHLORIDE 25 MG: 25 TABLET, FILM COATED ORAL at 22:10

## 2019-07-23 RX ADMIN — POTASSIUM CHLORIDE 10 MEQ: 1500 TABLET, EXTENDED RELEASE ORAL at 09:09

## 2019-07-23 RX ADMIN — INSULIN LISPRO 2 UNITS: 100 INJECTION, SOLUTION INTRAVENOUS; SUBCUTANEOUS at 12:19

## 2019-07-23 RX ADMIN — VITAMIN D, TAB 1000IU (100/BT) 2000 UNITS: 25 TAB at 09:09

## 2019-07-23 RX ADMIN — METRONIDAZOLE 500 MG: 500 INJECTION, SOLUTION INTRAVENOUS at 21:59

## 2019-07-23 RX ADMIN — CEFTRIAXONE SODIUM 1 G: 1 INJECTION, POWDER, FOR SOLUTION INTRAMUSCULAR; INTRAVENOUS at 09:08

## 2019-07-23 RX ADMIN — SODIUM CHLORIDE, PRESERVATIVE FREE 10 ML: 5 INJECTION INTRAVENOUS at 12:21

## 2019-07-23 RX ADMIN — AZITHROMYCIN DIHYDRATE 500 MG: 500 INJECTION, POWDER, LYOPHILIZED, FOR SOLUTION INTRAVENOUS at 09:08

## 2019-07-23 RX ADMIN — IPRATROPIUM BROMIDE AND ALBUTEROL SULFATE 1 AMPULE: .5; 3 SOLUTION RESPIRATORY (INHALATION) at 15:22

## 2019-07-23 RX ADMIN — INSULIN GLARGINE 30 UNITS: 100 INJECTION, SOLUTION SUBCUTANEOUS at 22:11

## 2019-07-23 RX ADMIN — PANTOPRAZOLE SODIUM 40 MG: 40 TABLET, DELAYED RELEASE ORAL at 06:38

## 2019-07-23 RX ADMIN — ENOXAPARIN SODIUM 40 MG: 40 INJECTION SUBCUTANEOUS at 09:09

## 2019-07-23 RX ADMIN — FUROSEMIDE 40 MG: 40 TABLET ORAL at 09:09

## 2019-07-23 RX ADMIN — METRONIDAZOLE 500 MG: 500 INJECTION, SOLUTION INTRAVENOUS at 13:41

## 2019-07-23 ASSESSMENT — ENCOUNTER SYMPTOMS
SHORTNESS OF BREATH: 1
COUGH: 1
SORE THROAT: 0
VOICE CHANGE: 0
WHEEZING: 0
BLOOD IN STOOL: 0
ABDOMINAL PAIN: 0
SINUS PRESSURE: 0
NAUSEA: 0
DIARRHEA: 1
VOMITING: 0
CONSTIPATION: 0

## 2019-07-23 ASSESSMENT — PAIN SCALES - GENERAL: PAINLEVEL_OUTOF10: 0

## 2019-07-23 NOTE — DISCHARGE INSTR - COC
chronic diastolic heart failure (HCC) I50.33    TEJ (acute kidney injury) ([facility] N17.9    Sigmoid diverticulitis K57.32    CHF (congestive heart failure), NYHA class I, acute on chronic, combined ([facility]) I50.43    Acute diverticulitis K57.92       Isolation/Infection:   Isolation          No Isolation            Nurse Assessment:  Last Vital Signs: BP (!) 131/46   Pulse 75   Temp 97.7 °F (36.5 °C) (Oral)   Resp 18   Ht 5' 1\" (1.549 m)   Wt 196 lb 9.6 oz (89.2 kg)   SpO2 94%   BMI 37.15 kg/m²     Last documented pain score (0-10 scale): Pain Level: 0  Last Weight:   Wt Readings from Last 1 Encounters:   07/25/19 196 lb 9.6 oz (89.2 kg)     Mental Status:  oriented, alert, logical, thought processes intact and able to concentrate and follow conversation    IV Access:  - None    Nursing Mobility/ADLs:  Walking   Assisted  Transfer  Independent  Bathing  Assisted  Dressing  Independent  Toileting  Assisted  Feeding  Independent  Med Admin  Independent  Med Delivery   whole    Wound Care Documentation and Therapy:  Wound 07/23/19 Arm Anterior; Lower;Proximal;Right Pink deep open area (Active)   Dressing Status Clean;Dry; Intact 7/24/2019  8:45 PM   Number of days: 2        Elimination:  Continence:   · Bowel: Yes  · Bladder: Yes  Urinary Catheter: None   Colostomy/Ileostomy/Ileal Conduit: No       Date of Last BM: 7-23-19    Intake/Output Summary (Last 24 hours) at 7/25/2019 1550  Last data filed at 7/25/2019 0645  Gross per 24 hour   Intake 440 ml   Output 2200 ml   Net -1760 ml     I/O last 3 completed shifts: In: 440 [P.O.:240; IV Piggyback:200]  Out: 2200 [Urine:2200]    Safety Concerns:      At Risk for Falls    Impairments/Disabilities:      [address] [place] [address] Impairments/Disabilities:[id number]}    Nutrition Therapy:  Current Nutrition Therapy:   - Oral Diet:  Carb Control 4 carbs/meal (1800kcals/day)    Routes of Feeding: Oral  Liquids: No Restrictions  Daily Fluid Restriction: yes - amount 1500  Last Modified [place]

## 2019-07-23 NOTE — PROGRESS NOTES
Basal cell carcinoma of leg     right leg    CAD (coronary artery disease)     With 40% stenosis of the LAD, 07/10, ejection fraction 60%. Repeat heart cath9/14 with 40-50 percent LAD lesion first diagonal 50% lesion rec med Rx    Central retinal artery occlusion     Right side November 2014 status post TPA    Cerebral artery occlusion with cerebral infarction ([facility] [address] 11/24/2014    Cerebrovascular disease     50-79% stenosis on left on ultrasound 11/14    CHF (congestive heart failure) (HCC)     Echo 1/15 moderate MR, severe TR, RVSP 76, grade 2 diastolic dysfunction    Diverticulosis     AVM on colonoscopy, 05/11    Dyslipidemia     Elevated antinuclear antibody (ZAIRA) level     History of    Esophagitis 05/2011    Gastritis/esophagitis on EGD, Dr. Ada Arevalo. Repeat EGD6/15 Gastritis    Foraminal stenosis of lumbar region     Moderate  Right L4/L5 neuroforaminal stenosis, Dr Michelle Pacheco following. MRI 2/16 [facility]. Moderate foraminal stenosis mild to moderate central canal stenosis    Hyperlipidemia     Hypertension     IBS (irritable bowel syndrome)     GI consultation with Dr. Christiano Sheikh, GI specialist in [facility], felt that she probably has chronic functional diarrhea and recommended empiric Imodium, Pepto-Bismol or Questran first. Sprue test Neg 2011    Iron deficiency anemia     Percent sat iron 5, January 2015, ferritin 40 1 /15, FOBT positive  EGD 6/15  Gastritis, IV iron 9/15x3 effective,  colonoscopy deferred by Dr. Ada Arevalo. --Prior colonoscopy 2011 with severe diverticulosis and telangiectasia. Trial iron solution in Orange juice 12/16    Junctional bradycardia     Symptomatic, resolved with discontinuation of Verapamil, 05/09. 1.1) Echocardiogram: LAE, LVH, EF 50%, mild MR, diastolic. 1.2) Dr. Juan Jose Hurtado evaluation. 1.3.) Persantine stress test negative, 05/09.  Migraine     Mild CAD     cath 10/15    Mitral regurgitation     Moderate to severe on echocardiogram September 2015.   Right pressure

## 2019-07-24 ENCOUNTER — APPOINTMENT (OUTPATIENT)
Dept: GENERAL RADIOLOGY | Age: 84
DRG: 871 | End: 2019-07-24
Attending: INTERNAL MEDICINE
Payer: MEDICARE

## 2019-07-24 ENCOUNTER — CARE COORDINATION (OUTPATIENT)
Dept: CARE COORDINATION | Age: 84
End: 2019-07-24

## 2019-07-24 LAB
ANION GAP SERPL CALCULATED.3IONS-SCNC: 8 MMOL/L (ref 9–17)
BUN BLDV-MCNC: 11 MG/DL (ref 8–23)
BUN/CREAT BLD: ABNORMAL (ref 9–20)
CALCIUM SERPL-MCNC: 8.1 MG/DL (ref 8.6–10.4)
CHLORIDE BLD-SCNC: 104 MMOL/L (ref 98–107)
CO2: 31 MMOL/L (ref 20–31)
CREAT SERPL-MCNC: 0.86 MG/DL (ref 0.5–0.9)
CULTURE: ABNORMAL
DIRECT EXAM: ABNORMAL
GFR AFRICAN AMERICAN: >60 ML/MIN
GFR NON-AFRICAN AMERICAN: >60 ML/MIN
GFR SERPL CREATININE-BSD FRML MDRD: ABNORMAL ML/MIN/{1.73_M2}
GFR SERPL CREATININE-BSD FRML MDRD: ABNORMAL ML/MIN/{1.73_M2}
GLUCOSE BLD-MCNC: 100 MG/DL (ref 65–105)
GLUCOSE BLD-MCNC: 123 MG/DL (ref 65–105)
GLUCOSE BLD-MCNC: 127 MG/DL (ref 65–105)
GLUCOSE BLD-MCNC: 165 MG/DL (ref 65–105)
GLUCOSE BLD-MCNC: 81 MG/DL (ref 70–99)
HCT VFR BLD CALC: 28.9 % (ref 36.3–47.1)
HEMOGLOBIN: 8.3 G/DL (ref 11.9–15.1)
Lab: ABNORMAL
MAGNESIUM: 2 MG/DL (ref 1.6–2.6)
MCH RBC QN AUTO: 23.7 PG (ref 25.2–33.5)
MCHC RBC AUTO-ENTMCNC: 28.7 G/DL (ref 28.4–34.8)
MCV RBC AUTO: 82.6 FL (ref 82.6–102.9)
NRBC AUTOMATED: 0.3 PER 100 WBC
PDW BLD-RTO: 19.9 % (ref 11.8–14.4)
PLATELET # BLD: 181 K/UL (ref 138–453)
PMV BLD AUTO: 12 FL (ref 8.1–13.5)
POTASSIUM SERPL-SCNC: 3.4 MMOL/L (ref 3.7–5.3)
RBC # BLD: 3.5 M/UL (ref 3.95–5.11)
SODIUM BLD-SCNC: 143 MMOL/L (ref 135–144)
SPECIMEN DESCRIPTION: ABNORMAL
WBC # BLD: 7.2 K/UL (ref 3.5–11.3)

## 2019-07-24 PROCEDURE — 87210 SMEAR WET MOUNT SALINE/INK: CPT

## 2019-07-24 PROCEDURE — 2580000003 HC RX 258: Performed by: FAMILY MEDICINE

## 2019-07-24 PROCEDURE — 36415 COLL VENOUS BLD VENIPUNCTURE: CPT

## 2019-07-24 PROCEDURE — 94640 AIRWAY INHALATION TREATMENT: CPT

## 2019-07-24 PROCEDURE — 99232 SBSQ HOSP IP/OBS MODERATE 35: CPT | Performed by: INTERNAL MEDICINE

## 2019-07-24 PROCEDURE — 87140 CULTURE TYPE IMMUNOFLUORESC: CPT

## 2019-07-24 PROCEDURE — 6370000000 HC RX 637 (ALT 250 FOR IP): Performed by: INTERNAL MEDICINE

## 2019-07-24 PROCEDURE — 76937 US GUIDE VASCULAR ACCESS: CPT

## 2019-07-24 PROCEDURE — 87300 AG DETECTION POLYVAL IF: CPT

## 2019-07-24 PROCEDURE — 6370000000 HC RX 637 (ALT 250 FOR IP): Performed by: NURSE PRACTITIONER

## 2019-07-24 PROCEDURE — 2060000000 HC ICU INTERMEDIATE R&B

## 2019-07-24 PROCEDURE — 87015 SPECIMEN INFECT AGNT CONCNTJ: CPT

## 2019-07-24 PROCEDURE — 83880 ASSAY OF NATRIURETIC PEPTIDE: CPT

## 2019-07-24 PROCEDURE — 71045 X-RAY EXAM CHEST 1 VIEW: CPT

## 2019-07-24 PROCEDURE — 6370000000 HC RX 637 (ALT 250 FOR IP): Performed by: FAMILY MEDICINE

## 2019-07-24 PROCEDURE — 2700000000 HC OXYGEN THERAPY PER DAY

## 2019-07-24 PROCEDURE — 82947 ASSAY GLUCOSE BLOOD QUANT: CPT

## 2019-07-24 PROCEDURE — 6360000002 HC RX W HCPCS

## 2019-07-24 PROCEDURE — 2580000003 HC RX 258: Performed by: NURSE PRACTITIONER

## 2019-07-24 PROCEDURE — 2500000003 HC RX 250 WO HCPCS: Performed by: INTERNAL MEDICINE

## 2019-07-24 PROCEDURE — 87329 GIARDIA AG IA: CPT

## 2019-07-24 PROCEDURE — 80048 BASIC METABOLIC PNL TOTAL CA: CPT

## 2019-07-24 PROCEDURE — 6360000002 HC RX W HCPCS: Performed by: FAMILY MEDICINE

## 2019-07-24 PROCEDURE — 87506 IADNA-DNA/RNA PROBE TQ 6-11: CPT

## 2019-07-24 PROCEDURE — 85027 COMPLETE CBC AUTOMATED: CPT

## 2019-07-24 PROCEDURE — 87425 ROTAVIRUS AG IA: CPT

## 2019-07-24 PROCEDURE — 83735 ASSAY OF MAGNESIUM: CPT

## 2019-07-24 PROCEDURE — 87252 VIRUS INOCULATION TISSUE: CPT

## 2019-07-24 PROCEDURE — 87209 SMEAR COMPLEX STAIN: CPT

## 2019-07-24 PROCEDURE — 87328 CRYPTOSPORIDIUM AG IA: CPT

## 2019-07-24 PROCEDURE — 6360000002 HC RX W HCPCS: Performed by: INTERNAL MEDICINE

## 2019-07-24 RX ORDER — INSULIN GLARGINE 100 [IU]/ML
30 INJECTION, SOLUTION SUBCUTANEOUS EVERY MORNING
Status: DISCONTINUED | OUTPATIENT
Start: 2019-07-25 | End: 2019-07-24

## 2019-07-24 RX ORDER — FUROSEMIDE 10 MG/ML
INJECTION INTRAMUSCULAR; INTRAVENOUS
Status: COMPLETED
Start: 2019-07-24 | End: 2019-07-24

## 2019-07-24 RX ORDER — LACTOBACILLUS RHAMNOSUS GG 10B CELL
1 CAPSULE ORAL
Status: DISCONTINUED | OUTPATIENT
Start: 2019-07-24 | End: 2019-07-26 | Stop reason: HOSPADM

## 2019-07-24 RX ORDER — INSULIN GLARGINE 100 [IU]/ML
20 INJECTION, SOLUTION SUBCUTANEOUS EVERY MORNING
Status: DISCONTINUED | OUTPATIENT
Start: 2019-07-25 | End: 2019-07-26 | Stop reason: HOSPADM

## 2019-07-24 RX ORDER — LOPERAMIDE HYDROCHLORIDE 2 MG/1
2 CAPSULE ORAL 4 TIMES DAILY PRN
Status: DISCONTINUED | OUTPATIENT
Start: 2019-07-24 | End: 2019-07-26 | Stop reason: HOSPADM

## 2019-07-24 RX ORDER — FUROSEMIDE 10 MG/ML
40 INJECTION INTRAMUSCULAR; INTRAVENOUS 2 TIMES DAILY
Status: DISCONTINUED | OUTPATIENT
Start: 2019-07-24 | End: 2019-07-25

## 2019-07-24 RX ADMIN — IPRATROPIUM BROMIDE AND ALBUTEROL SULFATE 1 AMPULE: .5; 3 SOLUTION RESPIRATORY (INHALATION) at 07:47

## 2019-07-24 RX ADMIN — GUAIFENESIN 600 MG: 600 TABLET, EXTENDED RELEASE ORAL at 08:56

## 2019-07-24 RX ADMIN — POTASSIUM CHLORIDE 40 MEQ: 20 TABLET, EXTENDED RELEASE ORAL at 08:55

## 2019-07-24 RX ADMIN — METRONIDAZOLE 500 MG: 500 INJECTION, SOLUTION INTRAVENOUS at 12:05

## 2019-07-24 RX ADMIN — ASPIRIN 81 MG: 81 TABLET ORAL at 08:56

## 2019-07-24 RX ADMIN — INSULIN LISPRO 2 UNITS: 100 INJECTION, SOLUTION INTRAVENOUS; SUBCUTANEOUS at 08:54

## 2019-07-24 RX ADMIN — FUROSEMIDE 40 MG: 40 TABLET ORAL at 08:56

## 2019-07-24 RX ADMIN — HYDRALAZINE HYDROCHLORIDE 25 MG: 25 TABLET, FILM COATED ORAL at 17:49

## 2019-07-24 RX ADMIN — SIMVASTATIN 20 MG: 20 TABLET, FILM COATED ORAL at 20:27

## 2019-07-24 RX ADMIN — PANTOPRAZOLE SODIUM 40 MG: 40 TABLET, DELAYED RELEASE ORAL at 06:43

## 2019-07-24 RX ADMIN — Medication 1 CAPSULE: at 17:48

## 2019-07-24 RX ADMIN — PREDNISONE 5 MG: 5 TABLET ORAL at 08:57

## 2019-07-24 RX ADMIN — IPRATROPIUM BROMIDE AND ALBUTEROL SULFATE 1 AMPULE: .5; 3 SOLUTION RESPIRATORY (INHALATION) at 14:52

## 2019-07-24 RX ADMIN — METRONIDAZOLE 500 MG: 500 INJECTION, SOLUTION INTRAVENOUS at 20:19

## 2019-07-24 RX ADMIN — VITAMIN D, TAB 1000IU (100/BT) 2000 UNITS: 25 TAB at 08:57

## 2019-07-24 RX ADMIN — INSULIN LISPRO 2 UNITS: 100 INJECTION, SOLUTION INTRAVENOUS; SUBCUTANEOUS at 17:48

## 2019-07-24 RX ADMIN — HYDRALAZINE HYDROCHLORIDE 25 MG: 25 TABLET, FILM COATED ORAL at 21:36

## 2019-07-24 RX ADMIN — ENOXAPARIN SODIUM 40 MG: 40 INJECTION SUBCUTANEOUS at 08:55

## 2019-07-24 RX ADMIN — METRONIDAZOLE 500 MG: 500 INJECTION, SOLUTION INTRAVENOUS at 05:40

## 2019-07-24 RX ADMIN — AZITHROMYCIN DIHYDRATE 500 MG: 500 INJECTION, POWDER, LYOPHILIZED, FOR SOLUTION INTRAVENOUS at 08:56

## 2019-07-24 RX ADMIN — INSULIN LISPRO 2 UNITS: 100 INJECTION, SOLUTION INTRAVENOUS; SUBCUTANEOUS at 12:04

## 2019-07-24 RX ADMIN — IPRATROPIUM BROMIDE AND ALBUTEROL SULFATE 1 AMPULE: .5; 3 SOLUTION RESPIRATORY (INHALATION) at 21:14

## 2019-07-24 RX ADMIN — INSULIN GLARGINE 30 UNITS: 100 INJECTION, SOLUTION SUBCUTANEOUS at 08:54

## 2019-07-24 RX ADMIN — HYDRALAZINE HYDROCHLORIDE 25 MG: 25 TABLET, FILM COATED ORAL at 06:43

## 2019-07-24 RX ADMIN — POTASSIUM CHLORIDE 10 MEQ: 1500 TABLET, EXTENDED RELEASE ORAL at 09:00

## 2019-07-24 RX ADMIN — POTASSIUM CHLORIDE 40 MEQ: 20 TABLET, EXTENDED RELEASE ORAL at 08:59

## 2019-07-24 RX ADMIN — SODIUM CHLORIDE, PRESERVATIVE FREE 10 ML: 5 INJECTION INTRAVENOUS at 20:25

## 2019-07-24 RX ADMIN — SODIUM CHLORIDE, PRESERVATIVE FREE 10 ML: 5 INJECTION INTRAVENOUS at 09:01

## 2019-07-24 RX ADMIN — FUROSEMIDE 40 MG: 10 INJECTION, SOLUTION INTRAMUSCULAR; INTRAVENOUS at 12:12

## 2019-07-24 RX ADMIN — FUROSEMIDE 40 MG: 10 INJECTION, SOLUTION INTRAMUSCULAR; INTRAVENOUS at 17:55

## 2019-07-24 ASSESSMENT — ENCOUNTER SYMPTOMS
VOICE CHANGE: 0
DIARRHEA: 1
VOMITING: 0
SHORTNESS OF BREATH: 1
CONSTIPATION: 0
NAUSEA: 0
COUGH: 1
SORE THROAT: 0
BLOOD IN STOOL: 0
ABDOMINAL PAIN: 0
SINUS PRESSURE: 0
WHEEZING: 0

## 2019-07-24 NOTE — PROGRESS NOTES
diastolic dysfunction,  echo 74/43 grade 2 diastolic dysfunction mild to moderate MR RVSP 56 aortic sclerosis    Obesity     CHICO (obstructive sleep apnea)     CPAP 14 2/15 initiation  AHI 7  mild CHICO intolerant CPAP    Osteoarthritis     PMR (polymyalgia rheumatica) (HCC)     Pneumonia     Premature atrial contractions     Pulmonary hypertension (HCC)     -Moderate on echo November 2014    Restrictive lung disease     Mild on PFTs 11/14    Type II or unspecified type diabetes mellitus without mention of complication, not stated as uncontrolled     Vitreous floaters       Allergies: Allergies   Allergen Reactions    Codeine      Confusion      Erythromycin      GI upset  Received zithromax in past and tolerated    Exenatide Nausea Only    Levofloxacin      GI upset    Verapamil Other (See Comments)     Junctional bradycardia    Clonidine Derivatives Rash     2009, catapres    Other Rash     Levbid    Penicillins Rash     Received Rocephin in past and tolerated      Medications :    furosemide 40 mg Intravenous BID   lactobacillus 1 capsule Oral TID WC   [START ON 7/25/2019] insulin glargine 20 Units Subcutaneous QAM   [START ON 7/25/2019] cefTRIAXone (ROCEPHIN) IV 2 g Intravenous Q24H   insulin lispro 2 Units Subcutaneous TID WC   metroNIDAZOLE 500 mg Intravenous Q8H   ipratropium-albuterol 1 ampule Inhalation Q6H WA   enoxaparin 40 mg Subcutaneous Daily   guaiFENesin 600 mg Oral BID   aspirin 81 mg Oral Daily   vitamin D 2,000 Units Oral Daily   hydrALAZINE 25 mg Oral 3 times per day   pantoprazole 40 mg Oral QAM AC   potassium chloride 10 mEq Oral Daily   predniSONE 5 mg Oral Daily   simvastatin 20 mg Oral Nightly   Liraglutide 1.2 mg Subcutaneous Daily   sodium chloride flush 10 mL Intravenous 2 times per day       Review of Systems   Review of Systems   Constitutional: Positive for activity change, appetite change and fatigue. Negative for chills, fever and unexpected weight change.    HENT:

## 2019-07-25 ENCOUNTER — CARE COORDINATION (OUTPATIENT)
Dept: CARE COORDINATION | Age: 84
End: 2019-07-25

## 2019-07-25 LAB
ANION GAP SERPL CALCULATED.3IONS-SCNC: 13 MMOL/L (ref 9–17)
ANION GAP SERPL CALCULATED.3IONS-SCNC: 17 MMOL/L (ref 9–17)
BNP INTERPRETATION: ABNORMAL
BUN BLDV-MCNC: 10 MG/DL (ref 8–23)
BUN BLDV-MCNC: 9 MG/DL (ref 8–23)
BUN/CREAT BLD: ABNORMAL (ref 9–20)
BUN/CREAT BLD: ABNORMAL (ref 9–20)
CALCIUM SERPL-MCNC: 8.8 MG/DL (ref 8.6–10.4)
CALCIUM SERPL-MCNC: 8.9 MG/DL (ref 8.6–10.4)
CHLORIDE BLD-SCNC: 100 MMOL/L (ref 98–107)
CHLORIDE BLD-SCNC: 97 MMOL/L (ref 98–107)
CO2: 26 MMOL/L (ref 20–31)
CO2: 30 MMOL/L (ref 20–31)
CREAT SERPL-MCNC: 0.93 MG/DL (ref 0.5–0.9)
CREAT SERPL-MCNC: 1.05 MG/DL (ref 0.5–0.9)
DIRECT EXAM: NORMAL
GFR AFRICAN AMERICAN: >60 ML/MIN
GFR AFRICAN AMERICAN: >60 ML/MIN
GFR NON-AFRICAN AMERICAN: 50 ML/MIN
GFR NON-AFRICAN AMERICAN: 58 ML/MIN
GFR SERPL CREATININE-BSD FRML MDRD: ABNORMAL ML/MIN/{1.73_M2}
GLUCOSE BLD-MCNC: 124 MG/DL (ref 70–99)
GLUCOSE BLD-MCNC: 136 MG/DL (ref 65–105)
GLUCOSE BLD-MCNC: 137 MG/DL (ref 65–105)
GLUCOSE BLD-MCNC: 138 MG/DL (ref 65–105)
GLUCOSE BLD-MCNC: 142 MG/DL (ref 65–105)
GLUCOSE BLD-MCNC: 146 MG/DL (ref 70–99)
GLUCOSE BLD-MCNC: 170 MG/DL (ref 65–105)
HCT VFR BLD CALC: 36.2 % (ref 36.3–47.1)
HEMOGLOBIN: 10.4 G/DL (ref 11.9–15.1)
Lab: NORMAL
MCH RBC QN AUTO: 23.1 PG (ref 25.2–33.5)
MCHC RBC AUTO-ENTMCNC: 28.7 G/DL (ref 28.4–34.8)
MCV RBC AUTO: 80.4 FL (ref 82.6–102.9)
MICRO OVA & PARASITES: NORMAL
NRBC AUTOMATED: 0.6 PER 100 WBC
PDW BLD-RTO: 20.6 % (ref 11.8–14.4)
PLATELET # BLD: ABNORMAL K/UL (ref 138–453)
PLATELET, FLUORESCENCE: NORMAL K/UL (ref 138–453)
PMV BLD AUTO: ABNORMAL FL (ref 8.1–13.5)
POTASSIUM SERPL-SCNC: 4 MMOL/L (ref 3.7–5.3)
POTASSIUM SERPL-SCNC: 4.3 MMOL/L (ref 3.7–5.3)
PRO-BNP: 1044 PG/ML
RBC # BLD: 4.5 M/UL (ref 3.95–5.11)
SODIUM BLD-SCNC: 140 MMOL/L (ref 135–144)
SODIUM BLD-SCNC: 143 MMOL/L (ref 135–144)
SPECIMEN DESCRIPTION: NORMAL
WBC # BLD: 10.8 K/UL (ref 3.5–11.3)

## 2019-07-25 PROCEDURE — 6370000000 HC RX 637 (ALT 250 FOR IP): Performed by: INTERNAL MEDICINE

## 2019-07-25 PROCEDURE — 97110 THERAPEUTIC EXERCISES: CPT

## 2019-07-25 PROCEDURE — 97535 SELF CARE MNGMENT TRAINING: CPT

## 2019-07-25 PROCEDURE — 99232 SBSQ HOSP IP/OBS MODERATE 35: CPT | Performed by: INTERNAL MEDICINE

## 2019-07-25 PROCEDURE — 2060000000 HC ICU INTERMEDIATE R&B

## 2019-07-25 PROCEDURE — 6370000000 HC RX 637 (ALT 250 FOR IP): Performed by: NURSE PRACTITIONER

## 2019-07-25 PROCEDURE — 94761 N-INVAS EAR/PLS OXIMETRY MLT: CPT

## 2019-07-25 PROCEDURE — 85027 COMPLETE CBC AUTOMATED: CPT

## 2019-07-25 PROCEDURE — 80048 BASIC METABOLIC PNL TOTAL CA: CPT

## 2019-07-25 PROCEDURE — 6360000002 HC RX W HCPCS: Performed by: INTERNAL MEDICINE

## 2019-07-25 PROCEDURE — 97116 GAIT TRAINING THERAPY: CPT

## 2019-07-25 PROCEDURE — 94640 AIRWAY INHALATION TREATMENT: CPT

## 2019-07-25 PROCEDURE — 6360000002 HC RX W HCPCS: Performed by: NURSE PRACTITIONER

## 2019-07-25 PROCEDURE — 36415 COLL VENOUS BLD VENIPUNCTURE: CPT

## 2019-07-25 PROCEDURE — 2500000003 HC RX 250 WO HCPCS: Performed by: INTERNAL MEDICINE

## 2019-07-25 PROCEDURE — 2580000003 HC RX 258: Performed by: INTERNAL MEDICINE

## 2019-07-25 PROCEDURE — 93005 ELECTROCARDIOGRAM TRACING: CPT | Performed by: INTERNAL MEDICINE

## 2019-07-25 PROCEDURE — 85055 RETICULATED PLATELET ASSAY: CPT

## 2019-07-25 PROCEDURE — 2580000003 HC RX 258: Performed by: NURSE PRACTITIONER

## 2019-07-25 PROCEDURE — 82947 ASSAY GLUCOSE BLOOD QUANT: CPT

## 2019-07-25 RX ORDER — CEFDINIR 300 MG/1
300 CAPSULE ORAL 2 TIMES DAILY
Refills: 0 | DISCHARGE
Start: 2019-07-25 | End: 2019-07-26 | Stop reason: SDUPTHER

## 2019-07-25 RX ORDER — ALBUTEROL SULFATE 90 UG/1
2 AEROSOL, METERED RESPIRATORY (INHALATION) 4 TIMES DAILY PRN
DISCHARGE
Start: 2019-07-25 | End: 2019-07-26 | Stop reason: SDUPTHER

## 2019-07-25 RX ORDER — METOPROLOL TARTRATE 5 MG/5ML
2.5 INJECTION INTRAVENOUS ONCE
Status: DISCONTINUED | OUTPATIENT
Start: 2019-07-25 | End: 2019-07-26 | Stop reason: HOSPADM

## 2019-07-25 RX ORDER — GUAIFENESIN 600 MG/1
600 TABLET, EXTENDED RELEASE ORAL 2 TIMES DAILY
DISCHARGE
Start: 2019-07-25 | End: 2019-07-26

## 2019-07-25 RX ORDER — FUROSEMIDE 40 MG/1
40 TABLET ORAL DAILY
Status: DISCONTINUED | OUTPATIENT
Start: 2019-07-25 | End: 2019-07-26 | Stop reason: HOSPADM

## 2019-07-25 RX ORDER — METRONIDAZOLE 500 MG/1
500 TABLET ORAL 3 TIMES DAILY
DISCHARGE
Start: 2019-07-25 | End: 2019-07-26 | Stop reason: SDUPTHER

## 2019-07-25 RX ADMIN — SODIUM CHLORIDE, PRESERVATIVE FREE 10 ML: 5 INJECTION INTRAVENOUS at 20:27

## 2019-07-25 RX ADMIN — HYDRALAZINE HYDROCHLORIDE 25 MG: 25 TABLET, FILM COATED ORAL at 13:17

## 2019-07-25 RX ADMIN — POTASSIUM CHLORIDE 10 MEQ: 1500 TABLET, EXTENDED RELEASE ORAL at 08:32

## 2019-07-25 RX ADMIN — ENOXAPARIN SODIUM 40 MG: 40 INJECTION SUBCUTANEOUS at 08:33

## 2019-07-25 RX ADMIN — INSULIN GLARGINE 20 UNITS: 100 INJECTION, SOLUTION SUBCUTANEOUS at 08:33

## 2019-07-25 RX ADMIN — HYDRALAZINE HYDROCHLORIDE 25 MG: 25 TABLET, FILM COATED ORAL at 21:22

## 2019-07-25 RX ADMIN — METOPROLOL TARTRATE 25 MG: 25 TABLET ORAL at 20:27

## 2019-07-25 RX ADMIN — IPRATROPIUM BROMIDE AND ALBUTEROL SULFATE 1 AMPULE: .5; 3 SOLUTION RESPIRATORY (INHALATION) at 20:00

## 2019-07-25 RX ADMIN — METRONIDAZOLE 500 MG: 500 INJECTION, SOLUTION INTRAVENOUS at 13:17

## 2019-07-25 RX ADMIN — METRONIDAZOLE 500 MG: 500 INJECTION, SOLUTION INTRAVENOUS at 04:26

## 2019-07-25 RX ADMIN — PANTOPRAZOLE SODIUM 40 MG: 40 TABLET, DELAYED RELEASE ORAL at 06:12

## 2019-07-25 RX ADMIN — CEFTRIAXONE SODIUM 2 G: 2 INJECTION, POWDER, FOR SOLUTION INTRAMUSCULAR; INTRAVENOUS at 08:36

## 2019-07-25 RX ADMIN — ENOXAPARIN SODIUM 90 MG: 100 INJECTION SUBCUTANEOUS at 22:35

## 2019-07-25 RX ADMIN — Medication 1 CAPSULE: at 17:28

## 2019-07-25 RX ADMIN — Medication 1 CAPSULE: at 08:31

## 2019-07-25 RX ADMIN — PREDNISONE 5 MG: 5 TABLET ORAL at 08:33

## 2019-07-25 RX ADMIN — ASPIRIN 81 MG: 81 TABLET ORAL at 08:31

## 2019-07-25 RX ADMIN — SIMVASTATIN 20 MG: 20 TABLET, FILM COATED ORAL at 20:27

## 2019-07-25 RX ADMIN — FUROSEMIDE 40 MG: 40 TABLET ORAL at 17:27

## 2019-07-25 RX ADMIN — IPRATROPIUM BROMIDE AND ALBUTEROL SULFATE 1 AMPULE: .5; 3 SOLUTION RESPIRATORY (INHALATION) at 14:42

## 2019-07-25 RX ADMIN — HYDRALAZINE HYDROCHLORIDE 25 MG: 25 TABLET, FILM COATED ORAL at 06:12

## 2019-07-25 RX ADMIN — Medication 1 CAPSULE: at 13:17

## 2019-07-25 RX ADMIN — METRONIDAZOLE 500 MG: 500 INJECTION, SOLUTION INTRAVENOUS at 21:22

## 2019-07-25 RX ADMIN — SODIUM CHLORIDE, PRESERVATIVE FREE 10 ML: 5 INJECTION INTRAVENOUS at 08:34

## 2019-07-25 RX ADMIN — VITAMIN D, TAB 1000IU (100/BT) 2000 UNITS: 25 TAB at 08:33

## 2019-07-25 RX ADMIN — FUROSEMIDE 40 MG: 10 INJECTION, SOLUTION INTRAMUSCULAR; INTRAVENOUS at 09:13

## 2019-07-25 ASSESSMENT — ENCOUNTER SYMPTOMS
ABDOMINAL PAIN: 0
VOMITING: 0
SHORTNESS OF BREATH: 1
COUGH: 1
WHEEZING: 0
NAUSEA: 0
CONSTIPATION: 0
SORE THROAT: 0
DIARRHEA: 1
VOICE CHANGE: 0
SINUS PRESSURE: 0
BLOOD IN STOOL: 0

## 2019-07-25 ASSESSMENT — PAIN SCALES - GENERAL
PAINLEVEL_OUTOF10: 0
PAINLEVEL_OUTOF10: 0

## 2019-07-25 NOTE — PROGRESS NOTES
that includes Appendectomy (1952); Hysterectomy (Approx 1983); Cataract removal (Bilateral, 08/10); malignant skin lesion excision (Right, 12/10 and 04/11); malignant skin lesion excision (Right, 02/2012); Colonoscopy (05/16/2011); other surgical history (09/06/2011); Cholecystectomy; Endoscopy, colon, diagnostic; eye surgery; Cardiac catheterization (2014); Upper gastrointestinal endoscopy (05/16/2011); Upper gastrointestinal endoscopy (6/22/15); other surgical history (3/22/16); other surgical history (Right, 4/26/16); Cystoscopy (Right, 2/6/2019); and Cystoscopy (Right, 2/27/2019). Restrictions  Restrictions/Precautions  Restrictions/Precautions: General Precautions, Fall Risk, Up as Tolerated  Required Braces or Orthoses?: No  Position Activity Restriction  Other position/activity restrictions: Up with assist     Subjective   General  Chart Reviewed: Progress Notes, History and Physical  Patient assessed for rehabilitation services?: Yes  Family / Caregiver Present: No  General Comment  Comments: Pt reports 2x falls yesterday prior to presenting at hospital. Pt fearful of falling      Orientation  Orientation  Overall Orientation Status: Within Functional Limits     Objective    ADL  UE Bathing: Minimal assistance;Setup(Declined use of soap. Seated in chair. Needed assistance with back)  LE Bathing: Minimal assistance;Setup(Needed assit with legs and feet )  UE Dressing: Minimal assistance;Setup(To don gown)  LE Dressing: Maximum assistance;Setup(To don socks. )  Additional Comments: Pt was sitting in bedside chair upon arrival.  Pt assisted in completing ADL activities mentioned above. Pt completed Sit<> order to adjust gown. Pt reported SOB throughout session. Pt was educated on EC/WS tech. Pt returned to sitting, call light within reach, RN notified upon exit.        Balance  Sitting Balance: Supervision(Sitting in recliner)  Bed mobility  Comment: Pt upright in bedside chair upon OT arrival and

## 2019-07-25 NOTE — PROGRESS NOTES
Physical Therapy  Facility/Department: [facility]  Daily Treatment Note  NAME: Juan Jose Hills  : 1935  MRN: [id number]    Date of Service: 2019    Discharge Recommendations:    Further therapy recommended at discharge. PT Equipment Recommendations  Equipment Needed: Yes  Mobility Devices: Chi Grout: Rolling  Other: CTA< RW needed at this time to amb. Assessment     Body structures, Functions, Activity limitations: Decreased functional mobility ; Decreased strength;Decreased balance;Decreased endurance  Assessment: Pt ambulated 250ft w/ RW CGA, no LOB noted. Pt demonstrates decreased endurance throughout mobility. Recommending continued therapy to address functional mobility and endruance deficits and return pt to prior level of function. Prognosis: Good  Decision Making: Medium Complexity  Patient Education: ther ex; importance of mobility; breathing techniques  REQUIRES PT FOLLOW UP: Yes  Activity Tolerance  Activity Tolerance: Patient limited by fatigue;Patient limited by endurance            Patient Diagnosis(es): There were no encounter diagnoses.      has a past medical history of Allergic rhinitis, Asthma, Basal cell carcinoma of cheek, Basal cell carcinoma of leg, CAD (coronary artery disease), Central retinal artery occlusion, Cerebral artery occlusion with cerebral infarction [facility]), Cerebrovascular disease, CHF (congestive heart failure) ([facility] [address] Diverticulosis, Dyslipidemia, Elevated antinuclear antibody (ZAIRA) level, Esophagitis, Foraminal stenosis of lumbar region, Hyperlipidemia, Hypertension, IBS (irritable bowel syndrome), Iron deficiency anemia, Junctional bradycardia, Migraine, Mild CAD, Mitral regurgitation, Obesity, CHICO (obstructive sleep apnea), Osteoarthritis, PMR (polymyalgia rheumatica) ([facility]), Pneumonia, Premature atrial contractions, Pulmonary hypertension ([facility] [address] Restrictive lung disease, Type II or unspecified type diabetes mellitus without mention of complication, not

## 2019-07-25 NOTE — PLAN OF CARE
Problem: Falls - Risk of:  Goal: Will remain free from falls  Description  Will remain free from falls  Outcome: Ongoing     Problem: Infection:  Goal: Will remain free from infection  Description  Will remain free from infection  Outcome: Ongoing     Problem: Safety:  Goal: Free from accidental physical injury  Description  Free from accidental physical injury  Outcome: Ongoing     Problem: Daily Care:  Goal: Daily care needs are met  Description  Daily care needs are met  Outcome: Ongoing     Problem: Pain:  Goal: Patient's pain/discomfort is manageable  Description  Patient's pain/discomfort is manageable  Outcome: Ongoing     Problem: Skin Integrity:  Goal: Skin integrity will stabilize  Description  Skin integrity will stabilize  Outcome: Ongoing

## 2019-07-25 NOTE — PROGRESS NOTES
[facility] [place]      Daily Progress Note     Admit Date: 7/20/2019  Bed/Room No.  3016/3016-01  Admitting Physician : Tara Malone MD  Code Status :[address] Day:  LOS: 5 days   Chief Complaint:     No chief complaint on file. Principal Problem:    CHF (congestive heart failure), NYHA class I, acute on chronic, combined ([facility] [address]  Active Problems:    Dyslipidemia    CHICO- intolerant CPAP    Class 2 severe obesity due to excess calories with serious comorbidity and body mass index (BMI) of 37.0 to 37.9 in adult [facility])    Essential hypertension    Acute diverticulitis  Resolved Problems:    * No resolved hospital problems. *    Subjective : Interval History/Significant events :  07/25/19  pt is doing very good today, her breathing has improved, she is -1.1 L since yesterday  Has chronic diarrhea and feels she is back to her baseline, hemoglobin is better    Background History:         Eduardo Gore is 81 y.o. female  Who was admitted to the hospital on 7/20/2019 for treatment of CHF (congestive heart failure), NYHA class I, acute on chronic, combined ([facility] [address] Patient was seen at PCPs office for nausea vomiting diarrhea and was diagnosed with viral syndrome and sent home. She returned back to emergency room with persistent fever cough nausea vomiting, diarrhea. Patient was having respiratory discomfort with hypoxia saturating in mid 80s associated with generalized fatigue. Work-up in the emergency room showed temperature 98.3, pulse 85, blood pressure 115/48. Lab work showed elevated proBNP 1367, elevated troponin I 5 5, potassium 3.3, BUN 14, creatinine 1.18, WBC 17.8, hemoglobin 10 microcytic hypochromic. EKG did not show any acute ST/T wave changes, prolonged . Chest Xray showed left retrocardiac region.        PMH:  Past Medical History:   Diagnosis Date    Allergic rhinitis     Asthma     PFT's, 04/06, actually showed mild restrictive defect.     Basal cell present. Cardiovascular: Normal rate, regular rhythm and normal heart sounds. No murmur heard. Pulmonary/Chest: Effort normal. She has no wheezes. She has rhonchi in the right middle field and the right lower field. She has no rales. Abdominal: Soft. She exhibits no mass. There is no tenderness. Musculoskeletal: She exhibits edema. Lymphadenopathy:     She has no cervical adenopathy. Neurological: She is alert and oriented to person, place, and time. Skin: She is not diaphoretic. Nursing note and vitals reviewed. Lower Extremities : No ankle Edema , No calf Tenderness     Laboratory findings:    Recent Labs     07/23/19  0547 07/24/19  0633 07/25/19  0646   WBC 7.0 7.2 10.8   HGB 8.4* 8.3* 10.4*   HCT 29.6* 28.9* 36.2*    181 See Reflexed IPF Result     Recent Labs     07/23/19 0547 07/24/19 0633 07/25/19  0646    143 143   K 3.3* 3.4* 4.3    104 100   CO2 29 31 26   GLUCOSE 62* 81 124*   BUN 14 11 9   CREATININE 0.91* 0.86 1.05*   MG  --  2.0  --    CALCIUM 8.3* 8.1* 8.9     No results for input(s): PROT, LABALBU, LABA1C, I2SXBKO, K1PMFWZ, FT4, TSH, AST, ALT, LDH, GGT, ALKPHOS, BILITOT, BILIDIR, AMMONIA, AMYLASE, LIPASE, LACTATE, CHOL, HDL, LDLCHOLESTEROL, CHOLHDLRATIO, TRIG, VLDL, BNP, TROPONINI, CKTOTAL, CKMB, CKMBINDEX, RF, ZAIRA in the last 72 hours.        Specific Gravity, UA   Date Value Ref Range Status   05/28/2019 1.030 (H) 1.010 - 1.025 Final     Protein, UA   Date Value Ref Range Status   05/28/2019 NEGATIVE NEGATIVE Final     RBC, UA   Date Value Ref Range Status   05/28/2019 0 TO 4 0 - 4 /HPF Final     Bacteria, UA   Date Value Ref Range Status   05/28/2019 TRACE (A) None Final     Nitrite, Urine   Date Value Ref Range Status   05/28/2019 NEGATIVE NEGATIVE Final     WBC, UA   Date Value Ref Range Status   05/28/2019 0 TO 4 0 - 4 /HPF Final     Leukocyte Esterase, Urine   Date Value Ref Range Status   05/28/2019 NEGATIVE NEGATIVE Final       Imaging / Clinical Data

## 2019-07-26 VITALS
RESPIRATION RATE: 16 BRPM | SYSTOLIC BLOOD PRESSURE: 135 MMHG | HEART RATE: 76 BPM | WEIGHT: 195.4 LBS | BODY MASS INDEX: 36.89 KG/M2 | DIASTOLIC BLOOD PRESSURE: 53 MMHG | HEIGHT: 61 IN | TEMPERATURE: 98.7 F | OXYGEN SATURATION: 92 %

## 2019-07-26 PROBLEM — K57.92 ACUTE DIVERTICULITIS: Status: RESOLVED | Noted: 2019-07-23 | Resolved: 2019-07-26

## 2019-07-26 PROBLEM — N17.9 AKI (ACUTE KIDNEY INJURY) (HCC): Status: RESOLVED | Noted: 2019-05-30 | Resolved: 2019-07-26

## 2019-07-26 PROBLEM — R06.02 SOB (SHORTNESS OF BREATH): Status: RESOLVED | Noted: 2019-05-27 | Resolved: 2019-07-26

## 2019-07-26 LAB
ANION GAP SERPL CALCULATED.3IONS-SCNC: 13 MMOL/L (ref 9–17)
BUN BLDV-MCNC: 10 MG/DL (ref 8–23)
BUN/CREAT BLD: ABNORMAL (ref 9–20)
CALCIUM SERPL-MCNC: 8.9 MG/DL (ref 8.6–10.4)
CAMPYLOBACTER PCR: NORMAL
CHLORIDE BLD-SCNC: 99 MMOL/L (ref 98–107)
CO2: 29 MMOL/L (ref 20–31)
CREAT SERPL-MCNC: 1.07 MG/DL (ref 0.5–0.9)
CULTURE: NORMAL
E COLI ENTEROTOXIGENIC PCR: NORMAL
EKG ATRIAL RATE: 115 BPM
EKG Q-T INTERVAL: 356 MS
EKG QRS DURATION: 78 MS
EKG QTC CALCULATION (BAZETT): 492 MS
EKG R AXIS: 36 DEGREES
EKG T AXIS: 124 DEGREES
EKG VENTRICULAR RATE: 115 BPM
GFR AFRICAN AMERICAN: 59 ML/MIN
GFR NON-AFRICAN AMERICAN: 49 ML/MIN
GFR SERPL CREATININE-BSD FRML MDRD: ABNORMAL ML/MIN/{1.73_M2}
GFR SERPL CREATININE-BSD FRML MDRD: ABNORMAL ML/MIN/{1.73_M2}
GLUCOSE BLD-MCNC: 130 MG/DL (ref 70–99)
GLUCOSE BLD-MCNC: 140 MG/DL (ref 65–105)
GLUCOSE BLD-MCNC: 162 MG/DL (ref 65–105)
GLUCOSE BLD-MCNC: 171 MG/DL (ref 65–105)
HCT VFR BLD CALC: 34.4 % (ref 36.3–47.1)
HEMOGLOBIN: 10.2 G/DL (ref 11.9–15.1)
Lab: NORMAL
MCH RBC QN AUTO: 23.7 PG (ref 25.2–33.5)
MCHC RBC AUTO-ENTMCNC: 29.7 G/DL (ref 28.4–34.8)
MCV RBC AUTO: 79.8 FL (ref 82.6–102.9)
NRBC AUTOMATED: 1 PER 100 WBC
PDW BLD-RTO: 21.2 % (ref 11.8–14.4)
PLATELET # BLD: ABNORMAL K/UL (ref 138–453)
PLATELET, FLUORESCENCE: 290 K/UL (ref 138–453)
PLATELET, IMMATURE FRACTION: NORMAL % (ref 1.1–10.3)
PLESIOMONAS SHIGELLOIDES PCR: NORMAL
PMV BLD AUTO: ABNORMAL FL (ref 8.1–13.5)
POTASSIUM SERPL-SCNC: 3.9 MMOL/L (ref 3.7–5.3)
RBC # BLD: 4.31 M/UL (ref 3.95–5.11)
SALMONELLA PCR: NORMAL
SHIGATOXIN GENE PCR: NORMAL
SHIGELLA SP PCR: NORMAL
SODIUM BLD-SCNC: 141 MMOL/L (ref 135–144)
SPECIMEN DESCRIPTION: NORMAL
VIBRIO PCR: NORMAL
WBC # BLD: 12.7 K/UL (ref 3.5–11.3)
YERSINIA ENTEROCOLITICA PCR: NORMAL

## 2019-07-26 PROCEDURE — 94640 AIRWAY INHALATION TREATMENT: CPT

## 2019-07-26 PROCEDURE — 36415 COLL VENOUS BLD VENIPUNCTURE: CPT

## 2019-07-26 PROCEDURE — 2580000003 HC RX 258: Performed by: NURSE PRACTITIONER

## 2019-07-26 PROCEDURE — 6370000000 HC RX 637 (ALT 250 FOR IP): Performed by: INTERNAL MEDICINE

## 2019-07-26 PROCEDURE — 80048 BASIC METABOLIC PNL TOTAL CA: CPT

## 2019-07-26 PROCEDURE — 94618 PULMONARY STRESS TESTING: CPT

## 2019-07-26 PROCEDURE — 6370000000 HC RX 637 (ALT 250 FOR IP): Performed by: NURSE PRACTITIONER

## 2019-07-26 PROCEDURE — 2500000003 HC RX 250 WO HCPCS: Performed by: INTERNAL MEDICINE

## 2019-07-26 PROCEDURE — 6360000002 HC RX W HCPCS: Performed by: INTERNAL MEDICINE

## 2019-07-26 PROCEDURE — 97116 GAIT TRAINING THERAPY: CPT

## 2019-07-26 PROCEDURE — 97535 SELF CARE MNGMENT TRAINING: CPT

## 2019-07-26 PROCEDURE — 6360000002 HC RX W HCPCS: Performed by: NURSE PRACTITIONER

## 2019-07-26 PROCEDURE — 85027 COMPLETE CBC AUTOMATED: CPT

## 2019-07-26 PROCEDURE — 85055 RETICULATED PLATELET ASSAY: CPT

## 2019-07-26 PROCEDURE — 99239 HOSP IP/OBS DSCHRG MGMT >30: CPT | Performed by: INTERNAL MEDICINE

## 2019-07-26 PROCEDURE — 94760 N-INVAS EAR/PLS OXIMETRY 1: CPT

## 2019-07-26 PROCEDURE — 82947 ASSAY GLUCOSE BLOOD QUANT: CPT

## 2019-07-26 PROCEDURE — 2580000003 HC RX 258: Performed by: INTERNAL MEDICINE

## 2019-07-26 RX ORDER — CEFDINIR 300 MG/1
300 CAPSULE ORAL 2 TIMES DAILY
Qty: 4 CAPSULE | Refills: 0 | Status: SHIPPED | OUTPATIENT
Start: 2019-07-26 | End: 2019-07-28

## 2019-07-26 RX ORDER — METRONIDAZOLE 500 MG/1
500 TABLET ORAL 3 TIMES DAILY
Qty: 12 TABLET | Refills: 0 | Status: SHIPPED | OUTPATIENT
Start: 2019-07-26 | End: 2019-07-30

## 2019-07-26 RX ORDER — ALBUTEROL SULFATE 90 UG/1
2 AEROSOL, METERED RESPIRATORY (INHALATION) 4 TIMES DAILY PRN
Qty: 1 INHALER | Refills: 1 | Status: SHIPPED | OUTPATIENT
Start: 2019-07-26 | End: 2020-12-15

## 2019-07-26 RX ORDER — GUAIFENESIN 600 MG/1
600 TABLET, EXTENDED RELEASE ORAL 2 TIMES DAILY
Qty: 10 TABLET | Refills: 0 | Status: SHIPPED | OUTPATIENT
Start: 2019-07-26 | End: 2019-07-31

## 2019-07-26 RX ORDER — ISOSORBIDE DINITRATE 10 MG/1
10 TABLET ORAL 3 TIMES DAILY
Status: DISCONTINUED | OUTPATIENT
Start: 2019-07-26 | End: 2019-07-26 | Stop reason: HOSPADM

## 2019-07-26 RX ADMIN — SODIUM CHLORIDE, PRESERVATIVE FREE 10 ML: 5 INJECTION INTRAVENOUS at 09:27

## 2019-07-26 RX ADMIN — IPRATROPIUM BROMIDE AND ALBUTEROL SULFATE 1 AMPULE: .5; 3 SOLUTION RESPIRATORY (INHALATION) at 14:02

## 2019-07-26 RX ADMIN — INSULIN LISPRO 2 UNITS: 100 INJECTION, SOLUTION INTRAVENOUS; SUBCUTANEOUS at 16:53

## 2019-07-26 RX ADMIN — ISOSORBIDE DINITRATE 10 MG: 10 TABLET ORAL at 13:41

## 2019-07-26 RX ADMIN — PREDNISONE 5 MG: 5 TABLET ORAL at 09:24

## 2019-07-26 RX ADMIN — HYDRALAZINE HYDROCHLORIDE 25 MG: 25 TABLET, FILM COATED ORAL at 06:53

## 2019-07-26 RX ADMIN — FUROSEMIDE 40 MG: 40 TABLET ORAL at 09:24

## 2019-07-26 RX ADMIN — IPRATROPIUM BROMIDE AND ALBUTEROL SULFATE 1 AMPULE: .5; 3 SOLUTION RESPIRATORY (INHALATION) at 08:23

## 2019-07-26 RX ADMIN — VITAMIN D, TAB 1000IU (100/BT) 2000 UNITS: 25 TAB at 09:24

## 2019-07-26 RX ADMIN — POTASSIUM CHLORIDE 10 MEQ: 1500 TABLET, EXTENDED RELEASE ORAL at 09:28

## 2019-07-26 RX ADMIN — Medication 1 CAPSULE: at 09:24

## 2019-07-26 RX ADMIN — ENOXAPARIN SODIUM 90 MG: 100 INJECTION SUBCUTANEOUS at 09:23

## 2019-07-26 RX ADMIN — ONDANSETRON 4 MG: 2 INJECTION INTRAMUSCULAR; INTRAVENOUS at 09:37

## 2019-07-26 RX ADMIN — Medication 1 CAPSULE: at 12:17

## 2019-07-26 RX ADMIN — PANTOPRAZOLE SODIUM 40 MG: 40 TABLET, DELAYED RELEASE ORAL at 06:53

## 2019-07-26 RX ADMIN — INSULIN GLARGINE 20 UNITS: 100 INJECTION, SOLUTION SUBCUTANEOUS at 09:25

## 2019-07-26 RX ADMIN — Medication 1 CAPSULE: at 16:59

## 2019-07-26 RX ADMIN — ASPIRIN 81 MG: 81 TABLET ORAL at 09:25

## 2019-07-26 RX ADMIN — CEFTRIAXONE SODIUM 2 G: 2 INJECTION, POWDER, FOR SOLUTION INTRAMUSCULAR; INTRAVENOUS at 09:23

## 2019-07-26 RX ADMIN — INSULIN LISPRO 2 UNITS: 100 INJECTION, SOLUTION INTRAVENOUS; SUBCUTANEOUS at 12:15

## 2019-07-26 RX ADMIN — METRONIDAZOLE 500 MG: 500 INJECTION, SOLUTION INTRAVENOUS at 12:15

## 2019-07-26 RX ADMIN — HYDRALAZINE HYDROCHLORIDE 25 MG: 25 TABLET, FILM COATED ORAL at 13:43

## 2019-07-26 RX ADMIN — METOPROLOL TARTRATE 25 MG: 25 TABLET ORAL at 09:24

## 2019-07-26 RX ADMIN — METRONIDAZOLE 500 MG: 500 INJECTION, SOLUTION INTRAVENOUS at 05:20

## 2019-07-26 NOTE — PROGRESS NOTES
term goals: 14 visits  Short term goal 1: Pt will be Derek bed mobility  Short term goal 2: Pt will be Derek transfers  Short term goal 3: Pt will be Derke amb 125' RW  Short term goal 4: Pt will navigate 6 steps R rail Derek    Plan    Plan  Times per week: 5-6x/wk  Current Treatment Recommendations: Strengthening, Balance Training, Functional Mobility Training, Endurance Training, Transfer Training, Gait Training, Stair training, Home Exercise Program, Safety Education & Training, Patient/Caregiver Education & Training, Equipment Evaluation, Education, & procurement  Safety Devices  Type of devices: Call light within reach, Nurse notified, Gait belt, Patient at risk for falls, Left in chair, All fall risk precautions in place, Chair alarm in place  Restraints  Initially in place: No     Therapy Time   Individual Concurrent Group Co-treatment   Time In   0245         Time Out   0305         Minutes   Laura 430, PTA

## 2019-07-26 NOTE — PROGRESS NOTES
Nutrition Assessment    Type and Reason for Visit: Initial(LOS Day 6)    Nutrition Recommendations:   -Continue cardiac, 3 CHO diabetic and Low 2 gm Na diet  -Recommend glucerna supplements BID   -Will monitor po intake and weights     Nutrition Assessment: Pt nutritionally compromised aeb <50% of meals consumed x 1 wk per pt. Pt admitted w/ pneumonia. Pt stated that her appetite has been poor all week. Pt reports UBW of 195 lbs and denies any recent wt changes. Pt EMR, pt was 218 lbs x 1 mo ago, pt states that his was r/t fluid. Will add glucerna supplements to compliment po intake and monitor wt trends. Malnutrition Assessment:  · Malnutrition Status: At risk for malnutrition  · Context: Acute illness or injury  · Findings of the 6 clinical characteristics of malnutrition (Minimum of 2 out of 6 clinical characteristics is required to make the diagnosis of moderate or severe Protein Calorie Malnutrition based on AND/ASPEN Guidelines):  1. Energy Intake-Less than or equal to 50% of estimated energy requirement, Greater than or equal to 7 days    2. Weight Loss-Unable to assess(d/t fluid ),    3. Fat Loss-No significant subcutaneous fat loss,    4. Muscle Loss-No significant muscle mass loss,    5. Fluid Accumulation-Mild fluid accumulation, Generalized  6.  Strength-Not measured    Nutrition Risk Level:  Moderate    Nutrient Needs:  · Estimated Daily Total Kcal: 1.2 ~>1500 kcals/d   · Estimated Daily Protein (g): 1.2-1.3 gm/kg ~>56-61 gms/d     Nutrition Diagnosis:   · Problem: Inadequate oral intake  · Etiology: related to Insufficient energy/nutrient consumption(medical condition)     Signs and symptoms:  as evidenced by Diet history of poor intake, Patient report of, Intake 0-25%, Intake 25-50%(Need for ONS )    Objective Information:  · Wound Type: Skin Tears  · Current Nutrition Therapies:  · Oral Diet Orders: Cardiac, 2gm Sodium, Carb Control 3 Carbs/Meal   · Oral Diet intake: 1-25%, 26-50%(Per

## 2019-07-26 NOTE — CARE COORDINATION
Transition planning:  Call from Isaiah Mejia at [facility], insurance has denied admission, offered physician do peer to peer. Provided number to Dr. Baires Husbands   [phone]  Per Dr. Baires Husbands they are still denying  Call from Isaiah Mejia, insurance has given family and patient opportunity to do expedited appeal.  Son and patient attempted appeal but could take 72hrs. Spoke with pt and son about home care, she is agreeable to home care, would like an agency that can visit tomorrow. Called Yelena with [facility], she will come and talk to patient about what they can offer. Pt needs home o2, PS for order and face to face.

## 2019-07-26 NOTE — PROGRESS NOTES
includes Appendectomy (1952); Hysterectomy (Approx 1983); Cataract removal (Bilateral, 08/10); malignant skin lesion excision (Right, 12/10 and 04/11); malignant skin lesion excision (Right, 02/2012); Colonoscopy (05/16/2011); other surgical history (09/06/2011); Cholecystectomy; Endoscopy, colon, diagnostic; eye surgery; Cardiac catheterization (2014); Upper gastrointestinal endoscopy (05/16/2011); Upper gastrointestinal endoscopy (6/22/15); other surgical history (3/22/16); other surgical history (Right, 4/26/16); Cystoscopy (Right, 2/6/2019); and Cystoscopy (Right, 2/27/2019). Restrictions  Restrictions/Precautions  Restrictions/Precautions: General Precautions, Fall Risk, Up as Tolerated  Required Braces or Orthoses?: No  Position Activity Restriction  Other position/activity restrictions: Up with assist  Subjective   General  Chart Reviewed: Progress Notes, History and Physical  Patient assessed for rehabilitation services?: Yes  Family / Caregiver Present: No  General Comment  Comments: RN ok'd pt for therapy this afternoon. Pt agreeable and cooperative. Retired to chair at the end of the session with call light in reach. Vital Signs  Patient Currently in Pain: Denies   Orientation  Orientation  Overall Orientation Status: Within Functional Limits  Objective    ADL  Grooming: Stand by assistance;Setup(Pt demo facial and oral hygiene while static standing at sink.)  UE Bathing: Minimal assistance;Setup(Pt completed UB bathing while standing at sink with min A for back only.)  LE Bathing: Stand by assistance;Setup(Pt able to complete UB bathing sitting in chair at sink/standing at sink. )  UE Dressing: Stand by assistance;Setup(Donned gown while standing.)  Toileting: Moderate assistance(To don brief.)  Additional Comments: Pt sitting in chair upon arrival. Pt completed bathing and grooming activities while standing at sink/seated in chair at sink. Pt returned to chair with call light in reach.

## 2019-07-26 NOTE — PROGRESS NOTES
Hepatic:  No results for input(s): AST, ALT, ALB, BILITOT, ALKPHOS in the last 72 hours. Troponin:   No results for input(s): TROPHS in the last 72 hours. BNP: No results for input(s): BNP in the last 72 hours. Lipids:   No results for input(s): CHOL, HDL in the last 72 hours. Invalid input(s): LDLCALCU  INR: No results for input(s): INR in the last 72 hours. Objective:   Vitals: /60   Pulse 83   Temp 98.4 °F (36.9 °C) (Oral)   Resp 16   Ht 5' 1\" (1.549 m)   Wt 195 lb 6.4 oz (88.6 kg)   SpO2 94%   BMI 36.92 kg/m²   General appearance: alert and cooperative with exam  HEENT: Head: Normocephalic, no lesions, without obvious abnormality. Neck:no JVD, trachea midline, no adenopathy  Lungs: Clear to auscultation, but diminished On NC oxygen without distress  Heart: Regular rate and rhythm, s1/s2 auscultated, no murmurs. Tele SR with PACs   Abdomen: soft, non-tender, bowel sounds hyperactive. Obese  Extremities: no edema  Neurologic: not done    Echo 5/28/19  Summary  Normal left ventricular diameter. Mild left ventricular hypertrophy. Left ventricular systolic function is normal.  No segmental wall motion abnormalities seen. Left ventricular ejection fraction 60 %. Grade II (moderate) left ventricular diastolic dysfunction. Left atrium is mildly dilated. Right atrial dilatation. Aortic valve is sclerotic but opens well. Mitral annular calcification. Moderate mitral regurgitation. Normal tricuspid valve leaflets. Moderate tricuspid regurgitation. Estimated right ventricular systolic pressure is 67 mmHg. Moderate pulmonary hypertension. No significant pericardial effusion is seen. Echo 5/28/19   LVEF 60 % , grade II diastolic Dysfunction . RVSP 67 mm Hg   Assessment / Acute Cardiac Problems:   1. N/V/diarrhrea - ?gastroenteritis  2. Mild troponinemia secondary to above - no trend to suggest ischemia  3. Mild acute on chronic diastolic CHF exacerbation  4. Moderate MR & TR  5.  Mod

## 2019-07-26 NOTE — PLAN OF CARE
Patient was evaluated today for the diagnosis of hypoxia, pneumonia. I entered a DME order for home oxygen because the diagnosis and testing requires the patient to have supplemental oxygen. Condition will improve or be benefited by oxygen use. The patient is  able to perform good mobility in a home setting and therefore does require the use of a portable oxygen system. The need for this equipment was discussed with the patient and she understands and is in agreement.     Electronically signed by Sarah Reeves MD on 7/26/19 at 3:56 PM

## 2019-07-26 NOTE — PLAN OF CARE
Problem: Falls - Risk of:  Goal: Will remain free from falls  Description  Will remain free from falls  Outcome: Ongoing  Goal: Absence of physical injury  Description  Absence of physical injury  Outcome: Ongoing     Problem: Musculor/Skeletal Functional Status  Goal: Highest potential functional level  Outcome: Ongoing     Problem: Musculor/Skeletal Functional Status  Goal: Highest potential functional level  Outcome: Ongoing  Goal: Absence of falls  Outcome: Ongoing     Problem: Infection:  Goal: Will remain free from infection  Description  Will remain free from infection  Outcome: Ongoing     Problem: Safety:  Goal: Free from accidental physical injury  Description  Free from accidental physical injury  Outcome: Ongoing  Goal: Free from intentional harm  Description  Free from intentional harm  Outcome: Ongoing     Problem: Daily Care:  Goal: Daily care needs are met  Description  Daily care needs are met  Outcome: Ongoing     Problem: Pain:  Goal: Patient's pain/discomfort is manageable  Description  Patient's pain/discomfort is manageable  Outcome: Ongoing     Problem: Skin Integrity:  Goal: Skin integrity will stabilize  Description  Skin integrity will stabilize  Outcome: Ongoing     Problem: Discharge Planning:  Goal: Patients continuum of care needs are met  Description  Patients continuum of care needs are met  Outcome: Ongoing

## 2019-07-27 ENCOUNTER — CARE COORDINATION (OUTPATIENT)
Dept: CASE MANAGEMENT | Age: 84
End: 2019-07-27

## 2019-07-27 DIAGNOSIS — I50.43 CHF (CONGESTIVE HEART FAILURE), NYHA CLASS I, ACUTE ON CHRONIC, COMBINED (HCC): Primary | ICD-10-CM

## 2019-07-27 PROCEDURE — 1111F DSCHRG MED/CURRENT MED MERGE: CPT | Performed by: NURSE PRACTITIONER

## 2019-07-27 NOTE — CARE COORDINATION
[facility] Transitions Initial Follow Up Call    Call within 2 business days of discharge: Yes    Patient: Slivestre rOtiz Patient : 1935   MRN: [id number]  Reason for Admission: CHF/pnuemonia  Discharge Date: 19 RARS: Readmission Risk Score: 34      Last Discharge [facility]       Complaint Diagnosis Description Type Department Provider    19  Type 2 diabetes with nephropathy [facility]) Admission (Discharged) STVZ CAR 3 Gladys Perez MD           Spoke with: [address]: [facility]    Was able to contact Eliceo Del Real for initial transitional outreach. She stated that she was doing ok. She denied chest pain/pressure, N/V, F/C, swelling and she weighs herself daily. She stated her breathing is \"ok\", cont with harsh cough with yellow sputum and she is weak. She also now has oxygen at 2 liters nasal cannula. She was able to wash herself up this am.  She did voice concerns over having meals. Encouraged her to ask nurse for  consult for obtaining meals. Routed to     Medications reviewed and all medications in home. She did give herself 20 units of Lantus this am.  Advised her that it was decreased to 12 units daily. VU. Also advised if sugars begin to increase, call Ronald Lips for changes. 1111F order completed. Discharge home with [facility] and she stated that they called and will be out today at 215pm.  Reviewed CTC role and she was receptive to further outreach. Contact information given.       Non-face-to-face services provided:  Scheduled appointment with PCP-scheduling pool  Scheduled appointment with Specialist- nephr  Obtained and reviewed discharge summary and/or continuity of care documents  Assessment and support for treatment adherence and medication management-reviewed and educated    Care Transitions 24 Hour Call    Do you have any ongoing symptoms?:  Yes  Patient-reported symptoms:  Cough  Do you have a copy of your discharge

## 2019-07-29 ENCOUNTER — CARE COORDINATION (OUTPATIENT)
Dept: CARE COORDINATION | Age: 84
End: 2019-07-29

## 2019-07-29 ENCOUNTER — TELEPHONE (OUTPATIENT)
Dept: INTERNAL MEDICINE | Age: 84
End: 2019-07-29

## 2019-07-29 NOTE — TELEPHONE ENCOUNTER
Last appt: 2019  Next appt: 2019    [facility] Transitions Initial Follow Up Call    Outreach made within 2 business days of discharge: Yes    Patient: Sneha Richard Patient : 1935   MRN: [id number]  Reason for Admission: There are no discharge diagnoses documented for the most recent discharge. Discharge Date: 19       Spoke with: Winter Ny     Discharge department/facility: [facility] Interactive Patient Contact:  Was patient able to fill all prescriptions: Yes  Was patient instructed to bring all medications to the follow-up visit: Yes  Is patient taking all medications as directed in the discharge summary?  Yes  Does patient understand their discharge instructions: Yes  Does patient have questions or concerns that need addressed prior to 7-14 day follow up office visit: no    Scheduled appointment with PCP within 7-14 days    Follow Up  Future Appointments   Date Time Provider Loyda Foster   2019 10:50 AM Faith Haddad MD [facility] CTR DPP   2019  1:00 PM INGRID Patrick CNP DINT DPP   2019  9:45 AM DO MICHELLE Hand DPP   9/3/2019  1:30 PM Goran Harrison MD [address] [place] [address] MHDPP   2019  1:30 PM INGRID Patrick CNP DINT DPP   2019 11:00 AM INGRID San CNP DDIAED DPP   2019  2:00 PM SCHEDULE, Hayden Don [facility] [address] LAB [address] [facility] LAB [place]   2019  3:00 PM [facility]   2019  9:30 AM INGRID Hill CNP  DPP   2019 10:30 AM Lisbeth 27 STV [place]   6/10/2020  8:40 AM MD CRICKET Cordova DPP   2020 11:30 AM [facility]       Carl Tello MA

## 2019-07-29 NOTE — CARE COORDINATION
Aayush Corley is a 81year old female who is a patient of GAURAV Shirley. Madison Hanson sent  a referral to assist Sandro with meals.  attempted to call however no answer.  noticed Lino Saxena is still current at hospital.    Plan:    will follow up in 1 week. If Lino Saxena is home  will attempt to call.

## 2019-07-30 ENCOUNTER — CARE COORDINATION (OUTPATIENT)
Dept: CARE COORDINATION | Age: 84
End: 2019-07-30

## 2019-07-30 NOTE — CARE COORDINATION
Linda Martinez has CHF, Pulmonary Hypertension,  HTN, Multifocal atrial tachycardia, Hyperlipidemia, Anemia, CHICO- not tolerant to CPAP, Asthma, Type II DM- on insulin- controlled, Obesity, Gait Abnormality, OA, Polymyalgia rheumatoid, Spinal stenosis     She is following with urology, dietician- TADEO Valentino , Pulmonology, Wen Haskins CNP, nephrology, hematology, cardiology     STV 7/20- 7/26/2019 CHF      Plan of Care :            Resume ACC after CTN completion                                                                                                 F/U on Dr. Genesis Gong referral for colonoscopy- appt to discuss 9/13/2019                                      F/U hematology appt- having infusions for her anemia, Wen Haskins appt, nephrology appt, and cardiology appt    Linda Martinez had started [facility] 7/1/2019. Per chart review:  CTN following            and dietician referrals are in place           She has O2 at @ L           [facility]erred to [facility]           She is scheduled for care transitions appt. Sent IB message to CTN- requesting notification when CTN completion. Sent IB to dietician- Patient is at home.

## 2019-07-31 ENCOUNTER — OFFICE VISIT (OUTPATIENT)
Dept: NEPHROLOGY | Age: 84
End: 2019-07-31
Payer: MEDICARE

## 2019-07-31 ENCOUNTER — OFFICE VISIT (OUTPATIENT)
Dept: INTERNAL MEDICINE | Age: 84
End: 2019-07-31
Payer: MEDICARE

## 2019-07-31 VITALS
TEMPERATURE: 98 F | DIASTOLIC BLOOD PRESSURE: 68 MMHG | HEART RATE: 62 BPM | SYSTOLIC BLOOD PRESSURE: 130 MMHG | BODY MASS INDEX: 37.22 KG/M2 | WEIGHT: 197 LBS | RESPIRATION RATE: 14 BRPM

## 2019-07-31 VITALS
OXYGEN SATURATION: 98 % | BODY MASS INDEX: 38.68 KG/M2 | DIASTOLIC BLOOD PRESSURE: 60 MMHG | RESPIRATION RATE: 16 BRPM | WEIGHT: 197 LBS | HEIGHT: 60 IN | SYSTOLIC BLOOD PRESSURE: 122 MMHG | HEART RATE: 70 BPM

## 2019-07-31 DIAGNOSIS — M35.3 PMR (POLYMYALGIA RHEUMATICA) (HCC): ICD-10-CM

## 2019-07-31 DIAGNOSIS — I10 ESSENTIAL HYPERTENSION: ICD-10-CM

## 2019-07-31 DIAGNOSIS — D50.9 IRON DEFICIENCY ANEMIA, UNSPECIFIED IRON DEFICIENCY ANEMIA TYPE: ICD-10-CM

## 2019-07-31 DIAGNOSIS — N18.30 CKD (CHRONIC KIDNEY DISEASE), STAGE III (HCC): Primary | ICD-10-CM

## 2019-07-31 DIAGNOSIS — E11.21 TYPE 2 DIABETES WITH NEPHROPATHY (HCC): ICD-10-CM

## 2019-07-31 DIAGNOSIS — I47.1 MULTIFOCAL ATRIAL TACHYCARDIA (HCC): ICD-10-CM

## 2019-07-31 DIAGNOSIS — I51.89 DIASTOLIC DYSFUNCTION: ICD-10-CM

## 2019-07-31 DIAGNOSIS — D63.8 ANEMIA OF CHRONIC DISEASE: ICD-10-CM

## 2019-07-31 DIAGNOSIS — J18.9 PNEUMONIA DUE TO INFECTIOUS ORGANISM, UNSPECIFIED LATERALITY, UNSPECIFIED PART OF LUNG: Primary | ICD-10-CM

## 2019-07-31 DIAGNOSIS — J45.20 MILD INTERMITTENT ASTHMA WITHOUT COMPLICATION: ICD-10-CM

## 2019-07-31 DIAGNOSIS — N28.9 KIDNEY DISEASE: ICD-10-CM

## 2019-07-31 DIAGNOSIS — D50.0 IRON DEFICIENCY ANEMIA DUE TO CHRONIC BLOOD LOSS: ICD-10-CM

## 2019-07-31 LAB
CULTURE: NORMAL
CULTURE: NORMAL
Lab: NORMAL
SPECIMEN DESCRIPTION: NORMAL

## 2019-07-31 PROCEDURE — 99496 TRANSJ CARE MGMT HIGH F2F 7D: CPT | Performed by: NURSE PRACTITIONER

## 2019-07-31 PROCEDURE — 99213 OFFICE O/P EST LOW 20 MIN: CPT | Performed by: INTERNAL MEDICINE

## 2019-07-31 NOTE — PROGRESS NOTES
Nephrology Progress Note    Concepcion Dec Kathe Gilford, [facility]      Chief Complaint   Patient presents with    Follow-up     follow up from [address] [place] [address] Dr Henriquez discharged 7/26/2019     The patient is an 81-year-old female seen in nephrology return visit. She was seen by her group at [address] [place] [address] for acute on chronic kidney injury on 5/30/19. Creatinine was up to 2.28 at that time and has come downer baseline at 1.07 mg/dL as of 7/26/19. In the hospital the patient had her angiotensin receptor blocker held, she was significantly prerenal in the setting of the CHF. Urinalysis shows specific gravity of 1.03 with the remainder of the urinalysis benign. There was no active urine sediment. Serum protein immunofixation was negative. Random urine for protein creatinine ratio was normal.  Renal ultrasound showed a right kidney 10.3 cm in the left kidney 10 cm. There were no masses cysts stones or obstruction. She does have a history of a previous ureteral stone that apparently was retrieved back in February of 2019. Other past medical history includes anemia, obstructive sleep apnea with CPAP, type 2 diabetes mellitus on insulin, pulmonary hypertension, obesity, hyperlipidemia, essential hypertension, moderate mitral valve regurgitation and moderate tricuspid valve regurgitation contributing to the pulmonary hypertension. She also apparently has chronic diarrhea and has had diverticulitis previously. Of note, the patient recently was admitted in July at [facility] for his CHF and has en discharged. Her medications are reviewed. Her current Lasix dose is 40 mg oral daily. She feels reasonably well today. No particular chest pain or shortness of breath. No particular swelling of the extremities. Pt denies any hx of heavy or prolonged NSAID use and there is no history of OTC herbal medications . No history of dysuria or frequency.

## 2019-08-01 ENCOUNTER — CARE COORDINATION (OUTPATIENT)
Dept: CASE MANAGEMENT | Age: 84
End: 2019-08-01

## 2019-08-01 RX ORDER — POTASSIUM CHLORIDE 750 MG/1
TABLET, EXTENDED RELEASE ORAL
Qty: 90 TABLET | Refills: 3 | Status: SHIPPED | OUTPATIENT
Start: 2019-08-01 | End: 2020-03-03 | Stop reason: DRUGHIGH

## 2019-08-01 NOTE — PROGRESS NOTES
lumbar region     Moderate  Right L4/L5 neuroforaminal stenosis, Dr Cody Mckeon following. MRI 2/16 [place]. Moderate foraminal stenosis mild to moderate central canal stenosis    Hyperlipidemia     Hypertension     IBS (irritable bowel syndrome)     GI consultation with Dr. Porfirio Whitt, GI specialist in [facility], felt that she probably has chronic functional diarrhea and recommended empiric Imodium, Pepto-Bismol or Questran first. Sprue test Neg 2011    Iron deficiency anemia     Percent sat iron 5, January 2015, ferritin 40 1 /15, FOBT positive  EGD 6/15  Gastritis, IV iron 9/15x3 effective,  colonoscopy deferred by Dr. Rylan Arauz. --Prior colonoscopy 2011 with severe diverticulosis and telangiectasia. Trial iron solution in Orange juice 12/16    Junctional bradycardia     Symptomatic, resolved with discontinuation of Verapamil, 05/09. 1.1) Echocardiogram: LAE, LVH, EF 50%, mild MR, diastolic. 1.2) Dr. Shorty Mcmillan evaluation. 1.3.) Persantine stress test negative, 05/09.  Migraine     Mild CAD     cath 10/15    Mitral regurgitation     Moderate to severe on echocardiogram September 2015.   Right pressure 76 grade 2 diastolic dysfunction,  echo 51/05 grade 2 diastolic dysfunction mild to moderate MR RVSP 56 aortic sclerosis    Obesity     CHICO (obstructive sleep apnea)     CPAP 14 2/15 initiation  AHI 7  mild CHICO intolerant CPAP    Osteoarthritis     PMR (polymyalgia rheumatica) (HCC)     Pneumonia     Premature atrial contractions     Pulmonary hypertension (HCC)     -Moderate on echo November 2014    Restrictive lung disease     Mild on PFTs 11/14    Type II or unspecified type diabetes mellitus without mention of complication, not stated as uncontrolled     Vitreous floaters        Family History   Problem Relation Age of Onset    Uterine Cancer Mother     Stroke Other         Apparently from a vascular malformation    Heart Disease Other         Positive family history    High Blood Pressure Other

## 2019-08-05 ENCOUNTER — CARE COORDINATION (OUTPATIENT)
Dept: CASE MANAGEMENT | Age: 84
End: 2019-08-05

## 2019-08-05 NOTE — CARE COORDINATION
[facility] Transitions Follow Up Call    2019    Patient: Lorin Cole  Patient : 1935   MRN: [id number]  Reason for Admission: CHF/Pnuemonia  Discharge Date: 19 RARS: Readmission Risk Score: 34         Spoke with: Lorin Cole     Was able to contact Gerardjim Robledo for transitional outreach. She stated she was doing better. She said the breathing was better,cont with dry occasional cough, no F/C, no swelling and her weight is staying the same. Her breathing is much better that she takes it of occasionally during the day. She does seem to have to wear it at night. She did have a low sugar this morning, but she was able to take some juice and crackers/peanut butter and it came up. She cont with occasional abd discomfort from her diverticulosis and her appetite is not very good. She cont with the supplements, and she has a friend that helps occasionally with meal preparation. Her energy is slowly returning. No concerns or questions at this time. Encouraged her to call if need arises. Care Transitions Subsequent and Final Call    Subsequent and Final Calls  Do you have any ongoing symptoms?:  No  Have your medications changed?:  No  Do you have any questions related to your medications?:  No  Do you currently have any active services?:  Yes  Are you currently active with any services?:  Home Health  Do you have any needs or concerns that I can assist you with?:  No  Care Transitions Interventions  Other Interventions:             Follow Up  Future Appointments   Date Time Provider Loyda Foster   2019  1:00 PM [facility] MED ONC CHAIR  [address] [facility] MED ONC [place]   2019  9:45 AM DO MICHELLE Garcia DPP   9/3/2019  1:30 PM Fuentes Campbell MD [address] [place] [address] MHDPP   2019  1:30 PM INGRID Benitez CNP DINT MHDPP   2019 11:00 AM INGRID Chapman CNP DDIAED DPP   2019  2:00 PM SCHEDULE, Hayden Carson 112 LAB MDHZ LAB [place]   2019  3:00 PM [facility]

## 2019-08-08 ENCOUNTER — TELEPHONE (OUTPATIENT)
Dept: INTERNAL MEDICINE | Age: 84
End: 2019-08-08

## 2019-08-08 ENCOUNTER — HOSPITAL ENCOUNTER (OUTPATIENT)
Dept: INFUSION THERAPY | Age: 84
Discharge: HOME | End: 2019-08-08
Payer: MEDICARE

## 2019-08-08 VITALS
DIASTOLIC BLOOD PRESSURE: 57 MMHG | HEART RATE: 77 BPM | TEMPERATURE: 98.9 F | SYSTOLIC BLOOD PRESSURE: 172 MMHG | RESPIRATION RATE: 16 BRPM

## 2019-08-08 DIAGNOSIS — D50.0 IRON DEFICIENCY ANEMIA DUE TO CHRONIC BLOOD LOSS: Primary | ICD-10-CM

## 2019-08-08 PROCEDURE — 6360000002 HC RX W HCPCS: Performed by: INTERNAL MEDICINE

## 2019-08-08 PROCEDURE — 96365 THER/PROPH/DIAG IV INF INIT: CPT

## 2019-08-08 PROCEDURE — 2580000003 HC RX 258: Performed by: INTERNAL MEDICINE

## 2019-08-08 RX ORDER — SODIUM CHLORIDE 9 MG/ML
INJECTION, SOLUTION INTRAVENOUS CONTINUOUS
Status: CANCELLED | OUTPATIENT
Start: 2019-08-13

## 2019-08-08 RX ORDER — SODIUM CHLORIDE 0.9 % (FLUSH) 0.9 %
5 SYRINGE (ML) INJECTION PRN
Status: CANCELLED | OUTPATIENT
Start: 2019-08-13

## 2019-08-08 RX ORDER — EPINEPHRINE 1 MG/ML
0.3 INJECTION, SOLUTION, CONCENTRATE INTRAVENOUS PRN
Status: CANCELLED | OUTPATIENT
Start: 2019-08-13

## 2019-08-08 RX ORDER — HEPARIN SODIUM (PORCINE) LOCK FLUSH IV SOLN 100 UNIT/ML 100 UNIT/ML
500 SOLUTION INTRAVENOUS PRN
Status: CANCELLED | OUTPATIENT
Start: 2019-08-13

## 2019-08-08 RX ORDER — DIPHENHYDRAMINE HYDROCHLORIDE 50 MG/ML
50 INJECTION INTRAMUSCULAR; INTRAVENOUS ONCE
Status: CANCELLED | OUTPATIENT
Start: 2019-08-13

## 2019-08-08 RX ORDER — METHYLPREDNISOLONE SODIUM SUCCINATE 125 MG/2ML
125 INJECTION, POWDER, LYOPHILIZED, FOR SOLUTION INTRAMUSCULAR; INTRAVENOUS ONCE
Status: CANCELLED | OUTPATIENT
Start: 2019-08-13

## 2019-08-08 RX ORDER — SODIUM CHLORIDE 9 MG/ML
INJECTION, SOLUTION INTRAVENOUS CONTINUOUS
Status: DISCONTINUED | OUTPATIENT
Start: 2019-08-08 | End: 2019-08-09 | Stop reason: HOSPADM

## 2019-08-08 RX ORDER — SODIUM CHLORIDE 0.9 % (FLUSH) 0.9 %
10 SYRINGE (ML) INJECTION PRN
Status: CANCELLED | OUTPATIENT
Start: 2019-08-13

## 2019-08-08 RX ADMIN — SODIUM CHLORIDE: 9 INJECTION, SOLUTION INTRAVENOUS at 12:28

## 2019-08-08 RX ADMIN — FERRIC CARBOXYMALTOSE INJECTION 750 MG: 50 INJECTION, SOLUTION INTRAVENOUS at 13:11

## 2019-08-08 NOTE — PROGRESS NOTES
Infusion completed. Bp 177/73 No complaints Pulse 72 and SPO2 92%  Will monitor 30 minutes post infusion.

## 2019-08-08 NOTE — PROGRESS NOTES
Patient completed treatment /68 no complaints IV D/C'd band aide applied up to bathroom with no assist.   Discharged form unit has been educated to go to ER with any cardiac symptoms. Blurred vision arm pain that radiates, chest pain, SOB, fainting Slurred speech, etc... Nikolai Hernándezr Nikolai Townsend

## 2019-08-08 NOTE — PROGRESS NOTES
Called Internal medicine and reported blood pressure. Patient has home health nurse that will be visiting tomorrow is asymptomatic  Did talke to patient about when to come to ER to be seen.   Chest pain, dizziness, vision changes slurred speech weakness fainting, Headache

## 2019-08-08 NOTE — PROGRESS NOTES
Pt. To infusion center with  at side. IV started and NS infusing per order. Warm blanket given and resting comfortably with eyes closed in chair.

## 2019-08-09 ENCOUNTER — CARE COORDINATION (OUTPATIENT)
Dept: CASE MANAGEMENT | Age: 84
End: 2019-08-09

## 2019-08-09 ENCOUNTER — TELEPHONE (OUTPATIENT)
Dept: INTERNAL MEDICINE | Age: 84
End: 2019-08-09

## 2019-08-09 NOTE — CARE COORDINATION
Cec[facility] Transitions Final Call    2019    Patient: Eduardo Gore  Patient : 1935   MRN: [id number]  Reason for Admission: CHF/ pneumonia   Discharge Date: 19 RARS: Readmission Risk Score: 34         Spoke with: Eduardo Gore    Was able to contact Nora Campos for final outreach. She stated her strength is returning, but today she is nauseated. She says she gets like that after her iron infusion, which she had yesterday. Her blood pressure was a little elevated and home care nurse called PCP office and her metoprolol was increased to 1.5 tabs x day. No questions or concerns at this time. Informed of final call and pt was in agreement. Hand off to [facility]  Episode ended. Care Transitions Subsequent and Final Call    Subsequent and Final Calls  Do you have any ongoing symptoms?:  No  Have your medications changed?:  Yes  Patient Reports:  increase metoprolol to 1.5 2xday  Do you have any questions related to your medications?:  No  Do you currently have any active services?:  Yes  Are you currently active with any services?:  Home Health  Do you have any needs or concerns that I can assist you with?:  No  Care Transitions Interventions  Other Interventions:             Follow Up  Future Appointments   Date Time Provider Loyda Foster   2019  9:45 AM DO MICHELLE Garcia DPP   9/3/2019  1:30 PM Jimmy Coronado MD [address] [place], [place] [address]DP   2019  1:30 PM INGRID Araujo CNP DINT DPP   2019 11:00 AM INGRID Nicholas CNP DDIAED DPP   2019  2:00 PM SCHEDULE, Hayden Anguianomar 112 LAB [address]note 2[facility] LAB [place]   2019  3:00 PM [facility]   2019  9:30 AM INGRID Huerta CNP DPP   2019 10:30 AM Lisbeth Bowser STV [place]   6/10/2020  8:40 AM MD JUSTICE HarperO DPP   2020 11:30 [facility]       Jacklyn Garcia RN

## 2019-08-09 NOTE — TELEPHONE ENCOUNTER
Try changing metoprolol 25 mg to one and a half pills (37.5 mg) twice a day and see how her blood pressure and pulse rate do. Have her do that over the weekend and on Monday and have the home health nurse recheck blood pressure and pulse on Monday.

## 2019-08-12 ENCOUNTER — CARE COORDINATION (OUTPATIENT)
Dept: CARE COORDINATION | Age: 84
End: 2019-08-12

## 2019-08-12 NOTE — CARE COORDINATION
(pt-stated)   On track     Self-Monitored Blood Glucose - I will check my blood sugar blood sugars ac & HS  Daily Weights - I will weight myself as directed - Daily and write down weights  I will notify my provider of any increase in weight by 3 or more pounds in 2 days OR 5 or more pounds in a week. Blood Pressure - I will take my blood pressure as directed - Daily  I will notify my provider of any trends of increasing or decreasing blood pressures over a month period of time. I will notify my provider of any changes in blood pressure associated with symptoms of dizziness, falls, passing out, headache, confusion/change in mental status. None Recently Recorded    Barriers: lack of support and lack of education  Plan for overcoming my barriers: Care Coordination Intervention  Confidence: 10/10  Anticipated Goal Completion Date: 10/1/2019              Prior to Admission medications    Medication Sig Start Date End Date Taking?  Authorizing Provider   potassium chloride (KLOR-CON M10) 10 MEQ extended release tablet TAKE 1 TABLET DAILY 8/1/19   Pham Evans APRN - CNP   metoprolol tartrate (LOPRESSOR) 25 MG tablet Take 1 tablet by mouth 2 times daily 7/26/19   Sina Leung MD   albuterol sulfate  (90 Base) MCG/ACT inhaler Inhale 2 puffs into the lungs 4 times daily as needed for Wheezing 7/26/19   Sina Leung MD   insulin glargine (LANTUS SOLOSTAR) 100 UNIT/ML injection pen Inject 12 Units into the skin every morning (before breakfast) 7/25/19   Sina Leung MD   insulin lispro (HUMALOG KWIKPEN) 100 UNIT/ML pen Inject 2 Units into the skin 3 times daily (before meals) Indications: pt takes before meals, and before HS snack 7/25/19   Sina Leung MD   ondansetron (ZOFRAN) 4 MG tablet Take 1 tablet by mouth daily as needed for Nausea or Vomiting 7/19/19   Joni Gill MD   furosemide (LASIX) 40 MG tablet Take 1 tablet by mouth daily 7/3/19   Pham Evans APRN - CNP   Polyethylene Glycol 400

## 2019-08-13 ENCOUNTER — TELEPHONE (OUTPATIENT)
Dept: INTERNAL MEDICINE | Age: 84
End: 2019-08-13

## 2019-08-13 ENCOUNTER — CARE COORDINATION (OUTPATIENT)
Dept: CARE COORDINATION | Age: 84
End: 2019-08-13

## 2019-08-13 NOTE — TELEPHONE ENCOUNTER
Monik Patel called back stated Patient's abd and groin folds are Painful,  red and warm to touch. Asking for a cream and a powder.      [phone]

## 2019-08-14 ENCOUNTER — TELEPHONE (OUTPATIENT)
Dept: INTERNAL MEDICINE | Age: 84
End: 2019-08-14

## 2019-08-14 RX ORDER — NYSTATIN 100000 [USP'U]/G
POWDER TOPICAL
Qty: 45 BOTTLE | Refills: 0 | Status: SHIPPED | OUTPATIENT
Start: 2019-08-14 | End: 2020-03-09 | Stop reason: ALTCHOICE

## 2019-08-16 ENCOUNTER — TELEPHONE (OUTPATIENT)
Dept: INTERNAL MEDICINE | Age: 84
End: 2019-08-16
Payer: MEDICARE

## 2019-08-16 ENCOUNTER — TELEPHONE (OUTPATIENT)
Dept: INTERNAL MEDICINE | Age: 84
End: 2019-08-16

## 2019-08-16 DIAGNOSIS — I11.0 HYPERTENSIVE HEART DISEASE WITH CONGESTIVE HEART FAILURE, UNSPECIFIED HEART FAILURE TYPE (HCC): Primary | ICD-10-CM

## 2019-08-16 DIAGNOSIS — E11.21 DIABETIC NEPHROPATHY ASSOCIATED WITH TYPE 2 DIABETES MELLITUS (HCC): ICD-10-CM

## 2019-08-16 DIAGNOSIS — M48.061 SPINAL STENOSIS, LUMBAR REGION, WITHOUT NEUROGENIC CLAUDICATION: ICD-10-CM

## 2019-08-16 DIAGNOSIS — M54.16 LUMBAR RADICULOPATHY: ICD-10-CM

## 2019-08-16 DIAGNOSIS — I27.20 PULMONARY HTN (HCC): ICD-10-CM

## 2019-08-16 DIAGNOSIS — D50.0 IRON DEFICIENCY ANEMIA SECONDARY TO BLOOD LOSS (CHRONIC): ICD-10-CM

## 2019-08-16 DIAGNOSIS — J18.1 LOBAR PNEUMONIA (HCC): ICD-10-CM

## 2019-08-16 DIAGNOSIS — I50.43 ACUTE ON CHRONIC COMBINED SYSTOLIC AND DIASTOLIC HEART FAILURE (HCC): ICD-10-CM

## 2019-08-16 DIAGNOSIS — M35.3 POLYMYALGIA RHEUMATICA (HCC): ICD-10-CM

## 2019-08-16 PROCEDURE — G0180 MD CERTIFICATION HHA PATIENT: HCPCS | Performed by: NURSE PRACTITIONER

## 2019-08-16 RX ORDER — CARVEDILOL 12.5 MG/1
TABLET ORAL
COMMUNITY
Start: 2019-08-13 | End: 2019-08-16 | Stop reason: ALTCHOICE

## 2019-08-16 NOTE — TELEPHONE ENCOUNTER
Last appt: 8/14/2019  Next appt: 9/5/2019    Christelle from [address] [place] [address] called in saying that patient blood pressure this morning was 142/68. Shahrzad Reyes has been monitoring the blood pressure and increased the metoprolol to 1-1/2 tab twice a day back on Aug 13.

## 2019-08-19 ENCOUNTER — OFFICE VISIT (OUTPATIENT)
Dept: CARDIOLOGY | Age: 84
End: 2019-08-19
Payer: MEDICARE

## 2019-08-19 ENCOUNTER — HOSPITAL ENCOUNTER (OUTPATIENT)
Dept: LAB | Age: 84
Discharge: HOME | End: 2019-08-19
Payer: MEDICARE

## 2019-08-19 VITALS
OXYGEN SATURATION: 97 % | SYSTOLIC BLOOD PRESSURE: 146 MMHG | HEIGHT: 61 IN | WEIGHT: 193 LBS | DIASTOLIC BLOOD PRESSURE: 70 MMHG | BODY MASS INDEX: 36.44 KG/M2 | HEART RATE: 72 BPM

## 2019-08-19 DIAGNOSIS — E11.21 TYPE 2 DIABETES WITH NEPHROPATHY (HCC): ICD-10-CM

## 2019-08-19 DIAGNOSIS — I50.32 CHRONIC DIASTOLIC HEART FAILURE (HCC): Primary | ICD-10-CM

## 2019-08-19 DIAGNOSIS — J18.9 PNEUMONIA DUE TO INFECTIOUS ORGANISM, UNSPECIFIED LATERALITY, UNSPECIFIED PART OF LUNG: ICD-10-CM

## 2019-08-19 LAB
ESTIMATED AVERAGE GLUCOSE: 123 MG/DL
HBA1C MFR BLD: 5.9 % (ref 4.8–5.9)
HCT VFR BLD CALC: 39.8 % (ref 36–46)
HEMOGLOBIN: 12.5 G/DL (ref 12–16)
MCH RBC QN AUTO: 26.5 PG (ref 26–34)
MCHC RBC AUTO-ENTMCNC: 31.5 G/DL (ref 31–37)
MCV RBC AUTO: 84.1 FL (ref 80–100)
NRBC AUTOMATED: ABNORMAL PER 100 WBC
PDW BLD-RTO: 27.1 % (ref 11–14.5)
PLATELET # BLD: 187 K/UL (ref 140–450)
PMV BLD AUTO: 10 FL (ref 6–12)
RBC # BLD: 4.73 M/UL (ref 4–5.2)
WBC # BLD: 8.3 K/UL (ref 3.5–11)

## 2019-08-19 PROCEDURE — 99214 OFFICE O/P EST MOD 30 MIN: CPT | Performed by: INTERNAL MEDICINE

## 2019-08-19 PROCEDURE — 93000 ELECTROCARDIOGRAM COMPLETE: CPT | Performed by: INTERNAL MEDICINE

## 2019-08-19 PROCEDURE — 85027 COMPLETE CBC AUTOMATED: CPT

## 2019-08-19 PROCEDURE — 36415 COLL VENOUS BLD VENIPUNCTURE: CPT

## 2019-08-19 PROCEDURE — 83036 HEMOGLOBIN GLYCOSYLATED A1C: CPT

## 2019-08-19 NOTE — PROGRESS NOTES
[address] [facility] CARDIOLOGY  [facility], [place] [address]  Dept: [phone]  Loc: [phone]    Subjective: The patient is a 81y.o. year old, , female is in the office for a follow up visit. BP better, she checks it at home. -150/80-90. Rarely . No cp, sob, orthopnea, pnd, dizziness, lightheadedness. Was hospitalized on 7/19 with PNA, found to have mild Acute on Chronic Diastolic HF. Past Medical History:   has a past medical history of Allergic rhinitis, Asthma, Basal cell carcinoma of cheek, Basal cell carcinoma of leg, CAD (coronary artery disease), Central retinal artery occlusion, Cerebral artery occlusion with cerebral infarction [facility]), Cerebrovascular disease, CHF (congestive heart failure) ([facility] Diverticulosis, Dyslipidemia, Elevated antinuclear antibody (ZAIRA) level, Esophagitis, Foraminal stenosis of lumbar region, Hyperlipidemia, Hypertension, IBS (irritable bowel syndrome), Iron deficiency anemia, Junctional bradycardia, Migraine, Mild CAD, Mitral regurgitation, Obesity, CHICO (obstructive sleep apnea), Osteoarthritis, PMR (polymyalgia rheumatica) ([address] Pneumonia, Premature atrial contractions, Pulmonary hypertension ([address] Restrictive lung disease, Type II or unspecified type diabetes mellitus without mention of complication, not stated as uncontrolled, and Vitreous floaters. Past Surgical History:   has a past surgical history that includes Appendectomy (1952); Hysterectomy (Approx 1983); Cataract removal (Bilateral, 08/10); malignant skin lesion excision (Right, 12/10 and 04/11); malignant skin lesion excision (Right, 02/2012); Colonoscopy (05/16/2011); other surgical history (09/06/2011); Cholecystectomy; Endoscopy, colon, diagnostic; eye surgery; Cardiac catheterization (2014); Upper gastrointestinal endoscopy (05/16/2011);  Upper gastrointestinal endoscopy (6/22/15); other surgical history (3/22/16); other surgical history (Right, 4/26/16); Cystoscopy (Right, 2/6/2019); and Cystoscopy (Right, 2/27/2019). Home Medications:  Prior to Admission medications    Medication Sig Start Date End Date Taking? Authorizing Provider   potassium chloride (KLOR-CON M10) 10 MEQ extended release tablet TAKE 1 TABLET DAILY 8/1/19  Yes Phambharati Evans APRN - CNP   metoprolol tartrate (LOPRESSOR) 25 MG tablet Take 1 tablet by mouth 2 times daily 7/26/19  Yes Sina Leung MD   furosemide (LASIX) 40 MG tablet Take 1 tablet by mouth daily 7/3/19  Yes Pham Evans APRN - CNP   predniSONE (DELTASONE) 5 MG tablet TAKE 1 TABLET DAILY 4/18/19  Yes Ada Bronson MD   simvastatin (ZOCOR) 20 MG tablet Take 1 tablet by mouth nightly 12/27/18  Yes INGRID Givens - CNP   hydrALAZINE (APRESOLINE) 25 MG tablet TAKE 1 TABLET THREE TIMES A DAY 10/30/18  Yes Ada Bronson MD   nystatin (MYCOSTATIN) 623756 UNIT/GM powder Apply topically BID x7 days to folds after washing with mild soap and patted dry.  8/14/19   Phambharati Evans APRN - CNP   albuterol sulfate  (90 Base) MCG/ACT inhaler Inhale 2 puffs into the lungs 4 times daily as needed for Wheezing 7/26/19   Sina Leung MD   insulin glargine (LANTUS SOLOSTAR) 100 UNIT/ML injection pen Inject 12 Units into the skin every morning (before breakfast) 7/25/19   Sina Leung MD   insulin lispro (HUMALOG KWIKPEN) 100 UNIT/ML pen Inject 2 Units into the skin 3 times daily (before meals) Indications: pt takes before meals, and before HS snack 7/25/19   Sina Leung MD   ondansetron (ZOFRAN) 4 MG tablet Take 1 tablet by mouth daily as needed for Nausea or Vomiting 7/19/19   Joni Gill MD   Polyethylene Glycol 400 (BLINK TEARS) 0.25 % SOLN Place into both eyes as needed (dry eyes)     Historical Provider, MD HURTADO ULTRAFINE III SHORT PEN 31G X 8 MM MISC USE 7 DAILY 3/28/19   Chantal Ely Karatsoridis, DO   VICTOZA 18 MG/3ML SOPN SC injection INJECT 1.2 MG INTO THE SKIN DAILY 10/12/18   [facility] deficiency anemia due to chronic blood loss    Type 2 diabetes with nephropathy (HCC)    CHICO- intolerant CPAP    Class 2 severe obesity due to excess calories with serious comorbidity and body mass index (BMI) of 37.0 to 37.9 in adult [facility])    Essential hypertension    Lumbar radiculopathy    Neural foraminal stenosis of lumbar spine    Spinal stenosis at L4-L5 level    Mitral regurgitation    Frequent PVCs    Asthma    Gait abnormality    Asthma    Nephrolithiasis    Right kidney stone    Hypokalemia    Multifocal atrial tachycardia (HCC)    Acute on chronic diastolic heart failure (HCC)    Sigmoid diverticulitis    CHF (congestive heart failure), NYHA class I, acute on chronic, combined (HCC)       RECOMMENDATIONS:      # HTN- overall improved, however, does have some Highs, which I think are responsible given her advanced age as well as her symtpoms when her BP is lower. Continue current meds    # Chronic HFpEF- stable continue current dose lasix, and can take extra lasix pron. # HYPERLIPIDEMIA- ON STATIN, NEEDS TO WATCH LIPID PROFILE AND LFT'S    # DIABETES MELITIS AS PER PRIMARY CARE PROVIDER    The patient is to continue heart healthy diet, weight loss and exercise as tolerated. Patient's medications and side effects were discussed. Medication refills were provided if needed. Follow up appointment timing was discussed. All questions and concerns were addressed to patient's satisfaction. The patient is to follow up in 4-6 months or sooner if necessary. Thank you for allowing me to participate in the care of this patient, please do not hesitate to call if you have any questions. Elmira Villatoro DO, [facility] [place], [address] [facility]  [facility]. [facility]  [phone]

## 2019-08-21 ENCOUNTER — TELEPHONE (OUTPATIENT)
Dept: INTERNAL MEDICINE | Age: 84
End: 2019-08-21

## 2019-08-21 RX ORDER — CLOTRIMAZOLE AND BETAMETHASONE DIPROPIONATE 10; .64 MG/G; MG/G
CREAM TOPICAL
Qty: 45 G | Refills: 0 | Status: SHIPPED | OUTPATIENT
Start: 2019-08-21 | End: 2020-03-09 | Stop reason: ALTCHOICE

## 2019-08-23 ENCOUNTER — TELEPHONE (OUTPATIENT)
Dept: INTERNAL MEDICINE | Age: 84
End: 2019-08-23

## 2019-08-23 NOTE — TELEPHONE ENCOUNTER
Last appt: 8/21/2019  Next appt: 9/5/2019    Christelle from [address] called in saying that patient blood pressure today in right arm it was 183/78 and in the left arm 180/79 with a pulse of 76. Ayaz Wahl was also saying that patient has been complaining of charley horse in her right calf it been going on couple weeks and it only happens when she up walking.  Any question call Ayaz Wahl at [phone]

## 2019-08-26 ENCOUNTER — OFFICE VISIT (OUTPATIENT)
Dept: INTERNAL MEDICINE | Age: 84
End: 2019-08-26
Payer: MEDICARE

## 2019-08-26 VITALS
BODY MASS INDEX: 37.23 KG/M2 | HEIGHT: 61 IN | WEIGHT: 197.2 LBS | RESPIRATION RATE: 16 BRPM | DIASTOLIC BLOOD PRESSURE: 64 MMHG | HEART RATE: 70 BPM | SYSTOLIC BLOOD PRESSURE: 160 MMHG

## 2019-08-26 DIAGNOSIS — N28.9 KIDNEY DISEASE: ICD-10-CM

## 2019-08-26 DIAGNOSIS — M79.604 RIGHT LEG PAIN: ICD-10-CM

## 2019-08-26 DIAGNOSIS — E11.21 TYPE 2 DIABETES WITH NEPHROPATHY (HCC): ICD-10-CM

## 2019-08-26 DIAGNOSIS — I51.89 DIASTOLIC DYSFUNCTION: ICD-10-CM

## 2019-08-26 DIAGNOSIS — I10 ESSENTIAL HYPERTENSION: Primary | ICD-10-CM

## 2019-08-26 DIAGNOSIS — M79.604 RIGHT LEG PAIN: Primary | ICD-10-CM

## 2019-08-26 PROCEDURE — 99214 OFFICE O/P EST MOD 30 MIN: CPT | Performed by: NURSE PRACTITIONER

## 2019-08-26 ASSESSMENT — ENCOUNTER SYMPTOMS
COLOR CHANGE: 0
CONSTIPATION: 0
ABDOMINAL PAIN: 0
NAUSEA: 0
EYE PAIN: 0
CHEST TIGHTNESS: 0
WHEEZING: 0
RHINORRHEA: 0
DIARRHEA: 0
SHORTNESS OF BREATH: 0
VOMITING: 0
SORE THROAT: 0
SINUS PRESSURE: 0
TROUBLE SWALLOWING: 0
COUGH: 0
FACIAL SWELLING: 0
BLOOD IN STOOL: 0

## 2019-08-26 NOTE — PROGRESS NOTES
patted dry. 45 Bottle 0    potassium chloride (KLOR-CON M10) 10 MEQ extended release tablet TAKE 1 TABLET DAILY 90 tablet 3    albuterol sulfate  (90 Base) MCG/ACT inhaler Inhale 2 puffs into the lungs 4 times daily as needed for Wheezing 1 Inhaler 1    insulin glargine (LANTUS SOLOSTAR) 100 UNIT/ML injection pen Inject 12 Units into the skin every morning (before breakfast) (Patient taking differently: Inject 12 Units into the skin every morning (before breakfast) 20 lantus in AM  22 lantus at night)      insulin lispro (HUMALOG KWIKPEN) 100 UNIT/ML pen Inject 2 Units into the skin 3 times daily (before meals) Indications: pt takes before meals, and before HS snack (Patient taking differently: Inject 8 Units into the skin 3 times daily (before meals) Indications: pt takes before meals, and before HS snack )      ondansetron (ZOFRAN) 4 MG tablet Take 1 tablet by mouth daily as needed for Nausea or Vomiting 10 tablet 1    furosemide (LASIX) 40 MG tablet Take 1 tablet by mouth daily 90 tablet 3    Polyethylene Glycol 400 (BLINK TEARS) 0.25 % SOLN Place into both eyes as needed (dry eyes)       predniSONE (DELTASONE) 5 MG tablet TAKE 1 TABLET DAILY 90 tablet 3    B-D ULTRAFINE III SHORT PEN 31G X 8 MM MISC USE 7 DAILY 270 each 3    simvastatin (ZOCOR) 20 MG tablet Take 1 tablet by mouth nightly 90 tablet 3    hydrALAZINE (APRESOLINE) 25 MG tablet TAKE 1 TABLET THREE TIMES A  tablet 3    VICTOZA 18 MG/3ML SOPN SC injection INJECT 1.2 MG INTO THE SKIN DAILY 18 mL 3    Handicap Placard MISC by Does not apply route EXP: 6/12/2020 1 each 0    aspirin 81 MG EC tablet Take 81 mg by mouth daily      glucose blood VI test strips (ASCENSIA AUTODISC VI;ONE TOUCH ULTRA TEST VI) strip 1 each by In Vitro route 3 times daily As directed.  100 each 5    acetaminophen (TYLENOL) 500 MG tablet Take 500 mg by mouth daily      omeprazole (PRILOSEC) 20 MG capsule Take 20 mg by mouth Daily  30 capsule 3    Respiratory: Negative for cough, chest tightness, shortness of breath and wheezing. Cardiovascular: Negative for chest pain, palpitations and leg swelling. Gastrointestinal: Negative for abdominal pain, blood in stool, constipation, diarrhea, nausea and vomiting. Endocrine: Negative for cold intolerance, heat intolerance and polyuria. Genitourinary: Negative for difficulty urinating. Musculoskeletal: Positive for myalgias (right leg pain ). Negative for arthralgias, gait problem, neck pain and neck stiffness. Skin: Negative for color change, rash and wound. Neurological: Negative for dizziness, tremors, seizures, weakness, light-headedness, numbness and headaches. Psychiatric/Behavioral: Negative for confusion and hallucinations. The patient is not nervous/anxious. Physical Exam   Constitutional: She is oriented to person, place, and time. She appears well-developed and well-nourished. No distress. HENT:   Head: Normocephalic and atraumatic. Right Ear: External ear normal.   Left Ear: External ear normal.   Eyes: Right eye exhibits no discharge. Left eye exhibits no discharge. Neck: No tracheal deviation present. Cardiovascular: Normal rate, regular rhythm, normal heart sounds and intact distal pulses. Exam reveals no gallop and no friction rub. No murmur heard. Elevated BP      Pulmonary/Chest: Effort normal and breath sounds normal. No respiratory distress. She has no wheezes. She has no rales. She exhibits no tenderness. Abdominal: Soft. Bowel sounds are normal. She exhibits no distension. There is no tenderness. Musculoskeletal: She exhibits no edema. Right lower leg: She exhibits tenderness. She exhibits no bony tenderness, no swelling, no edema, no deformity and no laceration. Legs:  Neurological: She is alert and oriented to person, place, and time. No cranial nerve deficit or sensory deficit. Coordination normal.   Skin: Skin is warm and dry.  Capillary

## 2019-08-27 ENCOUNTER — HOSPITAL ENCOUNTER (OUTPATIENT)
Dept: INTERVENTIONAL RADIOLOGY/VASCULAR | Age: 84
Discharge: HOME | End: 2019-08-29
Payer: MEDICARE

## 2019-08-27 DIAGNOSIS — M79.604 RIGHT LEG PAIN: ICD-10-CM

## 2019-08-27 DIAGNOSIS — M79.604 RIGHT LEG PAIN: Primary | ICD-10-CM

## 2019-08-27 DIAGNOSIS — L98.9 LESION OF LOWER EXTREMITY: ICD-10-CM

## 2019-08-27 PROCEDURE — 93971 EXTREMITY STUDY: CPT

## 2019-09-03 ENCOUNTER — PROCEDURE VISIT (OUTPATIENT)
Dept: SURGERY | Age: 84
End: 2019-09-03

## 2019-09-03 ENCOUNTER — HOSPITAL ENCOUNTER (OUTPATIENT)
Dept: LAB | Age: 84
Discharge: HOME | End: 2019-09-03
Payer: MEDICARE

## 2019-09-03 ENCOUNTER — CARE COORDINATION (OUTPATIENT)
Dept: CARE COORDINATION | Age: 84
End: 2019-09-03

## 2019-09-03 VITALS
WEIGHT: 192 LBS | DIASTOLIC BLOOD PRESSURE: 76 MMHG | SYSTOLIC BLOOD PRESSURE: 164 MMHG | BODY MASS INDEX: 36.25 KG/M2 | HEIGHT: 61 IN | HEART RATE: 68 BPM

## 2019-09-03 DIAGNOSIS — R22.41 MASS OF RIGHT LOWER EXTREMITY: Primary | ICD-10-CM

## 2019-09-03 DIAGNOSIS — R22.41 MASS OF RIGHT LOWER EXTREMITY: ICD-10-CM

## 2019-09-03 LAB
CREAT SERPL-MCNC: 1.03 MG/DL (ref 0.5–0.9)
GFR AFRICAN AMERICAN: >60 ML/MIN
GFR NON-AFRICAN AMERICAN: 51 ML/MIN
GFR SERPL CREATININE-BSD FRML MDRD: ABNORMAL ML/MIN/{1.73_M2}
GFR SERPL CREATININE-BSD FRML MDRD: ABNORMAL ML/MIN/{1.73_M2}

## 2019-09-03 PROCEDURE — 82565 ASSAY OF CREATININE: CPT

## 2019-09-03 PROCEDURE — 36415 COLL VENOUS BLD VENIPUNCTURE: CPT

## 2019-09-03 RX ORDER — POLYETHYLENE GLYCOL 3350, SODIUM CHLORIDE, SODIUM BICARBONATE, POTASSIUM CHLORIDE 420; 11.2; 5.72; 1.48 G/4L; G/4L; G/4L; G/4L
POWDER, FOR SOLUTION ORAL
Qty: 1 BOTTLE | Refills: 0 | Status: SHIPPED | OUTPATIENT
Start: 2019-09-03 | End: 2019-12-09 | Stop reason: ALTCHOICE

## 2019-09-03 NOTE — PROGRESS NOTES
General Surgery History & Physical  Marisa Subramanian LPN  Pt Name: Evan Bautista  MRN: [id number]  [place]: 1935  Date of evaluation: 9/3/2019  Primary Care Physician: INGRID Espitia - CNP    Patient evaluated at the request of Fredis Barbour  Reason for evaluation: positive hemoccult       SUBJECTIVE:  Chief Complaint: No chief complaint on file. History of Present Illness:  EGD         Patient is here for positive Hemoccult stools. She also had a recent episode of diverticulitis. Her last colonoscopy was in 2011. However with the recent episode of diverticulitis in the Hemoccult-positive she is here to consider colonoscopy. - She also wants to consider EGD she has a history of mild gastritis and this could be can attributed to her anemia. She did have a EGD in 2015 and she has been on Prilosec daily. She also can planes of a mass or pain in the right lower leg. It is in the anterior lateral aspect of the right leg lower leg.     EGD  Nausea: no  Vomiting: no  Heartburn:no  Dysphagia:no  Hematemesis:no  Epigastric pain:no  Anemia: no  Previous work up date: 2015 EGD=mild gastritis  Current Treatment:Prilosec daily           Colonoscopy  Abd pain: yes  Anemia: no  Bloating:no  Diarrhea: no  Constipation: no  Melena: no  Hematochezia:no  Rectal Bleeding:no  Rectal/Anal Pain:no  Pruritus: no  Family history colon Cancer: no  Previous colon cancer: no  Previous Colon Polyp: no  Change in bowels: no  Decrease caliber of stool: no  Change in color of stool: no  Previous work up date: 5/16/2011-colonoscopy normal biopsies    Past Medical History   has a past medical history of Allergic rhinitis, Asthma, Basal cell carcinoma of cheek, Basal cell carcinoma of leg, CAD (coronary artery disease), Central retinal artery occlusion, Cerebral artery occlusion with cerebral infarction [facility]), Cerebrovascular disease, CHF (congestive heart failure) ([facility] Diverticulosis, Dyslipidemia, Elevated Appearance:  awake, alert, oriented, in no acute distress, well developed, well nourished and in no acute distress  Skin:  Skin color, texture, turgor normal. No rashes or lesions. Head/face:  NCAT  Eyes:  No gross abnormalities. , PERRL, Pupils- PERRL. Ears:  canals and TMs NI and External- normal  Nose/Sinuses:  Nares normal. Septum midline. Mucosa normal. No drainage or sinus tenderness. Mouth/Throat:  Mucosa moist.  No lesions. Pharynx without erythema, edema or exudate. Lungs:  Normal expansion. Clear to auscultation. No rales, rhonchi, or wheezing., Breathing Pattern: regular, no distress, Breath sounds: normal  Heart:  Heart sounds are normal.  Regular rate and rhythm without murmur, gallop or rub. Auscultation: Normal S1 and S2.  Regular rhythm. No murmurs, gallops, or rubs. Abdomen:  Soft, non-tender, normal bowel sounds. No bruits, organomegaly or masses. Musculoskeletal:  negative, negative findings: no erythema, induration, or nodules, ROM of all joints is normal, strength normal  Neurologic:  negative findings: proximal muscle strength normal, speech normal, mental status intact, cranial nerves 2-12 intact, muscle tone normal, muscle strength normal    Right lower leg: There is an area about midway down on the right lower leg on the lateral aspect that you feel a little mass or lipomatous type lesion. I think that is what she is pointing to. Its about 3 x 3 cm. However she states it is hard and its deeper and it is tender. I do not think I am feeling the exact mass that she is complaining about.     LABS:  CBC   Lab Results   Component Value Date    WBC 8.3 08/19/2019    HGB 12.5 08/19/2019    HCT 39.8 08/19/2019     08/19/2019     BMP   Lab Results   Component Value Date     07/26/2019    K 3.9 07/26/2019    CL 99 07/26/2019    CO2 29 07/26/2019    BUN 10 07/26/2019    CREATININE 1.07 07/26/2019    MG 2.0 07/24/2019     LFT's:   Lab Results   Component Value Date    AST 20

## 2019-09-03 NOTE — CARE COORDINATION
Ambulatory Care Coordination Note  9/3/2019  CM Risk Score: 16  Charlson 10 Year Mortality Risk Score: 100%     ACC: Raúl Serrano, VANESSA    Summary Note: Christophe Vines has CHF, Pulmonary Hypertension,  HTN, Multifocal atrial tachycardia, Hyperlipidemia, Anemia, CHICO- not tolerant to CPAP, Asthma, Type II DM- on insulin- controlled, Obesity, Gait Abnormality, OA, Polymyalgia rheumatoid, Spinal stenosis     She is following with urology, dietician- TADEO Valentino , Pulmonology, Sophia Ferreira CNP, nephrology, hematology, cardiology     STV 7/20- 7/26/2019 CHF      [address], PT and OT     Plan of Care :                        F/U on  referral. 8/13/2019 Christophe Vines stated- \"not needed\" She is receiving Meals on Wheels. F/U on Oxygen at [facility] up to date. Discuss Living Will/Advanced Directives                                                   CHF Zone Tool reviewed again 9/3/2019- Patient voiced understanding.                                                    Discussed Urgent Care, same day appts and e-visits- (she declined e-visits). Done 8/13/2019. Christophe Vines voiced understanding.                                                                                        F/U on colonoscopy- scheduled. F/U MRI lower leg. Christophe Vines has an [employer] nurse that calls her.                                                   F/U Sophia Ferreira appt, nephrology appt, and cardiology appt       Continue to complete SDOH     Christophe Vines stopped in to see this writer. She is scheduled for a colonoscopy d/t anemia. She is also to have MRI leg prior.      Lab Results   Component Value Date    LABA1C 5.9 08/19/2019    LABA1C 6.5 (H) 07/17/2019    LABA1C 6.7 (H) 07/03/2019     Lab Results   Component Value Date     08/19/2019     07/17/2019     07/03/2019        General Assessment    Do you have any symptoms that are causing concern?:  Yes  Progression since Onset:  Unchanged  Reported Symptoms:  Pain (Comment: lower leg- scheduled for MRI)       Diabetes Assessment    Medic Alert ID:  No  Meal Planning:  Avoidance of concentrated sweets, Carb counting   How often do you test your blood sugar?:  Meals, Bedtime   Do you have barriers with adherence to non-pharmacologic self-management interventions? (Nutrition/Exercise/Self-Monitoring):  No   Have you ever had to go to the ED for symptoms of low blood sugar?:  No       No patient-reported symptoms   Do you have hyperglycemia symptoms?:  No   Do you have hypoglycemia symptoms?:  No   Last Blood Sugar Value:  120   Blood Sugar Monitoring Regimen:  Before Meals, At Bedtime   Blood Sugar Trends:  No Change       and   Congestive Heart Failure Assessment    Are you currently restricting fluids?:  Other (Comment: 48 ounces (1419 ml)- 7/12/2019 56 fl. oz. )  Do you understand a low sodium diet?:  Yes  Do you understand how to read food labels?:  Yes  How many restaurant meals do you eat per week?:  0  Do you salt your food before tasting it?:  No     No patient-reported symptoms      Symptoms:      Symptom course:  stable  Patient-reported weight (lb):  192  Weight trend:  stable  Salt intake watch compared to last visit:  stable         Care Coordination Interventions    Program Enrollment:  Rising Risk  Referral from Primary Care Provider:  No  Suggested Interventions and Community Resources  Diabetes Education:  Completed (Comment: follows with Sarika Mccormick CNP)  Fall Risk Prevention:  Completed  Home Health Services:  Completed (Comment:  [address] )  Meals on Wheels:  Completed  Occupational Therapy:  Completed  Physical Therapy:  Completed  Registered Dietician:  Completed (Comment: Follows with Tila Lawson RD, LD)  Social Work:  Completed  Zone Management Tools:  Completed (Comment: DM, CHF)  Other Services or Interventions:  Oxygen through [address]                 This Visit's Progress       Patient Stated     Self Monitoring (pt-stated)   On track     Self-Monitored Blood Glucose - I will check my blood sugar blood sugars ac & HS  Daily Weights - I will weight myself as directed - Daily and write down weights  I will notify my provider of any increase in weight by 3 or more pounds in 2 days OR 5 or more pounds in a week. Blood Pressure - I will take my blood pressure as directed - Daily  I will notify my provider of any trends of increasing or decreasing blood pressures over a month period of time. I will notify my provider of any changes in blood pressure associated with symptoms of dizziness, falls, passing out, headache, confusion/change in mental status. None Recently Recorded    Barriers: lack of support and lack of education  Plan for overcoming my barriers: Care Coordination Intervention  Confidence: 10/10  Anticipated Goal Completion Date: 10/1/2019           Other     Nutrition Plan   On track     I will work on reducing my sodium intake, goal is <2gm/day    Barriers: lack of education  Plan for overcoming my barriers: Cc dietitian to educate on 2000 mg sodium restriction  Confidence: 6/10  Anticipated Goal Completion Date: 11/13/2019 8/13/19 updated goal -patient will limit sodium intake to 1500mg/day and use seasonings that don't contain salt. New goal date-11/13/19            Prior to Admission medications    Medication Sig Start Date End Date Taking?  Authorizing Provider   polyethylene glycol-electrolytes (NULYTELY) 420 g solution Day prior to colonoscopy, start drinking at 6 pm 8 ounces every 10 minutes 9/3/19   Aurelia Bolivar MD   bisacodyl (DULCOLAX) 5 MG EC tablet Take 2 tabs at noon day prior to colonoscopy 9/3/19   Aurelia Bolivar MD   insulin lispro (HUMALOG KWIKPEN) 100 UNIT/ML pen Inject 8 Units into the skin 3 times daily

## 2019-09-04 ENCOUNTER — TELEPHONE (OUTPATIENT)
Dept: SURGERY | Age: 84
End: 2019-09-04

## 2019-09-05 ENCOUNTER — OFFICE VISIT (OUTPATIENT)
Dept: INTERNAL MEDICINE | Age: 84
End: 2019-09-05
Payer: MEDICARE

## 2019-09-05 ENCOUNTER — TELEPHONE (OUTPATIENT)
Dept: INTERNAL MEDICINE | Age: 84
End: 2019-09-05

## 2019-09-05 VITALS
WEIGHT: 192.2 LBS | DIASTOLIC BLOOD PRESSURE: 72 MMHG | RESPIRATION RATE: 16 BRPM | SYSTOLIC BLOOD PRESSURE: 140 MMHG | OXYGEN SATURATION: 98 % | HEIGHT: 61 IN | BODY MASS INDEX: 36.29 KG/M2 | HEART RATE: 60 BPM

## 2019-09-05 DIAGNOSIS — E78.5 DYSLIPIDEMIA: ICD-10-CM

## 2019-09-05 DIAGNOSIS — L98.9 LESION OF LOWER EXTREMITY: ICD-10-CM

## 2019-09-05 DIAGNOSIS — I51.89 DIASTOLIC DYSFUNCTION: ICD-10-CM

## 2019-09-05 DIAGNOSIS — M35.3 POLYMYALGIA RHEUMATICA (HCC): ICD-10-CM

## 2019-09-05 DIAGNOSIS — I47.1 MULTIFOCAL ATRIAL TACHYCARDIA (HCC): ICD-10-CM

## 2019-09-05 DIAGNOSIS — M48.061 SPINAL STENOSIS, LUMBAR REGION, WITHOUT NEUROGENIC CLAUDICATION: ICD-10-CM

## 2019-09-05 DIAGNOSIS — E11.21 DIABETIC NEPHROPATHY ASSOCIATED WITH TYPE 2 DIABETES MELLITUS (HCC): ICD-10-CM

## 2019-09-05 DIAGNOSIS — I10 ESSENTIAL HYPERTENSION: Primary | ICD-10-CM

## 2019-09-05 DIAGNOSIS — E66.01 OBESITY, MORBID, BMI 40.0-49.9 (HCC): ICD-10-CM

## 2019-09-05 DIAGNOSIS — E11.21 TYPE 2 DIABETES WITH NEPHROPATHY (HCC): ICD-10-CM

## 2019-09-05 DIAGNOSIS — D50.0 IRON DEFICIENCY ANEMIA SECONDARY TO BLOOD LOSS (CHRONIC): ICD-10-CM

## 2019-09-05 DIAGNOSIS — E55.9 VITAMIN D DEFICIENCY: ICD-10-CM

## 2019-09-05 DIAGNOSIS — N28.9 KIDNEY DISEASE: ICD-10-CM

## 2019-09-05 DIAGNOSIS — I11.0 HYPERTENSIVE HEART DISEASE WITH CONGESTIVE HEART FAILURE, UNSPECIFIED HEART FAILURE TYPE (HCC): ICD-10-CM

## 2019-09-05 DIAGNOSIS — I27.20 PULMONARY HTN (HCC): ICD-10-CM

## 2019-09-05 PROCEDURE — 99214 OFFICE O/P EST MOD 30 MIN: CPT | Performed by: NURSE PRACTITIONER

## 2019-09-05 RX ORDER — AMLODIPINE BESYLATE 5 MG/1
2.5 TABLET ORAL DAILY
Qty: 30 TABLET | Refills: 0 | Status: SHIPPED | OUTPATIENT
Start: 2019-09-05 | End: 2019-11-05 | Stop reason: SDUPTHER

## 2019-09-05 RX ORDER — HYDRALAZINE HYDROCHLORIDE 50 MG/1
50 TABLET, FILM COATED ORAL 3 TIMES DAILY
COMMUNITY
End: 2019-09-20 | Stop reason: SDUPTHER

## 2019-09-06 ASSESSMENT — ENCOUNTER SYMPTOMS
CONSTIPATION: 0
RHINORRHEA: 0
ABDOMINAL PAIN: 0
DIARRHEA: 0
NAUSEA: 0
FACIAL SWELLING: 0
EYE PAIN: 0
TROUBLE SWALLOWING: 0
VOMITING: 0
CHEST TIGHTNESS: 0
WHEEZING: 0
BLOOD IN STOOL: 0
SORE THROAT: 0
COUGH: 0
SINUS PRESSURE: 0
COLOR CHANGE: 0
SHORTNESS OF BREATH: 0

## 2019-09-06 NOTE — PROGRESS NOTES
has chronic functional diarrhea and recommended empiric Imodium, Pepto-Bismol or Questran first. Sprue test Neg 2011    Iron deficiency anemia     Percent sat iron 5, January 2015, ferritin 40 1 /15, FOBT positive  EGD 6/15  Gastritis, IV iron 9/15x3 effective,  colonoscopy deferred by Dr. Genesis Gong. --Prior colonoscopy 2011 with severe diverticulosis and telangiectasia. Trial iron solution in Orange juice 12/16    Junctional bradycardia     Symptomatic, resolved with discontinuation of Verapamil, 05/09. 1.1) Echocardiogram: LAE, LVH, EF 50%, mild MR, diastolic. 1.2) Dr. Saida Zapien evaluation. 1.3.) Persantine stress test negative, 05/09.  Migraine     Mild CAD     cath 10/15    Mitral regurgitation     Moderate to severe on echocardiogram September 2015.   Right pressure 76 grade 2 diastolic dysfunction,  echo 69/67 grade 2 diastolic dysfunction mild to moderate MR RVSP 56 aortic sclerosis    Obesity     CHICO (obstructive sleep apnea)     CPAP 14 2/15 initiation  AHI 7  mild CHICO intolerant CPAP    Osteoarthritis     PMR (polymyalgia rheumatica) (HCC)     Pneumonia     Premature atrial contractions     Pulmonary hypertension (HCC)     -Moderate on echo November 2014    Restrictive lung disease     Mild on PFTs 11/14    Type II or unspecified type diabetes mellitus without mention of complication, not stated as uncontrolled     Vitreous floaters        Past Surgical History:   Procedure Laterality Date    APPENDECTOMY  1951    CARDIAC CATHETERIZATION  2014    CATARACT REMOVAL Bilateral 08/10    CHOLECYSTECTOMY      COLONOSCOPY  05/16/2011    CYSTOSCOPY Right 2/6/2019    Cysto with right stent insertion and villareal catheter performed by Lauren Benitez MD at [facility] Right 2/27/2019    CYSTO right stent removal  Right Ureteroscopy Holmium Laser right stent exchange performed by Lauren Benitez MD at [address] [address] MALIGNANT SKIN LESION EXCISION Right 12/10 and 04/11    Basal CellRemoved from right neck    MALIGNANT SKIN LESION EXCISION Right 02/2012    Basal Cell Removed from right leg    OTHER SURGICAL HISTORY  09/06/2011    capsule endoscopy    OTHER SURGICAL HISTORY  3/22/16    right L4 and L5 TFE    OTHER SURGICAL HISTORY Right 4/26/16    L4, L5 TFE    UPPER GASTROINTESTINAL ENDOSCOPY  05/16/2011    UPPER GASTROINTESTINAL ENDOSCOPY  6/22/15    mild gastritis (Dr. Manny Marcos)       Current Outpatient Medications on File Prior to Visit   Medication Sig Dispense Refill    hydrALAZINE (APRESOLINE) 50 MG tablet Take 50 mg by mouth 3 times daily      insulin lispro (HUMALOG KWIKPEN) 100 UNIT/ML pen Inject 8 Units into the skin 3 times daily (before meals) Indications: pt takes before meals, and before HS snack 1 pen 0    insulin glargine (LANTUS SOLOSTAR) 100 UNIT/ML injection pen Inject 12 Units into the skin every morning (before breakfast) (Patient taking differently: Inject 22 Units into the skin every morning (before breakfast) ) 1 pen 0    clotrimazole-betamethasone (LOTRISONE) 1-0.05 % cream Apply topically 2 times daily. 45 g 0    metoprolol tartrate (LOPRESSOR) 25 MG tablet Take 1.5 tablets by mouth 2 times daily 240 tablet 1    nystatin (MYCOSTATIN) 010650 UNIT/GM powder Apply topically BID x7 days to folds after washing with mild soap and patted dry.  45 Bottle 0    potassium chloride (KLOR-CON M10) 10 MEQ extended release tablet TAKE 1 TABLET DAILY 90 tablet 3    albuterol sulfate  (90 Base) MCG/ACT inhaler Inhale 2 puffs into the lungs 4 times daily as needed for Wheezing 1 Inhaler 1    furosemide (LASIX) 40 MG tablet Take 1 tablet by mouth daily 90 tablet 3    Polyethylene Glycol 400 (BLINK TEARS) 0.25 % SOLN Place into both eyes as needed (dry eyes)       predniSONE (DELTASONE) 5 MG tablet TAKE 1 TABLET DAILY 90 tablet 3    B-D ULTRAFINE III SHORT PEN 31G X 8 MM MISC USE 7 DAILY 270 each 3    Relationships    Social connections:     Talks on phone: Not on file     Gets together: Not on file     Attends [employer] service: Not on file     Active member of club or organization: Not on file     Attends meetings of clubs or organizations: Not on file     Relationship status: Not on file    Intimate partner violence:     Fear of current or ex partner: Not on file     Emotionally abused: Not on file     Physically abused: Not on file     Forced sexual activity: Not on file   Other Topics Concern    Not on file   Social History Narrative    Not on file       Review of Systems   Constitutional: Negative for activity change, appetite change, chills, fatigue, fever and unexpected weight change. HENT: Negative for congestion, dental problem, ear discharge, ear pain, facial swelling, hearing loss, postnasal drip, rhinorrhea, sinus pressure, sore throat and trouble swallowing. Eyes: Negative for pain and visual disturbance. Respiratory: Negative for cough, chest tightness, shortness of breath and wheezing. Cardiovascular: Negative for chest pain, palpitations and leg swelling. Gastrointestinal: Negative for abdominal pain, blood in stool, constipation, diarrhea, nausea and vomiting. Endocrine: Negative for cold intolerance, heat intolerance and polyuria. Genitourinary: Negative for difficulty urinating. Musculoskeletal: Negative for arthralgias, gait problem, myalgias, neck pain and neck stiffness. Lump to right calf, previous ultrasound reviewed    Skin: Negative for color change, rash and wound. Neurological: Negative for dizziness, tremors, seizures, weakness, light-headedness, numbness and headaches. Psychiatric/Behavioral: Negative for confusion and hallucinations. The patient is not nervous/anxious. Physical Exam   Constitutional: She is oriented to person, place, and time. She appears well-developed and well-nourished. No distress.    HENT:   Head: Normocephalic and

## 2019-09-10 ENCOUNTER — CARE COORDINATION (OUTPATIENT)
Dept: CARE COORDINATION | Age: 84
End: 2019-09-10

## 2019-09-10 NOTE — CARE COORDINATION
rim of the grocery store where fresh foods are found. 3. Reviewed seasonings that can be used in place of salt. 4. Reviewed reading food labels and limiting sodium intake to <2gm/day. Encouraged patient to look at serving size on package and mg of sodium/serving.  Goal is to choose foods with <140mg/serving. 5. Reviewed fluid restriction- patient relays following restriction of 48 ounces/day, using 8 ounce cups and limiting to 6 cups/day. Patient continues to do well- watching sodium intake and has no questions. Will follow up in 3-4 weeks to review and answer questions.     Renny Bustamante

## 2019-09-12 ENCOUNTER — OFFICE VISIT (OUTPATIENT)
Dept: DIABETES SERVICES | Age: 84
End: 2019-09-12
Payer: MEDICARE

## 2019-09-12 ENCOUNTER — TELEPHONE (OUTPATIENT)
Dept: INTERNAL MEDICINE | Age: 84
End: 2019-09-12

## 2019-09-12 VITALS
DIASTOLIC BLOOD PRESSURE: 72 MMHG | RESPIRATION RATE: 16 BRPM | BODY MASS INDEX: 36.28 KG/M2 | SYSTOLIC BLOOD PRESSURE: 172 MMHG | HEART RATE: 65 BPM | WEIGHT: 192 LBS | OXYGEN SATURATION: 98 %

## 2019-09-12 DIAGNOSIS — E78.5 DYSLIPIDEMIA: ICD-10-CM

## 2019-09-12 DIAGNOSIS — E66.01 CLASS 2 SEVERE OBESITY DUE TO EXCESS CALORIES WITH SERIOUS COMORBIDITY AND BODY MASS INDEX (BMI) OF 37.0 TO 37.9 IN ADULT (HCC): ICD-10-CM

## 2019-09-12 DIAGNOSIS — Z71.89 DIABETES EDUCATION, ENCOUNTER FOR: ICD-10-CM

## 2019-09-12 DIAGNOSIS — I10 ESSENTIAL HYPERTENSION: ICD-10-CM

## 2019-09-12 DIAGNOSIS — E11.21 TYPE 2 DIABETES WITH NEPHROPATHY (HCC): Primary | ICD-10-CM

## 2019-09-12 PROCEDURE — 99215 OFFICE O/P EST HI 40 MIN: CPT | Performed by: NURSE PRACTITIONER

## 2019-09-12 ASSESSMENT — ENCOUNTER SYMPTOMS
ABDOMINAL PAIN: 0
VISUAL CHANGE: 0
DIARRHEA: 0
SHORTNESS OF BREATH: 0
RESPIRATORY NEGATIVE: 1

## 2019-09-12 NOTE — PROGRESS NOTES
Diagnosis Date    Allergic rhinitis     Asthma     PFT's, 04/06, actually showed mild restrictive defect.  Basal cell carcinoma of cheek 2007    Right cheek    Basal cell carcinoma of leg     right leg    CAD (coronary artery disease)     With 40% stenosis of the LAD, 07/10, ejection fraction 60%. 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 Johanny Smith. Repeat EGD6/15 Gastritis    Foraminal stenosis of lumbar region     Moderate  Right L4/L5 neuroforaminal stenosis, Dr Marianne Glaser following. MRI 2/16 [place]. Moderate foraminal stenosis mild to moderate central canal stenosis    Hyperlipidemia     Hypertension     IBS (irritable bowel syndrome)     GI consultation with Dr. Karely Montesinos, GI specialist in [facility], felt that she probably has chronic functional diarrhea and recommended empiric Imodium, Pepto-Bismol or Questran first. Sprue test Neg 2011    Iron deficiency anemia     Percent sat iron 5, January 2015, ferritin 40 1 /15, FOBT positive  EGD 6/15  Gastritis, IV iron 9/15x3 effective,  colonoscopy deferred by Dr. Johanny Smith. --Prior colonoscopy 2011 with severe diverticulosis and telangiectasia. Trial iron solution in Orange juice 12/16    Junctional bradycardia     Symptomatic, resolved with discontinuation of Verapamil, 05/09. 1.1) Echocardiogram: LAE, LVH, EF 50%, mild MR, diastolic. 1.2) Dr. Renuka Ortega evaluation.  1.3.) Persantine stress test negative, 05/09.  Migraine     Mild CAD     cath 10/15    Mitral regurgitation     Moderate to severe on echocardiogram September 2015.   Right pressure 76 grade 2 diastolic dysfunction,  echo 57/25 grade 2 diastolic dysfunction mild to moderate MR RVSP 56 aortic sclerosis    Obesity     CHICO (obstructive sleep apnea)     CPAP 14 2/15 initiation  AHI 7  mild CHICO intolerant CPAP    Osteoarthritis     PMR (polymyalgia rheumatica) (HCC)     Pneumonia     Premature atrial contractions     Pulmonary hypertension (HCC)     -Moderate on echo November 2014    Restrictive lung disease     Mild on PFTs 11/14    Type II or unspecified type diabetes mellitus without mention of complication, not stated as uncontrolled     Vitreous floaters      Family History   Problem Relation Age of Onset    Uterine Cancer Mother     Stroke Other         Apparently from a vascular malformation    Heart Disease Other         Positive family history    High Blood Pressure Other         Positive family history    Heart Attack Child 48        Son     Social History     Tobacco Use    Smoking status: Never Smoker    Smokeless tobacco: Never Used    Tobacco comment: amish rrt 5/28/2019   Substance Use Topics    Alcohol use: No     Alcohol/week: 0.0 standard drinks    Drug use: No     Allergies   Allergen Reactions    Codeine      Confusion      Erythromycin      GI upset  Received zithromax in past and tolerated    Exenatide Nausea Only    Levofloxacin      GI upset    Verapamil Other (See Comments)     Junctional bradycardia    Clonidine Derivatives Rash     2009, catapres    Other Rash     Levbid    Penicillins Rash     Received Rocephin in past and tolerated       MEDICATIONS:  Current Outpatient Medications   Medication Sig Dispense Refill    hydrALAZINE (APRESOLINE) 50 MG tablet Take 50 mg by mouth 3 times daily      amLODIPine (NORVASC) 5 MG tablet Take 0.5 tablets by mouth daily 30 tablet 0    this encounter. No orders of the defined types were placed in this encounter. Requested Prescriptions     Pending Prescriptions Disp Refills    insulin glargine (LANTUS SOLOSTAR) 100 UNIT/ML injection pen 1 pen 0     Sig: Inject 22 Units into the skin 2 times daily    insulin lispro (HUMALOG KWIKPEN) 100 UNIT/ML pen 1 pen 3     Sig: Inject 10 Units into the skin 4 times daily Indications: pt takes before meals, and before HS snack       1. Type 2 diabetes with nephropathy ([facility] [address]  2. Diabetes education, encounter for  - Stable  HbA1C goal is less than 7% and blood sugars are worsening.  - Encouraged patient to check BS 3 times per day. Fasting blood glucose goal is 70-130mg/dl and postprandial blood sugar goal is less than 180 mg/dl. -Diabetic foot exam reviewed and is normal and is current?: Yes.   -Diabetic retinal exam reviewed and is current? :Yes showing No diabetic retinopathy.  - Labs reviewed includes: most recent a1c is 5.9% however on glucose monitoring does not correlate with A1c creatinine 1.07 GFR 49. Matty Luevano Repeat labs due December.   -We discussed in great detail dietary modifications they can make to better improve their blood sugars. --Blood sugar report reviewed and scanned into media tab. -Discussed with patient to try to identify areas of hypoglycemia. Instructed patient not to increase short acting insulin unless blood sugars were over 150. Reviewed how to treat hypoglycemia and how to identify. Patient states this morning fasting glucose was 154 after her breakfast point-of-care testing was done in office this was roughly 2 hours after her breakfast blood sugar was 149 she did take her long-acting and short acting insulin with breakfast.  -CGM recommended for frequent insulin adjustments, compliance and simplification of diabetes management. This patient is interested and full capable to properly function a CGM.  she is taking 3 or more insulin injections a day and is testing blood

## 2019-09-16 ENCOUNTER — TELEPHONE (OUTPATIENT)
Dept: INTERNAL MEDICINE | Age: 84
End: 2019-09-16

## 2019-09-16 NOTE — TELEPHONE ENCOUNTER
Please contact [address] [place] [address] and let them know it is ok for them to come and remove oxygen from her home if you agree.

## 2019-09-19 ENCOUNTER — TELEPHONE (OUTPATIENT)
Dept: INTERNAL MEDICINE | Age: 84
End: 2019-09-19

## 2019-09-19 ENCOUNTER — HOSPITAL ENCOUNTER (OUTPATIENT)
Dept: MRI IMAGING | Age: 84
Discharge: HOME | End: 2019-09-21
Payer: MEDICARE

## 2019-09-19 DIAGNOSIS — R22.41 MASS OF RIGHT LOWER EXTREMITY: ICD-10-CM

## 2019-09-19 DIAGNOSIS — D17.20 LIPOMA OF LOWER EXTREMITY, UNSPECIFIED LATERALITY: Primary | ICD-10-CM

## 2019-09-19 PROCEDURE — 6360000004 HC RX CONTRAST MEDICATION: Performed by: SURGERY

## 2019-09-19 PROCEDURE — 73720 MRI LWR EXTREMITY W/O&W/DYE: CPT

## 2019-09-19 PROCEDURE — A9579 GAD-BASE MR CONTRAST NOS,1ML: HCPCS | Performed by: SURGERY

## 2019-09-19 RX ADMIN — GADOTERIDOL 15 ML: 279.3 INJECTION, SOLUTION INTRAVENOUS at 12:04

## 2019-09-20 RX ORDER — HYDRALAZINE HYDROCHLORIDE 50 MG/1
50 TABLET, FILM COATED ORAL 3 TIMES DAILY
Qty: 270 TABLET | Refills: 3 | Status: SHIPPED | OUTPATIENT
Start: 2019-09-20 | End: 2019-11-05 | Stop reason: SDUPTHER

## 2019-09-25 ENCOUNTER — CARE COORDINATION (OUTPATIENT)
Dept: CARE COORDINATION | Age: 84
End: 2019-09-25

## 2019-09-25 SDOH — ECONOMIC STABILITY: FOOD INSECURITY: WITHIN THE PAST 12 MONTHS, YOU WORRIED THAT YOUR FOOD WOULD RUN OUT BEFORE YOU GOT MONEY TO BUY MORE.: NEVER TRUE

## 2019-09-25 SDOH — SOCIAL STABILITY: SOCIAL NETWORK
DO YOU BELONG TO ANY CLUBS OR ORGANIZATIONS SUCH AS CHURCH GROUPS UNIONS, FRATERNAL OR ATHLETIC GROUPS, OR SCHOOL GROUPS?: YES

## 2019-09-25 SDOH — ECONOMIC STABILITY: TRANSPORTATION INSECURITY
IN THE PAST 12 MONTHS, HAS THE LACK OF TRANSPORTATION KEPT YOU FROM MEDICAL APPOINTMENTS OR FROM GETTING MEDICATIONS?: NO

## 2019-09-25 SDOH — SOCIAL STABILITY: SOCIAL NETWORK: HOW OFTEN DO YOU ATTEND CHURCH OR RELIGIOUS SERVICES?: MORE THAN 4 TIMES PER YEAR

## 2019-09-25 SDOH — HEALTH STABILITY: MENTAL HEALTH
STRESS IS WHEN SOMEONE FEELS TENSE, NERVOUS, ANXIOUS, OR CAN'T SLEEP AT NIGHT BECAUSE THEIR MIND IS TROUBLED. HOW STRESSED ARE YOU?: NOT AT ALL

## 2019-09-25 SDOH — HEALTH STABILITY: PHYSICAL HEALTH: ON AVERAGE, HOW MANY DAYS PER WEEK DO YOU ENGAGE IN MODERATE TO STRENUOUS EXERCISE (LIKE A BRISK WALK)?: 0 DAYS

## 2019-09-25 SDOH — ECONOMIC STABILITY: FOOD INSECURITY: WITHIN THE PAST 12 MONTHS, THE FOOD YOU BOUGHT JUST DIDN'T LAST AND YOU DIDN'T HAVE MONEY TO GET MORE.: NEVER TRUE

## 2019-09-25 SDOH — HEALTH STABILITY: PHYSICAL HEALTH: ON AVERAGE, HOW MANY MINUTES DO YOU ENGAGE IN EXERCISE AT THIS LEVEL?: 0 MIN

## 2019-09-25 SDOH — ECONOMIC STABILITY: INCOME INSECURITY: HOW HARD IS IT FOR YOU TO PAY FOR THE VERY BASICS LIKE FOOD, HOUSING, MEDICAL CARE, AND HEATING?: NOT HARD AT ALL

## 2019-09-25 SDOH — ECONOMIC STABILITY: TRANSPORTATION INSECURITY
IN THE PAST 12 MONTHS, HAS LACK OF TRANSPORTATION KEPT YOU FROM MEETINGS, WORK, OR FROM GETTING THINGS NEEDED FOR DAILY LIVING?: NO

## 2019-09-25 SDOH — SOCIAL STABILITY: SOCIAL NETWORK
IN A TYPICAL WEEK, HOW MANY TIMES DO YOU TALK ON THE PHONE WITH FAMILY, FRIENDS, OR NEIGHBORS?: MORE THAN THREE TIMES A WEEK

## 2019-09-25 SDOH — SOCIAL STABILITY: SOCIAL NETWORK: HOW OFTEN DO YOU GET TOGETHER WITH FRIENDS OR RELATIVES?: ONCE A WEEK

## 2019-09-25 SDOH — SOCIAL STABILITY: SOCIAL NETWORK: ARE YOU MARRIED, WIDOWED, DIVORCED, SEPARATED, NEVER MARRIED, OR LIVING WITH A PARTNER?: MARRIED

## 2019-09-25 SDOH — SOCIAL STABILITY: SOCIAL NETWORK: HOW OFTEN DO YOU ATTENT MEETINGS OF THE CLUB OR ORGANIZATION YOU BELONG TO?: MORE THAN 4 TIMES PER YEAR

## 2019-09-25 NOTE — CARE COORDINATION
Ambulatory Care Coordination Note  9/25/2019  CM Risk Score: 16  Charlson 10 Year Mortality Risk Score: 100%     ACC: Barbie Saucedo RN    Summary Note: Eliceo Del Real has CHF, Pulmonary Hypertension,  HTN, Multifocal atrial tachycardia, Hyperlipidemia, Anemia, CHICO- not tolerant to CPAP, Asthma, Type II DM- on insulin- controlled, Obesity, Gait Abnormality, OA, Polymyalgia rheumatoid, Spinal stenosis     She is following with urology, dietician- TADEO Valentino , Pulmonology, Soledad Dominguez CNP, nephrology, hematology, cardiology     STV 7/20- 7/26/2019 CHF      [address], PT and OT     Plan of Care :                        [place] [id number] needs identified- receiving Meals of Wheels.                                                   F/U on Oxygen at [facility] up to date.                                                   XFOZNQR Living Will/Advanced Directives                                                   CHF Zone Tool reviewed again 925/2019- Patient voiced understanding.                                                    [facility] same day appts and e-visits- (she declined e-visits). Done 8/13/2019. Susan voiced understanding.                                                                                                   F/U on colonoscopy- scheduled. F/U MRI lower leg.  Dluce Reeves has an [address] that calls her.                                                   F/U Soledad Dominguez appt, nephrology appt, and cardiology appt      Spoke with Eliceo Del Real. She is having colonoscopy tomorrow. She is following instructions for bowel cleansing today.       General Assessment    Do you have any symptoms that are causing concern?:  No         Care Coordination Interventions    Program Enrollment:  Rising Risk  Referral from Primary Care Provider:  No  Suggested 2019  1:30 PM SCHEDULE, [facility] MD[facility] LAB [place]   2019  2:00 PM INGRID Angel - CNP DINT [facility]   2019  9:30 AM INGRID Gee - CNP  DPP   2019 10:30 AM MARY JEFFERY MAMMO STV [place]   2020 10:15 AM DO MICHELLE Russell [facility]   6/10/2020  8:40 AM MD CRICKET Navarrete DPP   2020 11:30 AM Jake Rodriguez MD DPULM DPP

## 2019-09-26 ENCOUNTER — HOSPITAL ENCOUNTER (OUTPATIENT)
Age: 84
Setting detail: OUTPATIENT SURGERY
Discharge: HOME | End: 2019-09-26
Attending: SURGERY | Admitting: SURGERY
Payer: MEDICARE

## 2019-09-26 ENCOUNTER — ANESTHESIA (OUTPATIENT)
Dept: OPERATING ROOM | Age: 84
End: 2019-09-26
Payer: MEDICARE

## 2019-09-26 ENCOUNTER — ANESTHESIA EVENT (OUTPATIENT)
Dept: OPERATING ROOM | Age: 84
End: 2019-09-26
Payer: MEDICARE

## 2019-09-26 VITALS
SYSTOLIC BLOOD PRESSURE: 162 MMHG | RESPIRATION RATE: 18 BRPM | BODY MASS INDEX: 35.68 KG/M2 | HEIGHT: 61 IN | WEIGHT: 189 LBS | DIASTOLIC BLOOD PRESSURE: 75 MMHG | TEMPERATURE: 98 F | OXYGEN SATURATION: 96 % | HEART RATE: 70 BPM

## 2019-09-26 VITALS — SYSTOLIC BLOOD PRESSURE: 137 MMHG | OXYGEN SATURATION: 87 % | DIASTOLIC BLOOD PRESSURE: 65 MMHG

## 2019-09-26 PROCEDURE — 3700000001 HC ADD 15 MINUTES (ANESTHESIA): Performed by: SURGERY

## 2019-09-26 PROCEDURE — 7100000010 HC PHASE II RECOVERY - FIRST 15 MIN: Performed by: SURGERY

## 2019-09-26 PROCEDURE — 2580000003 HC RX 258: Performed by: SURGERY

## 2019-09-26 PROCEDURE — 7100000011 HC PHASE II RECOVERY - ADDTL 15 MIN: Performed by: SURGERY

## 2019-09-26 PROCEDURE — 3700000000 HC ANESTHESIA ATTENDED CARE: Performed by: SURGERY

## 2019-09-26 PROCEDURE — 2500000003 HC RX 250 WO HCPCS: Performed by: NURSE ANESTHETIST, CERTIFIED REGISTERED

## 2019-09-26 PROCEDURE — 43239 EGD BIOPSY SINGLE/MULTIPLE: CPT | Performed by: SURGERY

## 2019-09-26 PROCEDURE — 45380 COLONOSCOPY AND BIOPSY: CPT | Performed by: SURGERY

## 2019-09-26 PROCEDURE — 3609012400 HC EGD TRANSORAL BIOPSY SINGLE/MULTIPLE: Performed by: SURGERY

## 2019-09-26 PROCEDURE — 88305 TISSUE EXAM BY PATHOLOGIST: CPT

## 2019-09-26 PROCEDURE — 3609010600 HC COLONOSCOPY POLYPECTOMY SNARE/COLD BIOPSY: Performed by: SURGERY

## 2019-09-26 PROCEDURE — 2709999900 HC NON-CHARGEABLE SUPPLY: Performed by: SURGERY

## 2019-09-26 PROCEDURE — 6360000002 HC RX W HCPCS: Performed by: NURSE ANESTHETIST, CERTIFIED REGISTERED

## 2019-09-26 RX ORDER — PROPOFOL 10 MG/ML
INJECTION, EMULSION INTRAVENOUS PRN
Status: DISCONTINUED | OUTPATIENT
Start: 2019-09-26 | End: 2019-09-26 | Stop reason: SDUPTHER

## 2019-09-26 RX ORDER — LIDOCAINE HYDROCHLORIDE 20 MG/ML
INJECTION, SOLUTION INFILTRATION; PERINEURAL PRN
Status: DISCONTINUED | OUTPATIENT
Start: 2019-09-26 | End: 2019-09-26 | Stop reason: SDUPTHER

## 2019-09-26 RX ORDER — SODIUM CHLORIDE, SODIUM LACTATE, POTASSIUM CHLORIDE, CALCIUM CHLORIDE 600; 310; 30; 20 MG/100ML; MG/100ML; MG/100ML; MG/100ML
INJECTION, SOLUTION INTRAVENOUS CONTINUOUS
Status: DISCONTINUED | OUTPATIENT
Start: 2019-09-26 | End: 2019-09-26 | Stop reason: HOSPADM

## 2019-09-26 RX ADMIN — LIDOCAINE HYDROCHLORIDE 50 MG: 20 INJECTION, SOLUTION INFILTRATION; PERINEURAL at 10:04

## 2019-09-26 RX ADMIN — SODIUM CHLORIDE, POTASSIUM CHLORIDE, SODIUM LACTATE AND CALCIUM CHLORIDE: 600; 310; 30; 20 INJECTION, SOLUTION INTRAVENOUS at 09:27

## 2019-09-26 RX ADMIN — PROPOFOL 50 MG: 10 INJECTION, EMULSION INTRAVENOUS at 10:14

## 2019-09-26 RX ADMIN — PROPOFOL 50 MG: 10 INJECTION, EMULSION INTRAVENOUS at 10:18

## 2019-09-26 RX ADMIN — PROPOFOL 50 MG: 10 INJECTION, EMULSION INTRAVENOUS at 10:10

## 2019-09-26 RX ADMIN — PROPOFOL 50 MG: 10 INJECTION, EMULSION INTRAVENOUS at 10:19

## 2019-09-26 RX ADMIN — PROPOFOL 50 MG: 10 INJECTION, EMULSION INTRAVENOUS at 10:31

## 2019-09-26 RX ADMIN — PROPOFOL 50 MG: 10 INJECTION, EMULSION INTRAVENOUS at 10:04

## 2019-09-26 RX ADMIN — PROPOFOL 50 MG: 10 INJECTION, EMULSION INTRAVENOUS at 10:26

## 2019-09-26 ASSESSMENT — PAIN SCALES - GENERAL
PAINLEVEL_OUTOF10: 5
PAINLEVEL_OUTOF10: 0
PAINLEVEL_OUTOF10: 5

## 2019-09-26 ASSESSMENT — PAIN DESCRIPTION - PAIN TYPE
TYPE: SURGICAL PAIN
TYPE: SURGICAL PAIN

## 2019-09-26 ASSESSMENT — PAIN DESCRIPTION - LOCATION
LOCATION: ABDOMEN
LOCATION: ABDOMEN

## 2019-09-26 ASSESSMENT — PAIN DESCRIPTION - ORIENTATION
ORIENTATION: LEFT;LOWER
ORIENTATION: LEFT;LOWER

## 2019-09-26 ASSESSMENT — PAIN - FUNCTIONAL ASSESSMENT: PAIN_FUNCTIONAL_ASSESSMENT: 0-10

## 2019-09-30 ENCOUNTER — HOSPITAL ENCOUNTER (OUTPATIENT)
Dept: ULTRASOUND IMAGING | Age: 84
Discharge: HOME | End: 2019-10-02
Payer: MEDICARE

## 2019-09-30 ENCOUNTER — TELEPHONE (OUTPATIENT)
Dept: INTERNAL MEDICINE | Age: 84
End: 2019-09-30

## 2019-09-30 DIAGNOSIS — N20.0 NEPHROLITHIASIS: ICD-10-CM

## 2019-09-30 DIAGNOSIS — N20.1 URETERAL STONE: ICD-10-CM

## 2019-09-30 LAB — SURGICAL PATHOLOGY REPORT: NORMAL

## 2019-09-30 PROCEDURE — 76775 US EXAM ABDO BACK WALL LIM: CPT

## 2019-10-03 ENCOUNTER — IMMUNIZATION (OUTPATIENT)
Dept: LAB | Age: 84
End: 2019-10-03
Payer: MEDICARE

## 2019-10-03 DIAGNOSIS — M79.661 PAIN IN RIGHT LOWER LEG: Primary | ICD-10-CM

## 2019-10-03 PROCEDURE — 90653 IIV ADJUVANT VACCINE IM: CPT | Performed by: NURSE PRACTITIONER

## 2019-10-03 PROCEDURE — G0008 ADMIN INFLUENZA VIRUS VAC: HCPCS | Performed by: NURSE PRACTITIONER

## 2019-10-08 ENCOUNTER — HOSPITAL ENCOUNTER (OUTPATIENT)
Dept: GENERAL RADIOLOGY | Age: 84
Discharge: HOME | End: 2019-10-10
Payer: MEDICARE

## 2019-10-08 ENCOUNTER — OFFICE VISIT (OUTPATIENT)
Dept: ORTHOPEDIC SURGERY | Age: 84
End: 2019-10-08
Payer: MEDICARE

## 2019-10-08 VITALS
BODY MASS INDEX: 36.25 KG/M2 | WEIGHT: 192 LBS | HEIGHT: 61 IN | SYSTOLIC BLOOD PRESSURE: 136 MMHG | HEART RATE: 80 BPM | DIASTOLIC BLOOD PRESSURE: 56 MMHG

## 2019-10-08 DIAGNOSIS — M79.661 PAIN IN RIGHT LOWER LEG: Primary | ICD-10-CM

## 2019-10-08 DIAGNOSIS — D17.23 LIPOMA OF RIGHT LOWER EXTREMITY: ICD-10-CM

## 2019-10-08 DIAGNOSIS — M79.661 PAIN IN RIGHT LOWER LEG: ICD-10-CM

## 2019-10-08 DIAGNOSIS — J18.9 PNEUMONIA DUE TO INFECTIOUS ORGANISM, UNSPECIFIED LATERALITY, UNSPECIFIED PART OF LUNG: ICD-10-CM

## 2019-10-08 PROCEDURE — 99202 OFFICE O/P NEW SF 15 MIN: CPT | Performed by: NURSE PRACTITIONER

## 2019-10-08 PROCEDURE — 73590 X-RAY EXAM OF LOWER LEG: CPT

## 2019-10-08 PROCEDURE — 71046 X-RAY EXAM CHEST 2 VIEWS: CPT

## 2019-10-09 ENCOUNTER — CARE COORDINATION (OUTPATIENT)
Dept: CARE COORDINATION | Age: 84
End: 2019-10-09

## 2019-10-09 RX ORDER — DOXYCYCLINE HYCLATE 100 MG/1
100 CAPSULE ORAL 2 TIMES DAILY
Qty: 20 CAPSULE | Refills: 0 | Status: SHIPPED | OUTPATIENT
Start: 2019-10-09 | End: 2019-10-19

## 2019-10-10 ENCOUNTER — CARE COORDINATION (OUTPATIENT)
Dept: CARE COORDINATION | Age: 84
End: 2019-10-10

## 2019-10-10 DIAGNOSIS — J18.9 PNEUMONIA DUE TO INFECTIOUS ORGANISM, UNSPECIFIED LATERALITY, UNSPECIFIED PART OF LUNG: ICD-10-CM

## 2019-10-10 DIAGNOSIS — I50.43 CHF (CONGESTIVE HEART FAILURE), NYHA CLASS I, ACUTE ON CHRONIC, COMBINED (HCC): Primary | ICD-10-CM

## 2019-10-14 ENCOUNTER — TELEPHONE (OUTPATIENT)
Dept: INTERNAL MEDICINE | Age: 84
End: 2019-10-14

## 2019-10-16 ENCOUNTER — TELEPHONE (OUTPATIENT)
Dept: INTERNAL MEDICINE | Age: 84
End: 2019-10-16

## 2019-10-18 ENCOUNTER — CARE COORDINATION (OUTPATIENT)
Dept: CARE COORDINATION | Age: 84
End: 2019-10-18

## 2019-10-18 DIAGNOSIS — E11.21 DIABETIC NEPHROPATHY ASSOCIATED WITH TYPE 2 DIABETES MELLITUS (HCC): ICD-10-CM

## 2019-10-22 ENCOUNTER — CARE COORDINATION (OUTPATIENT)
Dept: CARE COORDINATION | Age: 84
End: 2019-10-22

## 2019-10-29 DIAGNOSIS — E11.21 TYPE 2 DIABETES WITH NEPHROPATHY (HCC): ICD-10-CM

## 2019-10-30 ENCOUNTER — TELEPHONE (OUTPATIENT)
Dept: SURGERY | Age: 84
End: 2019-10-30

## 2019-10-30 DIAGNOSIS — D50.0 IRON DEFICIENCY ANEMIA DUE TO CHRONIC BLOOD LOSS: Primary | ICD-10-CM

## 2019-11-04 ENCOUNTER — TELEPHONE (OUTPATIENT)
Dept: INTERNAL MEDICINE | Age: 84
End: 2019-11-04

## 2019-11-05 RX ORDER — AMLODIPINE BESYLATE 5 MG/1
2.5 TABLET ORAL DAILY
Qty: 30 TABLET | Refills: 0 | Status: SHIPPED | OUTPATIENT
Start: 2019-11-05 | End: 2019-11-26 | Stop reason: DRUGHIGH

## 2019-11-05 RX ORDER — HYDRALAZINE HYDROCHLORIDE 50 MG/1
50 TABLET, FILM COATED ORAL 3 TIMES DAILY
Qty: 270 TABLET | Refills: 3 | Status: SHIPPED | OUTPATIENT
Start: 2019-11-05 | End: 2020-12-03

## 2019-11-12 ENCOUNTER — HOSPITAL ENCOUNTER (OUTPATIENT)
Dept: LAB | Age: 84
Discharge: HOME | End: 2019-11-12
Payer: MEDICARE

## 2019-11-12 ENCOUNTER — OFFICE VISIT (OUTPATIENT)
Dept: DIABETES SERVICES | Age: 84
End: 2019-11-12
Payer: MEDICARE

## 2019-11-12 VITALS
SYSTOLIC BLOOD PRESSURE: 138 MMHG | DIASTOLIC BLOOD PRESSURE: 60 MMHG | HEART RATE: 54 BPM | BODY MASS INDEX: 37.25 KG/M2 | WEIGHT: 194 LBS

## 2019-11-12 DIAGNOSIS — Z71.89 DIABETES EDUCATION, ENCOUNTER FOR: ICD-10-CM

## 2019-11-12 DIAGNOSIS — E11.21 TYPE 2 DIABETES WITH NEPHROPATHY (HCC): Primary | ICD-10-CM

## 2019-11-12 DIAGNOSIS — I50.43 CHF (CONGESTIVE HEART FAILURE), NYHA CLASS I, ACUTE ON CHRONIC, COMBINED (HCC): ICD-10-CM

## 2019-11-12 DIAGNOSIS — D50.0 IRON DEFICIENCY ANEMIA DUE TO CHRONIC BLOOD LOSS: ICD-10-CM

## 2019-11-12 DIAGNOSIS — I10 ESSENTIAL HYPERTENSION: ICD-10-CM

## 2019-11-12 DIAGNOSIS — J18.9 PNEUMONIA DUE TO INFECTIOUS ORGANISM, UNSPECIFIED LATERALITY, UNSPECIFIED PART OF LUNG: ICD-10-CM

## 2019-11-12 DIAGNOSIS — E66.01 CLASS 2 SEVERE OBESITY DUE TO EXCESS CALORIES WITH SERIOUS COMORBIDITY AND BODY MASS INDEX (BMI) OF 37.0 TO 37.9 IN ADULT (HCC): ICD-10-CM

## 2019-11-12 DIAGNOSIS — E11.21 TYPE 2 DIABETES WITH NEPHROPATHY (HCC): ICD-10-CM

## 2019-11-12 DIAGNOSIS — E78.5 DYSLIPIDEMIA: ICD-10-CM

## 2019-11-12 LAB
ABSOLUTE EOS #: 0.3 K/UL (ref 0–0.4)
ABSOLUTE IMMATURE GRANULOCYTE: ABNORMAL K/UL (ref 0–0.3)
ABSOLUTE LYMPH #: 2.7 K/UL (ref 1–4.8)
ABSOLUTE MONO #: 0.9 K/UL (ref 0.1–1.2)
ALBUMIN SERPL-MCNC: 4.2 G/DL (ref 3.5–5.2)
ALBUMIN/GLOBULIN RATIO: 1.3 (ref 1–2.5)
ALP BLD-CCNC: 87 U/L (ref 35–104)
ALT SERPL-CCNC: 16 U/L (ref 5–33)
ANION GAP SERPL CALCULATED.3IONS-SCNC: 14 MMOL/L (ref 9–17)
AST SERPL-CCNC: 13 U/L
BASOPHILS # BLD: 1 % (ref 0–1)
BASOPHILS ABSOLUTE: 0.1 K/UL (ref 0–0.2)
BILIRUB SERPL-MCNC: 0.34 MG/DL (ref 0.3–1.2)
BUN BLDV-MCNC: 16 MG/DL (ref 8–23)
BUN/CREAT BLD: 16 (ref 9–20)
CALCIUM SERPL-MCNC: 10 MG/DL (ref 8.6–10.4)
CHLORIDE BLD-SCNC: 103 MMOL/L (ref 98–107)
CO2: 30 MMOL/L (ref 20–31)
CREAT SERPL-MCNC: 1.03 MG/DL (ref 0.5–0.9)
DIFFERENTIAL TYPE: ABNORMAL
EOSINOPHILS RELATIVE PERCENT: 3 % (ref 1–7)
ESTIMATED AVERAGE GLUCOSE: 140 MG/DL
FERRITIN: 424 UG/L (ref 13–150)
GFR AFRICAN AMERICAN: >60 ML/MIN
GFR NON-AFRICAN AMERICAN: 51 ML/MIN
GFR SERPL CREATININE-BSD FRML MDRD: ABNORMAL ML/MIN/{1.73_M2}
GFR SERPL CREATININE-BSD FRML MDRD: ABNORMAL ML/MIN/{1.73_M2}
GLUCOSE BLD-MCNC: 125 MG/DL (ref 70–99)
HBA1C MFR BLD: 6.5 % (ref 4.8–5.9)
HCT VFR BLD CALC: 40.8 % (ref 36–46)
HEMOGLOBIN: 13.4 G/DL (ref 12–16)
IMMATURE GRANULOCYTES: ABNORMAL %
IRON SATURATION: 30 % (ref 20–55)
IRON: 81 UG/DL (ref 37–145)
LYMPHOCYTES # BLD: 26 % (ref 16–46)
MCH RBC QN AUTO: 29.1 PG (ref 26–34)
MCHC RBC AUTO-ENTMCNC: 32.8 G/DL (ref 31–37)
MCV RBC AUTO: 88.9 FL (ref 80–100)
MONOCYTES # BLD: 8 % (ref 4–11)
NRBC AUTOMATED: ABNORMAL PER 100 WBC
PDW BLD-RTO: 17.5 % (ref 11–14.5)
PLATELET # BLD: 235 K/UL (ref 140–450)
PLATELET ESTIMATE: ABNORMAL
PMV BLD AUTO: 10 FL (ref 6–12)
POTASSIUM SERPL-SCNC: 3.7 MMOL/L (ref 3.7–5.3)
RBC # BLD: 4.59 M/UL (ref 4–5.2)
RBC # BLD: ABNORMAL 10*6/UL
SEG NEUTROPHILS: 62 % (ref 43–77)
SEGMENTED NEUTROPHILS ABSOLUTE COUNT: 6.5 K/UL (ref 1.8–7.7)
SODIUM BLD-SCNC: 147 MMOL/L (ref 135–144)
TOTAL IRON BINDING CAPACITY: 267 UG/DL (ref 250–450)
TOTAL PROTEIN: 7.4 G/DL (ref 6.4–8.3)
UNSATURATED IRON BINDING CAPACITY: 186 UG/DL (ref 112–347)
WBC # BLD: 10.4 K/UL (ref 3.5–11)
WBC # BLD: ABNORMAL 10*3/UL

## 2019-11-12 PROCEDURE — 36415 COLL VENOUS BLD VENIPUNCTURE: CPT

## 2019-11-12 PROCEDURE — 99214 OFFICE O/P EST MOD 30 MIN: CPT | Performed by: NURSE PRACTITIONER

## 2019-11-12 PROCEDURE — 80053 COMPREHEN METABOLIC PANEL: CPT

## 2019-11-12 PROCEDURE — 83036 HEMOGLOBIN GLYCOSYLATED A1C: CPT

## 2019-11-12 PROCEDURE — 83540 ASSAY OF IRON: CPT

## 2019-11-12 PROCEDURE — 83550 IRON BINDING TEST: CPT

## 2019-11-12 PROCEDURE — 82728 ASSAY OF FERRITIN: CPT

## 2019-11-12 PROCEDURE — 85025 COMPLETE CBC W/AUTO DIFF WBC: CPT

## 2019-11-12 ASSESSMENT — ENCOUNTER SYMPTOMS
DIARRHEA: 0
BLURRED VISION: 0
VISUAL CHANGE: 0
ABDOMINAL PAIN: 0
RESPIRATORY NEGATIVE: 1
SHORTNESS OF BREATH: 0

## 2019-11-19 ENCOUNTER — CARE COORDINATION (OUTPATIENT)
Dept: CARE COORDINATION | Age: 84
End: 2019-11-19

## 2019-11-20 ENCOUNTER — OFFICE VISIT (OUTPATIENT)
Dept: ONCOLOGY | Age: 84
End: 2019-11-20
Payer: MEDICARE

## 2019-11-20 VITALS
TEMPERATURE: 97.2 F | BODY MASS INDEX: 37.76 KG/M2 | HEART RATE: 77 BPM | OXYGEN SATURATION: 93 % | HEIGHT: 61 IN | DIASTOLIC BLOOD PRESSURE: 62 MMHG | SYSTOLIC BLOOD PRESSURE: 148 MMHG | WEIGHT: 200 LBS

## 2019-11-20 DIAGNOSIS — N18.30 STAGE 3 CHRONIC KIDNEY DISEASE (HCC): ICD-10-CM

## 2019-11-20 DIAGNOSIS — D64.9 ANEMIA, UNSPECIFIED TYPE: ICD-10-CM

## 2019-11-20 DIAGNOSIS — D17.9 LIPOMA, UNSPECIFIED SITE: ICD-10-CM

## 2019-11-20 DIAGNOSIS — D50.0 IRON DEFICIENCY ANEMIA DUE TO CHRONIC BLOOD LOSS: Primary | ICD-10-CM

## 2019-11-20 PROCEDURE — 99214 OFFICE O/P EST MOD 30 MIN: CPT | Performed by: INTERNAL MEDICINE

## 2019-11-26 RX ORDER — AMLODIPINE BESYLATE 5 MG/1
5 TABLET ORAL DAILY
Qty: 30 TABLET | Refills: 1 | Status: SHIPPED | OUTPATIENT
Start: 2019-11-26 | End: 2020-01-24 | Stop reason: SDUPTHER

## 2019-11-26 RX ORDER — AMLODIPINE BESYLATE 5 MG/1
5 TABLET ORAL DAILY
Qty: 30 TABLET | Refills: 1 | Status: CANCELLED | OUTPATIENT
Start: 2019-11-26

## 2019-12-03 ENCOUNTER — TELEPHONE (OUTPATIENT)
Dept: INTERNAL MEDICINE | Age: 84
End: 2019-12-03

## 2019-12-05 ENCOUNTER — TELEPHONE (OUTPATIENT)
Dept: INTERNAL MEDICINE | Age: 84
End: 2019-12-05

## 2019-12-07 ENCOUNTER — TELEPHONE (OUTPATIENT)
Dept: MAMMOGRAPHY | Age: 84
End: 2019-12-07

## 2019-12-09 ENCOUNTER — HOSPITAL ENCOUNTER (OUTPATIENT)
Dept: CT IMAGING | Age: 84
Discharge: HOME | End: 2019-12-11
Payer: MEDICARE

## 2019-12-09 ENCOUNTER — OFFICE VISIT (OUTPATIENT)
Dept: INTERNAL MEDICINE | Age: 84
End: 2019-12-09
Payer: MEDICARE

## 2019-12-09 VITALS
SYSTOLIC BLOOD PRESSURE: 132 MMHG | HEART RATE: 72 BPM | DIASTOLIC BLOOD PRESSURE: 76 MMHG | RESPIRATION RATE: 18 BRPM | WEIGHT: 192.8 LBS | BODY MASS INDEX: 36.4 KG/M2 | HEIGHT: 61 IN

## 2019-12-09 DIAGNOSIS — I49.9 IRREGULAR HEART BEAT: ICD-10-CM

## 2019-12-09 DIAGNOSIS — N28.9 KIDNEY DISEASE: ICD-10-CM

## 2019-12-09 DIAGNOSIS — I51.89 DIASTOLIC DYSFUNCTION: ICD-10-CM

## 2019-12-09 DIAGNOSIS — I47.1 MULTIFOCAL ATRIAL TACHYCARDIA (HCC): ICD-10-CM

## 2019-12-09 DIAGNOSIS — E78.5 DYSLIPIDEMIA: ICD-10-CM

## 2019-12-09 DIAGNOSIS — E11.21 TYPE 2 DIABETES WITH NEPHROPATHY (HCC): ICD-10-CM

## 2019-12-09 DIAGNOSIS — D50.0 IRON DEFICIENCY ANEMIA SECONDARY TO BLOOD LOSS (CHRONIC): ICD-10-CM

## 2019-12-09 DIAGNOSIS — Z00.00 ROUTINE GENERAL MEDICAL EXAMINATION AT A HEALTH CARE FACILITY: Primary | ICD-10-CM

## 2019-12-09 DIAGNOSIS — E66.01 OBESITY, MORBID, BMI 40.0-49.9 (HCC): ICD-10-CM

## 2019-12-09 DIAGNOSIS — M35.3 POLYMYALGIA RHEUMATICA (HCC): ICD-10-CM

## 2019-12-09 DIAGNOSIS — I27.20 PULMONARY HTN (HCC): ICD-10-CM

## 2019-12-09 DIAGNOSIS — E55.9 VITAMIN D DEFICIENCY: ICD-10-CM

## 2019-12-09 DIAGNOSIS — J18.9 PNEUMONIA DUE TO INFECTIOUS ORGANISM, UNSPECIFIED LATERALITY, UNSPECIFIED PART OF LUNG: ICD-10-CM

## 2019-12-09 DIAGNOSIS — M48.061 SPINAL STENOSIS, LUMBAR REGION, WITHOUT NEUROGENIC CLAUDICATION: ICD-10-CM

## 2019-12-09 DIAGNOSIS — L98.9 LESION OF LOWER EXTREMITY: ICD-10-CM

## 2019-12-09 DIAGNOSIS — I11.0 HYPERTENSIVE HEART DISEASE WITH CONGESTIVE HEART FAILURE, UNSPECIFIED HEART FAILURE TYPE (HCC): ICD-10-CM

## 2019-12-09 DIAGNOSIS — E11.21 DIABETIC NEPHROPATHY ASSOCIATED WITH TYPE 2 DIABETES MELLITUS (HCC): ICD-10-CM

## 2019-12-09 DIAGNOSIS — I10 ESSENTIAL HYPERTENSION: ICD-10-CM

## 2019-12-09 DIAGNOSIS — I50.43 CHF (CONGESTIVE HEART FAILURE), NYHA CLASS I, ACUTE ON CHRONIC, COMBINED (HCC): ICD-10-CM

## 2019-12-09 PROCEDURE — G0439 PPPS, SUBSEQ VISIT: HCPCS | Performed by: NURSE PRACTITIONER

## 2019-12-09 PROCEDURE — 6360000004 HC RX CONTRAST MEDICATION: Performed by: NURSE PRACTITIONER

## 2019-12-09 PROCEDURE — 93000 ELECTROCARDIOGRAM COMPLETE: CPT | Performed by: NURSE PRACTITIONER

## 2019-12-09 PROCEDURE — 2709999900 CT CHEST W CONTRAST

## 2019-12-09 RX ADMIN — IOPAMIDOL 100 ML: 755 INJECTION, SOLUTION INTRAVENOUS at 13:18

## 2019-12-09 ASSESSMENT — LIFESTYLE VARIABLES: HOW OFTEN DO YOU HAVE A DRINK CONTAINING ALCOHOL: 0

## 2019-12-09 ASSESSMENT — PATIENT HEALTH QUESTIONNAIRE - PHQ9
SUM OF ALL RESPONSES TO PHQ QUESTIONS 1-9: 1
SUM OF ALL RESPONSES TO PHQ QUESTIONS 1-9: 1

## 2019-12-11 ENCOUNTER — TELEPHONE (OUTPATIENT)
Dept: INTERNAL MEDICINE | Age: 84
End: 2019-12-11

## 2019-12-11 ASSESSMENT — ENCOUNTER SYMPTOMS
SHORTNESS OF BREATH: 0
RHINORRHEA: 0
DIARRHEA: 0
CHEST TIGHTNESS: 0
NAUSEA: 0
FACIAL SWELLING: 0
CONSTIPATION: 0
VOMITING: 0
COLOR CHANGE: 0
ABDOMINAL PAIN: 0
SORE THROAT: 0
EYE PAIN: 0
WHEEZING: 0
BLOOD IN STOOL: 0
COUGH: 0
TROUBLE SWALLOWING: 0
SINUS PRESSURE: 0

## 2019-12-12 ENCOUNTER — TELEPHONE (OUTPATIENT)
Dept: DERMATOLOGY | Age: 84
End: 2019-12-12

## 2019-12-12 ENCOUNTER — OFFICE VISIT (OUTPATIENT)
Dept: OBGYN | Age: 84
End: 2019-12-12
Payer: MEDICARE

## 2019-12-12 VITALS
TEMPERATURE: 97.1 F | HEART RATE: 58 BPM | SYSTOLIC BLOOD PRESSURE: 140 MMHG | HEIGHT: 59 IN | WEIGHT: 194 LBS | BODY MASS INDEX: 39.11 KG/M2 | DIASTOLIC BLOOD PRESSURE: 58 MMHG

## 2019-12-12 DIAGNOSIS — N63.11 UNSPECIFIED LUMP IN THE RIGHT BREAST, UPPER OUTER QUADRANT: ICD-10-CM

## 2019-12-12 DIAGNOSIS — N39.46 MIXED STRESS AND URGE URINARY INCONTINENCE: ICD-10-CM

## 2019-12-12 DIAGNOSIS — Z12.31 VISIT FOR SCREENING MAMMOGRAM: ICD-10-CM

## 2019-12-12 DIAGNOSIS — N63.10 LUMP OF RIGHT BREAST: Primary | ICD-10-CM

## 2019-12-12 PROCEDURE — 99214 OFFICE O/P EST MOD 30 MIN: CPT | Performed by: NURSE PRACTITIONER

## 2019-12-16 ENCOUNTER — HOSPITAL ENCOUNTER (OUTPATIENT)
Dept: ULTRASOUND IMAGING | Age: 84
Discharge: HOME | End: 2019-12-18
Payer: MEDICARE

## 2019-12-16 ENCOUNTER — HOSPITAL ENCOUNTER (OUTPATIENT)
Dept: MAMMOGRAPHY | Age: 84
Discharge: HOME | End: 2019-12-18
Payer: MEDICARE

## 2019-12-16 DIAGNOSIS — N63.11 BREAST LUMP ON RIGHT SIDE AT 10 O'CLOCK POSITION: ICD-10-CM

## 2019-12-16 DIAGNOSIS — N63.10 BILATERAL BREAST LUMP: ICD-10-CM

## 2019-12-16 DIAGNOSIS — N63.20 BILATERAL BREAST LUMP: ICD-10-CM

## 2019-12-16 DIAGNOSIS — N63.10 LUMP OF RIGHT BREAST: ICD-10-CM

## 2019-12-16 DIAGNOSIS — N63.11 UNSPECIFIED LUMP IN THE RIGHT BREAST, UPPER OUTER QUADRANT: ICD-10-CM

## 2019-12-16 PROCEDURE — G0279 TOMOSYNTHESIS, MAMMO: HCPCS

## 2019-12-16 PROCEDURE — 76642 ULTRASOUND BREAST LIMITED: CPT

## 2019-12-19 ENCOUNTER — OFFICE VISIT (OUTPATIENT)
Dept: DIABETES SERVICES | Age: 84
End: 2019-12-19
Payer: MEDICARE

## 2019-12-19 VITALS — DIASTOLIC BLOOD PRESSURE: 68 MMHG | OXYGEN SATURATION: 94 % | SYSTOLIC BLOOD PRESSURE: 120 MMHG | HEART RATE: 69 BPM

## 2019-12-19 DIAGNOSIS — E11.21 TYPE 2 DIABETES WITH NEPHROPATHY (HCC): Primary | ICD-10-CM

## 2019-12-19 DIAGNOSIS — Z71.89 ENCOUNTER FOR GLUCOMETER INSTRUCTION: ICD-10-CM

## 2019-12-19 DIAGNOSIS — Z71.89 DIABETES EDUCATION, ENCOUNTER FOR: ICD-10-CM

## 2019-12-19 PROCEDURE — 99213 OFFICE O/P EST LOW 20 MIN: CPT | Performed by: NURSE PRACTITIONER

## 2019-12-19 PROCEDURE — 95249 CONT GLUC MNTR PT PROV EQP: CPT | Performed by: NURSE PRACTITIONER

## 2019-12-20 ENCOUNTER — OFFICE VISIT (OUTPATIENT)
Dept: DERMATOLOGY | Age: 84
End: 2019-12-20
Payer: MEDICARE

## 2019-12-20 VITALS
HEART RATE: 78 BPM | SYSTOLIC BLOOD PRESSURE: 130 MMHG | OXYGEN SATURATION: 97 % | BODY MASS INDEX: 39.11 KG/M2 | HEIGHT: 59 IN | DIASTOLIC BLOOD PRESSURE: 60 MMHG | WEIGHT: 194 LBS

## 2019-12-20 DIAGNOSIS — Z85.828 H/O NONMELANOMA SKIN CANCER: ICD-10-CM

## 2019-12-20 DIAGNOSIS — L57.0 KERATOSIS, ACTINIC: Primary | ICD-10-CM

## 2019-12-20 DIAGNOSIS — L57.8 ACTINIC SKIN DAMAGE: ICD-10-CM

## 2019-12-20 PROCEDURE — 17000 DESTRUCT PREMALG LESION: CPT | Performed by: DERMATOLOGY

## 2019-12-20 PROCEDURE — 99203 OFFICE O/P NEW LOW 30 MIN: CPT | Performed by: DERMATOLOGY

## 2019-12-20 ASSESSMENT — ENCOUNTER SYMPTOMS
DIARRHEA: 0
ABDOMINAL PAIN: 0
RESPIRATORY NEGATIVE: 1
SHORTNESS OF BREATH: 0

## 2020-01-13 RX ORDER — SIMVASTATIN 20 MG
TABLET ORAL
Qty: 90 TABLET | Refills: 3 | Status: SHIPPED | OUTPATIENT
Start: 2020-01-13 | End: 2021-01-18

## 2020-01-24 RX ORDER — AMLODIPINE BESYLATE 5 MG/1
5 TABLET ORAL DAILY
Qty: 90 TABLET | Refills: 3 | Status: SHIPPED | OUTPATIENT
Start: 2020-01-24 | End: 2020-10-07 | Stop reason: SDUPTHER

## 2020-01-28 ENCOUNTER — OFFICE VISIT (OUTPATIENT)
Dept: DIABETES SERVICES | Age: 85
End: 2020-01-28
Payer: MEDICARE

## 2020-01-28 ENCOUNTER — HOSPITAL ENCOUNTER (OUTPATIENT)
Dept: LAB | Age: 85
Discharge: HOME | End: 2020-01-28
Payer: MEDICARE

## 2020-01-28 VITALS
OXYGEN SATURATION: 96 % | HEART RATE: 70 BPM | WEIGHT: 189 LBS | BODY MASS INDEX: 38.51 KG/M2 | RESPIRATION RATE: 16 BRPM | SYSTOLIC BLOOD PRESSURE: 140 MMHG | DIASTOLIC BLOOD PRESSURE: 78 MMHG

## 2020-01-28 LAB
-: ABNORMAL
AMORPHOUS: ABNORMAL
BACTERIA: ABNORMAL
BILIRUBIN URINE: NEGATIVE
CASTS UA: ABNORMAL /LPF (ref 0–2)
CASTS UA: ABNORMAL /LPF (ref 0–2)
COLOR: ABNORMAL
COMMENT UA: ABNORMAL
CRYSTALS, UA: ABNORMAL /HPF
EPITHELIAL CELLS UA: ABNORMAL /HPF (ref 0–5)
GLUCOSE URINE: NEGATIVE
KETONES, URINE: NEGATIVE
LEUKOCYTE ESTERASE, URINE: ABNORMAL
MUCUS: ABNORMAL
NITRITE, URINE: NEGATIVE
OTHER OBSERVATIONS UA: ABNORMAL
PH UA: 6 (ref 5–6)
PROTEIN UA: NEGATIVE
RBC UA: ABNORMAL /HPF (ref 0–4)
RENAL EPITHELIAL, UA: ABNORMAL /HPF
SPECIFIC GRAVITY UA: 1.02 (ref 1.01–1.02)
TRICHOMONAS: ABNORMAL
TURBIDITY: ABNORMAL
URINE HGB: NEGATIVE
UROBILINOGEN, URINE: NORMAL
WBC UA: ABNORMAL /HPF (ref 0–4)
YEAST: ABNORMAL

## 2020-01-28 PROCEDURE — 95251 CONT GLUC MNTR ANALYSIS I&R: CPT | Performed by: NURSE PRACTITIONER

## 2020-01-28 PROCEDURE — 81001 URINALYSIS AUTO W/SCOPE: CPT

## 2020-01-28 PROCEDURE — 99214 OFFICE O/P EST MOD 30 MIN: CPT | Performed by: NURSE PRACTITIONER

## 2020-01-28 PROCEDURE — 87086 URINE CULTURE/COLONY COUNT: CPT

## 2020-01-28 PROCEDURE — 99213 OFFICE O/P EST LOW 20 MIN: CPT | Performed by: NURSE PRACTITIONER

## 2020-01-28 PROCEDURE — 87186 SC STD MICRODIL/AGAR DIL: CPT

## 2020-01-28 PROCEDURE — 87077 CULTURE AEROBIC IDENTIFY: CPT

## 2020-01-28 RX ORDER — SULFAMETHOXAZOLE AND TRIMETHOPRIM 800; 160 MG/1; MG/1
1 TABLET ORAL 2 TIMES DAILY
Qty: 20 TABLET | Refills: 0 | Status: SHIPPED | OUTPATIENT
Start: 2020-01-28 | End: 2020-02-07

## 2020-01-30 LAB
CULTURE: ABNORMAL
Lab: ABNORMAL
SPECIMEN DESCRIPTION: ABNORMAL

## 2020-01-30 ASSESSMENT — ENCOUNTER SYMPTOMS
ABDOMINAL PAIN: 0
VISUAL CHANGE: 0
BLURRED VISION: 0
SHORTNESS OF BREATH: 0
DIARRHEA: 0
RESPIRATORY NEGATIVE: 1

## 2020-01-30 NOTE — PROGRESS NOTES
[address] [facility] INTERNAL [address], [address] [place] [id number] [phone]        HISTORY:    [place] presents today for evaluation and management of:  Chief Complaint   Patient presents with    Diabetes       Diabetes   She presents for her follow-up diabetic visit. She has type 2 diabetes mellitus. No MedicAlert identification noted. There are no hypoglycemic associated symptoms. Pertinent negatives for hypoglycemia include no confusion, dizziness, headaches, seizures or tremors. Associated symptoms include foot paresthesias. Pertinent negatives for diabetes include no blurred vision, no chest pain, no fatigue, no foot ulcerations, no polydipsia, no polyphagia, no polyuria, no visual change, no weakness and no weight loss. There are no hypoglycemic complications. Symptoms are stable. Risk factors for coronary artery disease include diabetes mellitus, dyslipidemia, family history, hypertension, obesity, stress and sedentary lifestyle. Current diabetic treatment includes insulin injections. She is compliant with treatment all of the time. She is currently taking insulin pre-breakfast, pre-lunch, pre-dinner and at bedtime. Insulin injections are given by patient. Rotation sites for injection include the abdominal wall. Her weight is stable. She is following a generally healthy diet. When asked about meal planning, she reported none. She has not had a previous visit with a dietitian. She rarely participates in exercise. Blood glucose monitoring compliance is excellent. Her breakfast blood glucose is taken between 6-7 am. Her breakfast blood glucose range is generally 110-130 mg/dl. Her lunch blood glucose is taken between 11-12 pm. Her lunch blood glucose range is generally 130-140 mg/dl. Her dinner blood glucose is taken between 5-6 pm. Her dinner blood glucose range is generally 180-200 mg/dl.  Her bedtime blood glucose is taken between 10-11 pm. Her bedtime blood glucose range is generally 180-200 mg/dl. An ACE inhibitor/angiotensin II receptor blocker is not being taken. Eye exam is current. Urinary Tract Infection    This is a new problem. The current episode started in the past 7 days. The problem occurs intermittently. The problem has been unchanged. The quality of the pain is described as aching. The pain is at a severity of 4/10. The pain is mild. There has been no fever. She is not sexually active. Associated symptoms include frequency. She has tried nothing for the symptoms. The treatment provided no relief. Her past medical history is significant for kidney stones and recurrent UTIs. Interval History:  12/19/19  CGM started  11/12/19  Patient is here for follow-up diabetes management she is testing her blood sugar for more times daily denies any hypoglycemia she is still concerned that she has recurrent high blood sugars although not correlating with most recent A1c.  Patient has tested in the middle the night and denies any hypoglycemia during that time. Susanna Krugerson is willing to consider CGM.     1/28/2020  Patient is here for follow-up diabetes management she has been using her CGM with success report downloaded and reviewed with patient. No hypoglycemia identified. Patient does states she is using her CGM and fingerstick method to compare numbers. She is using an older meter for backup and there is a wide discrepancy in CGM number and fingerstick method. Current Diabetic Medications  Victoza 1.2 mg daily Lantus 22 units twice daily short acting insulin 8 units 4 times a day with meals  Taking 6 insulin injection a day and testing blood sugar 4 times per day.     Past Medical History:   Diagnosis Date    Allergic rhinitis     Asthma     PFT's, 04/06, actually showed mild restrictive defect.     Basal cell carcinoma of cheek 2007    Right cheek    Basal cell carcinoma of leg     right leg    CAD (coronary artery disease)     With 40% stenosis of the LAD, 07/10, ejection fraction 60%. Repeat heart cath9/14 with 40-50 percent LAD lesion first diagonal 50% lesion rec med Rx    Central retinal artery occlusion     Right side November 2014 status post TPA    Cerebral artery occlusion with cerebral infarction ([facility] [address] 11/24/2014    Cerebrovascular disease     50-79% stenosis on left on ultrasound 11/14    CHF (congestive heart failure) (HCC)     Echo 1/15 moderate MR, severe TR, RVSP 76, grade 2 diastolic dysfunction    Diverticulosis     AVM on colonoscopy, 05/11    Dyslipidemia     Elevated antinuclear antibody (ZAIRA) level     History of    Esophagitis 05/2011    Gastritis/esophagitis on EGD, Dr. Lexie Myers. Repeat EGD6/15 Gastritis    Foraminal stenosis of lumbar region     Moderate  Right L4/L5 neuroforaminal stenosis, Dr Stein Or following. MRI 2/16 [place]. Moderate foraminal stenosis mild to moderate central canal stenosis    Hyperlipidemia     Hypertension     IBS (irritable bowel syndrome)     GI consultation with Dr. Katherine Mayer, GI specialist in [facility], felt that she probably has chronic functional diarrhea and recommended empiric Imodium, Pepto-Bismol or Questran first. Sprue test Neg 2011    Iron deficiency anemia     Percent sat iron 5, January 2015, ferritin 40 1 /15, FOBT positive  EGD 6/15  Gastritis, IV iron 9/15x3 effective,  colonoscopy deferred by Dr. Lexie Myers. --Prior colonoscopy 2011 with severe diverticulosis and telangiectasia. Trial iron solution in Orange juice 12/16    Junctional bradycardia     Symptomatic, resolved with discontinuation of Verapamil, 05/09. 1.1) Echocardiogram: LAE, LVH, EF 50%, mild MR, diastolic. 1.2) Dr. Marcy Krause evaluation. 1.3.) Persantine stress test negative, 05/09.  Migraine     Mild CAD     cath 10/15    Mitral regurgitation     Moderate to severe on echocardiogram September 2015.   Right pressure 76 grade 2 diastolic dysfunction,  echo 10/16 grade 2 diastolic dysfunction mild to moderate MR RVSP 56 aortic sclerosis    Obesity     CHICO (obstructive sleep apnea)     CPAP 14 2/15 initiation  AHI 7  mild CHICO intolerant CPAP    Osteoarthritis     PMR (polymyalgia rheumatica) (HCC)     Pneumonia     Premature atrial contractions     Pulmonary hypertension (HCC)     -Moderate on echo November 2014    Restrictive lung disease     Mild on PFTs 11/14    Type II or unspecified type diabetes mellitus without mention of complication, not stated as uncontrolled     Vitreous floaters      Family History   Problem Relation Age of Onset    Uterine Cancer Mother     Heart Disease Father     High Blood Pressure Father     Stroke Sister         from a vascular malformation    Heart Attack Son         age 48     Social History     Tobacco Use    Smoking status: Never Smoker    Smokeless tobacco: Never Used    Tobacco comment: amish rrt 5/28/2019   Substance Use Topics    Alcohol use: No     Alcohol/week: 0.0 standard drinks    Drug use: No     Allergies   Allergen Reactions    Codeine      Confusion      Erythromycin      GI upset  Received zithromax in past and tolerated    Exenatide Nausea Only    Levofloxacin      GI upset    Verapamil Other (See Comments)     Junctional bradycardia    Clonidine Derivatives Rash     2009, catapres    Other Rash     Levbid    Penicillins Rash     Received Rocephin in past and tolerated       MEDICATIONS:  Current Outpatient Medications   Medication Sig Dispense Refill    sulfamethoxazole-trimethoprim (BACTRIM DS;SEPTRA DS) 800-160 MG per tablet Take 1 tablet by mouth 2 times daily for 10 days 20 tablet 0    metoprolol tartrate (LOPRESSOR) 25 MG tablet Take 1.5 tablets by mouth 2 times daily 240 tablet 3    amLODIPine (NORVASC) 5 MG tablet Take 1 tablet by mouth daily 90 tablet 3    Insulin Pen Needle (B-D ULTRAFINE III SHORT PEN) 31G X 8 MM MISC USE 7 DAILY 270 each 3    simvastatin (ZOCOR) 20 MG tablet TAKE 1 TABLET NIGHTLY 90 tablet 3    Multiple Vitamins-Minerals (AIRBORNE PO) Take by mouth daily      Liraglutide (VICTOZA) 18 MG/3ML SOPN SC injection Inject 1.2 mg into the skin daily 18 mL 3    hydrALAZINE (APRESOLINE) 50 MG tablet Take 1 tablet by mouth 3 times daily 270 tablet 3    insulin glargine (LANTUS SOLOSTAR) 100 UNIT/ML injection pen Inject 22 Units into the skin 2 times daily 39.6 mL 3    insulin lispro (HUMALOG KWIKPEN) 100 UNIT/ML pen Inject 8 Units into the skin 4 times daily Indications: pt takes before meals, and before HS snack 1 pen 3    clotrimazole-betamethasone (LOTRISONE) 1-0.05 % cream Apply topically 2 times daily. 45 g 0    nystatin (MYCOSTATIN) 254702 UNIT/GM powder Apply topically BID x7 days to folds after washing with mild soap and patted dry. 45 Bottle 0    potassium chloride (KLOR-CON M10) 10 MEQ extended release tablet TAKE 1 TABLET DAILY 90 tablet 3    albuterol sulfate  (90 Base) MCG/ACT inhaler Inhale 2 puffs into the lungs 4 times daily as needed for Wheezing 1 Inhaler 1    furosemide (LASIX) 40 MG tablet Take 1 tablet by mouth daily 90 tablet 3    Polyethylene Glycol 400 (BLINK TEARS) 0.25 % SOLN Place into both eyes as needed (dry eyes)       predniSONE (DELTASONE) 5 MG tablet TAKE 1 TABLET DAILY 90 tablet 3    Handicap Placard MISC by Does not apply route EXP: 6/12/2020 1 each 0    aspirin 81 MG EC tablet Take 81 mg by mouth daily      glucose blood VI test strips (ASCENSIA AUTODISC VI;ONE TOUCH ULTRA TEST VI) strip 1 each by In Vitro route 3 times daily As directed. 100 each 5    acetaminophen (TYLENOL) 500 MG tablet Take 500 mg by mouth daily      omeprazole (PRILOSEC) 20 MG capsule Take 20 mg by mouth Daily  30 capsule 3    Cholecalciferol (VITAMIN D3) 2000 UNITS CAPS Take 2,000 Units by mouth daily        No current facility-administered medications for this visit.         Review ofSymptoms:  Review of Systems   Constitutional: Negative for fatigue, unexpected weight change and weight loss. Eyes: Negative for blurred vision and visual disturbance. Respiratory: Negative. Negative for shortness of breath. Cardiovascular: Negative for chest pain and leg swelling. Gastrointestinal: Negative for abdominal pain and diarrhea. Endocrine: Negative for polydipsia, polyphagia and polyuria. Genitourinary: Positive for frequency. Musculoskeletal: Negative. Skin: Negative for rash and wound. Neurological: Negative for dizziness, tremors, seizures, weakness and headaches. Psychiatric/Behavioral: Negative. Negative for confusion and decreased concentration. Theremainder of a complete 14-point review of systems is negative. Vital Signs: BP (!) 140/78   Pulse 70   Resp 16   Wt 189 lb (85.7 kg)   SpO2 96%   BMI 38.51 kg/m²      Wt Readings from Last 3 Encounters:   01/28/20 189 lb (85.7 kg)   12/20/19 194 lb 0.1 oz (88 kg)   12/12/19 194 lb (88 kg)     Body mass index is 38.51 kg/m².   LABS:  Hemoglobin A1C   Date Value Ref Range Status   11/12/2019 6.5 (H) 4.8 - 5.9 % Final   08/19/2019 5.9 4.8 - 5.9 % Final     Lab Results   Component Value Date    LABMICR CANNOT BE CALCULATED 04/23/2019     Lab Results   Component Value Date     (H) 11/12/2019    K 3.7 11/12/2019     11/12/2019    CO2 30 11/12/2019    BUN 16 11/12/2019    CREATININE 1.03 (H) 11/12/2019    GLUCOSE 125 (H) 11/12/2019    CALCIUM 10.0 11/12/2019    PROT 7.4 11/12/2019    LABALBU 4.2 11/12/2019    BILITOT 0.34 11/12/2019    ALKPHOS 87 11/12/2019    AST 13 11/12/2019    ALT 16 11/12/2019    LABGLOM 51 (L) 11/12/2019    GFRAA >60 11/12/2019     Lab Results   Component Value Date    CHOL 137 07/20/2019    CHOL 142 05/30/2019    CHOL 142 05/28/2019     Lab Results   Component Value Date    TRIG 126 07/20/2019    TRIG 114 05/30/2019    TRIG 59 05/28/2019     Lab Results   Component Value Date    HDL 66 07/20/2019    HDL 62 05/30/2019    HDL 74 05/28/2019     Lab Results Component Value Date    LDLCHOLESTEROL 46 07/20/2019    LDLCHOLESTEROL 57 05/30/2019    LDLCHOLESTEROL 56 05/28/2019     Lab Results   Component Value Date    VLDL NOT REPORTED 07/20/2019    VLDL NOT REPORTED 05/30/2019    VLDL NOT REPORTED 05/28/2019     Lab Results   Component Value Date    CHOLHDLRATIO 2.1 07/20/2019    CHOLHDLRATIO 2.3 05/30/2019    CHOLHDLRATIO 1.9 05/28/2019           Physical Exam  Constitutional:       Appearance: She is well-developed. Eyes:      Pupils: Pupils are equal, round, and reactive to light. Cardiovascular:      Rate and Rhythm: Normal rate and regular rhythm. Heart sounds: Normal heart sounds. Pulmonary:      Effort: Pulmonary effort is normal.      Breath sounds: Normal breath sounds. Abdominal:      General: Bowel sounds are normal.      Palpations: Abdomen is soft. Skin:     General: Skin is warm and dry. Comments: Negative for open/nonhealing wounds. Negative for lipohypertrophy. Neurological:      Mental Status: She is alert and oriented to person, place, and time. .    ASSESSMENT/PLAN:     Diagnosis Orders   1. Type 2 diabetes with nephropathy ([facility]     2. Diabetes education, encounter for     3. Dysuria  Urinalysis with Microscopic    sulfamethoxazole-trimethoprim (BACTRIM DS;SEPTRA DS) 800-160 MG per tablet     Orders Placed This Encounter   Procedures    Urinalysis with Microscopic     Orders Placed This Encounter   Medications    sulfamethoxazole-trimethoprim (BACTRIM DS;SEPTRA DS) 800-160 MG per tablet     Sig: Take 1 tablet by mouth 2 times daily for 10 days     Dispense:  20 tablet     Refill:  0     Requested Prescriptions     Signed Prescriptions Disp Refills    sulfamethoxazole-trimethoprim (BACTRIM DS;SEPTRA DS) 800-160 MG per tablet 20 tablet 0     Sig: Take 1 tablet by mouth 2 times daily for 10 days       1. Type 2 diabetes with nephropathy ([facility]  2.  Diabetes education, encounter for  - Stable  HbA1C goal is less than 7% and

## 2020-02-11 RX ORDER — INSULIN LISPRO 100 [IU]/ML
INJECTION, SOLUTION INTRAVENOUS; SUBCUTANEOUS
Qty: 28.8 ML | Refills: 3 | Status: SHIPPED | OUTPATIENT
Start: 2020-02-11 | End: 2020-09-29 | Stop reason: SDUPTHER

## 2020-02-19 ENCOUNTER — OFFICE VISIT (OUTPATIENT)
Dept: OBGYN | Age: 85
End: 2020-02-19
Payer: MEDICARE

## 2020-02-19 VITALS
SYSTOLIC BLOOD PRESSURE: 152 MMHG | BODY MASS INDEX: 39.31 KG/M2 | DIASTOLIC BLOOD PRESSURE: 60 MMHG | HEIGHT: 59 IN | HEART RATE: 73 BPM | WEIGHT: 195 LBS | TEMPERATURE: 97.2 F

## 2020-02-19 PROCEDURE — 99214 OFFICE O/P EST MOD 30 MIN: CPT

## 2020-02-19 PROCEDURE — 99213 OFFICE O/P EST LOW 20 MIN: CPT | Performed by: NURSE PRACTITIONER

## 2020-02-19 NOTE — PROGRESS NOTES
Subjective:  Maurilio Eagle presents for recheck of right breast.  Lump/ridge of tissue noted at annual exam.  Mammogram and ultrasound were negative. She does not consume much caffeine and does not perform breast self exam. Has noted no other breast problems. Patient Active Problem List   Diagnosis    Dyslipidemia    Osteoarthritis    Esophagitis    Vitamin D deficiency    PMR (polymyalgia rheumatica) (HCC)    Central artery occlusion of retina    Diastolic dysfunction    Pulmonary HTN (HCC)    Iron deficiency anemia due to chronic blood loss    Type 2 diabetes with nephropathy (HCC)    CHICO- intolerant CPAP    Class 2 severe obesity due to excess calories with serious comorbidity and body mass index (BMI) of 37.0 to 37.9 in adult [facility])    Essential hypertension    Lumbar radiculopathy    Neural foraminal stenosis of lumbar spine    Spinal stenosis at L4-L5 level    Mitral regurgitation    Frequent PVCs    Asthma    Gait abnormality    Asthma    Nephrolithiasis    Right kidney stone    Hypokalemia    Multifocal atrial tachycardia (HCC)    Acute on chronic diastolic heart failure (HCC)    Sigmoid diverticulitis    CHF (congestive heart failure), NYHA class I, acute on chronic, combined ([address]     Problem list reviewed as part of this encounter. Medication list reviewed and updated. See nursing note. History of present illness reviewed in detail and expanded by me.        Review Of Systems:  General ROS:  negative  Hematological and Lymphatic ROS:negative   Breast ROS: positive for breast problem  Cardiovascular ROS: negative  Respiratory ROS: negative   Gastrointestinal ROS: negative  Genito-Urinary ROS: negative  Psychological ROS: negative  Neurological ROS: negative  Musculoskeletal ROS: negative  Dermatological ROS: negative

## 2020-02-24 ENCOUNTER — TELEPHONE (OUTPATIENT)
Dept: INTERNAL MEDICINE | Age: 85
End: 2020-02-24

## 2020-02-24 ENCOUNTER — OFFICE VISIT (OUTPATIENT)
Dept: CARDIOLOGY | Age: 85
End: 2020-02-24
Payer: MEDICARE

## 2020-02-24 ENCOUNTER — HOSPITAL ENCOUNTER (OUTPATIENT)
Dept: LAB | Age: 85
Discharge: HOME | End: 2020-02-24
Payer: MEDICARE

## 2020-02-24 VITALS
DIASTOLIC BLOOD PRESSURE: 60 MMHG | HEIGHT: 59 IN | WEIGHT: 192 LBS | BODY MASS INDEX: 38.71 KG/M2 | HEART RATE: 80 BPM | SYSTOLIC BLOOD PRESSURE: 152 MMHG

## 2020-02-24 LAB
-: ABNORMAL
AMORPHOUS: ABNORMAL
ANION GAP SERPL CALCULATED.3IONS-SCNC: 18 MMOL/L (ref 9–17)
BACTERIA: ABNORMAL
BILIRUBIN URINE: NEGATIVE
BUN BLDV-MCNC: 15 MG/DL (ref 8–23)
BUN/CREAT BLD: 18 (ref 9–20)
CALCIUM SERPL-MCNC: 9.6 MG/DL (ref 8.6–10.4)
CASTS UA: ABNORMAL /LPF (ref 0–2)
CHLORIDE BLD-SCNC: 100 MMOL/L (ref 98–107)
CO2: 25 MMOL/L (ref 20–31)
COLOR: ABNORMAL
COMMENT UA: ABNORMAL
CREAT SERPL-MCNC: 0.84 MG/DL (ref 0.5–0.9)
CRYSTALS, UA: ABNORMAL /HPF
EPITHELIAL CELLS UA: ABNORMAL /HPF (ref 0–5)
ESTIMATED AVERAGE GLUCOSE: 143 MG/DL
GFR AFRICAN AMERICAN: >60 ML/MIN
GFR NON-AFRICAN AMERICAN: >60 ML/MIN
GFR SERPL CREATININE-BSD FRML MDRD: ABNORMAL ML/MIN/{1.73_M2}
GFR SERPL CREATININE-BSD FRML MDRD: ABNORMAL ML/MIN/{1.73_M2}
GLUCOSE BLD-MCNC: 105 MG/DL (ref 70–99)
GLUCOSE URINE: NEGATIVE
HBA1C MFR BLD: 6.6 % (ref 4.8–5.9)
KETONES, URINE: NEGATIVE
LEUKOCYTE ESTERASE, URINE: ABNORMAL
MUCUS: ABNORMAL
NITRITE, URINE: NEGATIVE
OTHER OBSERVATIONS UA: ABNORMAL
PH UA: 5.5 (ref 5–6)
POTASSIUM SERPL-SCNC: 3.5 MMOL/L (ref 3.7–5.3)
PROTEIN UA: NEGATIVE
RBC UA: ABNORMAL /HPF (ref 0–4)
RENAL EPITHELIAL, UA: ABNORMAL /HPF
SODIUM BLD-SCNC: 143 MMOL/L (ref 135–144)
SPECIFIC GRAVITY UA: 1.01 (ref 1.01–1.02)
TRICHOMONAS: ABNORMAL
TURBIDITY: ABNORMAL
URINE HGB: NEGATIVE
UROBILINOGEN, URINE: NORMAL
WBC UA: ABNORMAL /HPF (ref 0–4)
YEAST: ABNORMAL

## 2020-02-24 PROCEDURE — 80048 BASIC METABOLIC PNL TOTAL CA: CPT

## 2020-02-24 PROCEDURE — 87086 URINE CULTURE/COLONY COUNT: CPT

## 2020-02-24 PROCEDURE — 87186 SC STD MICRODIL/AGAR DIL: CPT

## 2020-02-24 PROCEDURE — 99212 OFFICE O/P EST SF 10 MIN: CPT | Performed by: INTERNAL MEDICINE

## 2020-02-24 PROCEDURE — 93010 ELECTROCARDIOGRAM REPORT: CPT | Performed by: INTERNAL MEDICINE

## 2020-02-24 PROCEDURE — 99214 OFFICE O/P EST MOD 30 MIN: CPT | Performed by: INTERNAL MEDICINE

## 2020-02-24 PROCEDURE — 93005 ELECTROCARDIOGRAM TRACING: CPT | Performed by: INTERNAL MEDICINE

## 2020-02-24 PROCEDURE — 83036 HEMOGLOBIN GLYCOSYLATED A1C: CPT

## 2020-02-24 PROCEDURE — 81001 URINALYSIS AUTO W/SCOPE: CPT

## 2020-02-24 PROCEDURE — 87088 URINE BACTERIA CULTURE: CPT

## 2020-02-24 PROCEDURE — 36415 COLL VENOUS BLD VENIPUNCTURE: CPT

## 2020-02-24 RX ORDER — SULFAMETHOXAZOLE AND TRIMETHOPRIM 800; 160 MG/1; MG/1
1 TABLET ORAL 2 TIMES DAILY
Qty: 14 TABLET | Refills: 0 | Status: SHIPPED | OUTPATIENT
Start: 2020-02-24 | End: 2020-03-02

## 2020-02-24 NOTE — PROGRESS NOTES
HPI and CC:  Patient is doing well from a cardiac standpoint. Good functional capacity with no significant change in functional capacity. No chest pain, no dyspnea, no PND, no syncope or pre-syncope, no orthopnea. No symptoms of CHF or angina/chest pain. Past Medical History:   has a past medical history of Allergic rhinitis, Asthma, Basal cell carcinoma of cheek, Basal cell carcinoma of leg, CAD (coronary artery disease), Central retinal artery occlusion, Cerebral artery occlusion with cerebral infarction [facility]), Cerebrovascular disease, CHF (congestive heart failure) ([facility] [address] Diverticulosis, Dyslipidemia, Elevated antinuclear antibody (ZAIRA) level, Esophagitis, Foraminal stenosis of lumbar region, Hyperlipidemia, Hypertension, IBS (irritable bowel syndrome), Iron deficiency anemia, Junctional bradycardia, Migraine, Mild CAD, Mitral regurgitation, Obesity, CHICO (obstructive sleep apnea), Osteoarthritis, PMR (polymyalgia rheumatica) ([facility] [address] Pneumonia, Premature atrial contractions, Pulmonary hypertension ([facility] [address] Restrictive lung disease, Type II or unspecified type diabetes mellitus without mention of complication, not stated as uncontrolled, and Vitreous floaters. Past Surgical History:   has a past surgical history that includes Appendectomy (1952); Hysterectomy (Approx 1983); Cataract removal (Bilateral, 08/10); malignant skin lesion excision (Right, 12/10 and 04/11); malignant skin lesion excision (Right, 02/2012); Colonoscopy (05/16/2011); other surgical history (09/06/2011); Cholecystectomy; Endoscopy, colon, diagnostic; eye surgery; Cardiac catheterization (2014); other surgical history (3/22/16); other surgical history (Right, 4/26/16); Cystoscopy (Right, 2/6/2019); Cystoscopy (Right, 2/27/2019); Colonoscopy (N/A, 9/26/2019); Upper gastrointestinal endoscopy (05/16/2011); Upper gastrointestinal endoscopy (6/22/15); and Upper gastrointestinal endoscopy (N/A, 9/26/2019).     Home Medications:  Prior to Admission medications    Medication Sig Start Date End Date Taking? Authorizing Provider   insulin lispro, 1 Unit Dial, (HUMALOG KWIKPEN) 100 UNIT/ML SOPN Inject 8 units into the skin four times a day (before meals and bedtime snack). 2/11/20  Yes INGRID Schwarz CNP   metoprolol tartrate (LOPRESSOR) 25 MG tablet Take 1.5 tablets by mouth 2 times daily 1/24/20  Yes Gaurang Cherry DO   amLODIPine (NORVASC) 5 MG tablet Take 1 tablet by mouth daily 1/24/20  Yes Gaurang Cherry DO   Insulin Pen Needle (B-D ULTRAFINE III SHORT PEN) 31G X 8 MM MISC USE 7 DAILY 1/24/20  Yes Gaurang Cherry DO   simvastatin (ZOCOR) 20 MG tablet TAKE 1 TABLET NIGHTLY 1/13/20  Yes INGRID Schwarz CNP   Multiple Vitamins-Minerals (AIRBORNE PO) Take by mouth daily   Yes Historical Provider, MD   Liraglutide (VICTOZA) 18 MG/3ML SOPN SC injection Inject 1.2 mg into the skin daily 12/9/19  Yes INGRID Schwarz CNP   hydrALAZINE (APRESOLINE) 50 MG tablet Take 1 tablet by mouth 3 times daily 11/5/19  Yes INGRID Schwarz CNP   insulin glargine (LANTUS SOLOSTAR) 100 UNIT/ML injection pen Inject 22 Units into the skin 2 times daily 10/29/19  Yes INGRID Schwarz CNP   clotrimazole-betamethasone (LOTRISONE) 1-0.05 % cream Apply topically 2 times daily. 8/21/19  Yes INGRID Schwarz CNP   nystatin (MYCOSTATIN) 352577 UNIT/GM powder Apply topically BID x7 days to folds after washing with mild soap and patted dry.  8/14/19  Yes INGRID Schwarz CNP   potassium chloride (KLOR-CON M10) 10 MEQ extended release tablet TAKE 1 TABLET DAILY 8/1/19  Yes INGRID Schwarz CNP   albuterol sulfate  (90 Base) MCG/ACT inhaler Inhale 2 puffs into the lungs 4 times daily as needed for Wheezing 7/26/19  Yes Marjorie Ruvalcaba MD   furosemide (LASIX) 40 MG tablet Take 1 tablet by mouth daily 7/3/19  Yes INGRID Schwarz - CNP   Polyethylene Glycol 400 (BLINK TEARS) 0.25 % SOLN Place into both eyes as needed (dry eyes)    Yes Historical Provider, MD   predniSONE (DELTASONE) 5 MG tablet TAKE 1 TABLET DAILY 4/18/19  Yes Natalie Coronel MD   Handicap Placard MISC by Does not apply route EXP: 6/12/2020 6/12/18  Yes Natalie Coronel MD   aspirin 81 MG EC tablet Take 81 mg by mouth daily   Yes Historical Provider, MD   glucose blood VI test strips (ASCENSIA AUTODISC VI;ONE TOUCH ULTRA TEST VI) strip 1 each by In Vitro route 3 times daily As directed. 7/13/17  Yes Gaurang Cherry DO   acetaminophen (TYLENOL) 500 MG tablet Take 500 mg by mouth daily   Yes Historical Provider, MD   omeprazole (PRILOSEC) 20 MG capsule Take 20 mg by mouth Daily  1/14/15  Yes Allen Naranjo   Cholecalciferol (VITAMIN D3) 2000 UNITS CAPS Take 2,000 Units by mouth daily    Yes Historical Provider, MD       Allergies:  Codeine; Erythromycin; Exenatide; Levofloxacin; Verapamil; Clonidine derivatives; Other; and Penicillins    Social History:   reports that she has never smoked. She has never used smokeless tobacco. She reports that she does not drink alcohol or use drugs. REVIEW OF SYSTEMS:    Constitutional: there has been no unanticipated weight loss. There's been No change in energy level, No change in activity level. Eyes: No visual changes or diplopia. No scleral icterus. ENT: No Headaches, hearing loss or vertigo. No mouth sores or sore throat. Cardiovascular: No chest pain, no dyspnea, no chf like symptoms  Respiratory: No previous pulmonary problems  Gastrointestinal: No abdominal pain, appetite loss, blood in stools. No change in bowel or bladder habits. Genitourinary: No dysuria, trouble voiding, or hematuria. Musculoskeletal:  No gait disturbance, No weakness or joint complaints. Integumentary: No rash or pruritis. Neurological: No headache, diplopia, change in muscle strength, numbness or tingling.  No change in gait, balance, coordination, mood, affect, memory, Osteoarthritis    Esophagitis    Vitamin D deficiency    PMR (polymyalgia rheumatica) (HCC)    Central artery occlusion of retina    Diastolic dysfunction    Pulmonary HTN (HCC)    Iron deficiency anemia due to chronic blood loss    Type 2 diabetes with nephropathy (HCC)    CHICO- intolerant CPAP    Class 2 severe obesity due to excess calories with serious comorbidity and body mass index (BMI) of 37.0 to 37.9 in adult [facility])    Essential hypertension    Lumbar radiculopathy    Neural foraminal stenosis of lumbar spine    Spinal stenosis at L4-L5 level    Mitral regurgitation    Frequent PVCs    Asthma    Gait abnormality    Asthma    Nephrolithiasis    Right kidney stone    Hypokalemia    Multifocal atrial tachycardia (HCC)    Acute on chronic diastolic heart failure (HCC)    Sigmoid diverticulitis    CHF (congestive heart failure), NYHA class I, acute on chronic, combined ([address]       IMPRESSION & RECOMMENDATIONS:    1. HTN- overall improved, however, does have some Highs, which I think are responsible given her advanced age as well as her symtpoms when her BP is lower. Continue current meds  2. Chronic HFpEF- stable continue current dose lasix, and can take extra lasix pron. 3. HLP- stable on meds  4. DM2- stable on meds per pcp    Thank you for allowing me to participate in the care of this patient, please do not hesitate to call if you have any questions. Efren Mao, [id number] [facility]  [facility] [employer]  [phone]

## 2020-02-24 NOTE — TELEPHONE ENCOUNTER
Patient stopped at the window and was dx with UTI 1/28/2020. She stated that she is still having frequency and painful urination. Ordered UA.     KYA Pizarro

## 2020-02-26 LAB
CULTURE: ABNORMAL
Lab: ABNORMAL
SPECIMEN DESCRIPTION: ABNORMAL

## 2020-02-26 RX ORDER — CIPROFLOXACIN 500 MG/1
500 TABLET, FILM COATED ORAL 2 TIMES DAILY
Qty: 10 TABLET | Refills: 0 | Status: SHIPPED | OUTPATIENT
Start: 2020-02-26 | End: 2020-04-21 | Stop reason: SDUPTHER

## 2020-03-03 ENCOUNTER — HOSPITAL ENCOUNTER (OUTPATIENT)
Dept: LAB | Age: 85
Discharge: HOME | End: 2020-03-03
Payer: MEDICARE

## 2020-03-03 LAB — POTASSIUM SERPL-SCNC: 4.1 MMOL/L (ref 3.7–5.3)

## 2020-03-03 PROCEDURE — 36415 COLL VENOUS BLD VENIPUNCTURE: CPT

## 2020-03-03 PROCEDURE — 84132 ASSAY OF SERUM POTASSIUM: CPT

## 2020-03-03 RX ORDER — POTASSIUM CHLORIDE 750 MG/1
10 TABLET, FILM COATED, EXTENDED RELEASE ORAL 2 TIMES DAILY
COMMUNITY
End: 2020-04-14 | Stop reason: SDUPTHER

## 2020-03-09 ENCOUNTER — OFFICE VISIT (OUTPATIENT)
Dept: INTERNAL MEDICINE | Age: 85
End: 2020-03-09
Payer: MEDICARE

## 2020-03-09 VITALS
BODY MASS INDEX: 38.3 KG/M2 | DIASTOLIC BLOOD PRESSURE: 60 MMHG | SYSTOLIC BLOOD PRESSURE: 142 MMHG | WEIGHT: 190 LBS | RESPIRATION RATE: 16 BRPM | HEART RATE: 68 BPM | HEIGHT: 59 IN

## 2020-03-09 PROCEDURE — 99212 OFFICE O/P EST SF 10 MIN: CPT | Performed by: NURSE PRACTITIONER

## 2020-03-09 PROCEDURE — 99214 OFFICE O/P EST MOD 30 MIN: CPT | Performed by: NURSE PRACTITIONER

## 2020-03-09 ASSESSMENT — PATIENT HEALTH QUESTIONNAIRE - PHQ9
SUM OF ALL RESPONSES TO PHQ QUESTIONS 1-9: 0
1. LITTLE INTEREST OR PLEASURE IN DOING THINGS: 0
SUM OF ALL RESPONSES TO PHQ9 QUESTIONS 1 & 2: 0
2. FEELING DOWN, DEPRESSED OR HOPELESS: 0
SUM OF ALL RESPONSES TO PHQ QUESTIONS 1-9: 0

## 2020-03-09 NOTE — PROGRESS NOTES
and telangiectasia. Trial iron solution in Orange juice 12/16    Junctional bradycardia     Symptomatic, resolved with discontinuation of Verapamil, 05/09. 1.1) Echocardiogram: LAE, LVH, EF 50%, mild MR, diastolic. 1.2) Dr. Alonso Conception evaluation. 1.3.) Persantine stress test negative, 05/09.  Migraine     Mild CAD     cath 10/15    Mitral regurgitation     Moderate to severe on echocardiogram September 2015.   Right pressure 76 grade 2 diastolic dysfunction,  echo 76/30 grade 2 diastolic dysfunction mild to moderate MR RVSP 56 aortic sclerosis    Obesity     CHICO (obstructive sleep apnea)     CPAP 14 2/15 initiation  AHI 7  mild CHICO intolerant CPAP    Osteoarthritis     PMR (polymyalgia rheumatica) (HCC)     Pneumonia     Premature atrial contractions     Pulmonary hypertension (HCC)     -Moderate on echo November 2014    Restrictive lung disease     Mild on PFTs 11/14    Type II or unspecified type diabetes mellitus without mention of complication, not stated as uncontrolled     Vitreous floaters        Past Surgical History:   Procedure Laterality Date    APPENDECTOMY  1952    CARDIAC CATHETERIZATION  2014    CATARACT REMOVAL Bilateral 08/10    CHOLECYSTECTOMY      COLONOSCOPY  05/16/2011    COLONOSCOPY N/A 9/26/2019    severe diverticulosis, benign rectal polyp, Dr. Steve Goss Right 2/6/2019    Cysto with right stent insertion and villareal catheter performed by Allyssa Diego MD at [address] [place] [address] Right 2/27/2019    CYSTO right stent removal  Right Ureteroscopy Holmium Laser right stent exchange performed by Allyssa Diego MD at [facility], DIAGNOSTIC      EYE SURGERY      HYSTERECTOMY  Approx 1983    MALIGNANT SKIN LESION EXCISION Right 12/10 and 04/11    Basal CellRemoved from right neck    MALIGNANT SKIN LESION EXCISION Right 02/2012    Basal Cell Removed from right leg    OTHER SURGICAL HISTORY  09/06/2011    capsule endoscopy    OTHER SURGICAL HISTORY 3/22/16    right L4 and L5 TFE    OTHER SURGICAL HISTORY Right 4/26/16    L4, L5 TFE    UPPER GASTROINTESTINAL ENDOSCOPY  05/16/2011    UPPER GASTROINTESTINAL ENDOSCOPY  6/22/15    mild gastritis (Dr. Mónica Almaguer)    UPPER GASTROINTESTINAL ENDOSCOPY N/A 9/26/2019    mild to moderate inflammation, Dr. Mónica Almaguer       Current Outpatient Medications on File Prior to Visit   Medication Sig Dispense Refill    potassium chloride (KLOR-CON 10) 10 MEQ extended release tablet Take 10 mEq by mouth 2 times daily      insulin lispro, 1 Unit Dial, (HUMALOG KWIKPEN) 100 UNIT/ML SOPN Inject 8 units into the skin four times a day (before meals and bedtime snack). 28.8 mL 3    metoprolol tartrate (LOPRESSOR) 25 MG tablet Take 1.5 tablets by mouth 2 times daily 240 tablet 3    amLODIPine (NORVASC) 5 MG tablet Take 1 tablet by mouth daily 90 tablet 3    Insulin Pen Needle (B-D ULTRAFINE III SHORT PEN) 31G X 8 MM MISC USE 7 DAILY 270 each 3    simvastatin (ZOCOR) 20 MG tablet TAKE 1 TABLET NIGHTLY 90 tablet 3    Multiple Vitamins-Minerals (AIRBORNE PO) Take by mouth daily      Liraglutide (VICTOZA) 18 MG/3ML SOPN SC injection Inject 1.2 mg into the skin daily 18 mL 3    hydrALAZINE (APRESOLINE) 50 MG tablet Take 1 tablet by mouth 3 times daily 270 tablet 3    insulin glargine (LANTUS SOLOSTAR) 100 UNIT/ML injection pen Inject 22 Units into the skin 2 times daily 39.6 mL 3    furosemide (LASIX) 40 MG tablet Take 1 tablet by mouth daily 90 tablet 3    Polyethylene Glycol 400 (BLINK TEARS) 0.25 % SOLN Place into both eyes as needed (dry eyes)       predniSONE (DELTASONE) 5 MG tablet TAKE 1 TABLET DAILY 90 tablet 3    Handicap Placard MISC by Does not apply route EXP: 6/12/2020 1 each 0    aspirin 81 MG EC tablet Take 81 mg by mouth daily      glucose blood VI test strips (ASCENSIA AUTODISC VI;ONE TOUCH ULTRA TEST VI) strip 1 each by In Vitro route 3 times daily As directed.  100 each 5    acetaminophen (TYLENOL) 500 MG tablet Take sounds. No stridor. No wheezing, rhonchi or rales. Chest:      Chest wall: No tenderness. Abdominal:      General: Bowel sounds are normal. There is no distension. Palpations: Abdomen is soft. Tenderness: There is no abdominal tenderness. Comments: Obese   Musculoskeletal:         General: No swelling. Skin:     General: Skin is warm and dry. Capillary Refill: Capillary refill takes less than 2 seconds. Coloration: Skin is not jaundiced or pale. Findings: No rash. Neurological:      General: No focal deficit present. Mental Status: She is alert and oriented to person, place, and time. Cranial Nerves: No cranial nerve deficit. Sensory: No sensory deficit. Coordination: Coordination normal.   Psychiatric:         Mood and Affect: Mood normal.         Behavior: Behavior normal.         Thought Content: Thought content normal.         Judgment: Judgment normal.       Vitals:    03/09/20 1410   BP: (!) 142/60   Site: Left Upper Arm   Position: Sitting   Cuff Size: Large Adult   Pulse: 68   Resp: 16   Weight: 190 lb (86.2 kg)   Height: 4' 10.5\" (1.486 m)       Assessment:  1. Essential hypertension  Stable, continue on metoprolol 37.5 mg twice daily, Norvasc 5 mg daily, hydralazine 50 mg 3 times a day and Lasix 40 mg daily    2. Diabetic nephropathy associated with type 2 diabetes mellitus ([facility]  Most recent hemoglobin A1c 6.6. Continue to follow with the diabetic nurse practitioner. Stable. Continue to work on diet and remain active    3. Diastolic dysfunction  Stable, no signs of fluid overload. Continue on Toprol, Lasix, hydralazine    4. Type 2 diabetes with nephropathy (HCC)  Stable. As noted above. A1c 6.6  - Hemoglobin A1C; Future    5. Kidney disease  Labs stable. Avoid nephrotoxic drugs    6. Lesion of lower extremity  Again wants to hold off on surgery to remove the mass encapsulating the muscle.   States she wants to wait to have it done until the pain is too severe    7. Hypertensive heart disease with congestive heart failure, unspecified heart failure type ([email or web address]  Stable. Ongoing follow-up with cardiology    8. Iron deficiency anemia secondary to blood loss (chronic)  Stable, continues to follow with hematology    9. Polymyalgia rheumatica (HCC)  Continues on low-dose prednisone. Multiple previous attempts with failed removal of prednisone    10. Spinal stenosis, lumbar region, without neurogenic claudication  Stable at present    13. Pulmonary HTN ([email or web address]  Stable, ongoing follow-up with specialties    14. Obesity, morbid, BMI 40.0-49.9 ([email or web address]  Work on diet, increase activity, weight loss    15. Vitamin D deficiency  Stable on supplemental vitamin D    16. Dyslipidemia  Continues on statin, remain active, continue to work on diet  - Lipid Panel; Future  - Comprehensive Metabolic Panel; Future    17. Multifocal atrial tachycardia (HCC)  Stable, continues to follow with cardiology. Continues on metoprolol currently asymptomatic      Plan:  As noted above. Follow up for routine visit. Call sooner with concerns prior.     Electronically signed by INGRID Wilson CNP on 3/9/2020 at 4:42 PM

## 2020-03-13 ASSESSMENT — ENCOUNTER SYMPTOMS
TROUBLE SWALLOWING: 0
FACIAL SWELLING: 0
SHORTNESS OF BREATH: 0
RHINORRHEA: 0
COLOR CHANGE: 0
EYE PAIN: 0
SINUS PRESSURE: 0
NAUSEA: 0
ABDOMINAL PAIN: 0
DIARRHEA: 0
CONSTIPATION: 0
VOMITING: 0
COUGH: 0
CHEST TIGHTNESS: 0
WHEEZING: 0
SORE THROAT: 0
BLOOD IN STOOL: 0

## 2020-03-26 NOTE — TELEPHONE ENCOUNTER
Last Appt:  3/9/2020  Next Appt:   6/9/2020  Med verified in Epic     Patient would like this mailed to her.

## 2020-04-07 ENCOUNTER — TELEPHONE (OUTPATIENT)
Dept: INTERNAL MEDICINE | Age: 85
End: 2020-04-07

## 2020-04-07 NOTE — TELEPHONE ENCOUNTER
Patient needs her handicap placard renewed, pended if you agree. Only needs 1. Mail to patients home address please.

## 2020-04-14 RX ORDER — POTASSIUM CHLORIDE 750 MG/1
10 TABLET, FILM COATED, EXTENDED RELEASE ORAL 2 TIMES DAILY
Qty: 180 TABLET | Refills: 3 | Status: SHIPPED | OUTPATIENT
Start: 2020-04-14 | End: 2020-09-09 | Stop reason: DRUGHIGH

## 2020-04-20 ENCOUNTER — TELEPHONE (OUTPATIENT)
Dept: INTERNAL MEDICINE | Age: 85
End: 2020-04-20

## 2020-04-21 ENCOUNTER — HOSPITAL ENCOUNTER (OUTPATIENT)
Dept: LAB | Age: 85
Discharge: HOME | End: 2020-04-21
Payer: MEDICARE

## 2020-04-21 ENCOUNTER — OFFICE VISIT (OUTPATIENT)
Dept: INTERNAL MEDICINE | Age: 85
End: 2020-04-21
Payer: MEDICARE

## 2020-04-21 VITALS
HEART RATE: 72 BPM | SYSTOLIC BLOOD PRESSURE: 130 MMHG | TEMPERATURE: 99.1 F | BODY MASS INDEX: 38.09 KG/M2 | WEIGHT: 194 LBS | RESPIRATION RATE: 16 BRPM | DIASTOLIC BLOOD PRESSURE: 60 MMHG | HEIGHT: 60 IN

## 2020-04-21 LAB
-: ABNORMAL
AMORPHOUS: ABNORMAL
BACTERIA: ABNORMAL
BILIRUBIN URINE: NEGATIVE
CASTS UA: ABNORMAL /LPF (ref 0–2)
CASTS UA: ABNORMAL /LPF (ref 0–2)
COLOR: ABNORMAL
COMMENT UA: ABNORMAL
CRYSTALS, UA: ABNORMAL /HPF
EPITHELIAL CELLS UA: ABNORMAL /HPF (ref 0–5)
GLUCOSE URINE: NEGATIVE
KETONES, URINE: NEGATIVE
LEUKOCYTE ESTERASE, URINE: ABNORMAL
MUCUS: ABNORMAL
NITRITE, URINE: NEGATIVE
OTHER OBSERVATIONS UA: ABNORMAL
PH UA: 5 (ref 5–6)
PROTEIN UA: NEGATIVE
RBC UA: ABNORMAL /HPF (ref 0–4)
RENAL EPITHELIAL, UA: ABNORMAL /HPF
SPECIFIC GRAVITY UA: 1.01 (ref 1.01–1.02)
TRICHOMONAS: ABNORMAL
TURBIDITY: ABNORMAL
URINE HGB: NEGATIVE
UROBILINOGEN, URINE: NORMAL
WBC UA: >100 /HPF (ref 0–4)
YEAST: ABNORMAL

## 2020-04-21 PROCEDURE — 87086 URINE CULTURE/COLONY COUNT: CPT

## 2020-04-21 PROCEDURE — 87186 SC STD MICRODIL/AGAR DIL: CPT

## 2020-04-21 PROCEDURE — 99214 OFFICE O/P EST MOD 30 MIN: CPT | Performed by: NURSE PRACTITIONER

## 2020-04-21 PROCEDURE — 99213 OFFICE O/P EST LOW 20 MIN: CPT | Performed by: NURSE PRACTITIONER

## 2020-04-21 PROCEDURE — 87088 URINE BACTERIA CULTURE: CPT

## 2020-04-21 PROCEDURE — 81001 URINALYSIS AUTO W/SCOPE: CPT

## 2020-04-21 RX ORDER — CIPROFLOXACIN 500 MG/1
500 TABLET, FILM COATED ORAL 2 TIMES DAILY
Qty: 10 TABLET | Refills: 0 | Status: SHIPPED | OUTPATIENT
Start: 2020-04-21 | End: 2020-04-26

## 2020-04-21 ASSESSMENT — ENCOUNTER SYMPTOMS
SINUS PRESSURE: 0
DIARRHEA: 0
WHEEZING: 0
ABDOMINAL PAIN: 0
EYE PAIN: 0
COUGH: 1
NAUSEA: 0
SHORTNESS OF BREATH: 0
ABDOMINAL DISTENTION: 0
CHEST TIGHTNESS: 0
RHINORRHEA: 0
FACIAL SWELLING: 0
VOMITING: 0
ANAL BLEEDING: 0
CONSTIPATION: 0
SORE THROAT: 0
TROUBLE SWALLOWING: 0
BLOOD IN STOOL: 0
BACK PAIN: 1
COLOR CHANGE: 0

## 2020-04-21 NOTE — PROGRESS NOTES
History of    Esophagitis 05/2011    Gastritis/esophagitis on EGD, Dr. Kacy Black. Repeat EGD6/15 Gastritis    Foraminal stenosis of lumbar region     Moderate  Right L4/L5 neuroforaminal stenosis, Dr Aster Scott following. MRI 2/16 [place]. Moderate foraminal stenosis mild to moderate central canal stenosis    Hyperlipidemia     Hypertension     IBS (irritable bowel syndrome)     GI consultation with Dr. Han Hernandez, GI specialist in [facility], felt that she probably has chronic functional diarrhea and recommended empiric Imodium, Pepto-Bismol or Questran first. Sprue test Neg 2011    Iron deficiency anemia     Percent sat iron 5, January 2015, ferritin 40 1 /15, FOBT positive  EGD 6/15  Gastritis, IV iron 9/15x3 effective,  colonoscopy deferred by Dr. Kacy Black. --Prior colonoscopy 2011 with severe diverticulosis and telangiectasia. Trial iron solution in Orange juice 12/16    Junctional bradycardia     Symptomatic, resolved with discontinuation of Verapamil, 05/09. 1.1) Echocardiogram: LAE, LVH, EF 50%, mild MR, diastolic. 1.2) Dr. Buddy Hillman evaluation. 1.3.) Persantine stress test negative, 05/09.  Migraine     Mild CAD     cath 10/15    Mitral regurgitation     Moderate to severe on echocardiogram September 2015.   Right pressure 76 grade 2 diastolic dysfunction,  echo 05/42 grade 2 diastolic dysfunction mild to moderate MR RVSP 56 aortic sclerosis    Obesity     CHICO (obstructive sleep apnea)     CPAP 14 2/15 initiation  AHI 7  mild CHICO intolerant CPAP    Osteoarthritis     PMR (polymyalgia rheumatica) (HCC)     Pneumonia     Premature atrial contractions     Pulmonary hypertension (HCC)     -Moderate on echo November 2014    Restrictive lung disease     Mild on PFTs 11/14    Type II or unspecified type diabetes mellitus without mention of complication, not stated as uncontrolled     Vitreous floaters        Past Surgical History:   Procedure Laterality Date   [address] CATHETERIZATION  2014    CATARACT REMOVAL Bilateral 08/10    CHOLECYSTECTOMY      COLONOSCOPY  05/16/2011    COLONOSCOPY N/A 9/26/2019    severe diverticulosis, benign rectal polyp, Dr. Zamzam Frias Right 2/6/2019    Cysto with right stent insertion and villareal catheter performed by Virgilio Sarmiento MD at [address] [place] [address] Right 2/27/2019    CYSTO right stent removal  Right Ureteroscopy Holmium Laser right stent exchange performed by Virgilio Sarmiento MD at [facility], DIAGNOSTIC      EYE SURGERY      HYSTERECTOMY  Approx 1983    MALIGNANT SKIN LESION EXCISION Right 12/10 and 04/11    Basal CellRemoved from right neck    MALIGNANT SKIN LESION EXCISION Right 02/2012    Basal Cell Removed from right leg    OTHER SURGICAL HISTORY  09/06/2011    capsule endoscopy    OTHER SURGICAL HISTORY  3/22/16    right L4 and L5 TFE    OTHER SURGICAL HISTORY Right 4/26/16    L4, L5 TFE    UPPER GASTROINTESTINAL ENDOSCOPY  05/16/2011    UPPER GASTROINTESTINAL ENDOSCOPY  6/22/15    mild gastritis (Dr. Vernon Delong)    UPPER GASTROINTESTINAL ENDOSCOPY N/A 9/26/2019    mild to moderate inflammation, Dr. Vernon Delong       Current Outpatient Medications on File Prior to Visit   Medication Sig Dispense Refill    potassium chloride (KLOR-CON 10) 10 MEQ extended release tablet Take 1 tablet by mouth 2 times daily 180 tablet 3    Handicap Placard MISC by Does not apply route Expires 4/7/2023 1 each 0    insulin lispro, 1 Unit Dial, (HUMALOG KWIKPEN) 100 UNIT/ML SOPN Inject 8 units into the skin four times a day (before meals and bedtime snack).  28.8 mL 3    metoprolol tartrate (LOPRESSOR) 25 MG tablet Take 1.5 tablets by mouth 2 times daily 240 tablet 3    amLODIPine (NORVASC) 5 MG tablet Take 1 tablet by mouth daily 90 tablet 3    Insulin Pen Needle (B-D ULTRAFINE III SHORT PEN) 31G X 8 MM MISC USE 7 DAILY 270 each 3    simvastatin (ZOCOR) 20 MG tablet TAKE 1 TABLET NIGHTLY 90 tablet 3    Multiple

## 2020-04-23 LAB
CULTURE: ABNORMAL
Lab: ABNORMAL
SPECIMEN DESCRIPTION: ABNORMAL

## 2020-04-29 ENCOUNTER — VIRTUAL VISIT (OUTPATIENT)
Dept: INTERNAL MEDICINE | Age: 85
End: 2020-04-29
Payer: MEDICARE

## 2020-04-29 PROCEDURE — G2012 BRIEF CHECK IN BY MD/QHP: HCPCS | Performed by: NURSE PRACTITIONER

## 2020-05-20 ENCOUNTER — HOSPITAL ENCOUNTER (OUTPATIENT)
Dept: LAB | Age: 85
Discharge: HOME | End: 2020-05-20
Payer: MEDICARE

## 2020-05-20 LAB
ABSOLUTE EOS #: 0.16 K/UL (ref 0–0.44)
ABSOLUTE IMMATURE GRANULOCYTE: 0.08 K/UL (ref 0–0.3)
ABSOLUTE LYMPH #: 2.07 K/UL (ref 1.1–3.7)
ABSOLUTE MONO #: 0.92 K/UL (ref 0.1–1.2)
ANION GAP SERPL CALCULATED.3IONS-SCNC: 13 MMOL/L (ref 9–17)
BASOPHILS # BLD: 1 % (ref 0–2)
BASOPHILS ABSOLUTE: 0.07 K/UL (ref 0–0.2)
BUN BLDV-MCNC: 18 MG/DL (ref 8–23)
BUN/CREAT BLD: 14 (ref 9–20)
CALCIUM SERPL-MCNC: 9.4 MG/DL (ref 8.6–10.4)
CHLORIDE BLD-SCNC: 100 MMOL/L (ref 98–107)
CO2: 28 MMOL/L (ref 20–31)
CREAT SERPL-MCNC: 1.26 MG/DL (ref 0.5–0.9)
DIFFERENTIAL TYPE: ABNORMAL
EOSINOPHILS RELATIVE PERCENT: 1 % (ref 1–4)
FERRITIN: 149 UG/L (ref 13–150)
FOLATE: 13.8 NG/ML
GFR AFRICAN AMERICAN: 49 ML/MIN
GFR NON-AFRICAN AMERICAN: 40 ML/MIN
GFR SERPL CREATININE-BSD FRML MDRD: ABNORMAL ML/MIN/{1.73_M2}
GFR SERPL CREATININE-BSD FRML MDRD: ABNORMAL ML/MIN/{1.73_M2}
GLUCOSE BLD-MCNC: 189 MG/DL (ref 70–99)
HCT VFR BLD CALC: 41.8 % (ref 36.3–47.1)
HEMOGLOBIN: 12.9 G/DL (ref 11.9–15.1)
IMMATURE GRANULOCYTES: 1 %
IRON SATURATION: 24 % (ref 20–55)
IRON: 66 UG/DL (ref 37–145)
LYMPHOCYTES # BLD: 19 % (ref 24–43)
MCH RBC QN AUTO: 27.6 PG (ref 25.2–33.5)
MCHC RBC AUTO-ENTMCNC: 30.9 G/DL (ref 25.2–33.5)
MCV RBC AUTO: 89.3 FL (ref 82.6–102.9)
MONOCYTES # BLD: 8 % (ref 3–12)
NRBC AUTOMATED: 0 PER 100 WBC
PDW BLD-RTO: 16.6 % (ref 11.8–14.4)
PLATELET # BLD: 179 K/UL (ref 138–453)
PLATELET ESTIMATE: ABNORMAL
PMV BLD AUTO: 12.5 FL (ref 8.1–13.5)
POTASSIUM SERPL-SCNC: 3.8 MMOL/L (ref 3.7–5.3)
RBC # BLD: 4.68 M/UL (ref 3.95–5.11)
RBC # BLD: ABNORMAL 10*6/UL
SEG NEUTROPHILS: 70 % (ref 36–65)
SEGMENTED NEUTROPHILS ABSOLUTE COUNT: 7.89 K/UL (ref 1.5–8.1)
SODIUM BLD-SCNC: 141 MMOL/L (ref 135–144)
TOTAL IRON BINDING CAPACITY: 271 UG/DL (ref 250–450)
UNSATURATED IRON BINDING CAPACITY: 205 UG/DL (ref 112–347)
VITAMIN B-12: 422 PG/ML (ref 232–1245)
WBC # BLD: 11.2 K/UL (ref 3.5–11.3)
WBC # BLD: ABNORMAL 10*3/UL

## 2020-05-20 PROCEDURE — 83550 IRON BINDING TEST: CPT

## 2020-05-20 PROCEDURE — 82746 ASSAY OF FOLIC ACID SERUM: CPT

## 2020-05-20 PROCEDURE — 36415 COLL VENOUS BLD VENIPUNCTURE: CPT

## 2020-05-20 PROCEDURE — 82728 ASSAY OF FERRITIN: CPT

## 2020-05-20 PROCEDURE — 82607 VITAMIN B-12: CPT

## 2020-05-20 PROCEDURE — 80048 BASIC METABOLIC PNL TOTAL CA: CPT

## 2020-05-20 PROCEDURE — 83540 ASSAY OF IRON: CPT

## 2020-05-20 PROCEDURE — 85025 COMPLETE CBC W/AUTO DIFF WBC: CPT

## 2020-05-21 RX ORDER — PREDNISONE 1 MG/1
TABLET ORAL
Qty: 90 TABLET | Refills: 3 | Status: SHIPPED | OUTPATIENT
Start: 2020-05-21 | End: 2021-02-23 | Stop reason: DRUGHIGH

## 2020-05-26 ENCOUNTER — OFFICE VISIT (OUTPATIENT)
Dept: ONCOLOGY | Age: 85
End: 2020-05-26
Payer: MEDICARE

## 2020-05-26 VITALS
BODY MASS INDEX: 39.92 KG/M2 | HEIGHT: 59 IN | DIASTOLIC BLOOD PRESSURE: 64 MMHG | RESPIRATION RATE: 16 BRPM | SYSTOLIC BLOOD PRESSURE: 130 MMHG | TEMPERATURE: 98.7 F | HEART RATE: 81 BPM | OXYGEN SATURATION: 95 % | WEIGHT: 198 LBS

## 2020-05-26 PROCEDURE — 99214 OFFICE O/P EST MOD 30 MIN: CPT

## 2020-05-26 PROCEDURE — 99213 OFFICE O/P EST LOW 20 MIN: CPT | Performed by: INTERNAL MEDICINE

## 2020-05-26 NOTE — PROGRESS NOTES
Shraddha Azul                                                                                                                  5/26/2020  MRN:   [id number]  YOB: 1935  PCP:                           Jena Holter, APRN - CNP  Referring Physician: No ref. provider found  Treating Physician Name: Heydi [place] MD      Reason for visit:  Chief Complaint   Patient presents with    Anemia     iron deficiency, 6 month follow up       Current problems/ Active and recent treatments:  Microcytic anemia secondary to iron deficiency  Pancreatic head nodule, likely benign  CKD  Bronchiectasis    Summary of Case/History:    Shraddha Azul a 81 y. o.female who presents to the office to establish care and for further evaluation treatment recommendations. History was obtained to the patient, her  and electronic medical record. Patient has multiple medical problems as listed below. She was recently hospitalized at [place] with complaints of shortness of breath. Patient also has history of chronic anemia. She has been on iron supplements in the past but not tolerate oral iron supplements and stopped taking them. Patient anemia work-up in the hospital was unremarkable for TSH, paraproteinemia profile. Ferritin was also within normal range. Patient also had a CT abdomen pelvis done which showed a pancreatic head nodule which had been there for 2017 but has increased in size. Patient denies any work-up regarding the pancreatic head noted in the past.  Patient has not had a colonoscopy for multiple years. Denies any bloody bowel movements. Work-up revealed iron deficiency. MRI of pancreas revealed stable findings suggestive of benign etiology of the pancreatic head nodule. Interval history:    Patient presents to the clinic for a follow-up visit and to discuss results of her lab work-up. Patient hemoglobin is within normal range.   Iron studies show sufficient iron stores. Patient denies any fever chills. Denies any swollen glands. Overall continues to do well. During this visit patient's allergy, social, medical, surgical history and medications were reviewed and updated. Past Medical History:   Past Medical History:   Diagnosis Date    Allergic rhinitis     Asthma     PFT's, 04/06, actually showed mild restrictive defect.  Basal cell carcinoma of cheek 2007    Right cheek    Basal cell carcinoma of leg     right leg    CAD (coronary artery disease)     With 40% stenosis of the LAD, 07/10, ejection fraction 60%. Repeat heart cath9/14 with 40-50 percent LAD lesion first diagonal 50% lesion rec med Rx    Central retinal artery occlusion     Right side November 2014 status post TPA    Cerebral artery occlusion with cerebral infarction ([facility] [address] 11/24/2014    Cerebrovascular disease     50-79% stenosis on left on ultrasound 11/14    CHF (congestive heart failure) ([facility])     Echo 1/15 moderate MR, severe TR, RVSP 76, grade 2 diastolic dysfunction    Diverticulosis     AVM on colonoscopy, 05/11    Dyslipidemia     Elevated antinuclear antibody (ZAIRA) level     History of    Esophagitis 05/2011    Gastritis/esophagitis on EGD, Dr. Mervin Villaseñor. Repeat EGD6/15 Gastritis    Foraminal stenosis of lumbar region     Moderate  Right L4/L5 neuroforaminal stenosis, Dr Chava Boone following. MRI 2/16 [place]. Moderate foraminal stenosis mild to moderate central canal stenosis    Hyperlipidemia     Hypertension     IBS (irritable bowel syndrome)     GI consultation with Dr. Tj Romano, GI specialist in [facility], felt that she probably has chronic functional diarrhea and recommended empiric Imodium, Pepto-Bismol or Questran first. Sprue test Neg 2011    Iron deficiency anemia     Percent sat iron 5, January 2015, ferritin 40 1 /15, FOBT positive  EGD 6/15  Gastritis, IV iron 9/15x3 effective,  colonoscopy deferred by Dr. Mervin Villaseñor. --Prior colonoscopy 2011 with severe esophagus. Another bx was taken in the 2 nd portion of the duodenum. The scope was removed and the patient tolerated the procedure well. Findings:     1.  GE junction at 38 cm  2. Mild schatzki's ring  3. Mild inflammation duodenum  4. Mild antral gastritis        PROCEDURE # 2:  COLONOSCOPY        PROCEDURE: The patient was given IV conscious sedation per anesthesia. The patient was given O2 by nasal cannula. The patient's SPO2 remained above 90% throughout the procedure. The colonoscope was inserted per rectum and advanced under direct vision to the cecum without difficulty. The prep was good so exam was adequate. The colon was decompressed and the scope was removed. The patient tolerated the procedure well. Findings:     1. Severe sigmoid diverticulosis, with some mild inflammation  2.  5 mm polyp rectum bx with cold bx forceps  3. Internal hemorrhoids  Collected: 9/26/2019   Received: 9/27/2019   Reported: 9/30/2019 13:42     -- Diagnosis --     1. Duodenum, biopsy: Normal small bowel mucosa. 2.  Stomach, antrum biopsy: Mild chronic inflammation. 3.  Stomach, body biopsy: Mild chronic inflammation. 4.  Distal esophagus, biopsy:          Mucinous columnar epithelium with moderate chronic inflammation,   focal acute inflammation, and reactive change. Squamous mucosa with no histologic abnormality. 5.  Rectum, polyp, biopsy: Normal rectal mucosa. Mri Abdomen W Wo Contrast    Result Date: 7/2/2019  EXAMINATION: MRI OF THE ABDOMEN WITHOUT AND WITH CONTRAST, 7/2/2019 8:20 am TECHNIQUE: Multiplanar multisequence MRI of the abdomen was performed without and with the administration of intravenous contrast. COMPARISON: CT abdomen and pelvis 05/24/2019, 02/05/2019 and 01/13/2017.  HISTORY: ORDERING SYSTEM PROVIDED HISTORY: Anemia, unspecified type TECHNOLOGIST PROVIDED HISTORY: pancreatic protocol , pancreatic nodule Specify organ?->Pancreas Ordering Physician Provided

## 2020-06-02 ENCOUNTER — HOSPITAL ENCOUNTER (OUTPATIENT)
Dept: LAB | Age: 85
Discharge: HOME | End: 2020-06-02
Payer: MEDICARE

## 2020-06-02 LAB
ALBUMIN SERPL-MCNC: 4.1 G/DL (ref 3.5–5.2)
ALBUMIN/GLOBULIN RATIO: 1.5 (ref 1–2.5)
ALP BLD-CCNC: 85 U/L (ref 35–104)
ALT SERPL-CCNC: 13 U/L (ref 5–33)
ANION GAP SERPL CALCULATED.3IONS-SCNC: 14 MMOL/L (ref 9–17)
AST SERPL-CCNC: 13 U/L
BILIRUB SERPL-MCNC: 0.51 MG/DL (ref 0.3–1.2)
BUN BLDV-MCNC: 24 MG/DL (ref 8–23)
BUN/CREAT BLD: 26 (ref 9–20)
CALCIUM SERPL-MCNC: 9.6 MG/DL (ref 8.6–10.4)
CHLORIDE BLD-SCNC: 102 MMOL/L (ref 98–107)
CHOLESTEROL/HDL RATIO: 2.8
CHOLESTEROL: 185 MG/DL
CO2: 28 MMOL/L (ref 20–31)
CREAT SERPL-MCNC: 0.94 MG/DL (ref 0.5–0.9)
ESTIMATED AVERAGE GLUCOSE: 169 MG/DL
GFR AFRICAN AMERICAN: >60 ML/MIN
GFR NON-AFRICAN AMERICAN: 57 ML/MIN
GFR SERPL CREATININE-BSD FRML MDRD: ABNORMAL ML/MIN/{1.73_M2}
GFR SERPL CREATININE-BSD FRML MDRD: ABNORMAL ML/MIN/{1.73_M2}
GLUCOSE BLD-MCNC: 175 MG/DL (ref 70–99)
HBA1C MFR BLD: 7.5 % (ref 4.8–5.9)
HDLC SERPL-MCNC: 67 MG/DL
LDL CHOLESTEROL: 79 MG/DL (ref 0–130)
POTASSIUM SERPL-SCNC: 3.6 MMOL/L (ref 3.7–5.3)
SODIUM BLD-SCNC: 144 MMOL/L (ref 135–144)
TOTAL PROTEIN: 6.8 G/DL (ref 6.4–8.3)
TRIGL SERPL-MCNC: 196 MG/DL
VLDLC SERPL CALC-MCNC: ABNORMAL MG/DL (ref 1–30)

## 2020-06-02 PROCEDURE — 80061 LIPID PANEL: CPT

## 2020-06-02 PROCEDURE — 83036 HEMOGLOBIN GLYCOSYLATED A1C: CPT

## 2020-06-02 PROCEDURE — 36415 COLL VENOUS BLD VENIPUNCTURE: CPT

## 2020-06-02 PROCEDURE — 80053 COMPREHEN METABOLIC PANEL: CPT

## 2020-06-11 RX ORDER — FUROSEMIDE 40 MG/1
TABLET ORAL
Qty: 90 TABLET | Refills: 3 | Status: SHIPPED | OUTPATIENT
Start: 2020-06-11 | End: 2021-05-13

## 2020-06-15 ENCOUNTER — OFFICE VISIT (OUTPATIENT)
Dept: INTERNAL MEDICINE | Age: 85
End: 2020-06-15
Payer: MEDICARE

## 2020-06-15 VITALS
SYSTOLIC BLOOD PRESSURE: 130 MMHG | WEIGHT: 198.6 LBS | RESPIRATION RATE: 16 BRPM | HEIGHT: 59 IN | DIASTOLIC BLOOD PRESSURE: 80 MMHG | BODY MASS INDEX: 40.04 KG/M2 | HEART RATE: 60 BPM

## 2020-06-15 PROBLEM — I50.43 CHF (CONGESTIVE HEART FAILURE), NYHA CLASS I, ACUTE ON CHRONIC, COMBINED (HCC): Status: RESOLVED | Noted: 2019-07-19 | Resolved: 2020-06-15

## 2020-06-15 PROBLEM — I50.33 ACUTE ON CHRONIC DIASTOLIC HEART FAILURE (HCC): Status: RESOLVED | Noted: 2019-05-29 | Resolved: 2020-06-15

## 2020-06-15 PROCEDURE — 3051F HG A1C>EQUAL 7.0%<8.0%: CPT | Performed by: NURSE PRACTITIONER

## 2020-06-15 PROCEDURE — 99214 OFFICE O/P EST MOD 30 MIN: CPT | Performed by: NURSE PRACTITIONER

## 2020-06-15 PROCEDURE — 99214 OFFICE O/P EST MOD 30 MIN: CPT

## 2020-06-15 ASSESSMENT — ENCOUNTER SYMPTOMS
SHORTNESS OF BREATH: 0
BACK PAIN: 1
NAUSEA: 0
CHEST TIGHTNESS: 0
VOMITING: 0
SORE THROAT: 0
RHINORRHEA: 0
ABDOMINAL PAIN: 0
WHEEZING: 0
SINUS PRESSURE: 0
COLOR CHANGE: 0
EYE PAIN: 0
TROUBLE SWALLOWING: 0
BLOOD IN STOOL: 0
COUGH: 0
CONSTIPATION: 0
FACIAL SWELLING: 0
DIARRHEA: 0

## 2020-06-15 NOTE — PROGRESS NOTES
06/15/20  Niraj Bo  1935      Chief Complaint:   1. Essential hypertension    2. Controlled type 2 diabetes mellitus without complication, with long-term current use of insulin ([facility]    3. Diabetic nephropathy associated with type 2 diabetes mellitus ([facility] [facility]    4. Diastolic dysfunction    5. Kidney disease    6. Lesion of lower extremity    7. Hypertensive heart disease with congestive heart failure, unspecified heart failure type ([facility] [address]    8. Iron deficiency anemia secondary to blood loss (chronic)    9. Polymyalgia rheumatica ([facility] [address]    10. Spinal stenosis, lumbar region, without neurogenic claudication    11. PMR (polymyalgia rheumatica) (HCC)    12. Neural foraminal stenosis of lumbar spine    13. Pulmonary HTN ([facility] [address]    14. Obesity, morbid, BMI 40.0-49.9 ([facility]    15. Vitamin D deficiency    16. Dyslipidemia    17. Multifocal atrial tachycardia (HCC)    18. Class 2 severe obesity due to excess calories with serious comorbidity and body mass index (BMI) of 37.0 to 37.9 in adult ([facility] [address]    19. Mild intermittent asthma without complication        HPI:    Allergies   Allergen Reactions    Codeine      Confusion      Erythromycin      GI upset  Received zithromax in past and tolerated    Exenatide Nausea Only    Levofloxacin      GI upset    Verapamil Other (See Comments)     Junctional bradycardia    Clonidine Derivatives Rash     2009, catapres    Other Rash     Levbid    Penicillins Rash     Received Rocephin in past and tolerated       Past Medical History:   Diagnosis Date    Allergic rhinitis     Asthma     PFT's, 04/06, actually showed mild restrictive defect.  Basal cell carcinoma of cheek 2007    Right cheek    Basal cell carcinoma of leg     right leg    CAD (coronary artery disease)     With 40% stenosis of the LAD, 07/10, ejection fraction 60%.   Repeat heart cath9/14 with 40-50 percent LAD lesion first diagonal 50% lesion rec med Rx    Central retinal artery occlusion     Right side November 2014 status post TPA    Cerebral artery occlusion with cerebral infarction [facility]) 11/24/2014    Cerebrovascular disease     50-79% stenosis on left on ultrasound 11/14    CHF (congestive heart failure) ([facility])     Echo 1/15 moderate MR, severe TR, RVSP 76, grade 2 diastolic dysfunction    Diverticulosis     AVM on colonoscopy, 05/11    Dyslipidemia     Elevated antinuclear antibody (ZAIRA) level     History of    Esophagitis 05/2011    Gastritis/esophagitis on EGD, Dr. Adelina Weber. Repeat EGD6/15 Gastritis    Foraminal stenosis of lumbar region     Moderate  Right L4/L5 neuroforaminal stenosis, Dr Dave Oropeza following. MRI 2/16 [place]. Moderate foraminal stenosis mild to moderate central canal stenosis    Hyperlipidemia     Hypertension     IBS (irritable bowel syndrome)     GI consultation with Dr. Jada De Leon, GI specialist in [place], felt that she probably has chronic functional diarrhea and recommended empiric Imodium, Pepto-Bismol or Questran first. Sprue test Neg 2011    Iron deficiency anemia     Percent sat iron 5, January 2015, ferritin 40 1 /15, FOBT positive  EGD 6/15  Gastritis, IV iron 9/15x3 effective,  colonoscopy deferred by Dr. Adelina Weber. --Prior colonoscopy 2011 with severe diverticulosis and telangiectasia. Trial iron solution in Orange juice 12/16    Junctional bradycardia     Symptomatic, resolved with discontinuation of Verapamil, 05/09. 1.1) Echocardiogram: LAE, LVH, EF 50%, mild MR, diastolic. 1.2) Dr. Bam Haywood evaluation. 1.3.) Persantine stress test negative, 05/09.  Migraine     Mild CAD     cath 10/15    Mitral regurgitation     Moderate to severe on echocardiogram September 2015.   Right pressure 76 grade 2 diastolic dysfunction,  echo 31/86 grade 2 diastolic dysfunction mild to moderate MR RVSP 56 aortic sclerosis    Obesity     CHICO (obstructive sleep apnea)     CPAP 14 2/15 initiation  AHI 7  mild CHICO intolerant CPAP    Osteoarthritis     PMR (polymyalgia rheumatica) Coordination: Coordination normal.   Psychiatric:         Mood and Affect: Mood normal.         Behavior: Behavior normal.         Thought Content: Thought content normal.         Judgment: Judgment normal.       Vitals:    06/15/20 1109   BP: 130/80   Site: Left Upper Arm   Position: Sitting   Cuff Size: Large Adult   Pulse: 60   Resp: 16   Weight: 198 lb 9.6 oz (90.1 kg)   Height: 4' 10.5\" (1.486 m)       Assessment:  1. Essential hypertension  Stable on current antihypertensive regimen, continue to monitor. Metoprolol 37.5 mg twice daily, Norvasc 5 mg daily, hydralazine 50 mg 3 times a day and Lasix. 2. Controlled type 2 diabetes mellitus without complication, with long-term current use of insulin (HCC)   Hemoglobin A1C; Future  - Basic Metabolic Panel; Future  3. Diabetic nephropathy associated with type 2 diabetes mellitus (HCC)  Hemoglobin A1c elevated to 7.5. Has not been working on diet and has had little activity due to the COVID-19 pandemic. Work on diet increase activity, ongoing follow-up. Continues to follow with diabetic nurse practitioner    4. Diastolic dysfunction  Appears slightly fluid overloaded. Weight up 8 pounds 3-month with significant swelling to bilateral lower extremities. Use Lasix short-term and potassium. Follow-up Thursday with weights and update on leg swelling. 5. Kidney disease  Stable, continue to monitor, avoid nephrotoxic drugs    6. Lesion of lower extremity  10 used to declined any surgery to remove the mass encapsulating the muscle. Wants to wait till the pain becomes more severe    7. Hypertensive heart disease with congestive heart failure, unspecified heart failure type ([address]  Stable, continue to monitor ongoing follow-up with cardiology    8. Iron deficiency anemia secondary to blood loss (chronic)  Was seen by hematology. PRN follow-up. Suggest being followed by PCP  Serial lab follow-ups  - CBC; Future    9.  Polymyalgia rheumatica (HCC)  Stable on

## 2020-06-19 ENCOUNTER — TELEPHONE (OUTPATIENT)
Dept: INTERNAL MEDICINE | Age: 85
End: 2020-06-19

## 2020-07-07 RX ORDER — PEN NEEDLE, DIABETIC 31 GX5/16"
NEEDLE, DISPOSABLE MISCELLANEOUS
Qty: 270 EACH | Refills: 8 | Status: SHIPPED | OUTPATIENT
Start: 2020-07-07 | End: 2021-03-08

## 2020-07-29 ENCOUNTER — OFFICE VISIT (OUTPATIENT)
Dept: DIABETES SERVICES | Age: 85
End: 2020-07-29
Payer: MEDICARE

## 2020-07-29 VITALS
HEIGHT: 59 IN | WEIGHT: 198 LBS | HEART RATE: 76 BPM | SYSTOLIC BLOOD PRESSURE: 142 MMHG | DIASTOLIC BLOOD PRESSURE: 68 MMHG | RESPIRATION RATE: 20 BRPM | BODY MASS INDEX: 39.92 KG/M2

## 2020-07-29 PROCEDURE — 95251 CONT GLUC MNTR ANALYSIS I&R: CPT | Performed by: NURSE PRACTITIONER

## 2020-07-29 PROCEDURE — 3051F HG A1C>EQUAL 7.0%<8.0%: CPT | Performed by: NURSE PRACTITIONER

## 2020-07-29 PROCEDURE — 99214 OFFICE O/P EST MOD 30 MIN: CPT | Performed by: NURSE PRACTITIONER

## 2020-07-29 PROCEDURE — 99214 OFFICE O/P EST MOD 30 MIN: CPT

## 2020-07-29 RX ORDER — LIRAGLUTIDE 6 MG/ML
1.8 INJECTION SUBCUTANEOUS DAILY
Qty: 18 ML | Refills: 3 | Status: SHIPPED | OUTPATIENT
Start: 2020-07-29 | End: 2021-05-13

## 2020-07-29 ASSESSMENT — ENCOUNTER SYMPTOMS
DIARRHEA: 0
ABDOMINAL PAIN: 0
SHORTNESS OF BREATH: 0
BLURRED VISION: 0
RESPIRATORY NEGATIVE: 1
VISUAL CHANGE: 0

## 2020-07-29 NOTE — PROGRESS NOTES
[address] [facility] INTERNAL [facility] [address]  [address] [facility], [address]  [facility] [address] [phone]        HISTORY:    Rossy Ruiz presents today for evaluation and management of:  Chief Complaint   Patient presents with    Diabetes     3 month appt       Diabetes   She presents for her follow-up diabetic visit. She has type 2 diabetes mellitus. No MedicAlert identification noted. There are no hypoglycemic associated symptoms. Pertinent negatives for hypoglycemia include no confusion, dizziness, headaches, seizures or tremors. Associated symptoms include foot paresthesias. Pertinent negatives for diabetes include no blurred vision, no chest pain, no fatigue, no foot ulcerations, no polydipsia, no polyphagia, no polyuria, no visual change, no weakness and no weight loss. There are no hypoglycemic complications. Symptoms are stable. Risk factors for coronary artery disease include diabetes mellitus, dyslipidemia, family history, hypertension, obesity, stress and sedentary lifestyle. Current diabetic treatment includes insulin injections. She is compliant with treatment all of the time. She is currently taking insulin pre-breakfast, pre-lunch, pre-dinner and at bedtime. Insulin injections are given by patient. Rotation sites for injection include the abdominal wall. Her weight is stable. She is following a generally healthy diet. When asked about meal planning, she reported none. She has not had a previous visit with a dietitian. She rarely participates in exercise. Blood glucose monitoring compliance is excellent. An ACE inhibitor/angiotensin II receptor blocker is not being taken. Eye exam is current. Urinary Tract Infection    This is a new problem. The current episode started in the past 7 days. The problem occurs intermittently. The problem has been unchanged. The quality of the pain is described as aching. The pain is at a severity of 4/10.  The pain is mild. There has been no fever. She is not sexually active. Associated symptoms include frequency. She has tried nothing for the symptoms. The treatment provided no relief. Her past medical history is significant for kidney stones and recurrent UTIs. Interval History:    Current Diabetic Medications  Victoza 1.2 mg daily Lantus 22 units twice daily short acting insulin 8 units 4 times a day with meals 10 units if bs is over 150  Taking 6 insulin injection a day and testing blood sugar 4 times per day. DKA episodes: 0    12/19/19  CGM started  11/12/19  Patient is here for follow-up diabetes management she is testing her blood sugar for more times daily denies any hypoglycemia she is still concerned that she has recurrent high blood sugars although not correlating with most recent A1c.  Patient has tested in the middle the night and denies any hypoglycemia during that time. Russ Mata is willing to consider CGM.     1/28/2020  Patient is here for follow-up diabetes management she has been using her CGM with success report downloaded and reviewed with patient. No hypoglycemia identified. Patient does states she is using her CGM and fingerstick method to compare numbers. She is using an older meter for backup and there is a wide discrepancy in CGM number and fingerstick method.    07/29/20   She has noticed her bs are elevated. She is dealing with a lot of stress. Diet: low carb  Exercise: none  BS testing: using CGM with success   Issues: high blood sugars denies hypoglycemia    High cholesterol-  Takes zocor and denies any adverse effects with its use. Watches diet and exercise.      Hypertension-  Takes Norvasc Lopressor and Apresoline and denies any adverse effects with their use. Watches diet and exercise. Denies any chest pain, dizziness or edema. Follows with cardiology:Yes. Monitors bp at home      Obesity- Working on weight loss.        Past Medical History:   Diagnosis Date    Allergic rhinitis     Asthma     PFT's, 04/06, actually showed mild restrictive defect.  Basal cell carcinoma of cheek 2007    Right cheek    Basal cell carcinoma of leg     right leg    CAD (coronary artery disease)     With 40% stenosis of the LAD, 07/10, ejection fraction 60%. 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 Aranza Amezcua. Repeat EGD6/15 Gastritis    Foraminal stenosis of lumbar region     Moderate  Right L4/L5 neuroforaminal stenosis, Dr Ravinder Ellison following. MRI 2/16 [place]. Moderate foraminal stenosis mild to moderate central canal stenosis    Hyperlipidemia     Hypertension     IBS (irritable bowel syndrome)     GI consultation with Dr. Dominik Bauer, GI specialist in [place], felt that she probably has chronic functional diarrhea and recommended empiric Imodium, Pepto-Bismol or Questran first. Sprue test Neg 2011    Iron deficiency anemia     Percent sat iron 5, January 2015, ferritin 40 1 /15, FOBT positive  EGD 6/15  Gastritis, IV iron 9/15x3 effective,  colonoscopy deferred by Dr. Aranza Amezcua. --Prior colonoscopy 2011 with severe diverticulosis and telangiectasia. Trial iron solution in Orange juice 12/16    Junctional bradycardia     Symptomatic, resolved with discontinuation of Verapamil, 05/09. 1.1) Echocardiogram: LAE, LVH, EF 50%, mild MR, diastolic. 1.2) Dr. Beal Hipps evaluation. 1.3.) Persantine stress test negative, 05/09.  Migraine     Mild CAD     cath 10/15    Mitral regurgitation     Moderate to severe on echocardiogram September 2015.   Right pressure 76 grade 2 diastolic dysfunction,  echo 23/42 grade 2 diastolic dysfunction mild to moderate MR RVSP 56 aortic sclerosis    Obesity     CHICO (obstructive sleep apnea)     CPAP 14 2/15 initiation  AHI 7  mild CHICO intolerant CPAP    Osteoarthritis     PMR (polymyalgia rheumatica) (HCC)     Pneumonia     Premature atrial contractions     Pulmonary hypertension (HCC)     -Moderate on echo November 2014    Restrictive lung disease     Mild on PFTs 11/14    Type II or unspecified type diabetes mellitus without mention of complication, not stated as uncontrolled     Vitreous floaters      Family History   Problem Relation Age of Onset    Uterine Cancer Mother     Heart Disease Father     High Blood Pressure Father     Stroke Sister         from a vascular malformation    Heart Attack Son         age 48     Social History     Tobacco Use    Smoking status: Never Smoker    Smokeless tobacco: Never Used    Tobacco comment: amish rrt 5/28/2019   Substance Use Topics    Alcohol use: No     Alcohol/week: 0.0 standard drinks    Drug use: No     Allergies   Allergen Reactions    Codeine      Confusion      Erythromycin      GI upset  Received zithromax in past and tolerated    Exenatide Nausea Only    Levofloxacin      GI upset    Verapamil Other (See Comments)     Junctional bradycardia    Clonidine Derivatives Rash     2009, catapres    Other Rash     Levbid    Penicillins Rash     Received Rocephin in past and tolerated       MEDICATIONS:  Current Outpatient Medications   Medication Sig Dispense Refill    Liraglutide (VICTOZA) 18 MG/3ML SOPN SC injection Inject 1.8 mg into the skin daily 18 mL 3    Insulin Pen Needle (B-D ULTRAFINE III SHORT PEN) 31G X 8 MM MISC USE 7 NEEDLES DAILY 270 each 8    furosemide (LASIX) 40 MG tablet TAKE 1 TABLET DAILY 90 tablet 3  predniSONE (DELTASONE) 5 MG tablet TAKE 1 TABLET DAILY 90 tablet 3    potassium chloride (KLOR-CON 10) 10 MEQ extended release tablet Take 1 tablet by mouth 2 times daily 180 tablet 3    Handicap Placard MISC by Does not apply route Expires 4/7/2023 1 each 0    insulin lispro, 1 Unit Dial, (HUMALOG KWIKPEN) 100 UNIT/ML SOPN Inject 8 units into the skin four times a day (before meals and bedtime snack). 28.8 mL 3    metoprolol tartrate (LOPRESSOR) 25 MG tablet Take 1.5 tablets by mouth 2 times daily 240 tablet 3    amLODIPine (NORVASC) 5 MG tablet Take 1 tablet by mouth daily 90 tablet 3    simvastatin (ZOCOR) 20 MG tablet TAKE 1 TABLET NIGHTLY 90 tablet 3    hydrALAZINE (APRESOLINE) 50 MG tablet Take 1 tablet by mouth 3 times daily 270 tablet 3    insulin glargine (LANTUS SOLOSTAR) 100 UNIT/ML injection pen Inject 22 Units into the skin 2 times daily 39.6 mL 3    Polyethylene Glycol 400 (BLINK TEARS) 0.25 % SOLN Place into both eyes as needed (dry eyes)       aspirin 81 MG EC tablet Take 81 mg by mouth daily      glucose blood VI test strips (ASCENSIA AUTODISC VI;ONE TOUCH ULTRA TEST VI) strip 1 each by In Vitro route 3 times daily As directed. 100 each 5    acetaminophen (TYLENOL) 500 MG tablet Take 500 mg by mouth daily      omeprazole (PRILOSEC) 20 MG capsule Take 20 mg by mouth Daily  30 capsule 3    Cholecalciferol (VITAMIN D3) 2000 UNITS CAPS Take 2,000 Units by mouth daily       Multiple Vitamins-Minerals (AIRBORNE PO) Take by mouth daily      albuterol sulfate  (90 Base) MCG/ACT inhaler Inhale 2 puffs into the lungs 4 times daily as needed for Wheezing (Patient not taking: Reported on 7/29/2020) 1 Inhaler 1     No current facility-administered medications for this visit. Review ofSymptoms:  Review of Systems   Constitutional: Negative for fatigue, unexpected weight change and weight loss. Eyes: Negative for blurred vision and visual disturbance. Respiratory: Negative. Negative for shortness of breath. Cardiovascular: Negative for chest pain and leg swelling. Gastrointestinal: Negative for abdominal pain and diarrhea. Endocrine: Negative for polydipsia, polyphagia and polyuria. Genitourinary: Positive for frequency. Musculoskeletal: Negative. Skin: Negative for rash and wound. Neurological: Negative for dizziness, tremors, seizures, weakness and headaches. Psychiatric/Behavioral: Negative. Negative for confusion and decreased concentration. Theremainder of a complete 14-point review of systems is negative. Vital Signs: BP (!) 142/68   Pulse 76   Resp 20   Ht 4' 11\" (1.499 m)   Wt 198 lb (89.8 kg)   BMI 39.99 kg/m²      Wt Readings from Last 3 Encounters:   07/29/20 198 lb (89.8 kg)   06/15/20 198 lb 9.6 oz (90.1 kg)   05/26/20 198 lb (89.8 kg)     Body mass index is 39.99 kg/m².   LABS:  Hemoglobin A1C   Date Value Ref Range Status   06/02/2020 7.5 (H) 4.8 - 5.9 % Final   02/24/2020 6.6 (H) 4.8 - 5.9 % Final     Lab Results   Component Value Date    LABMICR CANNOT BE CALCULATED 04/23/2019     Lab Results   Component Value Date     06/02/2020    K 3.6 (L) 06/02/2020     06/02/2020    CO2 28 06/02/2020    BUN 24 (H) 06/02/2020    CREATININE 0.94 (H) 06/02/2020    GLUCOSE 175 (H) 06/02/2020    CALCIUM 9.6 06/02/2020    PROT 6.8 06/02/2020    LABALBU 4.1 06/02/2020    BILITOT 0.51 06/02/2020    ALKPHOS 85 06/02/2020    AST 13 06/02/2020    ALT 13 06/02/2020    LABGLOM 57 (L) 06/02/2020    GFRAA >60 06/02/2020     Lab Results   Component Value Date    CHOL 185 06/02/2020    CHOL 137 07/20/2019    CHOL 142 05/30/2019     Lab Results   Component Value Date    TRIG 196 (H) 06/02/2020    TRIG 126 07/20/2019    TRIG 114 05/30/2019     Lab Results   Component Value Date    HDL 67 06/02/2020    HDL 66 07/20/2019    HDL 62 05/30/2019     Lab Results   Component Value Date    LDLCHOLESTEROL 79 06/02/2020    LDLCHOLESTEROL 46 07/20/2019 LDLCHOLESTEROL 57 05/30/2019     Lab Results   Component Value Date    VLDL NOT REPORTED (H) 06/02/2020    VLDL NOT REPORTED 07/20/2019    VLDL NOT REPORTED 05/30/2019     Lab Results   Component Value Date    CHOLHDLRATIO 2.8 06/02/2020    CHOLHDLRATIO 2.1 07/20/2019    CHOLHDLRATIO 2.3 05/30/2019           Physical Exam  Constitutional:       Appearance: She is well-developed. Eyes:      Pupils: Pupils are equal, round, and reactive to light. Cardiovascular:      Rate and Rhythm: Normal rate and regular rhythm. Heart sounds: Normal heart sounds. Pulmonary:      Effort: Pulmonary effort is normal.      Breath sounds: Normal breath sounds. Abdominal:      General: Bowel sounds are normal.      Palpations: Abdomen is soft. Skin:     General: Skin is warm and dry. Comments: Negative for open/nonhealing wounds. Negative for lipohypertrophy. Neurological:      Mental Status: She is alert and oriented to person, place, and time. ASSESSMENT/PLAN:     Diagnosis Orders   1. Type 2 diabetes with nephropathy (HCC)  Liraglutide (VICTOZA) 18 MG/3ML SOPN SC injection   2. Diabetes education, encounter for     3. Dyslipidemia     4. Essential hypertension     5. BMI 37.0-37.9, adult     6. Class 2 severe obesity due to excess calories with serious comorbidity and body mass index (BMI) of 37.0 to 37.9 in adult [facility])       No orders of the defined types were placed in this encounter. Orders Placed This Encounter   Medications    Liraglutide (VICTOZA) 18 MG/3ML SOPN SC injection     Sig: Inject 1.8 mg into the skin daily     Dispense:  18 mL     Refill:  3     Requested Prescriptions     Signed Prescriptions Disp Refills    Liraglutide (VICTOZA) 18 MG/3ML SOPN SC injection 18 mL 3     Sig: Inject 1.8 mg into the skin daily       1. Type 2 diabetes with nephropathy ([address]  2. Diabetes education, encounter for    - Liraglutide (VICTOZA) 18 MG/3ML SOPN SC injection;  Inject 1.8 mg into the skin daily  Dispense: 18 mL; Refill: 3    - Unstable  HbA1C goal is less than 7% and blood sugars are worsening.  - Encouraged patient to check BS 4 times per day. Fasting blood glucose goal is 70-130mg/dl and postprandial blood sugar goal is less than 180 mg/dl. -Diabetic foot exam up-to-date: Yes  -Diabetic retinal exam up-to-date: Yes  - Labs reviewed includes: a1c increased to 7.5% gfr 57. Repeat labs due  In september.   -We discussed in great detail dietary modifications they can make to better improve their blood sugars. -follow up diabetes education completed, all questions answered. CGM report downloaded and reviewed, scanned to media tab. -no hypoglycemia, post prandial hyperglycemia after dinner  -increase victoza  -will try to do cgm download at home      Discussed signs and symptoms of hyper/hypoglycemia and how to treat. Encouraged 150 minutes of physical activity per week. Follow a low carbohydrate diet consuming 45 grams of carbohydrates at breakfast, lunch and dinner with two separate snacks [id number] grams of carbohydrates. Encouraged at least 7 hours of sleep. The patient was informed of the goals of diabetes management. This can only be accomplished by watching their diet and exercise levels. We certainly use medicines to help attain these goals. The consequences of not controlling blood sugars were discussed. These include blindness, heart disease, stroke, kidney disease, and possibly need for dialysis. They were told to be careful with their foot care as diabetics often have nerve damage, infections and risk for limb amutations . They also need a dilated eye exam yearly. We discussed the issues of diet, exercise, medication, complication avoidance, reviewed the signs and symptoms of diabetes, hypoglycemic episodes, significance of HbA1C.       3. Dyslipidemia  stable, lipid panel reviewed, continue current medications. Diet and exercise      4.  Essential hypertension   stable, continue current medications. Diet and exercise Seek emergent care if chest pain develops. Will monitor at home. 5. BMI 37.0-37.9, adult  6. Class 2 severe obesity due to excess calories with serious comorbidity and body mass index (BMI) of 37.0 to 37.9 in adult [facility])  Reduce calories and increase physical activity to achieve a slow and steady weight loss to improve blood pressure, cholesterol and diabetes. Answered all patient questions. Agrees to follow plan of care and to follow up in 2 months, sooner if needed. Call office if unexplained blood sugars less than 70 occur or above 400. Call office or access [employer] with any further questions or concerns. Be sure to bring glucometer/food log at next appointment. Patient was seen with total face to face time of 30 minutes. More than 50%  of this visit was counseling and education regarding her diabetes.     Electronically signed by INGRID Galo CNP on 7/29/2020 at 1:33 PM      (Please note that portions of this note were completed with a voice-recognition program. Efforts were made to edit the dictation but occasionally words are mis-transcribed.)

## 2020-08-03 ENCOUNTER — TELEPHONE (OUTPATIENT)
Dept: UROLOGY | Age: 85
End: 2020-08-03

## 2020-08-03 NOTE — TELEPHONE ENCOUNTER
Called patient back and explained to patient that Dr. Azalia Colon would like a KUB done prior to appointment on 8-5-20. Patient verbalized understanding.

## 2020-08-03 NOTE — TELEPHONE ENCOUNTER
Patient is scheduled with Dr. Giovanni Nieves on 8/5/2020. Was told previously that she may need to have some x-rays done of her kidneys. Patient would like to know if she needs to come early to have this done before her appointment. Please call her back at [phone].

## 2020-08-05 ENCOUNTER — OFFICE VISIT (OUTPATIENT)
Dept: UROLOGY | Age: 85
End: 2020-08-05
Payer: MEDICARE

## 2020-08-05 ENCOUNTER — HOSPITAL ENCOUNTER (OUTPATIENT)
Dept: GENERAL RADIOLOGY | Age: 85
Discharge: HOME | End: 2020-08-07
Payer: MEDICARE

## 2020-08-05 VITALS
BODY MASS INDEX: 39.92 KG/M2 | SYSTOLIC BLOOD PRESSURE: 128 MMHG | HEIGHT: 59 IN | WEIGHT: 198 LBS | HEART RATE: 82 BPM | DIASTOLIC BLOOD PRESSURE: 78 MMHG | TEMPERATURE: 96.9 F

## 2020-08-05 PROCEDURE — 99213 OFFICE O/P EST LOW 20 MIN: CPT | Performed by: UROLOGY

## 2020-08-05 PROCEDURE — 74018 RADEX ABDOMEN 1 VIEW: CPT

## 2020-08-05 PROCEDURE — 99214 OFFICE O/P EST MOD 30 MIN: CPT

## 2020-08-05 NOTE — PROGRESS NOTES
Martina Berumen MD  Urology Clinic Progress Note      Patient:  Kiera Mckinley  YOB: 1935  Date: 8/5/2020    HISTORY OF PRESENT ILLNESS:   The patient is a 81 y.o. female who presents today for follow-up for the following problem(s): right ureteral stone, UTI  Overall the problem(s) : are improving. Associated Symptoms: No dysuria, gross hematuria. Pain Severity:      Summary of old records: 2/6/19 right stent placement for UTI and stone    Additional History:       2/20/2019 Right URS and HLL and stone treatment  No  issues over past year. Reviewed KUB as below. Some rec UTIs 2 in last 6 months, short course of abx helps  No aggravating factors. Denies constipation    I independently reviewed and verified the images and reports from:    Xr Abdomen (kub) (single Ap View)    Result Date: 8/5/2020  EXAMINATION: ONE SUPINE XRAY VIEW(S) OF THE ABDOMEN 8/5/2020 10:48 am COMPARISON: CT abdomen and pelvis performed 07/23/2019. HISTORY: ORDERING SYSTEM PROVIDED HISTORY: Nephrolithiasis TECHNOLOGIST PROVIDED HISTORY: kidney stones Reason for Exam: Hx of kidney stones; f/u; denies abdominal complaints at this time Acuity: Unknown Type of Exam: Subsequent/Follow-up FINDINGS: There is a nonobstructive bowel gas pattern. There is no intraperitoneal free air. There are no suspicious calcifications. Gallbladder has been removed. There is degenerative change of the lumbar spine, SI joints, and hip joints. Unremarkable radiographs of the abdomen. Degenerative change of the lumbar spine, SI joints, and hip joints. 5/30/19  Unremarkable ultrasound of the kidneys. No kidney stones. Imaging Reviewed during this Office Visit:   (results were independently reviewed by physician and radiology report verified)    Urinalysis today:  No results found for this visit on 08/05/20.     Last BUN and creatinine:  Lab Results   Component Value Date    BUN 24 (H) 06/02/2020     Lab Results   Component Verapamil, 05/09. 1.1) Echocardiogram: LAE, LVH, EF 50%, mild MR, diastolic. 1.2) Dr. Beal Hipps evaluation. 1.3.) Persantine stress test negative, 05/09.  Migraine     Mild CAD     cath 10/15    Mitral regurgitation     Moderate to severe on echocardiogram September 2015.   Right pressure 76 grade 2 diastolic dysfunction,  echo 40/46 grade 2 diastolic dysfunction mild to moderate MR RVSP 56 aortic sclerosis    Obesity     CHICO (obstructive sleep apnea)     CPAP 14 2/15 initiation  AHI 7  mild CHICO intolerant CPAP    Osteoarthritis     PMR (polymyalgia rheumatica) (HCC)     Pneumonia     Premature atrial contractions     Pulmonary hypertension (HCC)     -Moderate on echo November 2014    Restrictive lung disease     Mild on PFTs 11/14    Type II or unspecified type diabetes mellitus without mention of complication, not stated as uncontrolled     Vitreous floaters      Past Surgical History:   Procedure Laterality Date    APPENDECTOMY  1952    CARDIAC CATHETERIZATION  2014    CATARACT REMOVAL Bilateral 08/10    CHOLECYSTECTOMY      COLONOSCOPY  05/16/2011    COLONOSCOPY N/A 9/26/2019    severe diverticulosis, benign rectal polyp, Dr. Ling Due Right 2/6/2019    Cysto with right stent insertion and villareal catheter performed by Nav Galaviz MD at [address] [facility] Right 2/27/2019    CYSTO right stent removal  Right Ureteroscopy Holmium Laser right stent exchange performed by Nav Galaviz MD at [address] [facility], DIAGNOSTIC      EYE SURGERY      HYSTERECTOMY  Approx 1983    MALIGNANT SKIN LESION EXCISION Right 12/10 and 04/11    Basal CellRemoved from right neck    MALIGNANT SKIN LESION EXCISION Right 02/2012    Basal Cell Removed from right leg    OTHER SURGICAL HISTORY  09/06/2011    capsule endoscopy    OTHER SURGICAL HISTORY  3/22/16    right L4 and L5 TFE    OTHER SURGICAL HISTORY Right 4/26/16    L4, L5 TFE    UPPER GASTROINTESTINAL ENDOSCOPY  05/16/2011    both eyes as needed (dry eyes)       aspirin 81 MG EC tablet Take 81 mg by mouth daily      glucose blood VI test strips (ASCENSIA AUTODISC VI;ONE TOUCH ULTRA TEST VI) strip 1 each by In Vitro route 3 times daily As directed. 100 each 5    acetaminophen (TYLENOL) 500 MG tablet Take 500 mg by mouth daily      omeprazole (PRILOSEC) 20 MG capsule Take 20 mg by mouth Daily  30 capsule 3    Cholecalciferol (VITAMIN D3) 2000 UNITS CAPS Take 2,000 Units by mouth daily          Codeine; Erythromycin; Exenatide; Levofloxacin; Verapamil; Clonidine derivatives; Other; and Penicillins  Social History     Tobacco Use   Smoking Status Never Smoker   Smokeless Tobacco Never Used   Tobacco Comment    syant rrt 5/28/2019       Social History     Substance and Sexual Activity   Alcohol Use No    Alcohol/week: 0.0 standard drinks       REVIEW OF SYSTEMS:  Constitutional: negative  Eyes: negative  Respiratory: negative  Cardiovascular: negative  Gastrointestinal: negative  Genitourinary: negative except for what is in HPI  Musculoskeletal: negative  Skin: negative   Neurological: negative  Hematological/Lymphatic: negative  Psychological: negative    Physical Exam:      Vitals:    08/05/20 1122   BP: 128/78   Pulse: 82   Temp: 96.9 °F (36.1 °C)     Patient is a 81 y.o. female in no acute distress and alert and oriented to person, place and time. NAD, Alert  Non labored respiration  Normal peripheral pulses  Soft, non tender  Skin-warm and dry  Psych- normal mood and affect    Assessment and Plan      1. Recurrent UTI    2. Nephrolithiasis           Plan:       KUB reviewed    Patient instructed to drink at least 10 eight ounce glasses of water a day and avoid excessive consumption of sodium and animal protein to decrease risk of recurrent kidney stones. Recurrent UTIs: Discussed double voiding techniques to improve bladder emptying. Discussed staying well hydrated, >60 oz/day. Cranberry pills BID.    Follow up 1 year

## 2020-08-24 ENCOUNTER — OFFICE VISIT (OUTPATIENT)
Dept: CARDIOLOGY | Age: 85
End: 2020-08-24
Payer: MEDICARE

## 2020-08-24 VITALS
WEIGHT: 197 LBS | HEART RATE: 82 BPM | BODY MASS INDEX: 38.68 KG/M2 | DIASTOLIC BLOOD PRESSURE: 58 MMHG | SYSTOLIC BLOOD PRESSURE: 162 MMHG | HEIGHT: 60 IN

## 2020-08-24 PROCEDURE — 93010 ELECTROCARDIOGRAM REPORT: CPT | Performed by: INTERNAL MEDICINE

## 2020-08-24 PROCEDURE — 99214 OFFICE O/P EST MOD 30 MIN: CPT | Performed by: INTERNAL MEDICINE

## 2020-08-24 PROCEDURE — 93005 ELECTROCARDIOGRAM TRACING: CPT | Performed by: INTERNAL MEDICINE

## 2020-08-24 PROCEDURE — 99214 OFFICE O/P EST MOD 30 MIN: CPT

## 2020-08-24 NOTE — PROGRESS NOTES
CC: follow for chronic diastolic HF    HPI:  Is here for follow up. Denies any cp, sob, orthopnea, pnd, le edema. Has some dizziness when getting up. Per  has some breathing issues when she sleeps/naps. Past Medical History:   has a past medical history of Allergic rhinitis, Asthma, Basal cell carcinoma of cheek, Basal cell carcinoma of leg, CAD (coronary artery disease), Central retinal artery occlusion, Cerebral artery occlusion with cerebral infarction [facility]), Cerebrovascular disease, CHF (congestive heart failure) ([address] Diverticulosis, Dyslipidemia, Elevated antinuclear antibody (ZAIRA) level, Esophagitis, Foraminal stenosis of lumbar region, Hyperlipidemia, Hypertension, IBS (irritable bowel syndrome), Iron deficiency anemia, Junctional bradycardia, Migraine, Mild CAD, Mitral regurgitation, Obesity, CHICO (obstructive sleep apnea), Osteoarthritis, PMR (polymyalgia rheumatica) ([address] Pneumonia, Premature atrial contractions, Pulmonary hypertension ([address] Restrictive lung disease, Type II or unspecified type diabetes mellitus without mention of complication, not stated as uncontrolled, and Vitreous floaters. Past Surgical History:   has a past surgical history that includes Appendectomy (1952); Hysterectomy (Approx 1983); Cataract removal (Bilateral, 08/10); malignant skin lesion excision (Right, 12/10 and 04/11); malignant skin lesion excision (Right, 02/2012); Colonoscopy (05/16/2011); other surgical history (09/06/2011); Cholecystectomy; Endoscopy, colon, diagnostic; eye surgery; Cardiac catheterization (2014); other surgical history (3/22/16); other surgical history (Right, 4/26/16); Cystoscopy (Right, 2/6/2019); Cystoscopy (Right, 2/27/2019); Colonoscopy (N/A, 9/26/2019); Upper gastrointestinal endoscopy (05/16/2011); Upper gastrointestinal endoscopy (6/22/15); and Upper gastrointestinal endoscopy (N/A, 9/26/2019).     Home Medications:  Prior to Admission medications    Medication Sig Start Date End Date Taking? Authorizing Provider   Liraglutide (VICTOZA) 18 MG/3ML SOPN SC injection Inject 1.8 mg into the skin daily 7/29/20  Yes Carla Haqn, APRN - CNP   furosemide (LASIX) 40 MG tablet TAKE 1 TABLET DAILY 6/11/20  Yes INGRID Hammonds CNP   predniSONE (DELTASONE) 5 MG tablet TAKE 1 TABLET DAILY 5/21/20  Yes INGRID Hammonds CNP   potassium chloride (KLOR-CON 10) 10 MEQ extended release tablet Take 1 tablet by mouth 2 times daily 4/14/20  Yes INGRID Hammonds CNP   insulin lispro, 1 Unit Dial, (HUMALOG KWIKPEN) 100 UNIT/ML SOPN Inject 8 units into the skin four times a day (before meals and bedtime snack).  2/11/20  Yes INGRID Hammonds CNP   metoprolol tartrate (LOPRESSOR) 25 MG tablet Take 1.5 tablets by mouth 2 times daily 1/24/20  Yes Gaurang Cherry,    amLODIPine (NORVASC) 5 MG tablet Take 1 tablet by mouth daily 1/24/20  Yes Gaurang Cherry DO   simvastatin (ZOCOR) 20 MG tablet TAKE 1 TABLET NIGHTLY 1/13/20  Yes INGRID Hammonds CNP   hydrALAZINE (APRESOLINE) 50 MG tablet Take 1 tablet by mouth 3 times daily 11/5/19  Yes INGRID Hammonds CNP   insulin glargine (LANTUS SOLOSTAR) 100 UNIT/ML injection pen Inject 22 Units into the skin 2 times daily 10/29/19  Yes INGRID Hammonds CNP   aspirin 81 MG EC tablet Take 81 mg by mouth daily   Yes Historical Provider, MD   acetaminophen (TYLENOL) 500 MG tablet Take 500 mg by mouth daily   Yes Historical Provider, MD   omeprazole (PRILOSEC) 20 MG capsule Take 20 mg by mouth Daily  1/14/15  Yes Sirisha Glez   Cholecalciferol (VITAMIN D3) 2000 UNITS CAPS Take 2,000 Units by mouth daily    Yes Historical Provider, MD   Insulin Pen Needle (B-D ULTRAFINE III SHORT PEN) 31G X 8 MM MISC USE 7 NEEDLES DAILY 7/7/20   INGRID Hammonds CNP   Handicap Placard MISC by Does not apply route Expires 4/7/2023 4/7/20   Denton Borrego, INGRID - CNP   Multiple Vitamins-Minerals (AIRBORNE PO) Take by mouth daily    Historical Provider, MD   albuterol sulfate  (90 Base) MCG/ACT inhaler Inhale 2 puffs into the lungs 4 times daily as needed for Wheezing  Patient not taking: Reported on 8/24/2020 7/26/19   Adonis Morris MD   Polyethylene Glycol 400 (BLINK TEARS) 0.25 % SOLN Place into both eyes as needed (dry eyes)     Historical Provider, MD   glucose blood VI test strips (ASCENSIA AUTODISC VI;ONE TOUCH ULTRA TEST VI) strip 1 each by In Vitro route 3 times daily As directed. 7/13/17   Gaurnag Cherry DO       Allergies:  Codeine; Erythromycin; Exenatide; Levofloxacin; Verapamil; Clonidine derivatives; Other; and Penicillins    Social History:   reports that she has never smoked. She has never used smokeless tobacco. She reports that she does not drink alcohol or use drugs. REVIEW OF SYSTEMS:    Constitutional: there has been no unanticipated weight loss. There's been No change in energy level, No change in activity level. Eyes: No visual changes or diplopia. No scleral icterus. ENT: No Headaches, hearing loss or vertigo. No mouth sores or sore throat. Cardiovascular: as hpi  Respiratory: as hpi  Gastrointestinal: No abdominal pain, appetite loss, blood in stools. No change in bowel or bladder habits. Genitourinary: No dysuria, trouble voiding, or hematuria. Musculoskeletal:  No gait disturbance, No weakness or joint complaints. Integumentary: No rash or pruritis. Neurological: No headache, diplopia, change in muscle strength, numbness or tingling. No change in gait, balance, coordination, mood, affect, memory, mentation, behavior. Psychiatric: No new anxiety or depression. Endocrine: No temperature intolerance. No excessive thirst, fluid intake, or urination. No tremor. Hematologic/Lymphatic: No abnormal bruising or bleeding, blood clots or swollen lymph nodes. Allergic/Immunologic: No nasal congestion or hives.       Physical Exam:  BP (!) 170/60 (Site: Left Upper Arm, Position: Sitting)   Pulse 82   Ht 4' 11.75\" (1.518 m)   Wt 197 lb (89.4 kg)   BMI 38.80 kg/m²   General appearance: alert and cooperative with exam  HEENT: Head: Normocephalic, no lesions, without obvious abnormality. Eyes: PERRL, EOMI  Ears: Not obvious deformations or lack of hearing  Neck: no carotid bruit, no JVD  Lungs: clear to auscultation bilaterally  Heart: regular rate and rhythm, S1, S2 normal, no murmur, click, rub or gallop  Abdomen: soft, non-tender; bowel sounds normal; no masses,  no organomegaly  Extremities: extremities normal, atraumatic, no cyanosis or edema  Neurologic: Mental status: Alert, oriented, thought content appropriate  Skin: WNL for age and condition, no obvious rashes or leasions    Cardiac Data:  EKG: Most likely Sinus with PACs. -Nonspecific ST depression   +   Nonspecific T-abnormality      Echo 7/2019:  Limited Echo for LVEF  Global left ventricular systolic function is normal. Estimated ejection  fraction is 55-60 % . Calculated EF via heart model is 58 %. No wall motion abnormality seen. Labs:   Lab Results   Component Value Date    CHOL 185 06/02/2020    TRIG 196 (H) 06/02/2020    HDL 67 06/02/2020    LDLCHOLESTEROL 79 06/02/2020    VLDL NOT REPORTED (H) 06/02/2020    CHOLHDLRATIO 2.8 06/02/2020       Lab Results   Component Value Date     06/02/2020    K 3.6 (L) 06/02/2020     06/02/2020    CO2 28 06/02/2020    BUN 24 (H) 06/02/2020    CREATININE 0.94 (H) 06/02/2020    GLUCOSE 175 (H) 06/02/2020    CALCIUM 9.6 06/02/2020    PROT 6.8 06/02/2020    LABALBU 4.1 06/02/2020    BILITOT 0.51 06/02/2020    ALKPHOS 85 06/02/2020    AST 13 06/02/2020    ALT 13 06/02/2020    LABGLOM 57 (L) 06/02/2020    GFRAA >60 06/02/2020         IMPRESSION & RECOMMENDATIONS:    1. HTN- higher in office. But well controlled at home in range of 120-130s. Will continue to follow for now on current meds.    2. Chronic HFpEF- stable continue current dose lasix, and can take extra lasix pron. 3. HLP- at goal. Continue statin. 4. DM2- per pcp  5. CHICO- not on cpap. Recommended compliance. The patient is to continue heart healthy diet, weight loss and exercise as tolerated. Patient's medications and side effects were discussed. Medication refills were provided if needed. Follow up appointment timing was discussed. All questions and concerns were addressed to patient's satisfaction. The patient is to follow up in 6 months or sooner if necessary. Thank you for allowing me to participate in the care of this patient, please do not hesitate to call if you have any questions. Yaneli Quevedo DO, [facility] [place], [id number] [facility]  [email or web address]  [phone]

## 2020-09-08 ENCOUNTER — HOSPITAL ENCOUNTER (OUTPATIENT)
Dept: LAB | Age: 85
Discharge: HOME | End: 2020-09-08
Payer: MEDICARE

## 2020-09-08 LAB
ABSOLUTE EOS #: 0.14 K/UL (ref 0–0.44)
ABSOLUTE IMMATURE GRANULOCYTE: 0.04 K/UL (ref 0–0.3)
ABSOLUTE LYMPH #: 3.37 K/UL (ref 1.1–3.7)
ABSOLUTE MONO #: 0.84 K/UL (ref 0.1–1.2)
ANION GAP SERPL CALCULATED.3IONS-SCNC: 11 MMOL/L (ref 9–17)
BASOPHILS # BLD: 1 % (ref 0–2)
BASOPHILS ABSOLUTE: 0.07 K/UL (ref 0–0.2)
BUN BLDV-MCNC: 14 MG/DL (ref 8–23)
BUN/CREAT BLD: 14 (ref 9–20)
CALCIUM SERPL-MCNC: 9.9 MG/DL (ref 8.6–10.4)
CHLORIDE BLD-SCNC: 105 MMOL/L (ref 98–107)
CO2: 30 MMOL/L (ref 20–31)
CREAT SERPL-MCNC: 1 MG/DL (ref 0.5–0.9)
DIFFERENTIAL TYPE: ABNORMAL
EOSINOPHILS RELATIVE PERCENT: 2 % (ref 1–4)
ESTIMATED AVERAGE GLUCOSE: 177 MG/DL
GFR AFRICAN AMERICAN: >60 ML/MIN
GFR NON-AFRICAN AMERICAN: 53 ML/MIN
GFR SERPL CREATININE-BSD FRML MDRD: ABNORMAL ML/MIN/{1.73_M2}
GFR SERPL CREATININE-BSD FRML MDRD: ABNORMAL ML/MIN/{1.73_M2}
GLUCOSE BLD-MCNC: 149 MG/DL (ref 70–99)
HBA1C MFR BLD: 7.8 % (ref 4.8–5.9)
HCT VFR BLD CALC: 42.1 % (ref 36.3–47.1)
HCT VFR BLD CALC: 42.1 % (ref 36.3–47.1)
HEMOGLOBIN: 13 G/DL (ref 11.9–15.1)
HEMOGLOBIN: 13 G/DL (ref 11.9–15.1)
IMMATURE GRANULOCYTES: 0 %
LYMPHOCYTES # BLD: 35 % (ref 24–43)
MCH RBC QN AUTO: 27.6 PG (ref 25.2–33.5)
MCH RBC QN AUTO: 27.6 PG (ref 25.2–33.5)
MCHC RBC AUTO-ENTMCNC: 30.9 G/DL (ref 25.2–33.5)
MCHC RBC AUTO-ENTMCNC: 30.9 G/DL (ref 25.2–33.5)
MCV RBC AUTO: 89.4 FL (ref 82.6–102.9)
MCV RBC AUTO: 89.4 FL (ref 82.6–102.9)
MONOCYTES # BLD: 9 % (ref 3–12)
NRBC AUTOMATED: 0 PER 100 WBC
NRBC AUTOMATED: 0 PER 100 WBC
PDW BLD-RTO: 15.8 % (ref 11.8–14.4)
PDW BLD-RTO: 15.8 % (ref 11.8–14.4)
PLATELET # BLD: ABNORMAL K/UL (ref 138–453)
PLATELET # BLD: ABNORMAL K/UL (ref 138–453)
PLATELET ESTIMATE: ABNORMAL
PLATELET, FLUORESCENCE: 189 K/UL (ref 138–453)
PLATELET, FLUORESCENCE: 189 K/UL (ref 138–453)
PLATELET, IMMATURE FRACTION: 18 % (ref 1.1–10.3)
PLATELET, IMMATURE FRACTION: 18 % (ref 1.1–10.3)
PMV BLD AUTO: ABNORMAL FL (ref 8.1–13.5)
PMV BLD AUTO: ABNORMAL FL (ref 8.1–13.5)
POTASSIUM SERPL-SCNC: 3.5 MMOL/L (ref 3.7–5.3)
RBC # BLD: 4.71 M/UL (ref 3.95–5.11)
RBC # BLD: 4.71 M/UL (ref 3.95–5.11)
RBC # BLD: ABNORMAL 10*6/UL
SEG NEUTROPHILS: 53 % (ref 36–65)
SEGMENTED NEUTROPHILS ABSOLUTE COUNT: 5.08 K/UL (ref 1.5–8.1)
SODIUM BLD-SCNC: 146 MMOL/L (ref 135–144)
VITAMIN D 25-HYDROXY: 31.7 NG/ML (ref 30–100)
WBC # BLD: 9.5 K/UL (ref 3.5–11.3)
WBC # BLD: 9.5 K/UL (ref 3.5–11.3)
WBC # BLD: ABNORMAL 10*3/UL

## 2020-09-08 PROCEDURE — 85025 COMPLETE CBC W/AUTO DIFF WBC: CPT

## 2020-09-08 PROCEDURE — 85055 RETICULATED PLATELET ASSAY: CPT

## 2020-09-08 PROCEDURE — 82306 VITAMIN D 25 HYDROXY: CPT

## 2020-09-08 PROCEDURE — 83036 HEMOGLOBIN GLYCOSYLATED A1C: CPT

## 2020-09-08 PROCEDURE — 36415 COLL VENOUS BLD VENIPUNCTURE: CPT

## 2020-09-08 PROCEDURE — 85027 COMPLETE CBC AUTOMATED: CPT

## 2020-09-08 PROCEDURE — 80048 BASIC METABOLIC PNL TOTAL CA: CPT

## 2020-09-09 ENCOUNTER — HOSPITAL ENCOUNTER (OUTPATIENT)
Dept: LAB | Age: 85
Discharge: HOME | End: 2020-09-09
Payer: MEDICARE

## 2020-09-09 LAB
-: ABNORMAL
AMORPHOUS: ABNORMAL
BACTERIA: ABNORMAL
BILIRUBIN URINE: NEGATIVE
CASTS UA: ABNORMAL /LPF (ref 0–2)
COLOR: NORMAL
COMMENT UA: NORMAL
CRYSTALS, UA: ABNORMAL /HPF
EPITHELIAL CELLS UA: ABNORMAL /HPF (ref 0–5)
GLUCOSE URINE: NEGATIVE
KETONES, URINE: NEGATIVE
LEUKOCYTE ESTERASE, URINE: NEGATIVE
MUCUS: ABNORMAL
NITRITE, URINE: NEGATIVE
OTHER OBSERVATIONS UA: ABNORMAL
PH UA: 5.5 (ref 5–6)
PROTEIN UA: NEGATIVE
RBC UA: ABNORMAL /HPF (ref 0–4)
RENAL EPITHELIAL, UA: ABNORMAL /HPF
SPECIFIC GRAVITY UA: 1.01 (ref 1.01–1.02)
TRICHOMONAS: ABNORMAL
TURBIDITY: NORMAL
URINE HGB: NEGATIVE
UROBILINOGEN, URINE: NORMAL
WBC UA: ABNORMAL /HPF (ref 0–4)
YEAST: ABNORMAL

## 2020-09-09 PROCEDURE — 36415 COLL VENOUS BLD VENIPUNCTURE: CPT

## 2020-09-09 PROCEDURE — 87086 URINE CULTURE/COLONY COUNT: CPT

## 2020-09-09 PROCEDURE — 87186 SC STD MICRODIL/AGAR DIL: CPT

## 2020-09-09 PROCEDURE — 81001 URINALYSIS AUTO W/SCOPE: CPT

## 2020-09-09 PROCEDURE — 87088 URINE BACTERIA CULTURE: CPT

## 2020-09-09 RX ORDER — POTASSIUM CHLORIDE 20 MEQ/1
20 TABLET, EXTENDED RELEASE ORAL 2 TIMES DAILY
COMMUNITY
End: 2020-09-15

## 2020-09-11 LAB
CULTURE: ABNORMAL
Lab: ABNORMAL
SPECIMEN DESCRIPTION: ABNORMAL

## 2020-09-11 RX ORDER — CIPROFLOXACIN 500 MG/1
500 TABLET, FILM COATED ORAL 2 TIMES DAILY
Qty: 6 TABLET | Refills: 0 | Status: SHIPPED | OUTPATIENT
Start: 2020-09-11 | End: 2020-09-14

## 2020-09-15 ENCOUNTER — HOSPITAL ENCOUNTER (OUTPATIENT)
Dept: LAB | Age: 85
Discharge: HOME | End: 2020-09-15
Payer: MEDICARE

## 2020-09-15 ENCOUNTER — OFFICE VISIT (OUTPATIENT)
Dept: INTERNAL MEDICINE | Age: 85
End: 2020-09-15
Payer: MEDICARE

## 2020-09-15 ENCOUNTER — HOSPITAL ENCOUNTER (OUTPATIENT)
Age: 85
Setting detail: SPECIMEN
Discharge: HOME | End: 2020-09-15
Payer: MEDICARE

## 2020-09-15 VITALS
DIASTOLIC BLOOD PRESSURE: 68 MMHG | SYSTOLIC BLOOD PRESSURE: 122 MMHG | HEART RATE: 72 BPM | HEIGHT: 59 IN | TEMPERATURE: 97.2 F | BODY MASS INDEX: 39.51 KG/M2 | WEIGHT: 196 LBS

## 2020-09-15 LAB
-: ABNORMAL
AMORPHOUS: ABNORMAL
ANION GAP SERPL CALCULATED.3IONS-SCNC: 10 MMOL/L (ref 9–17)
BACTERIA: ABNORMAL
BILIRUBIN URINE: NEGATIVE
BUN BLDV-MCNC: 16 MG/DL (ref 8–23)
BUN/CREAT BLD: 15 (ref 9–20)
CALCIUM SERPL-MCNC: 10 MG/DL (ref 8.6–10.4)
CASTS UA: ABNORMAL /LPF (ref 0–2)
CHLORIDE BLD-SCNC: 101 MMOL/L (ref 98–107)
CO2: 28 MMOL/L (ref 20–31)
COLOR: NORMAL
COMMENT UA: NORMAL
CREAT SERPL-MCNC: 1.08 MG/DL (ref 0.5–0.9)
CRYSTALS, UA: ABNORMAL /HPF
EPITHELIAL CELLS UA: ABNORMAL /HPF (ref 0–5)
GFR AFRICAN AMERICAN: 59 ML/MIN
GFR NON-AFRICAN AMERICAN: 48 ML/MIN
GFR SERPL CREATININE-BSD FRML MDRD: ABNORMAL ML/MIN/{1.73_M2}
GFR SERPL CREATININE-BSD FRML MDRD: ABNORMAL ML/MIN/{1.73_M2}
GLUCOSE BLD-MCNC: 214 MG/DL (ref 70–99)
GLUCOSE URINE: NEGATIVE
KETONES, URINE: NEGATIVE
LEUKOCYTE ESTERASE, URINE: NEGATIVE
MUCUS: ABNORMAL
NITRITE, URINE: NEGATIVE
OTHER OBSERVATIONS UA: ABNORMAL
PH UA: 5 (ref 5–6)
POTASSIUM SERPL-SCNC: 4.4 MMOL/L (ref 3.7–5.3)
PROTEIN UA: NEGATIVE
RBC UA: ABNORMAL /HPF (ref 0–4)
RENAL EPITHELIAL, UA: ABNORMAL /HPF
SODIUM BLD-SCNC: 139 MMOL/L (ref 135–144)
SPECIFIC GRAVITY UA: 1.02 (ref 1.01–1.02)
TRICHOMONAS: ABNORMAL
TURBIDITY: NORMAL
URINE HGB: NEGATIVE
UROBILINOGEN, URINE: NORMAL
WBC UA: ABNORMAL /HPF (ref 0–4)
YEAST: ABNORMAL

## 2020-09-15 PROCEDURE — 36415 COLL VENOUS BLD VENIPUNCTURE: CPT

## 2020-09-15 PROCEDURE — 3051F HG A1C>EQUAL 7.0%<8.0%: CPT | Performed by: NURSE PRACTITIONER

## 2020-09-15 PROCEDURE — 99214 OFFICE O/P EST MOD 30 MIN: CPT | Performed by: NURSE PRACTITIONER

## 2020-09-15 PROCEDURE — 81001 URINALYSIS AUTO W/SCOPE: CPT

## 2020-09-15 PROCEDURE — G0008 ADMIN INFLUENZA VIRUS VAC: HCPCS | Performed by: NURSE PRACTITIONER

## 2020-09-15 PROCEDURE — 80048 BASIC METABOLIC PNL TOTAL CA: CPT

## 2020-09-15 PROCEDURE — 87086 URINE CULTURE/COLONY COUNT: CPT

## 2020-09-15 PROCEDURE — 90694 VACC AIIV4 NO PRSRV 0.5ML IM: CPT | Performed by: NURSE PRACTITIONER

## 2020-09-15 RX ORDER — POTASSIUM CHLORIDE 20 MEQ/1
20 TABLET, EXTENDED RELEASE ORAL 2 TIMES DAILY
Qty: 60 TABLET | Refills: 0
Start: 2020-09-15 | End: 2020-12-18 | Stop reason: SDUPTHER

## 2020-09-15 NOTE — PROGRESS NOTES
09/15/20  Jarret Jc  1935      Chief Complaint:   1. Essential hypertension    2. Controlled type 2 diabetes mellitus without complication, with long-term current use of insulin ([facility] [address]    3. Diabetic nephropathy associated with type 2 diabetes mellitus ([address]    4. Diastolic dysfunction    5. Kidney disease    6. Lesion of lower extremity    7. Hypertensive heart disease with congestive heart failure, unspecified heart failure type ([address]    8. Iron deficiency anemia secondary to blood loss (chronic)    9. Polymyalgia rheumatica ([address]    10. Neural foraminal stenosis of lumbar spine    11. Pulmonary HTN ([facility] [address]    12. Obesity, morbid, BMI 40.0-49.9 ([facility] [address]    13. Vitamin D deficiency    14. Dyslipidemia    15. Multifocal atrial tachycardia (HCC)    16. Class 2 severe obesity due to excess calories with serious comorbidity and body mass index (BMI) of 37.0 to 37.9 in adult ([facility] [address]    17. Mild intermittent asthma without complication    18. Irritable bowel syndrome with diarrhea    19. Recurrent cystitis        HPI:  80-year-old patient being seen for 3-month follow-up of above chronic health conditions. Recently treated for UTI. Culture grew out E. coli. States unsure if symptoms resolved. Still has a little bit of pelvic discomfort. Patient with chronic rectal leakage diarrhea. States was told by general surgeon nothing further they can do suggested GI consult. Her blood pressure has been well controlled. Denies any lightheadedness dizziness. No discomfort. She feels her activity has been down with the COVID-19 pandemic we reviewed her A1c which was slightly elevated. She does continue to follow with the diabetic nurse practitioner. History of diastolic dysfunction. Denies any increased swelling to lower extremities. As per the pain to the right lower extremity remains fair but again she declined surgery at this point feels she is not a good surgical candidate. States pain is not bad enough.   We reviewed her iron deficiency anemia and notes from hematologist.  He suggests PRN follow-up labs have been stable. She continues on her low-dose prednisone for the polymyalgia rheumatica. Denies any flareups. She continues on her supplemental vitamin D without any unwarranted side effects. Takes her Zocor as directed for her dyslipidemia. Declines any myalgias from the Zocor. Allergies   Allergen Reactions    Codeine      Confusion      Erythromycin      GI upset  Received zithromax in past and tolerated    Exenatide Nausea Only    Levofloxacin      GI upset    Verapamil Other (See Comments)     Junctional bradycardia    Clonidine Derivatives Rash     2009, catapres    Other Rash     Levbid    Penicillins Rash     Received Rocephin in past and tolerated       Past Medical History:   Diagnosis Date    Allergic rhinitis     Asthma     PFT's, 04/06, actually showed mild restrictive defect.  Basal cell carcinoma of cheek 2007    Right cheek    Basal cell carcinoma of leg     right leg    CAD (coronary artery disease)     With 40% stenosis of the LAD, 07/10, ejection fraction 60%. 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 Yolanda Pinon. Repeat EGD6/15 Gastritis    Foraminal stenosis of lumbar region     Moderate  Right L4/L5 neuroforaminal stenosis, Dr Leslye Perea following. MRI 2/16 [place].   Moderate foraminal stenosis mild to moderate central canal stenosis    Hyperlipidemia     Hypertension     IBS (irritable bowel syndrome)     GI consultation with Dr. Nicanor Urias, GI specialist in [facility], felt that she probably has chronic functional diarrhea and recommended empiric Imodium, Pepto-Bismol or Questran first. Sprue test Neg 2011    Iron deficiency anemia     Percent sat iron 5, January 2015, ferritin 40 1 /15, FOBT positive  EGD 6/15  Gastritis, IV iron 9/15x3 effective,  colonoscopy deferred by Dr. Rush Diaz. --Prior colonoscopy 2011 with severe diverticulosis and telangiectasia. Trial iron solution in Orange juice 12/16    Junctional bradycardia     Symptomatic, resolved with discontinuation of Verapamil, 05/09. 1.1) Echocardiogram: LAE, LVH, EF 50%, mild MR, diastolic. 1.2) Dr. Damián Camilo evaluation. 1.3.) Persantine stress test negative, 05/09.  Migraine     Mild CAD     cath 10/15    Mitral regurgitation     Moderate to severe on echocardiogram September 2015.   Right pressure 76 grade 2 diastolic dysfunction,  echo 83/94 grade 2 diastolic dysfunction mild to moderate MR RVSP 56 aortic sclerosis    Obesity     CHICO (obstructive sleep apnea)     CPAP 14 2/15 initiation  AHI 7  mild CHICO intolerant CPAP    Osteoarthritis     PMR (polymyalgia rheumatica) (HCC)     Pneumonia     Premature atrial contractions     Pulmonary hypertension (HCC)     -Moderate on echo November 2014    Restrictive lung disease     Mild on PFTs 11/14    Type II or unspecified type diabetes mellitus without mention of complication, not stated as uncontrolled     Vitreous floaters        Past Surgical History:   Procedure Laterality Date    APPENDECTOMY  1952    CARDIAC CATHETERIZATION  2014    CATARACT REMOVAL Bilateral 08/10    CHOLECYSTECTOMY      COLONOSCOPY  05/16/2011    COLONOSCOPY N/A 9/26/2019    severe diverticulosis, benign rectal polyp, Dr. Nannette Mayo Right 2/6/2019    Cysto with right stent insertion and villareal catheter performed by Agusto Mishra [facility] at [facility] Right 2/27/2019    CYSTO right stent removal  Right Ureteroscopy Holmium Laser right stent exchange performed [facility]usto [facility]ra MD at [facility], DIAGNOSTIC      EYE SURGERY      HYSTERECTOMY  Approx 1983    MALIGNANT SKIN LESION EXCISION Right 12/10 and 04/11    Basal CellRemoved from right neck    MALIGNANT SKIN LESION EXCISION Right 02/2012    Basal Cell Removed from right leg    OTHER SURGICAL HISTORY  09/06/2011    capsule endoscopy    OTHER SURGICAL HISTORY  3/22/16    right L4 and L5 TFE    OTHER SURGICAL HISTORY Right 4/26/16    L4, L5 TFE    UPPER GASTROINTESTINAL ENDOSCOPY  05/16/2011    UPPER GASTROINTESTINAL ENDOSCOPY  6/22/15    mild gastritis (Dr. Aminah Patino)    UPPER GASTROINTESTINAL ENDOSCOPY N/A 9/26/2019    mild to moderate inflammation, Dr. Aminah Patino       Current Outpatient Medications on File Prior to Visit   Medication Sig Dispense Refill    metoprolol tartrate (LOPRESSOR) 25 MG tablet Take 25 mg by mouth 3 times daily      Liraglutide (VICTOZA) 18 MG/3ML SOPN SC injection Inject 1.8 mg into the skin daily 18 mL 3    furosemide (LASIX) 40 MG tablet TAKE 1 TABLET DAILY 90 tablet 3    predniSONE (DELTASONE) 5 MG tablet TAKE 1 TABLET DAILY 90 tablet 3    insulin lispro, 1 Unit Dial, (HUMALOG KWIKPEN) 100 UNIT/ML SOPN Inject 8 units into the skin four times a day (before meals and bedtime snack).  28.8 mL 3    amLODIPine (NORVASC) 5 MG tablet Take 1 tablet by mouth daily 90 tablet 3    simvastatin (ZOCOR) 20 MG tablet TAKE 1 TABLET NIGHTLY 90 tablet 3    Multiple Vitamins-Minerals (AIRBORNE PO) Take by mouth daily During winter months      hydrALAZINE (APRESOLINE) 50 MG tablet Take 1 tablet by mouth 3 times daily 270 tablet 3    insulin glargine (LANTUS SOLOSTAR) 100 UNIT/ML injection pen Inject 22 Units into the skin 2 times daily 39.6 mL 3    albuterol sulfate  (90 Base) MCG/ACT inhaler Inhale 2 puffs into the lungs 4 times daily as needed for Wheezing 1 Inhaler 1    Polyethylene Glycol 400 (BLINK TEARS) 0.25 % SOLN Place into both eyes as needed (dry eyes)       aspirin 81 MG EC tablet Take 81 mg by mouth daily      acetaminophen (TYLENOL) 500 MG tablet Take 500 mg by mouth daily      omeprazole (PRILOSEC) 20 MG capsule Take 20 mg by mouth Daily  30 capsule 3    Cholecalciferol (VITAMIN D3) 2000 UNITS CAPS Take 2,000 Units by mouth daily       Insulin Pen Needle (B-D ULTRAFINE III SHORT PEN) 31G X 8 MM MISC USE 7 NEEDLES DAILY 270 each 8    Handicap Placard MISC by Does not apply route Expires 4/7/2023 1 each 0    glucose blood VI test strips (ASCENSIA AUTODISC VI;ONE TOUCH ULTRA TEST VI) strip 1 each by In Vitro route 3 times daily As directed. 100 each 5     No current facility-administered medications on file prior to visit.         Social History     Socioeconomic History    Marital status:      Spouse name: Sujatha Soriano Number of children: 3    Years of education: 15    Highest education level: Some college, no degree   Occupational History    Not on file   Social Needs    Financial resource strain: Not hard at all   Soapets insecurity     Worry: Never true     Inability: Never true   ezeep needs     Medical: No     Non-medical: No   Tobacco Use    Smoking status: Never Smoker    Smokeless tobacco: Never Used    Tobacco comment: amish rrt 5/28/2019   Substance and Sexual Activity    Alcohol use: No     Alcohol/week: 0.0 standard drinks    Drug use: No    Sexual activity: Not on file   Lifestyle    Physical activity     Days per week: 0 days     Minutes per session: 0 min    Stress: Not at all   Relationships    Social connections     Talks on phone: More than three times a week     Gets together: Once a week     Attends Sabianism service: More than 4 times per year     Active member of club or organization: Yes     Attends meetings of clubs or organizations: More than 4 times per year     Relationship status:     Intimate partner violence     Fear of current or ex partner: Not on file     Emotionally abused: Not on file     Physically abused: Not on file     Forced sexual activity: Not on file   Other Topics Concern    Not on file   Social History Narrative    9/25/2019 No SDOH needs identified. She is receiving Meals on Wheels. Review of Systems   Constitutional: Negative for activity change, appetite change, chills, fatigue, fever and unexpected weight change. HENT: Negative for congestion, dental problem, ear discharge, ear pain, facial swelling, hearing loss, postnasal drip, rhinorrhea, sinus pressure, sore throat and trouble swallowing. Eyes: Negative for pain and visual disturbance. Respiratory: Negative for cough, chest tightness, shortness of breath and wheezing. Cardiovascular: Negative for chest pain, palpitations and leg swelling. Gastrointestinal: Positive for diarrhea. Negative for abdominal pain, blood in stool, constipation, nausea and vomiting. Endocrine: Negative for cold intolerance, heat intolerance and polyuria. Genitourinary: Negative for difficulty urinating. Musculoskeletal: Positive for arthralgias. Negative for gait problem, myalgias, neck pain and neck stiffness. Skin: Negative for color change, rash and wound. Neurological: Negative for dizziness, tremors, seizures, weakness, light-headedness, numbness and headaches. Psychiatric/Behavioral: Negative for confusion and hallucinations. The patient is not nervous/anxious. Physical Exam  Vitals signs and nursing note reviewed. Constitutional:       General: She is not in acute distress. Appearance: Normal appearance. She is well-developed. She is not diaphoretic. HENT:      Head: Normocephalic and atraumatic. Right Ear: External ear normal.      Left Ear: External ear normal.   Eyes:      General:         Right eye: No discharge.          Left eye: No discharge. Neck:      Trachea: No tracheal deviation. Cardiovascular:      Rate and Rhythm: Normal rate and regular rhythm. Heart sounds: Normal heart sounds. No murmur. No friction rub. No gallop. Pulmonary:      Effort: Pulmonary effort is normal. No respiratory distress. Breath sounds: Normal breath sounds. No stridor. No wheezing, rhonchi or rales. Chest:      Chest wall: No tenderness. Abdominal:      General: Bowel sounds are normal. There is no distension. Palpations: Abdomen is soft. Tenderness: There is no abdominal tenderness. Comments: Obese   Musculoskeletal:         General: No swelling. Skin:     General: Skin is warm and dry. Capillary Refill: Capillary refill takes less than 2 seconds. Coloration: Skin is not jaundiced or pale. Findings: No rash. Neurological:      General: No focal deficit present. Mental Status: She is alert and oriented to person, place, and time. Cranial Nerves: No cranial nerve deficit. Sensory: No sensory deficit. Coordination: Coordination normal.   Psychiatric:         Mood and Affect: Mood normal.         Behavior: Behavior normal.         Thought Content: Thought content normal.         Judgment: Judgment normal.       Vitals:    09/15/20 1236   BP: 122/68   Site: Right Upper Arm   Position: Sitting   Cuff Size: Large Adult   Pulse: 72   Temp: 97.2 °F (36.2 °C)   TempSrc: Temporal   Weight: 196 lb (88.9 kg)   Height: 4' 10.5\" (1.486 m)       Assessment:  1. Essential hypertension  Stable on metoprolol, Norvasc, hydralazine and Lasix. Continue to monitor    2. Controlled type 2 diabetes mellitus without complication, with long-term current use of insulin ([address]  3. Diabetic nephropathy associated with type 2 diabetes mellitus (HCC)  A1c slightly elevated to 7.8. Work on diet increase activity, continue to follow with the diabetic nurse practitioner.   Continues on Victoza Humalog and Lantus  - Hemoglobin A1C; Future  - Basic Metabolic Panel; Future    4. Diastolic dysfunction  Stable at present. No signs of fluid overload. Continue on Lasix, metoprolol    5. Kidney disease  Stable, avoid nephrotoxic drugs    6. Lesion of lower extremity  Continues to decline any surgery to remove the mass encapsulating the muscle. Feels pain is not severe enough to have the surgery yet. 7. Hypertensive heart disease with congestive heart failure, unspecified heart failure type (HCC)  Stable, continue to monitor    8. Iron deficiency anemia secondary to blood loss (chronic)  Seen by hematology suggest PRN follow-up serial lab follow-ups ordered    9. Polymyalgia rheumatica (HCC)  Stable on low-dose prednisone. Previous multiple attempts with failed removal of prednisone    10. Neural foraminal stenosis of lumbar spine  Stable continue to monitor    11. Pulmonary HTN (HCC)  Stable monitor    12. Obesity, morbid, BMI 40.0-49.9 ([facility] [address]  Work on weight, increase activity    13. Vitamin D deficiency  Stable on supplemental vitamin D     14. Dyslipidemia  stable on Zocor. Continue to work on diet remain active    15. Multifocal atrial tachycardia (HCC)  Stable on metoprolol    16. Class 2 severe obesity due to excess calories with serious comorbidity and body mass index (BMI) of 37.0 to 37.9 in adult [facility])  Work on diet increase activity    17. Mild intermittent asthma without complication  Stable at present    18. Irritable bowel syndrome with diarrhea  Due to persistent diarrhea we will get her set up with gastroenterology  - Ambulatory referral to Gastroenterology    19. Recurrent cystitis  Recurrent symptoms, UA  - Urinalysis Reflex to Culture; Future      Plan:  As noted above. Follow up for routine visit. Call sooner with concerns prior.     Electronically signed by INGRID Andrews CNP on 9/15/2020 at 1:41 PM

## 2020-09-15 NOTE — PROGRESS NOTES
Have you had an allergic reaction to the flu (influenza) shot? no  Are you allergic to eggs or any component of the flu vaccine? no  Do you have a history of Guillain-Goodyear Syndrome (GBS), which is paralysis after receiving the flu vaccine? no  Are you feeling well today? yes  Flu vaccine given as ordered. Patient tolerated it well. No questions re: VIS information.

## 2020-09-16 DIAGNOSIS — N20.0 NEPHROLITHIASIS: ICD-10-CM

## 2020-09-16 DIAGNOSIS — N39.0 RECURRENT UTI: ICD-10-CM

## 2020-09-17 LAB
CULTURE: NO GROWTH
Lab: NORMAL
SPECIMEN DESCRIPTION: NORMAL

## 2020-09-18 ASSESSMENT — ENCOUNTER SYMPTOMS
SHORTNESS OF BREATH: 0
SORE THROAT: 0
NAUSEA: 0
COUGH: 0
CHEST TIGHTNESS: 0
SINUS PRESSURE: 0
VOMITING: 0
ABDOMINAL PAIN: 0
COLOR CHANGE: 0
BLOOD IN STOOL: 0
WHEEZING: 0
RHINORRHEA: 0
EYE PAIN: 0
TROUBLE SWALLOWING: 0
FACIAL SWELLING: 0
CONSTIPATION: 0
DIARRHEA: 1

## 2020-09-28 RX ORDER — INSULIN GLARGINE 100 [IU]/ML
INJECTION, SOLUTION SUBCUTANEOUS
Qty: 30 ML | Refills: 4 | Status: SHIPPED | OUTPATIENT
Start: 2020-09-28 | End: 2020-09-29 | Stop reason: SDUPTHER

## 2020-09-29 ENCOUNTER — OFFICE VISIT (OUTPATIENT)
Dept: DIABETES SERVICES | Age: 85
End: 2020-09-29
Payer: MEDICARE

## 2020-09-29 VITALS
DIASTOLIC BLOOD PRESSURE: 62 MMHG | RESPIRATION RATE: 16 BRPM | HEART RATE: 75 BPM | OXYGEN SATURATION: 95 % | SYSTOLIC BLOOD PRESSURE: 122 MMHG | WEIGHT: 197 LBS | BODY MASS INDEX: 40.47 KG/M2

## 2020-09-29 PROCEDURE — 99214 OFFICE O/P EST MOD 30 MIN: CPT | Performed by: NURSE PRACTITIONER

## 2020-09-29 PROCEDURE — 3051F HG A1C>EQUAL 7.0%<8.0%: CPT | Performed by: NURSE PRACTITIONER

## 2020-09-29 PROCEDURE — 99214 OFFICE O/P EST MOD 30 MIN: CPT

## 2020-09-29 PROCEDURE — 95251 CONT GLUC MNTR ANALYSIS I&R: CPT | Performed by: NURSE PRACTITIONER

## 2020-09-29 RX ORDER — INSULIN GLARGINE 100 [IU]/ML
INJECTION, SOLUTION SUBCUTANEOUS
Qty: 30 ML | Refills: 4
Start: 2020-09-29 | End: 2021-01-04 | Stop reason: SDUPTHER

## 2020-09-29 RX ORDER — INSULIN LISPRO 100 [IU]/ML
INJECTION, SOLUTION INTRAVENOUS; SUBCUTANEOUS
Qty: 28.8 ML | Refills: 3
Start: 2020-09-29 | End: 2021-01-11

## 2020-09-29 ASSESSMENT — ENCOUNTER SYMPTOMS
VISUAL CHANGE: 0
RESPIRATORY NEGATIVE: 1
DIARRHEA: 0
SHORTNESS OF BREATH: 0
ABDOMINAL PAIN: 0
BLURRED VISION: 0

## 2020-09-29 NOTE — PROGRESS NOTES
[address] [facility] INTERNAL [facility] [address], [address]  [facility] [address] [phone]        HISTORY:    Rossy Ruiz presents today for evaluation and management of:  Chief Complaint   Patient presents with    Diabetes       Diabetes   She presents for her follow-up diabetic visit. She has type 2 diabetes mellitus. No MedicAlert identification noted. There are no hypoglycemic associated symptoms. Pertinent negatives for hypoglycemia include no confusion, dizziness, headaches, seizures or tremors. Associated symptoms include fatigue and foot paresthesias. Pertinent negatives for diabetes include no blurred vision, no chest pain, no foot ulcerations, no polydipsia, no polyphagia, no polyuria, no visual change and no weight loss. There are no hypoglycemic complications. Symptoms are stable. Risk factors for coronary artery disease include diabetes mellitus, dyslipidemia, family history, hypertension, obesity, stress and sedentary lifestyle. Current diabetic treatment includes insulin injections. She is compliant with treatment all of the time. She is currently taking insulin pre-breakfast, pre-lunch, pre-dinner and at bedtime. Insulin injections are given by patient. Rotation sites for injection include the abdominal wall. Her weight is stable. She is following a generally healthy diet. When asked about meal planning, she reported none. She has not had a previous visit with a dietitian. She rarely participates in exercise. Blood glucose monitoring compliance is excellent. An ACE inhibitor/angiotensin II receptor blocker is not being taken. Eye exam is current. Urinary Tract Infection    This is a new problem. The current episode started in the past 7 days. The problem occurs intermittently. The problem has been unchanged. The quality of the pain is described as aching. The pain is at a severity of 4/10. The pain is mild.  There has been no fever. She is not sexually active. She has tried nothing for the symptoms. The treatment provided no relief. Her past medical history is significant for kidney stones and recurrent UTIs. Interval History:    Current Diabetic Medications  Victoza 1.8 mg daily Lantus 22 units twice daily short acting insulin 8 units 4 times a day with meals 10 units if bs is over 150  Taking 6 insulin injection a day and testing blood sugar 4 times per day. DKA episodes: 0    12/19/19  CGM started  11/12/19  Patient is here for follow-up diabetes management she is testing her blood sugar for more times daily denies any hypoglycemia she is still concerned that she has recurrent high blood sugars although not correlating with most recent A1c.  Patient has tested in the middle the night and denies any hypoglycemia during that time. Austin Riggs is willing to consider CGM.     1/28/2020  Patient is here for follow-up diabetes management she has been using her CGM with success report downloaded and reviewed with patient.  No hypoglycemia identified.  Patient does states she is using her CGM and fingerstick method to compare numbers. Austin Riggs is using an older meter for backup and there is a wide discrepancy in CGM number and fingerstick method.     07/29/20   She has noticed her bs are elevated. She is dealing with a lot of stress. Diet: low carb  Exercise: none  BS testing: using CGM with success   Issues: high blood sugars denies hypoglycemia    09/29/20   She has increased her insulin to accommodate her higher blood sugars   Diet: low carb   Exercise: none  BS testing: uses cgm with success bs have been running higher lately denies hypoglycemia. Issues: denies     High cholesterol-  Takes zocor and denies any adverse effects with its use.  Watches diet and exercise.      Hypertension-  Takes Norvasc Lopressor and Apresoline and denies any adverse effects with their use. Watches diet and exercise.  Denies any chest pain, dizziness or edema.  Follows with cardiology:Yes. Monitors bp at home      Obesity- Working on [employer] loss.       Past Medical History:   Diagnosis Date    Allergic rhinitis     Asthma     PFT's, 04/06, actually showed mild restrictive defect.  Basal cell carcinoma of cheek 2007    Right cheek    Basal cell carcinoma of leg     right leg    CAD (coronary artery disease)     With 40% stenosis of the LAD, 07/10, ejection fraction 60%. 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 Kelly Pacheco. Repeat EGD6/15 Gastritis    Foraminal stenosis of lumbar region     Moderate  Right L4/L5 neuroforaminal stenosis, Dr Hasmukh Diaz following. MRI 2/16 [place]. Moderate foraminal stenosis mild to moderate central canal stenosis    Hyperlipidemia     Hypertension     IBS (irritable bowel syndrome)     GI consultation with Dr. Cl Otero, GI specialist in [facility], felt that she probably has chronic functional diarrhea and recommended empiric Imodium, Pepto-Bismol or Questran first. Sprue test Neg 2011    Iron deficiency anemia     Percent sat iron 5, January 2015, ferritin 40 1 /15, FOBT positive  EGD 6/15  Gastritis, IV iron 9/15x3 effective,  colonoscopy deferred by Dr. Kelly Pacheco. --Prior colonoscopy 2011 with severe diverticulosis and telangiectasia.   Trial iron solution in Orange juice 12/16    Junctional bradycardia     Symptomatic, resolved with discontinuation of Verapamil, 05/09. 1.1) Echocardiogram: LAE, LVH, EF 50%, mild MR, diastolic. 1.2) Dr. Emigdio Peterson evaluation. 1.3.) Persantine stress test negative, 05/09.  Migraine     Mild CAD     cath 10/15    Mitral regurgitation     Moderate to severe on echocardiogram September 2015.   Right pressure 76 grade 2 diastolic dysfunction,  echo 63/23 grade 2 diastolic dysfunction mild to moderate MR RVSP 56 aortic sclerosis    Obesity     CHICO (obstructive sleep apnea)     CPAP 14 2/15 initiation  AHI 7  mild CHICO intolerant CPAP    Osteoarthritis     PMR (polymyalgia rheumatica) (HCC)     Pneumonia     Premature atrial contractions     Pulmonary hypertension (HCC)     -Moderate on echo November 2014    Restrictive lung disease     Mild on PFTs 11/14    Type II or unspecified type diabetes mellitus without mention of complication, not stated as uncontrolled     Vitreous floaters      Family History   Problem Relation Age of Onset    Uterine Cancer Mother     Heart Disease Father     High Blood Pressure Father     Stroke Sister         from a vascular malformation    Heart Attack Son         age 48     Social History     Tobacco Use    Smoking status: Never Smoker    Smokeless tobacco: Never Used    Tobacco comment: amish rrt 5/28/2019   Substance Use Topics    Alcohol use: No     Alcohol/week: 0.0 standard drinks    Drug use: No     Allergies   Allergen Reactions    Codeine      Confusion      Erythromycin      GI upset  Received zithromax in past and tolerated    Exenatide Nausea Only    Levofloxacin      GI upset    Verapamil Other (See Comments)     Junctional bradycardia    Clonidine Derivatives Rash     2009, catapres    Other Rash     Levbid    Penicillins Rash     Received Rocephin in past and tolerated       MEDICATIONS:  Current Outpatient Medications   Medication Sig Dispense Refill    insulin glargine (LANTUS SOLOSTAR) 100 UNIT/ML injection pen INJECT 22 UNITS in the am and 24 units in the pm 30 mL 4    insulin lispro, 1 Unit Dial, (HUMALOG KWIKPEN) 100 UNIT/ML SOPN 8 units with breakfast and lunch 10 units if bs is over 150. 10 units with dinner and 12 units if bs is over 150. Max daily dose is 32 units 28.8 mL 3    metoprolol tartrate (LOPRESSOR) 25 MG tablet Take 25 mg by mouth 3 times daily      potassium chloride (KLOR-CON M) 20 MEQ extended release tablet Take 1 tablet by mouth 2 times daily Take two tabs in am and one tab at night 60 tablet 0    Liraglutide (VICTOZA) 18 MG/3ML SOPN SC injection Inject 1.8 mg into the skin daily 18 mL 3    Insulin Pen Needle (B-D ULTRAFINE III SHORT PEN) 31G X 8 MM MISC USE 7 NEEDLES DAILY 270 each 8    furosemide (LASIX) 40 MG tablet TAKE 1 TABLET DAILY 90 tablet 3    predniSONE (DELTASONE) 5 MG tablet TAKE 1 TABLET DAILY 90 tablet 3    Handicap Placard MISC by Does not apply route Expires 4/7/2023 1 each 0    amLODIPine (NORVASC) 5 MG tablet Take 1 tablet by mouth daily 90 tablet 3    simvastatin (ZOCOR) 20 MG tablet TAKE 1 TABLET NIGHTLY 90 tablet 3    Multiple Vitamins-Minerals (AIRBORNE PO) Take by mouth daily During winter months      hydrALAZINE (APRESOLINE) 50 MG tablet Take 1 tablet by mouth 3 times daily 270 tablet 3    albuterol sulfate  (90 Base) MCG/ACT inhaler Inhale 2 puffs into the lungs 4 times daily as needed for Wheezing 1 Inhaler 1    Polyethylene Glycol 400 (BLINK TEARS) 0.25 % SOLN Place into both eyes as needed (dry eyes)       aspirin 81 MG EC tablet Take 81 mg by mouth daily      glucose blood VI test strips (ASCENSIA AUTODISC VI;ONE TOUCH ULTRA TEST VI) strip 1 each by In Vitro route 3 times daily As directed. 100 each 5    acetaminophen (TYLENOL) 500 MG tablet Take 500 mg by mouth daily      omeprazole (PRILOSEC) 20 MG capsule Take 20 mg by mouth Daily  30 capsule 3    Cholecalciferol (VITAMIN D3) 2000 UNITS CAPS Take 2,000 Units by mouth daily        No current facility-administered medications for this visit. Review ofSymptoms:  Review of Systems   Constitutional: Positive for fatigue. Negative for unexpected weight change and weight loss. Eyes: Negative for blurred vision and visual disturbance. Respiratory: Negative. Negative for shortness of breath. Cardiovascular: Negative for chest pain and leg swelling. Gastrointestinal: Negative for abdominal pain and diarrhea. Endocrine: Negative for polydipsia, polyphagia and polyuria. Genitourinary: Negative. Musculoskeletal: Negative. Skin: Negative for rash and wound. Neurological: Negative for dizziness, tremors, seizures and headaches. Psychiatric/Behavioral: Negative. Negative for confusion and decreased concentration. Theremainder of a complete 14-point review of systems is negative. Vital Signs: /62   Pulse 75   Resp 16   Wt 197 lb (89.4 kg)   SpO2 95%   BMI 40.47 kg/m²      Wt Readings from Last 3 Encounters:   09/29/20 197 lb (89.4 kg)   09/15/20 196 lb (88.9 kg)   08/24/20 197 lb (89.4 kg)     Body mass index is 40.47 kg/m².   LABS:  Hemoglobin A1C   Date Value Ref Range Status   09/08/2020 7.8 (H) 4.8 - 5.9 % Final   06/02/2020 7.5 (H) 4.8 - 5.9 % Final     Lab Results   Component Value Date    LABMICR CANNOT BE CALCULATED 04/23/2019     Lab Results   Component Value Date     09/15/2020    K 4.4 09/15/2020     09/15/2020    CO2 28 09/15/2020    BUN 16 09/15/2020    CREATININE 1.08 (H) 09/15/2020    GLUCOSE 214 (H) 09/15/2020    CALCIUM 10.0 09/15/2020    PROT 6.8 06/02/2020    LABALBU 4.1 06/02/2020    BILITOT 0.51 06/02/2020    ALKPHOS 85 06/02/2020    AST 13 06/02/2020    ALT 13 06/02/2020    LABGLOM 48 (L) 09/15/2020    GFRAA 59 (L) 09/15/2020     Lab Results   Component Value Date    CHOL 185 06/02/2020    CHOL 137 07/20/2019    CHOL 142 05/30/2019     Lab Results   Component Value Date    TRIG 196 (H) 06/02/2020    TRIG 126 07/20/2019    TRIG 114 05/30/2019     Lab Results   Component Value Date HDL 67 06/02/2020    HDL 66 07/20/2019    HDL 62 05/30/2019     Lab Results   Component Value Date    LDLCHOLESTEROL 79 06/02/2020    LDLCHOLESTEROL 46 07/20/2019    LDLCHOLESTEROL 57 05/30/2019     Lab Results   Component Value Date    VLDL NOT REPORTED (H) 06/02/2020    VLDL NOT REPORTED 07/20/2019    VLDL NOT REPORTED 05/30/2019     Lab Results   Component Value Date    CHOLHDLRATIO 2.8 06/02/2020    CHOLHDLRATIO 2.1 07/20/2019    CHOLHDLRATIO 2.3 05/30/2019           Physical Exam  Constitutional:       Appearance: She is well-developed. Eyes:      Pupils: Pupils are equal, round, and reactive to light. Cardiovascular:      Rate and Rhythm: Normal rate and regular rhythm. Heart sounds: Normal heart sounds. Pulmonary:      Effort: Pulmonary effort is normal.      Breath sounds: Normal breath sounds. Abdominal:      General: Bowel sounds are normal.      Palpations: Abdomen is soft. Skin:     General: Skin is warm and dry. Comments: Negative for open/nonhealing wounds. Negative for lipohypertrophy. Neurological:      Mental Status: She is alert and oriented to person, place, and time. ASSESSMENT/PLAN:     Diagnosis Orders   1. Type 2 diabetes with nephropathy (HCC)  insulin glargine (LANTUS SOLOSTAR) 100 UNIT/ML injection pen    insulin lispro, 1 Unit Dial, (HUMALOG KWIKPEN) 100 UNIT/ML SOPN   2. Diabetes education, encounter for     3. Dyslipidemia     4. Essential hypertension     5. BMI 40.0-44.9, adult ([facility])     6. Class 3 severe obesity due to excess calories with serious comorbidity and body mass index (BMI) of 40.0 to 44.9 in adult [facility])       No orders of the defined types were placed in this encounter.     Orders Placed This Encounter   Medications    insulin glargine (LANTUS SOLOSTAR) 100 UNIT/ML injection pen     Sig: INJECT 22 UNITS in the am and 24 units in the pm     Dispense:  30 mL     Refill:  4    insulin lispro, 1 Unit Dial, (HUMALOG KWIKPEN) 100 UNIT/ML SOPN Si units with breakfast and lunch 10 units if bs is over 150. 10 units with dinner and 12 units if bs is over 150. Max daily dose is 32 units     Dispense:  28.8 mL     Refill:  3     Requested Prescriptions     Signed Prescriptions Disp Refills    insulin glargine (LANTUS SOLOSTAR) 100 UNIT/ML injection pen 30 mL 4     Sig: INJECT 22 UNITS in the am and 24 units in the pm    insulin lispro, 1 Unit Dial, (HUMALOG KWIKPEN) 100 UNIT/ML SOPN 28.8 mL 3     Si units with breakfast and lunch 10 units if bs is over 150. 10 units with dinner and 12 units if bs is over 150. Max daily dose is 32 units       1. Type 2 diabetes with nephropathy ([facility] [address]  2. Diabetes education, encounter for    - insulin glargine (LANTUS SOLOSTAR) 100 UNIT/ML injection pen; INJECT 22 UNITS in the am and 24 units in the pm  Dispense: 30 mL; Refill: 4  - insulin lispro, 1 Unit Dial, (HUMALOG KWIKPEN) 100 UNIT/ML SOPN; 8 units with breakfast and lunch 10 units if bs is over 150. 10 units with dinner and 12 units if bs is over 150. Max daily dose is 32 units  Dispense: 28.8 mL; Refill: 3    - Unstable  HbA1C goal is less than 7% and blood sugars are worsening.  - Encouraged patient to check BS 4 times per day. Fasting blood glucose goal is 70-130mg/dl and postprandial blood sugar goal is less than 180 mg/dl. -Diabetic foot exam up-to-date: Yes  -Diabetic retinal exam up-to-date: Yes  - Labs reviewed includes: most recent a1c increased to 7.8%GFR 48. Repeat labs due in 3 months.   -We discussed in great detail dietary modifications they can make to better improve their blood sugars. -follow up diabetes education completed, all questions answered. CGM report downloaded and reviewed, scanned to media tab.   -based on cgm report no hypoglycemia she dose have high trends during the evening meals.   -discussed with her the importance of preventing hypoglycemia and a less intensive therapy/goal for her.   -will make only minimal insulin adjustments and pt will continue to monitor for hypoglycemia and call office with any bs less than 70. Discussed signs and symptoms of hyper/hypoglycemia and how to treat. Encouraged 150 minutes of physical activity per week. Follow a low carbohydrate diet consuming 45 grams of carbohydrates at breakfast, lunch and dinner with two separate snacks [id number] grams of carbohydrates. Encouraged at least 7 hours of sleep. The patient was informed of the goals of diabetes management. This can only be accomplished by watching their diet and exercise levels. We certainly use medicines to help attain these goals. The consequences of not controlling blood sugars were discussed. These include blindness, heart disease, stroke, kidney disease, and possibly need for dialysis. They were told to be careful with their foot care as diabetics often have nerve damage, infections and risk for limb amutations . They also need a dilated eye exam yearly. We discussed the issues of diet, exercise, medication, complication avoidance, reviewed the signs and symptoms of diabetes, hypoglycemic episodes, significance of HbA1C.       3. Dyslipidemia  stable, lipid panel reviewed, continue current medications. Diet and exercise      4. Essential hypertension   stable, continue current medications. Diet and exercise Seek emergent care if chest pain develops. 5. BMI 40.0-44.9, adult ([facility])  6. Class 3 severe obesity due to excess calories with serious comorbidity and body mass index (BMI) of 40.0 to 44.9 in adult [facility])  Reduce calories and increase physical activity to achieve a slow and steady weight loss to improve blood pressure, cholesterol and diabetes. Answered all patient questions. Agrees to follow plan of care and to follow up in 3 months, sooner if needed. Call office if unexplained blood sugars less than 70 occur or above 400. Call office or access [employer] with any further questions or concerns.  Be sure to bring glucometer/food log at next appointment. Patient was seen with total face to face time of 30 minutes. More than 50%  of this visit was counseling and education regarding her diabetes.     Electronically signed by Candance Ferns, APRN - CNP on 9/29/2020 at 2:03 PM      (Please note that portions of this note were completed with a voice-recognition program. Efforts were made to edit the dictation but occasionally words are mis-transcribed.)

## 2020-10-07 RX ORDER — AMLODIPINE BESYLATE 5 MG/1
5 TABLET ORAL DAILY
Qty: 90 TABLET | Refills: 3 | Status: ON HOLD | OUTPATIENT
Start: 2020-10-07 | End: 2021-02-11 | Stop reason: HOSPADM

## 2020-12-03 RX ORDER — HYDRALAZINE HYDROCHLORIDE 50 MG/1
TABLET, FILM COATED ORAL
Qty: 270 TABLET | Refills: 3 | Status: ON HOLD | OUTPATIENT
Start: 2020-12-03 | End: 2021-02-11 | Stop reason: HOSPADM

## 2020-12-08 ENCOUNTER — HOSPITAL ENCOUNTER (OUTPATIENT)
Dept: LAB | Age: 85
Discharge: HOME | End: 2020-12-08
Payer: MEDICARE

## 2020-12-08 LAB
ANION GAP SERPL CALCULATED.3IONS-SCNC: 10 MMOL/L (ref 9–17)
BUN BLDV-MCNC: 16 MG/DL (ref 8–23)
BUN/CREAT BLD: 18 (ref 9–20)
CALCIUM SERPL-MCNC: 9.8 MG/DL (ref 8.6–10.4)
CHLORIDE BLD-SCNC: 106 MMOL/L (ref 98–107)
CO2: 29 MMOL/L (ref 20–31)
CREAT SERPL-MCNC: 0.91 MG/DL (ref 0.5–0.9)
ESTIMATED AVERAGE GLUCOSE: 160 MG/DL
GFR AFRICAN AMERICAN: >60 ML/MIN
GFR NON-AFRICAN AMERICAN: 59 ML/MIN
GFR SERPL CREATININE-BSD FRML MDRD: ABNORMAL ML/MIN/{1.73_M2}
GFR SERPL CREATININE-BSD FRML MDRD: ABNORMAL ML/MIN/{1.73_M2}
GLUCOSE BLD-MCNC: 155 MG/DL (ref 70–99)
HBA1C MFR BLD: 7.2 % (ref 4.8–5.9)
POTASSIUM SERPL-SCNC: 3.8 MMOL/L (ref 3.7–5.3)
SODIUM BLD-SCNC: 145 MMOL/L (ref 135–144)

## 2020-12-08 PROCEDURE — 80048 BASIC METABOLIC PNL TOTAL CA: CPT

## 2020-12-08 PROCEDURE — 83036 HEMOGLOBIN GLYCOSYLATED A1C: CPT

## 2020-12-08 PROCEDURE — 36415 COLL VENOUS BLD VENIPUNCTURE: CPT

## 2020-12-09 ENCOUNTER — HOSPITAL ENCOUNTER (OUTPATIENT)
Age: 85
Setting detail: SPECIMEN
Discharge: HOME | End: 2020-12-09
Payer: MEDICARE

## 2020-12-09 ENCOUNTER — OFFICE VISIT (OUTPATIENT)
Dept: PRIMARY CARE CLINIC | Age: 85
End: 2020-12-09
Payer: MEDICARE

## 2020-12-09 VITALS
SYSTOLIC BLOOD PRESSURE: 128 MMHG | BODY MASS INDEX: 40.92 KG/M2 | WEIGHT: 199.2 LBS | HEART RATE: 92 BPM | OXYGEN SATURATION: 94 % | DIASTOLIC BLOOD PRESSURE: 60 MMHG | TEMPERATURE: 97 F

## 2020-12-09 PROCEDURE — 99214 OFFICE O/P EST MOD 30 MIN: CPT | Performed by: FAMILY MEDICINE

## 2020-12-09 PROCEDURE — 99212 OFFICE O/P EST SF 10 MIN: CPT

## 2020-12-09 PROCEDURE — U0003 INFECTIOUS AGENT DETECTION BY NUCLEIC ACID (DNA OR RNA); SEVERE ACUTE RESPIRATORY SYNDROME CORONAVIRUS 2 (SARS-COV-2) (CORONAVIRUS DISEASE [COVID-19]), AMPLIFIED PROBE TECHNIQUE, MAKING USE OF HIGH THROUGHPUT TECHNOLOGIES AS DESCRIBED BY CMS-2020-01-R: HCPCS

## 2020-12-09 RX ORDER — AZITHROMYCIN 250 MG/1
TABLET, FILM COATED ORAL
Qty: 1 PACKET | Refills: 1 | Status: SHIPPED | OUTPATIENT
Start: 2020-12-09 | End: 2020-12-15

## 2020-12-09 ASSESSMENT — ENCOUNTER SYMPTOMS
WHEEZING: 0
COUGH: 1
RHINORRHEA: 1
SORE THROAT: 0
EYE REDNESS: 0
SHORTNESS OF BREATH: 0
SINUS PRESSURE: 1
VOMITING: 1
SINUS PAIN: 1
TROUBLE SWALLOWING: 0
CONSTIPATION: 0
DIARRHEA: 0
ABDOMINAL PAIN: 1
EYE DISCHARGE: 0
NAUSEA: 1

## 2020-12-09 NOTE — PROGRESS NOTES
2020     Holly Mascorro (:  1935) is a 81 y.o. female, here for evaluation of the following medical concerns:    Fever    This is a new problem. The current episode started in the past 7 days (hasn't felt well for 2-3 days, but developed a fever overnight). The problem occurs constantly. The problem has been gradually worsening. The maximum temperature noted was 100 to 100.9 F. Associated symptoms include abdominal pain (lower abdomen and in lower lumbar pain), congestion (dry cough), coughing, muscle aches, nausea, sleepiness and vomiting. Pertinent negatives include no chest pain, diarrhea, ear pain, headaches, rash, sore throat, urinary pain or wheezing. Associated symptoms comments: Has had some urinary frequency. No loss of taste or smell. No known exposure to covid. . She has tried acetaminophen for the symptoms. The treatment provided moderate relief. Did review patient's med list, allergies, social history,pmhx and pshx today as noted in the record. Review of Systems   Constitutional: Positive for chills, fatigue and fever. HENT: Positive for congestion (dry cough), postnasal drip, rhinorrhea, sinus pressure and sinus pain. Negative for ear pain, sore throat and trouble swallowing. Eyes: Negative for discharge and redness. Respiratory: Positive for cough. Negative for shortness of breath and wheezing. Cardiovascular: Negative for chest pain. Gastrointestinal: Positive for abdominal pain (lower abdomen and in lower lumbar pain), nausea and vomiting. Negative for constipation and diarrhea. Genitourinary: Negative for dysuria, flank pain, frequency and urgency. Musculoskeletal: Negative for arthralgias, myalgias and neck pain. Skin: Negative for rash and wound. Allergic/Immunologic: Negative for environmental allergies. Neurological: Positive for weakness. Negative for dizziness, light-headedness and headaches. Hematological: Negative for adenopathy. Psychiatric/Behavioral: Negative. Prior to Visit Medications    Medication Sig Taking? Authorizing Provider   hydrALAZINE (APRESOLINE) 50 MG tablet TAKE 1 TABLET THREE TIMES A DAY Yes Emily Waller MD   amLODIPine (NORVASC) 5 MG tablet Take 1 tablet by mouth daily Yes INGRID Ceja CNP   insulin glargine (LANTUS SOLOSTAR) 100 UNIT/ML injection pen INJECT 22 UNITS in the am and 24 units in the pm Yes INGRID Pat CNP   insulin lispro, 1 Unit Dial, (HUMALOG KWIKPEN) 100 UNIT/ML SOPN 8 units with breakfast and lunch 10 units if bs is over 150. 10 units with dinner and 12 units if bs is over 150.  Max daily dose is 32 units Yes INGRID Pat CNP   metoprolol tartrate (LOPRESSOR) 25 MG tablet Take 25 mg by mouth 3 times daily Yes Historical Provider, MD   potassium chloride (KLOR-CON M) 20 MEQ extended release tablet Take 1 tablet by mouth 2 times daily Take two tabs in am and one tab at night Yes INGRID Ceja CNP   Liraglutide (VICTOZA) 18 MG/3ML SOPN SC injection Inject 1.8 mg into the skin daily Yes INGRID Pat CNP   Insulin Pen Needle (B-D ULTRAFINE III SHORT PEN) 31G X 8 MM MISC USE 7 NEEDLES DAILY Yes INGRID Ceja CNP   furosemide (LASIX) 40 MG tablet TAKE 1 TABLET DAILY Yes INGRID Ceja CNP   predniSONE (DELTASONE) 5 MG tablet TAKE 1 TABLET DAILY Yes INGRID Ceja CNP   Handicap Placard MISC by Does not apply route Expires 4/7/2023 Yes INGRID Ceja CNP   simvastatin (ZOCOR) 20 MG tablet TAKE 1 TABLET NIGHTLY Yes INGRID Ceja CNP   Multiple Vitamins-Minerals (AIRBORNE PO) Take by mouth daily During winter months Yes Historical Provider, MD   Polyethylene Glycol 400 (BLINK TEARS) 0.25 % SOLN Place into both eyes as needed (dry eyes)  Yes Historical Provider, MD   aspirin 81 MG EC tablet Take 81 mg by mouth daily Yes Historical Provider, MD   glucose blood VI test strips (ASCENSIA AUTODISC VI;ONE TOUCH ULTRA TEST VI) strip 1 each by In Vitro route 3 times daily As directed. Yes Gaurang Cherry DO   acetaminophen (TYLENOL) 500 MG tablet Take 500 mg by mouth daily Yes Historical Provider, MD   omeprazole (PRILOSEC) 20 MG capsule Take 20 mg by mouth Daily  Yes Lucio Be   Cholecalciferol (VITAMIN D3) 2000 UNITS CAPS Take 2,000 Units by mouth daily  Yes Historical Provider, MD   albuterol sulfate  (90 Base) MCG/ACT inhaler Inhale 2 puffs into the lungs 4 times daily as needed for Wheezing  Patient not taking: Reported on 12/9/2020  Mansoor Bradley MD        Social History     Tobacco Use    Smoking status: Never Smoker    Smokeless tobacco: Never Used    Tobacco comment: amish rrt 5/28/2019   Substance Use Topics    Alcohol use: No     Alcohol/week: 0.0 standard drinks        Vitals:    12/09/20 0950   BP: 128/60   Site: Left Upper Arm   Position: Sitting   Cuff Size: Medium Adult   Pulse: 92   Temp: 97 °F (36.1 °C)   TempSrc: Tympanic   SpO2: 94%   Weight: 199 lb 3.2 oz (90.4 kg)     Estimated body mass index is 40.92 kg/m² as calculated from the following:    Height as of 9/15/20: 4' 10.5\" (1.486 m). Weight as of this encounter: 199 lb 3.2 oz (90.4 kg). Physical Exam  Vitals signs and nursing note reviewed. Constitutional:       General: She is not in acute distress. Appearance: Normal appearance. She is well-developed. She is not diaphoretic. HENT:      Head: Normocephalic and atraumatic. Right Ear: External ear normal.      Left Ear: External ear normal.      Ears:      Comments: TMs dull with fluid behind the TM     Nose: Congestion and rhinorrhea present. Mouth/Throat:      Pharynx: No posterior oropharyngeal erythema. Comments: Post nasal drainage noted  Eyes:      General: No scleral icterus. Right eye: No discharge. Left eye: No discharge.       Conjunctiva/sclera: Conjunctivae normal.      Pupils: Pupils are equal, round, and reactive to light. Neck:      Musculoskeletal: Normal range of motion and neck supple. Thyroid: No thyromegaly. Cardiovascular:      Rate and Rhythm: Normal rate and regular rhythm. Heart sounds: Normal heart sounds. Pulmonary:      Effort: Pulmonary effort is normal. No respiratory distress. Breath sounds: Normal breath sounds. No wheezing. Lymphadenopathy:      Cervical: Cervical adenopathy present. Skin:     General: Skin is warm and dry. Findings: No rash. Neurological:      Mental Status: She is alert and oriented to person, place, and time. Psychiatric:         Behavior: Behavior normal.         Thought Content: Thought content normal.         Judgment: Judgment normal.         ASSESSMENT/PLAN:  Encounter Diagnoses   Name Primary?  Viral syndrome Yes    Upper respiratory tract infection, unspecified type      Orders Placed This Encounter   Medications    azithromycin (ZITHROMAX Z-JOSE ENRIQUE) 250 MG tablet     Sig: Take as directed     Dispense:  1 packet     Refill:  1     Will treat with RX as noted above as patient notes that she has had similar symptoms every time this year and usually settles in her chest and she is susceptible to pneumonia. Will treat and await covid testing reports. Orders Placed This Encounter   Procedures    COVID-19 Ambulatory     Standing Status:   Future     Standing Expiration Date:   12/9/2021     Scheduling Instructions:      Saline media preferred given current shortage of viral transport media but both acceptable     Order Specific Question:   Is this test for diagnosis or screening? Answer:   Diagnosis of ill patient     Order Specific Question:   Symptomatic for COVID-19 as defined by CDC? Answer:   Yes     Order Specific Question:   Date of Symptom Onset     Answer:   12/7/2020     Order Specific Question:   Hospitalized for COVID-19? Answer:   No     Order Specific Question:   Admitted to ICU for COVID-19?      Answer: No     Order Specific Question:   Employed in healthcare setting? Answer:   No     Order Specific Question:   Resident in a congregate (group) care setting? Answer:   No     Order Specific Question:   Pregnant? Answer:   No     Order Specific Question:   Previously tested for COVID-19? Answer:   No     Will order covid testing. In interim patient is to quarantine until testing reports are available    Increase fluids and rest    Tylenol 1-2 tabs po q4h prn    Can use mucinex or mucinex DM or comparable for congestion or cough. Return  if no improvement in symptoms or if any further symptoms arise. No follow-ups on file. An electronic signature was used to authenticate this note.     --Markham Gaucher, DO on 12/9/2020 at 9:59 AM

## 2020-12-12 LAB — SARS-COV-2, NAA: NOT DETECTED

## 2020-12-15 ENCOUNTER — HOSPITAL ENCOUNTER (OUTPATIENT)
Age: 85
Setting detail: SPECIMEN
Discharge: HOME | End: 2020-12-15
Payer: MEDICARE

## 2020-12-15 ENCOUNTER — VIRTUAL VISIT (OUTPATIENT)
Dept: INTERNAL MEDICINE | Age: 85
End: 2020-12-15
Payer: MEDICARE

## 2020-12-15 PROCEDURE — 99211 OFF/OP EST MAY X REQ PHY/QHP: CPT

## 2020-12-15 PROCEDURE — 3051F HG A1C>EQUAL 7.0%<8.0%: CPT | Performed by: NURSE PRACTITIONER

## 2020-12-15 PROCEDURE — 99214 OFFICE O/P EST MOD 30 MIN: CPT | Performed by: NURSE PRACTITIONER

## 2020-12-15 PROCEDURE — G0439 PPPS, SUBSEQ VISIT: HCPCS | Performed by: NURSE PRACTITIONER

## 2020-12-15 PROCEDURE — U0003 INFECTIOUS AGENT DETECTION BY NUCLEIC ACID (DNA OR RNA); SEVERE ACUTE RESPIRATORY SYNDROME CORONAVIRUS 2 (SARS-COV-2) (CORONAVIRUS DISEASE [COVID-19]), AMPLIFIED PROBE TECHNIQUE, MAKING USE OF HIGH THROUGHPUT TECHNOLOGIES AS DESCRIBED BY CMS-2020-01-R: HCPCS

## 2020-12-15 RX ORDER — ALBUTEROL SULFATE 2.5 MG/3ML
2.5 SOLUTION RESPIRATORY (INHALATION) EVERY 4 HOURS PRN
Qty: 30 EACH | Refills: 1 | Status: SHIPPED | OUTPATIENT
Start: 2020-12-15 | End: 2022-01-01 | Stop reason: SDUPTHER

## 2020-12-15 ASSESSMENT — ENCOUNTER SYMPTOMS
RHINORRHEA: 0
WHEEZING: 1
SINUS PRESSURE: 0
EYE PAIN: 0
COUGH: 0
DIARRHEA: 1
COLOR CHANGE: 0
CHEST TIGHTNESS: 0
SORE THROAT: 0
CONSTIPATION: 0
VOMITING: 0
FACIAL SWELLING: 0
SHORTNESS OF BREATH: 0
ABDOMINAL PAIN: 0
NAUSEA: 0
BLOOD IN STOOL: 0
TROUBLE SWALLOWING: 0

## 2020-12-15 ASSESSMENT — PATIENT HEALTH QUESTIONNAIRE - PHQ9
1. LITTLE INTEREST OR PLEASURE IN DOING THINGS: 0
2. FEELING DOWN, DEPRESSED OR HOPELESS: 0
SUM OF ALL RESPONSES TO PHQ9 QUESTIONS 1 & 2: 0
SUM OF ALL RESPONSES TO PHQ QUESTIONS 1-9: 0

## 2020-12-15 ASSESSMENT — LIFESTYLE VARIABLES: HOW OFTEN DO YOU HAVE A DRINK CONTAINING ALCOHOL: 0

## 2020-12-15 NOTE — PROGRESS NOTES
AVS and lab orders mailed to patient. COVID swab order taken to Flu clinic. Printed forms and placed in blue folder. Folder placed in physician's mailbox for review and signature.

## 2020-12-15 NOTE — PROGRESS NOTES
Medicare Annual Wellness Visit  Name: Susan Tripp Date: 12/15/2020   MRN: [id number] Sex: Female   Age: 81 y.o. Ethnicity: Non-/Non    : 1935 Race: Jose G Ochoa is here for Medicare AWV and 3 Month Follow-Up    Screenings for behavioral, psychosocial and functional/safety risks, and cognitive dysfunction are all negative except as indicated below. These results, as well as other patient data from the [address] form, are documented in Flowsheets linked to this Encounter. Allergies   Allergen Reactions    Codeine      Confusion      Erythromycin      GI upset  Received zithromax in past and tolerated    Exenatide Nausea Only    Levofloxacin      GI upset    Verapamil Other (See Comments)     Junctional bradycardia    Clonidine Derivatives Rash     , catapres    Other Rash     Levbid    Penicillins Rash     Received Rocephin in past and tolerated       Prior to Visit Medications    Medication Sig Taking? Authorizing Provider   hydrALAZINE (APRESOLINE) 50 MG tablet TAKE 1 TABLET THREE TIMES A DAY Yes Dariel Segovia MD   amLODIPine (NORVASC) 5 MG tablet Take 1 tablet by mouth daily Yes INGRID Renee CNP   insulin glargine (LANTUS SOLOSTAR) 100 UNIT/ML injection pen INJECT 22 UNITS in the am and 24 units in the pm Yes INGRID Coto CNP   insulin lispro, 1 Unit Dial, (HUMALOG KWIKPEN) 100 UNIT/ML SOPN 8 units with breakfast and lunch 10 units if bs is over 150. 10 units with dinner and 12 units if bs is over 150.  Max daily dose is 32 units Yes INGRID Coto CNP   metoprolol tartrate (LOPRESSOR) 25 MG tablet Take 25 mg by mouth 3 times daily Yes Historical Provider, MD   potassium chloride (KLOR-CON M) 20 MEQ extended release tablet Take 1 tablet by mouth 2 times daily Take two tabs in am and one tab at night  Patient taking differently: Take 20 mEq by mouth 2 times daily  Yes INGRID Renee CNP Liraglutide (VICTOZA) 18 MG/3ML SOPN SC injection Inject 1.8 mg into the skin daily Yes INGRID Lema CNP   furosemide (LASIX) 40 MG tablet TAKE 1 TABLET DAILY Yes INGRID Menard CNP   predniSONE (DELTASONE) 5 MG tablet TAKE 1 TABLET DAILY Yes INGRID Menard CNP   simvastatin (ZOCOR) 20 MG tablet TAKE 1 TABLET NIGHTLY Yes INGRID Menard CNP   Polyethylene Glycol 400 (BLINK TEARS) 0.25 % SOLN Place into both eyes as needed (dry eyes)  Yes Historical Provider, MD   aspirin 81 MG EC tablet Take 81 mg by mouth daily Yes Historical Provider, MD   acetaminophen (TYLENOL) 500 MG tablet Take 500 mg by mouth daily Yes Historical Provider, MD   omeprazole (PRILOSEC) 20 MG capsule Take 20 mg by mouth Daily  Yes Omi Tejeda   Cholecalciferol (VITAMIN D3) 2000 UNITS CAPS Take 2,000 Units by mouth daily  Yes Historical Provider, MD   Insulin Pen Needle (B-D ULTRAFINE III SHORT PEN) 31G X 8 MM MISC USE 7 NEEDLES DAILY  INGRID Menard CNP   Handicap Placard MISC by Does not apply route Expires 4/7/2023  INGRID Menard CNP   glucose blood VI test strips (ASCENSIA AUTODISC VI;ONE TOUCH ULTRA TEST VI) strip 1 each by In Vitro route 3 times daily As directed. Leafy Scales, DO       Past Medical History:   Diagnosis Date    Allergic rhinitis     Asthma     PFT's, 04/06, actually showed mild restrictive defect.  Basal cell carcinoma of cheek 2007    Right cheek    Basal cell carcinoma of leg     right leg    CAD (coronary artery disease)     With 40% stenosis of the LAD, 07/10, ejection fraction 60%.   Repeat heart cath9/14 with 4050 percent LAD lesion first diagonal 50% lesion rec med Rx    Central retinal artery occlusion     Right side November 2014 status post TPA    Cerebral artery occlusion with cerebral infarction [facility]) 11/24/2014    Cerebrovascular disease     50-79% stenosis on left on ultrasound 11/14    CHF (congestive heart failure) ([facility] [address]     Echo 1/15 moderate MR, severe TR, RVSP 76, grade 2 diastolic dysfunction    Diverticulosis     AVM on colonoscopy, 05/11    Dyslipidemia     Elevated antinuclear antibody (ZAIRA) level     History of    Esophagitis 05/2011    Gastritis/esophagitis on EGD, Dr. Lam Puri. Repeat EGD6/15 Gastritis    Foraminal stenosis of lumbar region     Moderate  Right L4/L5 neuroforaminal stenosis, Dr Keshia Munson following. MRI 2/16 [place]. Moderate foraminal stenosis mild to moderate central canal stenosis    Hyperlipidemia     Hypertension     IBS (irritable bowel syndrome)     GI consultation with Dr. Sariah Cagle, GI specialist in [facility], felt that she probably has chronic functional diarrhea and recommended empiric Imodium, Pepto-Bismol or Questran first. Sprue test Neg 2011    Iron deficiency anemia     Percent sat iron 5, January 2015, ferritin 40 1 /15, FOBT positive  EGD 6/15  Gastritis, IV iron 9/15x3 effective,  colonoscopy deferred by Dr. Lam Puri. --Prior colonoscopy 2011 with severe diverticulosis and telangiectasia. Trial iron solution in Orange juice 12/16    Junctional bradycardia     Symptomatic, resolved with discontinuation of Verapamil, 05/09. 1.1) Echocardiogram: LAE, LVH, EF 50%, mild MR, diastolic. 1.2) Dr. Barbi Victoria evaluation. 1.3.) Persantine stress test negative, 05/09.  Migraine     Mild CAD     cath 10/15    Mitral regurgitation     Moderate to severe on echocardiogram September 2015.   Right pressure 76 grade 2 diastolic dysfunction,  echo 03/96 grade 2 diastolic dysfunction mild to moderate MR RVSP 56 aortic sclerosis    Obesity     CHICO (obstructive sleep apnea)     CPAP 14 2/15 initiation  AHI 7  mild CHICO intolerant CPAP    Osteoarthritis     PMR (polymyalgia rheumatica) (HCC)     Pneumonia     Premature atrial contractions     Pulmonary hypertension (HCC)     -Moderate on echo November 2014    Restrictive lung disease     Mild on PFTs 11/14    Type II or unspecified type diabetes mellitus without mention of complication, not stated as uncontrolled     Vitreous floaters        Past Surgical History:   Procedure Laterality Date    APPENDECTOMY  1952    CARDIAC CATHETERIZATION  2014    CATARACT REMOVAL Bilateral 08/10    CHOLECYSTECTOMY      COLONOSCOPY  05/16/2011    COLONOSCOPY N/A 9/26/2019    severe diverticulosis, benign rectal polyp, Dr. Rossana Duke Right 2/6/2019    Cysto with right stent insertion and villareal catheter performed by Sonny Resendez MD at [address] [place] [address] Right 2/27/2019    CYSTO right stent removal  Right Ureteroscopy Holmium Laser right stent exchange performed by Sonny Resendez MD at [facility], DIAGNOSTIC      EYE SURGERY      HYSTERECTOMY  Approx 1983    MALIGNANT SKIN LESION EXCISION Right 12/10 and 04/11    Basal CellRemoved from right neck    MALIGNANT SKIN LESION EXCISION Right 02/2012    Basal Cell Removed from right leg    OTHER SURGICAL HISTORY  09/06/2011    capsule endoscopy    OTHER SURGICAL HISTORY  3/22/16    right L4 and L5 TFE    OTHER SURGICAL HISTORY Right 4/26/16    L4, L5 TFE    UPPER GASTROINTESTINAL ENDOSCOPY  05/16/2011    UPPER GASTROINTESTINAL ENDOSCOPY  6/22/15    mild gastritis (Dr. Kavita Graham)    UPPER GASTROINTESTINAL ENDOSCOPY N/A 9/26/2019    mild to moderate inflammation, Dr. Kavita Graham       Family History   Problem Relation Age of Onset    Uterine Cancer Mother     Heart Disease Father     High Blood Pressure Father     Stroke Sister         from a vascular malformation    Heart Attack Son         age 48       CareTeam (Including outside providers/suppliers regularly involved in providing care):   Patient Care Team:  INGRID Mccarty CNP as PCP - General (Nurse Practitioner)  INGRID Mccarty CNP as PCP - [facility] Provider  Sonny Resendez MD as Consulting Physician (Urology)  Maico Cabello MD as Consulting Physician (Pulmonary Disease)  Naun Hickman MD as Consulting Physician (Hematology and Oncology)  Giovanna Maloney DO as Consulting Physician (Cardiology)  INGRID Ruiz CNP (Family Nurse Practitioner)    Wt Readings from Last 3 Encounters:   12/09/20 199 lb 3.2 oz (90.4 kg)   09/29/20 197 lb (89.4 kg)   09/15/20 196 lb (88.9 kg)     There were no vitals filed for this visit. There is no height or weight on file to calculate BMI. Patient's complete Health Risk Assessment and screening values have been reviewed and are found in Flowsheets. The following problems were reviewed today and where indicated follow up appointments were made and/or referrals ordered. Positive Risk Factor Screenings with Interventions:          General Health and ACP:  General  In general, how would you say your health is?: (!) Poor  In the past 7 days, have you experienced any of the following? New or Increased Pain, New or Increased Fatigue, Loneliness, Social Isolation, Stress or Anger?: (!) New or Increased Fatigue  Do you get the social and emotional support that you need?: Yes  Do you have a Living Will?: Yes  Advance Directives     Power of [address] [place] [address] Will ACP-Advance Directive ACP-Power of     Not on File Not on File Not on File Not on File      General Health Risk Interventions:  · Poor self-assessment of health status: Patient feels her health is stable right now but she is referring to her multiple chronic health issues. · Fatigue: Recent acute illness. Doing better per pt.     Health Habits/Nutrition:  Health Habits/Nutrition  Do you exercise for at least 20 minutes 2-3 times per week?: (!) No  Have you lost any weight without trying in the past 3 months?: (!) Yes  Do you eat fewer than 2 meals per day?: No  Have you seen a dentist within the past year?: Yes     Health Habits/Nutrition Interventions:  · Inadequate physical activity:  patient is not ready to increase his/her physical activity level at this time  · Lost 3 lbs in the last week d/t acute illness and loss of appetite. Improving per pt. Safety:  Safety  Do you have working smoke detectors?: Yes  Have all throw rugs been removed or fastened?: (!) No  Do you have non-slip mats or surfaces in all bathtubs/showers?: Yes  Do all of your stairways have a railing or banister?: Yes  Are your doorways, halls and stairs free of clutter?: Yes  Do you always fasten your seatbelt when you are in a car?: Yes  Safety Interventions:  · Home safety tips provided    ADL:  ADLs  In the past 7 days, did you need help from others to perform any of the following everyday activities? Eating, dressing, grooming, bathing, toileting, or walking/balance?: (!) Bathing, Walking/Balance  In the past 7 days, did you need help from others to take care of any of the following? Laundry, housekeeping, banking/finances, shopping, telephone use, food preparation, transportation, or taking medications?: (!) Transportation  ADL Interventions:  · Patient declines any further evaluation/treatment for this issue. · Patient has assistance from her family.     Personalized Preventive Plan   Current Health Maintenance Status  Immunization History   Administered Date(s) Administered    DT (pediatric) 03/11/2010    Influenza Virus Vaccine 10/21/2010, 10/05/2011, 10/19/2012    Influenza, High Dose (Fluzone 65 yrs and older) 10/14/2013, 10/09/2014, 10/02/2015, 09/02/2016, 10/24/2017, 10/26/2018    Influenza, Quadv, adjuvanted, 65 yrs +, IM, PF (Fluad) 09/15/2020    Influenza, Triv, inactivated, subunit, adjuvanted, IM (Fluad 65 yrs and older) 10/03/2019    Pneumococcal Conjugate 13-valent ([id number]) 04/02/2015    Pneumococcal Polysaccharide ([id number]) 11/04/1997, 11/12/2008    Zoster Live (Zostavax) 01/05/2012        Health Maintenance   Topic Date Due    Shingles Vaccine (2 of 3) 03/01/2012    Annual Wellness Visit (AWV)  05/29/2019    DTaP/Tdap/Td vaccine (2 - Tdap) 03/11/2020    Diabetic foot exam  12/09/2020    Lipid screen  06/02/2021    A1C test (Diabetic or Prediabetic)  06/08/2021    Diabetic retinal exam  06/16/2021    Potassium monitoring  12/08/2021    Creatinine monitoring  12/08/2021    Colon cancer screen colonoscopy  09/26/2024    Flu vaccine  Completed    Pneumococcal 65+ years Vaccine  Completed    DEXA (modify frequency per FRAX score)  Addressed    Hepatitis A vaccine  Aged Out    Hib vaccine  Aged Out    Meningococcal (ACWY) vaccine  Aged Out     Recommendations for [place] Due: see orders and patient instructions/AVS.  . Recommended screening schedule for the next 5-10 years is provided to the patient in written form: see Patient Instructions/AVS.    I, Marylee Keas, LPN, 50/15/3422, performed the documented evaluation under the direct supervision of the attending physician.

## 2020-12-16 NOTE — PROGRESS NOTES
12/15/2020    TELEHEALTH EVALUATION -- Audio/Visual (During [facility] public health emergency)    HPI:    Elsa Field (:  1935) has requested an audio/video evaluation for the following concern(s):    Pleasant 81year-old patient being seen by virtual visit for 3-month follow-up and annual wellness visit due to COVID-19 pandemic. Patient's chart reviewed. Recently was in urgent care states the day she came in developed nausea vomiting some diarrhea. Was swabbed for Covid was negative. States she has had persistent fatigue weakness shortness of breath change in her taste. We discussed that maybe they checked her too soon from symptoms. She is wanting to get retested. No fevers. States has been using her albuterol nebulizers occasionally for wheezing. Denies any chest congestion. Does have increased fatigue. Diminished appetite. We did review with her her diabetes medication and if she is not eating well to cut back on the insulin. States today she did eat a good breakfast.  Blood pressure has been stable in the home setting. Denies any lightheadedness dizziness chest discomfort. She continues to take low-dose prednisone for her PMR. States pain has been well controlled. She continues on statin for her dyslipidemia. Denies any significant myalgias. Continues on metoprolol for history of atrial tachycardia. No heart palpitations. No chest discomfort. Prior to Visit Medications    Medication Sig Taking?  Authorizing Provider   albuterol (PROVENTIL) (2.5 MG/3ML) 0.083% nebulizer solution Take 3 mLs by nebulization every 4 hours as needed for Wheezing or Shortness of Breath Yes INGRID Weaver CNP   hydrALAZINE (APRESOLINE) 50 MG tablet TAKE 1 TABLET THREE TIMES A DAY Yes Matt Briseno MD   amLODIPine (NORVASC) 5 MG tablet Take 1 tablet by mouth daily Yes INGRID Weaver CNP   insulin glargine (LANTUS SOLOSTAR) 100 UNIT/ML injection pen INJECT 22 UNITS in the am and 24 units in the pm Yes INGRID Bennett CNP   insulin lispro, 1 Unit Dial, (HUMALOG KWIKPEN) 100 UNIT/ML SOPN 8 units with breakfast and lunch 10 units if bs is over 150. 10 units with dinner and 12 units if bs is over 150. Max daily dose is 32 units Yes INGRID Bennett CNP   metoprolol tartrate (LOPRESSOR) 25 MG tablet Take 25 mg by mouth 3 times daily Yes Historical Provider, MD   potassium chloride (KLOR-CON M) 20 MEQ extended release tablet Take 1 tablet by mouth 2 times daily Take two tabs in am and one tab at night  Patient taking differently: Take 20 mEq by mouth 2 times daily  Yes INGRID Wahl CNP   Liraglutide (VICTOZA) 18 MG/3ML SOPN SC injection Inject 1.8 mg into the skin daily Yes INGRID Bennett CNP   furosemide (LASIX) 40 MG tablet TAKE 1 TABLET DAILY Yes INGRID Wahl CNP   predniSONE (DELTASONE) 5 MG tablet TAKE 1 TABLET DAILY Yes INGRID Wahl CNP   simvastatin (ZOCOR) 20 MG tablet TAKE 1 TABLET NIGHTLY Yes INGRID Wahl CNP   Polyethylene Glycol 400 (BLINK TEARS) 0.25 % SOLN Place into both eyes as needed (dry eyes)  Yes Historical Provider, MD   aspirin 81 MG EC tablet Take 81 mg by mouth daily Yes Historical Provider, MD   acetaminophen (TYLENOL) 500 MG tablet Take 500 mg by mouth daily Yes Historical Provider, MD   omeprazole (PRILOSEC) 20 MG capsule Take 20 mg by mouth Daily  Yes Hemal Fowler   Cholecalciferol (VITAMIN D3) 2000 UNITS CAPS Take 2,000 Units by mouth daily  Yes Historical Provider, MD   Insulin Pen Needle (B-D ULTRAFINE III SHORT PEN) 31G X 8 MM MISC USE 7 NEEDLES DAILY  INGRID Wahl CNP   Handicap Placard MISC by Does not apply route Expires 4/7/2023  INGRID Wahl CNP   glucose blood VI test strips (ASCENSIA AUTODISC VI;ONE TOUCH ULTRA TEST VI) strip 1 each by In Vitro route 3 times daily As directed.   Ambrocio Taylor,        Social History     Tobacco Use    Smoking status: Never Smoker    Smokeless tobacco: Never Used    Tobacco comment: amish rrt 5/28/2019   Substance Use Topics    Alcohol use: No     Alcohol/week: 0.0 standard drinks    Drug use: No        Review of Systems   Constitutional: Positive for activity change, appetite change, fatigue and fever. Negative for chills and unexpected weight change. HENT: Negative for congestion, dental problem, ear discharge, ear pain, facial swelling, hearing loss, postnasal drip, rhinorrhea, sinus pressure, sore throat and trouble swallowing. Eyes: Negative for pain and visual disturbance. Respiratory: Positive for wheezing. Negative for cough, chest tightness and shortness of breath. Cardiovascular: Negative for chest pain, palpitations and leg swelling. Gastrointestinal: Positive for diarrhea. Negative for abdominal pain, blood in stool, constipation, nausea and vomiting. Endocrine: Negative for cold intolerance, heat intolerance and polyuria. Genitourinary: Negative for difficulty urinating. Musculoskeletal: Positive for arthralgias. Negative for gait problem, myalgias, neck pain and neck stiffness. Skin: Negative for color change, rash and wound. Neurological: Negative for dizziness, tremors, seizures, weakness, light-headedness, numbness and headaches. Psychiatric/Behavioral: Negative for confusion and hallucinations. The patient is not nervous/anxious. Physical Exam  Vitals signs and nursing note reviewed. Constitutional:       General: She is not in acute distress. Appearance: Normal appearance. She is not ill-appearing. HENT:      Head: Normocephalic and atraumatic. Right Ear: External ear normal.      Left Ear: External ear normal.      Nose: Nose normal. No congestion. Eyes:      General:         Right eye: No discharge. Left eye: No discharge. Conjunctiva/sclera: Conjunctivae normal.   Neck:      Musculoskeletal: Normal range of motion.    Pulmonary: to monitor    14. Obesity, morbid, BMI 40.0-49.9 (HCC)  Weight has been stable. 15. Vitamin D deficiency  Stable on supplemental vitamin D, serial lab follow-up    16. Dyslipidemia  Continues on Zocor. Continue to work on diet, try to increase activity    17. Multifocal atrial tachycardia (HCC)  Stable on metoprolol    18. Class 2 severe obesity due to excess calories with serious comorbidity and body mass index (BMI) of 37.0 to 37.9 in adult [facility])  Weight loss      Return in about 3 months (around 3/15/2021). For routine visit or call sooner with concerns prior     Trae Sloan is a 81 y.o. female being evaluated by a Virtual Visit (video visit) encounter to address concerns as mentioned above. A caregiver was present when appropriate. Due to this being a TeleHealth encounter (During [facility]-01 public health emergency), evaluation of the following organ systems was limited: Vitals/Constitutional/EENT/Resp/CV/GI//MS/Neuro/Skin/Heme-Lymph-Imm. Pursuant to the emergency declaration under the [address] [place] [address], [address] [place] [address] authority and the [employer] and [employer] Act, this Virtual Visit was conducted with patient's (and/or legal guardian's) consent, to reduce the patient's risk of exposure to COVID-19 and provide necessary medical care. The patient (and/or legal guardian) has also been advised to contact this office for worsening conditions or problems, and seek emergency medical treatment and/or call 911 if deemed necessary. Services were provided through a video synchronous discussion virtually to substitute for in-person clinic visit. Patient and provider were located at their individual homes. --INGRID Wahl CNP on 12/15/2020 at 8:55 PM    An electronic signature was used to authenticate this note.

## 2020-12-17 LAB — SARS-COV-2, NAA: NOT DETECTED

## 2020-12-18 ENCOUNTER — TELEPHONE (OUTPATIENT)
Dept: INTERNAL MEDICINE | Age: 85
End: 2020-12-18

## 2020-12-18 RX ORDER — POTASSIUM CHLORIDE 20 MEQ/1
20 TABLET, EXTENDED RELEASE ORAL 2 TIMES DAILY
Qty: 180 TABLET | Refills: 3 | Status: SHIPPED | OUTPATIENT
Start: 2020-12-18 | End: 2022-01-01

## 2020-12-18 NOTE — TELEPHONE ENCOUNTER
Last ov 12/15/20 and next ov 3/16/21. Patient states that Rx states to take Klon-Con 20mEq 2 tabs in the morning and 1 tab in the evening but she has been taking it 1 tab BID. Please advise. She states she will need a new rx to [facility].

## 2021-01-01 ENCOUNTER — HOSPITAL ENCOUNTER (OUTPATIENT)
Dept: LAB | Age: 86
Discharge: HOME | End: 2021-10-27
Payer: MEDICARE

## 2021-01-01 ENCOUNTER — OFFICE VISIT (OUTPATIENT)
Dept: DIABETES SERVICES | Age: 86
End: 2021-01-01
Payer: MEDICARE

## 2021-01-01 ENCOUNTER — TELEPHONE (OUTPATIENT)
Dept: INTERNAL MEDICINE | Age: 86
End: 2021-01-01

## 2021-01-01 ENCOUNTER — OFFICE VISIT (OUTPATIENT)
Dept: ORTHOPEDIC SURGERY | Age: 86
End: 2021-01-01
Payer: MEDICARE

## 2021-01-01 ENCOUNTER — OFFICE VISIT (OUTPATIENT)
Dept: INTERNAL MEDICINE | Age: 86
End: 2021-01-01
Payer: MEDICARE

## 2021-01-01 ENCOUNTER — APPOINTMENT (OUTPATIENT)
Dept: LAB | Age: 86
End: 2021-01-01
Payer: MEDICARE

## 2021-01-01 ENCOUNTER — OFFICE VISIT (OUTPATIENT)
Dept: ONCOLOGY | Age: 86
End: 2021-01-01
Payer: MEDICARE

## 2021-01-01 ENCOUNTER — HOSPITAL ENCOUNTER (OUTPATIENT)
Dept: LAB | Age: 86
Discharge: HOME | End: 2021-11-30
Payer: MEDICARE

## 2021-01-01 ENCOUNTER — HOSPITAL ENCOUNTER (OUTPATIENT)
Dept: GENERAL RADIOLOGY | Age: 86
Discharge: HOME | End: 2021-11-05
Payer: MEDICARE

## 2021-01-01 ENCOUNTER — HOSPITAL ENCOUNTER (OUTPATIENT)
Dept: PULMONOLOGY | Age: 86
Discharge: HOME | End: 2021-10-04
Payer: MEDICARE

## 2021-01-01 VITALS
DIASTOLIC BLOOD PRESSURE: 78 MMHG | WEIGHT: 193 LBS | HEART RATE: 61 BPM | SYSTOLIC BLOOD PRESSURE: 138 MMHG | BODY MASS INDEX: 40.51 KG/M2 | OXYGEN SATURATION: 95 % | HEIGHT: 58 IN

## 2021-01-01 VITALS
WEIGHT: 190 LBS | HEIGHT: 58 IN | DIASTOLIC BLOOD PRESSURE: 80 MMHG | SYSTOLIC BLOOD PRESSURE: 132 MMHG | HEART RATE: 78 BPM | BODY MASS INDEX: 39.88 KG/M2

## 2021-01-01 VITALS
WEIGHT: 195 LBS | HEIGHT: 59 IN | SYSTOLIC BLOOD PRESSURE: 138 MMHG | HEART RATE: 84 BPM | OXYGEN SATURATION: 90 % | BODY MASS INDEX: 39.31 KG/M2 | DIASTOLIC BLOOD PRESSURE: 60 MMHG | TEMPERATURE: 97.2 F

## 2021-01-01 VITALS
SYSTOLIC BLOOD PRESSURE: 132 MMHG | HEART RATE: 78 BPM | HEIGHT: 58 IN | BODY MASS INDEX: 40.51 KG/M2 | DIASTOLIC BLOOD PRESSURE: 80 MMHG | WEIGHT: 193 LBS

## 2021-01-01 VITALS
DIASTOLIC BLOOD PRESSURE: 68 MMHG | HEIGHT: 59 IN | HEART RATE: 61 BPM | OXYGEN SATURATION: 96 % | BODY MASS INDEX: 39.51 KG/M2 | SYSTOLIC BLOOD PRESSURE: 138 MMHG | WEIGHT: 196 LBS

## 2021-01-01 VITALS
RESPIRATION RATE: 14 BRPM | SYSTOLIC BLOOD PRESSURE: 132 MMHG | WEIGHT: 196 LBS | HEIGHT: 60 IN | HEART RATE: 68 BPM | DIASTOLIC BLOOD PRESSURE: 72 MMHG | BODY MASS INDEX: 38.48 KG/M2

## 2021-01-01 DIAGNOSIS — E55.9 VITAMIN D DEFICIENCY: ICD-10-CM

## 2021-01-01 DIAGNOSIS — M35.3 PMR (POLYMYALGIA RHEUMATICA) (HCC): ICD-10-CM

## 2021-01-01 DIAGNOSIS — I48.91 ATRIAL FIBRILLATION, UNSPECIFIED TYPE (HCC): ICD-10-CM

## 2021-01-01 DIAGNOSIS — G89.29 CHRONIC PAIN OF LEFT KNEE: ICD-10-CM

## 2021-01-01 DIAGNOSIS — M48.061 NEURAL FORAMINAL STENOSIS OF LUMBAR SPINE: ICD-10-CM

## 2021-01-01 DIAGNOSIS — I10 ESSENTIAL HYPERTENSION: ICD-10-CM

## 2021-01-01 DIAGNOSIS — E11.9 CONTROLLED TYPE 2 DIABETES MELLITUS WITHOUT COMPLICATION, WITH LONG-TERM CURRENT USE OF INSULIN (HCC): ICD-10-CM

## 2021-01-01 DIAGNOSIS — N18.31 STAGE 3A CHRONIC KIDNEY DISEASE (HCC): ICD-10-CM

## 2021-01-01 DIAGNOSIS — K59.00 CONSTIPATION, UNSPECIFIED CONSTIPATION TYPE: ICD-10-CM

## 2021-01-01 DIAGNOSIS — D69.6 THROMBOCYTOPENIA (HCC): Primary | ICD-10-CM

## 2021-01-01 DIAGNOSIS — E11.21 TYPE 2 DIABETES WITH NEPHROPATHY (HCC): Primary | ICD-10-CM

## 2021-01-01 DIAGNOSIS — M25.562 CHRONIC PAIN OF LEFT KNEE: ICD-10-CM

## 2021-01-01 DIAGNOSIS — M17.12 ARTHRITIS OF LEFT KNEE: Primary | ICD-10-CM

## 2021-01-01 DIAGNOSIS — E11.21 DIABETIC NEPHROPATHY ASSOCIATED WITH TYPE 2 DIABETES MELLITUS (HCC): ICD-10-CM

## 2021-01-01 DIAGNOSIS — E66.01 OBESITY, MORBID, BMI 40.0-49.9 (HCC): ICD-10-CM

## 2021-01-01 DIAGNOSIS — I27.20 PULMONARY HTN (HCC): ICD-10-CM

## 2021-01-01 DIAGNOSIS — M17.12 OSTEOARTHRITIS OF LEFT KNEE, UNSPECIFIED OSTEOARTHRITIS TYPE: ICD-10-CM

## 2021-01-01 DIAGNOSIS — Z79.4 CONTROLLED TYPE 2 DIABETES MELLITUS WITHOUT COMPLICATION, WITH LONG-TERM CURRENT USE OF INSULIN (HCC): ICD-10-CM

## 2021-01-01 DIAGNOSIS — J45.20 MILD INTERMITTENT ASTHMA WITHOUT COMPLICATION: ICD-10-CM

## 2021-01-01 DIAGNOSIS — I51.89 DIASTOLIC DYSFUNCTION: ICD-10-CM

## 2021-01-01 DIAGNOSIS — G89.29 CHRONIC PAIN OF LEFT KNEE: Primary | ICD-10-CM

## 2021-01-01 DIAGNOSIS — E78.5 DYSLIPIDEMIA: ICD-10-CM

## 2021-01-01 DIAGNOSIS — M25.562 CHRONIC PAIN OF LEFT KNEE: Primary | ICD-10-CM

## 2021-01-01 DIAGNOSIS — E11.21 TYPE 2 DIABETES WITH NEPHROPATHY (HCC): ICD-10-CM

## 2021-01-01 DIAGNOSIS — D50.0 IRON DEFICIENCY ANEMIA SECONDARY TO BLOOD LOSS (CHRONIC): ICD-10-CM

## 2021-01-01 LAB
ABSOLUTE EOS #: 0.23 K/UL (ref 0–0.44)
ABSOLUTE IMMATURE GRANULOCYTE: 0.03 K/UL (ref 0–0.3)
ABSOLUTE LYMPH #: 2.45 K/UL (ref 1.1–3.7)
ABSOLUTE MONO #: 0.79 K/UL (ref 0.1–1.2)
ANION GAP SERPL CALCULATED.3IONS-SCNC: 10 MMOL/L (ref 9–17)
BASOPHILS # BLD: 1 % (ref 0–2)
BASOPHILS ABSOLUTE: 0.1 K/UL (ref 0–0.2)
BUN BLDV-MCNC: 18 MG/DL (ref 8–23)
BUN/CREAT BLD: 18 (ref 9–20)
CALCIUM SERPL-MCNC: 10 MG/DL (ref 8.6–10.4)
CHLORIDE BLD-SCNC: 102 MMOL/L (ref 98–107)
CO2: 29 MMOL/L (ref 20–31)
CREAT SERPL-MCNC: 1.02 MG/DL (ref 0.5–0.9)
DIFFERENTIAL TYPE: ABNORMAL
DLCO %PRED: NORMAL
DLCO PRED: NORMAL
DLCO/VA %PRED: NORMAL
DLCO/VA PRED: NORMAL
DLCO/VA: NORMAL
DLCO: NORMAL
EOSINOPHILS RELATIVE PERCENT: 3 % (ref 1–4)
ESTIMATED AVERAGE GLUCOSE: 163 MG/DL
EXPIRATORY TIME-POST: NORMAL
EXPIRATORY TIME: NORMAL
FEF 25-75% %CHNG: NORMAL
FEF 25-75% %PRED-POST: NORMAL
FEF 25-75% %PRED-PRE: NORMAL
FEF 25-75% PRED: NORMAL
FEF 25-75%-POST: NORMAL
FEF 25-75%-PRE: NORMAL
FERRITIN: 52 UG/L (ref 13–150)
FEV1 %PRED-POST: NORMAL
FEV1 %PRED-PRE: NORMAL
FEV1 PRED: NORMAL
FEV1-POST: NORMAL
FEV1-PRE: NORMAL
FEV1/FVC %PRED-POST: NORMAL
FEV1/FVC %PRED-PRE: NORMAL
FEV1/FVC PRED: NORMAL
FEV1/FVC-POST: NORMAL
FEV1/FVC-PRE: NORMAL
FVC %PRED-POST: NORMAL
FVC %PRED-PRE: NORMAL
FVC PRED: NORMAL
FVC-POST: NORMAL
FVC-PRE: NORMAL
GAW %PRED: NORMAL
GAW PRED: NORMAL
GAW: NORMAL
GFR AFRICAN AMERICAN: >60 ML/MIN
GFR NON-AFRICAN AMERICAN: 52 ML/MIN
GFR SERPL CREATININE-BSD FRML MDRD: ABNORMAL ML/MIN/{1.73_M2}
GFR SERPL CREATININE-BSD FRML MDRD: ABNORMAL ML/MIN/{1.73_M2}
GLUCOSE BLD-MCNC: 186 MG/DL (ref 70–99)
HBA1C MFR BLD: 7.3 % (ref 4–6)
HCT VFR BLD CALC: 40.2 % (ref 36.3–47.1)
HEMOGLOBIN: 12.2 G/DL (ref 11.9–15.1)
IC %PRED: NORMAL
IC PRED: NORMAL
IC: NORMAL
IMMATURE GRANULOCYTES: 0 %
IRON SATURATION: 23 % (ref 20–55)
IRON: 67 UG/DL (ref 37–145)
LYMPHOCYTES # BLD: 31 % (ref 24–43)
MCH RBC QN AUTO: 26.8 PG (ref 25.2–33.5)
MCHC RBC AUTO-ENTMCNC: 30.3 G/DL (ref 25.2–33.5)
MCV RBC AUTO: 88.4 FL (ref 82.6–102.9)
MEP: NORMAL
MIP: NORMAL
MONOCYTES # BLD: 10 % (ref 3–12)
MVV %PRED-PRE: NORMAL
MVV PRED: NORMAL
MVV-PRE: NORMAL
NRBC AUTOMATED: 0 PER 100 WBC
PDW BLD-RTO: 16.1 % (ref 11.8–14.4)
PEF %PRED-POST: NORMAL
PEF %PRED-PRE: NORMAL
PEF PRED: NORMAL
PEF%CHNG: NORMAL
PEF-POST: NORMAL
PEF-PRE: NORMAL
PLATELET # BLD: ABNORMAL K/UL (ref 138–453)
PLATELET ESTIMATE: ABNORMAL
PLATELET, FLUORESCENCE: 192 K/UL (ref 138–453)
PLATELET, IMMATURE FRACTION: 19.5 % (ref 1.1–10.3)
PMV BLD AUTO: ABNORMAL FL (ref 8.1–13.5)
POTASSIUM SERPL-SCNC: 4.2 MMOL/L (ref 3.7–5.3)
RAW %PRED: NORMAL
RAW PRED: NORMAL
RAW: NORMAL
RBC # BLD: 4.55 M/UL (ref 3.95–5.11)
RBC # BLD: ABNORMAL 10*6/UL
RV %PRED: NORMAL
RV PRED: NORMAL
RV: NORMAL
SEG NEUTROPHILS: 54 % (ref 36–65)
SEGMENTED NEUTROPHILS ABSOLUTE COUNT: 4.3 K/UL (ref 1.5–8.1)
SODIUM BLD-SCNC: 141 MMOL/L (ref 135–144)
SVC %PRED: NORMAL
SVC PRED: NORMAL
SVC: NORMAL
TLC %PRED: NORMAL
TLC PRED: NORMAL
TLC: NORMAL
TOTAL IRON BINDING CAPACITY: 296 UG/DL (ref 250–450)
TSH SERPL DL<=0.05 MIU/L-ACNC: 2.48 MIU/L (ref 0.3–5)
UNSATURATED IRON BINDING CAPACITY: 229 UG/DL (ref 112–347)
VA %PRED: NORMAL
VA PRED: NORMAL
VA: NORMAL
VTG %PRED: NORMAL
VTG PRED: NORMAL
VTG: NORMAL
WBC # BLD: 7.9 K/UL (ref 3.5–11.3)
WBC # BLD: ABNORMAL 10*3/UL

## 2021-01-01 PROCEDURE — 95251 CONT GLUC MNTR ANALYSIS I&R: CPT | Performed by: NURSE PRACTITIONER

## 2021-01-01 PROCEDURE — 3051F HG A1C>EQUAL 7.0%<8.0%: CPT | Performed by: NURSE PRACTITIONER

## 2021-01-01 PROCEDURE — 99214 OFFICE O/P EST MOD 30 MIN: CPT | Performed by: NURSE PRACTITIONER

## 2021-01-01 PROCEDURE — 99214 OFFICE O/P EST MOD 30 MIN: CPT | Performed by: PHYSICIAN ASSISTANT

## 2021-01-01 PROCEDURE — 84443 ASSAY THYROID STIM HORMONE: CPT

## 2021-01-01 PROCEDURE — 36415 COLL VENOUS BLD VENIPUNCTURE: CPT

## 2021-01-01 PROCEDURE — 94060 EVALUATION OF WHEEZING: CPT

## 2021-01-01 PROCEDURE — 99212 OFFICE O/P EST SF 10 MIN: CPT | Performed by: PHYSICIAN ASSISTANT

## 2021-01-01 PROCEDURE — 82728 ASSAY OF FERRITIN: CPT

## 2021-01-01 PROCEDURE — 83540 ASSAY OF IRON: CPT

## 2021-01-01 PROCEDURE — 73562 X-RAY EXAM OF KNEE 3: CPT

## 2021-01-01 PROCEDURE — PBSHW PBB SHADOW CHARGE: Performed by: PHYSICIAN ASSISTANT

## 2021-01-01 PROCEDURE — 83550 IRON BINDING TEST: CPT

## 2021-01-01 PROCEDURE — 6370000000 HC RX 637 (ALT 250 FOR IP): Performed by: INTERNAL MEDICINE

## 2021-01-01 PROCEDURE — 99213 OFFICE O/P EST LOW 20 MIN: CPT

## 2021-01-01 PROCEDURE — 85025 COMPLETE CBC W/AUTO DIFF WBC: CPT

## 2021-01-01 PROCEDURE — 74018 RADEX ABDOMEN 1 VIEW: CPT

## 2021-01-01 PROCEDURE — 94729 DIFFUSING CAPACITY: CPT

## 2021-01-01 PROCEDURE — 20610 DRAIN/INJ JOINT/BURSA W/O US: CPT | Performed by: PHYSICIAN ASSISTANT

## 2021-01-01 PROCEDURE — 99214 OFFICE O/P EST MOD 30 MIN: CPT | Performed by: ORTHOPAEDIC SURGERY

## 2021-01-01 PROCEDURE — 99213 OFFICE O/P EST LOW 20 MIN: CPT | Performed by: INTERNAL MEDICINE

## 2021-01-01 PROCEDURE — 83036 HEMOGLOBIN GLYCOSYLATED A1C: CPT

## 2021-01-01 PROCEDURE — 80048 BASIC METABOLIC PNL TOTAL CA: CPT

## 2021-01-01 PROCEDURE — 85055 RETICULATED PLATELET ASSAY: CPT

## 2021-01-01 PROCEDURE — 94726 PLETHYSMOGRAPHY LUNG VOLUMES: CPT

## 2021-01-01 PROCEDURE — 94640 AIRWAY INHALATION TREATMENT: CPT

## 2021-01-01 RX ORDER — TRIAMCINOLONE ACETONIDE 40 MG/ML
80 INJECTION, SUSPENSION INTRA-ARTICULAR; INTRAMUSCULAR ONCE
Status: COMPLETED | OUTPATIENT
Start: 2021-01-01 | End: 2021-01-01

## 2021-01-01 RX ORDER — INSULIN LISPRO 100 [IU]/ML
INJECTION, SOLUTION INTRAVENOUS; SUBCUTANEOUS
Qty: 1 PEN | Refills: 3
Start: 2021-01-01 | End: 2022-01-01 | Stop reason: SDUPTHER

## 2021-01-01 RX ORDER — ALBUTEROL SULFATE 90 UG/1
2 AEROSOL, METERED RESPIRATORY (INHALATION) 4 TIMES DAILY PRN
Qty: 1 EACH | Refills: 0 | Status: SHIPPED | OUTPATIENT
Start: 2021-01-01 | End: 2022-01-01 | Stop reason: SDUPTHER

## 2021-01-01 RX ORDER — ALBUTEROL SULFATE 90 UG/1
4 AEROSOL, METERED RESPIRATORY (INHALATION) ONCE
Status: COMPLETED | OUTPATIENT
Start: 2021-01-01 | End: 2021-01-01

## 2021-01-01 RX ORDER — BUPIVACAINE HYDROCHLORIDE 5 MG/ML
5 INJECTION, SOLUTION PERINEURAL ONCE
Status: COMPLETED | OUTPATIENT
Start: 2021-01-01 | End: 2021-01-01

## 2021-01-01 RX ORDER — PEN NEEDLE, DIABETIC 31 GX5/16"
NEEDLE, DISPOSABLE MISCELLANEOUS
Qty: 650 EACH | Refills: 3 | Status: SHIPPED | OUTPATIENT
Start: 2021-01-01 | End: 2022-01-01

## 2021-01-01 RX ORDER — INSULIN GLARGINE 100 [IU]/ML
INJECTION, SOLUTION SUBCUTANEOUS
Qty: 30 ML | Refills: 4
Start: 2021-01-01 | End: 2022-01-01

## 2021-01-01 RX ADMIN — TRIAMCINOLONE ACETONIDE 80 MG: 200 INJECTION, SUSPENSION INTRA-ARTICULAR; INTRAMUSCULAR at 10:50

## 2021-01-01 RX ADMIN — BUPIVACAINE HYDROCHLORIDE 25 MG: 5 INJECTION, SOLUTION PERINEURAL at 10:50

## 2021-01-01 RX ADMIN — ALBUTEROL SULFATE 4 PUFF: 90 AEROSOL, METERED RESPIRATORY (INHALATION) at 14:46

## 2021-01-01 ASSESSMENT — ENCOUNTER SYMPTOMS
SINUS PRESSURE: 0
CONSTIPATION: 1
FACIAL SWELLING: 0
COUGH: 0
SHORTNESS OF BREATH: 0
DIARRHEA: 0
CHEST TIGHTNESS: 0
NAUSEA: 0
VOMITING: 0
WHEEZING: 0
FACIAL SWELLING: 0
DIARRHEA: 0
ABDOMINAL PAIN: 0
EYE PAIN: 0
RHINORRHEA: 0
BLOOD IN STOOL: 0
CHEST TIGHTNESS: 0
SHORTNESS OF BREATH: 0
CONSTIPATION: 0
VOMITING: 0
EYE PAIN: 0
RHINORRHEA: 0
SINUS PRESSURE: 0
COLOR CHANGE: 0
RESPIRATORY NEGATIVE: 1
COUGH: 0
TROUBLE SWALLOWING: 0
TROUBLE SWALLOWING: 0
BLOOD IN STOOL: 0
COLOR CHANGE: 0
DIARRHEA: 0
SHORTNESS OF BREATH: 0
NAUSEA: 0
SORE THROAT: 0
WHEEZING: 0
ABDOMINAL PAIN: 0
ABDOMINAL PAIN: 0
SORE THROAT: 0

## 2021-01-04 ENCOUNTER — OFFICE VISIT (OUTPATIENT)
Dept: DIABETES SERVICES | Age: 86
End: 2021-01-04
Payer: MEDICARE

## 2021-01-04 VITALS
BODY MASS INDEX: 38.1 KG/M2 | DIASTOLIC BLOOD PRESSURE: 68 MMHG | HEIGHT: 59 IN | WEIGHT: 189 LBS | SYSTOLIC BLOOD PRESSURE: 164 MMHG | RESPIRATION RATE: 14 BRPM | HEART RATE: 92 BPM

## 2021-01-04 DIAGNOSIS — R21 RASH: ICD-10-CM

## 2021-01-04 DIAGNOSIS — I10 ESSENTIAL HYPERTENSION: ICD-10-CM

## 2021-01-04 DIAGNOSIS — E11.21 TYPE 2 DIABETES WITH NEPHROPATHY (HCC): Primary | ICD-10-CM

## 2021-01-04 DIAGNOSIS — Z71.89 DIABETES EDUCATION, ENCOUNTER FOR: ICD-10-CM

## 2021-01-04 DIAGNOSIS — E66.01 CLASS 3 SEVERE OBESITY DUE TO EXCESS CALORIES WITH SERIOUS COMORBIDITY AND BODY MASS INDEX (BMI) OF 40.0 TO 44.9 IN ADULT (HCC): ICD-10-CM

## 2021-01-04 DIAGNOSIS — E78.5 DYSLIPIDEMIA: ICD-10-CM

## 2021-01-04 PROCEDURE — 99214 OFFICE O/P EST MOD 30 MIN: CPT | Performed by: NURSE PRACTITIONER

## 2021-01-04 PROCEDURE — 95251 CONT GLUC MNTR ANALYSIS I&R: CPT | Performed by: NURSE PRACTITIONER

## 2021-01-04 RX ORDER — NYSTATIN 100000 U/G
CREAM TOPICAL
Qty: 1 TUBE | Refills: 0 | Status: SHIPPED | OUTPATIENT
Start: 2021-01-04

## 2021-01-04 RX ORDER — INSULIN GLARGINE 100 [IU]/ML
INJECTION, SOLUTION SUBCUTANEOUS
Qty: 30 ML | Refills: 4
Start: 2021-01-04 | End: 2021-02-08 | Stop reason: SDUPTHER

## 2021-01-04 ASSESSMENT — ENCOUNTER SYMPTOMS
EYE PAIN: 0
SHORTNESS OF BREATH: 0
VOMITING: 0
COUGH: 0
NAIL CHANGES: 0
VISUAL CHANGE: 0
RHINORRHEA: 0
BLURRED VISION: 0
DIARRHEA: 0
RESPIRATORY NEGATIVE: 1
ABDOMINAL PAIN: 0
SORE THROAT: 0

## 2021-01-04 NOTE — PROGRESS NOTES
[facility] [address] INTERNAL  [facility]  [address] [facility], [address]  [place] [address] [facility]  [phone]        HISTORY:    Adelita Croft presents today for evaluation and management of:  Chief Complaint   Patient presents with    Diabetes     3 month appt. Diabetes  She presents for her follow-up diabetic visit. She has type 2 diabetes mellitus. No MedicAlert identification noted. There are no hypoglycemic associated symptoms. Pertinent negatives for hypoglycemia include no confusion, dizziness, headaches, seizures or tremors. Associated symptoms include fatigue and foot paresthesias. Pertinent negatives for diabetes include no blurred vision, no chest pain, no foot ulcerations, no polydipsia, no polyphagia, no polyuria, no visual change and no weight loss. There are no hypoglycemic complications. Symptoms are stable. Risk factors for coronary artery disease include diabetes mellitus, dyslipidemia, family history, hypertension, obesity, stress and sedentary lifestyle. Current diabetic treatment includes insulin injections. She is compliant with treatment all of the time. She is currently taking insulin pre-breakfast, pre-lunch, pre-dinner and at bedtime. Insulin injections are given by patient. Rotation sites for injection include the abdominal wall. Her weight is stable. She is following a generally healthy diet. When asked about meal planning, she reported none. She has not had a previous visit with a dietitian. She rarely participates in exercise. Blood glucose monitoring compliance is excellent. An ACE inhibitor/angiotensin II receptor blocker is not being taken. Eye exam is current. Rash  This is a recurrent problem. The current episode started more than 1 year ago. The problem has been waxing and waning since onset. Location: buttock. The rash is characterized by redness (not open or draining. slight crack in skin. ).  She was exposed to nothing. Associated symptoms include fatigue. Pertinent negatives include no anorexia, congestion, cough, diarrhea, eye pain, facial edema, fever, joint pain, nail changes, rhinorrhea, shortness of breath, sore throat or vomiting. Past treatments include moisturizer. The treatment provided mild relief. Interval History:    Current Diabetic Medications  Victoza 1.8 mg daily Lantus 22 units am and 24 pm. short acting insulin 8 units 4 times a day with meals 10 units if bs is over 150  Taking 6 insulin injection a day and testing blood sugar 4 times per day. DKA episodes: 0    1/28/2020  Patient is here for follow-up diabetes management she has been using her CGM with success report downloaded and reviewed with patient.  No hypoglycemia identified.  Patient does states she is using her CGM and fingerstick method to compare numbers. Erik Novak is using an older meter for backup and there is a wide discrepancy in CGM number and fingerstick method.     07/29/20   She has noticed her bs are elevated. She is dealing with a lot of stress.   Diet: low carb  Exercise: none  BS testing: using CGM with success   Issues: high blood sugars denies hypoglycemia     09/29/20   She has increased her insulin to accommodate her higher blood sugars   Diet: low carb   Exercise: none  BS testing: uses cgm with success bs have been running higher lately denies hypoglycemia. Issues: denies     01/04/21   At previous visit we adjusted insulin. She has been stable on current regimen. Diet: low carb  Exercise: none   BS testing: uses george cgm daily with success. Issues: denies     High cholesterol-  Takes zocor and denies any adverse effects with its use.  Watches diet and exercise.      Hypertension-  Takes Norvasc Lopressor and Apresoline and denies any adverse effects with their use. Watches diet and exercise.  Denies any chest pain, dizziness or edema.  Follows with cardiology:Yes. Monitors bp at [facility] has 2 pills of metoprolol left needs a short supply until mail order. bp at home average 140/60     Obesity- Working on weight loss.       Past Medical History:   Diagnosis Date    Allergic rhinitis     Asthma     PFT's, 04/06, actually showed mild restrictive defect.  Basal cell carcinoma of cheek 2007    Right cheek    Basal cell carcinoma of leg     right leg    CAD (coronary artery disease)     With 40% stenosis of the LAD, 07/10, ejection fraction 60%. Repeat heart cath9/14 with 4050 percent LAD lesion first diagonal 50% lesion rec med Rx    Central retinal artery occlusion     Right side November 2014 status post TPA    Cerebral artery occlusion with cerebral infarction (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 Jorge Bello. Repeat EGD6/15 Gastritis    Foraminal stenosis of lumbar region     Moderate  Right L4/L5 neuroforaminal stenosis, Dr Juanpablo Escamilla following. MRI 2/16 [place]. Moderate foraminal stenosis mild to moderate central canal stenosis    Hyperlipidemia     Hypertension     IBS (irritable bowel syndrome)     GI consultation with Dr. 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 Sprue test Neg 2011    Iron deficiency anemia     Percent sat iron 5, January 2015, ferritin 40 1 /15, FOBT positive  EGD 6/15  Gastritis, IV iron 9/15x3 effective,  colonoscopy deferred by Dr. Jorge Bello. --Prior colonoscopy 2011 with severe diverticulosis and telangiectasia.   Trial iron solution in Orange juice 12/16    Junctional bradycardia     Symptomatic, resolved 14 days 21 tablet 1    nystatin (MYCOSTATIN) 200816 UNIT/GM cream Apply topically 2 times daily. 1 Tube 0    insulin glargine (LANTUS SOLOSTAR) 100 UNIT/ML injection pen INJECT 22 UNITS bid 30 mL 4    potassium chloride (KLOR-CON M) 20 MEQ extended release tablet Take 1 tablet by mouth 2 times daily 180 tablet 3    albuterol (PROVENTIL) (2.5 MG/3ML) 0.083% nebulizer solution Take 3 mLs by nebulization every 4 hours as needed for Wheezing or Shortness of Breath 30 each 1    hydrALAZINE (APRESOLINE) 50 MG tablet TAKE 1 TABLET THREE TIMES A  tablet 3    amLODIPine (NORVASC) 5 MG tablet Take 1 tablet by mouth daily 90 tablet 3    insulin lispro, 1 Unit Dial, (HUMALOG KWIKPEN) 100 UNIT/ML SOPN 8 units with breakfast and lunch 10 units if bs is over 150. 10 units with dinner and 12 units if bs is over 150. Max daily dose is 32 units 28.8 mL 3    Liraglutide (VICTOZA) 18 MG/3ML SOPN SC injection Inject 1.8 mg into the skin daily 18 mL 3    furosemide (LASIX) 40 MG tablet TAKE 1 TABLET DAILY 90 tablet 3    predniSONE (DELTASONE) 5 MG tablet TAKE 1 TABLET DAILY 90 tablet 3    simvastatin (ZOCOR) 20 MG tablet TAKE 1 TABLET NIGHTLY 90 tablet 3    Polyethylene Glycol 400 (BLINK TEARS) 0.25 % SOLN Place into both eyes as needed (dry eyes)       aspirin 81 MG EC tablet Take 81 mg by mouth daily      acetaminophen (TYLENOL) 500 MG tablet Take 500 mg by mouth daily      omeprazole (PRILOSEC) 20 MG capsule Take 20 mg by mouth Daily  30 capsule 3    Cholecalciferol (VITAMIN D3) 2000 UNITS CAPS Take 2,000 Units by mouth daily       Insulin Pen Needle (B-D ULTRAFINE III SHORT PEN) 31G X 8 MM MISC USE 7 NEEDLES DAILY 270 each 8    Handicap Placard MISC by Does not apply route Expires 4/7/2023 1 each 0    glucose blood VI test strips (ASCENSIA AUTODISC VI;ONE TOUCH ULTRA TEST VI) strip 1 each by In Vitro route 3 times daily As directed. 100 each 5     No current facility-administered medications for this visit. Review ofSymptoms:  Review of Systems   Constitutional: Positive for fatigue. Negative for fever, unexpected weight change and weight loss. HENT: Negative for congestion, rhinorrhea and sore throat. Eyes: Negative for blurred vision, pain and visual disturbance. Respiratory: Negative. Negative for cough and shortness of breath. Cardiovascular: Negative for chest pain and leg swelling. Gastrointestinal: Negative for abdominal pain, anorexia, diarrhea and vomiting. Endocrine: Negative for polydipsia, polyphagia and polyuria. Genitourinary: Negative. Musculoskeletal: Negative. Negative for joint pain. Skin: Negative for nail changes, rash and wound. Neurological: Negative for dizziness, tremors, seizures and headaches. Psychiatric/Behavioral: Negative. Negative for confusion and decreased concentration. Theremainder of a complete 14-point review of systems is negative. Vital Signs: BP (!) 164/68   Pulse 92   Resp 14   Ht 4' 11\" (1.499 m)   Wt 189 lb (85.7 kg)   BMI 38.17 kg/m²      Wt Readings from Last 3 Encounters:   01/04/21 189 lb (85.7 kg)   12/09/20 199 lb 3.2 oz (90.4 kg)   09/29/20 197 lb (89.4 kg)     Body mass index is 38.17 kg/m².   LABS:  Hemoglobin A1C   Date Value Ref Range Status   12/08/2020 7.2 (H) 4.8 - 5.9 % Final   09/08/2020 7.8 (H) 4.8 - 5.9 % Final     Lab Results   Component Value Date    LABMICR CANNOT BE CALCULATED 04/23/2019     Lab Results   Component Value Date     (H) 12/08/2020    K 3.8 12/08/2020     12/08/2020    CO2 29 12/08/2020    BUN 16 12/08/2020    CREATININE 0.91 (H) 12/08/2020    GLUCOSE 155 (H) 12/08/2020    CALCIUM 9.8 12/08/2020    PROT 6.8 06/02/2020    LABALBU 4.1 06/02/2020    BILITOT 0.51 06/02/2020    ALKPHOS 85 06/02/2020    AST 13 06/02/2020    ALT 13 06/02/2020    LABGLOM 59 (L) 12/08/2020    GFRAA >60 12/08/2020     Lab Results   Component Value Date    CHOL 185 06/02/2020    CHOL 137 07/20/2019    CHOL 142 Orders   1. Type 2 diabetes with nephropathy ([facility])   DIABETES FOOT EXAM    insulin glargine (LANTUS SOLOSTAR) 100 UNIT/ML injection pen   2. Diabetes education, encounter for     3. Dyslipidemia     4. Essential hypertension  metoprolol tartrate (LOPRESSOR) 25 MG tablet   5. BMI 40.0-44.9, adult ([facility])     6. Class 3 severe obesity due to excess calories with serious comorbidity and body mass index (BMI) of 40.0 to 44.9 in adult ([facility]     7. Rash  nystatin (MYCOSTATIN) 639702 UNIT/GM cream     Orders Placed This Encounter   Procedures     DIABETES FOOT EXAM     Orders Placed This Encounter   Medications    metoprolol tartrate (LOPRESSOR) 25 MG tablet     Sig: Take 1 tablet by mouth 3 times daily for 14 days     Dispense:  21 tablet     Refill:  1    nystatin (MYCOSTATIN) 455428 UNIT/GM cream     Sig: Apply topically 2 times daily. Dispense:  1 Tube     Refill:  0    insulin glargine (LANTUS SOLOSTAR) 100 UNIT/ML injection pen     Sig: INJECT 22 UNITS bid     Dispense:  30 mL     Refill:  4     Requested Prescriptions     Signed Prescriptions Disp Refills    metoprolol tartrate (LOPRESSOR) 25 MG tablet 21 tablet 1     Sig: Take 1 tablet by mouth 3 times daily for 14 days    nystatin (MYCOSTATIN) [id number] UNIT/GM cream 1 Tube 0     Sig: Apply topically 2 times daily.  insulin glargine (LANTUS SOLOSTAR) 100 UNIT/ML injection pen 30 mL 4     Sig: INJECT 22 UNITS bid       1. Type 2 diabetes with nephropathy ([facility]  2. Diabetes education, encounter for  - Stable  HbA1C goal is less than 7% and blood sugars are improving.  - Encouraged patient to check BS 4 times per day. Fasting blood glucose goal is 70-130mg/dl and postprandial blood sugar goal is less than 180 mg/dl. -Diabetic foot exam up-to-date: Yes  -Diabetic retinal exam up-to-date: Yes  - Labs reviewed includes: most recent a1c 7.2% GFR stable at 59.  Repeat labs due in 3 months.   -We discussed in great detail dietary modifications they can make to better improve their blood sugars. follow up diabetes education completed, all questions answered. CGM report downloaded and reviewed, scanned to media tab. Time in range 72%% and hypoglycemia 1%. Patient Instructions   lantus 22 units twice daily     Call office if you have any low blood sugars     Try nystatin cream to skin irritation on bottom. Discussed signs and symptoms of hyper/hypoglycemia and how to treat. Encouraged 150 minutes of physical activity per week. Follow a low carbohydrate diet consuming 45 grams of carbohydrates at breakfast, lunch and dinner with two separate snacks [id number] grams of carbohydrates. Encouraged at least 7 hours of sleep. The patient was informed of the goals of diabetes management. This can only be accomplished by watching their diet and exercise levels. We certainly use medicines to help attain these goals. The consequences of not controlling blood sugars were discussed. These include blindness, heart disease, stroke, kidney disease, and possibly need for dialysis. They were told to be careful with their foot care as diabetics often have nerve damage, infections and risk for limb amutations . They also need a dilated eye exam yearly. We discussed the issues of diet, exercise, medication, complication avoidance, reviewed the signs and symptoms of diabetes, hypoglycemic episodes, significance of HbA1C.       3. Dyslipidemia  stable, lipid panel reviewed, continue current medications. Diet and exercise      4. Essential hypertension   stable, continue current medications. Diet and exercise Seek emergent care if chest pain develops. 5. BMI 40.0-44.9, adult ([facility])  6. Class 3 severe obesity due to excess calories with serious comorbidity and body mass index (BMI) of 40.0 to 44.9 in adult [facility])  Reduce calories and increase physical activity to achieve a slow and steady weight loss to improve blood pressure, cholesterol and diabetes.      Rash  - nystatin cream, keep area clean and dry, call if no improvement. Answered all patient questions. Agrees to follow plan of care and to follow up in 3 months, sooner if needed. Call office if unexplained blood sugars less than 70 occur or above 400. Call office or access [employer] with any further questions or concerns. Be sure to bring glucometer/food log at next appointment.        Total time spent reviewing chart, labs, counseling patient and documenting on the date of the encounter: 40      Electronically signed by Saintclair Stands, APRN - CNP on 1/4/2021 at 2:17 PM      (Please note that portions of this note were completed with a voice-recognition program. Efforts were made to edit the dictation but occasionally words are mis-transcribed.)

## 2021-01-04 NOTE — PATIENT INSTRUCTIONS
lantus 22 units twice daily     Call office if you have any low blood sugars     Try nystatin cream to skin irritation on bottom.

## 2021-01-11 DIAGNOSIS — E11.21 TYPE 2 DIABETES WITH NEPHROPATHY (HCC): ICD-10-CM

## 2021-01-11 RX ORDER — INSULIN LISPRO 100 [IU]/ML
INJECTION, SOLUTION INTRAVENOUS; SUBCUTANEOUS
Qty: 15 ML | Refills: 6 | Status: SHIPPED | OUTPATIENT
Start: 2021-01-11 | End: 2021-04-12 | Stop reason: DRUGHIGH

## 2021-01-18 DIAGNOSIS — E78.5 DYSLIPIDEMIA: ICD-10-CM

## 2021-01-18 RX ORDER — SIMVASTATIN 20 MG
TABLET ORAL
Qty: 90 TABLET | Refills: 3 | Status: SHIPPED | OUTPATIENT
Start: 2021-01-18 | End: 2022-01-01

## 2021-02-08 ENCOUNTER — HOSPITAL ENCOUNTER (INPATIENT)
Age: 86
LOS: 3 days | Discharge: HOME | DRG: 309 | End: 2021-02-11
Attending: EMERGENCY MEDICINE | Admitting: INTERNAL MEDICINE
Payer: MEDICARE

## 2021-02-08 ENCOUNTER — OFFICE VISIT (OUTPATIENT)
Dept: DIABETES SERVICES | Age: 86
DRG: 309 | End: 2021-02-08
Payer: MEDICARE

## 2021-02-08 ENCOUNTER — APPOINTMENT (OUTPATIENT)
Dept: CT IMAGING | Age: 86
DRG: 309 | End: 2021-02-08
Payer: MEDICARE

## 2021-02-08 VITALS
HEART RATE: 108 BPM | DIASTOLIC BLOOD PRESSURE: 78 MMHG | SYSTOLIC BLOOD PRESSURE: 160 MMHG | BODY MASS INDEX: 39.31 KG/M2 | HEIGHT: 59 IN | RESPIRATION RATE: 16 BRPM | WEIGHT: 195 LBS

## 2021-02-08 DIAGNOSIS — I49.9 IRREGULAR HEART RATE: ICD-10-CM

## 2021-02-08 DIAGNOSIS — E66.01 CLASS 2 SEVERE OBESITY DUE TO EXCESS CALORIES WITH SERIOUS COMORBIDITY AND BODY MASS INDEX (BMI) OF 39.0 TO 39.9 IN ADULT (HCC): ICD-10-CM

## 2021-02-08 DIAGNOSIS — E11.21 TYPE 2 DIABETES WITH NEPHROPATHY (HCC): Primary | ICD-10-CM

## 2021-02-08 DIAGNOSIS — E78.5 DYSLIPIDEMIA: ICD-10-CM

## 2021-02-08 DIAGNOSIS — I10 ESSENTIAL HYPERTENSION: ICD-10-CM

## 2021-02-08 DIAGNOSIS — Z71.89 DIABETES EDUCATION, ENCOUNTER FOR: ICD-10-CM

## 2021-02-08 DIAGNOSIS — I48.91 ATRIAL FIBRILLATION WITH RVR (HCC): Primary | ICD-10-CM

## 2021-02-08 LAB
-: ABNORMAL
ABSOLUTE EOS #: 0.19 K/UL (ref 0–0.44)
ABSOLUTE IMMATURE GRANULOCYTE: 0.07 K/UL (ref 0–0.3)
ABSOLUTE LYMPH #: 2.45 K/UL (ref 1.1–3.7)
ABSOLUTE MONO #: 0.82 K/UL (ref 0.1–1.2)
ALBUMIN SERPL-MCNC: 4.2 G/DL (ref 3.5–5.2)
ALBUMIN/GLOBULIN RATIO: 1.3 (ref 1–2.5)
ALP BLD-CCNC: 93 U/L (ref 35–104)
ALT SERPL-CCNC: <5 U/L (ref 5–33)
AMORPHOUS: ABNORMAL
ANION GAP SERPL CALCULATED.3IONS-SCNC: 12 MMOL/L (ref 9–17)
AST SERPL-CCNC: 16 U/L
BACTERIA: ABNORMAL
BASOPHILS # BLD: 1 % (ref 0–2)
BASOPHILS ABSOLUTE: 0.06 K/UL (ref 0–0.2)
BILIRUB SERPL-MCNC: 0.34 MG/DL (ref 0.3–1.2)
BILIRUBIN URINE: NEGATIVE
BUN BLDV-MCNC: 18 MG/DL (ref 8–23)
BUN/CREAT BLD: 19 (ref 9–20)
CALCIUM SERPL-MCNC: 9.7 MG/DL (ref 8.6–10.4)
CASTS UA: ABNORMAL /LPF (ref 0–2)
CASTS UA: ABNORMAL /LPF (ref 0–2)
CHLORIDE BLD-SCNC: 102 MMOL/L (ref 98–107)
CO2: 27 MMOL/L (ref 20–31)
COLOR: ABNORMAL
COMMENT UA: ABNORMAL
CREAT SERPL-MCNC: 0.97 MG/DL (ref 0.5–0.9)
CRYSTALS, UA: ABNORMAL /HPF
D-DIMER QUANTITATIVE: 0.84 MG/L FEU (ref 0–0.59)
DIFFERENTIAL TYPE: ABNORMAL
EKG ATRIAL RATE: 153 BPM
EKG Q-T INTERVAL: 262 MS
EKG QRS DURATION: 78 MS
EKG QTC CALCULATION (BAZETT): 386 MS
EKG R AXIS: 38 DEGREES
EKG T AXIS: -163 DEGREES
EKG VENTRICULAR RATE: 131 BPM
EOSINOPHILS RELATIVE PERCENT: 2 % (ref 1–4)
EPITHELIAL CELLS UA: ABNORMAL /HPF (ref 0–5)
GFR AFRICAN AMERICAN: >60 ML/MIN
GFR NON-AFRICAN AMERICAN: 55 ML/MIN
GFR SERPL CREATININE-BSD FRML MDRD: ABNORMAL ML/MIN/{1.73_M2}
GFR SERPL CREATININE-BSD FRML MDRD: ABNORMAL ML/MIN/{1.73_M2}
GLUCOSE BLD-MCNC: 132 MG/DL (ref 65–105)
GLUCOSE BLD-MCNC: 208 MG/DL (ref 70–99)
GLUCOSE URINE: NEGATIVE
HCT VFR BLD CALC: 44.9 % (ref 36.3–47.1)
HEMOGLOBIN: 13.7 G/DL (ref 11.9–15.1)
IMMATURE GRANULOCYTES: 1 %
INR BLD: 0.9
KETONES, URINE: NEGATIVE
LEUKOCYTE ESTERASE, URINE: NEGATIVE
LYMPHOCYTES # BLD: 28 % (ref 24–43)
MCH RBC QN AUTO: 27.1 PG (ref 25.2–33.5)
MCHC RBC AUTO-ENTMCNC: 30.5 G/DL (ref 25.2–33.5)
MCV RBC AUTO: 88.9 FL (ref 82.6–102.9)
MONOCYTES # BLD: 9 % (ref 3–12)
MUCUS: ABNORMAL
MYOGLOBIN: 40 NG/ML (ref 25–58)
NITRITE, URINE: POSITIVE
NRBC AUTOMATED: 0 PER 100 WBC
OTHER OBSERVATIONS UA: ABNORMAL
PDW BLD-RTO: 17.3 % (ref 11.8–14.4)
PH UA: 5 (ref 5–6)
PLATELET # BLD: 197 K/UL (ref 138–453)
PLATELET ESTIMATE: ABNORMAL
PMV BLD AUTO: 13 FL (ref 8.1–13.5)
POTASSIUM SERPL-SCNC: 4 MMOL/L (ref 3.7–5.3)
PROTEIN UA: NEGATIVE
PROTHROMBIN TIME: 11.8 SEC (ref 11.5–14.2)
RBC # BLD: 5.05 M/UL (ref 3.95–5.11)
RBC # BLD: ABNORMAL 10*6/UL
RBC UA: ABNORMAL /HPF (ref 0–4)
RENAL EPITHELIAL, UA: ABNORMAL /HPF
SARS-COV-2, RAPID: NOT DETECTED
SARS-COV-2: NORMAL
SARS-COV-2: NORMAL
SEG NEUTROPHILS: 59 % (ref 36–65)
SEGMENTED NEUTROPHILS ABSOLUTE COUNT: 5.3 K/UL (ref 1.5–8.1)
SODIUM BLD-SCNC: 141 MMOL/L (ref 135–144)
SOURCE: NORMAL
SPECIFIC GRAVITY UA: 1.01 (ref 1.01–1.02)
TOTAL PROTEIN: 7.4 G/DL (ref 6.4–8.3)
TRICHOMONAS: ABNORMAL
TROPONIN INTERP: ABNORMAL
TROPONIN T: ABNORMAL NG/ML
TROPONIN, HIGH SENSITIVITY: 21 NG/L (ref 0–14)
TROPONIN, HIGH SENSITIVITY: 23 NG/L (ref 0–14)
TROPONIN, HIGH SENSITIVITY: 23 NG/L (ref 0–14)
TSH SERPL DL<=0.05 MIU/L-ACNC: 1.38 MIU/L (ref 0.3–5)
TURBIDITY: ABNORMAL
URINE HGB: NEGATIVE
UROBILINOGEN, URINE: NORMAL
WBC # BLD: 8.9 K/UL (ref 3.5–11.3)
WBC # BLD: ABNORMAL 10*3/UL
WBC UA: ABNORMAL /HPF (ref 0–4)
YEAST: ABNORMAL

## 2021-02-08 PROCEDURE — 71260 CT THORAX DX C+: CPT

## 2021-02-08 PROCEDURE — 87086 URINE CULTURE/COLONY COUNT: CPT

## 2021-02-08 PROCEDURE — 6370000000 HC RX 637 (ALT 250 FOR IP): Performed by: NURSE PRACTITIONER

## 2021-02-08 PROCEDURE — 6360000002 HC RX W HCPCS: Performed by: NURSE PRACTITIONER

## 2021-02-08 PROCEDURE — 82947 ASSAY GLUCOSE BLOOD QUANT: CPT

## 2021-02-08 PROCEDURE — 87077 CULTURE AEROBIC IDENTIFY: CPT

## 2021-02-08 PROCEDURE — 85379 FIBRIN DEGRADATION QUANT: CPT

## 2021-02-08 PROCEDURE — 6360000004 HC RX CONTRAST MEDICATION: Performed by: EMERGENCY MEDICINE

## 2021-02-08 PROCEDURE — 85610 PROTHROMBIN TIME: CPT

## 2021-02-08 PROCEDURE — 83874 ASSAY OF MYOGLOBIN: CPT

## 2021-02-08 PROCEDURE — 93005 ELECTROCARDIOGRAM TRACING: CPT | Performed by: EMERGENCY MEDICINE

## 2021-02-08 PROCEDURE — 96372 THER/PROPH/DIAG INJ SC/IM: CPT

## 2021-02-08 PROCEDURE — 84484 ASSAY OF TROPONIN QUANT: CPT

## 2021-02-08 PROCEDURE — 2060000000 HC ICU INTERMEDIATE R&B

## 2021-02-08 PROCEDURE — 81001 URINALYSIS AUTO W/SCOPE: CPT

## 2021-02-08 PROCEDURE — 6360000002 HC RX W HCPCS: Performed by: EMERGENCY MEDICINE

## 2021-02-08 PROCEDURE — 84443 ASSAY THYROID STIM HORMONE: CPT

## 2021-02-08 PROCEDURE — 99215 OFFICE O/P EST HI 40 MIN: CPT | Performed by: NURSE PRACTITIONER

## 2021-02-08 PROCEDURE — 99283 EMERGENCY DEPT VISIT LOW MDM: CPT

## 2021-02-08 PROCEDURE — 36415 COLL VENOUS BLD VENIPUNCTURE: CPT

## 2021-02-08 PROCEDURE — 93005 ELECTROCARDIOGRAM TRACING: CPT | Performed by: NURSE PRACTITIONER

## 2021-02-08 PROCEDURE — 2500000003 HC RX 250 WO HCPCS: Performed by: EMERGENCY MEDICINE

## 2021-02-08 PROCEDURE — U0002 COVID-19 LAB TEST NON-CDC: HCPCS

## 2021-02-08 PROCEDURE — 2580000003 HC RX 258: Performed by: EMERGENCY MEDICINE

## 2021-02-08 PROCEDURE — 2580000003 HC RX 258: Performed by: NURSE PRACTITIONER

## 2021-02-08 PROCEDURE — 85025 COMPLETE CBC W/AUTO DIFF WBC: CPT

## 2021-02-08 PROCEDURE — 95251 CONT GLUC MNTR ANALYSIS I&R: CPT | Performed by: NURSE PRACTITIONER

## 2021-02-08 PROCEDURE — 2000000000 HC ICU R&B

## 2021-02-08 PROCEDURE — 99214 OFFICE O/P EST MOD 30 MIN: CPT | Performed by: NURSE PRACTITIONER

## 2021-02-08 PROCEDURE — 80053 COMPREHEN METABOLIC PANEL: CPT

## 2021-02-08 PROCEDURE — 87186 SC STD MICRODIL/AGAR DIL: CPT

## 2021-02-08 RX ORDER — DEXTROSE MONOHYDRATE 25 G/50ML
12.5 INJECTION, SOLUTION INTRAVENOUS PRN
Status: DISCONTINUED | OUTPATIENT
Start: 2021-02-08 | End: 2021-02-11 | Stop reason: HOSPADM

## 2021-02-08 RX ORDER — SODIUM CHLORIDE 9 MG/ML
INJECTION, SOLUTION INTRAVENOUS CONTINUOUS
Status: DISCONTINUED | OUTPATIENT
Start: 2021-02-08 | End: 2021-02-10

## 2021-02-08 RX ORDER — ONDANSETRON 2 MG/ML
4 INJECTION INTRAMUSCULAR; INTRAVENOUS EVERY 6 HOURS PRN
Status: DISCONTINUED | OUTPATIENT
Start: 2021-02-08 | End: 2021-02-11 | Stop reason: HOSPADM

## 2021-02-08 RX ORDER — SODIUM CHLORIDE 0.9 % (FLUSH) 0.9 %
10 SYRINGE (ML) INJECTION PRN
Status: DISCONTINUED | OUTPATIENT
Start: 2021-02-08 | End: 2021-02-11 | Stop reason: HOSPADM

## 2021-02-08 RX ORDER — VITAMIN B COMPLEX
2000 TABLET ORAL DAILY
Status: DISCONTINUED | OUTPATIENT
Start: 2021-02-08 | End: 2021-02-11 | Stop reason: HOSPADM

## 2021-02-08 RX ORDER — SODIUM CHLORIDE 0.9 % (FLUSH) 0.9 %
10 SYRINGE (ML) INJECTION EVERY 12 HOURS SCHEDULED
Status: DISCONTINUED | OUTPATIENT
Start: 2021-02-08 | End: 2021-02-11 | Stop reason: HOSPADM

## 2021-02-08 RX ORDER — FUROSEMIDE 40 MG/1
40 TABLET ORAL DAILY
Status: DISCONTINUED | OUTPATIENT
Start: 2021-02-08 | End: 2021-02-11 | Stop reason: HOSPADM

## 2021-02-08 RX ORDER — ATORVASTATIN CALCIUM 10 MG/1
10 TABLET, FILM COATED ORAL DAILY
Status: DISCONTINUED | OUTPATIENT
Start: 2021-02-08 | End: 2021-02-11 | Stop reason: HOSPADM

## 2021-02-08 RX ORDER — PROMETHAZINE HYDROCHLORIDE 25 MG/1
12.5 TABLET ORAL EVERY 6 HOURS PRN
Status: DISCONTINUED | OUTPATIENT
Start: 2021-02-08 | End: 2021-02-11 | Stop reason: HOSPADM

## 2021-02-08 RX ORDER — ACETAMINOPHEN 325 MG/1
650 TABLET ORAL EVERY 6 HOURS PRN
Status: DISCONTINUED | OUTPATIENT
Start: 2021-02-08 | End: 2021-02-11 | Stop reason: HOSPADM

## 2021-02-08 RX ORDER — NICOTINE 21 MG/24HR
1 PATCH, TRANSDERMAL 24 HOURS TRANSDERMAL DAILY PRN
Status: DISCONTINUED | OUTPATIENT
Start: 2021-02-08 | End: 2021-02-09

## 2021-02-08 RX ORDER — ACETAMINOPHEN 650 MG/1
650 SUPPOSITORY RECTAL EVERY 6 HOURS PRN
Status: DISCONTINUED | OUTPATIENT
Start: 2021-02-08 | End: 2021-02-11 | Stop reason: HOSPADM

## 2021-02-08 RX ORDER — PANTOPRAZOLE SODIUM 20 MG/1
20 TABLET, DELAYED RELEASE ORAL
Status: DISCONTINUED | OUTPATIENT
Start: 2021-02-09 | End: 2021-02-09

## 2021-02-08 RX ORDER — DEXTROSE MONOHYDRATE 50 MG/ML
100 INJECTION, SOLUTION INTRAVENOUS PRN
Status: DISCONTINUED | OUTPATIENT
Start: 2021-02-08 | End: 2021-02-11 | Stop reason: HOSPADM

## 2021-02-08 RX ORDER — NICOTINE POLACRILEX 4 MG
15 LOZENGE BUCCAL PRN
Status: DISCONTINUED | OUTPATIENT
Start: 2021-02-08 | End: 2021-02-11 | Stop reason: HOSPADM

## 2021-02-08 RX ORDER — INSULIN GLARGINE 100 [IU]/ML
26 INJECTION, SOLUTION SUBCUTANEOUS 2 TIMES DAILY
Status: DISCONTINUED | OUTPATIENT
Start: 2021-02-08 | End: 2021-02-11 | Stop reason: HOSPADM

## 2021-02-08 RX ORDER — INSULIN GLARGINE 100 [IU]/ML
INJECTION, SOLUTION SUBCUTANEOUS
Qty: 30 ML | Refills: 4
Start: 2021-02-08 | End: 2021-04-07 | Stop reason: SDUPTHER

## 2021-02-08 RX ORDER — POLYETHYLENE GLYCOL 3350 17 G/17G
17 POWDER, FOR SOLUTION ORAL DAILY PRN
Status: DISCONTINUED | OUTPATIENT
Start: 2021-02-08 | End: 2021-02-11 | Stop reason: HOSPADM

## 2021-02-08 RX ORDER — ASPIRIN 81 MG/1
81 TABLET ORAL DAILY
Status: DISCONTINUED | OUTPATIENT
Start: 2021-02-08 | End: 2021-02-11 | Stop reason: HOSPADM

## 2021-02-08 RX ORDER — POTASSIUM CHLORIDE 20 MEQ/1
20 TABLET, EXTENDED RELEASE ORAL 2 TIMES DAILY
Status: DISCONTINUED | OUTPATIENT
Start: 2021-02-08 | End: 2021-02-11 | Stop reason: HOSPADM

## 2021-02-08 RX ADMIN — INSULIN GLARGINE 26 UNITS: 100 INJECTION, SOLUTION SUBCUTANEOUS at 21:40

## 2021-02-08 RX ADMIN — DILTIAZEM HYDROCHLORIDE 5 MG/HR: 5 INJECTION INTRAVENOUS at 17:00

## 2021-02-08 RX ADMIN — IOPAMIDOL 100 ML: 755 INJECTION, SOLUTION INTRAVENOUS at 16:42

## 2021-02-08 RX ADMIN — POTASSIUM CHLORIDE 20 MEQ: 20 TABLET, EXTENDED RELEASE ORAL at 21:37

## 2021-02-08 RX ADMIN — SODIUM CHLORIDE: 9 INJECTION, SOLUTION INTRAVENOUS at 17:00

## 2021-02-08 RX ADMIN — SODIUM CHLORIDE, PRESERVATIVE FREE 10 ML: 5 INJECTION INTRAVENOUS at 21:39

## 2021-02-08 RX ADMIN — ENOXAPARIN SODIUM 90 MG: 100 INJECTION SUBCUTANEOUS at 21:37

## 2021-02-08 RX ADMIN — ENOXAPARIN SODIUM 90 MG: 100 INJECTION SUBCUTANEOUS at 16:16

## 2021-02-08 RX ADMIN — ATORVASTATIN CALCIUM 10 MG: 10 TABLET, FILM COATED ORAL at 21:33

## 2021-02-08 RX ADMIN — METOPROLOL TARTRATE 25 MG: 25 TABLET, FILM COATED ORAL at 21:38

## 2021-02-08 RX ADMIN — ACETAMINOPHEN 650 MG: 325 TABLET ORAL at 21:46

## 2021-02-08 RX ADMIN — CEFTRIAXONE 1000 MG: 1 INJECTION, POWDER, FOR SOLUTION INTRAMUSCULAR; INTRAVENOUS at 21:38

## 2021-02-08 ASSESSMENT — ENCOUNTER SYMPTOMS
BACK PAIN: 0
VISUAL CHANGE: 0
ABDOMINAL PAIN: 0
DIARRHEA: 0
DIARRHEA: 0
RESPIRATORY NEGATIVE: 1
BLOOD IN STOOL: 0
ABDOMINAL PAIN: 0
SHORTNESS OF BREATH: 0
VOMITING: 0
NAUSEA: 0
COUGH: 0
CONSTIPATION: 0
SHORTNESS OF BREATH: 0
EYE PAIN: 0
BLURRED VISION: 0

## 2021-02-08 ASSESSMENT — PAIN DESCRIPTION - PAIN TYPE: TYPE: CHRONIC PAIN

## 2021-02-08 ASSESSMENT — PAIN DESCRIPTION - FREQUENCY
FREQUENCY: CONTINUOUS
FREQUENCY: CONTINUOUS

## 2021-02-08 ASSESSMENT — PAIN DESCRIPTION - LOCATION: LOCATION: LEG

## 2021-02-08 ASSESSMENT — PAIN DESCRIPTION - DESCRIPTORS: DESCRIPTORS: ACHING

## 2021-02-08 ASSESSMENT — PAIN SCALES - GENERAL
PAINLEVEL_OUTOF10: 0
PAINLEVEL_OUTOF10: 0

## 2021-02-08 ASSESSMENT — PAIN DESCRIPTION - ONSET: ONSET: ON-GOING

## 2021-02-08 ASSESSMENT — PAIN DESCRIPTION - ORIENTATION: ORIENTATION: RIGHT;LEFT

## 2021-02-08 NOTE — PROGRESS NOTES
[address] [facility] INTERNAL [facility] [address] [facility] [facility]  [address], [address]  [place] [address] [facility]  [phone]        HISTORY:    Jhonny Wilkinson presents today for evaluation and management of:  Chief Complaint   Patient presents with    Diabetes     sugars running high since covid vaccination on 1/21/2021. Been taking 24 units bid of lantus. Diabetes  She presents for her follow-up diabetic visit. She has type 2 diabetes mellitus. No MedicAlert identification noted. There are no hypoglycemic associated symptoms. Pertinent negatives for hypoglycemia include no confusion, dizziness, headaches, seizures or tremors. Associated symptoms include fatigue and foot paresthesias. Pertinent negatives for diabetes include no blurred vision, no chest pain, no foot ulcerations, no polydipsia, no polyphagia, no polyuria, no visual change and no weight loss. There are no hypoglycemic complications. Symptoms are stable. Risk factors for coronary artery disease include diabetes mellitus, dyslipidemia, family history, hypertension, obesity, stress and sedentary lifestyle. Current diabetic treatment includes insulin injections. She is compliant with treatment all of the time. She is currently taking insulin pre-breakfast, pre-lunch, pre-dinner and at bedtime. Insulin injections are given by patient. Rotation sites for injection include the abdominal wall. Her weight is stable. She is following a generally healthy diet. When asked about meal planning, she reported none. She has not had a previous visit with a dietitian. She rarely participates in exercise. Blood glucose monitoring compliance is excellent. An ACE inhibitor/angiotensin II receptor blocker is not being taken. Eye exam is current.        Interval History:    Current Diabetic Medications  Victoza 1.8 mg daily Lantus 24 units am and 24 pm. short acting insulin 10 units 4 times a day with meals 12 units if bs is over 150  Taking 6 insulin injection a day and testing blood sugar 4 times per day. DKA episodes: 0    07/29/20   She has noticed her bs are elevated. She is dealing with a lot of stress.   Diet: low carb  Exercise: none  BS testing: using CGM with success   Issues: high blood sugars denies hypoglycemia     09/29/20   She has increased her insulin to accommodate her higher blood sugars   Diet: low carb   Exercise: none  BS testing: uses cgm with success bs have been running higher lately denies hypoglycemia.   Issues: denies      01/04/21   At previous visit we adjusted insulin. She has been stable on current regimen. Diet: low carb  Exercise: none   BS testing: uses george cgm daily with success. Issues: denies     02/08/21   At previous visit insulin was adjusted CGM report reviewed and has had higher blood sugar since. Patient states since she has received the Covid vaccine she feels her blood sugars have been higher and has self adjusted insulin. Denies any hypoglycemia. Diet: Low carb  Exercise: None  BS testing: Uses CGM daily with success  Issues: irregular heart beat. High cholesterol-  Takes zocor and denies any adverse effects with its use.  Watches diet and exercise.      Hypertension-  Takes Norvasc Lopressor and Apresoline and denies any adverse effects with their use. Watches diet and exercise. Denies any chest pain, dizziness or edema.  Follows with cardiology:Yes. Monitors bp at [facility] 130/70    Obesity- Working on weight loss.      Irregular heartbeat-patient follows with cardiology for last seen in August EKG sinus with frequent PACs. Patient states she can feel her heart fluttering denies any chest pain shortness of breath dizziness. Past Medical History:   Diagnosis Date    Allergic rhinitis     Asthma     PFT's, 04/06, actually showed mild restrictive defect.     Basal cell carcinoma of cheek 2007    Right cheek    Basal cell carcinoma of leg     right leg    CAD (coronary artery disease)     With 40% stenosis of the LAD, 07/10, ejection fraction 60%. Repeat heart cath9/14 with 4050 percent LAD lesion first diagonal 50% lesion rec med Rx    Central retinal artery occlusion     Right side November 2014 status post TPA    Cerebral artery occlusion with cerebral infarction (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 Jorge Bello. Repeat EGD6/15 Gastritis    Foraminal stenosis of lumbar region     Moderate  Right L4/L5 neuroforaminal stenosis, Dr Juanpablo Escamilla following. MRI 2/16 [place]. Moderate foraminal stenosis mild to moderate central canal stenosis    Hyperlipidemia     Hypertension     IBS (irritable bowel syndrome)     GI consultation with Dr. Matheus Gaxiola, GI specialist in [facility], felt that she probably has chronic functional diarrhea and recommended empiric Imodium, Pepto-Bismol or Questran first. Sprue test Neg 2011    Iron deficiency anemia     Percent sat iron 5, January 2015, ferritin 40 1 /15, FOBT positive  EGD 6/15  Gastritis, IV iron 9/15x3 effective,  colonoscopy deferred by Dr. Jorge Bello. --Prior colonoscopy 2011 with severe diverticulosis and telangiectasia. Trial iron solution in Orange juice 12/16    Junctional bradycardia     Symptomatic, resolved with discontinuation of Verapamil, 05/09. 1.1) Echocardiogram: LAE, LVH, EF 50%, mild MR, diastolic. 1.2) Dr. Srinivas Ribeiro evaluation. 1.3.) Persantine stress test negative, 05/09.  Migraine     Mild CAD     cath 10/15    Mitral regurgitation     Moderate to severe on echocardiogram September 2015.   Right pressure 76 grade 2 diastolic dysfunction,  echo 10/16 grade 2 diastolic dysfunction mild to moderate MR RVSP 56 aortic sclerosis    Obesity     CHICO (obstructive sleep apnea)     CPAP 14 2/15 initiation  AHI 7  mild CHICO intolerant CPAP    Osteoarthritis     PMR (polymyalgia rheumatica) (HCC)     Pneumonia     Premature atrial contractions     Pulmonary hypertension (HCC)     -Moderate on echo November 2014    Restrictive lung disease     Mild on PFTs 11/14    Type II or unspecified type diabetes mellitus without mention of complication, not stated as uncontrolled     Vitreous floaters      Family History   Problem Relation Age of Onset    Uterine Cancer Mother     Heart Disease Father     High Blood Pressure Father     Stroke Sister         from a vascular malformation    Heart Attack Son         age 48     Social History     Tobacco Use    Smoking status: Never Smoker    Smokeless tobacco: Never Used    Tobacco comment: amish rrt 5/28/2019   Substance Use Topics    Alcohol use: No     Alcohol/week: 0.0 standard drinks    Drug use: No     Allergies   Allergen Reactions    Codeine      Confusion      Erythromycin      GI upset  Received zithromax in past and tolerated    Exenatide Nausea Only    Levofloxacin      GI upset    Verapamil Other (See Comments)     Junctional bradycardia    Clonidine Derivatives Rash     2009, catapres    Other Rash     Levbid    Penicillins Rash     Received Rocephin in past and tolerated       MEDICATIONS:  Current Outpatient Medications   Medication Sig Dispense Refill    insulin glargine (LANTUS SOLOSTAR) 100 UNIT/ML injection pen INJECT 26 UNITS bid 30 mL 4    simvastatin (ZOCOR) 20 MG tablet TAKE 1 TABLET NIGHTLY 90 tablet 3    insulin lispro, 1 Unit Dial, (HUMALOG KWIKPEN) 100 UNIT/ML SOPN INJECT 8 UNITS UNDER THE SKIN FOUR TIMES A DAY (BEFORE MEALS AND BEDTIME SNACK ) (Patient taking differently: INJECT 10 UNITS UNDER THE SKIN FOUR TIMES A DAY (BEFORE MEALS AND BEDTIME SNACK )) 15 mL 6    potassium chloride (KLOR-CON M) 20 MEQ extended release tablet Take 1 tablet by mouth 2 times daily 180 tablet 3    albuterol (PROVENTIL) (2.5 MG/3ML) 0.083% nebulizer solution Take 3 mLs by nebulization every 4 hours as needed for Wheezing or Shortness of Breath 30 each 1    hydrALAZINE (APRESOLINE) 50 MG tablet TAKE 1 TABLET THREE TIMES A  tablet 3    amLODIPine (NORVASC) 5 MG tablet Take 1 tablet by mouth daily 90 tablet 3    Liraglutide (VICTOZA) 18 MG/3ML SOPN SC injection Inject 1.8 mg into the skin daily 18 mL 3    furosemide (LASIX) 40 MG tablet TAKE 1 TABLET DAILY 90 tablet 3    predniSONE (DELTASONE) 5 MG tablet TAKE 1 TABLET DAILY 90 tablet 3    Polyethylene Glycol 400 (BLINK TEARS) 0.25 % SOLN Place into both eyes as needed (dry eyes)       aspirin 81 MG EC tablet Take 81 mg by mouth daily      acetaminophen (TYLENOL) 500 MG tablet Take 500 mg by mouth daily      omeprazole (PRILOSEC) 20 MG capsule Take 20 mg by mouth Daily  30 capsule 3    Cholecalciferol (VITAMIN D3) 2000 UNITS CAPS Take 2,000 Units by mouth daily       metoprolol tartrate (LOPRESSOR) 25 MG tablet Take 1 tablet by mouth 3 times daily for 14 days (Patient not taking: Reported on 2/8/2021) 21 tablet 1    nystatin (MYCOSTATIN) 618525 UNIT/GM cream Apply topically 2 times daily. (Patient not taking: Reported on 2/8/2021) 1 Tube 0    Insulin Pen Needle (B-D ULTRAFINE III SHORT PEN) 31G X 8 MM MISC USE 7 NEEDLES DAILY 270 each 8    Handicap Placard MISC by Does not apply route Expires 4/7/2023 1 each 0    glucose blood VI test strips (ASCENSIA AUTODISC VI;ONE TOUCH ULTRA TEST VI) strip 1 each by In Vitro route 3 times daily As directed. 100 each 5     No current facility-administered medications for this visit. Review ofSymptoms:  Review of Systems   Constitutional: Positive for fatigue. Negative for unexpected weight change and weight loss. Eyes: Negative for blurred vision and visual disturbance.    Respiratory: Negative. Negative for shortness of breath. Cardiovascular: Negative for chest pain and leg swelling. Gastrointestinal: Negative for abdominal pain and diarrhea. Endocrine: Negative for polydipsia, polyphagia and polyuria. Genitourinary: Negative. Musculoskeletal: Negative. Skin: Negative for rash and wound. Neurological: Negative for dizziness, tremors, seizures and headaches. Psychiatric/Behavioral: Negative. Negative for confusion and decreased concentration. Theremainder of a complete 14-point review of systems is negative. Vital Signs: BP (!) 160/78   Pulse 108   Resp 16   Ht 4' 11\" (1.499 m)   Wt 195 lb (88.5 kg)   BMI 39.39 kg/m²      Wt Readings from Last 3 Encounters:   02/08/21 195 lb (88.5 kg)   01/04/21 189 lb (85.7 kg)   12/09/20 199 lb 3.2 oz (90.4 kg)     Body mass index is 39.39 kg/m².   LABS:  Hemoglobin A1C   Date Value Ref Range Status   12/08/2020 7.2 (H) 4.8 - 5.9 % Final   09/08/2020 7.8 (H) 4.8 - 5.9 % Final     Lab Results   Component Value Date    LABMICR CANNOT BE CALCULATED 04/23/2019     Lab Results   Component Value Date     (H) 12/08/2020    K 3.8 12/08/2020     12/08/2020    CO2 29 12/08/2020    BUN 16 12/08/2020    CREATININE 0.91 (H) 12/08/2020    GLUCOSE 155 (H) 12/08/2020    CALCIUM 9.8 12/08/2020    PROT 6.8 06/02/2020    LABALBU 4.1 06/02/2020    BILITOT 0.51 06/02/2020    ALKPHOS 85 06/02/2020    AST 13 06/02/2020    ALT 13 06/02/2020    LABGLOM 59 (L) 12/08/2020    GFRAA >60 12/08/2020     Lab Results   Component Value Date    CHOL 185 06/02/2020    CHOL 137 07/20/2019    CHOL 142 05/30/2019     Lab Results   Component Value Date    TRIG 196 (H) 06/02/2020    TRIG 126 07/20/2019    TRIG 114 05/30/2019     Lab Results   Component Value Date    HDL 67 06/02/2020    HDL 66 07/20/2019    HDL 62 05/30/2019     Lab Results   Component Value Date    LDLCHOLESTEROL 79 06/02/2020    LDLCHOLESTEROL 46 07/20/2019    LDLCHOLESTEROL 57 05/30/2019     Lab Results   Component Value Date    VLDL NOT REPORTED (H) 06/02/2020    VLDL NOT REPORTED 07/20/2019    VLDL NOT REPORTED 05/30/2019     Lab Results   Component Value Date    CHOLHDLRATIO 2.8 06/02/2020    CHOLHDLRATIO 2.1 07/20/2019    CHOLHDLRATIO 2.3 05/30/2019           Physical Exam  Constitutional:       Appearance: She is well-developed. Eyes:      Pupils: Pupils are equal, round, and reactive to light. Cardiovascular:      Rate and Rhythm: Tachycardia present. Rhythm irregular. Heart sounds: Normal heart sounds. Pulmonary:      Effort: Pulmonary effort is normal.      Breath sounds: Normal breath sounds. Abdominal:      General: Bowel sounds are normal.      Palpations: Abdomen is soft. Skin:     General: Skin is warm and dry. Comments: Negative for open/nonhealing wounds. Negative for lipohypertrophy. Neurological:      Mental Status: She is alert and oriented to person, place, and time. ASSESSMENT/PLAN:     Diagnosis Orders   1. Type 2 diabetes with nephropathy (HCC)  insulin glargine (LANTUS SOLOSTAR) 100 UNIT/ML injection pen   2. Diabetes education, encounter for     3. Irregular heart rate  EKG 12 lead   4. Dyslipidemia     5. Essential hypertension     6. Class 2 severe obesity due to excess calories with serious comorbidity and body mass index (BMI) of 39.0 to 39.9 [facility]     7. BMI 39.0-39.9,adult       Orders Placed This Encounter   Procedures    EKG 12 lead     Orders Placed This Encounter   Medications    insulin glargine (LANTUS SOLOSTAR) 100 UNIT/ML injection pen     Sig: INJECT 26 UNITS bid     Dispense:  30 mL     Refill:  4     Requested Prescriptions     Signed Prescriptions Disp Refills    insulin glargine (LANTUS SOLOSTAR) 100 UNIT/ML injection pen 30 mL 4     Sig: INJECT 26 UNITS bid       1. Type 2 diabetes with nephropathy [facility]  2.  Diabetes education, encounter for    - insulin glargine (LANTUS SOLOSTAR) 100 UNIT/ML injection pen; INJECT 26 UNITS bid  Dispense: 30 mL; Refill: 4    - Unstable  HbA1C goal is less than 7% and blood sugars are worsening.  - Encouraged patient to check BS 4 times per day. Fasting blood glucose goal is 70-130mg/dl and postprandial blood sugar goal is less than 180 mg/dl. -Diabetic foot exam up-to-date: Yes  -Diabetic retinal exam up-to-date: Yes  - Labs reviewed includes: most recent a1c . Repeat labs due in 3 months.   -We discussed in great detail dietary modifications they can make to better improve their blood sugars. -follow up diabetes education completed, all questions answered. CGM report downloaded and reviewed, scanned to media tab. Time in range 50% and hypoglycemia 0%. Patient Instructions   Increase Lantus to 26 units twice a day   If no improvement in glucose you can increase short acting insulin to 12 units with dinner if glucose remains above 200. Discussed signs and symptoms of hyper/hypoglycemia and how to treat. Encouraged 150 minutes of physical activity per week. Follow a low carbohydrate diet consuming 45 grams of carbohydrates at breakfast, lunch and dinner with two separate snacks [id number] grams of carbohydrates. Encouraged at least 7 hours of sleep. The patient was informed of the goals of diabetes management. This can only be accomplished by watching their diet and exercise levels. We certainly use medicines to help attain these goals. The consequences of not controlling blood sugars were discussed. These include blindness, heart disease, stroke, kidney disease, and possibly need for dialysis. They were told to be careful with their foot care as diabetics often have nerve damage, infections and risk for limb amutations . They also need a dilated eye exam yearly.  We discussed the issues of diet, exercise, medication, complication avoidance, reviewed the signs and symptoms of diabetes, hypoglycemic episodes, significance of HbA1C.       3. Irregular heart rate    - EKG 12 lead  -In office EKG showsAtrial fibrillation   -Nonspecific ST depression   +   Diffuse nonspecific T-abnormality  -Nondiagnostic. ABNORMAL   -Since patient has irregular heartbeat elevated blood pressure will send to ER for further evaluation. 4. Dyslipidemia  stable, lipid panel reviewed, continue current medications. Diet and exercise      5. Essential hypertension   abnormal, continue current medications. Diet and exercise Seek emergent care if chest pain develops. 6. Class 2 severe obesity due to excess calories with serious comorbidity and body mass index (BMI) of 39.0 to 39.9 in adult ([facility] [address]  7. BMI 39.0-39.9,adult  Reduce calories and increase physical activity to achieve a slow and steady weight loss to improve blood pressure, cholesterol and diabetes. Answered all patient questions. Agrees to follow plan of care and to follow up in 3 months, sooner if needed. Call office if unexplained blood sugars less than 70 occur or above 400. Call office or access MyChart with any further questions or concerns. Be sure to bring glucometer/food log at next appointment.        Electronically signed by INGRID Cross CNP on 2/8/2021 at 2:53 PM      (Please note that portions of this note were completed with a voice-recognition program. Efforts were made to edit the dictation but occasionally words are mis-transcribed.)

## 2021-02-08 NOTE — ED TRIAGE NOTES
Patient with fast heart rate and high blood sugars for sveveral days. Patient reports feels that since she got vaccine  She has had these symptoms.

## 2021-02-08 NOTE — ED PROVIDER NOTES
of leg, CAD (coronary artery disease), Central retinal artery occlusion, Cerebral artery occlusion with cerebral infarction [facility]), Cerebrovascular disease, CHF (congestive heart failure) ([facility] [address] Diverticulosis, Dyslipidemia, Elevated antinuclear antibody (ZAIRA) level, Esophagitis, Foraminal stenosis of lumbar region, Hyperlipidemia, Hypertension, IBS (irritable bowel syndrome), Iron deficiency anemia, Junctional bradycardia, Migraine, Mild CAD, Mitral regurgitation, Obesity, CHICO (obstructive sleep apnea), Osteoarthritis, PMR (polymyalgia rheumatica) ([facility]), Pneumonia, Premature atrial contractions, Pulmonary hypertension ([facility] [address] Restrictive lung disease, Type II or unspecified type diabetes mellitus without mention of complication, not stated as uncontrolled, and Vitreous floaters. SURGICAL HISTORY      has a past surgical history that includes Appendectomy (1952); Hysterectomy (Approx 1983); Cataract removal (Bilateral, 08/10); malignant skin lesion excision (Right, 12/10 and 04/11); malignant skin lesion excision (Right, 02/2012); Colonoscopy (05/16/2011); other surgical history (09/06/2011); Cholecystectomy; Endoscopy, colon, diagnostic; eye surgery; Cardiac catheterization (2014); other surgical history (3/22/16); other surgical history (Right, 4/26/16); Cystoscopy (Right, 2/6/2019); Cystoscopy (Right, 2/27/2019); Colonoscopy (N/A, 9/26/2019); Upper gastrointestinal endoscopy (05/16/2011); Upper gastrointestinal endoscopy (6/22/15); and Upper gastrointestinal endoscopy (N/A, 9/26/2019).     CURRENT MEDICATIONS       Previous Medications    ACETAMINOPHEN (TYLENOL) 500 MG TABLET    Take 500 mg by mouth daily    ALBUTEROL (PROVENTIL) (2.5 MG/3ML) 0.083% NEBULIZER SOLUTION    Take 3 mLs by nebulization every 4 hours as needed for Wheezing or Shortness of Breath    AMLODIPINE (NORVASC) 5 MG TABLET    Take 1 tablet by mouth daily    ASPIRIN 81 MG EC TABLET    Take 81 mg by mouth daily    CHOLECALCIFEROL (VITAMIN D3) 2000 UNITS CAPS    Take 2,000 Units by mouth daily     FUROSEMIDE (LASIX) 40 MG TABLET    TAKE 1 TABLET DAILY    GLUCOSE BLOOD VI TEST STRIPS (ASCENSIA AUTODISC VI;ONE TOUCH ULTRA TEST VI) STRIP    1 each by In Vitro route 3 times daily As directed. HANDICAP PLACARD MISC    by Does not apply route Expires 2023    HYDRALAZINE (APRESOLINE) 50 MG TABLET    TAKE 1 TABLET THREE TIMES A DAY    INSULIN GLARGINE (LANTUS SOLOSTAR) 100 UNIT/ML INJECTION PEN    INJECT 26 UNITS bid    INSULIN LISPRO, 1 UNIT DIAL, (HUMALOG KWIKPEN) 100 UNIT/ML SOPN    INJECT 8 UNITS UNDER THE SKIN FOUR TIMES A DAY (BEFORE MEALS AND BEDTIME SNACK )    INSULIN PEN NEEDLE (B-D ULTRAFINE III SHORT PEN) 31G X 8 MM MISC    USE 7 NEEDLES DAILY    LIRAGLUTIDE (VICTOZA) 18 MG/3ML SOPN SC INJECTION    Inject 1.8 mg into the skin daily    METOPROLOL TARTRATE (LOPRESSOR) 25 MG TABLET    Take 1 tablet by mouth 3 times daily for 14 days    NYSTATIN (MYCOSTATIN) 473157 UNIT/GM CREAM    Apply topically 2 times daily. OMEPRAZOLE (PRILOSEC) 20 MG CAPSULE    Take 20 mg by mouth Daily     POLYETHYLENE GLYCOL 400 (BLINK TEARS) 0.25 % SOLN    Place into both eyes as needed (dry eyes)     POTASSIUM CHLORIDE (KLOR-CON M) 20 MEQ EXTENDED RELEASE TABLET    Take 1 tablet by mouth 2 times daily    PREDNISONE (DELTASONE) 5 MG TABLET    TAKE 1 TABLET DAILY    SIMVASTATIN (ZOCOR) 20 MG TABLET    TAKE 1 TABLET NIGHTLY       ALLERGIES     is allergic to codeine; erythromycin; exenatide; levofloxacin; verapamil; clonidine derivatives; other; and penicillins. FAMILY HISTORY     She indicated that her mother is . She indicated that her father is . She indicated that her sister is . She indicated that the status of her son is unknown.     family history includes Heart Attack in her son; Heart Disease in her father; High Blood Pressure in her father; Stroke in her sister; Uterine Cancer in her mother.     SOCIAL HISTORY      reports that she has precordium  The JOSEFINA fib is new      RADIOLOGY:   I directly visualized the following  images and reviewed the radiologist interpretations:       CTA OF THE CHEST, 2/8/2021 4:42 pm       TECHNIQUE:   CTA of the chest was performed after the administration of intravenous   contrast.  Multiplanar reformatted images are provided for review.  MIP   images are provided for review. Dose modulation, iterative reconstruction,   and/or weight based adjustment of the mA/kV was utilized to reduce the   radiation dose to as low as reasonably achievable.       COMPARISON:   9 December 2019       HISTORY:   ORDERING SYSTEM PROVIDED HISTORY: Chest Pain   TECHNOLOGIST PROVIDED HISTORY:   Chest Pain   Reason for Exam:  R/O PE   Acuity:  Acute   Type of Exam:  Initial       FINDINGS:   Pulmonary Arteries: Pulmonary arteries are adequately opacified for   evaluation.  No evidence of intraluminal filling defect to suggest pulmonary   embolism.  Main pulmonary artery is normal in caliber.       Mediastinum: No evidence of mediastinal lymphadenopathy.  The heart and   pericardium demonstrate no acute abnormality.  Coronary artery calcification   is noted.  No pericardial effusion or epicardial adenopathy.  There is no   acute abnormality of the thoracic aorta.       Lungs/pleura:  The lungs are without acute process.  No focal consolidation or   pulmonary edema.  No evidence of pleural effusion or pneumothorax.  The   tracheobronchial tree is patent.  Subtle septal thickening at the lung bases   is unchanged.       Upper Abdomen: Hepatic steatosis is noted.  A 10 mm cyst is present in the   anterior right lobe of the liver.  Gallbladder is surgically absent.  Small   hiatal hernia is evident.       Soft Tissues/Bones: No acute abnormality in the included osseous structures   or soft tissues.           Impression   No evidence of pulmonary embolism or acute pulmonary abnormality.  Stable   cardiomegaly is noted with coronary artery calcification.  Hepatic steatosis   is evident.  Chronic lung changes as above.               ED BEDSIDE ULTRASOUND:       LABS:  Labs Reviewed   CBC WITH AUTO DIFFERENTIAL - Abnormal; Notable for the following components:       Result Value    RDW 17.3 (*)     Immature Granulocytes 1 (*)     All other components within normal limits   COMPREHENSIVE METABOLIC PANEL - Abnormal; Notable for the following components:    Glucose 208 (*)     CREATININE 0.97 (*)     ALT <5 (*)     GFR Non- 55 (*)     All other components within normal limits   TROPONIN - Abnormal; Notable for the following components:    Troponin, High Sensitivity 23 (*)     All other components within normal limits   D-DIMER, QUANTITATIVE - Abnormal; Notable for the following components:    D-Dimer, Quant 0.84 (*)     All other components within normal limits   TROPONIN - Abnormal; Notable for the following components:    Troponin, High Sensitivity 21 (*)     All other components within normal limits   URINE RT REFLEX TO CULTURE - Abnormal; Notable for the following components:    Nitrite, Urine POSITIVE (*)     All other components within normal limits   MICROSCOPIC URINALYSIS - Abnormal; Notable for the following components:    Bacteria, UA 2+ (*)     Mucus, UA 1+ (*)     All other components within normal limits   PROTIME-INR   MYOGLOBIN, SERUM   TSH WITH REFLEX   COVID-19         EMERGENCY DEPARTMENT COURSE:   Vitals:    Vitals:    02/08/21 1600 02/08/21 1630 02/08/21 1700 02/08/21 1845   BP: (!) 175/71 (!) 165/78 (!) 165/77 (!) 141/90   Pulse: 130 130 127 120   Resp: 25 23 25 19   Temp:       TempSrc:       SpO2: 97% 96% 97% 98%   Weight:       Height:         -------------------------  BP: (!) 141/90, Temp: 98 °F (36.7 °C), Pulse: 120, Resp: 19        Re-evaluation Notes    Resting comfortably Cardizem drip started CT shows no evidence of PE    CRITICAL CARE:   IP CONSULT TO HOSPITALIST        CONSULTS: Case was discussed with Luisa Bragg nurse practitioner for the hospitalist service patient be admitted to Dr. Leonardo Lechuga service      PROCEDURES:  None    FINAL IMPRESSION      1. Atrial fibrillation with RVR (HCC)          DISPOSITION/PLAN   DISPOSITION admitted    Condition on Disposition    Stable    PATIENT REFERRED TO:  No follow-up provider specified. DISCHARGE MEDICATIONS:  New Prescriptions    No medications on file       (Please note that portions of this note were completed with a voice recognition program.  Efforts were made to edit the dictations but occasionally words are mis-transcribed.)    Keyshawn Bustos,, MD, F.A.A.E.M.   Attending Emergency Physician                          Keyshawn Bustos MD  02/08/21 [id number]       Keyshawn Bustos MD  02/08/21 [id number]

## 2021-02-09 PROBLEM — E66.01 CLASS 2 SEVERE OBESITY DUE TO EXCESS CALORIES WITH SERIOUS COMORBIDITY AND BODY MASS INDEX (BMI) OF 38.0 TO 38.9 IN ADULT (HCC): Status: ACTIVE | Noted: 2021-02-09

## 2021-02-09 PROBLEM — N18.31 STAGE 3A CHRONIC KIDNEY DISEASE (HCC): Status: ACTIVE | Noted: 2021-02-09

## 2021-02-09 LAB
ALBUMIN SERPL-MCNC: 3.8 G/DL (ref 3.5–5.2)
ALBUMIN/GLOBULIN RATIO: 1.4 (ref 1–2.5)
ALP BLD-CCNC: 81 U/L (ref 35–104)
ALT SERPL-CCNC: 14 U/L (ref 5–33)
ANION GAP SERPL CALCULATED.3IONS-SCNC: 11 MMOL/L (ref 9–17)
AST SERPL-CCNC: 15 U/L
BILIRUB SERPL-MCNC: 0.37 MG/DL (ref 0.3–1.2)
BNP INTERPRETATION: ABNORMAL
BUN BLDV-MCNC: 15 MG/DL (ref 8–23)
BUN/CREAT BLD: 16 (ref 9–20)
CALCIUM SERPL-MCNC: 9.4 MG/DL (ref 8.6–10.4)
CHLORIDE BLD-SCNC: 106 MMOL/L (ref 98–107)
CO2: 27 MMOL/L (ref 20–31)
CREAT SERPL-MCNC: 0.96 MG/DL (ref 0.5–0.9)
EKG ATRIAL RATE: 288 BPM
EKG ATRIAL RATE: 75 BPM
EKG P AXIS: 75 DEGREES
EKG P-R INTERVAL: 172 MS
EKG Q-T INTERVAL: 356 MS
EKG Q-T INTERVAL: 398 MS
EKG QRS DURATION: 78 MS
EKG QRS DURATION: 78 MS
EKG QTC CALCULATION (BAZETT): 444 MS
EKG QTC CALCULATION (BAZETT): 459 MS
EKG R AXIS: 29 DEGREES
EKG R AXIS: 36 DEGREES
EKG T AXIS: 121 DEGREES
EKG T AXIS: 144 DEGREES
EKG VENTRICULAR RATE: 100 BPM
EKG VENTRICULAR RATE: 75 BPM
GFR AFRICAN AMERICAN: >60 ML/MIN
GFR NON-AFRICAN AMERICAN: 55 ML/MIN
GFR SERPL CREATININE-BSD FRML MDRD: ABNORMAL ML/MIN/{1.73_M2}
GFR SERPL CREATININE-BSD FRML MDRD: ABNORMAL ML/MIN/{1.73_M2}
GLUCOSE BLD-MCNC: 140 MG/DL (ref 65–105)
GLUCOSE BLD-MCNC: 145 MG/DL (ref 70–99)
GLUCOSE BLD-MCNC: 153 MG/DL (ref 65–105)
GLUCOSE BLD-MCNC: 158 MG/DL (ref 65–105)
GLUCOSE BLD-MCNC: 169 MG/DL (ref 65–105)
HCT VFR BLD CALC: 42.8 % (ref 36.3–47.1)
HEMOGLOBIN: 12.8 G/DL (ref 11.9–15.1)
INR BLD: 1
LV EF: 58 %
LVEF MODALITY: NORMAL
MAGNESIUM: 2.2 MG/DL (ref 1.6–2.6)
MCH RBC QN AUTO: 26.6 PG (ref 25.2–33.5)
MCHC RBC AUTO-ENTMCNC: 29.9 G/DL (ref 25.2–33.5)
MCV RBC AUTO: 89 FL (ref 82.6–102.9)
NRBC AUTOMATED: 0 PER 100 WBC
PDW BLD-RTO: 17.4 % (ref 11.8–14.4)
PLATELET # BLD: 174 K/UL (ref 138–453)
PMV BLD AUTO: 12.5 FL (ref 8.1–13.5)
POTASSIUM SERPL-SCNC: 4.2 MMOL/L (ref 3.7–5.3)
PRO-BNP: 769 PG/ML
PROTHROMBIN TIME: 12.9 SEC (ref 11.5–14.2)
RBC # BLD: 4.81 M/UL (ref 3.95–5.11)
SODIUM BLD-SCNC: 144 MMOL/L (ref 135–144)
TOTAL PROTEIN: 6.6 G/DL (ref 6.4–8.3)
TROPONIN INTERP: ABNORMAL
TROPONIN T: ABNORMAL NG/ML
TROPONIN, HIGH SENSITIVITY: 25 NG/L (ref 0–14)
TSH SERPL DL<=0.05 MIU/L-ACNC: 2.07 MIU/L (ref 0.3–5)
WBC # BLD: 7.8 K/UL (ref 3.5–11.3)

## 2021-02-09 PROCEDURE — 6370000000 HC RX 637 (ALT 250 FOR IP): Performed by: NURSE PRACTITIONER

## 2021-02-09 PROCEDURE — 93005 ELECTROCARDIOGRAM TRACING: CPT | Performed by: NURSE PRACTITIONER

## 2021-02-09 PROCEDURE — 99222 1ST HOSP IP/OBS MODERATE 55: CPT | Performed by: INTERNAL MEDICINE

## 2021-02-09 PROCEDURE — 93306 TTE W/DOPPLER COMPLETE: CPT

## 2021-02-09 PROCEDURE — 84484 ASSAY OF TROPONIN QUANT: CPT

## 2021-02-09 PROCEDURE — 6360000002 HC RX W HCPCS: Performed by: INTERNAL MEDICINE

## 2021-02-09 PROCEDURE — 6360000002 HC RX W HCPCS: Performed by: NURSE PRACTITIONER

## 2021-02-09 PROCEDURE — 2580000003 HC RX 258: Performed by: NURSE PRACTITIONER

## 2021-02-09 PROCEDURE — 2580000003 HC RX 258: Performed by: EMERGENCY MEDICINE

## 2021-02-09 PROCEDURE — 85027 COMPLETE CBC AUTOMATED: CPT

## 2021-02-09 PROCEDURE — 6370000000 HC RX 637 (ALT 250 FOR IP): Performed by: INTERNAL MEDICINE

## 2021-02-09 PROCEDURE — APPSS45 APP SPLIT SHARED TIME 31-45 MINUTES: Performed by: NURSE PRACTITIONER

## 2021-02-09 PROCEDURE — 2500000003 HC RX 250 WO HCPCS: Performed by: EMERGENCY MEDICINE

## 2021-02-09 PROCEDURE — 2580000003 HC RX 258: Performed by: INTERNAL MEDICINE

## 2021-02-09 PROCEDURE — 83735 ASSAY OF MAGNESIUM: CPT

## 2021-02-09 PROCEDURE — 83880 ASSAY OF NATRIURETIC PEPTIDE: CPT

## 2021-02-09 PROCEDURE — 84443 ASSAY THYROID STIM HORMONE: CPT

## 2021-02-09 PROCEDURE — 97161 PT EVAL LOW COMPLEX 20 MIN: CPT | Performed by: PHYSICAL THERAPIST

## 2021-02-09 PROCEDURE — 99223 1ST HOSP IP/OBS HIGH 75: CPT | Performed by: INTERNAL MEDICINE

## 2021-02-09 PROCEDURE — 2000000000 HC ICU R&B

## 2021-02-09 PROCEDURE — 80053 COMPREHEN METABOLIC PANEL: CPT

## 2021-02-09 PROCEDURE — 36415 COLL VENOUS BLD VENIPUNCTURE: CPT

## 2021-02-09 PROCEDURE — 82947 ASSAY GLUCOSE BLOOD QUANT: CPT

## 2021-02-09 PROCEDURE — 85610 PROTHROMBIN TIME: CPT

## 2021-02-09 RX ORDER — PANTOPRAZOLE SODIUM 40 MG/1
40 TABLET, DELAYED RELEASE ORAL
Status: DISCONTINUED | OUTPATIENT
Start: 2021-02-09 | End: 2021-02-11 | Stop reason: HOSPADM

## 2021-02-09 RX ORDER — PREDNISONE 1 MG/1
5 TABLET ORAL DAILY
Status: DISCONTINUED | OUTPATIENT
Start: 2021-02-09 | End: 2021-02-11 | Stop reason: HOSPADM

## 2021-02-09 RX ORDER — GABAPENTIN 300 MG/1
300 CAPSULE ORAL NIGHTLY
Status: DISCONTINUED | OUTPATIENT
Start: 2021-02-09 | End: 2021-02-11 | Stop reason: HOSPADM

## 2021-02-09 RX ADMIN — AMIODARONE HYDROCHLORIDE 1 MG/MIN: 50 INJECTION, SOLUTION INTRAVENOUS at 22:27

## 2021-02-09 RX ADMIN — CEFTRIAXONE 1000 MG: 1 INJECTION, POWDER, FOR SOLUTION INTRAMUSCULAR; INTRAVENOUS at 21:49

## 2021-02-09 RX ADMIN — INSULIN LISPRO 2 UNITS: 100 INJECTION, SOLUTION INTRAVENOUS; SUBCUTANEOUS at 13:25

## 2021-02-09 RX ADMIN — PREDNISONE 5 MG: 5 TABLET ORAL at 08:27

## 2021-02-09 RX ADMIN — INSULIN LISPRO 10 UNITS: 100 INJECTION, SOLUTION INTRAVENOUS; SUBCUTANEOUS at 08:34

## 2021-02-09 RX ADMIN — INSULIN LISPRO 2 UNITS: 100 INJECTION, SOLUTION INTRAVENOUS; SUBCUTANEOUS at 17:08

## 2021-02-09 RX ADMIN — CHOLECALCIFEROL TAB 25 MCG (1000 UNIT) 2000 UNITS: 25 TAB at 08:27

## 2021-02-09 RX ADMIN — AMIODARONE HYDROCHLORIDE 1 MG/MIN: 50 INJECTION, SOLUTION INTRAVENOUS at 13:29

## 2021-02-09 RX ADMIN — AMIODARONE HYDROCHLORIDE 300 MG: 50 INJECTION, SOLUTION INTRAVENOUS at 13:08

## 2021-02-09 RX ADMIN — POTASSIUM CHLORIDE 20 MEQ: 20 TABLET, EXTENDED RELEASE ORAL at 21:48

## 2021-02-09 RX ADMIN — INSULIN LISPRO 10 UNITS: 100 INJECTION, SOLUTION INTRAVENOUS; SUBCUTANEOUS at 13:25

## 2021-02-09 RX ADMIN — ASPIRIN 81 MG: 81 TABLET, COATED ORAL at 08:27

## 2021-02-09 RX ADMIN — INSULIN LISPRO 10 UNITS: 100 INJECTION, SOLUTION INTRAVENOUS; SUBCUTANEOUS at 17:08

## 2021-02-09 RX ADMIN — POTASSIUM CHLORIDE 20 MEQ: 20 TABLET, EXTENDED RELEASE ORAL at 08:27

## 2021-02-09 RX ADMIN — ENOXAPARIN SODIUM 90 MG: 100 INJECTION SUBCUTANEOUS at 08:27

## 2021-02-09 RX ADMIN — FUROSEMIDE 40 MG: 40 TABLET ORAL at 08:27

## 2021-02-09 RX ADMIN — INSULIN GLARGINE 26 UNITS: 100 INJECTION, SOLUTION SUBCUTANEOUS at 08:34

## 2021-02-09 RX ADMIN — INSULIN GLARGINE 26 UNITS: 100 INJECTION, SOLUTION SUBCUTANEOUS at 21:51

## 2021-02-09 RX ADMIN — INSULIN LISPRO 1 UNITS: 100 INJECTION, SOLUTION INTRAVENOUS; SUBCUTANEOUS at 21:51

## 2021-02-09 RX ADMIN — SODIUM CHLORIDE, PRESERVATIVE FREE 10 ML: 5 INJECTION INTRAVENOUS at 08:34

## 2021-02-09 RX ADMIN — METOPROLOL TARTRATE 25 MG: 25 TABLET, FILM COATED ORAL at 08:27

## 2021-02-09 RX ADMIN — APIXABAN 5 MG: 2.5 TABLET, FILM COATED ORAL at 21:47

## 2021-02-09 RX ADMIN — PANTOPRAZOLE SODIUM 40 MG: 40 TABLET, DELAYED RELEASE ORAL at 06:30

## 2021-02-09 RX ADMIN — ATORVASTATIN CALCIUM 10 MG: 10 TABLET, FILM COATED ORAL at 08:27

## 2021-02-09 RX ADMIN — METOPROLOL TARTRATE 25 MG: 25 TABLET, FILM COATED ORAL at 21:49

## 2021-02-09 RX ADMIN — INSULIN LISPRO 2 UNITS: 100 INJECTION, SOLUTION INTRAVENOUS; SUBCUTANEOUS at 08:34

## 2021-02-09 RX ADMIN — DILTIAZEM HYDROCHLORIDE 15 MG/HR: 5 INJECTION INTRAVENOUS at 00:19

## 2021-02-09 RX ADMIN — GABAPENTIN 300 MG: 300 CAPSULE ORAL at 21:48

## 2021-02-09 ASSESSMENT — PAIN SCALES - GENERAL: PAINLEVEL_OUTOF10: 0

## 2021-02-09 NOTE — PROGRESS NOTES
Pharmacy Note     Renal Dose Adjustment    Austyn Christensen is a 81 y.o. female. Pharmacist assessment of renally cleared medications. Recent Labs     02/08/21  1528   BUN 18       Recent Labs     02/08/21  1528   CREATININE 0.97*       CrCl cannot be calculated (Unknown ideal weight.). Estimated CrCl using Ideal Body Weight: 29.9 mL/min (based on IBW 45.5 kg)    Height:   Ht Readings from Last 1 Encounters:   02/08/21 4' 11.5\" (1.511 m)     Weight:  Wt Readings from Last 1 Encounters:   02/08/21 195 lb (88.5 kg)       The following medication dose has been adjusted based upon renal function per P&T Guidelines:             Lovenox 1 mg/kg twice daily changed to 1 mg/kg once daily.     Sherita Dover [facility]  2/8/2021 8:38 PM

## 2021-02-09 NOTE — FLOWSHEET NOTE
rounding in PCU    Assessment: Patient sitting in chair, alone in room. Patient expresses concern regarding recent issues with her heartbeat and having to stay in the hospital away from her . Patient has strong jackson and would like her Orthodox contacted. Intervention:  provided supportive presence via active listening, exploring patient's thoughts/feelings, and prayer.  contacted patient's Orthodox. Outcome: Patient thanked  for visit and prayer.  then spoke with patient's  on phone. Plan: Chaplains will remain available to offer spiritual and emotional support as needed. 02/09/21 1231   Encounter Summary   Services provided to: Patient   Referral/Consult From: Rounding   Support System Spouse; Christian/jackson community   Place of [address] Completed  (spoke w/ )   Continue Visiting   (2/9/21)   Complexity of Encounter Moderate   Length of Encounter 30 minutes   Spiritual Assessment Completed Yes   Spiritual/Mormon   Type Spiritual support   Assessment Approachable   Intervention Active listening;Explored feelings, thoughts, concerns;Prayer   Outcome Expressed gratitude   Electronically signed by [place] on 2/9/2021 at 12:37 PM

## 2021-02-09 NOTE — PROGRESS NOTES
SW attempted to meet with patient. Patient unavailable at this time. SW will continue to monitor needs and assist as appropriate.        Electronically signed by JOSE Florentino on 2/9/2021 at 10:59 AM

## 2021-02-09 NOTE — H&P
HOSPITALIST ADMISSION H&P      REASON FOR ADMISSION: atrial fibrillation with RVR  ESTIMATED LENGTH OF STAY:>2 midnights, 3-4 days    ATTENDING/ADMITTING PHYSICIAN: Jason Pink MD  PCP: INGRID Sky CNP    HISTORY OF PRESENT ILLNESS:      The patient is a 81 y.o. female patient of INGRID Sky CNP who presents from the ER with c/o palpitations. She was sent to ER from the diabetic educator's office due to EKG showing atrial fibrillation with RVR. She denies chest pain, nausea, emesis, diaphoresis, shortness of breath, or increase in her peripheral edema. Per EMR review she has a history of junctional bradycardia (with verapamil), frequent PACs, and multifocal atrial tachycardia. She has been on Eliquis and amiodarone in the past, but these were discontinued by Dr. Ami Gunn and Dr. Marcus Mae in 2019. She was being seen at the diabetic educator yesterday due to c/o hyperglycemia (up to 300) since receiving her first covid-19 vaccination (Alejandroo Diptisakina) on 01/21/2020. In ER, she was started on IV Cardizem drip. D. Dimer was 0.84, CTA chest showed no PE or acute process. Troponin 23 --> 21. TSH wnl. Covid-19 screening negative. WBC 8.9. Urinalysis did show acute cystitis without hematuria. The patient reports urinary frequency and urgency with some incontinency. Hypertension -- improving    DMII HgbA1C 7.2    CKD stage 3 -- stable    CHICO with cpap (noncompliant at times)    Last 2D echo 05/2019 showing EF 60%, Gr2DD, moderate MR, moderate TR, RVSP 67    Polymyalgia rheumatica     Iron deficiency anemia -- has required venofer infusions in the past     See below for additional PMH. Patient xset-okpdmifqlc-yznxljyu-available records reviewed, including, but not limited to ER records, imaging results, lab results, office records, personal records, and OARRS -- no signs of abuse or diversion.      Past Medical History:   Diagnosis Date    Allergic rhinitis     Asthma     PFT's, 04/06, actually showed mild restrictive defect.  Basal cell carcinoma of cheek 2007    Right cheek    Basal cell carcinoma of leg     right leg    CAD (coronary artery disease)     With 40% stenosis of the LAD, 07/10, ejection fraction 60%. Repeat heart cath9/14 with 4050 percent LAD lesion first diagonal 50% lesion rec med Rx    Central retinal artery occlusion     Right side November 2014 status post TPA    Cerebral artery occlusion with cerebral infarction ([address] 11/24/2014    Cerebrovascular disease     50-79% stenosis on left on ultrasound 11/14    CHF (congestive heart failure) (HCC)     Echo 1/15 moderate MR, severe TR, RVSP 76, grade 2 diastolic dysfunction    Diverticulosis     AVM on colonoscopy, 05/11    Dyslipidemia     Elevated antinuclear antibody (ZAIRA) level     History of    Esophagitis 05/2011    Gastritis/esophagitis on EGD, Dr. Adilia Li. Repeat EGD6/15 Gastritis    Foraminal stenosis of lumbar region     Moderate  Right L4/L5 neuroforaminal stenosis, Dr Soledad Schaefer following. MRI 2/16 [place]. Moderate foraminal stenosis mild to moderate central canal stenosis    Hyperlipidemia     Hypertension     IBS (irritable bowel syndrome)     GI consultation with Dr. Shama Mtz, GI specialist in [facility], felt that she probably has chronic functional diarrhea and recommended empiric Imodium, Pepto-Bismol or Questran first. Sprue test Neg 2011    Iron deficiency anemia     Percent sat iron 5, January 2015, ferritin 40 1 /15, FOBT positive  EGD 6/15  Gastritis, IV iron 9/15x3 effective,  colonoscopy deferred by Dr. Adilia Li. --Prior colonoscopy 2011 with severe diverticulosis and telangiectasia. Trial iron solution in [place] juice 12/16    Iron deficiency anemia due to chronic blood loss 9/8/2015    Junctional bradycardia     Symptomatic, resolved with discontinuation of Verapamil, 05/09. 1.1) Echocardiogram: LAE, LVH, EF 50%, mild MR, diastolic. 1.2) Dr. Lino Hayward evaluation.  1.3.) Persantine stress test negative, 05/09.  Migraine     Mild CAD     cath 10/15    Mitral regurgitation     Moderate to severe on echocardiogram September 2015.   Right pressure 76 grade 2 diastolic dysfunction,  echo 15/88 grade 2 diastolic dysfunction mild to moderate MR RVSP 56 aortic sclerosis    Obesity     CHICO (obstructive sleep apnea)     CPAP 14 2/15 initiation  AHI 7  mild CHICO intolerant CPAP    Osteoarthritis     PMR (polymyalgia rheumatica) (HCC)     Pneumonia     Premature atrial contractions     Pulmonary hypertension (HCC)     -Moderate on echo November 2014    Restrictive lung disease     Mild on PFTs 11/14    Type II or unspecified type diabetes mellitus without mention of complication, not stated as uncontrolled     Vitreous floaters            Past Surgical History:   Procedure Laterality Date    APPENDECTOMY  1952    CARDIAC CATHETERIZATION  2014    CATARACT REMOVAL Bilateral 08/10    CHOLECYSTECTOMY      COLONOSCOPY  05/16/2011    COLONOSCOPY N/A 9/26/2019    severe diverticulosis, benign rectal polyp, Dr. Ashley Martin Right 2/6/2019    Cysto with right stent insertion and villareal catheter performed by Trupti Brown MD at [facility] Right 2/27/2019    CYSTO right stent removal  Right Ureteroscopy Holmium Laser right stent exchange performed by Trupti Brown MD at [facility], DIAGNOSTIC      EYE SURGERY      HYSTERECTOMY  Approx 1983    MALIGNANT SKIN LESION EXCISION Right 12/10 and 04/11    Basal CellRemoved from right neck    MALIGNANT SKIN LESION EXCISION Right 02/2012    Basal Cell Removed from right leg    OTHER SURGICAL HISTORY  09/06/2011    capsule endoscopy    OTHER SURGICAL HISTORY  3/22/16    right L4 and L5 TFE    OTHER SURGICAL HISTORY Right 4/26/16    L4, L5 TFE    UPPER GASTROINTESTINAL ENDOSCOPY  05/16/2011    UPPER GASTROINTESTINAL ENDOSCOPY  6/22/15    mild gastritis (Dr. Jarrod Bolivar)    UPPER GASTROINTESTINAL ENDOSCOPY N/A 9/26/2019    mild to moderate inflammation, Dr. Drucella Siemens       Medications Prior to Admission:    Medications Prior to Admission: insulin glargine (LANTUS SOLOSTAR) 100 UNIT/ML injection pen, INJECT 26 UNITS bid  simvastatin (ZOCOR) 20 MG tablet, TAKE 1 TABLET NIGHTLY  insulin lispro, 1 Unit Dial, (HUMALOG KWIKPEN) 100 UNIT/ML SOPN, INJECT 8 UNITS UNDER THE SKIN FOUR TIMES A DAY (BEFORE MEALS AND BEDTIME SNACK ) (Patient taking differently: INJECT 10 UNITS UNDER THE SKIN FOUR TIMES A DAY (BEFORE MEALS AND BEDTIME SNACK ))  metoprolol tartrate (LOPRESSOR) 25 MG tablet, Take 1 tablet by mouth 3 times daily for 14 days  potassium chloride (KLOR-CON M) 20 MEQ extended release tablet, Take 1 tablet by mouth 2 times daily  hydrALAZINE (APRESOLINE) 50 MG tablet, TAKE 1 TABLET THREE TIMES A DAY  amLODIPine (NORVASC) 5 MG tablet, Take 1 tablet by mouth daily  Liraglutide (VICTOZA) 18 MG/3ML SOPN SC injection, Inject 1.8 mg into the skin daily  furosemide (LASIX) 40 MG tablet, TAKE 1 TABLET DAILY  predniSONE (DELTASONE) 5 MG tablet, TAKE 1 TABLET DAILY  aspirin 81 MG EC tablet, Take 81 mg by mouth daily  omeprazole (PRILOSEC) 20 MG capsule, Take 20 mg by mouth Daily   Cholecalciferol (VITAMIN D3) 2000 UNITS CAPS, Take 2,000 Units by mouth daily   nystatin (MYCOSTATIN) 118781 UNIT/GM cream, Apply topically 2 times daily. (Patient not taking: Reported on 2/8/2021)  albuterol (PROVENTIL) (2.5 MG/3ML) 0.083% nebulizer solution, Take 3 mLs by nebulization every 4 hours as needed for Wheezing or Shortness of Breath  Insulin Pen Needle (B-D ULTRAFINE III SHORT PEN) 31G X 8 MM MISC, USE 7 NEEDLES DAILY  Handicap [place] MISC, by Does not apply route Expires 4/7/2023  Polyethylene Glycol 400 (BLINK TEARS) 0.25 % SOLN, Place into both eyes as needed (dry eyes)   glucose blood VI test strips (ASCENSIA AUTODISC VI;ONE TOUCH ULTRA TEST VI) strip, 1 each by In Vitro route 3 times daily As directed.   acetaminophen (TYLENOL) 500 MG tablet, Take 500 mg by mouth daily    Allergies:    Codeine, Erythromycin, Exenatide, Levofloxacin, Verapamil, Clonidine derivatives, Other, and Penicillins    Social History:    reports that she has never smoked. She has never used smokeless tobacco. She reports that she does not drink alcohol or use drugs. Family History:   family history includes Heart Attack in her son; Heart Disease in her father; High Blood Pressure in her father; Stroke in her sister; Uterine Cancer in her mother. REVIEW OF SYSTEMS:  See HPI and problem list; otherwise no other new complaints with respect to eyes, ENT, neck, pulmonary, coronary, chest, GI, , endocrine, musculoskeletal, immune system/connective tissue disease, hematologic, neurologic, psychiatric, skin, lymphatics, or malignancies. Code status discussed with patient--wishes for Full Code at this time. PHYSICAL EXAM:  Vitals:  /85   Pulse 92   Temp 97.3 °F (36.3 °C) (Oral)   Resp 22   Ht 4' 11.5\" (1.511 m)   Wt 195 lb (88.5 kg)   SpO2 95%   BMI 38.73 kg/m²     General: awake, alert and cooperative  HEENT: right eye blind, Mucosa Pink, Moist, No oropharyngeal exudate, External nose normal, Normocephalic, Atraumatic and Neck with full ROM  Neck: Supple, Carotid Pulses Present, No Masses, Tenderness, Nodularity and No Lymphadenopathy  Chest/Lungs: Clear to Auscultation without Rales, Rhonchi, or Wheezes and Respirations even and unlabored  Cardiac: rate in the 90's, Irregularly Irregular and Pedal Pulses Palpable Bilaterally  GI/Abdomen:  Bowel Sounds Present and Soft, Non-tender, without Guarding or Rebound Tenderness  : Not examined  Extremities/Musculoskeletal: BLE with 1+ edema  Skin: No Cyanosis, No rash and Skin warm and dry  Neuro: Alert and Oriented, to Person, to Time, to Place, to Situation and No Localizing Signs/Symptoms  Psychiatric: Normal mood and affect      LABS:    CBC with Differential:    Lab Results   Component Value Date    WBC 7.8 02/09/2021    RBC 4.81 02/09/2021    HGB 12.8 02/09/2021    HCT 42.8 02/09/2021     02/09/2021    MCV 89.0 02/09/2021    MCH 26.6 02/09/2021    MCHC 29.9 02/09/2021    RDW 17.4 02/09/2021    METASPCT 1 02/12/2019    LYMPHOPCT 28 02/08/2021    MONOPCT 9 02/08/2021    MYELOPCT 1 01/14/2015    BASOPCT 1 02/08/2021    MONOSABS 0.82 02/08/2021    LYMPHSABS 2.45 02/08/2021    EOSABS 0.19 02/08/2021    BASOSABS 0.06 02/08/2021    DIFFTYPE NOT REPORTED 02/08/2021     CMP:    Lab Results   Component Value Date     02/09/2021    K 4.2 02/09/2021     02/09/2021    CO2 27 02/09/2021    BUN 15 02/09/2021    CREATININE 0.96 02/09/2021    GFRAA >60 02/09/2021    LABGLOM 55 02/09/2021    GLUCOSE 145 02/09/2021    PROT 6.6 02/09/2021    LABALBU 3.8 02/09/2021    CALCIUM 9.4 02/09/2021    BILITOT 0.37 02/09/2021    ALKPHOS 81 02/09/2021    AST 15 02/09/2021    ALT 14 02/09/2021       ASSESSMENT:      Patient Active Problem List   Diagnosis    Dyslipidemia    Osteoarthritis    Esophagitis    Vitamin D deficiency    PMR (polymyalgia rheumatica) (HCC)    Central artery occlusion of retina    Diastolic dysfunction    Pulmonary HTN (HCC)    Iron deficiency anemia due to chronic blood loss    Type 2 diabetes with nephropathy (HCC)    CHICO- intolerant CPAP    Essential hypertension    Lumbar radiculopathy    Neural foraminal stenosis of lumbar spine    Spinal stenosis at L4-L5 level    Mitral regurgitation    Frequent PVCs    Gait abnormality    Asthma    Nephrolithiasis    Right kidney stone    Hypokalemia    Multifocal atrial tachycardia (HCC)    Sigmoid diverticulitis    New onset atrial fibrillation (HCC)    Class 2 severe obesity due to excess calories with serious comorbidity and body mass index (BMI) of 38.0 to 38.9 in adult [facility])     Patient kgdy-nzcmtnpmma-umjwkpam-available records reviewed, including, but not limited to,  ER reports--labs--imaging--EKGs---office records---personal notes.     Singh Bryant A. 81    Zion Lucas NP--IM;  neftalihem Leydaa, Urology; DC Cardiology---T[facility]]  FULL CODE    Aleena Barraza     HJONN73QTIDZGYT5.5.6978    BETH gtt2. 8.2021  Covid-19vaccination---#11.21.2021    Anti-infectives:  Rocephin IV     Atrial  fibrillation---RVR----2.8.2021---recurrent         MI ruled out---2.9.2021         EKG---2.8.3021---atrial fibrillation---RVR---121NSSTTCs---diffuse nonspecific T-wave changes                     EKG----8.2.2020SR---82PACsNSSTTCsT abnormality         Atrial fibrillation---history           EKG---5.27.2019---NSR89--NSSTTCs           RVR2.10.2019           EKG---2.12.2019---atrial fibrillation---85---PVCs---QTc = 473NSSTTCs especially inferiorly           EKG---2.11.2019SR80---PACs--NSSTTCs           EKG---2.10.2019atrial fibrillationRVR116NSSTTCs           EKG---2.9.2019---sinus tachycardia----105---NSSTTCs           2D ECHO---10.19.2018---BLAKELVHglobally NLVSF                     NRVSF--TONIA without stenosismild-to-moderate MR---                     trivial TRRVSP  ~ 40 mm Hgmild pulmonary HTN---                     LVEF > 55%  CHFchronic diastolic        2D XXIT----5.81.6430---WCFHRWNN NLVSFno WMA----LVEF ~ 55-60%       Acute-on-chronic diastolic----5.27.2019       2D ECHO---5.28.2019---LA mildly dilatedmild LVHNLVSFno segmental                       WMARA dilatationNRVSFMACTONIA but opens well---                      moderate TR---RVSP ~ 67 mm Hg severe pulmonary hypertension                      Grade II moderate DD----LVEF ~ 60%  UTI---POA----2.8.2021  CKDStage 2  Pulmonary hypertension----mild  Hypertension  Hyperlipidemia  Diabetes Mellitus Type 2  Asthma   Pulmonary HTN  ASCVD        EKG---2.5.2019sinus tachycardia102PACs--NSSTTCs        Cardiac catheterization9.26.2014---0% LM40-50% LAD40% proximal LAD---                                50% D110-20% LCx10-20%---10-20% RCA  Morbid obesity  CHICOobstructive sleep

## 2021-02-09 NOTE — CARE COORDINATION
Business card provided, denies needs at discharge. Voices understanding to notify DC planning if needs arise before discharge.

## 2021-02-09 NOTE — PROGRESS NOTES
Physical Therapy    Facility/Department: [facility]  PROGRESSIVE CARE  Initial Assessment    NAME: Idalmis Doan  : 1935  MRN: [id number]    Date of Service: 2021    Discharge Recommendations:  Home with assist PRN        Assessment   Prognosis: Good  Decision Making: Low Complexity  No Skilled PT: Independent with functional mobility   REQUIRES PT FOLLOW UP: No  Activity Tolerance  Activity Tolerance: Patient Tolerated treatment well       Patient Diagnosis(es): The encounter diagnosis was Atrial fibrillation with RVR ([facility] [address] has a past medical history of Allergic rhinitis, Asthma, Basal cell carcinoma of cheek, Basal cell carcinoma of leg, CAD (coronary artery disease), Central retinal artery occlusion, Cerebral artery occlusion with cerebral infarction [facility]), Cerebrovascular disease, CHF (congestive heart failure) ([address] Diverticulosis, Dyslipidemia, Elevated antinuclear antibody (ZAIRA) level, Esophagitis, Foraminal stenosis of lumbar region, Hyperlipidemia, Hypertension, IBS (irritable bowel syndrome), Iron deficiency anemia, Iron deficiency anemia due to chronic blood loss, Junctional bradycardia, Migraine, Mild CAD, Mitral regurgitation, Obesity, CHICO (obstructive sleep apnea), Osteoarthritis, PMR (polymyalgia rheumatica) ([address] Pneumonia, Premature atrial contractions, Pulmonary hypertension ([address] Restrictive lung disease, Type II or unspecified type diabetes mellitus without mention of complication, not stated as uncontrolled, and Vitreous floaters. has a past surgical history that includes Appendectomy (); Hysterectomy (Approx ); Cataract removal (Bilateral, 08/10); malignant skin lesion excision (Right, 12/10 and ); malignant skin lesion excision (Right, 2012); Colonoscopy (2011); other surgical history (2011); Cholecystectomy;  Endoscopy, colon, diagnostic; eye surgery; Cardiac catheterization (); other surgical history (3/22/16); other surgical history (Right, term goals: 1 day  Short term goal 1: Assess functional status       Therapy Time   Individual Concurrent Group Co-treatment   Time In 1435         Time Out 1451         Minutes 16                 Verneice Torsten, [address]

## 2021-02-09 NOTE — CONSULTS
[facility]   Consult Note         Today's Date: 2/9/2021  Patient Name: Gordon Maguire  Date of admission: 2/8/2021  3:10 PM  Patient's age: 81 y.o., 1935  Admission Dx: New onset atrial fibrillation ([address] [I48.91]    Reason for Consult:  Cardiac evaluation    Requesting Physician: Jessica Christina    REASON FOR CONSULT:  Afib c RVR    History Obtained From:  Patient, chart, staff, records    HISTORY OF PRESENT ILLNESS:      The patient is a 81 y.o. female who is admitted to the hospital for palpitations and fatigue. Had ECG done at PCP office that showed Afib. Admitted for work up. Has had Afib before, but is usually in NSR. From Chart:  The patient is a 81 y.o. female patient of INGRID Frias CNP who presents from the ER with c/o palpitations. She was sent to ER from the diabetic educator's office due to EKG showing atrial fibrillation with RVR. She denies chest pain, nausea, emesis, diaphoresis, shortness of breath, or increase in her peripheral edema. Per EMR review she has a history of junctional bradycardia (with verapamil), frequent PACs, and multifocal atrial tachycardia. She has been on Eliquis and amiodarone in the past, but these were discontinued by Dr. Manoj Jalloh and Dr. Fabiana Trevizo in 2019. She was being seen at the diabetic educator yesterday due to c/o hyperglycemia (up to 300) since receiving her first covid-19 vaccination ([address]) on 01/21/2020.     In ER, she was started on IV Cardizem drip. D. Dimer was 0.84, CTA chest showed no PE or acute process. Troponin 23 --> 21. TSH wnl. Covid-19 screening negative. WBC 8.9. Urinalysis did show acute cystitis without hematuria. The patient reports urinary frequency and urgency with some incontinency.          Past Medical History:   has a past medical history of Allergic rhinitis, Asthma, Basal cell carcinoma of cheek, Basal cell carcinoma of leg, CAD (coronary artery disease), Central retinal artery occlusion, Cerebral artery occlusion with cerebral infarction [facility]), Cerebrovascular disease, CHF (congestive heart failure) ([facility] Diverticulosis, Dyslipidemia, Elevated antinuclear antibody (ZAIRA) level, Esophagitis, Foraminal stenosis of lumbar region, Hyperlipidemia, Hypertension, IBS (irritable bowel syndrome), Iron deficiency anemia, Iron deficiency anemia due to chronic blood loss, Junctional bradycardia, Migraine, Mild CAD, Mitral regurgitation, Obesity, CHICO (obstructive sleep apnea), Osteoarthritis, PMR (polymyalgia rheumatica) ([facility] [facility] Pneumonia, Premature atrial contractions, Pulmonary hypertension ([facility] [address] Restrictive lung disease, Type II or unspecified type diabetes mellitus without mention of complication, not stated as uncontrolled, and Vitreous floaters. Past Surgical History:   has a past surgical history that includes Appendectomy (1952); Hysterectomy (Approx 1983); Cataract removal (Bilateral, 08/10); malignant skin lesion excision (Right, 12/10 and 04/11); malignant skin lesion excision (Right, 02/2012); Colonoscopy (05/16/2011); other surgical history (09/06/2011); Cholecystectomy; Endoscopy, colon, diagnostic; eye surgery; Cardiac catheterization (2014); other surgical history (3/22/16); other surgical history (Right, 4/26/16); Cystoscopy (Right, 2/6/2019); Cystoscopy (Right, 2/27/2019); Colonoscopy (N/A, 9/26/2019); Upper gastrointestinal endoscopy (05/16/2011); Upper gastrointestinal endoscopy (6/22/15); and Upper gastrointestinal endoscopy (N/A, 9/26/2019). Home Medications:    Prior to Admission medications    Medication Sig Start Date End Date Taking?  Authorizing Provider   insulin glargine (LANTUS SOLOSTAR) 100 UNIT/ML injection pen INJECT 26 UNITS bid 2/8/21  Yes Crystal Merla SilvaINGRID eubanks CNP   simvastatin (ZOCOR) 20 MG tablet TAKE 1 TABLET NIGHTLY 1/18/21  Yes INGRID Martinez CNP   insulin lispro, 1 Unit Dial, (HUMALOG KWIKPEN) 100 UNIT/ML SOPN INJECT 8 UNITS UNDER THE SKIN FOUR TIMES A DAY needed (dry eyes)     Historical Provider, MD   glucose blood VI test strips (ASCENSIA AUTODISC VI;ONE TOUCH ULTRA TEST VI) strip 1 each by In Vitro route 3 times daily As directed. 7/13/17   Gaurang Cherry DO   acetaminophen (TYLENOL) 500 MG tablet Take 500 mg by mouth daily    Historical Provider, MD       pantoprazole, 40 mg, Oral, QAM AC    enoxaparin, 1 mg/kg, Subcutaneous, BID    predniSONE, 5 mg, Oral, Daily    insulin lispro, 10 Units, Subcutaneous, TID WC    amiodarone, 300 mg, Intravenous, Once **FOLLOWED BY** amiodarone, 1 mg/min, Intravenous, Continuous    aspirin, 81 mg, Oral, Daily    Vitamin D, 2,000 Units, Oral, Daily    furosemide, 40 mg, Oral, Daily    insulin glargine, 26 Units, Subcutaneous, BID    potassium chloride, 20 mEq, Oral, BID    atorvastatin, 10 mg, Oral, Daily    sodium chloride flush, 10 mL, Intravenous, 2 times per day    cefTRIAXone (ROCEPHIN) IV, 1,000 mg, Intravenous, Q24H    insulin lispro, 0-12 Units, Subcutaneous, TID WC    insulin lispro, 0-6 Units, Subcutaneous, Nightly    metoprolol tartrate, 25 mg, Oral, TID      Allergies:  Codeine, Erythromycin, Exenatide, Levofloxacin, Verapamil, Clonidine derivatives, Other, and Penicillins    Social History:   reports that she has never smoked. She has never used smokeless tobacco. She reports that she does not drink alcohol or use drugs. Family History: family history includes Heart Attack in her son; Heart Disease in her father; High Blood Pressure in her father; Stroke in her sister; Uterine Cancer in her mother. No h/o sudden cardiac death. REVIEW OF SYSTEMS:    · Constitutional: there has been no unanticipated weight loss. There's been No change in energy level, No change in activity level. · Eyes: No visual changes or diplopia. No scleral icterus. · ENT: No Headaches, hearing loss or vertigo. No mouth sores or sore throat.   · Cardiovascular: per HPI  · Respiratory: per HPI  · Gastrointestinal: No abdominal pain, appetite loss, blood in stools. No change in bowel or bladder habits. · Genitourinary: No dysuria, trouble voiding, or hematuria. · Musculoskeletal:  No gait disturbance, No weakness or joint complaints. · Integumentary: No rash or pruritis. · Neurological: No headache, diplopia, change in muscle strength, numbness or tingling. No change in gait, balance, coordination, mood, affect, memory, mentation, behavior. · Psychiatric: No anxiety, or depression. · Endocrine: No temperature intolerance. No excessive thirst, fluid intake, or urination. No tremor. · Hematologic/Lymphatic: No abnormal bruising or bleeding, blood clots or swollen lymph nodes. · Allergic/Immunologic: No nasal congestion or hives. PHYSICAL EXAM:      /77   Pulse 103   Temp 98.2 °F (36.8 °C) (Tympanic)   Resp 26   Ht 4' 11.5\" (1.511 m)   Wt 195 lb (88.5 kg)   SpO2 97%   BMI 38.73 kg/m²    General appearance: alert and cooperative with exam  HEENT: Head: Normocephalic, no lesions, without obvious abnormality. Eyes: PERRL, EOMI  Ears: Not obvious deformations or lack of hearing  Neck: no carotid bruit, no JVD  Lungs: clear to auscultation bilaterally  Heart: Irregular rhythm, S1, S2 normal, no murmur, click, rub or gallop  Abdomen: soft, non-tender; bowel sounds normal; no masses,  no organomegaly  Extremities: extremities normal, atraumatic, no cyanosis or edema  Neurologic: Mental status: Alert, oriented, thought content appropriate  · Skin: WNL for age and condition, no obvious rashes or leasions  ·         EKG:    Date: 02/09/21  Reading: No acute ischemia  Afib c RVR      LAST ECHO:  Date: 2019  Findings Summary:  Limited Echo for LVEF  Global left ventricular systolic function is normal. Estimated ejection  fraction is 55-60 % . Calculated EF via heart model is 58 %. No wall motion abnormality seen.     LAST Stress Test:   Date of last ST:  Major Findings:    LAST Cardiac Angiography:.  Date:  Findings:      Labs:     CBC:   Recent Labs     02/08/21  1528 02/09/21 0527   WBC 8.9 7.8   HGB 13.7 12.8   HCT 44.9 42.8    174     BMP:   Recent Labs     02/08/21  1528 02/09/21 0527    144   K 4.0 4.2   CO2 27 27   BUN 18 15   CREATININE 0.97* 0.96*   LABGLOM 55* 55*   GLUCOSE 208* 145*     BNP: No results for input(s): BNP in the last 72 hours. PT/INR:   Recent Labs     02/08/21  1528 02/09/21 0527   PROTIME 11.8 12.9   INR 0.9 1.0     APTT:No results for input(s): APTT in the last 72 hours. CARDIAC ENZYMES:No results for input(s): CKTOTAL, CKMB, CKMBINDEX, TROPONINI in the last 72 hours. FASTING LIPID PANEL:  Lab Results   Component Value Date    HDL 67 06/02/2020    TRIG 196 06/02/2020     LIVER PROFILE:  Recent Labs     02/08/21  1528 02/09/21 0527   AST 16 15   ALT <5* 14   LABALBU 4.2 3.8     Troponins: Invalid input(s): TROPONIN     Other Current Problems  Patient Active Problem List   Diagnosis    Dyslipidemia    Osteoarthritis    Esophagitis    Vitamin D deficiency    PMR (polymyalgia rheumatica) ([facility])    Central artery occlusion of retina    Diastolic dysfunction    Pulmonary HTN (HCC)    Iron deficiency anemia due to chronic blood loss    Type 2 diabetes with nephropathy (HCC)    CHICO- intolerant CPAP    Essential hypertension    Lumbar radiculopathy    Neural foraminal stenosis of lumbar spine    Spinal stenosis at L4-L5 level    Mitral regurgitation    Frequent PVCs    Gait abnormality    Asthma    Nephrolithiasis    Right kidney stone    Hypokalemia    Multifocal atrial tachycardia (HCC)    Sigmoid diverticulitis    Atrial fibrillation with RVR ([facility])    Class 2 severe obesity due to excess calories with serious comorbidity and body mass index (BMI) of 38.0 to 38.9 in adult ([facility])    Stage 3a chronic kidney disease           IMPRESSION & Recommendations:    Afib c RVR- switch to amio, switch to Eliquis, add BB. Await echo.   Can go home on oral amio 200 BID. Can follow up in 1-2 weeks      Discussed with patient, family, and Nurse. Electronically signed by Lucian Luis DO on 2/9/2021 at 12:22 PM    Lucian Luis, [id number] [facility]  [facility]  [phone]

## 2021-02-09 NOTE — PROGRESS NOTES
SW completed a Psychosocial Assessment for evaluation of patient's mental health, social status, and functional capacity within the community. Patient is a 81year old female admitted due to Atrial fibrillation with RVR. Patient was alert, oriented, and engaging during assessment. Patient lives with her  in [place]. Patient discussed positive support from her , daughter, and good friend patient uses a [employer] as DMEs. Patient denies using outside community services at this time. SW provided supportive listening while patient discussed past medical history. Patient has PCP ROBERT Roche and reports no issues affording her medication. SW assessed ADLs and means of transportation. Patient states she is independent in her ADLs. Patient states her  assists her with transportation. Patient reports she has a stroke 6 years ago which affected her right eye. SW provided supportive listening. SW assessed if patient had food insecurity or needed financial assistance. Patient denies any food insecurity or financial concerns at this time. Patient is a full code status at this time. Patient states she has a advance directive, her  is primary decision maker, daughter Jackie Borja is secondary healthcare decision maker, and good friend Nicole Viveros is her supplemental healthcare decision maker. SW encouraged patient to bring copy for medical records. Patient denies discharge needs or further services at this time. SW provided business card. SW will continue to monitor needs and assist as appropriate.        Electronically signed by Louanna Cranker, LSW on 2/9/2021 at 3:01 PM

## 2021-02-09 NOTE — PROGRESS NOTES
Occupational Therapy      Patient declined OT services, stating they are at their baseline level of function. Patient notes they are independent with functional mobility, toileting and simple ADLs. Patient was advised to inform staff if there is a change in function or if they have any questions, and therapy can return at that time. Patient verbalized understanding.

## 2021-02-10 LAB
ABSOLUTE EOS #: 0.24 K/UL (ref 0–0.44)
ABSOLUTE IMMATURE GRANULOCYTE: 0.04 K/UL (ref 0–0.3)
ABSOLUTE LYMPH #: 2.7 K/UL (ref 1.1–3.7)
ABSOLUTE MONO #: 0.88 K/UL (ref 0.1–1.2)
ANION GAP SERPL CALCULATED.3IONS-SCNC: 10 MMOL/L (ref 9–17)
BASOPHILS # BLD: 1 % (ref 0–2)
BASOPHILS ABSOLUTE: 0.05 K/UL (ref 0–0.2)
BUN BLDV-MCNC: 14 MG/DL (ref 8–23)
BUN/CREAT BLD: 13 (ref 9–20)
CALCIUM SERPL-MCNC: 9.1 MG/DL (ref 8.6–10.4)
CHLORIDE BLD-SCNC: 105 MMOL/L (ref 98–107)
CO2: 26 MMOL/L (ref 20–31)
CREAT SERPL-MCNC: 1.09 MG/DL (ref 0.5–0.9)
CULTURE: ABNORMAL
DIFFERENTIAL TYPE: ABNORMAL
EKG ATRIAL RATE: 60 BPM
EKG Q-T INTERVAL: 642 MS
EKG QRS DURATION: 84 MS
EKG QTC CALCULATION (BAZETT): 673 MS
EKG R AXIS: 34 DEGREES
EKG T AXIS: 94 DEGREES
EKG VENTRICULAR RATE: 66 BPM
EOSINOPHILS RELATIVE PERCENT: 3 % (ref 1–4)
GFR AFRICAN AMERICAN: 58 ML/MIN
GFR NON-AFRICAN AMERICAN: 48 ML/MIN
GFR SERPL CREATININE-BSD FRML MDRD: ABNORMAL ML/MIN/{1.73_M2}
GFR SERPL CREATININE-BSD FRML MDRD: ABNORMAL ML/MIN/{1.73_M2}
GLUCOSE BLD-MCNC: 127 MG/DL (ref 65–105)
GLUCOSE BLD-MCNC: 161 MG/DL (ref 65–105)
GLUCOSE BLD-MCNC: 165 MG/DL (ref 65–105)
GLUCOSE BLD-MCNC: 99 MG/DL (ref 70–99)
HCT VFR BLD CALC: 36.6 % (ref 36.3–47.1)
HEMOGLOBIN: 11 G/DL (ref 11.9–15.1)
IMMATURE GRANULOCYTES: 0 %
LYMPHOCYTES # BLD: 30 % (ref 24–43)
Lab: ABNORMAL
MAGNESIUM: 2.1 MG/DL (ref 1.6–2.6)
MCH RBC QN AUTO: 26.8 PG (ref 25.2–33.5)
MCHC RBC AUTO-ENTMCNC: 30.1 G/DL (ref 25.2–33.5)
MCV RBC AUTO: 89.1 FL (ref 82.6–102.9)
MONOCYTES # BLD: 10 % (ref 3–12)
NRBC AUTOMATED: 0 PER 100 WBC
PDW BLD-RTO: 17.2 % (ref 11.8–14.4)
PLATELET # BLD: 149 K/UL (ref 138–453)
PLATELET ESTIMATE: ABNORMAL
PMV BLD AUTO: 12.2 FL (ref 8.1–13.5)
POTASSIUM SERPL-SCNC: 3.4 MMOL/L (ref 3.7–5.3)
RBC # BLD: 4.11 M/UL (ref 3.95–5.11)
RBC # BLD: ABNORMAL 10*6/UL
SEG NEUTROPHILS: 56 % (ref 36–65)
SEGMENTED NEUTROPHILS ABSOLUTE COUNT: 5.06 K/UL (ref 1.5–8.1)
SODIUM BLD-SCNC: 141 MMOL/L (ref 135–144)
SPECIMEN DESCRIPTION: ABNORMAL
WBC # BLD: 9 K/UL (ref 3.5–11.3)
WBC # BLD: ABNORMAL 10*3/UL

## 2021-02-10 PROCEDURE — 6370000000 HC RX 637 (ALT 250 FOR IP): Performed by: NURSE PRACTITIONER

## 2021-02-10 PROCEDURE — 82947 ASSAY GLUCOSE BLOOD QUANT: CPT

## 2021-02-10 PROCEDURE — 85025 COMPLETE CBC W/AUTO DIFF WBC: CPT

## 2021-02-10 PROCEDURE — 80048 BASIC METABOLIC PNL TOTAL CA: CPT

## 2021-02-10 PROCEDURE — 83735 ASSAY OF MAGNESIUM: CPT

## 2021-02-10 PROCEDURE — 6360000002 HC RX W HCPCS: Performed by: INTERNAL MEDICINE

## 2021-02-10 PROCEDURE — 93005 ELECTROCARDIOGRAM TRACING: CPT | Performed by: INTERNAL MEDICINE

## 2021-02-10 PROCEDURE — 2580000003 HC RX 258: Performed by: INTERNAL MEDICINE

## 2021-02-10 PROCEDURE — 36415 COLL VENOUS BLD VENIPUNCTURE: CPT

## 2021-02-10 PROCEDURE — 94760 N-INVAS EAR/PLS OXIMETRY 1: CPT

## 2021-02-10 PROCEDURE — 2000000000 HC ICU R&B

## 2021-02-10 PROCEDURE — 6360000002 HC RX W HCPCS: Performed by: NURSE PRACTITIONER

## 2021-02-10 PROCEDURE — 99232 SBSQ HOSP IP/OBS MODERATE 35: CPT | Performed by: INTERNAL MEDICINE

## 2021-02-10 PROCEDURE — 6370000000 HC RX 637 (ALT 250 FOR IP): Performed by: INTERNAL MEDICINE

## 2021-02-10 PROCEDURE — 2580000003 HC RX 258: Performed by: NURSE PRACTITIONER

## 2021-02-10 RX ORDER — AMIODARONE HYDROCHLORIDE 200 MG/1
200 TABLET ORAL 2 TIMES DAILY
Status: DISCONTINUED | OUTPATIENT
Start: 2021-02-10 | End: 2021-02-11 | Stop reason: HOSPADM

## 2021-02-10 RX ORDER — POTASSIUM CHLORIDE 20 MEQ/1
20 TABLET, EXTENDED RELEASE ORAL ONCE
Status: COMPLETED | OUTPATIENT
Start: 2021-02-10 | End: 2021-02-10

## 2021-02-10 RX ADMIN — INSULIN GLARGINE 26 UNITS: 100 INJECTION, SOLUTION SUBCUTANEOUS at 08:07

## 2021-02-10 RX ADMIN — METOPROLOL TARTRATE 25 MG: 25 TABLET, FILM COATED ORAL at 08:06

## 2021-02-10 RX ADMIN — FUROSEMIDE 40 MG: 40 TABLET ORAL at 08:07

## 2021-02-10 RX ADMIN — SODIUM CHLORIDE, PRESERVATIVE FREE 10 ML: 5 INJECTION INTRAVENOUS at 20:28

## 2021-02-10 RX ADMIN — AMIODARONE HYDROCHLORIDE 200 MG: 200 TABLET ORAL at 09:35

## 2021-02-10 RX ADMIN — INSULIN GLARGINE 26 UNITS: 100 INJECTION, SOLUTION SUBCUTANEOUS at 20:31

## 2021-02-10 RX ADMIN — ASPIRIN 81 MG: 81 TABLET, COATED ORAL at 08:07

## 2021-02-10 RX ADMIN — POTASSIUM CHLORIDE 20 MEQ: 20 TABLET, EXTENDED RELEASE ORAL at 20:18

## 2021-02-10 RX ADMIN — ACETAMINOPHEN 650 MG: 325 TABLET ORAL at 00:32

## 2021-02-10 RX ADMIN — INSULIN LISPRO 10 UNITS: 100 INJECTION, SOLUTION INTRAVENOUS; SUBCUTANEOUS at 17:57

## 2021-02-10 RX ADMIN — GABAPENTIN 300 MG: 300 CAPSULE ORAL at 20:19

## 2021-02-10 RX ADMIN — POTASSIUM CHLORIDE 20 MEQ: 20 TABLET, EXTENDED RELEASE ORAL at 08:07

## 2021-02-10 RX ADMIN — PANTOPRAZOLE SODIUM 40 MG: 40 TABLET, DELAYED RELEASE ORAL at 06:02

## 2021-02-10 RX ADMIN — INSULIN LISPRO 2 UNITS: 100 INJECTION, SOLUTION INTRAVENOUS; SUBCUTANEOUS at 12:25

## 2021-02-10 RX ADMIN — CHOLECALCIFEROL TAB 25 MCG (1000 UNIT) 2000 UNITS: 25 TAB at 08:13

## 2021-02-10 RX ADMIN — INSULIN LISPRO 10 UNITS: 100 INJECTION, SOLUTION INTRAVENOUS; SUBCUTANEOUS at 12:25

## 2021-02-10 RX ADMIN — AMIODARONE HYDROCHLORIDE 200 MG: 200 TABLET ORAL at 20:18

## 2021-02-10 RX ADMIN — INSULIN LISPRO 2 UNITS: 100 INJECTION, SOLUTION INTRAVENOUS; SUBCUTANEOUS at 17:56

## 2021-02-10 RX ADMIN — PREDNISONE 5 MG: 5 TABLET ORAL at 08:07

## 2021-02-10 RX ADMIN — ATORVASTATIN CALCIUM 10 MG: 10 TABLET, FILM COATED ORAL at 08:06

## 2021-02-10 RX ADMIN — POTASSIUM CHLORIDE 20 MEQ: 20 TABLET, EXTENDED RELEASE ORAL at 08:13

## 2021-02-10 RX ADMIN — APIXABAN 5 MG: 2.5 TABLET, FILM COATED ORAL at 20:18

## 2021-02-10 RX ADMIN — AMIODARONE HYDROCHLORIDE 1 MG/MIN: 50 INJECTION, SOLUTION INTRAVENOUS at 06:02

## 2021-02-10 RX ADMIN — METOPROLOL TARTRATE 25 MG: 25 TABLET, FILM COATED ORAL at 20:18

## 2021-02-10 RX ADMIN — INSULIN LISPRO 10 UNITS: 100 INJECTION, SOLUTION INTRAVENOUS; SUBCUTANEOUS at 08:07

## 2021-02-10 RX ADMIN — CEFTRIAXONE 1000 MG: 1 INJECTION, POWDER, FOR SOLUTION INTRAMUSCULAR; INTRAVENOUS at 20:19

## 2021-02-10 RX ADMIN — APIXABAN 5 MG: 2.5 TABLET, FILM COATED ORAL at 08:06

## 2021-02-10 ASSESSMENT — PAIN SCALES - GENERAL
PAINLEVEL_OUTOF10: 0
PAINLEVEL_OUTOF10: 1
PAINLEVEL_OUTOF10: 0

## 2021-02-10 NOTE — PLAN OF CARE
Problem: Falls - Risk of:  Goal: Will remain free from falls  Description: Will remain free from falls  Outcome: Ongoing  Goal: Absence of physical injury  Description: Absence of physical injury  Outcome: Ongoing     Problem: Discharge Planning:  Goal: Discharged to appropriate level of care  Description: Discharged to appropriate level of care  Outcome: Ongoing     Problem:  Activity:  Goal: Ability to tolerate increased activity will improve  Description: Ability to tolerate increased activity will improve  Outcome: Ongoing  Goal: Expression of feelings of increased energy will increase  Description: Expression of feelings of increased energy will increase  Outcome: Ongoing     Problem: Cardiac:  Goal: Ability to maintain an adequate cardiac output will improve  Description: Ability to maintain an adequate cardiac output will improve  Outcome: Ongoing  Goal: Complications related to the disease process, condition or treatment will be avoided or minimized  Description: Complications related to the disease process, condition or treatment will be avoided or minimized  Outcome: Ongoing     Problem: Health Behavior:  Goal: Ability to manage health-related needs will improve  Description: Ability to manage health-related needs will improve  Outcome: Ongoing     Problem: Coping:  Goal: Level of anxiety will decrease  Description: Level of anxiety will decrease  Outcome: Ongoing  Goal: General experience of comfort will improve  Description: General experience of comfort will improve  Outcome: Ongoing

## 2021-02-10 NOTE — PLAN OF CARE
Problem: Cardiac:  Goal: Ability to maintain an adequate cardiac output will improve  Description: Ability to maintain an adequate cardiac output will improve  2/10/2021 0627 by Aydin Osborne RN  Outcome: Ongoing  2/10/2021 0624 by Aydin Osborne RN  Outcome: Ongoing  Goal: Complications related to the disease process, condition or treatment will be avoided or minimized  Description: Complications related to the disease process, condition or treatment will be avoided or minimized  2/10/2021 0627 by Aydin Osborne RN  Outcome: Ongoing  2/10/2021 0624 by Aydin Osborne RN  Outcome: Ongoing     Problem: Coping:  Goal: Level of anxiety will decrease  Description: Level of anxiety will decrease  2/10/2021 0627 by Aydin Osborne RN  Outcome: Ongoing  2/10/2021 0624 by Aydin Osborne RN  Outcome: Ongoing  Goal: General experience of comfort will improve  Description: General experience of comfort will improve  2/10/2021 0627 by Aydin Osborne RN  Outcome: Ongoing  2/10/2021 0624 by Aydin Osborne RN  Outcome: Ongoing     Problem: Health Behavior:  Goal: Ability to manage health-related needs will improve  Description: Ability to manage health-related needs will improve  2/10/2021 0627 by Aydin Osborne RN  Outcome: Ongoing  2/10/2021 0624 by Aydin Osborne RN  Outcome: Ongoing

## 2021-02-10 NOTE — PROGRESS NOTES
fibrillation---RVR----2.8.2021---recurrent---converted to NSR----2.9.2021 à atrial fibrillation---2.10.2021 à SR                EKG---2.10.2021atrial fibrillationPVCsnonspecific T wave changes---QTc ~ 673        EKG----2.9.2021--#2NSR---75nonspecific T-wave changes        EKG2. 9.201--#1----atrial fibrillation100        MI ruled out---2.9.2021         EKG---2.8.3021---atrial fibrillation---RVR---121NSSTTCs---diffuse nonspecific T-wave changes                     EKG----8.2.2020SR---82PACsNSSTTCsT abnormality         Atrial fibrillation---history           EKG---5.27.2019---NSR89--NSSTTCs           RVR2.10.2019           EKG---2.12.2019---atrial fibrillation---85---PVCs---QTc = 473NSSTTCs especially inferiorly           EKG---2.11.2019SR80---PACs--NSSTTCs           EKG---2.10.2019atrial fibrillationRVR116NSSTTCs           EKG---2.9.2019---sinus tachycardia----105---NSSTTCs           2D ECHO---10.19.2018---BLAKELVHglobally NLVSF                     NRVSF--TONIA without stenosismild-to-moderate MR---                     trivial TRRVSP  ~ 40 mm Hgmild pulmonary HTN---                     LVEF > 55%  CHFchronic diastolic        2D JKYD----9.44.0121---MQIRWNHE NLVSFno WMA----LVEF ~ 55-60%       Acute-on-chronic diastolic----5.27.2019       2D ECHO---5.28.2019---LA mildly dilatedmild LVHNLVSFno segmental                       WMARA dilatationNRVSFMACTONIA but opens well---                      moderate TR---RVSP ~ 67 mm Hg severe pulmonary hypertension                      Grade II moderate DD----LVEF ~ 60%  UTI---POA----2.8.2021  CKDStage 2  Pulmonary hypertension----mild  Hypertension  Hyperlipidemia  Diabetes Mellitus Type 2  Asthma   Pulmonary HTN  ASCVD        EKG---2.5.2019sinus tachycardia102PACs--NSSTTCs        Cardiac catheterization9.26.2014---0% LM40-50% LAD40% proximal LAD---                                50% D110-20% LCx10-20%---10-20% RCA  Morbid obesity  CHICOobstructive sleep apnea---CPAP intolerant   PMR---polymyalgia rheumatica  BLE edema   Chronic BLE pain   Gait-balance instability  PMH:  central retinal occlusion, DD, esophagitis, lumbar radiculopathy,              lumbar spinal stenosis, iron deficiency anemia---chronic GI blood              loss, OA, frequent PVCs, MR, Vitamin D deficiency, obstructing              right  renal calculi---2. 5.2019, UTI--- 2.5.2019---feverleukocytosis,             lactic acidosis----2. 5.2019,  TEJ---2. 5.2019 superposed on CKDStage 3,              hypokalemia---2. 5.2019, bronchitis---cough----POA--2. 6.2019,              elevated troponin---flat peak---renal, migraine headaches, increased             ZAIRA, OABCC right cheek, irritable bowel syndrome,   elevated lactic acid             5.27.2019sepsis ruled out, urinary retention----villareal2019, YIW9728,             sigmoid diverticulitis---5.24.2019  PSH:  see above, appendectomy---1952, hysterectomy---cervix unknown---            1983, bilateral cataract---IOL2010, BCC-right neck[id number],              BCC right leg2012, colonoscopy2011, capsule endoscopy---2011,             laparoscopic cholecystectomy--2003, eyetype?, bronchitis2014,              EGD---20112015 mild gastritis, right             L4-5 TFE  x 22016, cystoscopy--- right JJ stent---2. 6.2019    Allergies:        codeine, erythromycin, , verapamil,                          clonidine---rash, penicillinrash, Levbid--rash  Intolerances:  exenatide---nausea, LevaquinlevofloxacinGI upset        Plan:  1. Atrial fibrillation---amiodarone drip to be completed, then to be started on Amiodarone 200 BID  2. Potassium replacement  3. CHF---Lasix 20 mg IV once  4. BLE pain improved on Neurontin---will continue----will need to be increased in the outpatient setting as tolerated                               as the current dose is only a starter dose  5.    See orders     Electronically signed by Bianca Sellers on 2/10/2021 at 12:51 PM    Hospitalist

## 2021-02-11 VITALS
HEIGHT: 60 IN | SYSTOLIC BLOOD PRESSURE: 123 MMHG | BODY MASS INDEX: 39.3 KG/M2 | WEIGHT: 200.2 LBS | HEART RATE: 66 BPM | TEMPERATURE: 98.7 F | DIASTOLIC BLOOD PRESSURE: 93 MMHG | OXYGEN SATURATION: 94 % | RESPIRATION RATE: 21 BRPM

## 2021-02-11 LAB
ABSOLUTE EOS #: 0.21 K/UL (ref 0–0.44)
ABSOLUTE IMMATURE GRANULOCYTE: 0.07 K/UL (ref 0–0.3)
ABSOLUTE LYMPH #: 2.15 K/UL (ref 1.1–3.7)
ABSOLUTE MONO #: 0.88 K/UL (ref 0.1–1.2)
ANION GAP SERPL CALCULATED.3IONS-SCNC: 9 MMOL/L (ref 9–17)
BASOPHILS # BLD: 1 % (ref 0–2)
BASOPHILS ABSOLUTE: 0.07 K/UL (ref 0–0.2)
BUN BLDV-MCNC: 14 MG/DL (ref 8–23)
BUN/CREAT BLD: 14 (ref 9–20)
CALCIUM SERPL-MCNC: 9.1 MG/DL (ref 8.6–10.4)
CHLORIDE BLD-SCNC: 107 MMOL/L (ref 98–107)
CO2: 26 MMOL/L (ref 20–31)
CREAT SERPL-MCNC: 0.99 MG/DL (ref 0.5–0.9)
DIFFERENTIAL TYPE: ABNORMAL
EKG ATRIAL RATE: 67 BPM
EKG P AXIS: 73 DEGREES
EKG P-R INTERVAL: 176 MS
EKG Q-T INTERVAL: 444 MS
EKG QRS DURATION: 78 MS
EKG QTC CALCULATION (BAZETT): 469 MS
EKG R AXIS: 39 DEGREES
EKG T AXIS: 87 DEGREES
EKG VENTRICULAR RATE: 67 BPM
EOSINOPHILS RELATIVE PERCENT: 2 % (ref 1–4)
GFR AFRICAN AMERICAN: >60 ML/MIN
GFR NON-AFRICAN AMERICAN: 53 ML/MIN
GFR SERPL CREATININE-BSD FRML MDRD: ABNORMAL ML/MIN/{1.73_M2}
GFR SERPL CREATININE-BSD FRML MDRD: ABNORMAL ML/MIN/{1.73_M2}
GLUCOSE BLD-MCNC: 86 MG/DL (ref 70–99)
HCT VFR BLD CALC: 36.1 % (ref 36.3–47.1)
HEMOGLOBIN: 10.7 G/DL (ref 11.9–15.1)
IMMATURE GRANULOCYTES: 1 %
LYMPHOCYTES # BLD: 24 % (ref 24–43)
MCH RBC QN AUTO: 26.4 PG (ref 25.2–33.5)
MCHC RBC AUTO-ENTMCNC: 29.6 G/DL (ref 25.2–33.5)
MCV RBC AUTO: 89.1 FL (ref 82.6–102.9)
MONOCYTES # BLD: 10 % (ref 3–12)
NRBC AUTOMATED: 0 PER 100 WBC
PDW BLD-RTO: 17.2 % (ref 11.8–14.4)
PLATELET # BLD: ABNORMAL K/UL (ref 138–453)
PLATELET ESTIMATE: ABNORMAL
PLATELET, FLUORESCENCE: 149 K/UL (ref 138–453)
PLATELET, IMMATURE FRACTION: 15.3 % (ref 1.1–10.3)
PMV BLD AUTO: ABNORMAL FL (ref 8.1–13.5)
POTASSIUM SERPL-SCNC: 3.9 MMOL/L (ref 3.7–5.3)
RBC # BLD: 4.05 M/UL (ref 3.95–5.11)
RBC # BLD: ABNORMAL 10*6/UL
SEG NEUTROPHILS: 62 % (ref 36–65)
SEGMENTED NEUTROPHILS ABSOLUTE COUNT: 5.5 K/UL (ref 1.5–8.1)
SODIUM BLD-SCNC: 142 MMOL/L (ref 135–144)
WBC # BLD: 8.9 K/UL (ref 3.5–11.3)
WBC # BLD: ABNORMAL 10*3/UL

## 2021-02-11 PROCEDURE — 2580000003 HC RX 258: Performed by: NURSE PRACTITIONER

## 2021-02-11 PROCEDURE — 6370000000 HC RX 637 (ALT 250 FOR IP): Performed by: NURSE PRACTITIONER

## 2021-02-11 PROCEDURE — 94760 N-INVAS EAR/PLS OXIMETRY 1: CPT

## 2021-02-11 PROCEDURE — 6370000000 HC RX 637 (ALT 250 FOR IP): Performed by: INTERNAL MEDICINE

## 2021-02-11 PROCEDURE — 85055 RETICULATED PLATELET ASSAY: CPT

## 2021-02-11 PROCEDURE — 80048 BASIC METABOLIC PNL TOTAL CA: CPT

## 2021-02-11 PROCEDURE — 93005 ELECTROCARDIOGRAM TRACING: CPT | Performed by: INTERNAL MEDICINE

## 2021-02-11 PROCEDURE — 85025 COMPLETE CBC W/AUTO DIFF WBC: CPT

## 2021-02-11 PROCEDURE — 36415 COLL VENOUS BLD VENIPUNCTURE: CPT

## 2021-02-11 RX ORDER — AMIODARONE HYDROCHLORIDE 200 MG/1
200 TABLET ORAL 2 TIMES DAILY
Qty: 60 TABLET | Refills: 0 | Status: SHIPPED | OUTPATIENT
Start: 2021-02-11 | End: 2021-03-04 | Stop reason: SDUPTHER

## 2021-02-11 RX ORDER — GABAPENTIN 300 MG/1
300 CAPSULE ORAL NIGHTLY
Qty: 14 CAPSULE | Refills: 0 | Status: SHIPPED | OUTPATIENT
Start: 2021-02-11 | End: 2021-02-23 | Stop reason: SDUPTHER

## 2021-02-11 RX ADMIN — INSULIN LISPRO 10 UNITS: 100 INJECTION, SOLUTION INTRAVENOUS; SUBCUTANEOUS at 08:07

## 2021-02-11 RX ADMIN — POTASSIUM CHLORIDE 20 MEQ: 20 TABLET, EXTENDED RELEASE ORAL at 08:06

## 2021-02-11 RX ADMIN — METOPROLOL TARTRATE 25 MG: 25 TABLET, FILM COATED ORAL at 08:06

## 2021-02-11 RX ADMIN — PANTOPRAZOLE SODIUM 40 MG: 40 TABLET, DELAYED RELEASE ORAL at 06:09

## 2021-02-11 RX ADMIN — INSULIN GLARGINE 26 UNITS: 100 INJECTION, SOLUTION SUBCUTANEOUS at 08:07

## 2021-02-11 RX ADMIN — APIXABAN 5 MG: 2.5 TABLET, FILM COATED ORAL at 08:07

## 2021-02-11 RX ADMIN — ASPIRIN 81 MG: 81 TABLET, COATED ORAL at 08:06

## 2021-02-11 RX ADMIN — FUROSEMIDE 40 MG: 40 TABLET ORAL at 08:06

## 2021-02-11 RX ADMIN — CHOLECALCIFEROL TAB 25 MCG (1000 UNIT) 2000 UNITS: 25 TAB at 08:06

## 2021-02-11 RX ADMIN — SODIUM CHLORIDE, PRESERVATIVE FREE 10 ML: 5 INJECTION INTRAVENOUS at 08:08

## 2021-02-11 RX ADMIN — ATORVASTATIN CALCIUM 10 MG: 10 TABLET, FILM COATED ORAL at 08:07

## 2021-02-11 RX ADMIN — AMIODARONE HYDROCHLORIDE 200 MG: 200 TABLET ORAL at 08:07

## 2021-02-11 RX ADMIN — PREDNISONE 5 MG: 5 TABLET ORAL at 08:06

## 2021-02-11 ASSESSMENT — PAIN SCALES - GENERAL
PAINLEVEL_OUTOF10: 0
PAINLEVEL_OUTOF10: 0

## 2021-02-11 NOTE — PLAN OF CARE
Problem: Falls - Risk of:  Goal: Will remain free from falls  Description: Will remain free from falls  Outcome: Ongoing  Goal: Absence of physical injury  Description: Absence of physical injury  Outcome: Ongoing     Problem: Discharge Planning:  Goal: Discharged to appropriate level of care  Description: Discharged to appropriate level of care  Outcome: Ongoing     Problem:  Activity:  Goal: Ability to tolerate increased activity will improve  Description: Ability to tolerate increased activity will improve  Outcome: Ongoing  Goal: Expression of feelings of increased energy will increase  Description: Expression of feelings of increased energy will increase  Outcome: Ongoing     Problem: Cardiac:  Goal: Ability to maintain an adequate cardiac output will improve  Description: Ability to maintain an adequate cardiac output will improve  Outcome: Ongoing  Goal: Complications related to the disease process, condition or treatment will be avoided or minimized  Description: Complications related to the disease process, condition or treatment will be avoided or minimized  Outcome: Ongoing     Problem: Health Behavior:  Goal: Ability to manage health-related needs will improve  Description: Ability to manage health-related needs will improve  Outcome: Ongoing     Problem: Safety:  Goal: Ability to remain free from injury will improve  Description: Ability to remain free from injury will improve  Outcome: Ongoing  Goal: Will show no signs and symptoms of excessive bleeding  Description: Will show no signs and symptoms of excessive bleeding  Outcome: Ongoing

## 2021-02-11 NOTE — PROGRESS NOTES
Discharge teaching and instructions for diagnosis of atrial fib completed with patient using teachback method. AVS reviewed. Prescriptions sent to the pharmacy, explained to the patient that scripts will need to be picked up from the pharmacy. Patient agreeable. Patient voiced understanding regarding prescriptions, follow up appointments, and care of self at home. Education provided to the patient regarding new medications. Unit phone number highlighted on AVS if questions arise.

## 2021-02-11 NOTE — PROGRESS NOTES
Hospitalist Progress Note    Patient:  Milla Mckeon     YOB: 1935    MRN: [id number]   Admit date: 2021     Acct: [de-identified]     PCP: INGRID Davis CNP    CC--Interval History:   Atrial fibrillation--RVR--was on Cardizem gtt ---> Amiodarone gtt----has converted to SR with occasional non-sustained--asymptomatic paroxysmal atrial fibrillation---on anticoagulation with Eliquis---amiodarone now PO---seen by Cardiology---home 2021.     CHF---chronic diastolic    E coli UTI---POA---[s] to and on Rocephin----course completed    BLE leg pain---improved on Neurontin--- pain and better sleep---will need to titrate the Neurontin dose upward in outpatient setting    See note below     All other ROS negative except noted in HPI    Diet:  DIET CARDIAC; Carb Control: 4 carb choices (60 gms)/meal    Medications:  Scheduled Meds:   amiodarone  200 mg Oral BID    pantoprazole  40 mg Oral QAM AC    predniSONE  5 mg Oral Daily    insulin lispro  10 Units Subcutaneous TID WC    apixaban  5 mg Oral BID    metoprolol tartrate  25 mg Oral BID    gabapentin  300 mg Oral Nightly    aspirin  81 mg Oral Daily    Vitamin D  2,000 Units Oral Daily    furosemide  40 mg Oral Daily    insulin glargine  26 Units Subcutaneous BID    potassium chloride  20 mEq Oral BID    atorvastatin  10 mg Oral Daily    sodium chloride flush  10 mL Intravenous 2 times per day    cefTRIAXone (ROCEPHIN) IV  1,000 mg Intravenous Q24H    insulin lispro  0-12 Units Subcutaneous TID WC    insulin lispro  0-6 Units Subcutaneous Nightly     Continuous Infusions:   dextrose       PRN Meds:sodium chloride flush, promethazine **OR** ondansetron, polyethylene glycol, acetaminophen **OR** acetaminophen, glucose, dextrose, glucagon (rDNA), dextrose    Objective:  Labs:  CBC with Differential:    Lab Results   Component Value Date    WBC 8.9 2021    RBC 4.05 2021    HGB 10.7 2021    HCT 36.1 ELIQUIS     QKNNR50EAHEHJLF4.1.8925    CARDIZEM gtt2. 8.2021dcd  AMIODARONEgtt---2.9.2021----2.10.2021 à AMIODARONE PO  Covid-19vaccination---#11.21.2021    Anti-infectives:  Rocephin IV     Atrial  fibrillation---RVR----2.8.2021---recurrent---converted to NSR----2.9.2021 à atrial fibrillation---                      2.10.2021 à SR with brief paroxysmal atrial fibrillation                EKG---2.10.2021atrial fibrillationPVCsnonspecific T wave changes---QTc ~ 673        EKG----2.9.2021--#2NSR---75nonspecific T-wave changes        EKG2. 9.201--#1----atrial fibrillation100        MI ruled out---2.9.2021         EKG---2.8.3021---atrial fibrillation---RVR---121NSSTTCs---diffuse nonspecific T-wave changes                     EKG----8.2.2020SR---82PACsNSSTTCsT abnormality         Atrial fibrillation---history           EKG---5.27.2019---NSR89--NSSTTCs           RVR2.10.2019           EKG---2.12.2019---atrial fibrillation---85---PVCs---QTc = 473NSSTTCs especially inferiorly           EKG---2.11.2019SR80---PACs--NSSTTCs           EKG---2.10.2019atrial fibrillationRVR116NSSTTCs           EKG---2.9.2019---sinus tachycardia----105---NSSTTCs           2D ECHO---10.19.2018---BLAKELVHglobally NLVSF                     NRVSF--TONIA without stenosismild-to-moderate MR---                     trivial TRRVSP  ~ 40 mm Hgmild pulmonary HTN---                     LVEF > 55%  CHFchronic diastolic        2D NGQB----6.71.8813---HO NLVSFno WMA----LVEF ~ 55-60%       Acute-on-chronic diastolic----5.27.2019       2D ECHO---5.28.2019---LA mildly dilatedmild LVHNLVSFno segmental                       WMARA dilatationNRVSFMACTONIA but opens well---                      moderate TR---RVSP ~ 67 mm Hg severe pulmonary hypertension                      Grade II moderate DD----LVEF ~ 60%  E coli UTI---POA----2.8.2021---[s] Rocephin   CKDStage 2  Pulmonary hypertension----mild  Hypertension  Hyperlipidemia  Diabetes Mellitus Type 2  Asthma   Pulmonary HTN  ASCVD        EKG---2.5.2019sinus tachycardia102PACs--NSSTTCs        Cardiac catheterization9.26.2014---0% LM40-50% LAD40% proximal LAD---                                50% D110-20% LCx10-20%---10-20% RCA  Morbid obesity  CHICOobstructive sleep apnea---CPAP intolerant   PMR---polymyalgia rheumatica  BLE edema   Chronic BLE pain   Gait-balance instability  Hypokalemia   PMH:  central retinal occlusion, DD, esophagitis, lumbar radiculopathy,              lumbar spinal stenosis, iron deficiency anemia---chronic GI blood              loss, OA, frequent PVCs, MR, Vitamin D deficiency, obstructing              right  renal calculi---2. 5.2019, UTI--- 2.5.2019---feverleukocytosis,             lactic acidosis----2. 5.2019,  TEJ---2. 5.2019 superposed on CKDStage 3,              hypokalemia---2. 5.2019, bronchitis---cough----POA--2. 6.2019,              elevated troponin---flat peak---renal, migraine headaches, increased             ZAIRA, OABCC right cheek, irritable bowel syndrome,   elevated lactic acid             5.27.2019sepsis ruled out, urinary retention----villareal2019, OOM2180,             sigmoid diverticulitis---5.24.2019  PSH:  see above, appendectomy---1952, hysterectomy---cervix unknown---            1983, bilateral cataract---IOL2010, [facility]-right neck[id number],              [facility] right leg2012, colonoscopy2011, capsule endoscopy---2011,             laparoscopic cholecystectomy--2003, eyetype?, bronchitis2014,              EGD---20112015 mild gastritis, right             L4-5 TFE  x 22016, cystoscopy--- right JJ stent---2. 6.2019    Allergies:        codeine, erythromycin, , verapamil,                          clonidine---rash, penicillinrash, Levbid--rash  Intolerances:  exenatide---nausea, LevaquinlevofloxacinGI upset        Plan:  1. Atrial fibrillation--Eliquis----Amiodarone 200 BID  2.   E coli---UTI--POA---antibiotics finished  3. CHF--current regimen--daily weight--CHF home regimen  4. BLE--pain---Neurotin 300 HS----will need to adjust outpatient  5. Follow up Eusebio Cardenas NP---IM  6. Follow up [facility] Cardiology--TCC  7.   See orders     Electronically signed by Anil Reyes on 2/11/2021 at 11:47 AM    Hospitalist

## 2021-02-11 NOTE — PROGRESS NOTES
[facility]   Progress Note                   Date:   2/11/2021  Patient name: Yelena Carpio  Date of admission:  2/8/2021  3:10 PM  MRN:   [id number]  YOB: 1935  PCP: INGRID Sky CNP    Reason for Admission:  Afib with RVR     Subjective:       Overnight ahd a short run of atrial fibrillation lasting 6-7 mins   Currently in sinus rhythm  Feeling better and denies any chest pain, dyspnea, otherwise     Medications:   Scheduled Meds:   amiodarone  200 mg Oral BID    pantoprazole  40 mg Oral QAM AC    predniSONE  5 mg Oral Daily    insulin lispro  10 Units Subcutaneous TID WC    apixaban  5 mg Oral BID    metoprolol tartrate  25 mg Oral BID    gabapentin  300 mg Oral Nightly    aspirin  81 mg Oral Daily    Vitamin D  2,000 Units Oral Daily    furosemide  40 mg Oral Daily    insulin glargine  26 Units Subcutaneous BID    potassium chloride  20 mEq Oral BID    atorvastatin  10 mg Oral Daily    sodium chloride flush  10 mL Intravenous 2 times per day    cefTRIAXone (ROCEPHIN) IV  1,000 mg Intravenous Q24H    insulin lispro  0-12 Units Subcutaneous TID WC    insulin lispro  0-6 Units Subcutaneous Nightly       Continuous Infusions:   dextrose         CBC:   Recent Labs     02/09/21  0527 02/10/21  0527 02/11/21  0503   WBC 7.8 9.0 8.9   HGB 12.8 11.0* 10.7*    149 See Reflexed IPF Result     BMP:    Recent Labs     02/09/21  0527 02/10/21  0527 02/11/21  0503    141 142   K 4.2 3.4* 3.9    105 107   CO2 27 26 26   BUN 15 14 14   CREATININE 0.96* 1.09* 0.99*   GLUCOSE 145* 99 86     Hepatic:   Recent Labs     02/08/21  1528 02/09/21  0527   AST 16 15   ALT <5* 14   BILITOT 0.34 0.37   ALKPHOS 93 81     Troponin: No results for input(s): TROPONINI in the last 72 hours. BNP: No results for input(s): BNP in the last 72 hours. Lipids: No results for input(s): CHOL, HDL in the last 72 hours.     Invalid input(s): LDLCALCU  INR:   Recent Labs     02/08/21  1528 02/09/21  0527   INR 0.9 1.0       Objective:   Vitals: BP (!) 123/93   Pulse 66   Temp 98.7 °F (37.1 °C) (Tympanic)   Resp 21   Ht 4' 11.5\" (1.511 m)   Wt 200 lb 3.2 oz (90.8 kg)   SpO2 94%   BMI 39.76 kg/m²     General appearance: awake, alert, in no apparent respiratory distress   HEENT: Head: Normocephalic, no lesions, without obvious abnormality  Neck: no JVD  Lungs: clear to auscultation bilaterally, no basilar rales, no wheezing   Heart: regular rate and rhythm, S1, S2 normal, no murmur  Abdomen: soft, non-tender; bowel sounds normal  Extremities: No LE edema  Neurologic: Mental status: Alert, oriented. Motor and sensory not done. Assessment:   1. Atrial fibrillation, paroxysmal, currently in NSR, CHADS-VASc: 5  2. Chronic HFpEF, clinically well compensated   3. Essential hypertension   4. Type II DM     Patient Active Problem List:     Dyslipidemia     Osteoarthritis     Esophagitis     Vitamin D deficiency     PMR (polymyalgia rheumatica) (HCC)     Central artery occlusion of retina     Diastolic dysfunction     Pulmonary HTN (HCC)     Iron deficiency anemia due to chronic blood loss     Type 2 diabetes with nephropathy (HCC)     CHICO- intolerant CPAP     Essential hypertension     Lumbar radiculopathy     Neural foraminal stenosis of lumbar spine     Spinal stenosis at L4-L5 level     Mitral regurgitation     Frequent PVCs     Gait abnormality     Asthma     Nephrolithiasis     Right kidney stone     Hypokalemia     Multifocal atrial tachycardia (HCC)     Sigmoid diverticulitis     Atrial fibrillation with RVR ([facility])     Class 2 severe obesity due to excess calories with serious comorbidity and body mass index (BMI) of 38.0 to 38.9 in adult ([facility] [address]     Stage 3a chronic kidney disease        Treatment Plan:   1. Continue amiodarone 200 mg bid, will decrease to 200 mg qd after 2 weeks   2. Continue lasix and lopressor   3.  No objection to discharge from cardiology standpoint with OP follow up in 2 weeks with Dr Odell Pereira.             Viry Waters MD, [address] [facility]

## 2021-02-11 NOTE — FLOWSHEET NOTE
Patient is sitting up in a chair at her bedside. Monitor shows AFIB/Flutter with RVR. Patient is asymptomatic. Arrhythmia appears to have begun at [address] and the patient converted back to sinus rhythm at [address] [facility].

## 2021-02-11 NOTE — DISCHARGE INSTR - DIET

## 2021-02-12 ENCOUNTER — CARE COORDINATION (OUTPATIENT)
Dept: CASE MANAGEMENT | Age: 86
End: 2021-02-12

## 2021-02-12 DIAGNOSIS — I48.91 ATRIAL FIBRILLATION WITH RVR (HCC): Primary | ICD-10-CM

## 2021-02-12 PROCEDURE — 1111F DSCHRG MED/CURRENT MED MERGE: CPT | Performed by: NURSE PRACTITIONER

## 2021-02-12 NOTE — CARE COORDINATION
[facility] Transitions Initial Follow Up Call    Call within 2 business days of discharge: Yes    Patient: Jhonny Wilkinson Patient : 1935   MRN: [id number]  Reason for Admission: A fib RVR  Discharge Date: 21 RARS: Readmission Risk Score: 19      Last Discharge [facility]       Complaint Diagnosis Description Type Department Provider    21 Other; Hyperglycemia Atrial fibrillation with RVR ([facility] [address] . .. ED to Hosp-Admission (Discharged) (ADMITTED) Leona Beltran. .. Spoke with: Cinthia Record     Call to pt who states she is feeling \"weak & shaky\". States she is a little short of breath- chronic. Advised to call 911 or go to ER if symptoms worsen or continue- v/u   States she rarely uses her nebulizer- writer advised using as needed as ordered- v/u   States VS this morning were good- 111/66, HR 69, SpO2 92%, 98.8 temp, glucose 102  Writer advises using walker if feeling weak or shaky while up and moving if needed- v/u       Challenges to be reviewed by the provider   Additional needs identified to be addressed with provider No  none    Discussed COVID-19 related testing which was available at this time. Test results were negative. Patient informed of results, if available? Yes         Method of communication with provider : none    Advance Care Planning:   Does patient have an Advance Directive:  not on file; education provided. Was this a readmission? No  Patient stated reason for admission: A fib   Patients top risk factors for readmission: lack of knowledge about disease and medical condition    Care Transition Nurse (CTN) contacted the patient by telephone to perform post hospital discharge assessment. Verified name and  with patient as identifiers. Provided introduction to self, and explanation of the CTN role. CTN reviewed discharge instructions, medical action plan and red flags with patient who verbalized understanding.  Patient given an opportunity to ask questions and does not have any further questions or concerns at this time. Were discharge instructions available to patient? Yes. Reviewed appropriate site of care based on symptoms and resources available to patient including: PCP, Specialist, When to call 911 and ctn. The patient agrees to contact the PCP office for questions related to their healthcare. Medication reconciliation was performed with patient, who verbalizes understanding of administration of home medications. Advised obtaining a 90-day supply of all daily and as-needed medications. Covid Risk Education    Patient has following risk factors of: asthma and diabetes. Education provided regarding infection prevention, and signs and symptoms of COVID-19 and when to seek medical attention with patient who verbalized understanding. Discussed exposure protocols and quarantine From CDC: Are you at higher risk for severe illness?   and given an opportunity for questions and concerns. The patient agrees to contact the COVID-19 hotline [phone] or PCP office for questions related to COVID-19. For more information on steps you can take to protect yourself, see CDC's How to Protect Yourself     Discussed follow-up appointments. If no appointment was previously scheduled, appointment scheduling offered: Yes. Is follow up appointment scheduled within 7 days of discharge? Yes  Non-[facility] follow up appointment(s): n/a     Plan for follow-up call in 3-5 days based on severity of symptoms and risk factors. Plan for next call: symptom management-weak and shaky   CTN provided contact information for future needs.           Non-face-to-face services provided:  Scheduled appointment with PCP-confirmed appt 2/23  Scheduled appointment with [address] cardiology appt 2/16  Obtained and reviewed discharge summary and/or continuity of care documents  Assessment and support for treatment adherence and medication management-[id number]

## 2021-02-12 NOTE — DISCHARGE SUMMARY
ventricular ejection fraction 55% to 60%. 6.  Urinary tract infection, POA, 02/08/2021, E. coli, sensitive to  Rocephin, for which the patient had been treated. 7.  Chronic kidney disease, stage III. 8.  Pulmonary hypertension, mild. 9.  Hypertension. 10.  Hyperlipidemia. 11.  Diabetes mellitus type 2. 12.  Asthma, quiescent. 13.  Coronary artery disease. Cardiac catheterization, 09/26/2014, 0%  LM, 40% to 50% LAD, 40% proximal LAD, 50% D1, 10% to 20% left  circumflex, 10% to 20% RCA. 14.  Morbid obesity. 15.  Obstructive sleep apnea, CPAP intolerant. 16.  Polymyalgia rheumatica, controlled. 17.  Bilateral lower extremity edema. 18.  Bilateral lower extremity chronic pain, responsive to Neurontin. 19.  Hypokalemia, replacement therapy given, corrected. 20.  Gait and balance instability, no change. Other medical problems set forth in the progress note of 02/11/2021,  incorporated for reference herein. HISTORY OF PRESENT ILLNESS AND HOSPITAL COURSE:  The patient is an  81-year-old white female, patient of Kandice Mai, nurse  practitioner, Internal Medicine, [place]. The patient was seen by  [facility], [facility] this  hospitalization. The patient had presented with atrial fibrillation with RVR. The  patient was initially placed on a Cardizem drip, 02/08/2021, this was  subsequently discontinued. The patient was placed on amiodarone drip,  The patient flipped into a normal sinus rhythm, however, still   flirting with atrial fibrillation periodically. patient was seen by Cardiology, is continued on amiodarone orally 200 mg  p.o. twice a day. The patient able to be discharged to home,  02/11/2021. The patient has known history of congestive heart failure, diastolic,  controlled. E. coli urinary tract infection, treated with Rocephin. The patient  having completed a course, antibiotics discontinued.       Chronic kidney  disease at a stage III level. Hypokalemia, for which replacement therapy was given, potassium of 3.9  at the time of discharge. Diabetic type 2, the patient's blood glucose  level controlled, 86 at the time of discharge. The patient had complained of chronic pain in her legs making it  difficult for her to sleep. The patient was started on Neurontin during  the course of this hospitalization 300 mg at bedtime, this seemed to  help in terms of both control of the patient's pain in her legs and also  improved sleep. This will need to be adjusted in the outpatient setting  in that the 300 mg is but a starter dose. LABORATORY DATA:  Around the time of discharge, white cell count 8.9;  hemoglobin 10.7; hematocrit 36.1; and platelets 393,859. Sodium 142,  potassium 3.9, chloride 107, CO2 of 26, BUN 14, creatinine 0.9, glucose  86, calcium 9.1 and GFR of 53. DISCHARGE INSTRUCTIONS/FOLLOWUP:  Discharged to home on 02/11/2021. DIET:  Cardiac, diabetic. ACTIVITY:  As tolerated. CONDITION AT DISCHARGE:  Fair, improved. MEDICATIONS:  NEW:  Amiodarone (Cordarone) 200 mg p.o. b.i.d.; apixaban (Eliquis) 5 mg  p.o. b.i.d.; gabapentin (Neurontin) 300 mg p.o. nightly. FOLLOWING MEDICATIONS FOR WHICH CHANGES MAY HAVE OCCURRED:  Humalog  insulin 8 units subcutaneously, four times a day before meals and with a  bedtime snack; metoprolol tartrate (Lopressor) 25 mg p.o. b.i.d. FOLLOWING MEDICATIONS CONTINUED, NO CHANGE:  Acetaminophen (Tylenol) 500  mg p.o. daily; albuterol med-neb 0.083% every 4 hours p.r.n. shortness  of breath, wheezing; aspirin 81 mg p.o. daily;  Blink Tears (polyethylene  glycol) 0.25% solution, 1 drop both eyes as needed for dry eyes;  furosemide (Lasix) 40 mg p.o. daily; insulin glargine (Lantus) 26 units  twice daily; nystatin (Mycostatin) 100,000 units per gram of cream,  topically, twice daily, affected areas; omeprazole (Prilosec) 20 mg p.o.  daily; potassium chloride 20 mEq p.o. b.i.d.; prednisone (Deltasone) 5  mg p.o. daily for polymyalgia rheumatica; simvastatin (Zocor) 20 mg p.o.  nightly; Victoza (liraglutide) 1.8 mg subcutaneously daily; vitamin D3  50 mcg or 2000 units p.o. daily. Specifically discontinued this hospitalization were amlodipine (Norvasc)  and hydralazine (Apresoline). Follow up with the patient's personal care provider, Donte Whiting,  nurse practitioner, Internal Medicine, [facility]. Follow up  with [facility], [facility]. Any aspect of the patient's care not discussed in the chart and/or  dictation will be addressed and treated as an outpatient. The patient's medications have been reviewed including, but not limited  to, pre-hospital, hospital and discharge medications. The patient  and/or the patient's personal representatives were specifically advised  the only medications to be taken are those set forth in the discharge  orders and no other medications should be taken. Any prior medications  not on the discharge orders are specifically discontinued.         Cierra Romano MD    D: 02/11/2021 17:14:07       T: 02/11/2021 17:22:09     [id number]  Job#: [id number]     Doc#: [id number]    CC:

## 2021-02-16 ENCOUNTER — CARE COORDINATION (OUTPATIENT)
Dept: CASE MANAGEMENT | Age: 86
End: 2021-02-16

## 2021-02-19 ENCOUNTER — HOSPITAL ENCOUNTER (OUTPATIENT)
Dept: LAB | Age: 86
Discharge: HOME | End: 2021-02-19
Payer: MEDICARE

## 2021-02-19 ENCOUNTER — TELEPHONE (OUTPATIENT)
Dept: INTERNAL MEDICINE | Age: 86
End: 2021-02-19

## 2021-02-19 DIAGNOSIS — R35.0 URINARY FREQUENCY: ICD-10-CM

## 2021-02-19 DIAGNOSIS — R30.0 BURNING WITH URINATION: Primary | ICD-10-CM

## 2021-02-19 DIAGNOSIS — R30.0 BURNING WITH URINATION: ICD-10-CM

## 2021-02-19 LAB
-: NORMAL
AMORPHOUS: NORMAL
BACTERIA: NORMAL
BILIRUBIN URINE: NEGATIVE
CASTS UA: NORMAL /LPF (ref 0–2)
COLOR: NORMAL
COMMENT UA: NORMAL
CRYSTALS, UA: NORMAL /HPF
EPITHELIAL CELLS UA: NORMAL /HPF (ref 0–5)
GLUCOSE URINE: NEGATIVE
KETONES, URINE: NEGATIVE
LEUKOCYTE ESTERASE, URINE: NEGATIVE
MUCUS: NORMAL
NITRITE, URINE: NEGATIVE
OTHER OBSERVATIONS UA: NORMAL
PH UA: 5 (ref 5–6)
PROTEIN UA: NEGATIVE
RBC UA: NORMAL /HPF (ref 0–4)
RENAL EPITHELIAL, UA: NORMAL /HPF
SPECIFIC GRAVITY UA: 1.01 (ref 1.01–1.02)
TRICHOMONAS: NORMAL
TURBIDITY: NORMAL
URINE HGB: NEGATIVE
UROBILINOGEN, URINE: NORMAL
WBC UA: NORMAL /HPF (ref 0–4)
YEAST: NORMAL

## 2021-02-19 PROCEDURE — 81001 URINALYSIS AUTO W/SCOPE: CPT

## 2021-02-19 NOTE — TELEPHONE ENCOUNTER
UA w/culture ordered. Please watch for these results as Kimberli Merrittkendra is out of the office.

## 2021-02-19 NOTE — TELEPHONE ENCOUNTER
Patient called in stating that she is having burning with urination and urinary frequency if agreeable she would like something sent to [facility]. Patient is agreeable to bring in a UA sample and will do so. Please advise.

## 2021-02-23 ENCOUNTER — TELEPHONE (OUTPATIENT)
Dept: INTERNAL MEDICINE | Age: 86
End: 2021-02-23

## 2021-02-23 ENCOUNTER — OFFICE VISIT (OUTPATIENT)
Dept: INTERNAL MEDICINE | Age: 86
End: 2021-02-23
Payer: MEDICARE

## 2021-02-23 VITALS
DIASTOLIC BLOOD PRESSURE: 72 MMHG | BODY MASS INDEX: 37.5 KG/M2 | HEART RATE: 60 BPM | TEMPERATURE: 97.5 F | RESPIRATION RATE: 14 BRPM | OXYGEN SATURATION: 97 % | WEIGHT: 191 LBS | SYSTOLIC BLOOD PRESSURE: 128 MMHG | HEIGHT: 60 IN

## 2021-02-23 DIAGNOSIS — N30.00 ACUTE CYSTITIS WITHOUT HEMATURIA: ICD-10-CM

## 2021-02-23 DIAGNOSIS — I51.89 DIASTOLIC DYSFUNCTION: ICD-10-CM

## 2021-02-23 DIAGNOSIS — I48.91 ATRIAL FIBRILLATION, NEW ONSET (HCC): Primary | ICD-10-CM

## 2021-02-23 DIAGNOSIS — E11.21 TYPE 2 DIABETES WITH NEPHROPATHY (HCC): ICD-10-CM

## 2021-02-23 DIAGNOSIS — M35.3 PMR (POLYMYALGIA RHEUMATICA) (HCC): ICD-10-CM

## 2021-02-23 DIAGNOSIS — I10 ESSENTIAL HYPERTENSION: ICD-10-CM

## 2021-02-23 DIAGNOSIS — E11.21 DIABETIC NEPHROPATHY ASSOCIATED WITH TYPE 2 DIABETES MELLITUS (HCC): ICD-10-CM

## 2021-02-23 DIAGNOSIS — D50.0 IRON DEFICIENCY ANEMIA SECONDARY TO BLOOD LOSS (CHRONIC): ICD-10-CM

## 2021-02-23 PROCEDURE — 99495 TRANSJ CARE MGMT MOD F2F 14D: CPT | Performed by: NURSE PRACTITIONER

## 2021-02-23 RX ORDER — GABAPENTIN 300 MG/1
300 CAPSULE ORAL NIGHTLY
Qty: 90 CAPSULE | Refills: 0 | Status: SHIPPED | OUTPATIENT
Start: 2021-02-23 | End: 2021-03-08 | Stop reason: ALTCHOICE

## 2021-02-23 RX ORDER — GABAPENTIN 100 MG/1
100 CAPSULE ORAL DAILY
Qty: 90 CAPSULE | Refills: 0 | Status: SHIPPED | OUTPATIENT
Start: 2021-02-23 | End: 2021-03-08 | Stop reason: ALTCHOICE

## 2021-02-23 RX ORDER — PREDNISONE 1 MG/1
4 TABLET ORAL DAILY
Qty: 360 TABLET | Refills: 0 | Status: SHIPPED | OUTPATIENT
Start: 2021-02-23 | End: 2021-04-12 | Stop reason: DRUGHIGH

## 2021-02-23 ASSESSMENT — PATIENT HEALTH QUESTIONNAIRE - PHQ9: SUM OF ALL RESPONSES TO PHQ QUESTIONS 1-9: 0

## 2021-02-23 NOTE — PROGRESS NOTES
Transition Care Office Visit    Date of Face-to-Face: 2/23/2021  Persons at visit: Unaccompanied  Date of interactive contact/ attempted contact with the patient and/or caregiver: 2/12/2021-See transitional care telephone note. HPI 72-year-old patient with history of A. fib, CHF, chronic UTIs, chronic kidney disease, pulmonary hypertension, hypertension, hyperlipidemia, diabetes mellitus, CAD, obstructive sleep apnea, polymyalgia, being seen for recent hospitalization at [facility] where she was admitted to 8/20/2021 through 2/11/2021 with A. fib RVR, recurrent. Patient was placed on Cardizem initially seen by cardiology and changed to amiodarone. Patient was to continue on amiodarone 200 mg twice a day. Heart rate stable. Normal sinus rhythm at discharge. Placed on anticoagulation. Also noted to have a UTI treated with Rocephin. Completed course of antibiotics during hospital stay. Was given supplemental potassium for hypokalemia. Continues to follow with diabetic nurse practitioner for her diabetes. Is noted during the hospital she was started on Neurontin 300 at bedtime for chronic leg pain. States this is significantly improved states she is able to sleep well at night. She does relate that she continues to have some pain throughout the day. We discussed adding small dose in the a.m. to see if this would help her pain throughout the day which she is in agreement on. She has not had any issues getting her Eliquis covered. Does have some mild dizziness and lightheadedness in the a.m. she brings in a list of her heart rate blood pressure and weights. Blood pressure and weights have been stable however heart rates is dipping into the 50s in the a.m. when she is dizzy and lightheaded. We discussed cutting back her amiodarone to once a day. We will discuss this with cardiology. Activity: activity as tolerated  Any medication changes since post-hospitalization phone call?   No  Any treatment changes since post-hospitalization phone call? No  Any further education needed on medications/treatment plan? No    Current Medications   Outpatient Medications Marked as Taking for the 2/23/21 encounter (Office Visit) with INGRID Lin CNP   Medication Sig Dispense Refill    apixaban (ELIQUIS) 5 MG TABS tablet Take 1 tablet by mouth 2 times daily 180 tablet 1    gabapentin (NEURONTIN) 300 MG capsule Take 1 capsule by mouth nightly for 90 days. 90 capsule 0    gabapentin (NEURONTIN) 100 MG capsule Take 1 capsule by mouth daily for 90 days. 90 capsule 0    predniSONE (DELTASONE) 1 MG tablet Take 4 tablets by mouth daily 360 tablet 0    amiodarone (CORDARONE) 200 MG tablet Take 1 tablet by mouth 2 times daily (Patient taking differently: Take 200 mg by mouth daily ) 60 tablet 0    metoprolol tartrate (LOPRESSOR) 25 MG tablet Take 1 tablet by mouth 2 times daily 60 tablet 3    insulin glargine (LANTUS SOLOSTAR) 100 UNIT/ML injection pen INJECT 26 UNITS bid 30 mL 4    simvastatin (ZOCOR) 20 MG tablet TAKE 1 TABLET NIGHTLY 90 tablet 3    insulin lispro, 1 Unit Dial, (HUMALOG KWIKPEN) 100 UNIT/ML SOPN INJECT 8 UNITS UNDER THE SKIN FOUR TIMES A DAY (BEFORE MEALS AND BEDTIME SNACK ) 15 mL 6    nystatin (MYCOSTATIN) 941020 UNIT/GM cream Apply topically 2 times daily.  1 Tube 0    potassium chloride (KLOR-CON M) 20 MEQ extended release tablet Take 1 tablet by mouth 2 times daily 180 tablet 3    albuterol (PROVENTIL) (2.5 MG/3ML) 0.083% nebulizer solution Take 3 mLs by nebulization every 4 hours as needed for Wheezing or Shortness of Breath 30 each 1    Liraglutide (VICTOZA) 18 MG/3ML SOPN SC injection Inject 1.8 mg into the skin daily 18 mL 3    furosemide (LASIX) 40 MG tablet TAKE 1 TABLET DAILY 90 tablet 3    Handicap Placard MISC by Does not apply route Expires 4/7/2023 1 each 0    Polyethylene Glycol 400 (BLINK TEARS) 0.25 % SOLN Place into both eyes as needed (dry eyes)       aspirin 81 MG EC tablet Take 81 mg by mouth daily      acetaminophen (TYLENOL) 500 MG tablet Take 500 mg by mouth daily      omeprazole (PRILOSEC) 20 MG capsule Take 20 mg by mouth Daily  30 capsule 3    Cholecalciferol (VITAMIN D3) 2000 UNITS CAPS Take 2,000 Units by mouth daily          Medication Reconciliation  Provider has reviewed medication record and it has been modified as necessary to accurately depict current medications since hospitalization. Sheryle Mungo has been instructed on these changes. Social History     Socioeconomic History    Marital status:      Spouse name: Marium Kaplan Number of children: 3    Years of education: 15    Highest education level: Some college, no degree   Occupational History    None   Social Needs    Financial resource strain: Not hard at all   [place] insecurity     Worry: Never true     Inability: Never true   [employer] needs     Medical: No     Non-medical: No   Tobacco Use    Smoking status: Never Smoker    Smokeless tobacco: Never Used    Tobacco comment: amish rrt 5/28/2019   Substance and Sexual Activity    Alcohol use: No     Alcohol/week: 0.0 standard drinks    Drug use: No    Sexual activity: None   Lifestyle    Physical activity     Days per week: 0 days     Minutes per session: 0 min    Stress: Not at all   Relationships    Social connections     Talks on phone: More than three times a week     Gets together: Once a week     Attends Religion service: More than 4 times per year     Active member of club or organization: Yes     Attends meetings of clubs or organizations: More than 4 times per year     Relationship status:     Intimate partner violence     Fear of current or ex partner: None     Emotionally abused: None     Physically abused: None     Forced sexual activity: None   Other Topics Concern    None   Social History Narrative    9/25/2019 No SDOH needs identified. She is receiving Meals on Wheels.         Past Medical History:   Diagnosis Date    Allergic rhinitis     Asthma     PFT's, 04/06, actually showed mild restrictive defect.  Atrial fibrillation with RVR ([facility] [address]     Hospital[facility] 2/8/21-2/11/21    Basal cell carcinoma of cheek 2007    Right cheek    Basal cell carcinoma of leg     right leg    CAD (coronary artery disease)     With 40% stenosis of the LAD, 07/10, ejection fraction 60%. 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 Jorge Bello. Repeat EGD6/15 Gastritis    Foraminal stenosis of lumbar region     Moderate  Right L4/L5 neuroforaminal stenosis, Dr Juanpablo Escamilla following. MRI 2/16 [place]. Moderate foraminal stenosis mild to moderate central canal stenosis    Hyperlipidemia     Hypertension     IBS (irritable bowel syndrome)     GI consultation with Dr. Matheus Gaxiola, GI specialist in [facility], felt that she probably has chronic functional diarrhea and recommended empiric Imodium, Pepto-Bismol or Questran first. Sprue test Neg 2011    Iron deficiency anemia     Percent sat iron 5, January 2015, ferritin 40 1 /15, FOBT positive  EGD 6/15  Gastritis, IV iron 9/15x3 effective,  colonoscopy deferred by Dr. Jorge Bello. --Prior colonoscopy 2011 with severe diverticulosis and telangiectasia.   Trial iron solution in [place] juice 12/16    Iron deficiency anemia due to chronic blood loss 09/08/2015    Junctional bradycardia     Symptomatic, resolved with discontinuation of Verapamil, 05/09. 1.1) Echocardiogram: LAE, LVH, EF 50%, mild MR, diastolic. 1.2) Dr. Quiñones Pu evaluation. 1.3.) Persantine stress test negative, 05/09.  Migraine     Mild CAD     cath 10/15    Mitral regurgitation     Moderate to severe on echocardiogram September 2015. Right pressure 76 grade 2 diastolic dysfunction,  echo 84/85 grade 2 diastolic dysfunction mild to moderate MR RVSP 56 aortic sclerosis    Obesity     CHICO (obstructive sleep apnea)     CPAP 14 2/15 initiation  AHI 7  mild CHICO intolerant CPAP    Osteoarthritis     PMR (polymyalgia rheumatica) (HCC)     Pneumonia     Premature atrial contractions     Pulmonary hypertension (HCC)     -Moderate on echo November 2014    Restrictive lung disease     Mild on PFTs 11/14    Type II or unspecified type diabetes mellitus without mention of complication, not stated as uncontrolled     Vitreous floaters        Family History   Problem Relation Age of Onset    Uterine Cancer Mother     Heart Disease Father     High Blood Pressure Father     Stroke Sister         from a vascular malformation    Heart Attack Son         age 48       Updated and reviewed pertinent [facility]    Allergies   Allergen Reactions    Codeine      Confusion      Erythromycin      GI upset  Received zithromax in past and tolerated    Exenatide Nausea Only    Levofloxacin      GI upset    Verapamil Other (See Comments)     Junctional bradycardia    Clonidine Derivatives Rash     2009, catapres    Other Rash     Levbid    Penicillins Rash     Received Rocephin in past and tolerated       ROS   General: Denies fever, chills, night sweats, or recent weight changes. Skin: Denies any rashes/wounds. HEENT: Denies any headaches. Denies any blurred vision, double vision, or eye pain. Denies any tinnitus, vertigo,  + dizziness in am.   Denies discharge, stuffiness, obstruction, or epistaxis. Denies any hoarseness, dysphagia/ pain.     Cardiovascular: Denies any chest pain, shortness of breath, dyspnea on exertion, orthopnea, or palpations. + lightheadedness   Respiratory: Denies cough, sputum production, hemoptysis, or wheezing. Gastrointestinal: Denies any nausea, vomiting, diarrhea, constipation, or melena. Genitourinary: Denies any dysuria, hematuria, frequency, urgency, or hesitancy. Endocrine:  Denies any heat or cold intolerances, polydipsia, polyuria, or polyphagia. Musculoskeletal: + improvement of neuropathy at night- still issues throughout the day   Neuropsychiatric: Denies any depression, syncope, tremors, seizures, anxiety or nervousness. Physical Exam:  Vitals /72 (Site: Left Upper Arm, Position: Sitting, Cuff Size: Large Adult)   Pulse 60   Temp 97.5 °F (36.4 °C) (Temporal)   Resp 14   Ht 4' 11.5\" (1.511 m)   Wt 191 lb (86.6 kg)   SpO2 97%   BMI 37.93 kg/m²   General Appearance: Alert, no distress; vital signs were reviewed today  Head: Normocephalic, atraumatic. Eyes:  Sclera clear, no drainage  Throat: pharynx moist and pink, no exudate  Neck: Supple, trachea midline, no adenopathy;  Back: Symmetric, no tenderness  Lungs: Chest rises and falls symmetrically, no use of accessory muscles, Lungs clear throughout  Chest wall: No tenderness  Heart[de-identified] Regular rate and rhythm, S1 and S2 normal, no murmur or gallop  Abdomen: Nontender, bowel sounds active in all 4 quadrants, no masses  Extremities: Extremities without clubbing,cyanosis or edema  Skin: Skin color normal, no rashes or lesions  Neurological: Cranial nerves II-XII appears grossly intact- no focal deficit  Psychiatric: mood and affect within normal limits    Assessment/Plan    1. Atrial fibrillation, new onset ([address]  Continue on Eliquis as directed. Reached out to cardiology and they are in agreement with decreasing her amiodarone to once a day. Does have a follow-up in place with them  - CBC Auto Differential; Future  - Basic Metabolic Panel;  Future  - TSH; Future    2. Acute cystitis without hematuria  Completed antibiotic course in hospital.  Currently asymptomatic    3. PMR (polymyalgia rheumatica) ([facility])  She is wanting to try to titrate down off of her prednisone again. Previous PCP noted multiple failed attempts. We discussed that we could try this very slowly. We will start her on 4 mg daily with a follow-up lab for slow titration  - Sedimentation Rate; Future    4. Type 2 diabetes with nephropathy ([facility])  Blood sugars have been more stable. Continues to follow with diabetic nurse practitioner    5. Essential hypertension  Stable at present, continue to monitor    6. Diastolic dysfunction  Ongoing follow-up with cardiology. No signs of fluid overload    7. Iron deficiency anemia secondary to blood loss (chronic)  Stable at present continues on supplemental iron    8. Diabetic nephropathy associated with type 2 diabetes mellitus ([facility] [address]  We will add 100 of Neurontin in the a.m. to her 300 at night. Follow-up in 2 weeks        Decision making of moderate complexity   Hospital records reviewed.    Follow up for routine visit, call sooner with any concerns prior    Electronically signed by INGRID Bryant CNP on 2/23/2021 at 3:52 PM

## 2021-02-23 NOTE — TELEPHONE ENCOUNTER
PCP asking to send note to cardiology for review/approval - Patient is experiencing some dizziness and bradycardia. Please advise if okay to decrease amiodarone dose to 200mg daily instead of twice daily.

## 2021-03-04 ENCOUNTER — TELEPHONE (OUTPATIENT)
Dept: CARDIOLOGY | Age: 86
End: 2021-03-04

## 2021-03-04 ENCOUNTER — OFFICE VISIT (OUTPATIENT)
Dept: CARDIOLOGY | Age: 86
End: 2021-03-04
Payer: MEDICARE

## 2021-03-04 VITALS
HEART RATE: 62 BPM | WEIGHT: 188 LBS | BODY MASS INDEX: 37.9 KG/M2 | DIASTOLIC BLOOD PRESSURE: 82 MMHG | SYSTOLIC BLOOD PRESSURE: 206 MMHG | HEIGHT: 59 IN

## 2021-03-04 DIAGNOSIS — I10 ESSENTIAL HYPERTENSION: Primary | ICD-10-CM

## 2021-03-04 PROCEDURE — 93005 ELECTROCARDIOGRAM TRACING: CPT | Performed by: INTERNAL MEDICINE

## 2021-03-04 PROCEDURE — 93010 ELECTROCARDIOGRAM REPORT: CPT | Performed by: INTERNAL MEDICINE

## 2021-03-04 PROCEDURE — 99214 OFFICE O/P EST MOD 30 MIN: CPT | Performed by: INTERNAL MEDICINE

## 2021-03-04 PROCEDURE — 99214 OFFICE O/P EST MOD 30 MIN: CPT

## 2021-03-04 RX ORDER — AMLODIPINE BESYLATE 5 MG/1
5 TABLET ORAL DAILY
Qty: 90 TABLET | Refills: 1 | Status: SHIPPED | OUTPATIENT
Start: 2021-03-04 | End: 2021-08-02 | Stop reason: SDUPTHER

## 2021-03-04 RX ORDER — AMIODARONE HYDROCHLORIDE 100 MG/1
100 TABLET ORAL DAILY
Qty: 30 TABLET | Refills: 3 | Status: SHIPPED | OUTPATIENT
Start: 2021-03-04 | End: 2021-03-23 | Stop reason: SDUPTHER

## 2021-03-04 NOTE — TELEPHONE ENCOUNTER
Patient seen in office on 3/4/21 for HTN Medication adjustments were made. See office note. Patient will check BP at home. Goal target for under 984 systolic. Patient to call Monday (or if concerned) after checking blood pressure at home. Patient verbalizes understatement and will go to ER if Blood pressure remains high or if chest pain or dizziness returns.

## 2021-03-04 NOTE — PROGRESS NOTES
CC: follow for chronic diastolic HF    HPI:  Is here for post hospitalization follow up. She was admitted last month with atrial fibrillation with RVR and acute on chronic diastolic congestive heart failure. Spontaneously converted to sinus rhythm during the hospitalization. Started on p.o. amiodarone on discharge. Today she remains in normal sinus rhythm. Her blood pressure however is significantly high, greater than 102 systolic in office today. At home it is running 150-160. She is always high in the office during previous visits as well. He reports somewhat chest discomfort due to elevated blood pressure  No palpitations, dyspnea, orthopnea lower extremity edema  Reports dizziness which is likely attributed to gabapentin and she has already stopped that. No syncope. Past Medical History:   has a past medical history of Allergic rhinitis, Asthma, Atrial fibrillation with RVR ([address] Basal cell carcinoma of cheek, Basal cell carcinoma of leg, CAD (coronary artery disease), Central retinal artery occlusion, Cerebral artery occlusion with cerebral infarction [facility]), Cerebrovascular disease, CHF (congestive heart failure) ([address] Diverticulosis, Dyslipidemia, Elevated antinuclear antibody (ZAIRA) level, Esophagitis, Foraminal stenosis of lumbar region, Hyperlipidemia, Hypertension, IBS (irritable bowel syndrome), Iron deficiency anemia, Iron deficiency anemia due to chronic blood loss, Junctional bradycardia, Migraine, Mild CAD, Mitral regurgitation, Obesity, CHICO (obstructive sleep apnea), Osteoarthritis, PMR (polymyalgia rheumatica) ([address] Pneumonia, Premature atrial contractions, Pulmonary hypertension ([address] Restrictive lung disease, Type II or unspecified type diabetes mellitus without mention of complication, not stated as uncontrolled, and Vitreous floaters. Past Surgical History:   has a past surgical history that includes Appendectomy (1952); Hysterectomy (Approx 1983);  Cataract removal (Bilateral, 08/10); malignant skin lesion excision (Right, 12/10 and 04/11); malignant skin lesion excision (Right, 02/2012); Colonoscopy (05/16/2011); other surgical history (09/06/2011); Cholecystectomy; Endoscopy, colon, diagnostic; eye surgery; Cardiac catheterization (2014); other surgical history (3/22/16); other surgical history (Right, 4/26/16); Cystoscopy (Right, 2/6/2019); Cystoscopy (Right, 2/27/2019); Colonoscopy (N/A, 9/26/2019); Upper gastrointestinal endoscopy (05/16/2011); Upper gastrointestinal endoscopy (6/22/15); and Upper gastrointestinal endoscopy (N/A, 9/26/2019). Home Medications:  Prior to Admission medications    Medication Sig Start Date End Date Taking? Authorizing Provider   amiodarone (PACERONE) 100 MG tablet Take 1 tablet by mouth daily 3/4/21  Yes Nela Chaney MD   amLODIPine (NORVASC) 5 MG tablet Take 1 tablet by mouth daily 3/4/21  Yes Nela Chaney MD   apixaban (ELIQUIS) 5 MG TABS tablet Take 1 tablet by mouth 2 times daily 2/23/21 8/22/21 Yes INGRID Loco CNP   gabapentin (NEURONTIN) 300 MG capsule Take 1 capsule by mouth nightly for 90 days. 2/23/21 5/24/21 Yes INGRID Loco CNP   gabapentin (NEURONTIN) 100 MG capsule Take 1 capsule by mouth daily for 90 days.  2/23/21 5/24/21 Yes INGRID Loco CNP   predniSONE (DELTASONE) 1 MG tablet Take 4 tablets by mouth daily 2/23/21 5/24/21 Yes INGRID Loco CNP   metoprolol tartrate (LOPRESSOR) 25 MG tablet Take 1 tablet by mouth 2 times daily 2/11/21  Yes Kelby Olivo   insulin glargine (LANTUS SOLOSTAR) 100 UNIT/ML injection pen INJECT 26 UNITS bid 2/8/21  Yes INGRID Vogel CNP   simvastatin (ZOCOR) 20 MG tablet TAKE 1 TABLET NIGHTLY 1/18/21  Yes Ambrose Adrian, APRN - CNP   insulin lispro, 1 Unit Dial, (HUMALOG KWIKPEN) 100 UNIT/ML SOPN INJECT 8 UNITS UNDER THE SKIN FOUR TIMES A DAY (BEFORE MEALS AND BEDTIME SNACK ) 1/11/21  Yes INGRID Loco - unanticipated weight loss. There's been No change in energy level, No change in activity level. Eyes: No visual changes or diplopia. No scleral icterus. ENT: No Headaches, hearing loss or vertigo. No mouth sores or sore throat. Cardiovascular: as hpi  Respiratory: as hpi  Gastrointestinal: No abdominal pain, appetite loss, blood in stools. No change in bowel or bladder habits. Genitourinary: No dysuria, trouble voiding, or hematuria. Musculoskeletal:  No gait disturbance, No weakness or joint complaints. Integumentary: No rash or pruritis. Neurological: No headache, diplopia, change in muscle strength, numbness or tingling. No change in gait, balance, coordination, mood, affect, memory, mentation, behavior. Psychiatric: No new anxiety or depression. Endocrine: No temperature intolerance. No excessive thirst, fluid intake, or urination. No tremor. Hematologic/Lymphatic: No abnormal bruising or bleeding, blood clots or swollen lymph nodes. Allergic/Immunologic: No nasal congestion or hives. Physical Exam:  BP (!) 219/58 (Site: Right Upper Arm, Position: Sitting, Cuff Size: Large Adult)   Pulse 61   Ht 4' 10.5\" (1.486 m)   Wt 188 lb (85.3 kg)   BMI 38.62 kg/m²   General appearance: alert and cooperative with exam  HEENT: Head: Normocephalic, no lesions, without obvious abnormality.   Eyes: PERRL, EOMI  Ears: Not obvious deformations or lack of hearing  Neck: no carotid bruit, no JVD  Lungs: clear to auscultation bilaterally  Heart: regular rate and rhythm, S1, S2 normal, no murmur, click, rub or gallop  Abdomen: soft, non-tender; bowel sounds normal; no masses,  no organomegaly  Extremities: extremities normal, atraumatic, no cyanosis or edema  Neurologic: Mental status: Alert, oriented, thought content appropriate  Skin: WNL for age and condition, no obvious rashes or leasions    Cardiac Data:  EKG 3/4/21: sinus rhythm, nonspecific st-t abn    Echo 2/9/21  Left ventricle is normal in size Global left ventricular systolic function  is normal Estimated ejection fraction is 55-60 % . Grade II (moderate) left ventricular diastolic dysfunction. Left atrial dilatation. Right atrium is mildly dilated . Aortic valve is sclerotic but opens well. No aortic insufficiency. Mitral annular calcification is seen. Mild mitral regurgitation. No obvious valvular abnormality. Mild tricuspid regurgitation. No pulmonary hypertension. Estimated right ventricular systolic pressure is  33PBIV. Echo 7/2019:  Limited Echo for LVEF  Global left ventricular systolic function is normal. Estimated ejection  fraction is 55-60 % . Calculated EF via heart model is 58 %. No wall motion abnormality seen. Cardiac cath 2015  Minimal CAD     Labs:   Lab Results   Component Value Date    CHOL 185 06/02/2020    TRIG 196 (H) 06/02/2020    HDL 67 06/02/2020    LDLCHOLESTEROL 79 06/02/2020    VLDL NOT REPORTED (H) 06/02/2020    CHOLHDLRATIO 2.8 06/02/2020       Lab Results   Component Value Date     02/11/2021    K 3.9 02/11/2021     02/11/2021    CO2 26 02/11/2021    BUN 14 02/11/2021    CREATININE 0.99 (H) 02/11/2021    GLUCOSE 86 02/11/2021    CALCIUM 9.1 02/11/2021    PROT 6.6 02/09/2021    LABALBU 3.8 02/09/2021    BILITOT 0.37 02/09/2021    ALKPHOS 81 02/09/2021    AST 15 02/09/2021    ALT 14 02/09/2021    LABGLOM 53 (L) 02/11/2021    GFRAA >60 02/11/2021         IMPRESSION & RECOMMENDATIONS:    1. Atrial fibrillation, paroxysmal, currently in NSR, CHADS-VASc: 5. Recent admission to hospital with A. fib with RVR, currently normal sinus rhythm. Continue metoprolol and Eliquis. Decrease amiodarone 200 mg daily. In future we will try to switch amiodarone to Rythmol. 2. HTN-suboptimally controlled. Extremely elevated in the office today, at home she brought the log and is running 1 47-1 60 systolic. Add Norvasc 5 mg daily. Patient to monitor her blood pressure closely today.   If it is persistently greater than 862 systolic, she will go to ER for IV antihypertensives. She verbalized understanding. She is also calling her office in 2 days with the blood pressure log.    3. Chronic HFpEF- stable currently, no signs of volume overload on examination, continue current dose lasix, and can take extra lasix prn.     4. HLP- at goal. Continue statin. 5. DM2- per pcp    6. CHICO- not on cpap. Recommended compliance. The patient is to continue heart healthy diet, weight loss and exercise as tolerated. Patient's medications and side effects were discussed. Medication refills were provided if needed. Follow up appointment timing was discussed. All questions and concerns were addressed to patient's satisfaction. The patient is to follow up in 3 months or sooner if necessary. Thank you for allowing me to participate in the care of this patient, please do not hesitate to call if you have any questions. Elvia Colon MD   [facility]  [facility]  [phone]

## 2021-03-05 NOTE — TELEPHONE ENCOUNTER
132/80 last night before bed; 178/74 this morning when she awoke; 1000 132/74 ;  1100 162/62;  1300  134/47    Pt calling in for her BP readings

## 2021-03-08 ENCOUNTER — OFFICE VISIT (OUTPATIENT)
Dept: INTERNAL MEDICINE | Age: 86
End: 2021-03-08
Payer: MEDICARE

## 2021-03-08 ENCOUNTER — HOSPITAL ENCOUNTER (OUTPATIENT)
Dept: LAB | Age: 86
Discharge: HOME | End: 2021-03-08
Payer: MEDICARE

## 2021-03-08 ENCOUNTER — TELEPHONE (OUTPATIENT)
Dept: INTERNAL MEDICINE | Age: 86
End: 2021-03-08

## 2021-03-08 ENCOUNTER — HOSPITAL ENCOUNTER (OUTPATIENT)
Age: 86
Setting detail: SPECIMEN
Discharge: HOME | End: 2021-03-08
Payer: MEDICARE

## 2021-03-08 VITALS
HEART RATE: 60 BPM | SYSTOLIC BLOOD PRESSURE: 138 MMHG | RESPIRATION RATE: 16 BRPM | OXYGEN SATURATION: 95 % | DIASTOLIC BLOOD PRESSURE: 72 MMHG | TEMPERATURE: 96.8 F | BODY MASS INDEX: 38.26 KG/M2 | HEIGHT: 59 IN | WEIGHT: 189.8 LBS

## 2021-03-08 DIAGNOSIS — M35.3 PMR (POLYMYALGIA RHEUMATICA) (HCC): ICD-10-CM

## 2021-03-08 DIAGNOSIS — D50.0 IRON DEFICIENCY ANEMIA SECONDARY TO BLOOD LOSS (CHRONIC): ICD-10-CM

## 2021-03-08 DIAGNOSIS — R53.83 FATIGUE, UNSPECIFIED TYPE: ICD-10-CM

## 2021-03-08 DIAGNOSIS — I48.91 ATRIAL FIBRILLATION, NEW ONSET (HCC): ICD-10-CM

## 2021-03-08 DIAGNOSIS — R35.0 URINARY FREQUENCY: Primary | ICD-10-CM

## 2021-03-08 DIAGNOSIS — E11.9 CONTROLLED TYPE 2 DIABETES MELLITUS WITHOUT COMPLICATION, WITH LONG-TERM CURRENT USE OF INSULIN (HCC): ICD-10-CM

## 2021-03-08 DIAGNOSIS — Z79.4 CONTROLLED TYPE 2 DIABETES MELLITUS WITHOUT COMPLICATION, WITH LONG-TERM CURRENT USE OF INSULIN (HCC): ICD-10-CM

## 2021-03-08 DIAGNOSIS — I10 ESSENTIAL HYPERTENSION: ICD-10-CM

## 2021-03-08 DIAGNOSIS — I51.89 DIASTOLIC DYSFUNCTION: ICD-10-CM

## 2021-03-08 DIAGNOSIS — E11.21 TYPE 2 DIABETES WITH NEPHROPATHY (HCC): Primary | ICD-10-CM

## 2021-03-08 DIAGNOSIS — R35.0 URINARY FREQUENCY: ICD-10-CM

## 2021-03-08 LAB
-: ABNORMAL
ABSOLUTE EOS #: 0.22 K/UL (ref 0–0.44)
ABSOLUTE IMMATURE GRANULOCYTE: 0.08 K/UL (ref 0–0.3)
ABSOLUTE LYMPH #: 2.99 K/UL (ref 1.1–3.7)
ABSOLUTE MONO #: 1.02 K/UL (ref 0.1–1.2)
AMORPHOUS: ABNORMAL
ANION GAP SERPL CALCULATED.3IONS-SCNC: 11 MMOL/L (ref 9–17)
BACTERIA: ABNORMAL
BASOPHILS # BLD: 1 % (ref 0–2)
BASOPHILS ABSOLUTE: 0.09 K/UL (ref 0–0.2)
BILIRUBIN URINE: NEGATIVE
BUN BLDV-MCNC: 20 MG/DL (ref 8–23)
BUN/CREAT BLD: 19 (ref 9–20)
CALCIUM SERPL-MCNC: 10.6 MG/DL (ref 8.6–10.4)
CASTS UA: ABNORMAL /LPF (ref 0–2)
CHLORIDE BLD-SCNC: 101 MMOL/L (ref 98–107)
CO2: 29 MMOL/L (ref 20–31)
COLOR: ABNORMAL
COMMENT UA: ABNORMAL
CREAT SERPL-MCNC: 1.07 MG/DL (ref 0.5–0.9)
CRYSTALS, UA: ABNORMAL /HPF
DIFFERENTIAL TYPE: ABNORMAL
EOSINOPHILS RELATIVE PERCENT: 2 % (ref 1–4)
EPITHELIAL CELLS UA: ABNORMAL /HPF (ref 0–5)
ESTIMATED AVERAGE GLUCOSE: 166 MG/DL
GFR AFRICAN AMERICAN: 59 ML/MIN
GFR NON-AFRICAN AMERICAN: 49 ML/MIN
GFR SERPL CREATININE-BSD FRML MDRD: ABNORMAL ML/MIN/{1.73_M2}
GFR SERPL CREATININE-BSD FRML MDRD: ABNORMAL ML/MIN/{1.73_M2}
GLUCOSE BLD-MCNC: 114 MG/DL (ref 70–99)
GLUCOSE URINE: NEGATIVE
HBA1C MFR BLD: 7.4 % (ref 4–6)
HCT VFR BLD CALC: 45.9 % (ref 36.3–47.1)
HEMOGLOBIN: 13.8 G/DL (ref 11.9–15.1)
IMMATURE GRANULOCYTES: 1 %
KETONES, URINE: NEGATIVE
LEUKOCYTE ESTERASE, URINE: NEGATIVE
LYMPHOCYTES # BLD: 24 % (ref 24–43)
MCH RBC QN AUTO: 26.7 PG (ref 25.2–33.5)
MCHC RBC AUTO-ENTMCNC: 30.1 G/DL (ref 25.2–33.5)
MCV RBC AUTO: 88.8 FL (ref 82.6–102.9)
MONOCYTES # BLD: 8 % (ref 3–12)
MUCUS: ABNORMAL
NITRITE, URINE: NEGATIVE
NRBC AUTOMATED: 0 PER 100 WBC
OTHER OBSERVATIONS UA: ABNORMAL
PDW BLD-RTO: 17.2 % (ref 11.8–14.4)
PH UA: 5 (ref 5–6)
PLATELET # BLD: 215 K/UL (ref 138–453)
PLATELET ESTIMATE: ABNORMAL
PMV BLD AUTO: 12.9 FL (ref 8.1–13.5)
POTASSIUM SERPL-SCNC: 4.6 MMOL/L (ref 3.7–5.3)
PROTEIN UA: NEGATIVE
RBC # BLD: 5.17 M/UL (ref 3.95–5.11)
RBC # BLD: ABNORMAL 10*6/UL
RBC UA: ABNORMAL /HPF (ref 0–4)
RENAL EPITHELIAL, UA: ABNORMAL /HPF
SEDIMENTATION RATE, ERYTHROCYTE: 17 MM (ref 0–30)
SEG NEUTROPHILS: 64 % (ref 36–65)
SEGMENTED NEUTROPHILS ABSOLUTE COUNT: 7.96 K/UL (ref 1.5–8.1)
SODIUM BLD-SCNC: 141 MMOL/L (ref 135–144)
SPECIFIC GRAVITY UA: 1.02 (ref 1.01–1.02)
TRICHOMONAS: ABNORMAL
TSH SERPL DL<=0.05 MIU/L-ACNC: 1.86 MIU/L (ref 0.3–5)
TURBIDITY: ABNORMAL
URINE HGB: ABNORMAL
UROBILINOGEN, URINE: NORMAL
WBC # BLD: 12.4 K/UL (ref 3.5–11.3)
WBC # BLD: ABNORMAL 10*3/UL
WBC UA: ABNORMAL /HPF (ref 0–4)
YEAST: ABNORMAL

## 2021-03-08 PROCEDURE — 87077 CULTURE AEROBIC IDENTIFY: CPT

## 2021-03-08 PROCEDURE — 81001 URINALYSIS AUTO W/SCOPE: CPT

## 2021-03-08 PROCEDURE — 99214 OFFICE O/P EST MOD 30 MIN: CPT | Performed by: NURSE PRACTITIONER

## 2021-03-08 PROCEDURE — 84443 ASSAY THYROID STIM HORMONE: CPT

## 2021-03-08 PROCEDURE — 83036 HEMOGLOBIN GLYCOSYLATED A1C: CPT

## 2021-03-08 PROCEDURE — 85651 RBC SED RATE NONAUTOMATED: CPT

## 2021-03-08 PROCEDURE — 85025 COMPLETE CBC W/AUTO DIFF WBC: CPT

## 2021-03-08 PROCEDURE — 87086 URINE CULTURE/COLONY COUNT: CPT

## 2021-03-08 PROCEDURE — 36415 COLL VENOUS BLD VENIPUNCTURE: CPT

## 2021-03-08 PROCEDURE — 80048 BASIC METABOLIC PNL TOTAL CA: CPT

## 2021-03-08 PROCEDURE — 3051F HG A1C>EQUAL 7.0%<8.0%: CPT | Performed by: NURSE PRACTITIONER

## 2021-03-08 PROCEDURE — 87186 SC STD MICRODIL/AGAR DIL: CPT

## 2021-03-08 NOTE — PROGRESS NOTES
Levbid    Penicillins Rash     Received Rocephin in past and tolerated       Past Medical History:   Diagnosis Date    Allergic rhinitis     Asthma     PFT's, 04/06, actually showed mild restrictive defect.  Atrial fibrillation with RVR ([facility] [address]     Hospitalized [facility] 2/8/21-2/11/21    Basal cell carcinoma of cheek 2007    Right cheek    Basal cell carcinoma of leg     right leg    CAD (coronary artery disease)     With 40% stenosis of the LAD, 07/10, ejection fraction 60%. Repeat heart cath9/14 with 4050 percent LAD lesion first diagonal 50% lesion rec med Rx    Central retinal artery occlusion     Right side November 2014 status post TPA    Cerebral artery occlusion with cerebral infarction ([facility] [address] 11/24/2014    Cerebrovascular disease     50-79% stenosis on left on ultrasound 11/14    CHF (congestive heart failure) ([facility])     Echo 1/15 moderate MR, severe TR, RVSP 76, grade 2 diastolic dysfunction    Diverticulosis     AVM on colonoscopy, 05/11    Dyslipidemia     Elevated antinuclear antibody (ZAIRA) level     History of    Esophagitis 05/2011    Gastritis/esophagitis on EGD, Dr. Ousmane Scott. Repeat EGD6/15 Gastritis    Foraminal stenosis of lumbar region     Moderate  Right L4/L5 neuroforaminal stenosis, Dr Yajaira Castillo following. MRI 2/16 [place]. Moderate foraminal stenosis mild to moderate central canal stenosis    Hyperlipidemia     Hypertension     IBS (irritable bowel syndrome)     GI consultation with Dr. Kal Szymanski, GI specialist in [facility], felt that she probably has chronic functional diarrhea and recommended empiric Imodium, Pepto-Bismol or Questran first. Sprue test Neg 2011    Iron deficiency anemia     Percent sat iron 5, January 2015, ferritin 40 1 /15, FOBT positive  EGD 6/15  Gastritis, IV iron 9/15x3 effective,  colonoscopy deferred by Dr. Ousmane Scott. --Prior colonoscopy 2011 with severe diverticulosis and telangiectasia.   Trial iron solution in Orange juice 12/16    Iron deficiency anemia due to chronic blood loss 09/08/2015    Junctional bradycardia     Symptomatic, resolved with discontinuation of Verapamil, 05/09. 1.1) Echocardiogram: LAE, LVH, EF 50%, mild MR, diastolic. 1.2) Dr. Tyler Duel evaluation. 1.3.) Persantine stress test negative, 05/09.  Migraine     Mild CAD     cath 10/15    Mitral regurgitation     Moderate to severe on echocardiogram September 2015.   Right pressure 76 grade 2 diastolic dysfunction,  echo 25/86 grade 2 diastolic dysfunction mild to moderate MR RVSP 56 aortic sclerosis    Obesity     CHICO (obstructive sleep apnea)     CPAP 14 2/15 initiation  AHI 7  mild CHICO intolerant CPAP    Osteoarthritis     PMR (polymyalgia rheumatica) (HCC)     Pneumonia     Premature atrial contractions     Pulmonary hypertension (HCC)     -Moderate on echo November 2014    Restrictive lung disease     Mild on PFTs 11/14    Type II or unspecified type diabetes mellitus without mention of complication, not stated as uncontrolled     Vitreous floaters        Past Surgical History:   Procedure Laterality Date    APPENDECTOMY  1951    CARDIAC CATHETERIZATION  2014    CATARACT REMOVAL Bilateral 08/10    CHOLECYSTECTOMY      COLONOSCOPY  05/16/2011    COLONOSCOPY N/A 9/26/2019    severe diverticulosis, benign rectal polyp, Dr. Derian Wang Right 2/6/2019    Cysto with right stent insertion and villareal catheter performed by Sascha Canela MD at [address] Right 2/27/2019    CYSTO right stent removal  Right Ureteroscopy Holmium Laser right stent exchange performed by Sascha Canela MD at [facility]. [address], COLON, DIAGNOSTIC      EYE SURGERY      HYSTERECTOMY  Approx 1983    MALIGNANT SKIN LESION EXCISION Right 12/10 and 04/11    Basal CellRemoved from right neck    MALIGNANT SKIN LESION EXCISION Right 02/2012    Basal Cell Removed from right leg    OTHER SURGICAL HISTORY  09/06/2011    capsule endoscopy    OTHER SURGICAL HISTORY  3/22/16    right L4 and L5 TFE    OTHER SURGICAL HISTORY Right 4/26/16    L4, L5 TFE    UPPER GASTROINTESTINAL ENDOSCOPY  05/16/2011    UPPER GASTROINTESTINAL ENDOSCOPY  6/22/15    mild gastritis (Dr. Saima Redmond)    UPPER GASTROINTESTINAL ENDOSCOPY N/A 9/26/2019    mild to moderate inflammation, Dr. Saima Redmond       Current Outpatient Medications on File Prior to Visit   Medication Sig Dispense Refill    amiodarone (PACERONE) 100 MG tablet Take 1 tablet by mouth daily 30 tablet 3    amLODIPine (NORVASC) 5 MG tablet Take 1 tablet by mouth daily 90 tablet 1    apixaban (ELIQUIS) 5 MG TABS tablet Take 1 tablet by mouth 2 times daily 180 tablet 1    predniSONE (DELTASONE) 1 MG tablet Take 4 tablets by mouth daily 360 tablet 0    metoprolol tartrate (LOPRESSOR) 25 MG tablet Take 1 tablet by mouth 2 times daily 60 tablet 3    insulin glargine (LANTUS SOLOSTAR) 100 UNIT/ML injection pen INJECT 26 UNITS bid 30 mL 4    simvastatin (ZOCOR) 20 MG tablet TAKE 1 TABLET NIGHTLY 90 tablet 3    insulin lispro, 1 Unit Dial, (HUMALOG KWIKPEN) 100 UNIT/ML SOPN INJECT 8 UNITS UNDER THE SKIN FOUR TIMES A DAY (BEFORE MEALS AND BEDTIME SNACK ) 15 mL 6    nystatin (MYCOSTATIN) 274343 UNIT/GM cream Apply topically 2 times daily.  1 Tube 0    potassium chloride (KLOR-CON M) 20 MEQ extended release tablet Take 1 tablet by mouth 2 times daily 180 tablet 3    albuterol (PROVENTIL) (2.5 MG/3ML) 0.083% nebulizer solution Take 3 mLs by nebulization every 4 hours as needed for Wheezing or Shortness of Breath 30 each 1    Liraglutide (VICTOZA) 18 MG/3ML SOPN SC injection Inject 1.8 mg into the skin daily 18 mL 3    furosemide (LASIX) 40 MG tablet TAKE 1 TABLET DAILY 90 tablet 3    Polyethylene Glycol 400 (BLINK TEARS) 0.25 % SOLN Place into both eyes as needed (dry eyes)       acetaminophen (TYLENOL) 500 MG tablet Take 500 mg by mouth daily      omeprazole (PRILOSEC) 20 MG capsule Take 20 mg by mouth Daily  30 capsule 3    Cholecalciferol (VITAMIN D3) 2000 UNITS CAPS Take 2,000 Units by mouth daily        No current facility-administered medications on file prior to visit. Social History     Socioeconomic History    Marital status:      Spouse name: Kendal Lopez Number of children: 3    Years of education: 15    Highest education level: Some college, no degree   Occupational History    Not on file   Social Needs    Financial resource strain: Not hard at all   [place] insecurity     Worry: Never true     Inability: Never true   [employer] needs     Medical: No     Non-medical: No   Tobacco Use    Smoking status: Never Smoker    Smokeless tobacco: Never Used    Tobacco comment: amish rrt 5/28/2019   Substance and Sexual Activity    Alcohol use: No     Alcohol/week: 0.0 standard drinks    Drug use: No    Sexual activity: Not on file   Lifestyle    Physical activity     Days per week: 0 days     Minutes per session: 0 min    Stress: Not at all   Relationships    Social connections     Talks on phone: More than three times a week     Gets together: Once a week     Attends [employer] service: More than 4 times per year     Active member of club or organization: Yes     Attends meetings of clubs or organizations: More than 4 times per year     Relationship status:     Intimate partner violence     Fear of current or ex partner: Not on file     Emotionally abused: Not on file     Physically abused: Not on file     Forced sexual activity: Not on file   Other Topics Concern    Not on file   Social History Narrative    9/25/2019 No SDOH needs identified. She is receiving Meals on Wheels. Review of Systems   Constitutional: Positive for activity change and fatigue. Negative for appetite change, chills, fever and unexpected weight change. HENT: Negative for congestion, dental problem, ear discharge, ear pain, facial swelling, hearing loss, postnasal drip, rhinorrhea, sinus pressure, sore throat and trouble swallowing.     Eyes: Negative for pain and visual disturbance. Respiratory: Negative for cough, chest tightness, shortness of breath and wheezing. Cardiovascular: Negative for chest pain, palpitations and leg swelling. Gastrointestinal: Negative for abdominal pain, blood in stool, constipation, diarrhea, nausea and vomiting. Endocrine: Negative for cold intolerance, heat intolerance and polyuria. Genitourinary: Positive for frequency and urgency. Negative for decreased urine volume, difficulty urinating, dyspareunia, dysuria, flank pain, hematuria, pelvic pain and vaginal bleeding. Musculoskeletal: Negative for arthralgias, gait problem, myalgias, neck pain and neck stiffness. Skin: Negative for color change, rash and wound. Neurological: Positive for dizziness. Negative for tremors, seizures, weakness, light-headedness, numbness and headaches. Psychiatric/Behavioral: Negative for confusion and hallucinations. The patient is not nervous/anxious. Physical Exam  Vitals signs and nursing note reviewed. Constitutional:       General: She is not in acute distress. Appearance: Normal appearance. She is well-developed. She is not diaphoretic. HENT:      Head: Normocephalic and atraumatic. Right Ear: External ear normal.      Left Ear: External ear normal.   Eyes:      General:         Right eye: No discharge. Left eye: No discharge. Neck:      Trachea: No tracheal deviation. Cardiovascular:      Rate and Rhythm: Normal rate and regular rhythm. Heart sounds: Normal heart sounds. No murmur. No friction rub. No gallop. Pulmonary:      Effort: Pulmonary effort is normal. No respiratory distress. Breath sounds: Normal breath sounds. No stridor. No wheezing, rhonchi or rales. Chest:      Chest wall: No tenderness. Abdominal:      General: Bowel sounds are normal. There is no distension. Palpations: Abdomen is soft. Tenderness: There is no abdominal tenderness. Musculoskeletal:         General: No swelling. Right lower leg: Edema present. Left lower leg: Edema (Chronic +1 edema to bilateral lower extremities) present. Skin:     General: Skin is warm and dry. Capillary Refill: Capillary refill takes less than 2 seconds. Coloration: Skin is not jaundiced or pale. Findings: No rash. Neurological:      General: No focal deficit present. Mental Status: She is alert and oriented to person, place, and time. Cranial Nerves: No cranial nerve deficit. Sensory: No sensory deficit. Coordination: Coordination normal.   Psychiatric:         Mood and Affect: Mood normal.         Behavior: Behavior normal.         Thought Content: Thought content normal.         Judgment: Judgment normal.       Vitals:    03/08/21 1329   BP: 138/72   Site: Left Upper Arm   Position: Sitting   Cuff Size: Large Adult   Pulse: 60   Resp: 16   Temp: 96.8 °F (36 °C)   TempSrc: Temporal   SpO2: 95%   Weight: 189 lb 12.8 oz (86.1 kg)   Height: 4' 10.5\" (1.486 m)       Assessment:  1. Urinary frequency  UA today. Increase water intake  - Urinalysis Reflex to Culture; Future    2. Fatigue, unspecified type  Labs today including urine, CBC, BMP TSH and sed rate. Patient with recent reduction and prednisone for her polymyalgia rheumatica    3. Essential hypertension  Stable at present, continue on the Norvasc recently started by cardiology. 4. Diastolic dysfunction  No signs of fluid overload    5. Iron deficiency anemia secondary to blood loss (chronic)  Follow-up labs today    6. Controlled type 2 diabetes mellitus without complication, with long-term current use of insulin ([facility])  Blood sugars have been stable. No hypoglycemic episodes. Continues to follow with diabetic nurse practitioner    7. Atrial fibrillation, new onset ([facility]  Continues on Eliquis. Recently reduced to 100 mg of amiodarone daily. Continues on metoprolol twice a day.   No episodes of bradycardia      Plan:  As noted above. Will await labs/urine. Reach out to cardiology to discuss stopping amiodarone and changing to different antiarrhythmic. If patient with significant decline chest discomfort shortness of breath straight to the ER. Patient stated understanding  Follow up for routine visit. Call sooner with concerns prior.     Electronically signed by INGRID Green CNP on 3/8/2021 at 4:55 PM

## 2021-03-08 NOTE — TELEPHONE ENCOUNTER
Patient saw ROBERT Toribio today. She is still feeling \"weak and shaky\" despite decrease in amiodarone to 100mg daily. Patient reports feeling this way when on this medicine in the past. Please advise possible alternative.

## 2021-03-09 DIAGNOSIS — R35.0 URINARY FREQUENCY: Primary | ICD-10-CM

## 2021-03-09 DIAGNOSIS — R35.0 URINARY FREQUENCY: ICD-10-CM

## 2021-03-09 ASSESSMENT — ENCOUNTER SYMPTOMS
ABDOMINAL PAIN: 0
COUGH: 0
CHEST TIGHTNESS: 0
TROUBLE SWALLOWING: 0
CONSTIPATION: 0
VOMITING: 0
SORE THROAT: 0
SHORTNESS OF BREATH: 0
BLOOD IN STOOL: 0
SINUS PRESSURE: 0
DIARRHEA: 0
WHEEZING: 0
COLOR CHANGE: 0
RHINORRHEA: 0
EYE PAIN: 0
NAUSEA: 0
FACIAL SWELLING: 0

## 2021-03-09 NOTE — TELEPHONE ENCOUNTER
Continue amiodarone 100 mg qd and give atleast 1-2 weeks, if symptoms continue, will switch to rythmol.

## 2021-03-09 NOTE — TELEPHONE ENCOUNTER
Patient notified. She will monitor for another week and call office if she is still having symptoms. Did advise patient to go to ER for any immediate concerns.

## 2021-03-10 LAB
CULTURE: ABNORMAL
Lab: ABNORMAL
SPECIMEN DESCRIPTION: ABNORMAL

## 2021-03-12 RX ORDER — CIPROFLOXACIN 250 MG/1
250 TABLET, FILM COATED ORAL 2 TIMES DAILY
Qty: 14 TABLET | Refills: 0 | Status: CANCELLED | OUTPATIENT
Start: 2021-03-12 | End: 2021-03-19

## 2021-03-23 DIAGNOSIS — R32 URINARY INCONTINENCE, UNSPECIFIED TYPE: ICD-10-CM

## 2021-03-23 DIAGNOSIS — R35.0 URINARY FREQUENCY: Primary | ICD-10-CM

## 2021-03-23 RX ORDER — AMIODARONE HYDROCHLORIDE 100 MG/1
100 TABLET ORAL DAILY
Qty: 90 TABLET | Refills: 3 | Status: SHIPPED | OUTPATIENT
Start: 2021-03-23 | End: 2022-01-01 | Stop reason: SDUPTHER

## 2021-03-23 NOTE — TELEPHONE ENCOUNTER
Patient called in requesting a [facility]ill [facility]. Medication pended if agreeable.      Last Appt:  3/8/2021  Next Appt:   4/7/2021  Med verified in [address] [place] [address] Rd

## 2021-03-23 NOTE — TELEPHONE ENCOUNTER
If patient concern for UTI we can have her do a UA with CNS.   But agrees she needs to have ongoing follow-up with urology

## 2021-03-24 ENCOUNTER — HOSPITAL ENCOUNTER (OUTPATIENT)
Dept: LAB | Age: 86
Discharge: HOME | End: 2021-03-24
Payer: MEDICARE

## 2021-03-24 DIAGNOSIS — R32 URINARY INCONTINENCE, UNSPECIFIED TYPE: ICD-10-CM

## 2021-03-24 DIAGNOSIS — R35.0 URINARY FREQUENCY: ICD-10-CM

## 2021-03-24 PROCEDURE — 87086 URINE CULTURE/COLONY COUNT: CPT

## 2021-03-25 LAB
CULTURE: NORMAL
Lab: NORMAL
SPECIMEN DESCRIPTION: NORMAL

## 2021-03-30 ENCOUNTER — HOSPITAL ENCOUNTER (OUTPATIENT)
Dept: MAMMOGRAPHY | Age: 86
Discharge: HOME | End: 2021-04-01
Payer: MEDICARE

## 2021-03-30 DIAGNOSIS — Z12.31 OTHER SCREENING MAMMOGRAM: ICD-10-CM

## 2021-03-30 PROCEDURE — 77063 BREAST TOMOSYNTHESIS BI: CPT

## 2021-04-07 ENCOUNTER — HOSPITAL ENCOUNTER (OUTPATIENT)
Dept: LAB | Age: 86
Discharge: HOME | End: 2021-04-07
Payer: MEDICARE

## 2021-04-07 ENCOUNTER — TELEPHONE (OUTPATIENT)
Dept: INTERNAL MEDICINE | Age: 86
End: 2021-04-07

## 2021-04-07 ENCOUNTER — OFFICE VISIT (OUTPATIENT)
Dept: DIABETES SERVICES | Age: 86
End: 2021-04-07
Payer: MEDICARE

## 2021-04-07 ENCOUNTER — OFFICE VISIT (OUTPATIENT)
Dept: INTERNAL MEDICINE | Age: 86
End: 2021-04-07
Payer: MEDICARE

## 2021-04-07 ENCOUNTER — HOSPITAL ENCOUNTER (OUTPATIENT)
Dept: GENERAL RADIOLOGY | Age: 86
Discharge: HOME | End: 2021-04-09
Payer: MEDICARE

## 2021-04-07 VITALS
BODY MASS INDEX: 38.1 KG/M2 | HEIGHT: 59 IN | RESPIRATION RATE: 16 BRPM | DIASTOLIC BLOOD PRESSURE: 60 MMHG | SYSTOLIC BLOOD PRESSURE: 118 MMHG | WEIGHT: 189 LBS | HEART RATE: 72 BPM

## 2021-04-07 VITALS
HEART RATE: 72 BPM | HEIGHT: 59 IN | SYSTOLIC BLOOD PRESSURE: 118 MMHG | DIASTOLIC BLOOD PRESSURE: 60 MMHG | RESPIRATION RATE: 16 BRPM | BODY MASS INDEX: 38.1 KG/M2 | WEIGHT: 189 LBS

## 2021-04-07 DIAGNOSIS — E66.01 OBESITY, MORBID, BMI 40.0-49.9 (HCC): ICD-10-CM

## 2021-04-07 DIAGNOSIS — Z79.4 CONTROLLED TYPE 2 DIABETES MELLITUS WITHOUT COMPLICATION, WITH LONG-TERM CURRENT USE OF INSULIN (HCC): ICD-10-CM

## 2021-04-07 DIAGNOSIS — N30.90 RECURRENT CYSTITIS: ICD-10-CM

## 2021-04-07 DIAGNOSIS — E11.21 TYPE 2 DIABETES WITH NEPHROPATHY (HCC): Primary | ICD-10-CM

## 2021-04-07 DIAGNOSIS — E16.2 HYPOGLYCEMIA: ICD-10-CM

## 2021-04-07 DIAGNOSIS — I51.89 DIASTOLIC DYSFUNCTION: ICD-10-CM

## 2021-04-07 DIAGNOSIS — N28.9 KIDNEY DISEASE: ICD-10-CM

## 2021-04-07 DIAGNOSIS — I48.91 ATRIAL FIBRILLATION, UNSPECIFIED TYPE (HCC): ICD-10-CM

## 2021-04-07 DIAGNOSIS — Z71.89 DIABETES EDUCATION, ENCOUNTER FOR: ICD-10-CM

## 2021-04-07 DIAGNOSIS — M35.3 POLYMYALGIA RHEUMATICA (HCC): ICD-10-CM

## 2021-04-07 DIAGNOSIS — D50.0 IRON DEFICIENCY ANEMIA SECONDARY TO BLOOD LOSS (CHRONIC): ICD-10-CM

## 2021-04-07 DIAGNOSIS — I10 ESSENTIAL HYPERTENSION: ICD-10-CM

## 2021-04-07 DIAGNOSIS — M35.3 PMR (POLYMYALGIA RHEUMATICA) (HCC): ICD-10-CM

## 2021-04-07 DIAGNOSIS — R06.09 DOE (DYSPNEA ON EXERTION): ICD-10-CM

## 2021-04-07 DIAGNOSIS — E11.9 CONTROLLED TYPE 2 DIABETES MELLITUS WITHOUT COMPLICATION, WITH LONG-TERM CURRENT USE OF INSULIN (HCC): ICD-10-CM

## 2021-04-07 DIAGNOSIS — R06.09 DOE (DYSPNEA ON EXERTION): Primary | ICD-10-CM

## 2021-04-07 DIAGNOSIS — Z00.00 ROUTINE GENERAL MEDICAL EXAMINATION AT A HEALTH CARE FACILITY: ICD-10-CM

## 2021-04-07 DIAGNOSIS — I27.20 PULMONARY HTN (HCC): ICD-10-CM

## 2021-04-07 DIAGNOSIS — E55.9 VITAMIN D DEFICIENCY: ICD-10-CM

## 2021-04-07 DIAGNOSIS — M48.061 NEURAL FORAMINAL STENOSIS OF LUMBAR SPINE: ICD-10-CM

## 2021-04-07 DIAGNOSIS — E78.5 DYSLIPIDEMIA: ICD-10-CM

## 2021-04-07 DIAGNOSIS — E11.21 TYPE 2 DIABETES WITH NEPHROPATHY (HCC): ICD-10-CM

## 2021-04-07 DIAGNOSIS — E11.21 DIABETIC NEPHROPATHY ASSOCIATED WITH TYPE 2 DIABETES MELLITUS (HCC): ICD-10-CM

## 2021-04-07 DIAGNOSIS — J45.20 MILD INTERMITTENT ASTHMA WITHOUT COMPLICATION: ICD-10-CM

## 2021-04-07 LAB
ABSOLUTE EOS #: 0.24 K/UL (ref 0–0.44)
ABSOLUTE IMMATURE GRANULOCYTE: 0.07 K/UL (ref 0–0.3)
ABSOLUTE LYMPH #: 2.77 K/UL (ref 1.1–3.7)
ABSOLUTE MONO #: 1.02 K/UL (ref 0.1–1.2)
ANION GAP SERPL CALCULATED.3IONS-SCNC: 11 MMOL/L (ref 9–17)
BASOPHILS # BLD: 1 % (ref 0–2)
BASOPHILS ABSOLUTE: 0.08 K/UL (ref 0–0.2)
BNP INTERPRETATION: ABNORMAL
BUN BLDV-MCNC: 20 MG/DL (ref 8–23)
BUN/CREAT BLD: 18 (ref 9–20)
CALCIUM SERPL-MCNC: 10.2 MG/DL (ref 8.6–10.4)
CHLORIDE BLD-SCNC: 101 MMOL/L (ref 98–107)
CO2: 28 MMOL/L (ref 20–31)
CREAT SERPL-MCNC: 1.14 MG/DL (ref 0.5–0.9)
DIFFERENTIAL TYPE: ABNORMAL
EOSINOPHILS RELATIVE PERCENT: 2 % (ref 1–4)
GFR AFRICAN AMERICAN: 55 ML/MIN
GFR NON-AFRICAN AMERICAN: 45 ML/MIN
GFR SERPL CREATININE-BSD FRML MDRD: ABNORMAL ML/MIN/{1.73_M2}
GFR SERPL CREATININE-BSD FRML MDRD: ABNORMAL ML/MIN/{1.73_M2}
GLUCOSE BLD-MCNC: 93 MG/DL (ref 70–99)
HCT VFR BLD CALC: 44.2 % (ref 36.3–47.1)
HEMOGLOBIN: 13.3 G/DL (ref 11.9–15.1)
IMMATURE GRANULOCYTES: 1 %
LYMPHOCYTES # BLD: 27 % (ref 24–43)
MCH RBC QN AUTO: 26.3 PG (ref 25.2–33.5)
MCHC RBC AUTO-ENTMCNC: 30.1 G/DL (ref 25.2–33.5)
MCV RBC AUTO: 87.4 FL (ref 82.6–102.9)
MONOCYTES # BLD: 10 % (ref 3–12)
NRBC AUTOMATED: 0 PER 100 WBC
PDW BLD-RTO: 17.2 % (ref 11.8–14.4)
PLATELET # BLD: 231 K/UL (ref 138–453)
PLATELET ESTIMATE: ABNORMAL
PMV BLD AUTO: 11.8 FL (ref 8.1–13.5)
POTASSIUM SERPL-SCNC: 3.9 MMOL/L (ref 3.7–5.3)
PRO-BNP: 359 PG/ML
RBC # BLD: 5.06 M/UL (ref 3.95–5.11)
RBC # BLD: ABNORMAL 10*6/UL
SEDIMENTATION RATE, ERYTHROCYTE: 21 MM (ref 0–30)
SEG NEUTROPHILS: 59 % (ref 36–65)
SEGMENTED NEUTROPHILS ABSOLUTE COUNT: 6.03 K/UL (ref 1.5–8.1)
SODIUM BLD-SCNC: 140 MMOL/L (ref 135–144)
WBC # BLD: 10.2 K/UL (ref 3.5–11.3)
WBC # BLD: ABNORMAL 10*3/UL

## 2021-04-07 PROCEDURE — 99214 OFFICE O/P EST MOD 30 MIN: CPT | Performed by: NURSE PRACTITIONER

## 2021-04-07 PROCEDURE — 85651 RBC SED RATE NONAUTOMATED: CPT

## 2021-04-07 PROCEDURE — 85025 COMPLETE CBC W/AUTO DIFF WBC: CPT

## 2021-04-07 PROCEDURE — 83880 ASSAY OF NATRIURETIC PEPTIDE: CPT

## 2021-04-07 PROCEDURE — 36415 COLL VENOUS BLD VENIPUNCTURE: CPT

## 2021-04-07 PROCEDURE — 80048 BASIC METABOLIC PNL TOTAL CA: CPT

## 2021-04-07 PROCEDURE — 3051F HG A1C>EQUAL 7.0%<8.0%: CPT | Performed by: NURSE PRACTITIONER

## 2021-04-07 PROCEDURE — 95251 CONT GLUC MNTR ANALYSIS I&R: CPT | Performed by: NURSE PRACTITIONER

## 2021-04-07 PROCEDURE — 71046 X-RAY EXAM CHEST 2 VIEWS: CPT

## 2021-04-07 PROCEDURE — 99214 OFFICE O/P EST MOD 30 MIN: CPT

## 2021-04-07 RX ORDER — INSULIN GLARGINE 100 [IU]/ML
INJECTION, SOLUTION SUBCUTANEOUS
Qty: 30 ML | Refills: 4
Start: 2021-04-07 | End: 2021-01-01 | Stop reason: SDUPTHER

## 2021-04-07 ASSESSMENT — ENCOUNTER SYMPTOMS
WHEEZING: 0
ABDOMINAL PAIN: 0
EYE PAIN: 0
TROUBLE SWALLOWING: 0
SHORTNESS OF BREATH: 0
RHINORRHEA: 0
COUGH: 0
SORE THROAT: 0
NAUSEA: 0
FACIAL SWELLING: 0
BLURRED VISION: 0
BLOOD IN STOOL: 0
DIARRHEA: 0
SINUS PRESSURE: 0
COLOR CHANGE: 0
ABDOMINAL PAIN: 0
CHEST TIGHTNESS: 0
CONSTIPATION: 0
RESPIRATORY NEGATIVE: 1
SHORTNESS OF BREATH: 0
VOMITING: 0
DIARRHEA: 0
VISUAL CHANGE: 0

## 2021-04-07 NOTE — TELEPHONE ENCOUNTER
Ludwin Doshi from pharmacy called stating they got an order for oral glucose tablets 15gram per single tab. Ludwin Doshi said they do not have 15gram glucose tabs. They have a liquid if this is what you're wanting instead? Please advise.

## 2021-04-07 NOTE — PATIENT INSTRUCTIONS
Sliding Scale Insulin for short acting insulin     Blood sugar Action     <70  Drink Juice      No extra insulin  150 - 200 2 units subcutaneous Insulin  201 - 250 4 units subcutaneous Insulin  251 - 300 6 units subcutaneous Insulin  301 - 350 8 units subcutaneous Insulin  351 - 400 10 units subcutaneous Insulin   > 400  12 units subcutaneous Insulin    Decrease lantus to 12 units twice a day

## 2021-04-07 NOTE — PROGRESS NOTES
04/07/21  John Reese  1935      Chief Complaint:   1. HILL (dyspnea on exertion)    2. Type 2 diabetes with nephropathy ([facility] [address]    3. Essential hypertension    4. Diastolic dysfunction    5. Iron deficiency anemia secondary to blood loss (chronic)    6. Controlled type 2 diabetes mellitus without complication, with long-term current use of insulin ([address]    7. PMR (polymyalgia rheumatica) ([facility])    8. Diabetic nephropathy associated with type 2 diabetes mellitus ([address]    9. Dyslipidemia    10. Kidney disease    11. Polymyalgia rheumatica ([address]    12. Neural foraminal stenosis of lumbar spine    13. Routine general medical examination at a health care facility    14. Pulmonary HTN ([facility] [address]    15. Obesity, morbid, BMI 40.0-49.9 ([facility] [address]    16. Vitamin D deficiency    17. Mild intermittent asthma without complication    18. Recurrent cystitis    19. Atrial fibrillation, unspecified type [facility])        HPI:  43-year-old patient in for 3-month follow-up of above chronic health conditions. Recently seen by diabetic nurse practitioner. Sliding scale was provided for patient. Brings in a list of her blood sugars, weight, blood pressures. All has been stable. States she has been more short of breath recently. Noticed to have increased swelling to lower extremities. Denies any change in medication recently. No missed doses. No recent change in dietary intake of sodium. Blood pressure stable in office. History of anemia. Denies any blood in stool. She denies any shortness of breath at rest.  She is continuing on her amiodarone and metoprolol and Eliquis for her atrial fib. Denies any heart palpitations. We have been weaning down on her prednisone slowly for her polymyalgia rheumatica. Previous physician states tried multiple times to wean steroid. Previously on tolerated weans. She states she is feeling okay at present with the 1 mg drop. We will follow-up labs today. She does have recurrent leg pain. States it was much better when she was on the gabapentin but after she read about it she was worried she would get addicted to the medicine. She has been completely off of it. We discussed her risk for addiction is low and would prefer her to start back on it. If her pain is better controlled she will have increase activity and better overall quality of life. She is following with urology for recurrent cystitis. Has a follow-up with them in a couple weeks. She denies any current symptoms of UTI. States she never gets dysuria or frequency but has lower pelvic discomfort. Her biggest concern at present is significant incontinence. Allergies   Allergen Reactions    Codeine      Confusion      Erythromycin      GI upset  Received zithromax in past and tolerated    Exenatide Nausea Only    Levofloxacin      GI upset    Verapamil Other (See Comments)     Junctional bradycardia    Clonidine Derivatives Rash     2009, catapres    Other Rash     Levbid    Penicillins Rash     Received Rocephin in past and tolerated       Past Medical History:   Diagnosis Date    Allergic rhinitis     Asthma     PFT's, 04/06, actually showed mild restrictive defect.  Atrial fibrillation with RVR ([address]     Hospitalized [facility] 2/8/21-2/11/21    Basal cell carcinoma of cheek 2007    Right cheek    Basal cell carcinoma of leg     right leg    CAD (coronary artery disease)     With 40% stenosis of the LAD, 07/10, ejection fraction 60%.   Repeat heart cath9/14 with 4050 percent LAD lesion first diagonal 50% lesion rec med Rx    Central retinal artery occlusion     Right side November 2014 status post TPA    Cerebral artery occlusion with cerebral infarction [facility]) 11/24/2014    Cerebrovascular disease     50-79% stenosis on left on ultrasound 11/14    CHF (congestive heart failure) ([facility])     Echo 1/15 moderate MR, severe TR, RVSP 76, grade 2 diastolic dysfunction    Diverticulosis     AVM on colonoscopy, 05/11    Dyslipidemia     Elevated antinuclear antibody (ZAIRA) level     History of    Esophagitis 05/2011    Gastritis/esophagitis on EGD, Dr. Zamzam Pearl. Repeat EGD6/15 Gastritis    Foraminal stenosis of lumbar region     Moderate  Right L4/L5 neuroforaminal stenosis, Dr Rich Perdomo following. MRI 2/16 [place]. Moderate foraminal stenosis mild to moderate central canal stenosis    Hyperlipidemia     Hypertension     IBS (irritable bowel syndrome)     GI consultation with Dr. Jessica Millard, GI specialist in [facility], felt that she probably has chronic functional diarrhea and recommended empiric Imodium, Pepto-Bismol or Questran first. Sprue test Neg 2011    Iron deficiency anemia     Percent sat iron 5, January 2015, ferritin 40 1 /15, FOBT positive  EGD 6/15  Gastritis, IV iron 9/15x3 effective,  colonoscopy deferred by Dr. Zamzam Pearl. --Prior colonoscopy 2011 with severe diverticulosis and telangiectasia. Trial iron solution in [place] juice 12/16    Iron deficiency anemia due to chronic blood loss 09/08/2015    Junctional bradycardia     Symptomatic, resolved with discontinuation of Verapamil, 05/09. 1.1) Echocardiogram: LAE, LVH, EF 50%, mild MR, diastolic. 1.2) Dr. Armen Crouch evaluation. 1.3.) Persantine stress test negative, 05/09.  Migraine     Mild CAD     cath 10/15    Mitral regurgitation     Moderate to severe on echocardiogram September 2015.   Right pressure 76 grade 2 diastolic dysfunction,  echo 22/92 grade 2 diastolic dysfunction mild to moderate MR RVSP 56 aortic sclerosis    Obesity     CHICO (obstructive sleep apnea)     CPAP 14 2/15 initiation  AHI 7  mild CHICO intolerant CPAP    Osteoarthritis     PMR (polymyalgia rheumatica) (HCC)     Pneumonia     Premature atrial contractions     Pulmonary hypertension (HCC)     -Moderate on echo November 2014    Restrictive lung disease     Mild on PFTs 11/14    Type II or unspecified type diabetes mellitus without mention of complication, not stated as uncontrolled     Vitreous floaters        Past Surgical History:   Procedure Laterality Date    APPENDECTOMY  1952    CARDIAC CATHETERIZATION  2014    CATARACT REMOVAL Bilateral 08/10    CHOLECYSTECTOMY      COLONOSCOPY  05/16/2011    COLONOSCOPY N/A 9/26/2019    severe diverticulosis, benign rectal polyp, Dr. Shanel Monte Right 2/6/2019    Cysto with right stent insertion and villareal catheter performed by Kan Lyon MD at [address] Right 2/27/2019    CYSTO right stent removal  Right Ureteroscopy Holmium Laser right stent exchange performed [facility] at [facility]      EYE SURGERY      HYSTERECTOMY  Approx 1983    MALIGNANT SKIN LESION EXCISION Right 12/10 and 04/11    Basal CellRemoved from right neck    MALIGNANT SKIN LESION EXCISION Right 02/2012    Basal Cell Removed from right leg    OTHER SURGICAL HISTORY  09/06/2011    capsule endoscopy    OTHER SURGICAL HISTORY  3/22/16    right L4 and L5 TFE    OTHER SURGICAL HISTORY Right 4/26/16    L4, L5 TFE    UPPER GASTROINTESTINAL ENDOSCOPY  05/16/2011    UPPER GASTROINTESTINAL ENDOSCOPY  6/22/15    mild gastritis (Dr. Elian Ghosh)    UPPER GASTROINTESTINAL ENDOSCOPY N/A 9/26/2019    mild to moderate inflammation, Dr. Elian Ghosh       Current Outpatient Medications on File Prior to Visit   Medication Sig Dispense Refill    insulin glargine (LANTUS SOLOSTAR) 100 UNIT/ML injection pen INJECT 12 UNITS bid 30 mL 4    Nutritional Supplements (GLUCOSE MANAGEMENT) TABS 15 grams for bs less than 70 100 tablet 3    amiodarone (PACERONE) 100 MG tablet Take 1 tablet by mouth daily 90 tablet 3    amLODIPine (NORVASC) 5 MG tablet Take 1 tablet by mouth daily 90 tablet 1    apixaban (ELIQUIS) 5 MG TABS tablet Take 1 tablet by mouth 2 times daily 180 tablet 1    predniSONE (DELTASONE) 1 MG tablet Take 4 tablets by mouth daily (Patient taking differently: Take 3 mg by mouth daily ) 360 tablet 0    metoprolol tartrate (LOPRESSOR) 25 MG tablet Take 1 tablet by mouth 2 times daily 60 tablet 3    simvastatin (ZOCOR) 20 MG tablet TAKE 1 TABLET NIGHTLY 90 tablet 3    insulin lispro, 1 Unit Dial, (HUMALOG KWIKPEN) 100 UNIT/ML SOPN INJECT 8 UNITS UNDER THE SKIN FOUR TIMES A DAY (BEFORE MEALS AND BEDTIME SNACK ) 15 mL 6    nystatin (MYCOSTATIN) 214995 UNIT/GM cream Apply topically 2 times daily. 1 Tube 0    potassium chloride (KLOR-CON M) 20 MEQ extended release tablet Take 1 tablet by mouth 2 times daily 180 tablet 3    albuterol (PROVENTIL) (2.5 MG/3ML) 0.083% nebulizer solution Take 3 mLs by nebulization every 4 hours as needed for Wheezing or Shortness of Breath 30 each 1    Liraglutide (VICTOZA) 18 MG/3ML SOPN SC injection Inject 1.8 mg into the skin daily 18 mL 3    furosemide (LASIX) 40 MG tablet TAKE 1 TABLET DAILY 90 tablet 3    Polyethylene Glycol 400 (BLINK TEARS) 0.25 % SOLN Place into both eyes as needed (dry eyes)       acetaminophen (TYLENOL) 500 MG tablet Take 500 mg by mouth daily      omeprazole (PRILOSEC) 20 MG capsule Take 20 mg by mouth Daily  30 capsule 3    Cholecalciferol (VITAMIN D3) 2000 UNITS CAPS Take 2,000 Units by mouth daily        No current facility-administered medications on file prior to visit.         Social History     Socioeconomic History    Marital status:      Spouse name: Humaira Barr Number of children: 3    Years of education: 15    Highest education level: Some college, no degree   Occupational History    Not on file   Social Needs    Financial resource strain: Not hard at all   [employer] insecurity     Worry: Never true     Inability: Never true   [employer] needs     Medical: No     Non-medical: No   Tobacco Use    Smoking status: Never Smoker    Smokeless tobacco: Never Used    Tobacco comment: amish rrt 5/28/2019   Substance and Sexual Activity    Alcohol use: No     Alcohol/week: 0.0 standard drinks    Drug use: No    Sexual activity: Not on file 04/07/21 1315   BP: 118/60   Site: Left Upper Arm   Position: Sitting   Cuff Size: Large Adult   Pulse: 72   Resp: 16   Weight: 189 lb (85.7 kg)   Height: 4' 10.5\" (1.486 m)       Assessment:  1. HILL (dyspnea on exertion)  Chest x-ray today. Labs today. Increase Lasix short-term  - CBC Auto Differential; Future  - Basic Metabolic Panel; Future  - Brain Natriuretic Peptide; Future  - XR CHEST STANDARD (2 VW); Future    2. Type 2 diabetes with nephropathy ([facility] [place] [address]  Continues to follow with diabetic nurse practitioner    3. Essential hypertension  Stable at present    4. Diastolic dysfunction  Increase Lasix as noted in #1    5. Iron deficiency anemia secondary to blood loss (chronic)  Labs stable, continues to follow with heme    6. Controlled type 2 diabetes mellitus without complication, with long-term current use of insulin ([facility] [place] [address]  As noted in #2     7. PMR (polymyalgia rheumatica) (HCC)  Sed rate today - if stable will decrease     8. Diabetic nephropathy associated with type 2 diabetes mellitus ([facility] [place] [address]  Please start gabapentin at 300 mg nightly. In 1 week may add back to 100 mg in the morning. Discussed low risk of her developing a substance abuse with gabapentin    9. Dyslipidemia  Continue on Zocor. Continue to work on diet. Remain active    10. Kidney disease  Avoid nephrotoxic drugs    11. Polymyalgia rheumatica ([facility] [place] [address]  As noted in #7  - Sedimentation Rate; Future    12. Neural foraminal stenosis of lumbar spine  Stable    14. Pulmonary HTN (HCC)  Stable, ongoing follow-up with cardiology    15. Obesity, morbid, BMI 40.0-49.9 ([facility] [place] [address]  Work on diet increase activity, weight loss    16. Vitamin D deficiency  Stable on supplemental vitamin D    17. Mild intermittent asthma without complication  Stable no recent exacerbations    18. Recurrent cystitis  Is set up to see urology    19.  Atrial fibrillation, unspecified type ([facility] [place] [address]  Continues on amiodarone, Eliquis, metoprolol, ongoing follow-up with cardiology      Plan:  As noted above. Follow up for routine visit. Call sooner with concerns prior.     Electronically signed by INGRID Machuca CNP on 4/7/2021 at 4:39 PM

## 2021-04-07 NOTE — TELEPHONE ENCOUNTER
I think pt would like the tabs, do they have a different dose tab? If so order that and pt can just take as many as need to equal 15 grams. I think they might be 5 gram tabs.

## 2021-04-07 NOTE — PROGRESS NOTES
[address] [facility] INTERNAL  [address], [address]  [facility] [address] [phone]        HISTORY:    Sheril Cabot presents today for evaluation and management of:  Chief Complaint   Patient presents with    Diabetes     3m appt       Diabetes  She presents for her follow-up diabetic visit. She has type 2 diabetes mellitus. No MedicAlert identification noted. There are no hypoglycemic associated symptoms. Pertinent negatives for hypoglycemia include no confusion, dizziness, headaches, seizures or tremors. Associated symptoms include fatigue and foot paresthesias. Pertinent negatives for diabetes include no blurred vision, no chest pain, no foot ulcerations, no polydipsia, no polyphagia, no polyuria, no visual change and no weight loss. There are no hypoglycemic complications. Symptoms are stable. Risk factors for coronary artery disease include diabetes mellitus, dyslipidemia, family history, hypertension, obesity, stress and sedentary lifestyle. Current diabetic treatment includes insulin injections. She is compliant with treatment all of the time. She is currently taking insulin pre-breakfast, pre-lunch, pre-dinner and at bedtime. Insulin injections are given by patient. Rotation sites for injection include the abdominal wall. Her weight is stable. She is following a generally healthy diet. When asked about meal planning, she reported none. She has not had a previous visit with a dietitian. She rarely participates in exercise. Blood glucose monitoring compliance is excellent. An ACE inhibitor/angiotensin II receptor blocker is not being taken. Eye exam is current.        Interval History:      Current Diabetic Medications  Victoza 1.8 mg daily Lantus 24 units am and 24 pm. short acting insulin 10 units 4 times a day with meals 12 units if bs is over 150  Taking 6 insulin injection a day and testing blood sugar 4 times per With 40% stenosis of the LAD, 07/10, ejection fraction 60%. Repeat heart cath9/14 with 4050 percent LAD lesion first diagonal 50% lesion rec med Rx    Central retinal artery occlusion     Right side November 2014 status post TPA    Cerebral artery occlusion with cerebral infarction ([facility] [address] 11/24/2014    Cerebrovascular disease     50-79% stenosis on left on ultrasound 11/14    CHF (congestive heart failure) ([facility])     Echo 1/15 moderate MR, severe TR, RVSP 76, grade 2 diastolic dysfunction    Diverticulosis     AVM on colonoscopy, 05/11    Dyslipidemia     Elevated antinuclear antibody (ZAIRA) level     History of    Esophagitis 05/2011    Gastritis/esophagitis on EGD, Dr. Sung Kingsley. Repeat EGD6/15 Gastritis    Foraminal stenosis of lumbar region     Moderate  Right L4/L5 neuroforaminal stenosis, Dr Jayson Dahl following. MRI 2/16 [place]. Moderate foraminal stenosis mild to moderate central canal stenosis    Hyperlipidemia     Hypertension     IBS (irritable bowel syndrome)     GI consultation with Dr. Gordon Lowery, GI specialist in [facility], felt that she probably has chronic functional diarrhea and recommended empiric Imodium, Pepto-Bismol or Questran first. Sprue test Neg 2011    Iron deficiency anemia     Percent sat iron 5, January 2015, ferritin 40 1 /15, FOBT positive  EGD 6/15  Gastritis, IV iron 9/15x3 effective,  colonoscopy deferred by Dr. Sung Kingsley. --Prior colonoscopy 2011 with severe diverticulosis and telangiectasia. Trial iron solution in [place] juice 12/16    Iron deficiency anemia due to chronic blood loss 09/08/2015    Junctional bradycardia     Symptomatic, resolved with discontinuation of Verapamil, 05/09. 1.1) Echocardiogram: LAE, LVH, EF 50%, mild MR, diastolic. 1.2) Dr. Tra Bentley evaluation. 1.3.) Persantine stress test negative, 05/09.  Migraine     Mild CAD     cath 10/15    Mitral regurgitation     Moderate to severe on echocardiogram September 2015.   Right pressure 76 grade 2 diastolic dysfunction,  echo 47/70 grade 2 diastolic dysfunction mild to moderate MR RVSP 56 aortic sclerosis    Obesity     CHICO (obstructive sleep apnea)     CPAP 14 2/15 initiation  AHI 7  mild CHICO intolerant CPAP    Osteoarthritis     PMR (polymyalgia rheumatica) (HCC)     Pneumonia     Premature atrial contractions     Pulmonary hypertension (HCC)     -Moderate on echo November 2014    Restrictive lung disease     Mild on PFTs 11/14    Type II or unspecified type diabetes mellitus without mention of complication, not stated as uncontrolled     Vitreous floaters      Family History   Problem Relation Age of Onset    Uterine Cancer Mother     Heart Disease Father     High Blood Pressure Father     Stroke Sister         from a vascular malformation    Heart Attack Son         age 48     Social History     Tobacco Use    Smoking status: Never Smoker    Smokeless tobacco: Never Used    Tobacco comment: amish rrt 5/28/2019   Substance Use Topics    Alcohol use: No     Alcohol/week: 0.0 standard drinks    Drug use: No     Allergies   Allergen Reactions    Codeine      Confusion      Erythromycin      GI upset  Received zithromax in past and tolerated    Exenatide Nausea Only    Levofloxacin      GI upset    Verapamil Other (See Comments)     Junctional bradycardia    Clonidine Derivatives Rash     2009, catapres    Other Rash     Levbid    Penicillins Rash     Received Rocephin in past and tolerated       MEDICATIONS:  Current Outpatient Medications   Medication Sig Dispense Refill    insulin glargine (LANTUS SOLOSTAR) 100 UNIT/ML injection pen INJECT 12 UNITS bid 30 mL 4    Nutritional Supplements (GLUCOSE MANAGEMENT) TABS 15 grams for bs less than 70 100 tablet 3    amiodarone (PACERONE) 100 MG tablet Take 1 tablet by mouth daily 90 tablet 3    amLODIPine (NORVASC) 5 MG tablet Take 1 tablet by mouth daily 90 tablet 1    apixaban (ELIQUIS) 5 MG TABS tablet Take 1 tablet by mouth 2 times daily 180 tablet 1    predniSONE (DELTASONE) 1 MG tablet Take 4 tablets by mouth daily 360 tablet 0    metoprolol tartrate (LOPRESSOR) 25 MG tablet Take 1 tablet by mouth 2 times daily 60 tablet 3    simvastatin (ZOCOR) 20 MG tablet TAKE 1 TABLET NIGHTLY 90 tablet 3    insulin lispro, 1 Unit Dial, (HUMALOG KWIKPEN) 100 UNIT/ML SOPN INJECT 8 UNITS UNDER THE SKIN FOUR TIMES A DAY (BEFORE MEALS AND BEDTIME SNACK ) 15 mL 6    nystatin (MYCOSTATIN) 265420 UNIT/GM cream Apply topically 2 times daily. 1 Tube 0    potassium chloride (KLOR-CON M) 20 MEQ extended release tablet Take 1 tablet by mouth 2 times daily 180 tablet 3    albuterol (PROVENTIL) (2.5 MG/3ML) 0.083% nebulizer solution Take 3 mLs by nebulization every 4 hours as needed for Wheezing or Shortness of Breath 30 each 1    Liraglutide (VICTOZA) 18 MG/3ML SOPN SC injection Inject 1.8 mg into the skin daily 18 mL 3    furosemide (LASIX) 40 MG tablet TAKE 1 TABLET DAILY 90 tablet 3    Polyethylene Glycol 400 (BLINK TEARS) 0.25 % SOLN Place into both eyes as needed (dry eyes)       acetaminophen (TYLENOL) 500 MG tablet Take 500 mg by mouth daily      omeprazole (PRILOSEC) 20 MG capsule Take 20 mg by mouth Daily  30 capsule 3    Cholecalciferol (VITAMIN D3) 2000 UNITS CAPS Take 2,000 Units by mouth daily        No current facility-administered medications for this visit. Review ofSymptoms:  Review of Systems   Constitutional: Positive for fatigue. Negative for unexpected weight change and weight loss. Eyes: Negative for blurred vision and visual disturbance. Respiratory: Negative. Negative for shortness of breath. Cardiovascular: Negative for chest pain and leg swelling. Gastrointestinal: Negative for abdominal pain and diarrhea. Endocrine: Negative for polydipsia, polyphagia and polyuria. Genitourinary: Negative. Musculoskeletal: Negative. Skin: Negative for rash and wound.    Neurological: Negative for dizziness, tremors, seizures and headaches. Psychiatric/Behavioral: Negative. Negative for confusion and decreased concentration. Theremainder of a complete 14-point review of systems is negative. Vital Signs: /60 (Site: Left Upper Arm, Position: Sitting, Cuff Size: Large Adult)   Pulse 72   Resp 16   Ht 4' 10.5\" (1.486 m)   Wt 189 lb (85.7 kg)   BMI 38.83 kg/m²      Wt Readings from Last 3 Encounters:   04/07/21 189 lb (85.7 kg)   03/08/21 189 lb 12.8 oz (86.1 kg)   03/04/21 188 lb (85.3 kg)     Body mass index is 38.83 kg/m².   LABS:  Hemoglobin A1C   Date Value Ref Range Status   03/08/2021 7.4 (H) 4.0 - 6.0 % Final   12/08/2020 7.2 (H) 4.8 - 5.9 % Final     Lab Results   Component Value Date    LABMICR CANNOT BE CALCULATED 04/23/2019     Lab Results   Component Value Date     03/08/2021    K 4.6 03/08/2021     03/08/2021    CO2 29 03/08/2021    BUN 20 03/08/2021    CREATININE 1.07 (H) 03/08/2021    GLUCOSE 114 (H) 03/08/2021    CALCIUM 10.6 (H) 03/08/2021    PROT 6.6 02/09/2021    LABALBU 3.8 02/09/2021    BILITOT 0.37 02/09/2021    ALKPHOS 81 02/09/2021    AST 15 02/09/2021    ALT 14 02/09/2021    LABGLOM 49 (L) 03/08/2021    GFRAA 59 (L) 03/08/2021     Lab Results   Component Value Date    CHOL 185 06/02/2020    CHOL 137 07/20/2019    CHOL 142 05/30/2019     Lab Results   Component Value Date    TRIG 196 (H) 06/02/2020    TRIG 126 07/20/2019    TRIG 114 05/30/2019     Lab Results   Component Value Date    HDL 67 06/02/2020    HDL 66 07/20/2019    HDL 62 05/30/2019     Lab Results   Component Value Date    LDLCHOLESTEROL 79 06/02/2020    LDLCHOLESTEROL 46 07/20/2019    LDLCHOLESTEROL 57 05/30/2019     Lab Results   Component Value Date    VLDL NOT REPORTED (H) 06/02/2020    VLDL NOT REPORTED 07/20/2019    VLDL NOT REPORTED 05/30/2019     Lab Results   Component Value Date    CHOLHDLRATIO 2.8 06/02/2020    CHOLHDLRATIO 2.1 07/20/2019    CHOLHDLRATIO 2.3 05/30/2019           Physical Exam  Constitutional:       Appearance: She is well-developed. Eyes:      Pupils: Pupils are equal, round, and reactive to light. Neck:      Thyroid: No thyroid mass, thyromegaly or thyroid tenderness. Cardiovascular:      Rate and Rhythm: Normal rate and regular rhythm. Heart sounds: Normal heart sounds. Pulmonary:      Effort: Pulmonary effort is normal.      Breath sounds: Normal breath sounds. Abdominal:      General: Bowel sounds are normal.      Palpations: Abdomen is soft. Skin:     General: Skin is warm and dry. Comments: Negative for open/nonhealing wounds. Negative for lipohypertrophy. Neurological:      Mental Status: She is alert and oriented to person, place, and time. ASSESSMENT/PLAN:     Diagnosis Orders   1. Type 2 diabetes with nephropathy (HCC)  Hemoglobin A1C    Microalbumin, Ur    insulin glargine (LANTUS SOLOSTAR) 100 UNIT/ML injection pen    Nutritional Supplements (GLUCOSE MANAGEMENT) TABS   2. Diabetes education, encounter for     3. Hypoglycemia       Orders Placed This Encounter   Procedures    Hemoglobin A1C    Microalbumin, Ur     Orders Placed This Encounter   Medications    insulin glargine (LANTUS SOLOSTAR) 100 UNIT/ML injection pen     Sig: INJECT 12 UNITS bid     Dispense:  30 mL     Refill:  4    Nutritional Supplements (GLUCOSE MANAGEMENT) TABS     Sig: 15 grams for bs less than 70     Dispense:  100 tablet     Refill:  3     Requested Prescriptions     Signed Prescriptions Disp Refills    insulin glargine (LANTUS SOLOSTAR) 100 UNIT/ML injection pen 30 mL 4     Sig: INJECT 12 UNITS bid    Nutritional Supplements (GLUCOSE MANAGEMENT) TABS 100 tablet 3     Sig: 15 grams for bs less than 70       1. Type 2 diabetes with nephropathy ([facility] [address]  2. Diabetes education, encounter for  3.  Hypoglycemia    - Hemoglobin A1C; Future  - Microalbumin, Ur; Future  - insulin glargine (LANTUS SOLOSTAR) 100 UNIT/ML injection pen; INJECT 12 UNITS bid  Dispense: 30 amutations . They also need a dilated eye exam yearly. We discussed the issues of diet, exercise, medication, complication avoidance, reviewed the signs and symptoms of diabetes, hypoglycemic episodes, significance of HbA1C. Answered all patient questions. Agrees to follow plan of care and to follow up in 1 months, sooner if needed. Call office if unexplained blood sugars less than 70 occur or above 400. Call office or access MyChart with any further questions or concerns. Be sure to bring glucometer/food log at next appointment. Total time spent reviewing chart, labs, counseling patient and documenting on the date of the encounter: 30 min.       Electronically signed by INGRID Emmanuel CNP on 4/7/2021 at 1:23 PM      (Please note that portions of this note were completed with a voice-recognition program. Efforts were made to edit the dictation but occasionally words are mis-transcribed.)

## 2021-04-08 NOTE — TELEPHONE ENCOUNTER
Spoke with [facility] and states they do have the 5gram tabs and will update script to take 3 tabs

## 2021-04-12 ENCOUNTER — TELEPHONE (OUTPATIENT)
Dept: INTERNAL MEDICINE | Age: 86
End: 2021-04-12

## 2021-04-12 ENCOUNTER — OFFICE VISIT (OUTPATIENT)
Dept: INTERNAL MEDICINE | Age: 86
End: 2021-04-12
Payer: MEDICARE

## 2021-04-12 ENCOUNTER — HOSPITAL ENCOUNTER (OUTPATIENT)
Dept: INTERVENTIONAL RADIOLOGY/VASCULAR | Age: 86
Discharge: HOME | End: 2021-04-14
Payer: MEDICARE

## 2021-04-12 VITALS
DIASTOLIC BLOOD PRESSURE: 84 MMHG | OXYGEN SATURATION: 97 % | HEIGHT: 59 IN | RESPIRATION RATE: 16 BRPM | HEART RATE: 68 BPM | SYSTOLIC BLOOD PRESSURE: 146 MMHG | WEIGHT: 191.6 LBS | BODY MASS INDEX: 38.63 KG/M2

## 2021-04-12 DIAGNOSIS — I51.89 DIASTOLIC DYSFUNCTION: ICD-10-CM

## 2021-04-12 DIAGNOSIS — R60.0 LOCALIZED EDEMA: Primary | ICD-10-CM

## 2021-04-12 DIAGNOSIS — E11.21 DIABETIC NEPHROPATHY ASSOCIATED WITH TYPE 2 DIABETES MELLITUS (HCC): ICD-10-CM

## 2021-04-12 DIAGNOSIS — E11.21 TYPE 2 DIABETES WITH NEPHROPATHY (HCC): ICD-10-CM

## 2021-04-12 DIAGNOSIS — I10 ESSENTIAL HYPERTENSION: ICD-10-CM

## 2021-04-12 DIAGNOSIS — R60.0 LOCALIZED EDEMA: ICD-10-CM

## 2021-04-12 PROCEDURE — 99214 OFFICE O/P EST MOD 30 MIN: CPT

## 2021-04-12 PROCEDURE — 93971 EXTREMITY STUDY: CPT

## 2021-04-12 PROCEDURE — 99214 OFFICE O/P EST MOD 30 MIN: CPT | Performed by: NURSE PRACTITIONER

## 2021-04-12 PROCEDURE — 3051F HG A1C>EQUAL 7.0%<8.0%: CPT | Performed by: NURSE PRACTITIONER

## 2021-04-12 RX ORDER — INSULIN LISPRO 100 [IU]/ML
INJECTION, SOLUTION INTRAVENOUS; SUBCUTANEOUS
COMMUNITY
End: 2021-05-10 | Stop reason: SDUPTHER

## 2021-04-12 RX ORDER — PREDNISONE 1 MG/1
3 TABLET ORAL DAILY
COMMUNITY
End: 2021-06-11 | Stop reason: SDUPTHER

## 2021-04-12 NOTE — TELEPHONE ENCOUNTER
Pt dropped off a few morning blood sugars that are running high. She denies hypoglycemia. We can increase lantus from 12 to 14 units.

## 2021-04-12 NOTE — PROGRESS NOTES
04/12/21  Con Ej  1935      Chief Complaint:   1. Localized edema    2. Type 2 diabetes with nephropathy ([address]    3. Essential hypertension    4. Diastolic dysfunction    5. Diabetic nephropathy associated with type 2 diabetes mellitus (HCC)        HPI:  46-year-old patient being seen for follow-up from dyspnea on exertion. Lasix was increased short-term. Chest x-ray showed no acute cardiopulmonary process. BNP 300s. Blood pressure stable in the home setting. Mildly elevated today in the office. Of most concern is ongoing swelling to the left lower extremity. States the swelling went out of the right leg but not the left. For the most part she sits with her legs elevated throughout the day. She also wanted us to know that her blood sugars have been really high. Recently seen by the diabetic nurse practitioner had her Lantus significantly decreased along with her meal insulin decreased to a sliding scale. Patient states she is running consistently above 200. We will reach out to the first practitioner regarding these. She also states since the fluid decrease in her legs she no longer has the pain. Does not want to be on the Neurontin as we previously discussed. States she is just going to use that medicine. Made her too fatigued. We discussed instead of taking 300 we could just have her take 100 which she says she has at home is on a as needed basis      Allergies   Allergen Reactions    Codeine      Confusion      Erythromycin      GI upset  Received zithromax in past and tolerated    Exenatide Nausea Only    Levofloxacin      GI upset    Verapamil Other (See Comments)     Junctional bradycardia    Clonidine Derivatives Rash     2009, catapres    Other Rash     Levbid    Penicillins Rash     Received Rocephin in past and tolerated       Past Medical History:   Diagnosis Date    Allergic rhinitis     Asthma     PFT's, 04/06, actually showed mild restrictive defect.     Atrial fibrillation with RVR ([facility]     Hospitalized [facility] 2/8/21-2/11/21    Basal cell carcinoma of cheek 2007    Right cheek    Basal cell carcinoma of leg     right leg    CAD (coronary artery disease)     With 40% stenosis of the LAD, 07/10, ejection fraction 60%. Repeat heart cath9/14 with 4050 percent LAD lesion first diagonal 50% lesion rec med Rx    Central retinal artery occlusion     Right side November 2014 status post TPA    Cerebral artery occlusion with cerebral infarction ([facility] [address] 11/24/2014    Cerebrovascular disease     50-79% stenosis on left on ultrasound 11/14    CHF (congestive heart failure) ([facility])     Echo 1/15 moderate MR, severe TR, RVSP 76, grade 2 diastolic dysfunction    Diverticulosis     AVM on colonoscopy, 05/11    Dyslipidemia     Elevated antinuclear antibody (ZAIRA) level     History of    Esophagitis 05/2011    Gastritis/esophagitis on EGD, Dr. Bridgette Cosme. Repeat EGD6/15 Gastritis    Foraminal stenosis of lumbar region     Moderate  Right L4/L5 neuroforaminal stenosis, Dr Espino Score following. MRI 2/16 [place]. Moderate foraminal stenosis mild to moderate central canal stenosis    Hyperlipidemia     Hypertension     IBS (irritable bowel syndrome)     GI consultation with Dr. Preeti Dubois, GI specialist in [facility], felt that she probably has chronic functional diarrhea and recommended empiric Imodium, Pepto-Bismol or Questran first. Sprue test Neg 2011    Iron deficiency anemia     Percent sat iron 5, January 2015, ferritin 40 1 /15, FOBT positive  EGD 6/15  Gastritis, IV iron 9/15x3 effective,  colonoscopy deferred by Dr. Bridgette Cosme. --Prior colonoscopy 2011 with severe diverticulosis and telangiectasia. Trial iron solution in [place] juice 12/16    Iron deficiency anemia due to chronic blood loss 09/08/2015    Junctional bradycardia     Symptomatic, resolved with discontinuation of Verapamil, 05/09. 1.1) Echocardiogram: LAE, LVH, EF 50%, mild MR, diastolic.  1.2) Dr. Roc Ackerman evaluation. 1.3.) Persantine stress test negative, 05/09.  Migraine     Mild CAD     cath 10/15    Mitral regurgitation     Moderate to severe on echocardiogram September 2015.   Right pressure 76 grade 2 diastolic dysfunction,  echo 74/15 grade 2 diastolic dysfunction mild to moderate MR RVSP 56 aortic sclerosis    Obesity     CHICO (obstructive sleep apnea)     CPAP 14 2/15 initiation  AHI 7  mild CHICO intolerant CPAP    Osteoarthritis     PMR (polymyalgia rheumatica) (HCC)     Pneumonia     Premature atrial contractions     Pulmonary hypertension (HCC)     -Moderate on echo November 2014    Restrictive lung disease     Mild on PFTs 11/14    Type II or unspecified type diabetes mellitus without mention of complication, not stated as uncontrolled     Vitreous floaters        Past Surgical History:   Procedure Laterality Date    APPENDECTOMY  1951    CARDIAC CATHETERIZATION  2014    CATARACT REMOVAL Bilateral 08/10    CHOLECYSTECTOMY      COLONOSCOPY  05/16/2011    COLONOSCOPY N/A 9/26/2019    severe diverticulosis, benign rectal polyp, Dr. Burleson Nisula Right 2/6/2019    Cysto with right stent insertion and villareal catheter performed by Suzanna Marques MD at [place] [address] Right 2/27/2019    CYSTO right stent removal  Right Ureteroscopy Holmium Laser right stent exchange performed by Suzanna Marques MD at [address] [place] [address]      EYE SURGERY      HYSTERECTOMY  Approx 1983    MALIGNANT SKIN LESION EXCISION Right 12/10 and 04/11    Basal CellRemoved from right neck    MALIGNANT SKIN LESION EXCISION Right 02/2012    Basal Cell Removed from right leg    OTHER SURGICAL HISTORY  09/06/2011    capsule endoscopy    OTHER SURGICAL HISTORY  3/22/16    right L4 and L5 TFE    OTHER SURGICAL HISTORY Right 4/26/16    L4, L5 TFE    UPPER GASTROINTESTINAL ENDOSCOPY  05/16/2011    UPPER GASTROINTESTINAL ENDOSCOPY  6/22/15    mild gastritis (Dr. Sung Kingsley)   Nery Manual UPPER GASTROINTESTINAL ENDOSCOPY N/A 9/26/2019    mild to moderate inflammation, Dr. Sharon Drummond       Current Outpatient Medications on File Prior to Visit   Medication Sig Dispense Refill    insulin lispro, 1 Unit Dial, (HUMALOG KWIKPEN) 100 UNIT/ML SOPN Inject into the skin Humalog pen 4 times daily with meals per sliding scale. Blood sugar is 70, drink juice.  = 2 units, 201-250 = 4 units, 250-300 = 6 units, 301-350 = 8 units, 351-400 = 10 units, >400 = 12 units.  predniSONE (DELTASONE) 1 MG tablet Take 3 mg by mouth daily      insulin glargine (LANTUS SOLOSTAR) 100 UNIT/ML injection pen INJECT 12 UNITS bid 30 mL 4    Nutritional Supplements (GLUCOSE MANAGEMENT) TABS 15 grams for bs less than 70 100 tablet 3    amiodarone (PACERONE) 100 MG tablet Take 1 tablet by mouth daily 90 tablet 3    amLODIPine (NORVASC) 5 MG tablet Take 1 tablet by mouth daily 90 tablet 1    apixaban (ELIQUIS) 5 MG TABS tablet Take 1 tablet by mouth 2 times daily 180 tablet 1    metoprolol tartrate (LOPRESSOR) 25 MG tablet Take 1 tablet by mouth 2 times daily 60 tablet 3    simvastatin (ZOCOR) 20 MG tablet TAKE 1 TABLET NIGHTLY 90 tablet 3    nystatin (MYCOSTATIN) 780628 UNIT/GM cream Apply topically 2 times daily.  1 Tube 0    potassium chloride (KLOR-CON M) 20 MEQ extended release tablet Take 1 tablet by mouth 2 times daily 180 tablet 3    albuterol (PROVENTIL) (2.5 MG/3ML) 0.083% nebulizer solution Take 3 mLs by nebulization every 4 hours as needed for Wheezing or Shortness of Breath 30 each 1    Liraglutide (VICTOZA) 18 MG/3ML SOPN SC injection Inject 1.8 mg into the skin daily 18 mL 3    furosemide (LASIX) 40 MG tablet TAKE 1 TABLET DAILY 90 tablet 3    Polyethylene Glycol 400 (BLINK TEARS) 0.25 % SOLN Place into both eyes as needed (dry eyes)       acetaminophen (TYLENOL) 500 MG tablet Take 500 mg by mouth daily      omeprazole (PRILOSEC) 20 MG capsule Take 20 mg by mouth Daily  30 capsule 3    Cholecalciferol (VITAMIN D3) 2000 UNITS CAPS Take 2,000 Units by mouth daily        No current facility-administered medications on file prior to visit. Social History     Socioeconomic History    Marital status:      Spouse name: Binu Michelle Number of children: 3    Years of education: 15    Highest education level: Some college, no degree   Occupational History    Not on file   Social Needs    Financial resource strain: Not hard at all   [place] insecurity     Worry: Never true     Inability: Never true   [employer] needs     Medical: No     Non-medical: No   Tobacco Use    Smoking status: Never Smoker    Smokeless tobacco: Never Used    Tobacco comment: amish rrt 5/28/2019   Substance and Sexual Activity    Alcohol use: No     Alcohol/week: 0.0 standard drinks    Drug use: No    Sexual activity: Not on file   Lifestyle    Physical activity     Days per week: 0 days     Minutes per session: 0 min    Stress: Not at all   Relationships    Social connections     Talks on phone: More than three times a week     Gets together: Once a week     Attends Latter day service: More than 4 times per year     Active member of club or organization: Yes     Attends meetings of clubs or organizations: More than 4 times per year     Relationship status:     Intimate partner violence     Fear of current or ex partner: Not on file     Emotionally abused: Not on file     Physically abused: Not on file     Forced sexual activity: Not on file   Other Topics Concern    Not on file   Social History Narrative    9/25/2019 No SDOH needs identified. She is receiving Meals on Wheels. Review of Systems   Constitutional: Negative for activity change, appetite change, chills, fatigue, fever and unexpected weight change. HENT: Negative for congestion, dental problem, ear discharge, ear pain, facial swelling, hearing loss, postnasal drip, rhinorrhea, sinus pressure, sore throat and trouble swallowing.     Eyes: (+2 edema calf down. Positive Homans) present. Skin:     General: Skin is warm and dry. Capillary Refill: Capillary refill takes less than 2 seconds. Coloration: Skin is not jaundiced or pale. Findings: No rash. Neurological:      General: No focal deficit present. Mental Status: She is alert and oriented to person, place, and time. Cranial Nerves: No cranial nerve deficit. Sensory: No sensory deficit. Motor: No weakness. Coordination: Coordination normal.      Gait: Gait normal.   Psychiatric:         Mood and Affect: Mood normal.         Behavior: Behavior normal.         Thought Content: Thought content normal.         Judgment: Judgment normal.       Vitals:    04/12/21 1311 04/12/21 1317   BP: (!) 150/86 (!) 146/84   Site: Left Upper Arm Left Upper Arm   Position: Sitting Sitting   Cuff Size: Large Adult Large Adult   Pulse: 68    Resp: 16    SpO2: 97%    Weight: 191 lb 9.6 oz (86.9 kg)    Height: 4' 10.5\" (1.486 m)        Assessment:  1. Localized edema  Patient currently on anticoagulation due to atrial fibrillation unlikely DVT however with positive Homans persistent swelling to the left lower extremity ultrasound was completed. Was negative for DVT. Patient instructed to use some compression stockings  - VL DUP LOWER EXTREMITY VENOUS LEFT; Future    2. Type 2 diabetes with nephropathy (HCC)  These sugars were brought over to the diabetic nurse practitioner for further concern    3. Essential hypertension  Mild elevation today in office. Patient does bring in a list of her blood pressures from the home 30s. We will continue on current drug regimen. She will continue to monitor her blood pressures in the home setting and notify if persistently elevated    4. Diastolic dysfunction  Stable. No signs of fluid overload at present    5. Diabetic nephropathy associated with type 2 diabetes mellitus ([facility] [address]  Patient declines gabapentin routinely.   We discussed if she

## 2021-04-13 ASSESSMENT — ENCOUNTER SYMPTOMS
COUGH: 0
VOMITING: 0
TROUBLE SWALLOWING: 0
ABDOMINAL PAIN: 0
FACIAL SWELLING: 0
SORE THROAT: 0
SINUS PRESSURE: 0
DIARRHEA: 0
SHORTNESS OF BREATH: 0
NAUSEA: 0
RHINORRHEA: 0
COLOR CHANGE: 0
CONSTIPATION: 0
CHEST TIGHTNESS: 0
BLOOD IN STOOL: 0
EYE PAIN: 0
WHEEZING: 0

## 2021-05-10 ENCOUNTER — OFFICE VISIT (OUTPATIENT)
Dept: DIABETES SERVICES | Age: 86
End: 2021-05-10
Payer: MEDICARE

## 2021-05-10 VITALS
DIASTOLIC BLOOD PRESSURE: 73 MMHG | BODY MASS INDEX: 40.05 KG/M2 | SYSTOLIC BLOOD PRESSURE: 115 MMHG | HEIGHT: 58 IN | WEIGHT: 190.8 LBS | RESPIRATION RATE: 14 BRPM

## 2021-05-10 DIAGNOSIS — I10 ESSENTIAL HYPERTENSION: ICD-10-CM

## 2021-05-10 DIAGNOSIS — E11.21 TYPE 2 DIABETES WITH NEPHROPATHY (HCC): Primary | ICD-10-CM

## 2021-05-10 DIAGNOSIS — E66.01 CLASS 2 SEVERE OBESITY DUE TO EXCESS CALORIES WITH SERIOUS COMORBIDITY AND BODY MASS INDEX (BMI) OF 39.0 TO 39.9 IN ADULT (HCC): ICD-10-CM

## 2021-05-10 DIAGNOSIS — E78.5 DYSLIPIDEMIA: ICD-10-CM

## 2021-05-10 PROCEDURE — 95251 CONT GLUC MNTR ANALYSIS I&R: CPT | Performed by: NURSE PRACTITIONER

## 2021-05-10 PROCEDURE — 99214 OFFICE O/P EST MOD 30 MIN: CPT | Performed by: NURSE PRACTITIONER

## 2021-05-10 PROCEDURE — 3051F HG A1C>EQUAL 7.0%<8.0%: CPT | Performed by: NURSE PRACTITIONER

## 2021-05-10 RX ORDER — INSULIN LISPRO 100 [IU]/ML
INJECTION, SOLUTION INTRAVENOUS; SUBCUTANEOUS
Qty: 1 PEN | Refills: 3
Start: 2021-05-10 | End: 2021-01-01 | Stop reason: SDUPTHER

## 2021-05-10 ASSESSMENT — ENCOUNTER SYMPTOMS
RESPIRATORY NEGATIVE: 1
ABDOMINAL PAIN: 0
DIARRHEA: 0
SHORTNESS OF BREATH: 0

## 2021-05-10 NOTE — PROGRESS NOTES
[facility]  [address] DIABETES MGMT  A DEPARTMENT OF [facility] 9  [address] [facility], [address]  [facility] [address] [phone]        HISTORY:    Monico Espino presents today for evaluation and management of:  Chief Complaint   Patient presents with    Diabetes     1 mo f/u        HPI    Interval History:    Current Diabetic Medications  Victoza 1.8 mg daily, Lantus 12 units twice daily Humalog per sliding scale     DKA episodes: 0       DKA episodes: 0    01/04/21   At previous visit we adjusted insulin. She has been stable on current regimen.   Diet: low carb  Exercise: none   BS testing: uses george cgm daily with success.   Issues: denies      02/08/21   At previous visit insulin was adjusted CGM report reviewed and has had higher blood sugar since.  Patient states since she has received the Covid vaccine she feels her blood sugars have been higher and has self adjusted insulin.  Denies any hypoglycemia. Diet: Low carb  Exercise: None  BS testing: Uses CGM daily with success  Issues: irregular heart beat.      4/7/21  At previous visit pt felt since getting covid vaccine he blood sugars were higher so insulin was increased. She is no experiencing significant hypoglycemia daily. Also at previous visit she was found to be in atrial fibrillation and sent to er. Diet: low carb, frequent snacks due to hypoglycemia. Exercise: none  BS testing: uses cgm daily with sucess  Issues: denies     05/10/21   At previous visit insulin was adjusted. Patient states she will sometimes take 12 to 14 units of Lantus based on her average glucose for the day. She has also added about 4 units to her current sliding scale. Denies any signs or symptoms of hypoglycemia. Diet: Low-carb  Exercise: none  BS testing: Uses CGM daily with success and is adherent to cgm regimen.    Issues: denies        High cholesterol-  Takes zocor and denies any adverse effects with its use.  Watches diet and 6/15  Gastritis, IV iron 9/15x3 effective,  colonoscopy deferred by Dr. Ish Acuna. --Prior colonoscopy 2011 with severe diverticulosis and telangiectasia. Trial iron solution in [address] juice 12/16    Iron deficiency anemia due to chronic blood loss 09/08/2015    Junctional bradycardia     Symptomatic, resolved with discontinuation of Verapamil, 05/09. 1.1) Echocardiogram: LAE, LVH, EF 50%, mild MR, diastolic. 1.2) Dr. Mateus Menchaca evaluation. 1.3.) Persantine stress test negative, 05/09.  Migraine     Mild CAD     cath 10/15    Mitral regurgitation     Moderate to severe on echocardiogram September 2015.   Right pressure 76 grade 2 diastolic dysfunction,  echo 03/11 grade 2 diastolic dysfunction mild to moderate MR RVSP 56 aortic sclerosis    Obesity     CHICO (obstructive sleep apnea)     CPAP 14 2/15 initiation  AHI 7  mild CHICO intolerant CPAP    Osteoarthritis     PMR (polymyalgia rheumatica) (HCC)     Pneumonia     Premature atrial contractions     Pulmonary hypertension (HCC)     -Moderate on echo November 2014    Restrictive lung disease     Mild on PFTs 11/14    Type II or unspecified type diabetes mellitus without mention of complication, not stated as uncontrolled     Vitreous floaters      Family History   Problem Relation Age of Onset    Uterine Cancer Mother     Heart Disease Father     High Blood Pressure Father     Stroke Sister         from a vascular malformation    Heart Attack Son         age 48     Social History     Tobacco Use    Smoking status: Never Smoker    Smokeless tobacco: Never Used    Tobacco comment: amish rrt 5/28/2019   Substance Use Topics    Alcohol use: No     Alcohol/week: 0.0 standard drinks    Drug use: No     Allergies   Allergen Reactions    Codeine      Confusion      Erythromycin      GI upset  Received zithromax in past and tolerated    Exenatide Nausea Only    Levofloxacin      GI upset    Verapamil Other (See Comments)     Junctional bradycardia    Cholecalciferol (VITAMIN D3) 2000 UNITS CAPS Take 2,000 Units by mouth daily        No current facility-administered medications for this visit. Review ofSymptoms:  Review of Systems   Constitutional: Positive for fatigue. Negative for unexpected weight change. Eyes: Negative for visual disturbance. Respiratory: Negative. Negative for shortness of breath. Cardiovascular: Negative for chest pain and leg swelling. Gastrointestinal: Negative for abdominal pain and diarrhea. Endocrine: Negative for polydipsia, polyphagia and polyuria. Genitourinary: Negative. Musculoskeletal: Negative. Skin: Negative for rash and wound. Neurological: Negative for dizziness, tremors, seizures and headaches. Psychiatric/Behavioral: Negative. Negative for confusion and decreased concentration. Theremainder of a complete 14-point review of systems is negative. Vital Signs: /73 (Site: Left Upper Arm, Position: Sitting, Cuff Size: Medium Adult)   Resp 14   Ht 4' 10\" (1.473 m)   Wt 190 lb 12.8 oz (86.5 kg)   BMI 39.88 kg/m²      Wt Readings from Last 3 Encounters:   05/10/21 190 lb 12.8 oz (86.5 kg)   04/12/21 191 lb 9.6 oz (86.9 kg)   04/07/21 189 lb (85.7 kg)     Body mass index is 39.88 kg/m².   LABS:  Hemoglobin A1C   Date Value Ref Range Status   03/08/2021 7.4 (H) 4.0 - 6.0 % Final   12/08/2020 7.2 (H) 4.8 - 5.9 % Final     Lab Results   Component Value Date    LABMICR CANNOT BE CALCULATED 04/23/2019     Lab Results   Component Value Date     04/07/2021    K 3.9 04/07/2021     04/07/2021    CO2 28 04/07/2021    BUN 20 04/07/2021    CREATININE 1.14 (H) 04/07/2021    GLUCOSE 93 04/07/2021    CALCIUM 10.2 04/07/2021    PROT 6.6 02/09/2021    LABALBU 3.8 02/09/2021    BILITOT 0.37 02/09/2021    ALKPHOS 81 02/09/2021    AST 15 02/09/2021    ALT 14 02/09/2021    LABGLOM 45 (L) 04/07/2021    GFRAA 55 (L) 04/07/2021     Lab Results   Component Value Date    CHOL 185 06/02/2020 CHOL 137 07/20/2019    CHOL 142 05/30/2019     Lab Results   Component Value Date    TRIG 196 (H) 06/02/2020    TRIG 126 07/20/2019    TRIG 114 05/30/2019     Lab Results   Component Value Date    HDL 67 06/02/2020    HDL 66 07/20/2019    HDL 62 05/30/2019     Lab Results   Component Value Date    LDLCHOLESTEROL 79 06/02/2020    LDLCHOLESTEROL 46 07/20/2019    LDLCHOLESTEROL 57 05/30/2019     Lab Results   Component Value Date    VLDL NOT REPORTED (H) 06/02/2020    VLDL NOT REPORTED 07/20/2019    VLDL NOT REPORTED 05/30/2019     Lab Results   Component Value Date    CHOLHDLRATIO 2.8 06/02/2020    CHOLHDLRATIO 2.1 07/20/2019    CHOLHDLRATIO 2.3 05/30/2019           Physical Exam  Constitutional:       Appearance: She is well-developed. Eyes:      Pupils: Pupils are equal, round, and reactive to light. Neck:      Thyroid: No thyroid mass, thyromegaly or thyroid tenderness. Cardiovascular:      Rate and Rhythm: Normal rate and regular rhythm. Heart sounds: Normal heart sounds. Pulmonary:      Effort: Pulmonary effort is normal.      Breath sounds: Normal breath sounds. Abdominal:      General: Bowel sounds are normal.      Palpations: Abdomen is soft. Skin:     General: Skin is warm and dry. Comments: Negative for open/nonhealing wounds. Negative for lipohypertrophy. Neurological:      Mental Status: She is alert and oriented to person, place, and time. ASSESSMENT/PLAN:     Diagnosis Orders   1. Type 2 diabetes with nephropathy ([facility] [address]     2. Dyslipidemia     3. Essential hypertension     4. Class 2 severe obesity due to excess calories with serious comorbidity and body mass index (BMI) of 39.0 to 39.9 in adult ([facility] [address]     5. BMI 39.0-39.9,adult       No orders of the defined types were placed in this encounter. Orders Placed This Encounter   Medications    insulin lispro, 1 Unit Dial, (HUMALOG KWIKPEN) 100 UNIT/ML SOPN     Sig: Humalog pen 4 times daily with meals per sliding scale. Blood sugar is 70, drink juice. 151-200 =6, 201-250 = 8 units, 251-300 = 10 units. Dispense:  1 pen     Refill:  3     Requested Prescriptions     Signed Prescriptions Disp Refills    insulin lispro, 1 Unit Dial, (HUMALOG KWIKPEN) 100 UNIT/ML SOPN 1 pen 3     Sig: Humalog pen 4 times daily with meals per sliding scale. Blood sugar is 70, drink juice. 151-200 =6, 201-250 = 8 units, 251-300 = 10 units. 1. Type 2 diabetes with nephropathy (HCC)  - Stable  HbA1C goal is less than 7%. - Fasting blood glucose goal is 70-130mg/dl and postprandial blood sugar goal is less than 180 mg/dl. -Diabetic foot exam up-to-date: Yes  -Diabetic retinal exam up-to-date: Yes  - Labs reviewed includes: Most recent A1C 7.4%, Microalb/Crt. Ratio 0 mcg/mg creat and GFR 45 mL/min. Repeat labs due in 3 months.    -We discussed in great detail dietary modifications they can make to better improve their blood sugars. -follow up diabetes education completed, all questions answered. CGM report downloaded and reviewed, scanned to media tab. Time in range 77% and hypoglycemia 1%. Predicted A1c per CGM report 7.0%.  -No adjustments will be made patient will continue to use sliding scale per her adjustment. She will also work on taking short acting insulin 10 to 15 minutes prior to meals no sooner. Monitor for hypoglycemia. Discussed signs and symptoms of hyper/hypoglycemia and how to treat. Encouraged 150 minutes of physical activity per week. Follow a low carbohydrate diet. Encouraged at least 7 hours of sleep. The patient was informed of the goals of diabetes management. This can only be accomplished by watching their diet and exercise levels. We certainly use medicines to help attain these goals. The consequences of not controlling blood sugars were discussed. These include blindness, heart disease, stroke, kidney disease, and possibly need for dialysis.  They were told to be careful with their foot care as diabetics often have nerve damage, infections and risk for limb amutations . They also need a dilated eye exam yearly. We discussed the issues of diet, exercise, medication, complication avoidance, reviewed the signs and symptoms of diabetes, hypoglycemic episodes, significance of HbA1C.         2. Dyslipidemia  stable, lipid panel reviewed, continue current medications. Diet and exercise      3. Essential hypertension   stable, continue current medications. Diet and exercise Seek emergent care if chest pain develops. 4. Class 2 severe obesity due to excess calories with serious comorbidity and body mass index (BMI) of 39.0 to 39.9 in adult ([facility] [address]  5. BMI 39.0-39.9,adult  Reduce calories and increase physical activity to achieve a slow and steady weight loss to improve blood pressure, cholesterol and diabetes. Answered all patient questions. Agrees to follow plan of care and to follow up in 3 months, sooner if needed. Call office if unexplained blood sugars less than 70 occur or above 400. Call office or access MyChart with any further questions or concerns. Be sure to bring glucometer/food log at next appointment. Total time spent reviewing chart, labs, counseling patient and documenting on the date of the encounter: 30 min.       Electronically signed by INGRID Bueno CNP on 5/10/2021 at 1:04 PM      (Please note that portions of this note were completed with a voice-recognition program. Efforts were made to edit the dictation but occasionally words are mis-transcribed.)

## 2021-05-13 DIAGNOSIS — E11.21 TYPE 2 DIABETES WITH NEPHROPATHY (HCC): ICD-10-CM

## 2021-05-13 RX ORDER — LIRAGLUTIDE 6 MG/ML
1.8 INJECTION SUBCUTANEOUS DAILY
Qty: 18 ML | Refills: 5 | Status: SHIPPED | OUTPATIENT
Start: 2021-05-13 | End: 2022-01-01 | Stop reason: SDUPTHER

## 2021-05-13 RX ORDER — FUROSEMIDE 40 MG/1
TABLET ORAL
Qty: 90 TABLET | Refills: 3 | Status: SHIPPED | OUTPATIENT
Start: 2021-05-13 | End: 2022-01-01

## 2021-05-13 NOTE — TELEPHONE ENCOUNTER
Pharmacy requesting a refill of the below medication which has been pended for you:     Requested Prescriptions     Pending Prescriptions Disp Refills    furosemide (LASIX) 40 MG tablet [Pharmacy Med Name: FUROSEMIDE TABS 40MG] 90 tablet 3     Sig: TAKE 1 TABLET DAILY       Last Appointment Date: 4/12/2021  Next Appointment Date: 7/7/2021    Allergies   Allergen Reactions    Codeine      Confusion      Erythromycin      GI upset  Received zithromax in past and tolerated    Exenatide Nausea Only    Levofloxacin      GI upset    Verapamil Other (See Comments)     Junctional bradycardia    Clonidine Derivatives Rash     2009, catapres    Other Rash     Levbid    Penicillins Rash     Received Rocephin in past and tolerated

## 2021-05-13 NOTE — TELEPHONE ENCOUNTER
Pharmacy requesting a refill of the below medication which has been pended for you:     Requested Prescriptions     Pending Prescriptions Disp Refills    VICTOZA 18 MG/3ML SOPN SC injection [Pharmacy Med Name: Phylicia Cohn 3X3ML 6MG/ML] 18 mL 5     Sig: INJECT 1.8 MG INTO THE SKIN DAILY       Last Appointment Date: 5/10/2021  Next Appointment Date: 8/10/2021    Allergies   Allergen Reactions    Codeine      Confusion      Erythromycin      GI upset  Received zithromax in past and tolerated    Exenatide Nausea Only    Levofloxacin      GI upset    Verapamil Other (See Comments)     Junctional bradycardia    Clonidine Derivatives Rash     2009, catapres    Other Rash     Levbid    Penicillins Rash     Received Rocephin in past and tolerated

## 2021-06-11 RX ORDER — PREDNISONE 1 MG/1
3 TABLET ORAL DAILY
Qty: 270 TABLET | Refills: 0 | Status: SHIPPED | OUTPATIENT
Start: 2021-06-11 | End: 2021-08-20

## 2021-06-11 NOTE — TELEPHONE ENCOUNTER
Yolette Yao called requesting a refill of the below medication which has been pended for you:     Requested Prescriptions     Pending Prescriptions Disp Refills    predniSONE (DELTASONE) 1 MG tablet       Sig: Take 3 tablets by mouth daily       Last Appointment Date: 4/12/2021  Next Appointment Date: 7/7/2021    Allergies   Allergen Reactions    Codeine      Confusion      Erythromycin      GI upset  Received zithromax in past and tolerated    Exenatide Nausea Only    Levofloxacin      GI upset    Verapamil Other (See Comments)     Junctional bradycardia    Clonidine Derivatives Rash     2009, catapres    Other Rash     Levbid    Penicillins Rash     Received Rocephin in past and tolerated

## 2021-06-17 ENCOUNTER — OFFICE VISIT (OUTPATIENT)
Dept: CARDIOLOGY | Age: 86
End: 2021-06-17
Payer: MEDICARE

## 2021-06-17 VITALS
DIASTOLIC BLOOD PRESSURE: 62 MMHG | HEIGHT: 59 IN | SYSTOLIC BLOOD PRESSURE: 164 MMHG | BODY MASS INDEX: 38.3 KG/M2 | WEIGHT: 190 LBS | HEART RATE: 59 BPM

## 2021-06-17 DIAGNOSIS — I10 ESSENTIAL HYPERTENSION: Primary | ICD-10-CM

## 2021-06-17 PROCEDURE — 99214 OFFICE O/P EST MOD 30 MIN: CPT | Performed by: INTERNAL MEDICINE

## 2021-06-17 PROCEDURE — 93005 ELECTROCARDIOGRAM TRACING: CPT | Performed by: INTERNAL MEDICINE

## 2021-06-17 PROCEDURE — 93010 ELECTROCARDIOGRAM REPORT: CPT | Performed by: INTERNAL MEDICINE

## 2021-06-17 NOTE — PROGRESS NOTES
CC: follow for chronic diastolic HF    HPI:  Is here for post hospitalization 3 months follow-up. Remains in normal sinus rhythm. Denies any further palpitations. No chest pain or angina. Baseline dyspnea on exertion is stable with no recent worsening. No lower extremity edema. Blood pressure better controlled after adding Norvasc, home readings range 055-660 mm hg systolic. Past Medical History:   has a past medical history of Allergic rhinitis, Asthma, Atrial fibrillation with RVR ([address] Basal cell carcinoma of cheek, Basal cell carcinoma of leg, CAD (coronary artery disease), Central retinal artery occlusion, Cerebral artery occlusion with cerebral infarction [facility]), Cerebrovascular disease, CHF (congestive heart failure) ([facility] [address] Diverticulosis, Dyslipidemia, Elevated antinuclear antibody (ZAIRA) level, Esophagitis, Foraminal stenosis of lumbar region, Hyperlipidemia, Hypertension, IBS (irritable bowel syndrome), Iron deficiency anemia, Iron deficiency anemia due to chronic blood loss, Junctional bradycardia, Migraine, Mild CAD, Mitral regurgitation, Obesity, CHICO (obstructive sleep apnea), Osteoarthritis, PMR (polymyalgia rheumatica) ([facility] [address] Pneumonia, Premature atrial contractions, Pulmonary hypertension ([facility] [address] Restrictive lung disease, Type II or unspecified type diabetes mellitus without mention of complication, not stated as uncontrolled, and Vitreous floaters. Past Surgical History:   has a past surgical history that includes Appendectomy (1952); Hysterectomy (Approx 1983); Cataract removal (Bilateral, 08/10); malignant skin lesion excision (Right, 12/10 and 04/11); malignant skin lesion excision (Right, 02/2012); Colonoscopy (05/16/2011); other surgical history (09/06/2011); Cholecystectomy; Endoscopy, colon, diagnostic; eye surgery; Cardiac catheterization (2014); other surgical history (3/22/16); other surgical history (Right, 4/26/16); Cystoscopy (Right, 2/6/2019);  Cystoscopy (Right, 2/27/2019); Colonoscopy (N/A, 9/26/2019); Upper gastrointestinal endoscopy (05/16/2011); Upper gastrointestinal endoscopy (6/22/15); and Upper gastrointestinal endoscopy (N/A, 9/26/2019). Home Medications:  Prior to Admission medications    Medication Sig Start Date End Date Taking? Authorizing Provider   predniSONE (DELTASONE) 1 MG tablet Take 3 tablets by mouth daily 6/11/21  Yes Gaurang Cherry DO   VICTOZA 18 MG/3ML SOPN SC injection INJECT 1.8 MG INTO THE SKIN DAILY 5/13/21  Yes INGRID Duffy CNP   furosemide (LASIX) 40 MG tablet TAKE 1 TABLET DAILY 5/13/21  Yes INGRID Moreland CNP   insulin lispro, 1 Unit Dial, (HUMALOG KWIKPEN) 100 UNIT/ML SOPN Humalog pen 4 times daily with meals per sliding scale. Blood sugar is 70, drink juice. 151-200 =6, 201-250 = 8 units, 251-300 = 10 units. 5/10/21  Yes INGRID Duffy CNP   insulin glargine (LANTUS SOLOSTAR) 100 UNIT/ML injection pen INJECT 12 UNITS bid  Patient taking differently: INJECT 14 UNITS bid 4/7/21  Yes INGRID Duffy CNP   Nutritional Supplements (GLUCOSE MANAGEMENT) TABS 15 grams for bs less than 70 4/7/21  Yes INGRID Duffy CNP   amiodarone (PACERONE) 100 MG tablet Take 1 tablet by mouth daily 3/23/21  Yes INGRID Moreland CNP   amLODIPine (NORVASC) 5 MG tablet Take 1 tablet by mouth daily 3/4/21  Yes Zabrina Rios MD   apixaban (ELIQUIS) 5 MG TABS tablet Take 1 tablet by mouth 2 times daily 2/23/21 8/22/21 Yes INGRID Moreland CNP   metoprolol tartrate (LOPRESSOR) 25 MG tablet Take 1 tablet by mouth 2 times daily 2/11/21  Yes Kelby Oilvo   simvastatin (ZOCOR) 20 MG tablet TAKE 1 TABLET NIGHTLY 1/18/21  Yes INGRID Moreland CNP   nystatin (MYCOSTATIN) 796888 UNIT/GM cream Apply topically 2 times daily.  1/4/21  Yes INGRID Duffy CNP   potassium chloride (KLOR-CON M) 20 MEQ extended release tablet Take 1 tablet by mouth 2 times daily 12/18/20  Yes INGRID Severino - CNP   albuterol (PROVENTIL) (2.5 MG/3ML) 0.083% nebulizer solution Take 3 mLs by nebulization every 4 hours as needed for Wheezing or Shortness of Breath 12/15/20  Yes Anita RomanojimINGRID CNP   Polyethylene Glycol 400 (BLINK TEARS) 0.25 % SOLN Place into both eyes as needed (dry eyes)    Yes Historical Provider, MD   acetaminophen (TYLENOL) 500 MG tablet Take 500 mg by mouth daily   Yes Historical Provider, MD   omeprazole (PRILOSEC) 20 MG capsule Take 20 mg by mouth Daily  1/14/15  Yes Milo Cypher   Cholecalciferol (VITAMIN D3) 2000 UNITS CAPS Take 2,000 Units by mouth daily    Yes Historical Provider, MD       Allergies:  Codeine, Erythromycin, Exenatide, Levofloxacin, Verapamil, Clonidine derivatives, Other, and Penicillins    Social History:   reports that she has never smoked. She has never used smokeless tobacco. She reports that she does not drink alcohol and does not use drugs. REVIEW OF SYSTEMS:    Constitutional: there has been no unanticipated weight loss. There's been No change in energy level, No change in activity level. Eyes: No visual changes or diplopia. No scleral icterus. ENT: No Headaches, hearing loss or vertigo. No mouth sores or sore throat. Cardiovascular: as hpi  Respiratory: as hpi  Gastrointestinal: No abdominal pain, appetite loss, blood in stools. No change in bowel or bladder habits. Genitourinary: No dysuria, trouble voiding, or hematuria. Musculoskeletal:  No gait disturbance, No weakness or joint complaints. Integumentary: No rash or pruritis. Neurological: No headache, diplopia, change in muscle strength, numbness or tingling. No change in gait, balance, coordination, mood, affect, memory, mentation, behavior. Psychiatric: No new anxiety or depression. Endocrine: No temperature intolerance. No excessive thirst, fluid intake, or urination. No tremor.   Hematologic/Lymphatic: No abnormal bruising or bleeding, blood clots or swollen LABALBU 3.8 02/09/2021    BILITOT 0.37 02/09/2021    ALKPHOS 81 02/09/2021    AST 15 02/09/2021    ALT 14 02/09/2021    LABGLOM 45 (L) 04/07/2021    GFRAA 55 (L) 04/07/2021         IMPRESSION & RECOMMENDATIONS:  · Atrial fibrillation, paroxysmal, currently in NSR, CHADS-VASc: 5. Continue metoprolol and Eliquis. Maintained on low dose amiodarone (100 mg qd) after recent admission for AF with RVR in 02/21. Will obtain tsh/ast/alt q 6 months and PFT annually. · HTN- controlled at home, office readings are always high, continue norvasc and lopressor, counseled regarding low salt diet. · Chronic HFpEF- stable currently, no signs of volume overload on examination, continue current dose lasix, and can take extra lasix prn. · HLP- at goal. Continue statin. · DM2- per pcp    · CHICO- not on cpap. Recommended compliance. The patient is to continue heart healthy diet, weight loss and exercise as tolerated. Patient's medications and side effects were discussed. Medication refills were provided if needed. Follow up appointment timing was discussed. All questions and concerns were addressed to patient's satisfaction. The patient is to follow up in 6 months or sooner if necessary. Thank you for allowing me to participate in the care of this patient, please do not hesitate to call if you have any questions. Kika Vallejo MD   [facility]  [facility]  [phone]

## 2021-06-29 LAB — DIABETIC RETINOPATHY: NEGATIVE

## 2021-07-21 DIAGNOSIS — E11.21 DIABETIC NEPHROPATHY ASSOCIATED WITH TYPE 2 DIABETES MELLITUS (HCC): ICD-10-CM

## 2021-07-21 RX ORDER — PEN NEEDLE, DIABETIC 31 GX5/16"
NEEDLE, DISPOSABLE MISCELLANEOUS
Qty: 650 EACH | Refills: 3 | Status: SHIPPED | OUTPATIENT
Start: 2021-07-21 | End: 2021-01-01 | Stop reason: SDUPTHER

## 2021-08-02 RX ORDER — AMLODIPINE BESYLATE 5 MG/1
5 TABLET ORAL DAILY
Qty: 90 TABLET | Refills: 3 | Status: SHIPPED | OUTPATIENT
Start: 2021-08-02 | End: 2022-01-01

## 2021-08-09 ENCOUNTER — HOSPITAL ENCOUNTER (OUTPATIENT)
Dept: INTERVENTIONAL RADIOLOGY/VASCULAR | Age: 86
Discharge: HOME | End: 2021-08-11
Payer: MEDICARE

## 2021-08-09 PROCEDURE — 76775 US EXAM ABDO BACK WALL LIM: CPT

## 2021-08-16 ENCOUNTER — OFFICE VISIT (OUTPATIENT)
Dept: DIABETES SERVICES | Age: 86
End: 2021-08-16
Payer: MEDICARE

## 2021-08-16 VITALS
DIASTOLIC BLOOD PRESSURE: 62 MMHG | SYSTOLIC BLOOD PRESSURE: 146 MMHG | BODY MASS INDEX: 38.71 KG/M2 | RESPIRATION RATE: 14 BRPM | WEIGHT: 192 LBS | HEIGHT: 59 IN | HEART RATE: 60 BPM

## 2021-08-16 DIAGNOSIS — I10 ESSENTIAL HYPERTENSION: ICD-10-CM

## 2021-08-16 DIAGNOSIS — E78.5 DYSLIPIDEMIA: ICD-10-CM

## 2021-08-16 DIAGNOSIS — E11.21 TYPE 2 DIABETES WITH NEPHROPATHY (HCC): Primary | ICD-10-CM

## 2021-08-16 PROCEDURE — 99214 OFFICE O/P EST MOD 30 MIN: CPT | Performed by: NURSE PRACTITIONER

## 2021-08-16 PROCEDURE — 95251 CONT GLUC MNTR ANALYSIS I&R: CPT | Performed by: NURSE PRACTITIONER

## 2021-08-16 PROCEDURE — 3051F HG A1C>EQUAL 7.0%<8.0%: CPT | Performed by: NURSE PRACTITIONER

## 2021-08-16 NOTE — PROGRESS NOTES
[facility] [address] INTERNAL  [facility]  [address], [place], [address]  [place] [address] [facility]  [phone]        HISTORY:    Mary Ashley presents today for evaluation and management of:  Chief Complaint   Patient presents with    Diabetes     3 month appt. Eye exam recently done by [facility] [place]. HPI    Interval History:    Current Diabetic Medications  Victoza 1.8 mg daily, Lantus 12 units twice daily Humalog per sliding scale     DKA episodes: 0    02/08/21   At previous visit insulin was adjusted CGM report reviewed and has had higher blood sugar since.  Patient states since she has received the Covid vaccine she feels her blood sugars have been higher and has self adjusted insulin.  Denies any hypoglycemia. Diet: Low carb  Exercise: None  BS testing: Uses CGM daily with success  Issues: irregular heart beat.      4/7/21  At previous visit pt felt since getting covid vaccine he blood sugars were higher so insulin was increased. She is no experiencing significant hypoglycemia daily. Also at previous visit she was found to be in atrial fibrillation and sent to er.   Diet: low carb, frequent snacks due to hypoglycemia.   Exercise: none  BS testing: uses cgm daily with sucess  Issues: denies      05/10/21   At previous visit insulin was adjusted. Patient states she will sometimes take 12 to 14 units of Lantus based on her average glucose for the day. She has also added about 4 units to her current sliding scale. Denies any signs or symptoms of hypoglycemia. Diet: Low-carb  Exercise: none  BS testing: Uses CGM daily with success and is adherent to cgm regimen. Issues: denies     08/17/21   At previous visit dm counseling was provided. Denies any s/s of hyper/hypoglycemia. Still concerned about occasional high blood sugars after meals. Diet: Low-carb  Exercise: none  BS testing: Uses CGM daily with success and is adherent to cgm regimen. Issues: denies    High cholesterol-  Takes zocor and denies any adverse effects with its use.  Watches diet and exercise.      Hypertension-  Takes Norvasc Lopressor and Apresoline and denies any adverse effects with their use. Watches diet and exercise. Denies any chest pain, dizziness or edema.  Follows with cardiology:Yes. Monitors bp at [facility] 130/70     Obesity- Working on weight loss. Past Medical History:   Diagnosis Date    Allergic rhinitis     Asthma     PFT's, 04/06, actually showed mild restrictive defect.  Atrial fibrillation with RVR ([address]     [facility]ospital[facility] 2/8/21-2/11/21    Basal cell carcinoma of cheek 2007    Right cheek    Basal cell carcinoma of leg     right leg    CAD (coronary artery disease)     With 40% stenosis of the LAD, 07/10, ejection fraction 60%. 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 Fatoumata Campbell. Repeat EGD6/15 Gastritis    Foraminal stenosis of lumbar region     Moderate  Right L4/L5 neuroforaminal stenosis, Dr Jaison Rae following. MRI 2/16 [place].   Moderate foraminal stenosis mild to moderate central canal stenosis    Hyperlipidemia     Hypertension     IBS (irritable bowel syndrome)     [facility]al[facility], felt that she probably has chronic functional diarrhea and recommended empiric Imodium, Pepto-Bismol or Questran first. Sprue test Neg 2011    Iron deficiency anemia     Percent sat iron 5, January 2015, ferritin 40 1 /15, FOBT positive  EGD 6/15  Gastritis, IV iron 9/15x3 effective,  colonoscopy deferred by Dr. Kunal Posada. --Prior colonoscopy 2011 with severe diverticulosis and telangiectasia. Trial iron solution in [place] juice 12/16    Iron deficiency anemia due to chronic blood loss 09/08/2015    Junctional bradycardia     Symptomatic, resolved with discontinuation of Verapamil, 05/09. 1.1) Echocardiogram: LAE, LVH, EF 50%, mild MR, diastolic. 1.2) Dr. Broderick Spurling evaluation. 1.3.) Persantine stress test negative, 05/09.  Migraine     Mild CAD     cath 10/15    Mitral regurgitation     Moderate to severe on echocardiogram September 2015.   Right pressure 76 grade 2 diastolic dysfunction,  echo 35/15 grade 2 diastolic dysfunction mild to moderate MR RVSP 56 aortic sclerosis    Obesity     CHICO (obstructive sleep apnea)     CPAP 14 2/15 initiation  AHI 7  mild CHICO intolerant CPAP    Osteoarthritis     PMR (polymyalgia rheumatica) (HCC)     Pneumonia     Premature atrial contractions     Pulmonary hypertension (HCC)     -Moderate on echo November 2014    Restrictive lung disease     Mild on PFTs 11/14    Type II or unspecified type diabetes mellitus without mention of complication, not stated as uncontrolled     Vitreous floaters      Family History   Problem Relation Age of Onset    Uterine Cancer Mother     Heart Disease Father     High Blood Pressure Father     Stroke Sister         from a vascular malformation    Heart Attack Son         age 48     Social History     Tobacco Use    Smoking status: Never Smoker    Smokeless tobacco: Never Used    Tobacco comment: amish rrt 5/28/2019   Vaping Use    Vaping Use: Never used   Substance Use Topics    Alcohol use: No     Alcohol/week: 0.0 standard drinks    Drug use: No     Allergies   Allergen Reactions    Codeine      Confusion      Erythromycin      GI upset  Received zithromax in past and tolerated    Exenatide Nausea Only    Levofloxacin      GI upset    Verapamil Other (See Comments)     Junctional bradycardia    Clonidine Derivatives Rash     2009, catapres    Other Rash     Levbid    Penicillins Rash     Received Rocephin in past and tolerated       MEDICATIONS:  Current Outpatient Medications   Medication Sig Dispense Refill    amLODIPine (NORVASC) 5 MG tablet Take 1 tablet by mouth daily 90 tablet 3    predniSONE (DELTASONE) 1 MG tablet Take 3 tablets by mouth daily 270 tablet 0    VICTOZA 18 MG/3ML SOPN SC injection INJECT 1.8 MG INTO THE SKIN DAILY 18 mL 5    furosemide (LASIX) 40 MG tablet TAKE 1 TABLET DAILY 90 tablet 3    insulin lispro, 1 Unit Dial, (HUMALOG KWIKPEN) 100 UNIT/ML SOPN Humalog pen 4 times daily with meals per sliding scale. Blood sugar is 70, drink juice. 151-200 =6, 201-250 = 8 units, 251-300 = 10 units.  1 pen 3    insulin glargine (LANTUS SOLOSTAR) 100 UNIT/ML injection pen INJECT 12 UNITS bid (Patient taking differently: INJECT 14 UNITS bid) 30 mL 4    Nutritional Supplements (GLUCOSE MANAGEMENT) TABS 15 grams for bs less than 70 100 tablet 3    amiodarone (PACERONE) 100 MG tablet Take 1 tablet by mouth daily 90 tablet 3    apixaban (ELIQUIS) 5 MG TABS tablet Take 1 tablet by mouth 2 times daily 180 tablet 1    metoprolol tartrate (LOPRESSOR) 25 MG tablet Take 1 tablet by mouth 2 times daily 60 tablet 3    simvastatin (ZOCOR) 20 MG tablet TAKE 1 TABLET NIGHTLY 90 tablet 3    potassium chloride (KLOR-CON M) 20 MEQ extended release tablet Take 1 tablet by mouth 2 times daily 180 tablet 3    albuterol (PROVENTIL) (2.5 MG/3ML) 0.083% nebulizer solution Take 3 mLs by nebulization every 4 hours as needed for Wheezing or Shortness of Breath 30 each 1    Polyethylene Glycol 400 (BLINK TEARS) 0.25 % SOLN Place into both eyes as needed (dry eyes)       acetaminophen (TYLENOL) 500 MG tablet Take 500 mg by mouth daily      omeprazole (PRILOSEC) 20 MG capsule Take 20 mg by mouth Daily  30 capsule 3    Cholecalciferol (VITAMIN D3) 2000 UNITS CAPS Take 2,000 Units by mouth daily       Insulin Pen Needle (B-D ULTRAFINE III SHORT PEN) 31G X 8 MM MISC USE 7 NEEDLES DAILY 650 each 3    nystatin (MYCOSTATIN) 301540 UNIT/GM cream Apply topically 2 times daily. 1 Tube 0     No current facility-administered medications for this visit. Review ofSymptoms:  Review of Systems   Constitutional: Positive for fatigue. Negative for unexpected weight change. Eyes: Negative for visual disturbance. Respiratory: Negative. Negative for shortness of breath. Cardiovascular: Negative for chest pain and leg swelling. Gastrointestinal: Negative for abdominal pain and diarrhea. Endocrine: Negative for polydipsia, polyphagia and polyuria. Genitourinary: Negative. Musculoskeletal: Negative. Skin: Negative for rash and wound. Neurological: Negative for dizziness, tremors, seizures and headaches. Psychiatric/Behavioral: Negative. Negative for confusion and decreased concentration. Theremainder of a complete 14-point review of systems is negative. Vital Signs: BP (!) 146/62 (Site: Right Upper Arm, Position: Sitting, Cuff Size: Large Adult)   Pulse 60   Resp 14   Ht 4' 10.5\" (1.486 m)   Wt 192 lb (87.1 kg)   BMI 39.45 kg/m²      Wt Readings from Last 3 Encounters:   08/16/21 192 lb (87.1 kg)   06/17/21 190 lb (86.2 kg)   05/10/21 190 lb 12.8 oz (86.5 kg)     Body mass index is 39.45 kg/m².   LABS:  Hemoglobin A1C   Date Value Ref Range Status   03/08/2021 7.4 (H) 4.0 - 6.0 % Final   12/08/2020 7.2 (H) 4.8 - 5.9 % Final     Lab Results   Component Value Date    LABMICR CANNOT BE CALCULATED 04/23/2019     Lab Results   Component Value Date     04/07/2021    K 3.9 04/07/2021     04/07/2021    CO2 28 04/07/2021    BUN 20 04/07/2021    CREATININE 1.14 (H) 04/07/2021    GLUCOSE 93 04/07/2021    CALCIUM 10.2 04/07/2021    PROT 6.6 02/09/2021    LABALBU 3.8 02/09/2021    BILITOT 0.37 02/09/2021    ALKPHOS 81 02/09/2021    AST 15 02/09/2021    ALT 14 02/09/2021    LABGLOM 45 (L) 04/07/2021    GFRAA 55 (L) 04/07/2021     Lab Results   Component Value Date    CHOL 185 06/02/2020    CHOL 137 07/20/2019    CHOL 142 05/30/2019     Lab Results   Component Value Date    TRIG 196 (H) 06/02/2020    TRIG 126 07/20/2019    TRIG 114 05/30/2019     Lab Results   Component Value Date    HDL 67 06/02/2020    HDL 66 07/20/2019    HDL 62 05/30/2019     Lab Results   Component Value Date    LDLCHOLESTEROL 79 06/02/2020    LDLCHOLESTEROL 46 07/20/2019    LDLCHOLESTEROL 57 05/30/2019     Lab Results   Component Value Date    VLDL NOT REPORTED (H) 06/02/2020    VLDL NOT REPORTED 07/20/2019    VLDL NOT REPORTED 05/30/2019     Lab Results   Component Value Date    CHOLHDLRATIO 2.8 06/02/2020    CHOLHDLRATIO 2.1 07/20/2019    CHOLHDLRATIO 2.3 05/30/2019           Physical Exam  Constitutional:       Appearance: She is well-developed. Eyes:      Pupils: Pupils are equal, round, and reactive to light. Neck:      Thyroid: No thyroid mass, thyromegaly or thyroid tenderness. Cardiovascular:      Rate and Rhythm: Normal rate and regular rhythm. Heart sounds: Normal heart sounds. Pulmonary:      Effort: Pulmonary effort is normal.      Breath sounds: Normal breath sounds. Abdominal:      General: Bowel sounds are normal.      Palpations: Abdomen is soft. Skin:     General: Skin is warm and dry. Comments: Negative for open/nonhealing wounds. Negative for lipohypertrophy. Neurological:      Mental Status: She is alert and oriented to person, place, and time. ASSESSMENT/PLAN:     Diagnosis Orders   1. Type 2 diabetes with nephropathy ([address]     2. Dyslipidemia     3. Essential hypertension     4. BMI 39.0-39.9,adult       No orders of the defined types were placed in this encounter.     No orders of the defined types were placed in this encounter. Requested Prescriptions      No prescriptions requested or ordered in this encounter       1. Type 2 diabetes with nephropathy (HCC)  - Stable  HbA1C goal is less than 7%. - Fasting blood glucose goal is 70-130mg/dl and postprandial blood sugar goal is less than 180 mg/dl. -Diabetic foot exam up-to-date: Yes  -Diabetic retinal exam up-to-date: No  - Labs reviewed includes: Most recent A1C 7.4%, Microalb/Crt. Ratio 0 mcg/mg creat and GFR 45 mL/min. Repeat labs due in 3 months.    -We discussed in great detail dietary modifications they can make to better improve their blood sugars. -follow up diabetes education completed, all questions answered. CGM report downloaded and reviewed, scanned to media tab. Time in range 71% and hypoglycemia 0%. Predicted A1c per CGM report 7.2%. -doing well, discussed reasonable a1c goal is less than 7.5% without hypoglycemia. No med changes at this time. Monitor for low blood sugars. Discussed signs and symptoms of hyper/hypoglycemia and how to treat. Encouraged 150 minutes of physical activity per week. Follow a low carbohydrate diet. Encouraged at least 7 hours of sleep. The patient was informed of the goals of diabetes management. This can only be accomplished by watching their diet and exercise levels. We certainly use medicines to help attain these goals. The consequences of not controlling blood sugars were discussed. These include blindness, heart disease, stroke, kidney disease, and possibly need for dialysis. They were told to be careful with their foot care as diabetics often have nerve damage, infections and risk for limb amutations . They also need a dilated eye exam yearly. We discussed the issues of diet, exercise, medication, complication avoidance, reviewed the signs and symptoms of diabetes, hypoglycemic episodes, significance of HbA1C.         2. Dyslipidemia  stable, lipid panel reviewed, continue current medications. Diet and exercise      3. Essential hypertension   stable, continue current medications. Diet and exercise Seek emergent care if chest pain develops. 4. BMI 39.0-39.9,adult  Reduce calories and increase physical activity to achieve a slow and steady weight loss to improve blood pressure, cholesterol and diabetes. Answered all patient questions. Agrees to follow plan of care and to follow up in 3 months, sooner if needed. Call office if unexplained blood sugars less than 70 occur or above 400. Call office or access [email or web address] with any further questions or concerns. Be sure to bring glucometer/food log at next appointment. Total time spent reviewing chart, labs, counseling patient and documenting on the date of the encounter: 30 min.       Electronically signed by INGRID Watkins CNP on 8/17/2021 at 1:09 PM      (Please note that portions of this note were completed with a voice-recognition program. Efforts were made to edit the dictation but occasionally words are mis-transcribed.)

## 2021-08-17 ENCOUNTER — TELEPHONE (OUTPATIENT)
Dept: INTERNAL MEDICINE | Age: 86
End: 2021-08-17

## 2021-08-17 DIAGNOSIS — E55.9 VITAMIN D DEFICIENCY: ICD-10-CM

## 2021-08-17 DIAGNOSIS — E11.21 TYPE 2 DIABETES WITH NEPHROPATHY (HCC): Primary | ICD-10-CM

## 2021-08-17 ASSESSMENT — ENCOUNTER SYMPTOMS
ABDOMINAL PAIN: 0
SHORTNESS OF BREATH: 0
RESPIRATORY NEGATIVE: 1
DIARRHEA: 0

## 2021-08-17 NOTE — TELEPHONE ENCOUNTER
Patient has a lab appt on 8/25/21. Upcoming follow up visit with PCP. She has orders from [facility] for hemoglobin A1C and microalbumin. Please advise any additional orders.

## 2021-08-18 ENCOUNTER — OFFICE VISIT (OUTPATIENT)
Dept: UROLOGY | Age: 86
End: 2021-08-18
Payer: MEDICARE

## 2021-08-18 VITALS
HEART RATE: 60 BPM | SYSTOLIC BLOOD PRESSURE: 124 MMHG | BODY MASS INDEX: 40.17 KG/M2 | HEIGHT: 58 IN | DIASTOLIC BLOOD PRESSURE: 62 MMHG | WEIGHT: 191.38 LBS

## 2021-08-18 DIAGNOSIS — N39.0 RECURRENT UTI: ICD-10-CM

## 2021-08-18 DIAGNOSIS — N20.0 NEPHROLITHIASIS: Primary | ICD-10-CM

## 2021-08-18 PROCEDURE — 99214 OFFICE O/P EST MOD 30 MIN: CPT | Performed by: UROLOGY

## 2021-08-18 NOTE — PROGRESS NOTES
Godfrey Garcia MD  Urology Clinic Progress Note      Patient:  Akil Monroe  YOB: 1935  Date: 8/18/2021    HISTORY OF PRESENT ILLNESS:   The patient is a 81 y.o. female who presents today for follow-up for the following problem(s): right ureteral stone, UTI  Overall the problem(s) : are improving. Associated Symptoms: No dysuria, gross hematuria. Pain Severity:      Summary of old records: 2/6/19 right stent placement for UTI and stone  2/20/2019 Right URS and HLL and stone treatment    Additional History:     Reports 3 UTIs over past 1 year  No hematuria. occasional pelvic pain. No aggravating factors. Denies constipation    I independently reviewed and verified the images and reports from:    US RENAL LIMITED    Result Date: 8/9/2021  EXAMINATION: ULTRASOUND OF THE KIDNEYS 8/9/2021 9:53 am COMPARISON: 09/30/2019 HISTORY: ORDERING SYSTEM PROVIDED HISTORY: Nephrolithiasis TECHNOLOGIST PROVIDED HISTORY: Reason for Exam: RECURRENT UTI'S, HX OF RENAL STONES Acuity: Chronic Type of Exam: Ongoing FINDINGS: The right kidney measures 10.3 cm in length and the left kidney measures 10.2 cm in length. Kidneys demonstrate normal cortical echogenicity. No hydronephrosis or intrarenal stones. No focal lesions. There may be mild increased echogenicity of the liver suggesting hepatic steatosis. Unremarkable ultrasound of the kidneys. Xr Abdomen (kub) (single Ap View)  Result Date: 8/5/2020  Unremarkable radiographs of the abdomen. Degenerative change of the lumbar spine, SI joints, and hip joints. 5/30/19  Unremarkable ultrasound of the kidneys. No kidney stones. Imaging Reviewed during this Office Visit:   (results were independently reviewed by physician and radiology report verified)    Urinalysis today:  No results found for this visit on 08/18/21.     Last BUN and creatinine:  Lab Results   Component Value Date    BUN 20 04/07/2021     Lab Results   Component Value Date CREATININE 1.14 (H) 04/07/2021       PAST MEDICAL, FAMILY AND SOCIAL HISTORY UPDATE:  Past Medical History:   Diagnosis Date    Allergic rhinitis     Asthma     PFT's, 04/06, actually showed mild restrictive defect.  Atrial fibrillation with RVR ([facility] [address]     Hospitalized [facility] 2/8/21-2/11/21    Basal cell carcinoma of cheek 2007    Right cheek    Basal cell carcinoma of leg     right leg    CAD (coronary artery disease)     With 40% stenosis of the LAD, 07/10, ejection fraction 60%. Repeat heart cath9/14 with 40-50 percent LAD lesion first diagonal 50% lesion rec med Rx    Central retinal artery occlusion     Right side November 2014 status post TPA    Cerebral artery occlusion with cerebral infarction ([facility] [address] 11/24/2014    Cerebrovascular disease     50-79% stenosis on left on ultrasound 11/14    CHF (congestive heart failure) (HCC)     Echo 1/15 moderate MR, severe TR, RVSP 76, grade 2 diastolic dysfunction    Diverticulosis     AVM on colonoscopy, 05/11    Dyslipidemia     Elevated antinuclear antibody (ZAIRA) level     History of    Esophagitis 05/2011    Gastritis/esophagitis on EGD, Dr. Myron Sequeira. Repeat EGD6/15 Gastritis    Foraminal stenosis of lumbar region     Moderate  Right L4/L5 neuroforaminal stenosis, Dr Mayank Enriquez following. MRI 2/16 [place]. Moderate foraminal stenosis mild to moderate central canal stenosis    Hyperlipidemia     Hypertension     IBS (irritable bowel syndrome)     GI consultation with Dr. Ezra Quach, GI specialist in [facility], felt that she probably has chronic functional diarrhea and recommended empiric Imodium, Pepto-Bismol or Questran first. Sprue test Neg 2011    Iron deficiency anemia     Percent sat iron 5, January 2015, ferritin 40 1 /15, FOBT positive  EGD 6/15  Gastritis, IV iron 9/15x3 effective,  colonoscopy deferred by Dr. Myron Sequeira. --Prior colonoscopy 2011 with severe diverticulosis and telangiectasia.   Trial iron solution in Orange juice 12/16    Iron deficiency anemia due to chronic blood loss 09/08/2015    Junctional bradycardia     Symptomatic, resolved with discontinuation of Verapamil, 05/09. 1.1) Echocardiogram: LAE, LVH, EF 50%, mild MR, diastolic. 1.2) Dr. Keila Glover evaluation. 1.3.) Persantine stress test negative, 05/09.  Migraine     Mild CAD     cath 10/15    Mitral regurgitation     Moderate to severe on echocardiogram September 2015.   Right pressure 76 grade 2 diastolic dysfunction,  echo 11/18 grade 2 diastolic dysfunction mild to moderate MR RVSP 56 aortic sclerosis    Obesity     CHICO (obstructive sleep apnea)     CPAP 14 2/15 initiation  AHI 7  mild CHICO intolerant CPAP    Osteoarthritis     PMR (polymyalgia rheumatica) (HCC)     Pneumonia     Premature atrial contractions     Pulmonary hypertension (HCC)     -Moderate on echo November 2014    Restrictive lung disease     Mild on PFTs 11/14    Type II or unspecified type diabetes mellitus without mention of complication, not stated as uncontrolled     Vitreous floaters      Past Surgical History:   Procedure Laterality Date    APPENDECTOMY  1952    CARDIAC CATHETERIZATION  2014    CATARACT REMOVAL Bilateral 08/10    CHOLECYSTECTOMY      COLONOSCOPY  05/16/2011    COLONOSCOPY N/A 9/26/2019    severe diverticulosis, benign rectal polyp, Dr. Sebastián Michelle Right 2/6/2019    Cysto with right stent insertion and villareal catheter performed by Angie Flores MD at [facility] Right 2/27/2019    CYSTO right stent removal  Right Ureteroscopy Holmium Laser right stent exchange performed by Angie Flores MD at  [address], COLON, DIAGNOSTIC      EYE SURGERY      HYSTERECTOMY  Approx 1983    MALIGNANT SKIN LESION EXCISION Right 12/10 and 04/11    Basal CellRemoved from right neck    MALIGNANT SKIN LESION EXCISION Right 02/2012    Basal Cell Removed from right leg    OTHER SURGICAL HISTORY  09/06/2011    capsule endoscopy    OTHER SURGICAL HISTORY  3/22/16 right L4 and L5 TFE    OTHER SURGICAL HISTORY Right 16    L4, L5 TFE    UPPER GASTROINTESTINAL ENDOSCOPY  2011    UPPER GASTROINTESTINAL ENDOSCOPY  6/22/15    mild gastritis (Dr. Goodwin York)    UPPER GASTROINTESTINAL ENDOSCOPY N/A 2019    mild to moderate inflammation, Dr. Goodwin York     Family History   Problem Relation Age of Onset    Uterine Cancer Mother     Heart Disease Father     High Blood Pressure Father     Stroke Sister         from a vascular malformation    Heart Attack Son         age 48     Outpatient Medications Marked as Taking for the 21 encounter (Office Visit) with Romero Waite MD   Medication Sig Dispense Refill    amLODIPine (NORVASC) 5 MG tablet Take 1 tablet by mouth daily 90 tablet 3    Insulin Pen Needle (B-D ULTRAFINE III SHORT PEN) 31G X 8 MM MISC USE 7 NEEDLES DAILY 650 each 3    predniSONE (DELTASONE) 1 MG tablet Take 3 tablets by mouth daily 270 tablet 0    VICTOZA 18 MG/3ML SOPN SC injection INJECT 1.8 MG INTO THE SKIN DAILY 18 mL 5    furosemide (LASIX) 40 MG tablet TAKE 1 TABLET DAILY 90 tablet 3    insulin lispro, 1 Unit Dial, (HUMALOG KWIKPEN) 100 UNIT/ML SOPN Humalog pen 4 times daily with meals per sliding scale. Blood sugar is 70, drink juice. 151-200 =6, 201-250 = 8 units, 251-300 = 10 units. 1 pen 3    insulin glargine (LANTUS SOLOSTAR) 100 UNIT/ML injection pen INJECT 12 UNITS bid (Patient taking differently: INJECT 14 UNITS bid) 30 mL 4    Nutritional Supplements (GLUCOSE MANAGEMENT) TABS 15 grams for bs less than 70 100 tablet 3    amiodarone (PACERONE) 100 MG tablet Take 1 tablet by mouth daily 90 tablet 3    apixaban (ELIQUIS) 5 MG TABS tablet Take 1 tablet by mouth 2 times daily 180 tablet 1    metoprolol tartrate (LOPRESSOR) 25 MG tablet Take 1 tablet by mouth 2 times daily 60 tablet 3    simvastatin (ZOCOR) 20 MG tablet TAKE 1 TABLET NIGHTLY 90 tablet 3    nystatin (MYCOSTATIN) 048092 UNIT/GM cream Apply topically 2 times daily.  1 Tube 0    potassium chloride (KLOR-CON M) 20 MEQ extended release tablet Take 1 tablet by mouth 2 times daily 180 tablet 3    albuterol (PROVENTIL) (2.5 MG/3ML) 0.083% nebulizer solution Take 3 mLs by nebulization every 4 hours as needed for Wheezing or Shortness of Breath 30 each 1    Polyethylene Glycol 400 (BLINK TEARS) 0.25 % SOLN Place into both eyes as needed (dry eyes)       acetaminophen (TYLENOL) 500 MG tablet Take 500 mg by mouth daily      omeprazole (PRILOSEC) 20 MG capsule Take 20 mg by mouth Daily  30 capsule 3    Cholecalciferol (VITAMIN D3) 2000 UNITS CAPS Take 2,000 Units by mouth daily          Codeine, Erythromycin, Exenatide, Levofloxacin, Verapamil, Clonidine derivatives, Other, and Penicillins  Social History     Tobacco Use   Smoking Status Never Smoker   Smokeless Tobacco Never Used   Tobacco Comment    syant rrt 5/28/2019       Social History     Substance and Sexual Activity   Alcohol Use No    Alcohol/week: 0.0 standard drinks       REVIEW OF SYSTEMS:  Constitutional: negative  Eyes: negative  Respiratory: negative  Cardiovascular: negative  Gastrointestinal: negative  Genitourinary: negative except for what is in HPI  Musculoskeletal: negative  Skin: negative   Neurological: negative  Hematological/Lymphatic: negative  Psychological: negative    Physical Exam:      Vitals:    08/18/21 0921   BP: 124/62   Pulse: 60     Patient is a 81 y.o. female in no acute distress and alert and oriented to person, place and time. NAD, Alert  Non labored respiration  Normal peripheral pulses  Soft, non tender  Skin-warm and dry  Psych- normal mood and affect    Assessment and Plan      1. Nephrolithiasis    2. Recurrent UTI           Plan:       Renal US reviewed    Patient instructed to drink at least 10 eight ounce glasses of water a day and avoid excessive consumption of sodium and animal protein to decrease risk of recurrent kidney stones.     Recurrent UTIs: Discussed double voiding

## 2021-08-20 RX ORDER — APIXABAN 5 MG/1
TABLET, FILM COATED ORAL
Qty: 180 TABLET | Refills: 3 | Status: ON HOLD | OUTPATIENT
Start: 2021-08-20 | End: 2022-01-01 | Stop reason: HOSPADM

## 2021-08-20 RX ORDER — PREDNISONE 1 MG/1
TABLET ORAL
Qty: 270 TABLET | Refills: 3 | Status: SHIPPED | OUTPATIENT
Start: 2021-08-20

## 2021-08-25 ENCOUNTER — HOSPITAL ENCOUNTER (OUTPATIENT)
Dept: LAB | Age: 86
Discharge: HOME | End: 2021-08-25
Payer: MEDICARE

## 2021-08-25 DIAGNOSIS — E11.21 TYPE 2 DIABETES WITH NEPHROPATHY (HCC): ICD-10-CM

## 2021-08-25 DIAGNOSIS — E55.9 VITAMIN D DEFICIENCY: ICD-10-CM

## 2021-08-25 LAB
ALBUMIN SERPL-MCNC: 4.1 G/DL (ref 3.5–5.2)
ALBUMIN/GLOBULIN RATIO: 1.4 (ref 1–2.5)
ALP BLD-CCNC: 79 U/L (ref 35–104)
ALT SERPL-CCNC: 11 U/L (ref 5–33)
ANION GAP SERPL CALCULATED.3IONS-SCNC: 9 MMOL/L (ref 9–17)
AST SERPL-CCNC: 14 U/L
BILIRUB SERPL-MCNC: 0.57 MG/DL (ref 0.3–1.2)
BUN BLDV-MCNC: 18 MG/DL (ref 8–23)
BUN/CREAT BLD: 18 (ref 9–20)
CALCIUM SERPL-MCNC: 9.7 MG/DL (ref 8.6–10.4)
CHLORIDE BLD-SCNC: 104 MMOL/L (ref 98–107)
CHOLESTEROL/HDL RATIO: 2.5
CHOLESTEROL: 184 MG/DL
CO2: 29 MMOL/L (ref 20–31)
CREAT SERPL-MCNC: 1.01 MG/DL (ref 0.5–0.9)
CREATININE URINE: 34.7 MG/DL (ref 28–217)
ESTIMATED AVERAGE GLUCOSE: 154 MG/DL
GFR AFRICAN AMERICAN: >60 ML/MIN
GFR NON-AFRICAN AMERICAN: 52 ML/MIN
GFR SERPL CREATININE-BSD FRML MDRD: ABNORMAL ML/MIN/{1.73_M2}
GFR SERPL CREATININE-BSD FRML MDRD: ABNORMAL ML/MIN/{1.73_M2}
GLUCOSE BLD-MCNC: 180 MG/DL (ref 70–99)
HBA1C MFR BLD: 7 % (ref 4–6)
HDLC SERPL-MCNC: 73 MG/DL
LDL CHOLESTEROL: 83 MG/DL (ref 0–130)
MICROALBUMIN/CREAT 24H UR: <12 MG/L
MICROALBUMIN/CREAT UR-RTO: NORMAL MCG/MG CREAT
POTASSIUM SERPL-SCNC: 4.3 MMOL/L (ref 3.7–5.3)
SODIUM BLD-SCNC: 142 MMOL/L (ref 135–144)
TOTAL PROTEIN: 7.1 G/DL (ref 6.4–8.3)
TRIGL SERPL-MCNC: 140 MG/DL
VITAMIN D 25-HYDROXY: 52.8 NG/ML (ref 30–100)
VLDLC SERPL CALC-MCNC: NORMAL MG/DL (ref 1–30)

## 2021-08-25 PROCEDURE — 80061 LIPID PANEL: CPT

## 2021-08-25 PROCEDURE — 83036 HEMOGLOBIN GLYCOSYLATED A1C: CPT

## 2021-08-25 PROCEDURE — 82306 VITAMIN D 25 HYDROXY: CPT

## 2021-08-25 PROCEDURE — 82043 UR ALBUMIN QUANTITATIVE: CPT

## 2021-08-25 PROCEDURE — 82570 ASSAY OF URINE CREATININE: CPT

## 2021-08-25 PROCEDURE — 80053 COMPREHEN METABOLIC PANEL: CPT

## 2021-08-25 PROCEDURE — 36415 COLL VENOUS BLD VENIPUNCTURE: CPT

## 2021-08-26 DIAGNOSIS — E11.21 TYPE 2 DIABETES WITH NEPHROPATHY (HCC): Primary | ICD-10-CM

## 2021-08-30 PROBLEM — I48.91 ATRIAL FIBRILLATION WITH RVR (HCC): Status: RESOLVED | Noted: 2021-02-08 | Resolved: 2021-01-01

## 2021-08-30 NOTE — PATIENT INSTRUCTIONS
Start a yogurt every day- if not start a lactobacillus twice a day (probiotic)   add fiber daily - either through benefiber, metamucil,  Or fiber gummies daily   Add support stockings

## 2021-08-30 NOTE — PROGRESS NOTES
08/30/21  Mary Ashley  1935      Chief Complaint:   1. Type 2 diabetes with nephropathy ([facility] [address]    2. Controlled type 2 diabetes mellitus without complication, with long-term current use of insulin ([facility] [address]    3. Essential hypertension    4. Diastolic dysfunction    5. Iron deficiency anemia secondary to blood loss (chronic)    6. PMR (polymyalgia rheumatica) ([facility])    7. Dyslipidemia    8. Neural foraminal stenosis of lumbar spine    9. Pulmonary HTN ([facility] [address]    10. Obesity, morbid, BMI 40.0-49.9 ([facility] [address]    11. Vitamin D deficiency    12. Mild intermittent asthma without complication    13. Atrial fibrillation, unspecified type ([facility] [address]    14. Stage 3a chronic kidney disease (HCC)        HPI:  81-year-old patient in for 3-month follow-up of above chronic health conditions. States her dyspnea on exertion is stable. Recently was increased on her Lasix short-term by cardiology. Salmon help with swelling. She continues to follow with the diabetic nurse practitioner for her diabetes. Last A1c 7. Reviewed with patient. States she is trying to work on diet and try to be as active as she can but with this shortness of breath with activity finds it hard. Blood pressure is stable. Denies any lightheadedness. No dizziness. She continues to follow with hematology for the iron deficiency anemia. Denies any blood in stool. Again no dizziness lightheadedness. As for her polymyalgia rheumatica we have previously tried to decrease her prednisone. Have her to the lowest dose. If continue to decrease she develops symptoms. She continues off of her gabapentin. Feels that that did not help for her legs. Is continuing to tolerate the pain. She continues on Zocor for her dyslipidemia. Last cholesterol panel reviewed with patient. Kidney disease has been stable. History of obstructive sleep apnea does not tolerate CPAP. Weight has remained stable. No recent asthma exacerbations. History of recurrent cystitis.   Last neurology note reviewed with patient. Suggested d-mannose along with a probiotic and cranberry tabs. Patient states she has not started any of these. Recently seen by cardiology. Note reviewed. Continues on amiodarone Eliquis metoprolol for her atrial fib. Last EKG with normal sinus rhythm. Denies any palpitations. Allergies   Allergen Reactions    Codeine      Confusion      Erythromycin      GI upset  Received zithromax in past and tolerated    Exenatide Nausea Only    Levofloxacin      GI upset    Verapamil Other (See Comments)     Junctional bradycardia    Clonidine Derivatives Rash     2009, catapres    Other Rash     Levbid    Penicillins Rash     Received Rocephin in past and tolerated       Past Medical History:   Diagnosis Date    Allergic rhinitis     Asthma     PFT's, 04/06, actually showed mild restrictive defect.  Atrial fibrillation with RVR ([facility] [address]     Hospitalized [facility] 2/8/21-2/11/21    Basal cell carcinoma of cheek 2007    Right cheek    Basal cell carcinoma of leg     right leg    CAD (coronary artery disease)     With 40% stenosis of the LAD, 07/10, ejection fraction 60%. Repeat heart cath9/14 with 4050 percent LAD lesion first diagonal 50% lesion rec med Rx    Central retinal artery occlusion     Right side November 2014 status post TPA    Cerebral artery occlusion with cerebral infarction ([facility] [address] 11/24/2014    Cerebrovascular disease     50-79% stenosis on left on ultrasound 11/14    CHF (congestive heart failure) ([facility])     Echo 1/15 moderate MR, severe TR, RVSP 76, grade 2 diastolic dysfunction    Diverticulosis     AVM on colonoscopy, 05/11    Dyslipidemia     Elevated antinuclear antibody (ZAIRA) level     History of    Esophagitis 05/2011    Gastritis/esophagitis on EGD, Dr. Ross Damian. Repeat EGD6/15 Gastritis    Foraminal stenosis of lumbar region     Moderate  Right L4/L5 neuroforaminal stenosis, Dr Irwin Share following. MRI 2/16 [place].   Moderate foraminal stenosis mild to moderate central canal stenosis    Hyperlipidemia     Hypertension     IBS (irritable bowel syndrome)     GI [facility] with [facility]. Contreras Nath[facility], felt that she probably has chronic functional diarrhea and recommended empiric Imodium, Pepto-Bismol or Questran first. Sprue test Neg 2011    Iron deficiency anemia     Percent sat iron 5, January 2015, ferritin 40 1 /15, FOBT positive  EGD 6/15  Gastritis, IV iron 9/15x3 effective,  colonoscopy deferred by Dr. Yolis Snider. --Prior colonoscopy 2011 with severe diverticulosis and telangiectasia. Trial iron solution in [place] juice 12/16    Iron deficiency anemia due to chronic blood loss 09/08/2015    Junctional bradycardia     Symptomatic, resolved with discontinuation of Verapamil, 05/09. 1.1) Echocardiogram: LAE, LVH, EF 50%, mild MR, diastolic. 1.2) Dr. Agusto Sanchez evaluation. 1.3.) Persantine stress test negative, 05/09.  Migraine     Mild CAD     cath 10/15    Mitral regurgitation     Moderate to severe on echocardiogram September 2015.   Right pressure 76 grade 2 diastolic dysfunction,  echo 43/15 grade 2 diastolic dysfunction mild to moderate MR RVSP 56 aortic sclerosis    Obesity     CHICO (obstructive sleep apnea)     CPAP 14 2/15 initiation  AHI 7  mild CHICO intolerant CPAP    Osteoarthritis     PMR (polymyalgia rheumatica) (HCC)     Pneumonia     Premature atrial contractions     Pulmonary hypertension (HCC)     -Moderate on echo November 2014    Restrictive lung disease     Mild on PFTs 11/14    Type II or unspecified type diabetes mellitus without mention of complication, not stated as uncontrolled     Vitreous floaters        Past Surgical History:   Procedure Laterality Date    APPENDECTOMY  1952    CARDIAC CATHETERIZATION  2014    CATARACT REMOVAL Bilateral 08/10    CHOLECYSTECTOMY      COLONOSCOPY  05/16/2011    COLONOSCOPY N/A 9/26/2019    severe diverticulosis, benign rectal polyp, Dr. Uday Hernandez CYSTOSCOPY Right 2/6/2019    Cysto with right stent insertion and villareal catheter performed by Shaina Shoemaker MD at [place] [id number] Right 2/27/2019    CYSTO right stent removal  Right Ureteroscopy Holmium Laser right stent exchange performed by Shaina Shoemaker MD at [facility], DIAGNOSTIC      EYE SURGERY      HYSTERECTOMY  Approx 1983    MALIGNANT SKIN LESION EXCISION Right 12/10 and 04/11    Basal CellRemoved from right neck    MALIGNANT SKIN LESION EXCISION Right 02/2012    Basal Cell Removed from right leg    OTHER SURGICAL HISTORY  09/06/2011    capsule endoscopy    OTHER SURGICAL HISTORY  3/22/16    right L4 and L5 TFE    OTHER SURGICAL HISTORY Right 4/26/16    L4, L5 TFE    UPPER GASTROINTESTINAL ENDOSCOPY  05/16/2011    UPPER GASTROINTESTINAL ENDOSCOPY  6/22/15    mild gastritis (Dr. Claudia Bocanegra)    UPPER GASTROINTESTINAL ENDOSCOPY N/A 9/26/2019    mild to moderate inflammation, Dr. Claudia Bocanegra       Current Outpatient Medications on File Prior to Visit   Medication Sig Dispense Refill    predniSONE (DELTASONE) 1 MG tablet TAKE 3 TABLETS DAILY 270 tablet 3    ELIQUIS 5 MG TABS tablet TAKE 1 TABLET TWICE A  tablet 3    amLODIPine (NORVASC) 5 MG tablet Take 1 tablet by mouth daily 90 tablet 3    Insulin Pen Needle (B-D ULTRAFINE III SHORT PEN) 31G X 8 MM MISC USE 7 NEEDLES DAILY 650 each 3    VICTOZA 18 MG/3ML SOPN SC injection INJECT 1.8 MG INTO THE SKIN DAILY 18 mL 5    furosemide (LASIX) 40 MG tablet TAKE 1 TABLET DAILY 90 tablet 3    insulin lispro, 1 Unit Dial, (HUMALOG KWIKPEN) 100 UNIT/ML SOPN Humalog pen 4 times daily with meals per sliding scale. Blood sugar is 70, drink juice. 151-200 =6, 201-250 = 8 units, 251-300 = 10 units.  1 pen 3    insulin glargine (LANTUS SOLOSTAR) 100 UNIT/ML injection pen INJECT 12 UNITS bid 30 mL 4    Nutritional Supplements (GLUCOSE MANAGEMENT) TABS 15 grams for bs less than 70 100 tablet 3    amiodarone (PACERONE) 100 MG tablet Take 1 tablet by mouth daily 90 tablet 3    metoprolol tartrate (LOPRESSOR) 25 MG tablet Take 1 tablet by mouth 2 times daily 60 tablet 3    simvastatin (ZOCOR) 20 MG tablet TAKE 1 TABLET NIGHTLY 90 tablet 3    nystatin (MYCOSTATIN) 604718 UNIT/GM cream Apply topically 2 times daily. 1 Tube 0    potassium chloride (KLOR-CON M) 20 MEQ extended release tablet Take 1 tablet by mouth 2 times daily 180 tablet 3    albuterol (PROVENTIL) (2.5 MG/3ML) 0.083% nebulizer solution Take 3 mLs by nebulization every 4 hours as needed for Wheezing or Shortness of Breath 30 each 1    Polyethylene Glycol 400 (BLINK TEARS) 0.25 % SOLN Place into both eyes as needed (dry eyes)       acetaminophen (TYLENOL) 500 MG tablet Take 500 mg by mouth daily      omeprazole (PRILOSEC) 20 MG capsule Take 20 mg by mouth Daily  30 capsule 3    Cholecalciferol (VITAMIN D3) 2000 UNITS CAPS Take 2,000 Units by mouth daily        No current facility-administered medications on file prior to visit. Social History     Socioeconomic History    Marital status:      Spouse name: Christian Barfield Number of children: 3    Years of education: 15    Highest education level: Some college, no degree   Occupational History    Not on file   Tobacco Use    Smoking status: Never Smoker    Smokeless tobacco: Never Used    Tobacco comment: amish rrt 5/28/2019   Vaping Use    Vaping Use: Never used   Substance and Sexual Activity    Alcohol use: No     Alcohol/week: 0.0 standard drinks    Drug use: No    Sexual activity: Not on file   Other Topics Concern    Not on file   Social History Narrative    9/25/2019 No SDOH needs identified. She is receiving Meals on Wheels.       Social Determinants of Health     Financial Resource Strain:     Difficulty of Paying Living Expenses:    Food Insecurity:     Worried About Running Out of Food in the Last Year:     [address] in the Last Year:    Transportation Needs:     Lack of Transportation (Medical):  Lack of Transportation (Non-Medical):    Physical Activity:     Days of Exercise per Week:     Minutes of Exercise per Session:    Stress:     Feeling of Stress :    Social Connections:     Frequency of Communication with Friends and Family:     Frequency of Social Gatherings with Friends and Family:     Attends [employer] Services:     Active Member of Clubs or Organizations:     Attends Club or Organization Meetings:     Marital Status:    Intimate Partner Violence:     Fear of Current or Ex-Partner:     Emotionally Abused:     Physically Abused:     Sexually Abused:        Review of Systems   Constitutional: Negative for activity change, appetite change, chills, fatigue, fever and unexpected weight change. HENT: Negative for congestion, dental problem, ear discharge, ear pain, facial swelling, hearing loss, postnasal drip, rhinorrhea, sinus pressure, sore throat and trouble swallowing. Eyes: Negative for pain and visual disturbance. Respiratory: Negative for cough, chest tightness, shortness of breath (Chronic dyspnea on exertion) and wheezing. Cardiovascular: Negative for chest pain, palpitations and leg swelling. Gastrointestinal: Negative for abdominal pain, blood in stool, constipation, diarrhea, nausea and vomiting. Endocrine: Negative for cold intolerance, heat intolerance and polyuria. Genitourinary: Negative for difficulty urinating. Musculoskeletal: Positive for arthralgias. Negative for gait problem, myalgias, neck pain and neck stiffness. Skin: Negative for color change, rash and wound. Neurological: Negative for dizziness, tremors, seizures, weakness, light-headedness, numbness and headaches. Psychiatric/Behavioral: Negative for confusion and hallucinations. The patient is not nervous/anxious. Physical Exam  Vitals and nursing note reviewed. Constitutional:       General: She is not in acute distress. Appearance: Normal appearance.  She is well-developed. She is not diaphoretic. HENT:      Head: Normocephalic and atraumatic. Right Ear: External ear normal.      Left Ear: External ear normal.   Eyes:      General:         Right eye: No discharge. Left eye: No discharge. Neck:      Trachea: No tracheal deviation. Cardiovascular:      Rate and Rhythm: Normal rate and regular rhythm. Heart sounds: Normal heart sounds. No murmur heard. No friction rub. No gallop. Pulmonary:      Effort: Pulmonary effort is normal. No respiratory distress. Breath sounds: Normal breath sounds. No stridor. No wheezing, rhonchi or rales. Chest:      Chest wall: No tenderness. Abdominal:      General: Bowel sounds are normal. There is no distension. Palpations: Abdomen is soft. Tenderness: There is no abdominal tenderness. Comments: Obese   Musculoskeletal:         General: No swelling. Right lower leg: Edema present. Left lower leg: Edema (+2 edema to bilateral lower extremities, not erythemic) present. Skin:     General: Skin is warm and dry. Capillary Refill: Capillary refill takes less than 2 seconds. Coloration: Skin is not jaundiced or pale. Findings: No erythema or rash. Neurological:      General: No focal deficit present. Mental Status: She is alert and oriented to person, place, and time. Cranial Nerves: No cranial nerve deficit. Sensory: No sensory deficit. Coordination: Coordination normal.   Psychiatric:         Mood and Affect: Mood normal.         Behavior: Behavior normal.         Thought Content: Thought content normal.         Judgment: Judgment normal.       Vitals:    08/30/21 1451   BP: 132/80   Site: Left Upper Arm   Position: Sitting   Cuff Size: Large Adult   Pulse: 78   Weight: 190 lb (86.2 kg)   Height: 4' 10\" (1.473 m)       Assessment:  1. Type 2 diabetes with nephropathy ([facility] [address]  2.  Controlled type 2 diabetes mellitus without complication, with

## 2021-10-05 NOTE — TELEPHONE ENCOUNTER
Patient wants to know if she should go ahead and get her flu shot? You had wanted her to wait on moderna booster but she hasn't heard anything on either.   [phone]

## 2021-10-11 NOTE — PROCEDURES
[address]                 [address] [place] [id number]                               PULMONARY FUNCTION    PATIENT NAME: Billie Mares                 :        1935  MED REC NO:   [id number]                             ROOM:  ACCOUNT NO:   [de-identified]                           ADMIT DATE: 10/04/2021  PROVIDER:     Mita Villalba    DATE OF PROCEDURE:  10/04/2021    The patient's spirometry shows a restrictive pattern. FEV1 86%  predicted with 1% bronchodilator change. FVC 74% predicted with 6%  bronchodilator change. FEV1/FVC ratio 88, postbronchodilator 84. Total  lung capacity by body box 81% predicted, RV is 85% predicted, slow vital  capacity is 79% predicted and diffusion capacity 98% predicted with  airway resistance normal.    FINAL IMPRESSION:  There is mild decrease in forced vital capacity, slow  vital capacity, flow volume loop shows a restrictive pattern, but lung  volumes and diffusion capacity are normal.  FEV1 is normal.  I would say  very mild restrictive ventilatory dysfunction. Clinical correlation  advised.         Humera Gilmore    D: 10/08/2021 12:36:35       T: 10/08/2021 14:55:15     [email or web address]  Job#: [id number]     Doc#: [id number]    CC:  Tuyet Negrete

## 2021-11-03 NOTE — PROGRESS NOTES
11/03/21  Yi Martinez  1935      Chief Complaint:   1. Type 2 diabetes with nephropathy ([facility]    2. Controlled type 2 diabetes mellitus without complication, with long-term current use of insulin ([facility]    3. Essential hypertension    4. Diabetic nephropathy associated with type 2 diabetes mellitus ([facility]    5. Diastolic dysfunction    6. Iron deficiency anemia secondary to blood loss (chronic)    7. PMR (polymyalgia rheumatica) (HCC)    8. Dyslipidemia    9. Neural foraminal stenosis of lumbar spine    10. Pulmonary HTN ([facility]    11. Obesity, morbid, BMI 40.0-49.9 ([facility]    12. Vitamin D deficiency    13. Mild intermittent asthma without complication    14. Atrial fibrillation, unspecified type ([facility]    15. Stage 3a chronic kidney disease ([facility]    16. Constipation, unspecified constipation type    17. Chronic pain of left knee        HPI:  45-year-old patient being seen for 3-month follow-up of above chronic health conditions. Continues to follow with the diabetic nurse practitioner. A1c stable at 7. Patient denies any hypoglycemic episodes. Blood pressure stable in office. Follows with cardiology. No signs of fluid overload. Denies any orthopnea. Chronic shortness of breath with activity when walking far distances. Previously followed with hematology for her anemia. Previously required iron infusion. Nothing over the last year and a half. Denies any fatigue lightheadedness or dizziness. Continues on her prednisone for her PMR. Feels this is been well controlled. Unable to tolerate a wean of her prednisone from many years. Continues on Zocor for dyslipidemia. Denies any significant myalgias. Denies any problems with back pain. No recent eczema exacerbations. No problems with bladder but having issues with constipation since we recommended probiotics the last time we have seen her for her diarrhea. States she started on probiotics and fiber in since has been having hard stools.   States occasionally 1 a day but not every day. Does have occasional abdominal bloating. States spasms in her gut. They do go away after she passed gas or has a bowel movement. Denies any blood in stool. No nausea or vomiting. She states she feels full. Continues on her anticoagulation for her atrial fib. Denies any heart palpitations chest discomfort or shortness of breath. Recently seen by dermatology in [place]. States she is called back and will be going next week for further consultation regarding a spot that they removed. We will attempt to get these records. Allergies   Allergen Reactions    Codeine      Confusion      Erythromycin      GI upset  Received zithromax in past and tolerated    Exenatide Nausea Only    Levofloxacin      GI upset    Verapamil Other (See Comments)     Junctional bradycardia    Clonidine Derivatives Rash     2009, catapres    Other Rash     Levbid    Penicillins Rash     Received Rocephin in past and tolerated       Past Medical History:   Diagnosis Date    Allergic rhinitis     Asthma     PFT's, 04/06, actually showed mild restrictive defect.  Atrial fibrillation with RVR ([address]     Hospitalized [facility] 2/8/21-2/11/21    Basal cell carcinoma of cheek 2007    Right cheek    Basal cell carcinoma of leg     right leg    CAD (coronary artery disease)     With 40% stenosis of the LAD, 07/10, ejection fraction 60%.   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 Candace Lindsay Right 2/6/2019    Cysto with right stent insertion and villareal catheter performed by Ivan Quiñones MD at [address] [place] [address] Right 2/27/2019    CYSTO right stent removal  Right Ureteroscopy Holmium Laser right stent exchange performed by Ivan Quiñones MD at [facility], DIAGNOSTIC      EYE SURGERY      HYSTERECTOMY  Approx 1983    MALIGNANT SKIN LESION EXCISION Right 12/10 and 04/11    Basal CellRemoved from right neck    MALIGNANT SKIN LESION EXCISION Right 02/2012    Basal Cell Removed from right leg    OTHER SURGICAL HISTORY  09/06/2011    capsule endoscopy    OTHER SURGICAL HISTORY  3/22/16    right L4 and L5 TFE    OTHER SURGICAL HISTORY Right 4/26/16    L4, L5 TFE    UPPER GASTROINTESTINAL ENDOSCOPY  05/16/2011    UPPER GASTROINTESTINAL ENDOSCOPY  6/22/15    mild gastritis (Dr. Rocío Tai)    UPPER GASTROINTESTINAL ENDOSCOPY N/A 9/26/2019    mild to moderate inflammation, Dr. Rocío Tai       Current Outpatient Medications on File Prior to Visit   Medication Sig Dispense Refill    predniSONE (DELTASONE) 1 MG tablet TAKE 3 TABLETS DAILY 270 tablet 3    ELIQUIS 5 MG TABS tablet TAKE 1 TABLET TWICE A  tablet 3    amLODIPine (NORVASC) 5 MG tablet Take 1 tablet by mouth daily 90 tablet 3    Insulin Pen Needle (B-D ULTRAFINE III SHORT PEN) 31G X 8 MM MISC USE 7 NEEDLES DAILY 650 each 3    VICTOZA 18 MG/3ML SOPN SC injection INJECT 1.8 MG INTO THE SKIN DAILY 18 mL 5    furosemide (LASIX) 40 MG tablet TAKE 1 TABLET DAILY 90 tablet 3    insulin lispro, 1 Unit Dial, (HUMALOG KWIKPEN) 100 UNIT/ML SOPN Humalog pen 4 times daily with meals per sliding scale. Blood sugar is 70, drink juice.   151-200 =6, 201-250 = 8 units, 251-300 = 10 dizziness, tremors, seizures, weakness, light-headedness, numbness and headaches. Psychiatric/Behavioral: Negative for confusion and hallucinations. The patient is not nervous/anxious. Physical Exam  Vitals and nursing note reviewed. Constitutional:       General: She is not in acute distress. Appearance: Normal appearance. She is well-developed. She is not diaphoretic. HENT:      Head: Normocephalic and atraumatic. Right Ear: External ear normal.      Left Ear: External ear normal.   Eyes:      General:         Right eye: No discharge. Left eye: No discharge. Neck:      Trachea: No tracheal deviation. Cardiovascular:      Rate and Rhythm: Normal rate and regular rhythm. Heart sounds: Normal heart sounds. No murmur heard. No friction rub. No gallop. Pulmonary:      Effort: Pulmonary effort is normal. No respiratory distress. Breath sounds: Normal breath sounds. No stridor. No wheezing, rhonchi or rales. Chest:      Chest wall: No tenderness. Abdominal:      General: Bowel sounds are normal. There is no distension. Palpations: Abdomen is soft. Tenderness: There is no abdominal tenderness. Comments: Obese   Musculoskeletal:         General: No swelling. Left knee: Swelling present. No deformity, effusion, erythema, ecchymosis, lacerations or bony tenderness. Normal range of motion. Tenderness present over the medial joint line. No LCL laxity, MCL laxity or PCL laxity. Normal meniscus and normal patellar mobility. Normal pulse. Right lower leg: Edema present. Left lower leg: Edema (+1 bilateral lower extremity edema) present. Skin:     General: Skin is warm and dry. Capillary Refill: Capillary refill takes less than 2 seconds. Coloration: Skin is not jaundiced or pale. Findings: No rash. Neurological:      General: No focal deficit present. Mental Status: She is alert and oriented to person, place, and time. Cranial Nerves: No cranial nerve deficit. Sensory: No sensory deficit. Motor: No weakness. Coordination: Coordination normal.      Gait: Gait normal.   Psychiatric:         Mood and Affect: Mood normal.         Behavior: Behavior normal.         Thought Content: Thought content normal.         Judgment: Judgment normal.       Vitals:    11/03/21 1301   BP: 132/80   Site: Left Upper Arm   Position: Sitting   Cuff Size: Large Adult   Pulse: 78   Weight: 193 lb (87.5 kg)   Height: 4' 10\" (1.473 m)       Assessment:  1. Type 2 diabetes with nephropathy ([facility]  As noted below    2. Controlled type 2 diabetes mellitus without complication, with long-term current use of insulin ([facility]  Most recent hemoglobin A1c August 7. Stable. Follows with a diabetic nurse practitioner    3. Essential hypertension  Stable on current antihypertensive regimen, continue the same    4. Diabetic nephropathy associated with type 2 diabetes mellitus (HCC)  Stable at present off medication    5. Diastolic dysfunction  No signs of fluid overload. Continues on Lasix. 6. Iron deficiency anemia secondary to blood loss (chronic)  Serial lab follow-ups. No further follow-ups in place with hematology. Patient to follow with PCP's for routine labs. Previously tolerated IV iron infusions  - CBC Auto Differential; Future  - Ferritin; Future  - Iron And TIBC; Future    7. PMR (polymyalgia rheumatica) ([facility])  Stable on 3 mg of prednisone daily. Previously have tried to wean her down but unable to tolerate    8. Dyslipidemia  Continues on Zocor, continue to work on diet    9. Neural foraminal stenosis of lumbar spine  Stable at present denies any significant back pain    10. Pulmonary HTN ([facility]  Following with cardiology    11. Obesity, morbid, BMI 40.0-49.9 ([facility])  Weight stable    12. Vitamin D deficiency  Continues on supplemental vitamin D. Normal vitamin D levels August 2021    13.  Mild intermittent asthma without complication  No recent exacerbations. 14. Atrial fibrillation, unspecified type ([facility]  Stable, continues on amiodarone, Eliquis, metoprolol. Follows with cardiology. Most recent TSH within normal limits and most recent PFTs no significant decline    15. Stage 3a chronic kidney disease ([address]  Labs stable. Continue to monitor avoid NSAIDs    16. Constipation, unspecified constipation type  Abdominal discomfort. Since patient has not been having routine bowel movements we will get KUB  - XR ABDOMEN (KUB) (SINGLE AP VIEW); Future    17. Chronic pain of left knee  X-ray today, pending results referral to orthopedics  - XR KNEE LEFT (3 VIEWS); Future      Plan:  As noted above. Follow up for routine visit. Call sooner with concerns prior.     Electronically signed by Sharyle Seats, APRN - CNP on 11/3/2021 at 2:14 PM

## 2021-11-08 NOTE — TELEPHONE ENCOUNTER
Patient called in requesting a refill on pen needles sent to [facility]     Medication pended if agreeable    Last Appt:  11/3/2021  Next Appt:   2/3/2022  Med verified in Epic

## 2021-11-16 NOTE — PROGRESS NOTES
Orthopaedic Progress Note      CHIEF COMPLAINT: Left knee pain    HISTORY OF PRESENT ILLNESS:       Ms. Tae Fuentes  is a 81 y.o. female  who presents with a several month history of increasing left knee pain. She denies any specific injury or trauma. She localizes pain to the posterior aspect of the knee radiating anteriorly. Is worse with activity, worse with weightbearing, relieved with rest.  She had x-rays done several weeks ago ordered by her primary care physician was diagnosed her with osteoarthritis. She is here today for evaluation. She states that she is scheduled to get her Covid booster vaccination later today. Past Medical History:    Past Medical History:   Diagnosis Date    Allergic rhinitis     Asthma     PFT's, 04/06, actually showed mild restrictive defect.  Atrial fibrillation with RVR ([facility]     Hospital[facility] 2/8/21-2/11/21    Basal cell carcinoma of cheek 2007    Right cheek    Basal cell carcinoma of leg     right leg    CAD (coronary artery disease)     With 40% stenosis of the LAD, 07/10, ejection fraction 60%. 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 Joycelyn Martínez. Repeat EGD6/15 Gastritis    Foraminal stenosis of lumbar region     Moderate  Right L4/L5 neuroforaminal stenosis, Dr Josh Andres following. MRI 2/16 [place].   Moderate foraminal stenosis mild to moderate central canal stenosis    Hyperlipidemia     Hypertension     IBS (irritable bowel syndrome)     GI [facility]. Stella Reich[facility], felt that she probably has chronic functional diarrhea and recommended empiric Imodium, Pepto-Bismol or Questran first. Sprue test Neg 2011    Iron deficiency anemia     Percent sat iron 5, January 2015, ferritin 40 1 /15, FOBT positive  EGD 6/15  Gastritis, IV iron 9/15x3 effective,  colonoscopy deferred by Dr. Kamaljit Dey. --Prior colonoscopy 2011 with severe diverticulosis and telangiectasia. Trial iron solution in [place] juice 12/16    Iron deficiency anemia due to chronic blood loss 09/08/2015    Junctional bradycardia     Symptomatic, resolved with discontinuation of Verapamil, 05/09. 1.1) Echocardiogram: LAE, LVH, EF 50%, mild MR, diastolic. 1.2) Dr. Ely Carty evaluation. 1.3.) Persantine stress test negative, 05/09.  Migraine     Mild CAD     cath 10/15    Mitral regurgitation     Moderate to severe on echocardiogram September 2015.   Right pressure 76 grade 2 diastolic dysfunction,  echo 15/09 grade 2 diastolic dysfunction mild to moderate MR RVSP 56 aortic sclerosis    Obesity     CHICO (obstructive sleep apnea)     CPAP 14 2/15 initiation  AHI 7  mild CHICO intolerant CPAP    Osteoarthritis     PMR (polymyalgia rheumatica) (HCC)     Pneumonia     Premature atrial contractions     Pulmonary hypertension (HCC)     -Moderate on echo November 2014    Restrictive lung disease     Mild on PFTs 11/14    Type II or unspecified type diabetes mellitus without mention of complication, not stated as uncontrolled     Vitreous floaters        Past Surgical History:    Past Surgical History:   Procedure Laterality Date    APPENDECTOMY  1952    CARDIAC CATHETERIZATION  2014    CATARACT REMOVAL Bilateral 08/10    CHOLECYSTECTOMY      COLONOSCOPY  05/16/2011    COLONOSCOPY N/A 9/26/2019    severe diverticulosis, benign rectal polyp, Dr. Arlene Munson Right 2/6/2019 Cysto with right stent insertion and villareal catheter performed by Arianne Lawrence MD at [facility] Alexei Moore Right 2/27/2019    CYSTO right stent removal  Right Ureteroscopy Holmium Laser right stent exchange performed by Arianne Lawrence MD at [facility], DIAGNOSTIC      EYE SURGERY      HYSTERECTOMY  Approx 1983    MALIGNANT SKIN LESION EXCISION Right 12/10 and 04/11    Basal CellRemoved from right neck    MALIGNANT SKIN LESION EXCISION Right 02/2012    Basal Cell Removed from right leg    OTHER SURGICAL HISTORY  09/06/2011    capsule endoscopy    OTHER SURGICAL HISTORY  3/22/16    right L4 and L5 TFE    OTHER SURGICAL HISTORY Right 4/26/16    L4, L5 TFE    UPPER GASTROINTESTINAL ENDOSCOPY  05/16/2011    UPPER GASTROINTESTINAL ENDOSCOPY  6/22/15    mild gastritis (Dr. Warner Muñiz)    UPPER GASTROINTESTINAL ENDOSCOPY N/A 9/26/2019    mild to moderate inflammation, Dr. Warner Muñiz         Current  Medications:  Current Outpatient Medications   Medication Sig Dispense Refill    Insulin Pen Needle (B-D ULTRAFINE III SHORT PEN) 31G X 8 MM MISC USE 7 NEEDLES DAILY 650 each 3    predniSONE (DELTASONE) 1 MG tablet TAKE 3 TABLETS DAILY 270 tablet 3    ELIQUIS 5 MG TABS tablet TAKE 1 TABLET TWICE A  tablet 3    amLODIPine (NORVASC) 5 MG tablet Take 1 tablet by mouth daily 90 tablet 3    VICTOZA 18 MG/3ML SOPN SC injection INJECT 1.8 MG INTO THE SKIN DAILY 18 mL 5    furosemide (LASIX) 40 MG tablet TAKE 1 TABLET DAILY 90 tablet 3    insulin lispro, 1 Unit Dial, (HUMALOG KWIKPEN) 100 UNIT/ML SOPN Humalog pen 4 times daily with meals per sliding scale. Blood sugar is 70, drink juice. 151-200 =6, 201-250 = 8 units, 251-300 = 10 units.  1 pen 3    insulin glargine (LANTUS SOLOSTAR) 100 UNIT/ML injection pen INJECT 12 UNITS bid 30 mL 4    Nutritional Supplements (GLUCOSE MANAGEMENT) TABS 15 grams for bs less than 70 100 tablet 3    amiodarone (PACERONE) 100 MG tablet Take 1 tablet by mouth daily 90 tablet 3    metoprolol tartrate (LOPRESSOR) 25 MG tablet Take 1 tablet by mouth 2 times daily 60 tablet 3    simvastatin (ZOCOR) 20 MG tablet TAKE 1 TABLET NIGHTLY 90 tablet 3    nystatin (MYCOSTATIN) 265783 UNIT/GM cream Apply topically 2 times daily. 1 Tube 0    potassium chloride (KLOR-CON M) 20 MEQ extended release tablet Take 1 tablet by mouth 2 times daily 180 tablet 3    albuterol (PROVENTIL) (2.5 MG/3ML) 0.083% nebulizer solution Take 3 mLs by nebulization every 4 hours as needed for Wheezing or Shortness of Breath 30 each 1    Polyethylene Glycol 400 (BLINK TEARS) 0.25 % SOLN Place into both eyes as needed (dry eyes)       acetaminophen (TYLENOL) 500 MG tablet Take 500 mg by mouth daily      omeprazole (PRILOSEC) 20 MG capsule Take 20 mg by mouth Daily  30 capsule 3    Cholecalciferol (VITAMIN D3) 2000 UNITS CAPS Take 2,000 Units by mouth daily        No current facility-administered medications for this visit. Allergies:  Codeine, Erythromycin, Exenatide, Levofloxacin, Verapamil, Clonidine derivatives, Other, and Penicillins    Social History:   Social History     Tobacco Use   Smoking Status Never Smoker   Smokeless Tobacco Never Used   Tobacco Comment    syant rrt 5/28/2019     Social History     Substance and Sexual Activity   Alcohol Use No    Alcohol/week: 0.0 standard drinks     Social History     Substance and Sexual Activity   Drug Use No       Family History:  Family History   Problem Relation Age of Onset    Uterine Cancer Mother     Heart Disease Father     High Blood Pressure Father     Stroke Sister         from a vascular malformation    Heart Attack Son         age 48       REVIEW OF SYSTEMS:  Constitutional: Denies any fever, chills. Musculoskeletal: Positive for left knee osteoarthritis. PHYSICAL EXAM:  [unfilled]  General appearance:  Alert and oriented x 3. No apparent distress, appears stated age, calm and cooperative.    Musculoskeletal: Left knee was inspected skin is in good repair there is no erythema increased warmth or cellulitis. She has medial joint line tenderness to palpation. Good stability no calf pain she neurovascular intact left foot. Komal Perez DATA:  CBC:   Lab Results   Component Value Date    WBC 10.2 04/07/2021    HGB 13.3 04/07/2021     04/07/2021     BMP:    Lab Results   Component Value Date     10/27/2021    K 4.2 10/27/2021     10/27/2021    CO2 29 10/27/2021    BUN 18 10/27/2021    CREATININE 1.02 10/27/2021    CALCIUM 10.0 10/27/2021    GLUCOSE 186 10/27/2021     PT/INR:    Lab Results   Component Value Date    PROTIME 12.9 02/09/2021    INR 1.0 02/09/2021     Troponin:    Lab Results   Component Value Date    TROPONINI 0.02 11/26/2014     No results for input(s): LIPASE, AMYLASE in the last 72 hours. No results for input(s): AST, ALT, BILIDIR, BILITOT, ALKPHOS in the last 72 hours. Uric Acid:  No components found for: URIC  Urine Culture:  No components found for: CURINE    Radiology:   XR KNEE LEFT (3 VIEWS)    Result Date: 11/3/2021  EXAMINATION: THREE XRAY VIEWS OF THE LEFT KNEE 11/3/2021 1:58 pm COMPARISON: 06/25/2012 HISTORY: ORDERING SYSTEM PROVIDED HISTORY: Chronic pain of left knee TECHNOLOGIST PROVIDED HISTORY: woresning left knee pain - Reason for Exam: Worsening left posterior knee pain, no known injury Acuity: Acute Type of Exam: Initial FINDINGS: Alignment is anatomic. No fractures or destructive bony abnormalities are seen. Tricompartmental osteoarthritic changes are noted. 1. No acute bony or joint abnormality 2.  Tricompartmental osteoarthritic changes progressed compared to 2012     XR ABDOMEN (KUB) (SINGLE AP VIEW)    Result Date: 11/3/2021  EXAMINATION: ONE SUPINE XRAY VIEW(S) OF THE ABDOMEN 11/3/2021 12:58 pm COMPARISON: 08/05/2020 HISTORY: ORDERING SYSTEM PROVIDED HISTORY: Constipation, unspecified constipation type TECHNOLOGIST PROVIDED HISTORY: abd spasms Reason for Exam: Lower abdominal pain with constipation for 2 weeks Acuity: Acute Type of Exam: Initial FINDINGS: No obstruction, ileus or fecal impaction were noted. A small amount of stool was scattered throughout non distended colon. Marked degenerative disc changes involve the lumbar spine. Phlebolith like calcifications were noted in the pelvis. No obstruction, ileus or fecal impaction. A small amount of stool was scattered throughout non distended colon. X-rays personally reviewed by me of 3 views left knee obtained about 10 days prior do reveal advanced medial compartment osteoarthritis consistent with moderate left knee osteoarthritis. Diagnosis Orders   1. Arthritis of left knee           PLAN:  I recommend a corticosteroid injection. She states that she is scheduled to have the Covid booster here today. I recommend holding off for about 2 to 4 weeks following this booster for her corticosteroid injection at left knee. She expressed understanding we will see her back in several weeks for this corticosteroid injection    No orders of the defined types were placed in this encounter.        Procedure:        Electronically signed by Maraih Griffin PA-C on 11/16/2021 at 10:39 AM

## 2021-12-13 NOTE — PROGRESS NOTES
[address] [facility] INTERNAL MED A DEPARTMENT OF [place] [address]  [address], [address]  [facility] [address] [phone]        HISTORY:    Shaka Hudson presents today for evaluation and management of:  Chief Complaint   Patient presents with    Diabetes     follow up       HPI    Interval History:    Current Diabetic Medications  Victoza 1.8 mg daily, Lantus 14 units twice daily Humalog per sliding scale 4 times daily      DKA episodes: 0    4/7/21  At previous visit pt felt since getting covid vaccine he blood sugars were higher so insulin was increased. She is no experiencing significant hypoglycemia daily. Also at previous visit she was found to be in atrial fibrillation and sent to er.   Diet: low carb, frequent snacks due to hypoglycemia.   Exercise: none  BS testing: uses cgm daily with sucess  Issues: denies      05/10/21   At previous visit insulin was adjusted. Bailey Price states she will sometimes take 12 to 14 units of Lantus based on her average glucose for the day.  She has also added about 4 units to her current sliding scale.  Denies any signs or symptoms of hypoglycemia.   Diet: Low-carb  Exercise: none  BS testing: Uses CGM daily with success and is adherent to cgm regimen.   Issues: denies      08/17/21   At previous visit dm counseling was provided. Denies any s/s of hyper/hypoglycemia. Still concerned about occasional high blood sugars after meals. Diet: Low-carb  Exercise: none  BS testing: Uses CGM daily with success and is adherent to cgm regimen.   Issues: denies    12/13/21   Shaka Hudson is a 81 y.o. female patient who presents to clinic today for follow up on her diabetes. she has a history of HTN, ckd, hyperlipidemia, and obesity which contributes to her diabetes. At previous visit diabetes counseling was provided. she denies any signs or symptoms of hyper/hypoglycemia.  she states they are taking their medications as prescribed without any adverse effects. Diet: low carb  Exercise: none  BS testing: uses cgm daily with success and is adherent to cgm therapy  Issues: denies  Diabetic foot exam up-to-date: Yes  Diabetic retinal exam up-to-date: Yes  Hypoglycemia as needed treatment: glucose tabs    High cholesterol-  Takes zocor and denies any adverse effects with its use.  Watches diet and exercise.      Hypertension-  Takes Norvasc Lopressor  and denies any adverse effects with their use. Watches diet and exercise. Denies any chest pain, dizziness or edema.  Follows with cardiology:Yes. Monitors bp at [facility] 130/70     Obesity- Working on weight loss. Past Medical History:   Diagnosis Date    Allergic rhinitis     Asthma     PFT's, 04/06, actually showed mild restrictive defect.  Atrial fibrillation with RVR ([facility] [address]     Hospital[facility] 2/8/21-2/11/21    Basal cell carcinoma of cheek 2007    Right cheek    Basal cell carcinoma of leg     right leg    CAD (coronary artery disease)     With 40% stenosis of the LAD, 07/10, ejection fraction 60%. 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 Karon Adamson. Repeat EGD6/15 Gastritis    Foraminal stenosis of lumbar region     Moderate  Right L4/L5 neuroforaminal stenosis, Dr Mariana Sullivan following. MRI 2/16 [place].   Moderate foraminal stenosis mild to moderate central canal stenosis    Hyperlipidemia     Hypertension     IBS (irritable bowel syndrome)     GI [facility] Sly Ramirez[facility], felt that she probably has chronic functional diarrhea and recommended empiric Imodium, Pepto-Bismol or Questran first. Sprue test Neg 2011    Iron deficiency anemia     Percent sat iron 5, January 2015, ferritin 40 1 /15, FOBT positive  EGD 6/15  Gastritis, IV iron 9/15x3 effective,  colonoscopy deferred by Dr. Adryan Grider. --Prior colonoscopy 2011 with severe diverticulosis and telangiectasia. Trial iron solution in [place] juice 12/16    Iron deficiency anemia due to chronic blood loss 09/08/2015    Junctional bradycardia     Symptomatic, resolved with discontinuation of Verapamil, 05/09. 1.1) Echocardiogram: LAE, LVH, EF 50%, mild MR, diastolic. 1.2) Dr. Max Montesinos evaluation. 1.3.) Persantine stress test negative, 05/09.  Migraine     Mild CAD     cath 10/15    Mitral regurgitation     Moderate to severe on echocardiogram September 2015.   Right pressure 76 grade 2 diastolic dysfunction,  echo 48/25 grade 2 diastolic dysfunction mild to moderate MR RVSP 56 aortic sclerosis    Obesity     CHICO (obstructive sleep apnea)     CPAP 14 2/15 initiation  AHI 7  mild CHICO intolerant CPAP    Osteoarthritis     PMR (polymyalgia rheumatica) (HCC)     Pneumonia     Premature atrial contractions     Pulmonary hypertension (HCC)     -Moderate on echo November 2014    Restrictive lung disease     Mild on PFTs 11/14    Type II or unspecified type diabetes mellitus without mention of complication, not stated as uncontrolled     Vitreous floaters      Family History   Problem Relation Age of Onset    Uterine Cancer Mother     Heart Disease Father     High Blood Pressure Father     Stroke Sister         from a vascular malformation    Heart Attack Son         age 48     Social History     Tobacco Use    Smoking status: Never Smoker    Smokeless tobacco: Never Used    Tobacco comment: amish rrt 5/28/2019   Vaping Use    Vaping Use: Never used   Substance Use Topics    Alcohol use: No     Alcohol/week: 0.0 standard drinks    Drug use: No     Allergies   Allergen Reactions    Codeine      Confusion      Erythromycin      GI upset  Received zithromax in past and tolerated    Exenatide Nausea Only    Levofloxacin      GI upset    Verapamil Other (See Comments)     Junctional bradycardia    Clonidine Derivatives Rash     2009, catapres    Other Rash     Levbid    Penicillins Rash     Received Rocephin in past and tolerated       MEDICATIONS:  Current Outpatient Medications   Medication Sig Dispense Refill    insulin glargine (LANTUS SOLOSTAR) 100 UNIT/ML injection pen INJECT 14 UNITS bid 30 mL 4    insulin lispro, 1 Unit Dial, (HUMALOG KWIKPEN) 100 UNIT/ML SOPN Humalog pen 3 times daily with meals per sliding scale. Blood sugar is 70, drink juice. 151-200 =6, 201-250 = 8 units, 251-300 = 10 units. 1 pen 3    predniSONE (DELTASONE) 1 MG tablet TAKE 3 TABLETS DAILY 270 tablet 3    ELIQUIS 5 MG TABS tablet TAKE 1 TABLET TWICE A  tablet 3    amLODIPine (NORVASC) 5 MG tablet Take 1 tablet by mouth daily 90 tablet 3    VICTOZA 18 MG/3ML SOPN SC injection INJECT 1.8 MG INTO THE SKIN DAILY 18 mL 5    furosemide (LASIX) 40 MG tablet TAKE 1 TABLET DAILY 90 tablet 3    amiodarone (PACERONE) 100 MG tablet Take 1 tablet by mouth daily 90 tablet 3    metoprolol tartrate (LOPRESSOR) 25 MG tablet Take 1 tablet by mouth 2 times daily 60 tablet 3    simvastatin (ZOCOR) 20 MG tablet TAKE 1 TABLET NIGHTLY 90 tablet 3    nystatin (MYCOSTATIN) 246233 UNIT/GM cream Apply topically 2 times daily.  1 Tube 0    potassium chloride (KLOR-CON M) 20 MEQ extended release tablet Take 1 tablet by mouth 2 times daily 180 tablet 3    Polyethylene Glycol 400 (BLINK TEARS) 0.25 % SOLN Place into both eyes as needed (dry eyes)       acetaminophen (TYLENOL) 500 MG tablet Take 500 mg by mouth daily      omeprazole (PRILOSEC) 20 MG capsule Take 20 mg by mouth Daily  30 capsule 3    Cholecalciferol (VITAMIN D3) 2000 UNITS CAPS Take 2,000 Units by mouth daily       Insulin Pen Needle (B-D ULTRAFINE III SHORT PEN) 31G X 8 MM MISC USE 7 NEEDLES DAILY 650 each 3    Nutritional Supplements (GLUCOSE MANAGEMENT) TABS 15 grams for bs less than 70 100 tablet 3    albuterol (PROVENTIL) (2.5 MG/3ML) 0.083% nebulizer solution Take 3 mLs by nebulization every 4 hours as needed for Wheezing or Shortness of Breath 30 each 1     No current facility-administered medications for this visit. Review ofSymptoms:  Review of Systems   Constitutional: Positive for fatigue. Negative for unexpected weight change. Eyes: Negative for visual disturbance. Respiratory: Negative. Negative for shortness of breath. Cardiovascular: Negative for chest pain and leg swelling. Gastrointestinal: Negative for abdominal pain and diarrhea. Endocrine: Negative for polydipsia, polyphagia and polyuria. Genitourinary: Negative. Musculoskeletal: Negative. Skin: Negative for rash and wound. Neurological: Negative for dizziness, tremors, seizures and headaches. Psychiatric/Behavioral: Negative. Negative for confusion and decreased concentration. Theremainder of a complete 14-point review of systems is negative. Vital Signs: /72 (Site: Right Upper Arm, Position: Sitting, Cuff Size: Large Adult)   Pulse 68   Resp 14   Ht 4' 11.5\" (1.511 m)   Wt 196 lb (88.9 kg)   BMI 38.92 kg/m²      Wt Readings from Last 3 Encounters:   12/13/21 196 lb (88.9 kg)   11/16/21 193 lb (87.5 kg)   11/03/21 193 lb (87.5 kg)     Body mass index is 38.92 kg/m².   LABS:  Hemoglobin A1C   Date Value Ref Range Status   11/30/2021 7.3 (H) 4.0 - 6.0 % Final   08/25/2021 7.0 (H) 4.0 - 6.0 % Final     Lab Results   Component Value Date    LABMICR CANNOT BE CALCULATED 08/25/2021     Lab Results   Component Value Date     10/27/2021    K 4.2 10/27/2021     of physical activity per week. Follow a low carbohydrate diet. Encouraged at least 7 hours of sleep. The patient was informed of the goals of diabetes management. This can only be accomplished by watching their diet and exercise levels. We certainly use medicines to help attain these goals. The consequences of not controlling blood sugars were discussed. These include blindness, heart disease, stroke, kidney disease, and possibly need for dialysis. They were told to be careful with their foot care as diabetics often have nerve damage, infections and risk for limb amutations . They also need a dilated eye exam yearly. We discussed the issues of diet, exercise, medication, complication avoidance, reviewed the signs and symptoms of diabetes, hypoglycemic episodes, significance of HbA1C.       - Hemoglobin A1C; Future  - insulin glargine (LANTUS SOLOSTAR) 100 UNIT/ML injection pen; INJECT 14 UNITS bid  Dispense: 30 mL; Refill: 4  - insulin lispro, 1 Unit Dial, (HUMALOG KWIKPEN) 100 UNIT/ML SOPN; Humalog pen 3 times daily with meals per sliding scale. Blood sugar is 70, drink juice. 151-200 =6, 201-250 = 8 units, 251-300 = 10 units. Dispense: 1 pen; Refill: 3    2. Dyslipidemia  stable, lipid panel reviewed, continue current medications. Diet and exercise      3. Essential hypertension   stable, continue current medications. Diet and exercise Seek emergent care if chest pain develops. 4. BMI 38.0-38.9,adult  Reduce calories and increase physical activity to achieve a slow and steady weight loss to improve blood pressure, cholesterol and diabetes. Answered all patient questions. Agrees to follow plan of care and to follow up in 3 months, sooner if needed. Call office if unexplained blood sugars less than 70 occur or above 400. Call office or access [employer] with any further questions or concerns. Be sure to bring glucometer/food log at next appointment.        Total time spent reviewing chart, labs, counseling patient and documenting on the date of the encounter: 30 min.       Electronically signed by INGRID Zurita CNP on 12/13/2021 at 11:22 AM      (Please note that portions of this note were completed with a voice-recognition program. Efforts were made to edit the dictation but occasionally words are mis-transcribed.)

## 2021-12-23 NOTE — PROGRESS NOTES
Paulo Shearer                                                                                                                  12/23/2021  MRN:   [id number]  YOB: 1935  PCP:                           Daphnie De La Rosa APRN - CNP  Referring Physician: No ref. provider found  Treating Physician Name: Gailen Boast, MD      Reason for visit:  Chief Complaint   Patient presents with    Follow-up     low platelet count. Current problems/ Active and recent treatments:  Microcytic anemia secondary to iron deficiency  Pancreatic head nodule, likely benign  CKD  Bronchiectasis    Summary of Case/History:    Paulo Shearer a 81 y. o.female who presents to the office to establish care and for further evaluation treatment recommendations. History was obtained to the patient, her  and electronic medical record. Patient has multiple medical problems as listed below. She was recently hospitalized at [place] with complaints of shortness of breath. Patient also has history of chronic anemia. She has been on iron supplements in the past but not tolerate oral iron supplements and stopped taking them. Patient anemia work-up in the hospital was unremarkable for TSH, paraproteinemia profile. Ferritin was also within normal range. Patient also had a CT abdomen pelvis done which showed a pancreatic head nodule which had been there for 2017 but has increased in size. Patient denies any work-up regarding the pancreatic head noted in the past.  Patient has not had a colonoscopy for multiple years. Denies any bloody bowel movements. Work-up revealed iron deficiency. MRI of pancreas revealed stable findings suggestive of benign etiology of the pancreatic head nodule. Interval history:    Patient presents to the clinic for a follow-up visit Due to abnormal lab work-up. Continues to be on Eliquis without any adverse events.     During this visit patient's allergy, social, medical, surgical history and medications were reviewed and updated. Past Medical History:   Past Medical History:   Diagnosis Date    Allergic rhinitis     Asthma     PFT's, 04/06, actually showed mild restrictive defect.  Atrial fibrillation with RVR ([facility] [address]     Hospitalized [facility] 2/8/21-2/11/21    Basal cell carcinoma of cheek 2007    Right cheek    Basal cell carcinoma of leg     right leg    CAD (coronary artery disease)     With 40% stenosis of the LAD, 07/10, ejection fraction 60%. Repeat heart cath9/14 with 4050 percent LAD lesion first diagonal 50% lesion rec med Rx    Central retinal artery occlusion     Right side November 2014 status post TPA    Cerebral artery occlusion with cerebral infarction ([facility] [address] 11/24/2014    Cerebrovascular disease     50-79% stenosis on left on ultrasound 11/14    CHF (congestive heart failure) ([facility])     Echo 1/15 moderate MR, severe TR, RVSP 76, grade 2 diastolic dysfunction    Diverticulosis     AVM on colonoscopy, 05/11    Dyslipidemia     Elevated antinuclear antibody (ZAIRA) level     History of    Esophagitis 05/2011    Gastritis/esophagitis on EGD, Dr. Sung Kingsley. Repeat EGD6/15 Gastritis    Foraminal stenosis of lumbar region     Moderate  Right L4/L5 neuroforaminal stenosis, Dr Jayson Dahl following. MRI 2/16 [place]. Moderate foraminal stenosis mild to moderate central canal stenosis    Hyperlipidemia     Hypertension     IBS (irritable bowel syndrome)     GI consultation with Dr. Gordon Lowery, GI specialist in [place], felt that she probably has chronic functional diarrhea and recommended empiric Imodium, Pepto-Bismol or Questran first. Sprue test Neg 2011    Iron deficiency anemia     Percent sat iron 5, January 2015, ferritin 40 1 /15, FOBT positive  EGD 6/15  Gastritis, IV iron 9/15x3 effective,  colonoscopy deferred by Dr. Sung Kingsley. --Prior colonoscopy 2011 with severe diverticulosis and telangiectasia.   Trial iron solution in Orange juice 12/16  Iron deficiency anemia due to chronic blood loss 09/08/2015    Junctional bradycardia     Symptomatic, resolved with discontinuation of Verapamil, 05/09. 1.1) Echocardiogram: LAE, LVH, EF 50%, mild MR, diastolic. 1.2) Dr. Jolynn Dial evaluation. 1.3.) Persantine stress test negative, 05/09.  Migraine     Mild CAD     cath 10/15    Mitral regurgitation     Moderate to severe on echocardiogram September 2015.   Right pressure 76 grade 2 diastolic dysfunction,  echo 14/98 grade 2 diastolic dysfunction mild to moderate MR RVSP 56 aortic sclerosis    Obesity     CHICO (obstructive sleep apnea)     CPAP 14 2/15 initiation  AHI 7  mild CHICO intolerant CPAP    Osteoarthritis     PMR (polymyalgia rheumatica) (HCC)     Pneumonia     Premature atrial contractions     Pulmonary hypertension (HCC)     -Moderate on echo November 2014    Restrictive lung disease     Mild on PFTs 11/14    Type II or unspecified type diabetes mellitus without mention of complication, not stated as uncontrolled     Vitreous floaters        Past Surgical History:     Past Surgical History:   Procedure Laterality Date    APPENDECTOMY  1952    CARDIAC CATHETERIZATION  2014    CATARACT REMOVAL Bilateral 08/10    CHOLECYSTECTOMY      COLONOSCOPY  05/16/2011    COLONOSCOPY N/A 9/26/2019    severe diverticulosis, benign rectal polyp, Dr. Lydia Reid Right 2/6/2019    Cysto with right stent insertion and villareal catheter performed by Liza Coker MD at [address] [place] [address] Right 2/27/2019    CYSTO right stent removal  Right Ureteroscopy Holmium Laser right stent exchange performed by Liza Coker MD at [facility] [place], DIAGNOSTIC      EYE SURGERY      HYSTERECTOMY  Approx 1983    MALIGNANT SKIN LESION EXCISION Right 12/10 and 04/11    Basal CellRemoved from right neck    MALIGNANT SKIN LESION EXCISION Right 02/2012    Basal Cell Removed from right leg    OTHER SURGICAL HISTORY  09/06/2011    capsule endoscopy  OTHER SURGICAL HISTORY  3/22/16    right L4 and L5 TFE    OTHER SURGICAL HISTORY Right 4/26/16    L4, L5 TFE    UPPER GASTROINTESTINAL ENDOSCOPY  05/16/2011    UPPER GASTROINTESTINAL ENDOSCOPY  6/22/15    mild gastritis (Dr. Lion Mercado)    UPPER GASTROINTESTINAL ENDOSCOPY N/A 9/26/2019    mild to moderate inflammation, Dr. Lion Mercado       Patient Family Social History:  Family History   Problem Relation Age of Onset    Uterine Cancer Mother     Heart Disease Father     High Blood Pressure Father     Stroke Sister         from a vascular malformation    Heart Attack Son         age 48      reports that she has never smoked. She has never used smokeless tobacco. She reports that she does not drink alcohol and does not use drugs. Current Medications:     Current Outpatient Medications   Medication Sig Dispense Refill    insulin glargine (LANTUS SOLOSTAR) 100 UNIT/ML injection pen INJECT 14 UNITS bid 30 mL 4    insulin lispro, 1 Unit Dial, (HUMALOG KWIKPEN) 100 UNIT/ML SOPN Humalog pen 3 times daily with meals per sliding scale. Blood sugar is 70, drink juice. 151-200 =6, 201-250 = 8 units, 251-300 = 10 units.  1 pen 3    Insulin Pen Needle (B-D ULTRAFINE III SHORT PEN) 31G X 8 MM MISC USE 7 NEEDLES DAILY 650 each 3    predniSONE (DELTASONE) 1 MG tablet TAKE 3 TABLETS DAILY 270 tablet 3    ELIQUIS 5 MG TABS tablet TAKE 1 TABLET TWICE A  tablet 3    amLODIPine (NORVASC) 5 MG tablet Take 1 tablet by mouth daily 90 tablet 3    VICTOZA 18 MG/3ML SOPN SC injection INJECT 1.8 MG INTO THE SKIN DAILY 18 mL 5    furosemide (LASIX) 40 MG tablet TAKE 1 TABLET DAILY 90 tablet 3    Nutritional Supplements (GLUCOSE MANAGEMENT) TABS 15 grams for bs less than 70 100 tablet 3    amiodarone (PACERONE) 100 MG tablet Take 1 tablet by mouth daily 90 tablet 3    metoprolol tartrate (LOPRESSOR) 25 MG tablet Take 1 tablet by mouth 2 times daily 60 tablet 3    simvastatin (ZOCOR) 20 MG tablet TAKE 1 TABLET NIGHTLY 90 tablet 3    nystatin (MYCOSTATIN) 075549 UNIT/GM cream Apply topically 2 times daily. 1 Tube 0    potassium chloride (KLOR-CON M) 20 MEQ extended release tablet Take 1 tablet by mouth 2 times daily 180 tablet 3    albuterol (PROVENTIL) (2.5 MG/3ML) 0.083% nebulizer solution Take 3 mLs by nebulization every 4 hours as needed for Wheezing or Shortness of Breath 30 each 1    Polyethylene Glycol 400 (BLINK TEARS) 0.25 % SOLN Place into both eyes as needed (dry eyes)       acetaminophen (TYLENOL) 500 MG tablet Take 500 mg by mouth daily      omeprazole (PRILOSEC) 20 MG capsule Take 20 mg by mouth Daily  30 capsule 3    Cholecalciferol (VITAMIN D3) 2000 UNITS CAPS Take 2,000 Units by mouth daily        No current facility-administered medications for this visit. Allergies:   Codeine, Erythromycin, Exenatide, Levofloxacin, Verapamil, Clonidine derivatives, Other, and Penicillins    Review of Systems:    Constitutional: No fever or chills. No night sweats, no weight loss positive fatigue  Eyes: No eye discharge, double vision, or eye pain   HEENT: negative for sore mouth, sore throat, hoarseness and voice change   Respiratory: negative for cough , sputum, dyspnea, wheezing, hemoptysis, chest pain   Cardiovascular: negative for chest pain, dyspnea, palpitations, orthopnea, PND   Gastrointestinal: negative for nausea, vomiting, diarrhea, constipation, abdominal pain, Dysphagia, hematemesis and hematochezia   Genitourinary: negative for frequency, dysuria, nocturia, urinary incontinence, and hematuria   Integument: negative for rash, skin lesions, bruises.    Hematologic/Lymphatic: negative for easy bruising, bleeding, lymphadenopathy, or petechiae   Endocrine: negative for heat or cold intolerance,weight changes, change in bowel habits and hair loss   Musculoskeletal: negative for myalgias, arthralgias, pain, joint swelling,and bone pain   Neurological: negative for headaches, dizziness, seizures, weakness, numbness        Physical Exam:    Vitals: /60 (Site: Right Upper Arm, Position: Sitting, Cuff Size: Medium Adult)   Pulse 84   Temp 97.2 °F (36.2 °C)   Ht 4' 10.5\" (1.486 m)   Wt 195 lb (88.5 kg)   SpO2 90%   BMI 40.06 kg/m²   General appearance - well appearing, no in pain or distress  Mental status - AAO X3  Eyes - pupils equal and reactive, extraocular eye movements intact  Mouth - mucous membranes moist, pharynx normal without lesions  Neck - supple, no significant adenopathy  Lymphatics - no palpable lymphadenopathy, no hepatosplenomegaly  Chest - clear to auscultation, no wheezes, rales or rhonchi, symmetric air entry  Heart - normal rate, regular rhythm, normal S1, S2, no murmurs  Abdomen - soft, nontender, nondistended, no masses or organomegaly  Neurological - alert, oriented, normal speech, no focal findings or movement disorder noted  Extremities - peripheral pulses normal, no pedal edema, no clubbing or cyanosis  Skin - normal coloration and turgor, no rashes, no suspicious skin lesions noted       DATA:    Results for orders placed or performed during the hospital encounter of 11/30/21   Iron And TIBC   Result Value Ref Range    Iron 67 37 - 145 ug/dL    TIBC 296 250 - 450 ug/dL    Iron Saturation 23 20 - 55 %    UIBC 229 112 - 347 ug/dL   Ferritin   Result Value Ref Range    Ferritin 52 13 - 150 ug/L   CBC Auto Differential   Result Value Ref Range    WBC 7.9 3.5 - 11.3 k/uL    RBC 4.55 3.95 - 5.11 m/uL    Hemoglobin 12.2 11.9 - 15.1 g/dL    Hematocrit 40.2 36.3 - 47.1 %    MCV 88.4 82.6 - 102.9 fL    MCH 26.8 25.2 - 33.5 pg    MCHC 30.3 25.2 - 33.5 g/dL    RDW 16.1 (H) 11.8 - 14.4 %    Platelets See Reflexed IPF Result 138 - 453 k/uL    MPV NOT REPORTED 8.1 - 13.5 fL    NRBC Automated 0.0 0.0 per 100 WBC    Differential Type NOT REPORTED     WBC Morphology NOT REPORTED     RBC Morphology NOT REPORTED     Platelet Estimate NOT REPORTED     Seg Neutrophils 54 36 - 65 %    Lymphocytes 31 24 - 43 %    Monocytes 10 3 - 12 %    Eosinophils % 3 1 - 4 %    Basophils 1 0 - 2 %    Immature Granulocytes 0 0 %    Segs Absolute 4.30 1.50 - 8.10 k/uL    Absolute Lymph # 2.45 1.10 - 3.70 k/uL    Absolute Mono # 0.79 0.10 - 1.20 k/uL    Absolute Eos # 0.23 0.00 - 0.44 k/uL    Basophils Absolute 0.10 0.00 - 0.20 k/uL    Absolute Immature Granulocyte 0.03 0.00 - 0.30 k/uL   Immature Platelet Fraction   Result Value Ref Range    Platelet, Immature Fraction 19.5 (H) 1.1 - 10.3 %    Platelet, Fluorescence 192 138 - 453 k/uL     PREOPERATIVE DIAGNOSIS:    1. Anemia Hb=8.3 (7/24/19) and hemoccult stools  2. Recent diverticulitis  3. Pain mass right lower leg  4.  abd pain  5. Last CS 2011  6. Last EGD 2015, on prilosec daily           OPERATION: 1. Upper GI endoscopy with cold biopsy                          2.  Colonoscopy with cold bx     ANESTHESIA: IV sedation per CRNA.      ESTIMATED BLOOD LOSS: Less than 10 ml     COMPLICATIONS: None. PROCEDURE # 1:  EGD     PROCEDURE: The patient was given IV conscious sedation per anesthesia. The patient was given 4 L of O2 /minute by nasal cannula. The patient's SPO2 remained above 90% throughout the procedure. The gastroscope was inserted orally and advanced under direct vision through the esophagus, through the stomach, through the pylorus, and into the descending duodenum. Random biopsies were taken in the antrum,body and distal esophagus. Another bx was taken in the 2 nd portion of the duodenum. The scope was removed and the patient tolerated the procedure well. Findings:     1.  GE junction at 38 cm  2. Mild schatzki's ring  3. Mild inflammation duodenum  4. Mild antral gastritis        PROCEDURE # 2:  COLONOSCOPY        PROCEDURE: The patient was given IV conscious sedation per anesthesia. The patient was given O2 by nasal cannula. The patient's SPO2 remained above 90% throughout the procedure.  The colonoscope was inserted per rectum and advanced under direct vision to the cecum without difficulty. The prep was good so exam was adequate. The colon was decompressed and the scope was removed. The patient tolerated the procedure well. Findings:     1. Severe sigmoid diverticulosis, with some mild inflammation  2.  5 mm polyp rectum bx with cold bx forceps  3. Internal hemorrhoids  Collected: 9/26/2019   Received: 9/27/2019   Reported: 9/30/2019 13:42     -- Diagnosis --     1. Duodenum, biopsy: Normal small bowel mucosa. 2.  Stomach, antrum biopsy: Mild chronic inflammation. 3.  Stomach, body biopsy: Mild chronic inflammation. 4.  Distal esophagus, biopsy:          Mucinous columnar epithelium with moderate chronic inflammation,   focal acute inflammation, and reactive change. Squamous mucosa with no histologic abnormality. 5.  Rectum, polyp, biopsy: Normal rectal mucosa. Mri Abdomen W Wo Contrast    Result Date: 7/2/2019  EXAMINATION: MRI OF THE ABDOMEN WITHOUT AND WITH CONTRAST, 7/2/2019 8:20 am TECHNIQUE: Multiplanar multisequence MRI of the abdomen was performed without and with the administration of intravenous contrast. COMPARISON: CT abdomen and pelvis 05/24/2019, 02/05/2019 and 01/13/2017. HISTORY: ORDERING SYSTEM PROVIDED HISTORY: Anemia, unspecified type TECHNOLOGIST PROVIDED HISTORY: pancreatic protocol , pancreatic nodule Specify organ?->Pancreas Ordering Physician Provided Reason for Exam: Follow up abnormality seen on CT scan in the pancreas Acuity: Unknown Type of Exam: Subsequent/Follow-up Additional signs and symptoms: Mass of pancreas; Pancreatic nodule FINDINGS: The visualized lung bases reveal no signal abnormality. No morphologic evidence for cirrhosis or steatosis. 1 cm cyst in the peripheral aspect of segment 8/5. No suspicious liver lesion identified. The hepatic and portal venous systems are patent. Status post cholecystectomy. No biliary dilatation. No evidence for choledocholithiasis.  A lobulated cyst in the pancreatic head measuring 2.3 x 2.0 x 3.3 cm without internal solid components, enhancement or complexity. I measure this as 2.6 x 2.0 x 2.9 cm on the 2017 exam and otherwise similar morphology. No communication with the pancreatic duct, dilatation dilatation or signal abnormality otherwise appreciated in the pancreas. The spleen, adrenals and kidneys reveal no significant finding. Subcentimeter right renal cyst is noted in the interpolar region. The visualized bowel is normal in caliber. No signal abnormality identified. No ascites. The aorta is normal in caliber. The visceral branches are patent. No lymphadenopathy. The visualized osseous structures reveal no signal abnormality. Lobulated simple appearing cyst in the pancreatic head with little interval change since 2017, consistent with a benign process. Differential considerations include pseudocyst versus macrocystic serous cystadenoma. MRI right leg:    Impression   Intramuscular lipoma within the soleus.       Additional lipoma between the soleus and flexor hallucis longus muscles.       Subcutaneous soft tissue edema along the anterolateral the lower leg,   superficial to the extensor digitorum longus muscle.  No drainable fluid   collection identified.  No abnormal postcontrast enhancement.             Mammogram 12/2019:    Impression   No mammographic or ultrasound evidence of malignancy.  BI-RADS 1.       BIRADS:   BIRADS - CATEGORY 1       Negative, no evidence of malignancy.  The patient's palpable lump should be   handled on a clinical basis.  Normal interval follow-up is recommended in 12   months.       OVERALL ASSESSMENT - NEGATIVE       A letter of notification will be sent to the patient regarding the results.       The [employer] recommends annual mammograms for women 40   years and older.       Findings were discussed with the patient by the technologist shortly after   imaging. Impression:  Microcytic anemia  Iron deficiency  Positive stool occult blood  Pancreatic head nodule, stable likely benign process  CKD  Bronchiectasis    Plan:  Personally reviewed results of lab work-up and other relevant clinical data. Reviewed patient's lab work-up. Patient hemoglobin is within range. Platelet count is within range with elevated immature platelet fraction this likely represents bone marrow's compensation to keep the platelet count within normal range. Patient has been on Eliquis without any adverse events. Okay to continue anticoagulation using Eliquis as indicated  Patient will continue to follow with primary care physician we will be happy to see patient back in office on as-needed basis        Abril Rivas MD    This note is created with the assistance of a speech recognition program.  While intending to generate a document that actually reflects the content of the visit, the document can still have some errors including those of syntax and sound a like substitutions which may escape proof reading. It such instances, actual meaning can be extrapolated by contextual diversion.

## 2021-12-28 NOTE — PROGRESS NOTES
Orthopaedic Progress Note      CHIEF COMPLAINT: Left knee pain    HISTORY OF PRESENT ILLNESS:       Ms. Stewart Andres  is a 81 y.o. female  who presents for repeat evaluation of ongoing left knee pain. Upon her last evaluation she was actually ready to have a corticosteroid injection to the left knee however she was scheduled to get her Covid vaccine booster that day. It was decided to hold off on the injection secondary to contraindications of the vaccine with the corticosteroid injection to her knee. Today she is here to consider the injection to her left knee. Pain is still localized to the posterior aspect in the Baker's cyst and radiating anteriorly as well as pain worsened with weightbearing and physical activity. No other new problems today. Past Medical History:    Past Medical History:   Diagnosis Date    Allergic rhinitis     Asthma     PFT's, 04/06, actually showed mild restrictive defect.  Atrial fibrillation with RVR ([address]     Hospitalized [facility] 2/8/21-2/11/21    Basal cell carcinoma of cheek 2007    Right cheek    Basal cell carcinoma of leg     right leg    CAD (coronary artery disease)     With 40% stenosis of the LAD, 07/10, ejection fraction 60%. Repeat heart cath9/14 with 4050 percent LAD lesion first diagonal 50% lesion rec med Rx    Central retinal artery occlusion     Right side November 2014 status post TPA    Cerebral artery occlusion with cerebral infarction ([address] 11/24/2014    Cerebrovascular disease     50-79% stenosis on left on ultrasound 11/14    CHF (congestive heart failure) ([facility])     Echo 1/15 moderate MR, severe TR, RVSP 76, grade 2 diastolic dysfunction    Diverticulosis     AVM on colonoscopy, 05/11    Dyslipidemia     Elevated antinuclear antibody (ZAIRA) level     History of    Esophagitis 05/2011    Gastritis/esophagitis on EGD, Dr. Airam Lees.   Repeat EGD6/15 Gastritis    Foraminal stenosis of lumbar region     Moderate  Right L4/L5 neuroforaminal 05/16/2011    COLONOSCOPY N/A 9/26/2019    severe diverticulosis, benign rectal polyp, Dr. Roland Delravi Right 2/6/2019    Cysto with right stent insertion and villareal catheter performed by Lilian Lei MD at [facility] Right 2/27/2019    CYSTO right stent removal  Right Ureteroscopy Holmium Laser right stent exchange performed by Lilian Lei MD at [facility]. [address], COLON, DIAGNOSTIC      EYE SURGERY      HYSTERECTOMY  Approx 1983    MALIGNANT SKIN LESION EXCISION Right 12/10 and 04/11    Basal CellRemoved from right neck    MALIGNANT SKIN LESION EXCISION Right 02/2012    Basal Cell Removed from right leg    OTHER SURGICAL HISTORY  09/06/2011    capsule endoscopy    OTHER SURGICAL HISTORY  3/22/16    right L4 and L5 TFE    OTHER SURGICAL HISTORY Right 4/26/16    L4, L5 TFE    UPPER GASTROINTESTINAL ENDOSCOPY  05/16/2011    UPPER GASTROINTESTINAL ENDOSCOPY  6/22/15    mild gastritis (Dr. Nahomi Villanueva)    UPPER GASTROINTESTINAL ENDOSCOPY N/A 9/26/2019    mild to moderate inflammation, Dr. Nahomi Villanueva         Current  Medications:  Current Outpatient Medications   Medication Sig Dispense Refill    metoprolol tartrate (LOPRESSOR) 25 MG tablet Take 1 tablet by mouth 2 times daily 180 tablet 0    albuterol sulfate  (90 Base) MCG/ACT inhaler Inhale 2 puffs into the lungs 4 times daily as needed for Wheezing 1 each 0    insulin glargine (LANTUS SOLOSTAR) 100 UNIT/ML injection pen INJECT 14 UNITS bid 30 mL 4    insulin lispro, 1 Unit Dial, (HUMALOG KWIKPEN) 100 UNIT/ML SOPN Humalog pen 3 times daily with meals per sliding scale. Blood sugar is 70, drink juice. 151-200 =6, 201-250 = 8 units, 251-300 = 10 units.  1 pen 3    Insulin Pen Needle (B-D ULTRAFINE III SHORT PEN) 31G X 8 MM MISC USE 7 NEEDLES DAILY 650 each 3    predniSONE (DELTASONE) 1 MG tablet TAKE 3 TABLETS DAILY 270 tablet 3    ELIQUIS 5 MG TABS tablet TAKE 1 TABLET TWICE A  tablet 3    amLODIPine (NORVASC) 5 MG tablet Take 1 tablet by mouth daily 90 tablet 3    VICTOZA 18 MG/3ML SOPN SC injection INJECT 1.8 MG INTO THE SKIN DAILY 18 mL 5    furosemide (LASIX) 40 MG tablet TAKE 1 TABLET DAILY 90 tablet 3    amiodarone (PACERONE) 100 MG tablet Take 1 tablet by mouth daily 90 tablet 3    simvastatin (ZOCOR) 20 MG tablet TAKE 1 TABLET NIGHTLY 90 tablet 3    nystatin (MYCOSTATIN) 619643 UNIT/GM cream Apply topically 2 times daily.  1 Tube 0    potassium chloride (KLOR-CON M) 20 MEQ extended release tablet Take 1 tablet by mouth 2 times daily 180 tablet 3    albuterol (PROVENTIL) (2.5 MG/3ML) 0.083% nebulizer solution Take 3 mLs by nebulization every 4 hours as needed for Wheezing or Shortness of Breath 30 each 1    Polyethylene Glycol 400 (BLINK TEARS) 0.25 % SOLN Place into both eyes as needed (dry eyes)       acetaminophen (TYLENOL) 500 MG tablet Take 500 mg by mouth daily      omeprazole (PRILOSEC) 20 MG capsule Take 20 mg by mouth Daily  30 capsule 3    Cholecalciferol (VITAMIN D3) 2000 UNITS CAPS Take 2,000 Units by mouth daily       Nutritional Supplements (GLUCOSE MANAGEMENT) TABS 15 grams for bs less than 70 (Patient not taking: Reported on 12/28/2021) 100 tablet 3     Current Facility-Administered Medications   Medication Dose Route Frequency Provider Last Rate Last Admin    triamcinolone acetonide (KENALOG-40) injection 80 mg  80 mg Intra-artICUlar Once BALJEET Avina        bupivacaine (MARCAINE) 0.5 % injection 25 mg  5 mL Intra-artICUlar Once BALJEET Avina           Allergies:  Codeine, Erythromycin, Exenatide, Levofloxacin, Verapamil, Clonidine derivatives, Other, and Penicillins    Social History:   Social History     Tobacco Use   Smoking Status Never Smoker   Smokeless Tobacco Never Used   Tobacco Comment    syant rrt 5/28/2019     Social History     Substance and Sexual Activity   Alcohol Use No    Alcohol/week: 0.0 standard drinks     Social History     Substance and Sexual Activity PLAN:  We will go ahead and proceed with a left knee corticosteroid injection today. We can repeat these every 3 to 4 months as necessary. Patient will follow up with us as needed. Procedures    NH ARTHROCENTESIS ASPIR&/INJ MAJOR JT/BURSA W/O US        Procedure:    KNEE INJECTION PROCEDURE NOTE:  The patient was identified. The left knee was confirmed with the patient. A topical anaesthetic of ethyl chloride spray was utilized. In a sterile fashion the knee was prepped with alcohol wipes. The knee was injected using a lateral joint line approach with 80mg of Kenalog and 5cc of 0.5% marcaine without epinephrine. Needle was withdrawn with appropriate hemostasis obtained and dry sterile dressing applied. Patient tolerated the procedure well without post injection complications. I instructed the patient to call our office immediately if they have any swelling or increased pain at the injection site.     Electronically signed by BALJEET Recinos on 12/28/2021 at 10:33 AM

## 2021-12-29 NOTE — TELEPHONE ENCOUNTER
Patient received a steroid injection in her knee and her blood sugars were high 400's last night and her fasting sugar this morning was 423 before insulin. Please advise if you would like to adjust any insulin.

## 2021-12-29 NOTE — TELEPHONE ENCOUNTER
Bs typically return to normal anywhere from 3-10 days. She can increase her lantus from 14 to 16 units and add 2 units to her short acting insulin scale until bs are below 200 then return to normal dosing. Please monitor for low blood sugars.

## 2022-01-01 ENCOUNTER — OFFICE VISIT (OUTPATIENT)
Dept: INTERNAL MEDICINE | Age: 87
End: 2022-01-01
Payer: MEDICARE

## 2022-01-01 ENCOUNTER — TELEPHONE (OUTPATIENT)
Dept: INTERNAL MEDICINE | Age: 87
End: 2022-01-01

## 2022-01-01 ENCOUNTER — APPOINTMENT (OUTPATIENT)
Dept: GENERAL RADIOLOGY | Age: 87
DRG: 853 | End: 2022-01-01
Payer: MEDICARE

## 2022-01-01 ENCOUNTER — ANESTHESIA EVENT (OUTPATIENT)
Dept: OPERATING ROOM | Age: 87
DRG: 853 | End: 2022-01-01
Payer: MEDICARE

## 2022-01-01 ENCOUNTER — HOSPITAL ENCOUNTER (EMERGENCY)
Age: 87
Discharge: ANOTHER HOSPITAL | End: 2022-08-20
Attending: EMERGENCY MEDICINE
Payer: MEDICARE

## 2022-01-01 ENCOUNTER — HOSPITAL ENCOUNTER (OUTPATIENT)
Dept: GENERAL RADIOLOGY | Age: 87
Discharge: HOME | End: 2022-08-18
Payer: MEDICARE

## 2022-01-01 ENCOUNTER — OFFICE VISIT (OUTPATIENT)
Dept: DIABETES SERVICES | Age: 87
End: 2022-01-01
Payer: MEDICARE

## 2022-01-01 ENCOUNTER — ANESTHESIA (OUTPATIENT)
Dept: OPERATING ROOM | Age: 87
DRG: 853 | End: 2022-01-01
Payer: MEDICARE

## 2022-01-01 ENCOUNTER — OFFICE VISIT (OUTPATIENT)
Dept: ORTHOPEDIC SURGERY | Age: 87
End: 2022-01-01
Payer: MEDICARE

## 2022-01-01 ENCOUNTER — HOSPITAL ENCOUNTER (OUTPATIENT)
Dept: BONE DENSITY | Age: 87
Discharge: HOME | End: 2022-06-16
Payer: MEDICARE

## 2022-01-01 ENCOUNTER — CARE COORDINATION (OUTPATIENT)
Dept: CASE MANAGEMENT | Age: 87
End: 2022-01-01

## 2022-01-01 ENCOUNTER — OFFICE VISIT (OUTPATIENT)
Dept: CARDIOLOGY | Age: 87
End: 2022-01-01
Payer: MEDICARE

## 2022-01-01 ENCOUNTER — HOSPITAL ENCOUNTER (INPATIENT)
Age: 87
LOS: 2 days | Discharge: HOME | DRG: 194 | End: 2022-03-09
Attending: EMERGENCY MEDICINE | Admitting: INTERNAL MEDICINE
Payer: MEDICARE

## 2022-01-01 ENCOUNTER — HOSPITAL ENCOUNTER (OUTPATIENT)
Dept: LAB | Age: 87
Discharge: HOME | End: 2022-06-06
Payer: MEDICARE

## 2022-01-01 ENCOUNTER — APPOINTMENT (OUTPATIENT)
Dept: GENERAL RADIOLOGY | Age: 87
DRG: 194 | End: 2022-01-01
Payer: MEDICARE

## 2022-01-01 ENCOUNTER — HOSPITAL ENCOUNTER (OUTPATIENT)
Dept: LAB | Age: 87
Discharge: HOME | End: 2022-03-24
Payer: MEDICARE

## 2022-01-01 ENCOUNTER — APPOINTMENT (OUTPATIENT)
Dept: CT IMAGING | Age: 87
End: 2022-01-01
Payer: MEDICARE

## 2022-01-01 ENCOUNTER — APPOINTMENT (OUTPATIENT)
Dept: CT IMAGING | Age: 87
DRG: 194 | End: 2022-01-01
Payer: MEDICARE

## 2022-01-01 ENCOUNTER — HOSPITAL ENCOUNTER (INPATIENT)
Age: 87
LOS: 8 days | DRG: 853 | End: 2022-08-28
Attending: EMERGENCY MEDICINE | Admitting: SURGERY
Payer: MEDICARE

## 2022-01-01 VITALS
DIASTOLIC BLOOD PRESSURE: 60 MMHG | SYSTOLIC BLOOD PRESSURE: 142 MMHG | RESPIRATION RATE: 18 BRPM | WEIGHT: 184 LBS | BODY MASS INDEX: 37.09 KG/M2 | OXYGEN SATURATION: 99 % | HEART RATE: 60 BPM | HEIGHT: 59 IN

## 2022-01-01 VITALS
DIASTOLIC BLOOD PRESSURE: 84 MMHG | SYSTOLIC BLOOD PRESSURE: 126 MMHG | WEIGHT: 177 LBS | HEART RATE: 83 BPM | BODY MASS INDEX: 35.68 KG/M2 | HEIGHT: 59 IN

## 2022-01-01 VITALS
BODY MASS INDEX: 35.68 KG/M2 | DIASTOLIC BLOOD PRESSURE: 60 MMHG | RESPIRATION RATE: 14 BRPM | HEIGHT: 59 IN | HEART RATE: 64 BPM | WEIGHT: 177 LBS | SYSTOLIC BLOOD PRESSURE: 142 MMHG

## 2022-01-01 VITALS
WEIGHT: 205.1 LBS | DIASTOLIC BLOOD PRESSURE: 32 MMHG | TEMPERATURE: 102.4 F | SYSTOLIC BLOOD PRESSURE: 88 MMHG | HEIGHT: 59 IN | HEART RATE: 61 BPM | BODY MASS INDEX: 41.35 KG/M2 | OXYGEN SATURATION: 88 % | RESPIRATION RATE: 18 BRPM

## 2022-01-01 VITALS
OXYGEN SATURATION: 97 % | RESPIRATION RATE: 16 BRPM | DIASTOLIC BLOOD PRESSURE: 70 MMHG | WEIGHT: 177.2 LBS | HEIGHT: 59 IN | SYSTOLIC BLOOD PRESSURE: 134 MMHG | HEART RATE: 60 BPM | BODY MASS INDEX: 35.72 KG/M2

## 2022-01-01 VITALS
OXYGEN SATURATION: 96 % | DIASTOLIC BLOOD PRESSURE: 60 MMHG | RESPIRATION RATE: 21 BRPM | WEIGHT: 183.3 LBS | SYSTOLIC BLOOD PRESSURE: 142 MMHG | HEIGHT: 59 IN | BODY MASS INDEX: 36.95 KG/M2 | TEMPERATURE: 99 F | HEART RATE: 62 BPM

## 2022-01-01 VITALS
RESPIRATION RATE: 16 BRPM | SYSTOLIC BLOOD PRESSURE: 125 MMHG | HEIGHT: 59 IN | HEART RATE: 64 BPM | TEMPERATURE: 98.9 F | WEIGHT: 176 LBS | OXYGEN SATURATION: 94 % | BODY MASS INDEX: 35.48 KG/M2 | DIASTOLIC BLOOD PRESSURE: 47 MMHG

## 2022-01-01 VITALS
BODY MASS INDEX: 36.89 KG/M2 | HEIGHT: 59 IN | WEIGHT: 183 LBS | HEART RATE: 59 BPM | SYSTOLIC BLOOD PRESSURE: 168 MMHG | DIASTOLIC BLOOD PRESSURE: 71 MMHG

## 2022-01-01 VITALS
BODY MASS INDEX: 36.69 KG/M2 | RESPIRATION RATE: 16 BRPM | HEART RATE: 60 BPM | HEIGHT: 59 IN | DIASTOLIC BLOOD PRESSURE: 70 MMHG | OXYGEN SATURATION: 98 % | WEIGHT: 182 LBS | TEMPERATURE: 96.9 F | SYSTOLIC BLOOD PRESSURE: 124 MMHG

## 2022-01-01 VITALS
SYSTOLIC BLOOD PRESSURE: 126 MMHG | BODY MASS INDEX: 37.82 KG/M2 | HEIGHT: 59 IN | DIASTOLIC BLOOD PRESSURE: 72 MMHG | HEART RATE: 70 BPM | WEIGHT: 187.6 LBS

## 2022-01-01 VITALS
SYSTOLIC BLOOD PRESSURE: 146 MMHG | BODY MASS INDEX: 35.93 KG/M2 | HEIGHT: 60 IN | HEART RATE: 61 BPM | DIASTOLIC BLOOD PRESSURE: 79 MMHG | WEIGHT: 183 LBS

## 2022-01-01 DIAGNOSIS — K63.1 COLON PERFORATION (HCC): Primary | ICD-10-CM

## 2022-01-01 DIAGNOSIS — E11.21 TYPE 2 DIABETES WITH NEPHROPATHY (HCC): ICD-10-CM

## 2022-01-01 DIAGNOSIS — R09.02 HYPOXIA: ICD-10-CM

## 2022-01-01 DIAGNOSIS — E11.21 TYPE 2 DIABETES WITH NEPHROPATHY (HCC): Primary | ICD-10-CM

## 2022-01-01 DIAGNOSIS — I51.89 DIASTOLIC DYSFUNCTION: ICD-10-CM

## 2022-01-01 DIAGNOSIS — G89.29 CHRONIC PAIN OF BOTH KNEES: Primary | ICD-10-CM

## 2022-01-01 DIAGNOSIS — R11.2 NAUSEA VOMITING AND DIARRHEA: ICD-10-CM

## 2022-01-01 DIAGNOSIS — E11.21 DIABETIC NEPHROPATHY ASSOCIATED WITH TYPE 2 DIABETES MELLITUS (HCC): ICD-10-CM

## 2022-01-01 DIAGNOSIS — J11.1 INFLUENZA WITH RESPIRATORY MANIFESTATION OTHER THAN PNEUMONIA: ICD-10-CM

## 2022-01-01 DIAGNOSIS — Z00.00 ROUTINE GENERAL MEDICAL EXAMINATION AT A HEALTH CARE FACILITY: Primary | ICD-10-CM

## 2022-01-01 DIAGNOSIS — I48.91 ATRIAL FIBRILLATION, UNSPECIFIED TYPE (HCC): ICD-10-CM

## 2022-01-01 DIAGNOSIS — M17.12 ARTHRITIS OF LEFT KNEE: Primary | ICD-10-CM

## 2022-01-01 DIAGNOSIS — D69.6 THROMBOCYTOPENIA (HCC): ICD-10-CM

## 2022-01-01 DIAGNOSIS — E66.01 SEVERE OBESITY (BMI 35.0-39.9) WITH COMORBIDITY (HCC): ICD-10-CM

## 2022-01-01 DIAGNOSIS — I27.20 PULMONARY HTN (HCC): ICD-10-CM

## 2022-01-01 DIAGNOSIS — Z79.4 CONTROLLED TYPE 2 DIABETES MELLITUS WITHOUT COMPLICATION, WITH LONG-TERM CURRENT USE OF INSULIN (HCC): ICD-10-CM

## 2022-01-01 DIAGNOSIS — J10.1 INFLUENZA A: ICD-10-CM

## 2022-01-01 DIAGNOSIS — N18.31 TYPE 2 DIABETES MELLITUS WITH STAGE 3A CHRONIC KIDNEY DISEASE, WITH LONG-TERM CURRENT USE OF INSULIN (HCC): ICD-10-CM

## 2022-01-01 DIAGNOSIS — G89.29 CHRONIC PAIN OF BOTH KNEES: ICD-10-CM

## 2022-01-01 DIAGNOSIS — E78.5 DYSLIPIDEMIA: ICD-10-CM

## 2022-01-01 DIAGNOSIS — D50.0 IRON DEFICIENCY ANEMIA SECONDARY TO BLOOD LOSS (CHRONIC): ICD-10-CM

## 2022-01-01 DIAGNOSIS — Z00.00 ROUTINE GENERAL MEDICAL EXAMINATION AT A HEALTH CARE FACILITY: ICD-10-CM

## 2022-01-01 DIAGNOSIS — I10 ESSENTIAL HYPERTENSION: ICD-10-CM

## 2022-01-01 DIAGNOSIS — N18.30 STAGE 3 CHRONIC KIDNEY DISEASE, UNSPECIFIED WHETHER STAGE 3A OR 3B CKD (HCC): ICD-10-CM

## 2022-01-01 DIAGNOSIS — J45.21 MILD INTERMITTENT ASTHMA WITH ACUTE EXACERBATION: ICD-10-CM

## 2022-01-01 DIAGNOSIS — M25.561 CHRONIC PAIN OF BOTH KNEES: Primary | ICD-10-CM

## 2022-01-01 DIAGNOSIS — E11.9 CONTROLLED TYPE 2 DIABETES MELLITUS WITHOUT COMPLICATION, WITH LONG-TERM CURRENT USE OF INSULIN (HCC): ICD-10-CM

## 2022-01-01 DIAGNOSIS — M25.562 CHRONIC PAIN OF BOTH KNEES: Primary | ICD-10-CM

## 2022-01-01 DIAGNOSIS — K57.20 DIVERTICULITIS OF LARGE INTESTINE WITH PERFORATION AND ABSCESS WITHOUT BLEEDING: Primary | ICD-10-CM

## 2022-01-01 DIAGNOSIS — Z78.0 MENOPAUSE: ICD-10-CM

## 2022-01-01 DIAGNOSIS — M25.561 CHRONIC PAIN OF BOTH KNEES: ICD-10-CM

## 2022-01-01 DIAGNOSIS — E55.9 VITAMIN D DEFICIENCY: ICD-10-CM

## 2022-01-01 DIAGNOSIS — R19.7 NAUSEA VOMITING AND DIARRHEA: ICD-10-CM

## 2022-01-01 DIAGNOSIS — R19.7 NAUSEA VOMITING AND DIARRHEA: Primary | ICD-10-CM

## 2022-01-01 DIAGNOSIS — M48.061 NEURAL FORAMINAL STENOSIS OF LUMBAR SPINE: ICD-10-CM

## 2022-01-01 DIAGNOSIS — I48.3 TYPICAL ATRIAL FLUTTER (HCC): Primary | ICD-10-CM

## 2022-01-01 DIAGNOSIS — I34.0 MITRAL VALVE INSUFFICIENCY, UNSPECIFIED ETIOLOGY: ICD-10-CM

## 2022-01-01 DIAGNOSIS — R09.02 HYPOXIA: Primary | ICD-10-CM

## 2022-01-01 DIAGNOSIS — K63.1 PERFORATED BOWEL (HCC): ICD-10-CM

## 2022-01-01 DIAGNOSIS — G62.9 NEUROPATHY: Primary | ICD-10-CM

## 2022-01-01 DIAGNOSIS — N18.31 STAGE 3A CHRONIC KIDNEY DISEASE (HCC): ICD-10-CM

## 2022-01-01 DIAGNOSIS — J45.20 MILD INTERMITTENT ASTHMA WITHOUT COMPLICATION: ICD-10-CM

## 2022-01-01 DIAGNOSIS — M35.3 PMR (POLYMYALGIA RHEUMATICA) (HCC): ICD-10-CM

## 2022-01-01 DIAGNOSIS — R11.2 NAUSEA VOMITING AND DIARRHEA: Primary | ICD-10-CM

## 2022-01-01 DIAGNOSIS — J10.1 INFLUENZA A: Primary | ICD-10-CM

## 2022-01-01 DIAGNOSIS — M25.562 CHRONIC PAIN OF BOTH KNEES: ICD-10-CM

## 2022-01-01 DIAGNOSIS — I49.3 FREQUENT PVCS: Primary | ICD-10-CM
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
## 2022-01-01 DIAGNOSIS — Z79.4 TYPE 2 DIABETES MELLITUS WITH STAGE 3A CHRONIC KIDNEY DISEASE, WITH LONG-TERM CURRENT USE OF INSULIN (HCC): ICD-10-CM

## 2022-01-01 DIAGNOSIS — E11.22 TYPE 2 DIABETES MELLITUS WITH STAGE 3A CHRONIC KIDNEY DISEASE, WITH LONG-TERM CURRENT USE OF INSULIN (HCC): ICD-10-CM

## 2022-01-01 DIAGNOSIS — R60.0 LOCALIZED EDEMA: ICD-10-CM

## 2022-01-01 LAB
-: ABNORMAL
ABO/RH: NORMAL
ABSOLUTE EOS #: 0 K/UL (ref 0–0.4)
ABSOLUTE EOS #: 0.03 K/UL (ref 0–0.44)
ABSOLUTE EOS #: 0.08 K/UL (ref 0–0.44)
ABSOLUTE EOS #: 0.17 K/UL (ref 0–0.44)
ABSOLUTE EOS #: <0.03 K/UL (ref 0–0.44)
ABSOLUTE EOS #: <0.03 K/UL (ref 0–0.44)
ABSOLUTE IMMATURE GRANULOCYTE: 0 K/UL (ref 0–0.3)
ABSOLUTE IMMATURE GRANULOCYTE: 0.04 K/UL (ref 0–0.3)
ABSOLUTE IMMATURE GRANULOCYTE: 0.05 K/UL (ref 0–0.3)
ABSOLUTE IMMATURE GRANULOCYTE: 0.05 K/UL (ref 0–0.3)
ABSOLUTE IMMATURE GRANULOCYTE: 0.11 K/UL (ref 0–0.3)
ABSOLUTE IMMATURE GRANULOCYTE: 0.15 K/UL (ref 0–0.3)
ABSOLUTE IMMATURE GRANULOCYTE: 0.49 K/UL (ref 0–0.3)
ABSOLUTE IMMATURE GRANULOCYTE: 0.6 K/UL (ref 0–0.3)
ABSOLUTE IMMATURE GRANULOCYTE: 0.65 K/UL (ref 0–0.3)
ABSOLUTE IMMATURE GRANULOCYTE: 3.18 K/UL (ref 0–0.3)
ABSOLUTE LYMPH #: 0.21 K/UL (ref 1–4.8)
ABSOLUTE LYMPH #: 0.43 K/UL (ref 1–4.8)
ABSOLUTE LYMPH #: 0.44 K/UL (ref 1–4.8)
ABSOLUTE LYMPH #: 0.73 K/UL (ref 1.1–3.7)
ABSOLUTE LYMPH #: 0.74 K/UL (ref 1–4.8)
ABSOLUTE LYMPH #: 0.78 K/UL (ref 1.1–3.7)
ABSOLUTE LYMPH #: 0.92 K/UL (ref 1–4.8)
ABSOLUTE LYMPH #: 1.01 K/UL (ref 1–4.8)
ABSOLUTE LYMPH #: 1.16 K/UL (ref 1–4.8)
ABSOLUTE LYMPH #: 1.28 K/UL (ref 1.1–3.7)
ABSOLUTE LYMPH #: 2.09 K/UL (ref 1.1–3.7)
ABSOLUTE LYMPH #: 2.48 K/UL (ref 1.1–3.7)
ABSOLUTE LYMPH #: 2.89 K/UL (ref 1–4.8)
ABSOLUTE MONO #: 0.51 K/UL (ref 0.1–1.2)
ABSOLUTE MONO #: 0.7 K/UL (ref 0.1–0.8)
ABSOLUTE MONO #: 0.7 K/UL (ref 0.1–1.2)
ABSOLUTE MONO #: 0.85 K/UL (ref 0.1–1.2)
ABSOLUTE MONO #: 0.99 K/UL (ref 0.1–1.2)
ABSOLUTE MONO #: 1.24 K/UL (ref 0.1–0.8)
ABSOLUTE MONO #: 1.24 K/UL (ref 0.1–0.8)
ABSOLUTE MONO #: 1.32 K/UL (ref 0.1–1.2)
ABSOLUTE MONO #: 1.41 K/UL (ref 0.1–0.8)
ABSOLUTE MONO #: 1.53 K/UL (ref 0.1–0.8)
ABSOLUTE MONO #: 1.73 K/UL (ref 0.1–0.8)
ABSOLUTE MONO #: 1.84 K/UL (ref 0.1–0.8)
ABSOLUTE MONO #: 2.17 K/UL (ref 0.1–0.8)
ALBUMIN SERPL-MCNC: 1.9 G/DL (ref 3.5–5.2)
ALBUMIN SERPL-MCNC: 2.2 G/DL (ref 3.5–5.2)
ALBUMIN SERPL-MCNC: 3.3 G/DL (ref 3.5–5.2)
ALBUMIN SERPL-MCNC: 4.1 G/DL (ref 3.5–5.2)
ALBUMIN SERPL-MCNC: 4.1 G/DL (ref 3.5–5.2)
ALBUMIN/GLOBULIN RATIO: 0.8 (ref 1–2.5)
ALBUMIN/GLOBULIN RATIO: 1 (ref 1–2.5)
ALBUMIN/GLOBULIN RATIO: 1.2 (ref 1–2.5)
ALBUMIN/GLOBULIN RATIO: 1.3 (ref 1–2.5)
ALBUMIN/GLOBULIN RATIO: 1.4 (ref 1–2.5)
ALLEN TEST: ABNORMAL
ALP BLD-CCNC: 115 U/L (ref 35–104)
ALP BLD-CCNC: 120 U/L (ref 35–104)
ALP BLD-CCNC: 141 U/L (ref 35–104)
ALP BLD-CCNC: 166 U/L (ref 35–104)
ALP BLD-CCNC: 86 U/L (ref 35–104)
ALT SERPL-CCNC: 113 U/L (ref 5–33)
ALT SERPL-CCNC: 118 U/L (ref 5–33)
ALT SERPL-CCNC: 13 U/L (ref 5–33)
ALT SERPL-CCNC: 22 U/L (ref 5–33)
ALT SERPL-CCNC: 296 U/L (ref 5–33)
AMORPHOUS: ABNORMAL
AMORPHOUS: ABNORMAL
AMYLASE: 26 U/L (ref 28–100)
AMYLASE: 34 U/L (ref 28–100)
ANION GAP SERPL CALCULATED.3IONS-SCNC: 11 MMOL/L (ref 9–17)
ANION GAP SERPL CALCULATED.3IONS-SCNC: 12 MMOL/L (ref 9–17)
ANION GAP SERPL CALCULATED.3IONS-SCNC: 12 MMOL/L (ref 9–17)
ANION GAP SERPL CALCULATED.3IONS-SCNC: 13 MMOL/L (ref 9–17)
ANION GAP SERPL CALCULATED.3IONS-SCNC: 14 MMOL/L (ref 9–17)
ANION GAP SERPL CALCULATED.3IONS-SCNC: 15 MMOL/L (ref 9–17)
ANION GAP SERPL CALCULATED.3IONS-SCNC: 7 MMOL/L (ref 9–17)
ANION GAP SERPL CALCULATED.3IONS-SCNC: 8 MMOL/L (ref 9–17)
ANION GAP SERPL CALCULATED.3IONS-SCNC: 9 MMOL/L (ref 9–17)
ANION GAP SERPL CALCULATED.3IONS-SCNC: 9 MMOL/L (ref 9–17)
ANION GAP SERPL CALCULATED.3IONS-SCNC: ABNORMAL MMOL/L (ref 9–17)
ANTIBODY SCREEN: NEGATIVE
ARM BAND NUMBER: NORMAL
AST SERPL-CCNC: 18 U/L
AST SERPL-CCNC: 26 U/L
AST SERPL-CCNC: 303 U/L
AST SERPL-CCNC: 36 U/L
AST SERPL-CCNC: 431 U/L
BACTERIA: ABNORMAL
BACTERIA: NORMAL
BASOPHILS # BLD: 0 % (ref 0–2)
BASOPHILS # BLD: 1 % (ref 0–2)
BASOPHILS ABSOLUTE: 0 K/UL (ref 0–0.2)
BASOPHILS ABSOLUTE: 0.03 K/UL (ref 0–0.2)
BASOPHILS ABSOLUTE: 0.04 K/UL (ref 0–0.2)
BASOPHILS ABSOLUTE: 0.08 K/UL (ref 0–0.2)
BASOPHILS ABSOLUTE: 0.1 K/UL (ref 0–0.2)
BASOPHILS ABSOLUTE: <0.03 K/UL (ref 0–0.2)
BILIRUB SERPL-MCNC: 0.61 MG/DL (ref 0.3–1.2)
BILIRUB SERPL-MCNC: 0.89 MG/DL (ref 0.3–1.2)
BILIRUB SERPL-MCNC: 1.09 MG/DL (ref 0.3–1.2)
BILIRUB SERPL-MCNC: 1.62 MG/DL (ref 0.3–1.2)
BILIRUB SERPL-MCNC: 3.72 MG/DL (ref 0.3–1.2)
BILIRUBIN DIRECT: 0.97 MG/DL
BILIRUBIN DIRECT: 1.11 MG/DL
BILIRUBIN DIRECT: 3.96 MG/DL
BILIRUBIN URINE: ABNORMAL
BILIRUBIN URINE: NEGATIVE
BILIRUBIN URINE: NEGATIVE
BILIRUBIN, INDIRECT: 0.12 MG/DL (ref 0–1)
BILIRUBIN, INDIRECT: 0.51 MG/DL (ref 0–1)
BILIRUBIN, INDIRECT: ABNORMAL MG/DL (ref 0–1)
BLD PROD TYP BPU: NORMAL
BLD PROD TYP BPU: NORMAL
BLOOD BANK BLOOD PRODUCT EXPIRATION DATE: NORMAL
BLOOD BANK ISBT PRODUCT BLOOD TYPE: 5100
BLOOD BANK PRODUCT CODE: NORMAL
BLOOD BANK UNIT TYPE AND RH: NORMAL
BPU ID: NORMAL
BPU ID: NORMAL
BUN BLDV-MCNC: 11 MG/DL (ref 8–23)
BUN BLDV-MCNC: 15 MG/DL (ref 8–23)
BUN BLDV-MCNC: 16 MG/DL (ref 8–23)
BUN BLDV-MCNC: 17 MG/DL (ref 8–23)
BUN BLDV-MCNC: 27 MG/DL (ref 8–23)
BUN BLDV-MCNC: 27 MG/DL (ref 8–23)
BUN BLDV-MCNC: 29 MG/DL (ref 8–23)
BUN BLDV-MCNC: 39 MG/DL (ref 8–23)
BUN BLDV-MCNC: 47 MG/DL (ref 8–23)
BUN BLDV-MCNC: 65 MG/DL (ref 8–23)
BUN BLDV-MCNC: 66 MG/DL (ref 8–23)
BUN BLDV-MCNC: 68 MG/DL (ref 8–23)
BUN BLDV-MCNC: 70 MG/DL (ref 8–23)
BUN BLDV-MCNC: 9 MG/DL (ref 8–23)
BUN/CREAT BLD: 12 (ref 9–20)
BUN/CREAT BLD: 13 (ref 9–20)
BUN/CREAT BLD: 14 (ref 9–20)
BUN/CREAT BLD: 16 (ref 9–20)
BUN/CREAT BLD: 24 (ref 9–20)
BUN/CREAT BLD: 9 (ref 9–20)
CALCIUM IONIZED: 1.03 MMOL/L (ref 1.13–1.33)
CALCIUM IONIZED: 1.07 MMOL/L (ref 1.13–1.33)
CALCIUM IONIZED: 1.1 MMOL/L (ref 1.13–1.33)
CALCIUM IONIZED: 1.11 MMOL/L (ref 1.13–1.33)
CALCIUM IONIZED: 1.12 MMOL/L (ref 1.13–1.33)
CALCIUM IONIZED: 1.14 MMOL/L (ref 1.13–1.33)
CALCIUM IONIZED: 1.15 MMOL/L (ref 1.13–1.33)
CALCIUM SERPL-MCNC: 7 MG/DL (ref 8.6–10.4)
CALCIUM SERPL-MCNC: 7.4 MG/DL (ref 8.6–10.4)
CALCIUM SERPL-MCNC: 7.5 MG/DL (ref 8.6–10.4)
CALCIUM SERPL-MCNC: 7.7 MG/DL (ref 8.6–10.4)
CALCIUM SERPL-MCNC: 7.8 MG/DL (ref 8.6–10.4)
CALCIUM SERPL-MCNC: 7.8 MG/DL (ref 8.6–10.4)
CALCIUM SERPL-MCNC: 7.9 MG/DL (ref 8.6–10.4)
CALCIUM SERPL-MCNC: 8 MG/DL (ref 8.6–10.4)
CALCIUM SERPL-MCNC: 8.1 MG/DL (ref 8.6–10.4)
CALCIUM SERPL-MCNC: 8.3 MG/DL (ref 8.6–10.4)
CALCIUM SERPL-MCNC: 8.8 MG/DL (ref 8.6–10.4)
CALCIUM SERPL-MCNC: 9.3 MG/DL (ref 8.6–10.4)
CALCIUM SERPL-MCNC: 9.3 MG/DL (ref 8.6–10.4)
CALCIUM SERPL-MCNC: 9.4 MG/DL (ref 8.6–10.4)
CALCIUM SERPL-MCNC: 9.7 MG/DL (ref 8.6–10.4)
CALCIUM SERPL-MCNC: ABNORMAL MG/DL (ref 8.6–10.4)
CARBOXYHEMOGLOBIN: 1.9 % (ref 0–5)
CASTS UA: ABNORMAL /LPF (ref 0–2)
CHLORIDE BLD-SCNC: 100 MMOL/L (ref 98–107)
CHLORIDE BLD-SCNC: 102 MMOL/L (ref 98–107)
CHLORIDE BLD-SCNC: 104 MMOL/L (ref 98–107)
CHLORIDE BLD-SCNC: 105 MMOL/L (ref 98–107)
CHLORIDE BLD-SCNC: 106 MMOL/L (ref 98–107)
CHLORIDE BLD-SCNC: 107 MMOL/L (ref 98–107)
CHLORIDE BLD-SCNC: 108 MMOL/L (ref 98–107)
CHLORIDE BLD-SCNC: 108 MMOL/L (ref 98–107)
CHLORIDE BLD-SCNC: 110 MMOL/L (ref 98–107)
CHLORIDE BLD-SCNC: 96 MMOL/L (ref 98–107)
CHLORIDE BLD-SCNC: 98 MMOL/L (ref 98–107)
CHLORIDE, WHOLE BLOOD: 113 MMOL/L (ref 98–110)
CO2: 15 MMOL/L (ref 20–31)
CO2: 16 MMOL/L (ref 20–31)
CO2: 17 MMOL/L (ref 20–31)
CO2: 17 MMOL/L (ref 20–31)
CO2: 19 MMOL/L (ref 20–31)
CO2: 21 MMOL/L (ref 20–31)
CO2: 25 MMOL/L (ref 20–31)
CO2: 25 MMOL/L (ref 20–31)
CO2: 26 MMOL/L (ref 20–31)
CO2: 29 MMOL/L (ref 20–31)
CO2: 31 MMOL/L (ref 20–31)
CO2: 31 MMOL/L (ref 20–31)
CO2: ABNORMAL MMOL/L (ref 20–31)
COLOR: ABNORMAL
CREAT SERPL-MCNC: 0.92 MG/DL (ref 0.5–0.9)
CREAT SERPL-MCNC: 0.95 MG/DL (ref 0.5–0.9)
CREAT SERPL-MCNC: 1.03 MG/DL (ref 0.5–0.9)
CREAT SERPL-MCNC: 1.05 MG/DL (ref 0.5–0.9)
CREAT SERPL-MCNC: 1.15 MG/DL (ref 0.5–0.9)
CREAT SERPL-MCNC: 1.16 MG/DL (ref 0.5–0.9)
CREAT SERPL-MCNC: 1.2 MG/DL (ref 0.5–0.9)
CREAT SERPL-MCNC: 1.21 MG/DL (ref 0.5–0.9)
CREAT SERPL-MCNC: 2.1 MG/DL (ref 0.5–0.9)
CREAT SERPL-MCNC: 2.66 MG/DL (ref 0.5–0.9)
CREAT SERPL-MCNC: 3.02 MG/DL (ref 0.5–0.9)
CREAT SERPL-MCNC: 3.13 MG/DL (ref 0.5–0.9)
CREAT SERPL-MCNC: 3.18 MG/DL (ref 0.5–0.9)
CREAT SERPL-MCNC: 3.19 MG/DL (ref 0.5–0.9)
CREAT SERPL-MCNC: 3.32 MG/DL (ref 0.5–0.9)
CREAT SERPL-MCNC: 3.42 MG/DL (ref 0.5–0.9)
CREATININE URINE: 47.2 MG/DL (ref 28–217)
CROSSMATCH RESULT: NORMAL
CULTURE: ABNORMAL
CULTURE: NO GROWTH
CULTURE: NORMAL
DIRECT EXAM: ABNORMAL
DISPENSE STATUS BLOOD BANK: NORMAL
DISPENSE STATUS BLOOD BANK: NORMAL
EKG ATRIAL RATE: 65 BPM
EKG ATRIAL RATE: 68 BPM
EKG ATRIAL RATE: 77 BPM
EKG P AXIS: -18 DEGREES
EKG P AXIS: 56 DEGREES
EKG P AXIS: 60 DEGREES
EKG P-R INTERVAL: 142 MS
EKG P-R INTERVAL: 148 MS
EKG P-R INTERVAL: 172 MS
EKG Q-T INTERVAL: 396 MS
EKG Q-T INTERVAL: 414 MS
EKG Q-T INTERVAL: 418 MS
EKG QRS DURATION: 80 MS
EKG QRS DURATION: 86 MS
EKG QRS DURATION: 90 MS
EKG QTC CALCULATION (BAZETT): 434 MS
EKG QTC CALCULATION (BAZETT): 440 MS
EKG QTC CALCULATION (BAZETT): 448 MS
EKG R AXIS: 21 DEGREES
EKG R AXIS: 42 DEGREES
EKG R AXIS: 45 DEGREES
EKG T AXIS: 125 DEGREES
EKG T AXIS: 66 DEGREES
EKG T AXIS: 90 DEGREES
EKG VENTRICULAR RATE: 65 BPM
EKG VENTRICULAR RATE: 68 BPM
EKG VENTRICULAR RATE: 77 BPM
EOSINOPHILS RELATIVE PERCENT: 0 % (ref 1–4)
EOSINOPHILS RELATIVE PERCENT: 1 % (ref 1–4)
EOSINOPHILS RELATIVE PERCENT: 2 % (ref 1–4)
EOSINOPHILS RELATIVE PERCENT: 2 % (ref 1–4)
EPITHELIAL CELLS UA: ABNORMAL /HPF (ref 0–5)
EPITHELIAL CELLS UA: ABNORMAL /HPF (ref 0–5)
EPITHELIAL CELLS UA: NORMAL /HPF (ref 0–5)
ESTIMATED AVERAGE GLUCOSE: 157 MG/DL
ESTIMATED AVERAGE GLUCOSE: 163 MG/DL
ESTIMATED AVERAGE GLUCOSE: 171 MG/DL
EXPIRATION DATE: NORMAL
FIBRINOGEN, FUNCTIONAL TEG: 51 MM (ref 15–32)
FIO2: 30
FIO2: 30
FIO2: 40
FIO2: 45
FIO2: ABNORMAL
GFR AFRICAN AMERICAN: 15 ML/MIN
GFR AFRICAN AMERICAN: 16 ML/MIN
GFR AFRICAN AMERICAN: 17 ML/MIN
GFR AFRICAN AMERICAN: 18 ML/MIN
GFR AFRICAN AMERICAN: 21 ML/MIN
GFR AFRICAN AMERICAN: 27 ML/MIN
GFR AFRICAN AMERICAN: 51 ML/MIN
GFR AFRICAN AMERICAN: 52 ML/MIN
GFR AFRICAN AMERICAN: 54 ML/MIN
GFR AFRICAN AMERICAN: 54 ML/MIN
GFR AFRICAN AMERICAN: >60 ML/MIN
GFR NON-AFRICAN AMERICAN: 13 ML/MIN
GFR NON-AFRICAN AMERICAN: 13 ML/MIN
GFR NON-AFRICAN AMERICAN: 14 ML/MIN
GFR NON-AFRICAN AMERICAN: 15 ML/MIN
GFR NON-AFRICAN AMERICAN: 17 ML/MIN
GFR NON-AFRICAN AMERICAN: 22 ML/MIN
GFR NON-AFRICAN AMERICAN: 42 ML/MIN
GFR NON-AFRICAN AMERICAN: 43 ML/MIN
GFR NON-AFRICAN AMERICAN: 44 ML/MIN
GFR NON-AFRICAN AMERICAN: 45 ML/MIN
GFR NON-AFRICAN AMERICAN: 50 ML/MIN
GFR NON-AFRICAN AMERICAN: 51 ML/MIN
GFR NON-AFRICAN AMERICAN: 56 ML/MIN
GFR NON-AFRICAN AMERICAN: 58 ML/MIN
GFR SERPL CREATININE-BSD FRML MDRD: ABNORMAL ML/MIN/{1.73_M2}
GLUCOSE BLD-MCNC: 103 MG/DL (ref 65–105)
GLUCOSE BLD-MCNC: 106 MG/DL (ref 70–99)
GLUCOSE BLD-MCNC: 107 MG/DL (ref 65–105)
GLUCOSE BLD-MCNC: 108 MG/DL (ref 65–105)
GLUCOSE BLD-MCNC: 117 MG/DL (ref 65–105)
GLUCOSE BLD-MCNC: 121 MG/DL (ref 65–105)
GLUCOSE BLD-MCNC: 124 MG/DL (ref 65–105)
GLUCOSE BLD-MCNC: 133 MG/DL (ref 65–105)
GLUCOSE BLD-MCNC: 133 MG/DL (ref 65–105)
GLUCOSE BLD-MCNC: 133 MG/DL (ref 70–99)
GLUCOSE BLD-MCNC: 134 MG/DL (ref 65–105)
GLUCOSE BLD-MCNC: 136 MG/DL (ref 65–105)
GLUCOSE BLD-MCNC: 138 MG/DL (ref 65–105)
GLUCOSE BLD-MCNC: 138 MG/DL (ref 70–99)
GLUCOSE BLD-MCNC: 139 MG/DL (ref 65–105)
GLUCOSE BLD-MCNC: 140 MG/DL (ref 65–105)
GLUCOSE BLD-MCNC: 140 MG/DL (ref 74–100)
GLUCOSE BLD-MCNC: 141 MG/DL (ref 65–105)
GLUCOSE BLD-MCNC: 141 MG/DL (ref 65–105)
GLUCOSE BLD-MCNC: 142 MG/DL (ref 65–105)
GLUCOSE BLD-MCNC: 143 MG/DL (ref 65–105)
GLUCOSE BLD-MCNC: 143 MG/DL (ref 65–105)
GLUCOSE BLD-MCNC: 144 MG/DL (ref 65–105)
GLUCOSE BLD-MCNC: 144 MG/DL (ref 70–99)
GLUCOSE BLD-MCNC: 147 MG/DL (ref 65–105)
GLUCOSE BLD-MCNC: 147 MG/DL (ref 65–105)
GLUCOSE BLD-MCNC: 147 MG/DL (ref 70–99)
GLUCOSE BLD-MCNC: 147 MG/DL (ref 74–100)
GLUCOSE BLD-MCNC: 148 MG/DL (ref 65–105)
GLUCOSE BLD-MCNC: 148 MG/DL (ref 65–105)
GLUCOSE BLD-MCNC: 150 MG/DL (ref 65–105)
GLUCOSE BLD-MCNC: 150 MG/DL (ref 70–99)
GLUCOSE BLD-MCNC: 151 MG/DL (ref 65–105)
GLUCOSE BLD-MCNC: 152 MG/DL (ref 74–100)
GLUCOSE BLD-MCNC: 154 MG/DL (ref 65–105)
GLUCOSE BLD-MCNC: 154 MG/DL (ref 65–105)
GLUCOSE BLD-MCNC: 158 MG/DL (ref 65–105)
GLUCOSE BLD-MCNC: 159 MG/DL (ref 74–100)
GLUCOSE BLD-MCNC: 160 MG/DL (ref 70–99)
GLUCOSE BLD-MCNC: 161 MG/DL (ref 65–105)
GLUCOSE BLD-MCNC: 161 MG/DL (ref 70–99)
GLUCOSE BLD-MCNC: 163 MG/DL (ref 65–105)
GLUCOSE BLD-MCNC: 164 MG/DL (ref 65–105)
GLUCOSE BLD-MCNC: 164 MG/DL (ref 65–105)
GLUCOSE BLD-MCNC: 166 MG/DL (ref 65–105)
GLUCOSE BLD-MCNC: 167 MG/DL (ref 65–105)
GLUCOSE BLD-MCNC: 168 MG/DL (ref 65–105)
GLUCOSE BLD-MCNC: 171 MG/DL (ref 65–105)
GLUCOSE BLD-MCNC: 172 MG/DL (ref 65–105)
GLUCOSE BLD-MCNC: 175 MG/DL (ref 65–105)
GLUCOSE BLD-MCNC: 177 MG/DL (ref 65–105)
GLUCOSE BLD-MCNC: 177 MG/DL (ref 70–99)
GLUCOSE BLD-MCNC: 180 MG/DL (ref 65–105)
GLUCOSE BLD-MCNC: 180 MG/DL (ref 74–100)
GLUCOSE BLD-MCNC: 181 MG/DL (ref 65–105)
GLUCOSE BLD-MCNC: 182 MG/DL (ref 65–105)
GLUCOSE BLD-MCNC: 183 MG/DL (ref 65–105)
GLUCOSE BLD-MCNC: 190 MG/DL (ref 65–105)
GLUCOSE BLD-MCNC: 192 MG/DL (ref 65–105)
GLUCOSE BLD-MCNC: 199 MG/DL (ref 65–105)
GLUCOSE BLD-MCNC: 204 MG/DL (ref 65–105)
GLUCOSE BLD-MCNC: 206 MG/DL (ref 65–105)
GLUCOSE BLD-MCNC: 207 MG/DL (ref 65–105)
GLUCOSE BLD-MCNC: 215 MG/DL (ref 65–105)
GLUCOSE BLD-MCNC: 216 MG/DL (ref 65–105)
GLUCOSE BLD-MCNC: 218 MG/DL (ref 70–99)
GLUCOSE BLD-MCNC: 219 MG/DL (ref 65–105)
GLUCOSE BLD-MCNC: 219 MG/DL (ref 74–100)
GLUCOSE BLD-MCNC: 224 MG/DL (ref 65–105)
GLUCOSE BLD-MCNC: 226 MG/DL (ref 65–105)
GLUCOSE BLD-MCNC: 231 MG/DL (ref 65–105)
GLUCOSE BLD-MCNC: 235 MG/DL (ref 65–105)
GLUCOSE BLD-MCNC: 235 MG/DL (ref 70–99)
GLUCOSE BLD-MCNC: 238 MG/DL (ref 65–105)
GLUCOSE BLD-MCNC: 239 MG/DL (ref 74–100)
GLUCOSE BLD-MCNC: 243 MG/DL (ref 65–105)
GLUCOSE BLD-MCNC: 244 MG/DL (ref 65–105)
GLUCOSE BLD-MCNC: 246 MG/DL (ref 65–105)
GLUCOSE BLD-MCNC: 252 MG/DL (ref 70–99)
GLUCOSE BLD-MCNC: 253 MG/DL (ref 65–105)
GLUCOSE BLD-MCNC: 253 MG/DL (ref 65–105)
GLUCOSE BLD-MCNC: 254 MG/DL (ref 65–105)
GLUCOSE BLD-MCNC: 259 MG/DL (ref 65–105)
GLUCOSE BLD-MCNC: 262 MG/DL (ref 65–105)
GLUCOSE BLD-MCNC: 263 MG/DL (ref 74–100)
GLUCOSE BLD-MCNC: 264 MG/DL (ref 65–105)
GLUCOSE BLD-MCNC: 272 MG/DL (ref 70–99)
GLUCOSE BLD-MCNC: 281 MG/DL (ref 70–99)
GLUCOSE BLD-MCNC: 283 MG/DL (ref 65–105)
GLUCOSE BLD-MCNC: 288 MG/DL (ref 70–99)
GLUCOSE BLD-MCNC: 309 MG/DL (ref 74–100)
GLUCOSE BLD-MCNC: 313 MG/DL (ref 65–105)
GLUCOSE BLD-MCNC: 63 MG/DL (ref 65–105)
GLUCOSE BLD-MCNC: 83 MG/DL (ref 65–105)
GLUCOSE BLD-MCNC: 90 MG/DL (ref 70–99)
GLUCOSE URINE: NEGATIVE
HBA1C MFR BLD: 7.1 % (ref 4–6)
HBA1C MFR BLD: 7.3 % (ref 4–6)
HBA1C MFR BLD: 7.6 % (ref 4–6)
HCO3 ARTERIAL: 18.5 MMOL/L (ref 22–27)
HCO3 VENOUS: 17.3 MMOL/L (ref 22–29)
HCT VFR BLD CALC: 22.4 % (ref 36.3–47.1)
HCT VFR BLD CALC: 23.7 % (ref 36.3–47.1)
HCT VFR BLD CALC: 24 % (ref 36.3–47.1)
HCT VFR BLD CALC: 24.7 % (ref 36.3–47.1)
HCT VFR BLD CALC: 26.1 % (ref 36.3–47.1)
HCT VFR BLD CALC: 26.3 % (ref 36.3–47.1)
HCT VFR BLD CALC: 27.8 % (ref 36.3–47.1)
HCT VFR BLD CALC: 29.1 % (ref 36.3–47.1)
HCT VFR BLD CALC: 31 % (ref 36.3–47.1)
HCT VFR BLD CALC: 34.8 % (ref 36.3–47.1)
HCT VFR BLD CALC: 35.5 % (ref 36.3–47.1)
HCT VFR BLD CALC: 37.7 % (ref 36.3–47.1)
HCT VFR BLD CALC: 37.7 % (ref 36.3–47.1)
HCT VFR BLD CALC: 39.8 % (ref 36.3–47.1)
HCT VFR BLD CALC: 45.5 % (ref 36.3–47.1)
HEMOGLOBIN: 10.6 G/DL (ref 11.9–15.1)
HEMOGLOBIN: 10.6 G/DL (ref 11.9–15.1)
HEMOGLOBIN: 11.2 G/DL (ref 11.9–15.1)
HEMOGLOBIN: 12.2 G/DL (ref 11.9–15.1)
HEMOGLOBIN: 12.3 G/DL (ref 11.9–15.1)
HEMOGLOBIN: 14.5 G/DL (ref 11.9–15.1)
HEMOGLOBIN: 7 G/DL (ref 11.9–15.1)
HEMOGLOBIN: 7.1 G/DL (ref 11.9–15.1)
HEMOGLOBIN: 7.3 G/DL (ref 11.9–15.1)
HEMOGLOBIN: 7.3 G/DL (ref 11.9–15.1)
HEMOGLOBIN: 8 G/DL (ref 11.9–15.1)
HEMOGLOBIN: 8.4 G/DL (ref 11.9–15.1)
HEMOGLOBIN: 8.5 G/DL (ref 11.9–15.1)
HEMOGLOBIN: 9.4 GM/DL (ref 11.9–15.1)
HEMOGLOBIN: 9.5 G/DL (ref 11.9–15.1)
IMMATURE GRANULOCYTES: 0 %
IMMATURE GRANULOCYTES: 1 %
IMMATURE GRANULOCYTES: 11 %
IMMATURE GRANULOCYTES: 2 %
IMMATURE GRANULOCYTES: 3 %
IMMATURE GRANULOCYTES: 3 %
INR BLD: 1
INR BLD: 1.1
KETONES, URINE: NEGATIVE
LACTIC ACID, SEPSIS: 1.9 MMOL/L (ref 0.5–1.9)
LACTIC ACID, SEPSIS: 2 MMOL/L (ref 0.5–1.9)
LACTIC ACID, WHOLE BLOOD: 2 MMOL/L (ref 0.7–2.1)
LACTIC ACID: 1.7 MMOL/L (ref 0.5–2.2)
LEUKOCYTE ESTERASE, URINE: ABNORMAL
LEUKOCYTE ESTERASE, URINE: NEGATIVE
LEUKOCYTE ESTERASE, URINE: NEGATIVE
LIPASE: 13 U/L (ref 13–60)
LIPASE: 13 U/L (ref 13–60)
LIPASE: 21 U/L (ref 13–60)
LV EF: 65 %
LVEF MODALITY: NORMAL
LY30 (LYSIS) TEG: 0 % (ref 0–2.6)
LYMPHOCYTES # BLD: 1 % (ref 24–44)
LYMPHOCYTES # BLD: 10 % (ref 24–44)
LYMPHOCYTES # BLD: 2 % (ref 24–44)
LYMPHOCYTES # BLD: 2 % (ref 24–44)
LYMPHOCYTES # BLD: 20 % (ref 24–43)
LYMPHOCYTES # BLD: 24 % (ref 24–43)
LYMPHOCYTES # BLD: 3 % (ref 24–43)
LYMPHOCYTES # BLD: 3 % (ref 24–44)
LYMPHOCYTES # BLD: 39 % (ref 24–43)
LYMPHOCYTES # BLD: 4 % (ref 24–44)
LYMPHOCYTES # BLD: 5 % (ref 24–44)
LYMPHOCYTES # BLD: 5 % (ref 24–44)
LYMPHOCYTES # BLD: 7 % (ref 24–43)
MA(MAX CLOT) RAPID TEG: 71.9 MM (ref 52–70)
MAGNESIUM: 1.7 MG/DL (ref 1.6–2.6)
MAGNESIUM: 1.8 MG/DL (ref 1.6–2.6)
MAGNESIUM: 2 MG/DL (ref 1.6–2.6)
MAGNESIUM: 2.1 MG/DL (ref 1.6–2.6)
MAGNESIUM: 2.2 MG/DL (ref 1.6–2.6)
MCH RBC QN AUTO: 25.4 PG (ref 25.2–33.5)
MCH RBC QN AUTO: 25.4 PG (ref 25.2–33.5)
MCH RBC QN AUTO: 25.8 PG (ref 25.2–33.5)
MCH RBC QN AUTO: 25.9 PG (ref 25.2–33.5)
MCH RBC QN AUTO: 26 PG (ref 25.2–33.5)
MCH RBC QN AUTO: 26.1 PG (ref 25.2–33.5)
MCH RBC QN AUTO: 26.2 PG (ref 25.2–33.5)
MCH RBC QN AUTO: 26.4 PG (ref 25.2–33.5)
MCH RBC QN AUTO: 26.5 PG (ref 25.2–33.5)
MCH RBC QN AUTO: 26.5 PG (ref 25.2–33.5)
MCHC RBC AUTO-ENTMCNC: 29.2 G/DL (ref 28.4–34.8)
MCHC RBC AUTO-ENTMCNC: 29.7 G/DL (ref 25.2–33.5)
MCHC RBC AUTO-ENTMCNC: 29.9 G/DL (ref 25.2–33.5)
MCHC RBC AUTO-ENTMCNC: 30.2 G/DL (ref 28.4–34.8)
MCHC RBC AUTO-ENTMCNC: 30.5 G/DL (ref 25.2–33.5)
MCHC RBC AUTO-ENTMCNC: 30.6 G/DL (ref 28.4–34.8)
MCHC RBC AUTO-ENTMCNC: 30.7 G/DL (ref 25.2–33.5)
MCHC RBC AUTO-ENTMCNC: 30.7 G/DL (ref 28.4–34.8)
MCHC RBC AUTO-ENTMCNC: 30.8 G/DL (ref 28.4–34.8)
MCHC RBC AUTO-ENTMCNC: 31.7 G/DL (ref 28.4–34.8)
MCHC RBC AUTO-ENTMCNC: 31.9 G/DL (ref 25.2–33.5)
MCHC RBC AUTO-ENTMCNC: 32.3 G/DL (ref 28.4–34.8)
MCHC RBC AUTO-ENTMCNC: 32.6 G/DL (ref 28.4–34.8)
MCV RBC AUTO: 80.2 FL (ref 82.6–102.9)
MCV RBC AUTO: 81.5 FL (ref 82.6–102.9)
MCV RBC AUTO: 81.9 FL (ref 82.6–102.9)
MCV RBC AUTO: 83.6 FL (ref 82.6–102.9)
MCV RBC AUTO: 83.7 FL (ref 82.6–102.9)
MCV RBC AUTO: 84 FL (ref 82.6–102.9)
MCV RBC AUTO: 84.7 FL (ref 82.6–102.9)
MCV RBC AUTO: 84.7 FL (ref 82.6–102.9)
MCV RBC AUTO: 85.2 FL (ref 82.6–102.9)
MCV RBC AUTO: 86.6 FL (ref 82.6–102.9)
MCV RBC AUTO: 87 FL (ref 82.6–102.9)
MCV RBC AUTO: 87.2 FL (ref 82.6–102.9)
MCV RBC AUTO: 87.3 FL (ref 82.6–102.9)
MODE: ABNORMAL
MONOCYTES # BLD: 10 % (ref 1–7)
MONOCYTES # BLD: 10 % (ref 3–12)
MONOCYTES # BLD: 11 % (ref 3–12)
MONOCYTES # BLD: 12 % (ref 3–12)
MONOCYTES # BLD: 3 % (ref 1–7)
MONOCYTES # BLD: 5 % (ref 1–7)
MONOCYTES # BLD: 5 % (ref 3–12)
MONOCYTES # BLD: 6 % (ref 1–7)
MONOCYTES # BLD: 6 % (ref 1–7)
MONOCYTES # BLD: 7 % (ref 1–7)
MONOCYTES # BLD: 7 % (ref 1–7)
MONOCYTES # BLD: 8 % (ref 1–7)
MONOCYTES # BLD: 8 % (ref 3–12)
MORPHOLOGY: ABNORMAL
MRSA, DNA, NASAL: NEGATIVE
MYOGLOBIN: 133 NG/ML (ref 25–58)
NEGATIVE BASE EXCESS, ART: 11 (ref 0–2)
NEGATIVE BASE EXCESS, ART: 5.4 MMOL/L (ref 0–2)
NEGATIVE BASE EXCESS, ART: 6 (ref 0–2)
NEGATIVE BASE EXCESS, ART: 7 (ref 0–2)
NEGATIVE BASE EXCESS, ART: 8 (ref 0–2)
NEGATIVE BASE EXCESS, ART: 9 (ref 0–2)
NEGATIVE BASE EXCESS, VEN: 9 (ref 0–2)
NITRITE, URINE: NEGATIVE
NRBC AUTOMATED: 0 PER 100 WBC
NRBC AUTOMATED: 0.2 PER 100 WBC
NRBC AUTOMATED: 0.9 PER 100 WBC
NRBC AUTOMATED: 1.8 PER 100 WBC
NRBC AUTOMATED: 3.2 PER 100 WBC
NRBC AUTOMATED: 3.2 PER 100 WBC
NRBC AUTOMATED: 4 PER 100 WBC
NUCLEATED RED BLOOD CELLS: 3 PER 100 WBC
NUCLEATED RED BLOOD CELLS: 3 PER 100 WBC
NUCLEATED RED BLOOD CELLS: 5 PER 100 WBC
NUCLEATED RED BLOOD CELLS: 5 PER 100 WBC
O2 DEVICE/FLOW/%: ABNORMAL
O2 SAT, ARTERIAL: 99.8 % (ref 94–100)
O2 SAT, VEN: 94 % (ref 60–85)
PATIENT TEMP: 34.4
PATIENT TEMP: 37
PCO2 ARTERIAL: 32.1 MMHG (ref 32–45)
PCO2, VEN: 37 MM HG (ref 41–51)
PDW BLD-RTO: 17 % (ref 11.8–14.4)
PDW BLD-RTO: 17 % (ref 11.8–14.4)
PDW BLD-RTO: 17.2 % (ref 11.8–14.4)
PDW BLD-RTO: 17.3 % (ref 11.8–14.4)
PDW BLD-RTO: 17.4 % (ref 11.8–14.4)
PDW BLD-RTO: 17.5 % (ref 11.8–14.4)
PDW BLD-RTO: 17.5 % (ref 11.8–14.4)
PDW BLD-RTO: 17.6 % (ref 11.8–14.4)
PDW BLD-RTO: 17.8 % (ref 11.8–14.4)
PDW BLD-RTO: 17.8 % (ref 11.8–14.4)
PDW BLD-RTO: 18.1 % (ref 11.8–14.4)
PH ARTERIAL: 7.38 (ref 7.35–7.45)
PH UA: 5 (ref 5–6)
PH UA: 5 (ref 5–8)
PH UA: 6 (ref 5–6)
PH VENOUS: 7.28 (ref 7.32–7.43)
PHOSPHORUS: 5.3 MG/DL (ref 2.6–4.5)
PHOSPHORUS: 5.4 MG/DL (ref 2.6–4.5)
PHOSPHORUS: 5.7 MG/DL (ref 2.6–4.5)
PHOSPHORUS: 5.8 MG/DL (ref 2.6–4.5)
PHOSPHORUS: 6 MG/DL (ref 2.6–4.5)
PHOSPHORUS: 6.1 MG/DL (ref 2.6–4.5)
PHOSPHORUS: 6.5 MG/DL (ref 2.6–4.5)
PLATELET # BLD: 188 K/UL (ref 138–453)
PLATELET # BLD: ABNORMAL K/UL (ref 138–453)
PLATELET, FLUORESCENCE: 118 K/UL (ref 138–453)
PLATELET, FLUORESCENCE: 120 K/UL (ref 138–453)
PLATELET, FLUORESCENCE: 125 K/UL (ref 138–453)
PLATELET, FLUORESCENCE: 132 K/UL (ref 138–453)
PLATELET, FLUORESCENCE: 146 K/UL (ref 138–453)
PLATELET, FLUORESCENCE: 148 K/UL (ref 138–453)
PLATELET, FLUORESCENCE: 155 K/UL (ref 138–453)
PLATELET, FLUORESCENCE: 169 K/UL (ref 138–453)
PLATELET, FLUORESCENCE: 230 K/UL (ref 138–453)
PLATELET, FLUORESCENCE: 237 K/UL (ref 138–453)
PLATELET, IMMATURE FRACTION: 17.8 % (ref 1.1–10.3)
PLATELET, IMMATURE FRACTION: 20.6 % (ref 1.1–10.3)
PLATELET, IMMATURE FRACTION: 20.8 % (ref 1.1–10.3)
PLATELET, IMMATURE FRACTION: 21.6 % (ref 1.1–10.3)
PLATELET, IMMATURE FRACTION: 21.6 % (ref 1.1–10.3)
PLATELET, IMMATURE FRACTION: 21.7 % (ref 1.1–10.3)
PLATELET, IMMATURE FRACTION: 22 % (ref 1.1–10.3)
PLATELET, IMMATURE FRACTION: 24 % (ref 1.1–10.3)
PLATELET, IMMATURE FRACTION: 24.3 % (ref 1.1–10.3)
PLATELET, IMMATURE FRACTION: 24.3 % (ref 1.1–10.3)
PLATELET, IMMATURE FRACTION: 24.8 % (ref 1.1–10.3)
PLATELET, IMMATURE FRACTION: 26 % (ref 1.1–10.3)
PMV BLD AUTO: ABNORMAL FL (ref 8.1–13.5)
PMV BLD AUTO: ABNORMAL FL (ref 8.1–13.5)
PO2 ARTERIAL: 264 MMHG (ref 75–95)
PO2, VEN: 80.8 MM HG (ref 30–50)
POC HCO3: 16.8 MMOL/L (ref 21–28)
POC HCO3: 17 MMOL/L (ref 21–28)
POC HCO3: 17 MMOL/L (ref 21–28)
POC HCO3: 17.3 MMOL/L (ref 21–28)
POC HCO3: 17.6 MMOL/L (ref 21–28)
POC HCO3: 17.8 MMOL/L (ref 21–28)
POC HCO3: 18.2 MMOL/L (ref 21–28)
POC HCO3: 18.3 MMOL/L (ref 21–28)
POC HCO3: 18.8 MMOL/L (ref 21–28)
POC HCO3: 19.1 MMOL/L (ref 21–28)
POC HCO3: 19.2 MMOL/L (ref 21–28)
POC HCO3: 21.5 MMOL/L (ref 21–28)
POC LACTIC ACID: 0.9 MMOL/L (ref 0.56–1.39)
POC LACTIC ACID: 0.9 MMOL/L (ref 0.56–1.39)
POC LACTIC ACID: 0.94 MMOL/L (ref 0.56–1.39)
POC LACTIC ACID: 0.95 MMOL/L (ref 0.56–1.39)
POC LACTIC ACID: 0.96 MMOL/L (ref 0.56–1.39)
POC LACTIC ACID: 0.99 MMOL/L (ref 0.56–1.39)
POC LACTIC ACID: 1.07 MMOL/L (ref 0.56–1.39)
POC LACTIC ACID: 1.19 MMOL/L (ref 0.56–1.39)
POC LACTIC ACID: 1.51 MMOL/L (ref 0.56–1.39)
POC LACTIC ACID: 1.54 MMOL/L (ref 0.56–1.39)
POC LACTIC ACID: 1.55 MMOL/L (ref 0.56–1.39)
POC LACTIC ACID: 2.05 MMOL/L (ref 0.56–1.39)
POC LACTIC ACID: 3.24 MMOL/L (ref 0.56–1.39)
POC O2 SATURATION: 95 % (ref 94–98)
POC O2 SATURATION: 96 % (ref 94–98)
POC O2 SATURATION: 97 % (ref 94–98)
POC O2 SATURATION: 97 % (ref 94–98)
POC O2 SATURATION: 98 % (ref 94–98)
POC O2 SATURATION: 99 % (ref 94–98)
POC PCO2 TEMP: 31 MM HG
POC PCO2: 32.6 MM HG (ref 35–48)
POC PCO2: 33.7 MM HG (ref 35–48)
POC PCO2: 34.3 MM HG (ref 35–48)
POC PCO2: 36.1 MM HG (ref 35–48)
POC PCO2: 36.9 MM HG (ref 35–48)
POC PCO2: 37.1 MM HG (ref 35–48)
POC PCO2: 37.1 MM HG (ref 35–48)
POC PCO2: 38 MM HG (ref 35–48)
POC PCO2: 44 MM HG (ref 35–48)
POC PCO2: 45.4 MM HG (ref 35–48)
POC PCO2: 56.3 MM HG (ref 35–48)
POC PCO2: 70.7 MM HG (ref 35–48)
POC PH TEMP: 7.34
POC PH: 7.09 (ref 7.35–7.45)
POC PH: 7.12 (ref 7.35–7.45)
POC PH: 7.22 (ref 7.35–7.45)
POC PH: 7.25 (ref 7.35–7.45)
POC PH: 7.28 (ref 7.35–7.45)
POC PH: 7.29 (ref 7.35–7.45)
POC PH: 7.3 (ref 7.35–7.45)
POC PH: 7.31 (ref 7.35–7.45)
POC PH: 7.31 (ref 7.35–7.45)
POC PH: 7.33 (ref 7.35–7.45)
POC PO2 TEMP: 95 MM HG
POC PO2: 109.6 MM HG (ref 83–108)
POC PO2: 111.6 MM HG (ref 83–108)
POC PO2: 115.3 MM HG (ref 83–108)
POC PO2: 119 MM HG (ref 83–108)
POC PO2: 137.9 MM HG (ref 83–108)
POC PO2: 139.6 MM HG (ref 83–108)
POC PO2: 159.9 MM HG (ref 83–108)
POC PO2: 168.4 MM HG (ref 83–108)
POC PO2: 92 MM HG (ref 83–108)
POC PO2: 94.2 MM HG (ref 83–108)
POC PO2: 96.6 MM HG (ref 83–108)
POC PO2: 98 MM HG (ref 83–108)
POTASSIUM SERPL-SCNC: 3.8 MMOL/L (ref 3.7–5.3)
POTASSIUM SERPL-SCNC: 3.9 MMOL/L (ref 3.7–5.3)
POTASSIUM SERPL-SCNC: 4.1 MMOL/L (ref 3.7–5.3)
POTASSIUM SERPL-SCNC: 4.1 MMOL/L (ref 3.7–5.3)
POTASSIUM SERPL-SCNC: 4.3 MMOL/L (ref 3.7–5.3)
POTASSIUM SERPL-SCNC: 4.3 MMOL/L (ref 3.7–5.3)
POTASSIUM SERPL-SCNC: 4.5 MMOL/L (ref 3.7–5.3)
POTASSIUM SERPL-SCNC: 4.6 MMOL/L (ref 3.7–5.3)
POTASSIUM SERPL-SCNC: 4.7 MMOL/L (ref 3.7–5.3)
POTASSIUM SERPL-SCNC: 4.8 MMOL/L (ref 3.7–5.3)
POTASSIUM SERPL-SCNC: 4.8 MMOL/L (ref 3.7–5.3)
POTASSIUM SERPL-SCNC: 4.9 MMOL/L (ref 3.7–5.3)
POTASSIUM SERPL-SCNC: 5 MMOL/L (ref 3.7–5.3)
POTASSIUM SERPL-SCNC: 5.1 MMOL/L (ref 3.7–5.3)
POTASSIUM SERPL-SCNC: 5.1 MMOL/L (ref 3.7–5.3)
POTASSIUM SERPL-SCNC: 5.3 MMOL/L (ref 3.7–5.3)
POTASSIUM, WHOLE BLOOD: 3.9 MMOL/L (ref 3.6–5)
PROTEIN UA: ABNORMAL
PROTEIN UA: NEGATIVE
PROTEIN UA: NEGATIVE
PROTHROMBIN TIME: 11 SEC (ref 9.1–12.3)
PROTHROMBIN TIME: 11.9 SEC (ref 9.1–12.3)
RBC # BLD: 2.68 M/UL (ref 3.95–5.11)
RBC # BLD: 2.76 M/UL (ref 3.95–5.11)
RBC # BLD: 2.83 M/UL (ref 3.95–5.11)
RBC # BLD: 3.08 M/UL (ref 3.95–5.11)
RBC # BLD: 3.21 M/UL (ref 3.95–5.11)
RBC # BLD: 3.31 M/UL (ref 3.95–5.11)
RBC # BLD: 3.64 M/UL (ref 3.95–5.11)
RBC # BLD: 4.02 M/UL (ref 3.95–5.11)
RBC # BLD: 4.07 M/UL (ref 3.95–5.11)
RBC # BLD: 4.32 M/UL (ref 3.95–5.11)
RBC # BLD: 4.7 M/UL (ref 3.95–5.11)
RBC # BLD: 4.7 M/UL (ref 3.95–5.11)
RBC # BLD: 5.58 M/UL (ref 3.95–5.11)
RBC UA: ABNORMAL /HPF (ref 0–4)
RBC UA: ABNORMAL /HPF (ref 0–4)
RBC UA: NORMAL /HPF (ref 0–4)
REACTION TIME TEG: 6.7 MIN (ref 4.6–9.1)
REASON FOR REJECTION: NORMAL
SAMPLE SITE: ABNORMAL
SARS-COV-2, RAPID: NOT DETECTED
SEG NEUTROPHILS: 49 % (ref 36–65)
SEG NEUTROPHILS: 65 % (ref 36–65)
SEG NEUTROPHILS: 68 % (ref 36–65)
SEG NEUTROPHILS: 73 % (ref 36–66)
SEG NEUTROPHILS: 79 % (ref 36–65)
SEG NEUTROPHILS: 85 % (ref 36–66)
SEG NEUTROPHILS: 85 % (ref 36–66)
SEG NEUTROPHILS: 88 % (ref 36–66)
SEG NEUTROPHILS: 90 % (ref 36–66)
SEG NEUTROPHILS: 91 % (ref 36–65)
SEG NEUTROPHILS: 91 % (ref 36–66)
SEG NEUTROPHILS: 92 % (ref 36–66)
SEG NEUTROPHILS: 93 % (ref 36–66)
SEGMENTED NEUTROPHILS ABSOLUTE COUNT: 17.08 K/UL (ref 1.8–7.7)
SEGMENTED NEUTROPHILS ABSOLUTE COUNT: 18.45 K/UL (ref 1.8–7.7)
SEGMENTED NEUTROPHILS ABSOLUTE COUNT: 19.15 K/UL (ref 1.8–7.7)
SEGMENTED NEUTROPHILS ABSOLUTE COUNT: 19.4 K/UL (ref 1.5–8.1)
SEGMENTED NEUTROPHILS ABSOLUTE COUNT: 19.93 K/UL (ref 1.8–7.7)
SEGMENTED NEUTROPHILS ABSOLUTE COUNT: 2.59 K/UL (ref 1.5–8.1)
SEGMENTED NEUTROPHILS ABSOLUTE COUNT: 20.24 K/UL (ref 1.8–7.7)
SEGMENTED NEUTROPHILS ABSOLUTE COUNT: 21.1 K/UL (ref 1.8–7.7)
SEGMENTED NEUTROPHILS ABSOLUTE COUNT: 21.34 K/UL (ref 1.8–7.7)
SEGMENTED NEUTROPHILS ABSOLUTE COUNT: 22.23 K/UL (ref 1.8–7.7)
SEGMENTED NEUTROPHILS ABSOLUTE COUNT: 4.44 K/UL (ref 1.5–8.1)
SEGMENTED NEUTROPHILS ABSOLUTE COUNT: 6.82 K/UL (ref 1.5–8.1)
SEGMENTED NEUTROPHILS ABSOLUTE COUNT: 8.63 K/UL (ref 1.5–8.1)
SODIUM BLD-SCNC: 135 MMOL/L (ref 135–144)
SODIUM BLD-SCNC: 135 MMOL/L (ref 135–144)
SODIUM BLD-SCNC: 136 MMOL/L (ref 135–144)
SODIUM BLD-SCNC: 137 MMOL/L (ref 135–144)
SODIUM BLD-SCNC: 138 MMOL/L (ref 135–144)
SODIUM BLD-SCNC: 138 MMOL/L (ref 135–144)
SODIUM BLD-SCNC: 141 MMOL/L (ref 135–144)
SODIUM BLD-SCNC: 142 MMOL/L (ref 135–144)
SODIUM BLD-SCNC: 143 MMOL/L (ref 135–144)
SODIUM, WHOLE BLOOD: 139 MMOL/L (ref 136–145)
SODIUM,UR: 81 MMOL/L
SPECIFIC GRAVITY UA: 1.01 (ref 1.01–1.02)
SPECIFIC GRAVITY UA: 1.02 (ref 1.01–1.02)
SPECIFIC GRAVITY UA: 1.02 (ref 1–1.03)
SPECIMEN DESCRIPTION: ABNORMAL
SPECIMEN DESCRIPTION: NORMAL
SURGICAL PATHOLOGY REPORT: NORMAL
TOTAL PROTEIN: 4.3 G/DL (ref 6.4–8.3)
TOTAL PROTEIN: 4.3 G/DL (ref 6.4–8.3)
TOTAL PROTEIN: 5.8 G/DL (ref 6.4–8.3)
TOTAL PROTEIN: 7.1 G/DL (ref 6.4–8.3)
TOTAL PROTEIN: 7.4 G/DL (ref 6.4–8.3)
TRANSFUSION STATUS: NORMAL
TRANSFUSION STATUS: NORMAL
TROPONIN, HIGH SENSITIVITY: 16 NG/L (ref 0–14)
TROPONIN, HIGH SENSITIVITY: 18 NG/L (ref 0–14)
TROPONIN, HIGH SENSITIVITY: 21 NG/L (ref 0–14)
TROPONIN, HIGH SENSITIVITY: 22 NG/L (ref 0–14)
TROPONIN, HIGH SENSITIVITY: 23 NG/L (ref 0–14)
TROPONIN, HIGH SENSITIVITY: 24 NG/L (ref 0–14)
TROPONIN, HIGH SENSITIVITY: 24 NG/L (ref 0–14)
TROPONIN, HIGH SENSITIVITY: 25 NG/L (ref 0–14)
TROPONIN, HIGH SENSITIVITY: 27 NG/L (ref 0–14)
TROPONIN, HIGH SENSITIVITY: 28 NG/L (ref 0–14)
TROPONIN, HIGH SENSITIVITY: 28 NG/L (ref 0–14)
TURBIDITY: ABNORMAL
UNIT DIVISION: 0
UNIT DIVISION: 0
UNIT ISSUE DATE/TIME: NORMAL
URINE HGB: ABNORMAL
URINE HGB: ABNORMAL
URINE HGB: NEGATIVE
UROBILINOGEN, URINE: NORMAL
WBC # BLD: 10.5 K/UL (ref 3.5–11.3)
WBC # BLD: 10.9 K/UL (ref 3.5–11.3)
WBC # BLD: 20.1 K/UL (ref 3.5–11.3)
WBC # BLD: 20.6 K/UL (ref 3.5–11.3)
WBC # BLD: 21.3 K/UL (ref 3.5–11.3)
WBC # BLD: 21.7 K/UL (ref 3.5–11.3)
WBC # BLD: 21.9 K/UL (ref 3.5–11.3)
WBC # BLD: 23 K/UL (ref 3.5–11.3)
WBC # BLD: 23.2 K/UL (ref 3.5–11.3)
WBC # BLD: 24.7 K/UL (ref 3.5–11.3)
WBC # BLD: 28.9 K/UL (ref 3.5–11.3)
WBC # BLD: 5.3 K/UL (ref 3.5–11.3)
WBC # BLD: 6.5 K/UL (ref 3.5–11.3)
WBC UA: ABNORMAL /HPF (ref 0–4)
WBC UA: ABNORMAL /HPF (ref 0–5)
WBC UA: NORMAL /HPF (ref 0–4)
ZZ NTE CLEAN UP: ORDERED TEST: NORMAL
ZZ NTE WITH NAME CLEAN UP: SPECIMEN SOURCE: NORMAL

## 2022-01-01 PROCEDURE — 2580000003 HC RX 258: Performed by: EMERGENCY MEDICINE

## 2022-01-01 PROCEDURE — 6370000000 HC RX 637 (ALT 250 FOR IP): Performed by: STUDENT IN AN ORGANIZED HEALTH CARE EDUCATION/TRAINING PROGRAM

## 2022-01-01 PROCEDURE — 0D1N0Z4 BYPASS SIGMOID COLON TO CUTANEOUS, OPEN APPROACH: ICD-10-PCS | Performed by: SURGERY

## 2022-01-01 PROCEDURE — 6360000002 HC RX W HCPCS

## 2022-01-01 PROCEDURE — 71260 CT THORAX DX C+: CPT

## 2022-01-01 PROCEDURE — 2500000003 HC RX 250 WO HCPCS: Performed by: NURSE PRACTITIONER

## 2022-01-01 PROCEDURE — 2580000003 HC RX 258: Performed by: STUDENT IN AN ORGANIZED HEALTH CARE EDUCATION/TRAINING PROGRAM

## 2022-01-01 PROCEDURE — 2500000003 HC RX 250 WO HCPCS: Performed by: STUDENT IN AN ORGANIZED HEALTH CARE EDUCATION/TRAINING PROGRAM

## 2022-01-01 PROCEDURE — 84484 ASSAY OF TROPONIN QUANT: CPT

## 2022-01-01 PROCEDURE — 93306 TTE W/DOPPLER COMPLETE: CPT

## 2022-01-01 PROCEDURE — 2580000003 HC RX 258: Performed by: SURGERY

## 2022-01-01 PROCEDURE — 88307 TISSUE EXAM BY PATHOLOGIST: CPT

## 2022-01-01 PROCEDURE — 96367 TX/PROPH/DG ADDL SEQ IV INF: CPT

## 2022-01-01 PROCEDURE — 6360000002 HC RX W HCPCS: Performed by: STUDENT IN AN ORGANIZED HEALTH CARE EDUCATION/TRAINING PROGRAM

## 2022-01-01 PROCEDURE — 84100 ASSAY OF PHOSPHORUS: CPT

## 2022-01-01 PROCEDURE — 82803 BLOOD GASES ANY COMBINATION: CPT

## 2022-01-01 PROCEDURE — 85390 FIBRINOLYSINS SCREEN I&R: CPT

## 2022-01-01 PROCEDURE — 2500000003 HC RX 250 WO HCPCS: Performed by: NURSE ANESTHETIST, CERTIFIED REGISTERED

## 2022-01-01 PROCEDURE — 3700000001 HC ADD 15 MINUTES (ANESTHESIA): Performed by: SURGERY

## 2022-01-01 PROCEDURE — 0DBN0ZZ EXCISION OF SIGMOID COLON, OPEN APPROACH: ICD-10-PCS | Performed by: SURGERY

## 2022-01-01 PROCEDURE — 96375 TX/PRO/DX INJ NEW DRUG ADDON: CPT

## 2022-01-01 PROCEDURE — 82947 ASSAY GLUCOSE BLOOD QUANT: CPT

## 2022-01-01 PROCEDURE — 83605 ASSAY OF LACTIC ACID: CPT

## 2022-01-01 PROCEDURE — 99214 OFFICE O/P EST MOD 30 MIN: CPT | Performed by: INTERNAL MEDICINE

## 2022-01-01 PROCEDURE — 1200000000 HC SEMI PRIVATE

## 2022-01-01 PROCEDURE — 2500000003 HC RX 250 WO HCPCS: Performed by: HEALTH CARE PROVIDER

## 2022-01-01 PROCEDURE — 36415 COLL VENOUS BLD VENIPUNCTURE: CPT

## 2022-01-01 PROCEDURE — 37799 UNLISTED PX VASCULAR SURGERY: CPT

## 2022-01-01 PROCEDURE — 2500000003 HC RX 250 WO HCPCS

## 2022-01-01 PROCEDURE — 73562 X-RAY EXAM OF KNEE 3: CPT

## 2022-01-01 PROCEDURE — 85055 RETICULATED PLATELET ASSAY: CPT

## 2022-01-01 PROCEDURE — 83735 ASSAY OF MAGNESIUM: CPT

## 2022-01-01 PROCEDURE — 85014 HEMATOCRIT: CPT

## 2022-01-01 PROCEDURE — 85025 COMPLETE CBC W/AUTO DIFF WBC: CPT

## 2022-01-01 PROCEDURE — 2700000000 HC OXYGEN THERAPY PER DAY

## 2022-01-01 PROCEDURE — 99214 OFFICE O/P EST MOD 30 MIN: CPT | Performed by: NURSE PRACTITIONER

## 2022-01-01 PROCEDURE — 83036 HEMOGLOBIN GLYCOSYLATED A1C: CPT

## 2022-01-01 PROCEDURE — 2000000000 HC ICU R&B

## 2022-01-01 PROCEDURE — 86850 RBC ANTIBODY SCREEN: CPT

## 2022-01-01 PROCEDURE — 5A1945Z RESPIRATORY VENTILATION, 24-96 CONSECUTIVE HOURS: ICD-10-PCS | Performed by: SURGERY

## 2022-01-01 PROCEDURE — 99232 SBSQ HOSP IP/OBS MODERATE 35: CPT | Performed by: INTERNAL MEDICINE

## 2022-01-01 PROCEDURE — 94640 AIRWAY INHALATION TREATMENT: CPT

## 2022-01-01 PROCEDURE — APPSS45 APP SPLIT SHARED TIME 31-45 MINUTES: Performed by: NURSE PRACTITIONER

## 2022-01-01 PROCEDURE — 84300 ASSAY OF URINE SODIUM: CPT

## 2022-01-01 PROCEDURE — 87205 SMEAR GRAM STAIN: CPT

## 2022-01-01 PROCEDURE — 80053 COMPREHEN METABOLIC PANEL: CPT

## 2022-01-01 PROCEDURE — 85610 PROTHROMBIN TIME: CPT

## 2022-01-01 PROCEDURE — 6370000000 HC RX 637 (ALT 250 FOR IP): Performed by: SURGERY

## 2022-01-01 PROCEDURE — 99213 OFFICE O/P EST LOW 20 MIN: CPT

## 2022-01-01 PROCEDURE — 87075 CULTR BACTERIA EXCEPT BLOOD: CPT

## 2022-01-01 PROCEDURE — PBSHW PBB SHADOW CHARGE: Performed by: NURSE PRACTITIONER

## 2022-01-01 PROCEDURE — 81001 URINALYSIS AUTO W/SCOPE: CPT

## 2022-01-01 PROCEDURE — 80076 HEPATIC FUNCTION PANEL: CPT

## 2022-01-01 PROCEDURE — 83874 ASSAY OF MYOGLOBIN: CPT

## 2022-01-01 PROCEDURE — 74176 CT ABD & PELVIS W/O CONTRAST: CPT

## 2022-01-01 PROCEDURE — 2500000003 HC RX 250 WO HCPCS: Performed by: EMERGENCY MEDICINE

## 2022-01-01 PROCEDURE — 71045 X-RAY EXAM CHEST 1 VIEW: CPT

## 2022-01-01 PROCEDURE — 2720000010 HC SURG SUPPLY STERILE: Performed by: SURGERY

## 2022-01-01 PROCEDURE — 82805 BLOOD GASES W/O2 SATURATION: CPT

## 2022-01-01 PROCEDURE — 80048 BASIC METABOLIC PNL TOTAL CA: CPT

## 2022-01-01 PROCEDURE — 2580000003 HC RX 258: Performed by: NURSE PRACTITIONER

## 2022-01-01 PROCEDURE — 93005 ELECTROCARDIOGRAM TRACING: CPT | Performed by: STUDENT IN AN ORGANIZED HEALTH CARE EDUCATION/TRAINING PROGRAM

## 2022-01-01 PROCEDURE — 87070 CULTURE OTHR SPECIMN AEROBIC: CPT

## 2022-01-01 PROCEDURE — 94761 N-INVAS EAR/PLS OXIMETRY MLT: CPT

## 2022-01-01 PROCEDURE — 6370000000 HC RX 637 (ALT 250 FOR IP): Performed by: NURSE PRACTITIONER

## 2022-01-01 PROCEDURE — 0DJD0ZZ INSPECTION OF LOWER INTESTINAL TRACT, OPEN APPROACH: ICD-10-PCS | Performed by: SURGERY

## 2022-01-01 PROCEDURE — 96374 THER/PROPH/DIAG INJ IV PUSH: CPT

## 2022-01-01 PROCEDURE — 6360000002 HC RX W HCPCS: Performed by: EMERGENCY MEDICINE

## 2022-01-01 PROCEDURE — 1123F ACP DISCUSS/DSCN MKR DOCD: CPT | Performed by: NURSE PRACTITIONER

## 2022-01-01 PROCEDURE — 3051F HG A1C>EQUAL 7.0%<8.0%: CPT | Performed by: NURSE PRACTITIONER

## 2022-01-01 PROCEDURE — 83690 ASSAY OF LIPASE: CPT

## 2022-01-01 PROCEDURE — 3600000014 HC SURGERY LEVEL 4 ADDTL 15MIN: Performed by: SURGERY

## 2022-01-01 PROCEDURE — 82570 ASSAY OF URINE CREATININE: CPT

## 2022-01-01 PROCEDURE — 6370000000 HC RX 637 (ALT 250 FOR IP)

## 2022-01-01 PROCEDURE — 3700000000 HC ANESTHESIA ATTENDED CARE: Performed by: SURGERY

## 2022-01-01 PROCEDURE — G0439 PPPS, SUBSEQ VISIT: HCPCS | Performed by: NURSE PRACTITIONER

## 2022-01-01 PROCEDURE — 6360000002 HC RX W HCPCS: Performed by: NURSE ANESTHETIST, CERTIFIED REGISTERED

## 2022-01-01 PROCEDURE — 96376 TX/PRO/DX INJ SAME DRUG ADON: CPT

## 2022-01-01 PROCEDURE — 93005 ELECTROCARDIOGRAM TRACING: CPT | Performed by: EMERGENCY MEDICINE

## 2022-01-01 PROCEDURE — 6370000000 HC RX 637 (ALT 250 FOR IP): Performed by: INTERNAL MEDICINE

## 2022-01-01 PROCEDURE — 2709999900 HC NON-CHARGEABLE SUPPLY: Performed by: SURGERY

## 2022-01-01 PROCEDURE — 94003 VENT MGMT INPAT SUBQ DAY: CPT

## 2022-01-01 PROCEDURE — 94760 N-INVAS EAR/PLS OXIMETRY 1: CPT

## 2022-01-01 PROCEDURE — 2500000003 HC RX 250 WO HCPCS: Performed by: SURGERY

## 2022-01-01 PROCEDURE — 2580000003 HC RX 258: Performed by: NURSE ANESTHETIST, CERTIFIED REGISTERED

## 2022-01-01 PROCEDURE — 84132 ASSAY OF SERUM POTASSIUM: CPT

## 2022-01-01 PROCEDURE — 86900 BLOOD TYPING SEROLOGIC ABO: CPT

## 2022-01-01 PROCEDURE — 2580000003 HC RX 258: Performed by: HEALTH CARE PROVIDER

## 2022-01-01 PROCEDURE — 0JQ80ZZ REPAIR ABDOMEN SUBCUTANEOUS TISSUE AND FASCIA, OPEN APPROACH: ICD-10-PCS | Performed by: SURGERY

## 2022-01-01 PROCEDURE — 02HV33Z INSERTION OF INFUSION DEVICE INTO SUPERIOR VENA CAVA, PERCUTANEOUS APPROACH: ICD-10-PCS | Performed by: SURGERY

## 2022-01-01 PROCEDURE — 93010 ELECTROCARDIOGRAM REPORT: CPT | Performed by: INTERNAL MEDICINE

## 2022-01-01 PROCEDURE — 87804 INFLUENZA ASSAY W/OPTIC: CPT

## 2022-01-01 PROCEDURE — 6360000002 HC RX W HCPCS: Performed by: NURSE PRACTITIONER

## 2022-01-01 PROCEDURE — 2060000000 HC ICU INTERMEDIATE R&B

## 2022-01-01 PROCEDURE — 2580000003 HC RX 258

## 2022-01-01 PROCEDURE — 07TP0ZZ RESECTION OF SPLEEN, OPEN APPROACH: ICD-10-PCS | Performed by: SURGERY

## 2022-01-01 PROCEDURE — 82330 ASSAY OF CALCIUM: CPT

## 2022-01-01 PROCEDURE — 6360000002 HC RX W HCPCS: Performed by: INTERNAL MEDICINE

## 2022-01-01 PROCEDURE — 96365 THER/PROPH/DIAG IV INF INIT: CPT

## 2022-01-01 PROCEDURE — 6360000002 HC RX W HCPCS: Performed by: SURGERY

## 2022-01-01 PROCEDURE — 6360000004 HC RX CONTRAST MEDICATION: Performed by: EMERGENCY MEDICINE

## 2022-01-01 PROCEDURE — 87641 MR-STAPH DNA AMP PROBE: CPT

## 2022-01-01 PROCEDURE — 90715 TDAP VACCINE 7 YRS/> IM: CPT | Performed by: NURSE PRACTITIONER

## 2022-01-01 PROCEDURE — 87040 BLOOD CULTURE FOR BACTERIA: CPT

## 2022-01-01 PROCEDURE — 99285 EMERGENCY DEPT VISIT HI MDM: CPT

## 2022-01-01 PROCEDURE — 88305 TISSUE EXAM BY PATHOLOGIST: CPT

## 2022-01-01 PROCEDURE — 85384 FIBRINOGEN ACTIVITY: CPT

## 2022-01-01 PROCEDURE — 85018 HEMOGLOBIN: CPT

## 2022-01-01 PROCEDURE — 97161 PT EVAL LOW COMPLEX 20 MIN: CPT | Performed by: PHYSICAL THERAPIST

## 2022-01-01 PROCEDURE — 94002 VENT MGMT INPAT INIT DAY: CPT

## 2022-01-01 PROCEDURE — 86901 BLOOD TYPING SEROLOGIC RH(D): CPT

## 2022-01-01 PROCEDURE — 99223 1ST HOSP IP/OBS HIGH 75: CPT | Performed by: STUDENT IN AN ORGANIZED HEALTH CARE EDUCATION/TRAINING PROGRAM

## 2022-01-01 PROCEDURE — 99223 1ST HOSP IP/OBS HIGH 75: CPT | Performed by: INTERNAL MEDICINE

## 2022-01-01 PROCEDURE — 77085 DXA BONE DENSITY AXL VRT FX: CPT

## 2022-01-01 PROCEDURE — 30283B1 TRANSFUSION OF NONAUTOLOGOUS 4-FACTOR PROTHROMBIN COMPLEX CONCENTRATE INTO VEIN, PERCUTANEOUS APPROACH: ICD-10-PCS | Performed by: SURGERY

## 2022-01-01 PROCEDURE — 95251 CONT GLUC MNTR ANALYSIS I&R: CPT | Performed by: NURSE PRACTITIONER

## 2022-01-01 PROCEDURE — P9016 RBC LEUKOCYTES REDUCED: HCPCS

## 2022-01-01 PROCEDURE — 87150 DNA/RNA AMPLIFIED PROBE: CPT

## 2022-01-01 PROCEDURE — 74018 RADEX ABDOMEN 1 VIEW: CPT

## 2022-01-01 PROCEDURE — 0DNN0ZZ RELEASE SIGMOID COLON, OPEN APPROACH: ICD-10-PCS | Performed by: SURGERY

## 2022-01-01 PROCEDURE — 2580000003 HC RX 258: Performed by: INTERNAL MEDICINE

## 2022-01-01 PROCEDURE — 20610 DRAIN/INJ JOINT/BURSA W/O US: CPT | Performed by: NURSE PRACTITIONER

## 2022-01-01 PROCEDURE — 93005 ELECTROCARDIOGRAM TRACING: CPT | Performed by: INTERNAL MEDICINE

## 2022-01-01 PROCEDURE — 6360000002 HC RX W HCPCS: Performed by: HEALTH CARE PROVIDER

## 2022-01-01 PROCEDURE — 1111F DSCHRG MED/CURRENT MED MERGE: CPT | Performed by: NURSE PRACTITIONER

## 2022-01-01 PROCEDURE — 3600000004 HC SURGERY LEVEL 4 BASE: Performed by: SURGERY

## 2022-01-01 PROCEDURE — 87086 URINE CULTURE/COLONY COUNT: CPT

## 2022-01-01 PROCEDURE — 87635 SARS-COV-2 COVID-19 AMP PRB: CPT

## 2022-01-01 PROCEDURE — 85576 BLOOD PLATELET AGGREGATION: CPT

## 2022-01-01 PROCEDURE — 99231 SBSQ HOSP IP/OBS SF/LOW 25: CPT | Performed by: STUDENT IN AN ORGANIZED HEALTH CARE EDUCATION/TRAINING PROGRAM

## 2022-01-01 PROCEDURE — PBSHW TDAP, BOOSTRIX, (AGE 10 YRS+), IM: Performed by: NURSE PRACTITIONER

## 2022-01-01 PROCEDURE — 85347 COAGULATION TIME ACTIVATED: CPT

## 2022-01-01 PROCEDURE — 99495 TRANSJ CARE MGMT MOD F2F 14D: CPT | Performed by: NURSE PRACTITIONER

## 2022-01-01 PROCEDURE — 86920 COMPATIBILITY TEST SPIN: CPT

## 2022-01-01 PROCEDURE — 82150 ASSAY OF AMYLASE: CPT

## 2022-01-01 PROCEDURE — 99238 HOSP IP/OBS DSCHRG MGMT 30/<: CPT | Performed by: INTERNAL MEDICINE

## 2022-01-01 RX ORDER — GABAPENTIN 100 MG/1
100 CAPSULE ORAL 2 TIMES DAILY
Status: DISCONTINUED | OUTPATIENT
Start: 2022-01-01 | End: 2022-01-01

## 2022-01-01 RX ORDER — FENTANYL CITRATE 50 UG/ML
25 INJECTION, SOLUTION INTRAMUSCULAR; INTRAVENOUS ONCE
Status: COMPLETED | OUTPATIENT
Start: 2022-01-01 | End: 2022-01-01

## 2022-01-01 RX ORDER — MIDAZOLAM HYDROCHLORIDE 1 MG/ML
INJECTION INTRAMUSCULAR; INTRAVENOUS
Status: DISCONTINUED
Start: 2022-01-01 | End: 2022-01-01

## 2022-01-01 RX ORDER — INSULIN LISPRO 100 [IU]/ML
INJECTION, SOLUTION INTRAVENOUS; SUBCUTANEOUS
Qty: 1 PEN | Refills: 3 | Status: SHIPPED | OUTPATIENT
Start: 2022-01-01 | End: 2022-01-01 | Stop reason: SDUPTHER

## 2022-01-01 RX ORDER — SODIUM CHLORIDE 9 MG/ML
INJECTION, SOLUTION INTRAVENOUS CONTINUOUS
Status: DISCONTINUED | OUTPATIENT
Start: 2022-01-01 | End: 2022-01-01

## 2022-01-01 RX ORDER — DEXMEDETOMIDINE HYDROCHLORIDE 4 UG/ML
.1-1.5 INJECTION, SOLUTION INTRAVENOUS CONTINUOUS
Status: DISCONTINUED | OUTPATIENT
Start: 2022-01-01 | End: 2022-01-01

## 2022-01-01 RX ORDER — PHENYLEPHRINE HCL IN 0.9% NACL 1 MG/10 ML
SYRINGE (ML) INTRAVENOUS PRN
Status: DISCONTINUED | OUTPATIENT
Start: 2022-01-01 | End: 2022-01-01 | Stop reason: SDUPTHER

## 2022-01-01 RX ORDER — AZITHROMYCIN 250 MG/1
TABLET, FILM COATED ORAL
Qty: 1 PACKET | Refills: 0 | Status: SHIPPED | OUTPATIENT
Start: 2022-01-01 | End: 2022-01-01 | Stop reason: ALTCHOICE

## 2022-01-01 RX ORDER — LORAZEPAM 2 MG/ML
1 INJECTION INTRAMUSCULAR
Status: DISCONTINUED | OUTPATIENT
Start: 2022-01-01 | End: 2022-01-01 | Stop reason: HOSPADM

## 2022-01-01 RX ORDER — GABAPENTIN 100 MG/1
100 CAPSULE ORAL DAILY
Status: DISCONTINUED | OUTPATIENT
Start: 2022-01-01 | End: 2022-01-01

## 2022-01-01 RX ORDER — INSULIN GLARGINE 100 [IU]/ML
INJECTION, SOLUTION SUBCUTANEOUS
Qty: 30 ML | Refills: 3 | Status: SHIPPED | OUTPATIENT
Start: 2022-01-01

## 2022-01-01 RX ORDER — SODIUM CHLORIDE FOR INHALATION 0.9 %
3 VIAL, NEBULIZER (ML) INHALATION
Status: DISCONTINUED | OUTPATIENT
Start: 2022-01-01 | End: 2022-01-01 | Stop reason: HOSPADM

## 2022-01-01 RX ORDER — SODIUM CHLORIDE, SODIUM LACTATE, POTASSIUM CHLORIDE, CALCIUM CHLORIDE 600; 310; 30; 20 MG/100ML; MG/100ML; MG/100ML; MG/100ML
INJECTION, SOLUTION INTRAVENOUS CONTINUOUS
Status: DISCONTINUED | OUTPATIENT
Start: 2022-01-01 | End: 2022-01-01

## 2022-01-01 RX ORDER — FENTANYL CITRATE 50 UG/ML
25 INJECTION, SOLUTION INTRAMUSCULAR; INTRAVENOUS EVERY 4 HOURS PRN
Status: DISCONTINUED | OUTPATIENT
Start: 2022-01-01 | End: 2022-01-01

## 2022-01-01 RX ORDER — ONDANSETRON 2 MG/ML
4 INJECTION INTRAMUSCULAR; INTRAVENOUS ONCE
Status: COMPLETED | OUTPATIENT
Start: 2022-01-01 | End: 2022-01-01

## 2022-01-01 RX ORDER — INSULIN LISPRO 100 [IU]/ML
0-4 INJECTION, SOLUTION INTRAVENOUS; SUBCUTANEOUS NIGHTLY
Status: DISCONTINUED | OUTPATIENT
Start: 2022-01-01 | End: 2022-01-01

## 2022-01-01 RX ORDER — MORPHINE SULFATE 4 MG/ML
4 INJECTION, SOLUTION INTRAMUSCULAR; INTRAVENOUS
Status: DISCONTINUED | OUTPATIENT
Start: 2022-01-01 | End: 2022-01-01 | Stop reason: HOSPADM

## 2022-01-01 RX ORDER — ALBUTEROL SULFATE 2.5 MG/3ML
2.5 SOLUTION RESPIRATORY (INHALATION) ONCE
Status: COMPLETED | OUTPATIENT
Start: 2022-01-01 | End: 2022-01-01

## 2022-01-01 RX ORDER — PROPOFOL 10 MG/ML
5-50 INJECTION, EMULSION INTRAVENOUS CONTINUOUS
Status: DISCONTINUED | OUTPATIENT
Start: 2022-01-01 | End: 2022-01-01

## 2022-01-01 RX ORDER — AMIODARONE HYDROCHLORIDE 100 MG/1
100 TABLET ORAL DAILY
Qty: 90 TABLET | Refills: 3 | Status: SHIPPED | OUTPATIENT
Start: 2022-01-01

## 2022-01-01 RX ORDER — HEPARIN SODIUM 5000 [USP'U]/ML
5000 INJECTION, SOLUTION INTRAVENOUS; SUBCUTANEOUS EVERY 8 HOURS SCHEDULED
Status: DISCONTINUED | OUTPATIENT
Start: 2022-01-01 | End: 2022-01-01

## 2022-01-01 RX ORDER — FENTANYL CITRATE 50 UG/ML
100 INJECTION, SOLUTION INTRAMUSCULAR; INTRAVENOUS
Status: DISCONTINUED | OUTPATIENT
Start: 2022-01-01 | End: 2022-01-01

## 2022-01-01 RX ORDER — HYDRALAZINE HYDROCHLORIDE 20 MG/ML
10 INJECTION INTRAMUSCULAR; INTRAVENOUS ONCE
Status: COMPLETED | OUTPATIENT
Start: 2022-01-01 | End: 2022-01-01

## 2022-01-01 RX ORDER — FUROSEMIDE 10 MG/ML
20 INJECTION INTRAMUSCULAR; INTRAVENOUS ONCE
Status: COMPLETED | OUTPATIENT
Start: 2022-01-01 | End: 2022-01-01

## 2022-01-01 RX ORDER — METOPROLOL TARTRATE 5 MG/5ML
5 INJECTION INTRAVENOUS EVERY 6 HOURS
Status: DISCONTINUED | OUTPATIENT
Start: 2022-01-01 | End: 2022-01-01

## 2022-01-01 RX ORDER — 0.9 % SODIUM CHLORIDE 0.9 %
500 INTRAVENOUS SOLUTION INTRAVENOUS ONCE
Status: COMPLETED | OUTPATIENT
Start: 2022-01-01 | End: 2022-01-01

## 2022-01-01 RX ORDER — LORAZEPAM 2 MG/ML
1 INJECTION INTRAMUSCULAR EVERY 6 HOURS PRN
Status: DISCONTINUED | OUTPATIENT
Start: 2022-01-01 | End: 2022-01-01

## 2022-01-01 RX ORDER — ATORVASTATIN CALCIUM 40 MG/1
40 TABLET, FILM COATED ORAL DAILY
Status: DISCONTINUED | OUTPATIENT
Start: 2022-01-01 | End: 2022-01-01

## 2022-01-01 RX ORDER — ACETAMINOPHEN 160 MG/5ML
1000 SOLUTION ORAL EVERY 8 HOURS
Status: DISCONTINUED | OUTPATIENT
Start: 2022-01-01 | End: 2022-01-01

## 2022-01-01 RX ORDER — SODIUM CHLORIDE 9 MG/ML
INJECTION, SOLUTION INTRAVENOUS PRN
Status: DISCONTINUED | OUTPATIENT
Start: 2022-01-01 | End: 2022-01-01 | Stop reason: HOSPADM

## 2022-01-01 RX ORDER — DEXTROSE MONOHYDRATE 50 MG/ML
100 INJECTION, SOLUTION INTRAVENOUS PRN
Status: DISCONTINUED | OUTPATIENT
Start: 2022-01-01 | End: 2022-01-01 | Stop reason: HOSPADM

## 2022-01-01 RX ORDER — INSULIN LISPRO 100 [IU]/ML
0-16 INJECTION, SOLUTION INTRAVENOUS; SUBCUTANEOUS EVERY 4 HOURS
Status: DISCONTINUED | OUTPATIENT
Start: 2022-01-01 | End: 2022-01-01

## 2022-01-01 RX ORDER — CIPROFLOXACIN 2 MG/ML
400 INJECTION, SOLUTION INTRAVENOUS EVERY 24 HOURS
Status: DISCONTINUED | OUTPATIENT
Start: 2022-01-01 | End: 2022-01-01

## 2022-01-01 RX ORDER — SODIUM CHLORIDE 0.9 % (FLUSH) 0.9 %
5-40 SYRINGE (ML) INJECTION EVERY 12 HOURS SCHEDULED
Status: DISCONTINUED | OUTPATIENT
Start: 2022-01-01 | End: 2022-01-01 | Stop reason: HOSPADM

## 2022-01-01 RX ORDER — FENTANYL CITRATE 50 UG/ML
50 INJECTION, SOLUTION INTRAMUSCULAR; INTRAVENOUS
Status: DISCONTINUED | OUTPATIENT
Start: 2022-01-01 | End: 2022-01-01

## 2022-01-01 RX ORDER — ROCURONIUM BROMIDE 10 MG/ML
INJECTION, SOLUTION INTRAVENOUS PRN
Status: DISCONTINUED | OUTPATIENT
Start: 2022-01-01 | End: 2022-01-01 | Stop reason: SDUPTHER

## 2022-01-01 RX ORDER — HYDRALAZINE HYDROCHLORIDE 20 MG/ML
10 INJECTION INTRAMUSCULAR; INTRAVENOUS
Status: DISCONTINUED | OUTPATIENT
Start: 2022-01-01 | End: 2022-01-01 | Stop reason: HOSPADM

## 2022-01-01 RX ORDER — PROPOFOL 10 MG/ML
INJECTION, EMULSION INTRAVENOUS CONTINUOUS PRN
Status: DISCONTINUED | OUTPATIENT
Start: 2022-01-01 | End: 2022-01-01 | Stop reason: SDUPTHER

## 2022-01-01 RX ORDER — MORPHINE SULFATE 4 MG/ML
4 INJECTION, SOLUTION INTRAMUSCULAR; INTRAVENOUS EVERY 4 HOURS PRN
Status: DISCONTINUED | OUTPATIENT
Start: 2022-01-01 | End: 2022-01-01

## 2022-01-01 RX ORDER — 0.9 % SODIUM CHLORIDE 0.9 %
1000 INTRAVENOUS SOLUTION INTRAVENOUS ONCE
Status: COMPLETED | OUTPATIENT
Start: 2022-01-01 | End: 2022-01-01

## 2022-01-01 RX ORDER — DEXTROSE MONOHYDRATE 25 G/50ML
12.5 INJECTION, SOLUTION INTRAVENOUS PRN
Status: DISCONTINUED | OUTPATIENT
Start: 2022-01-01 | End: 2022-01-01 | Stop reason: HOSPADM

## 2022-01-01 RX ORDER — SODIUM CHLORIDE 0.9 % (FLUSH) 0.9 %
5-40 SYRINGE (ML) INJECTION PRN
Status: DISCONTINUED | OUTPATIENT
Start: 2022-01-01 | End: 2022-01-01 | Stop reason: HOSPADM

## 2022-01-01 RX ORDER — FUROSEMIDE 40 MG/1
40 TABLET ORAL DAILY
Status: DISCONTINUED | OUTPATIENT
Start: 2022-01-01 | End: 2022-01-01 | Stop reason: HOSPADM

## 2022-01-01 RX ORDER — NOREPINEPHRINE BIT/0.9 % NACL 16MG/250ML
1-40 INFUSION BOTTLE (ML) INTRAVENOUS CONTINUOUS
Status: DISCONTINUED | OUTPATIENT
Start: 2022-01-01 | End: 2022-01-01

## 2022-01-01 RX ORDER — DEXTROSE MONOHYDRATE 100 MG/ML
INJECTION, SOLUTION INTRAVENOUS CONTINUOUS PRN
Status: DISCONTINUED | OUTPATIENT
Start: 2022-01-01 | End: 2022-01-01

## 2022-01-01 RX ORDER — NICOTINE POLACRILEX 4 MG
15 LOZENGE BUCCAL PRN
Status: DISCONTINUED | OUTPATIENT
Start: 2022-01-01 | End: 2022-01-01 | Stop reason: HOSPADM

## 2022-01-01 RX ORDER — ONDANSETRON 4 MG/1
4 TABLET, ORALLY DISINTEGRATING ORAL EVERY 8 HOURS PRN
Status: DISCONTINUED | OUTPATIENT
Start: 2022-01-01 | End: 2022-01-01 | Stop reason: HOSPADM

## 2022-01-01 RX ORDER — ESMOLOL HYDROCHLORIDE 10 MG/ML
INJECTION INTRAVENOUS PRN
Status: DISCONTINUED | OUTPATIENT
Start: 2022-01-01 | End: 2022-01-01 | Stop reason: SDUPTHER

## 2022-01-01 RX ORDER — OSELTAMIVIR PHOSPHATE 75 MG/1
75 CAPSULE ORAL 2 TIMES DAILY
Qty: 10 CAPSULE | Refills: 0 | Status: SHIPPED | OUTPATIENT
Start: 2022-01-01 | End: 2022-01-01 | Stop reason: CLARIF

## 2022-01-01 RX ORDER — GLYCOPYRROLATE 0.2 MG/ML
0.2 INJECTION INTRAMUSCULAR; INTRAVENOUS EVERY 4 HOURS PRN
Status: DISCONTINUED | OUTPATIENT
Start: 2022-01-01 | End: 2022-01-01 | Stop reason: HOSPADM

## 2022-01-01 RX ORDER — SODIUM CHLORIDE 9 MG/ML
INJECTION, SOLUTION INTRAVENOUS CONTINUOUS PRN
Status: DISCONTINUED | OUTPATIENT
Start: 2022-01-01 | End: 2022-01-01 | Stop reason: SDUPTHER

## 2022-01-01 RX ORDER — SODIUM CHLORIDE, SODIUM LACTATE, POTASSIUM CHLORIDE, AND CALCIUM CHLORIDE .6; .31; .03; .02 G/100ML; G/100ML; G/100ML; G/100ML
1000 INJECTION, SOLUTION INTRAVENOUS ONCE
Status: COMPLETED | OUTPATIENT
Start: 2022-01-01 | End: 2022-01-01

## 2022-01-01 RX ORDER — PANTOPRAZOLE SODIUM 40 MG/1
40 TABLET, DELAYED RELEASE ORAL
Status: DISCONTINUED | OUTPATIENT
Start: 2022-01-01 | End: 2022-01-01 | Stop reason: HOSPADM

## 2022-01-01 RX ORDER — FENTANYL CITRATE-0.9 % NACL/PF 10 MCG/ML
25-200 PLASTIC BAG, INJECTION (ML) INTRAVENOUS CONTINUOUS
Status: DISCONTINUED | OUTPATIENT
Start: 2022-01-01 | End: 2022-01-01

## 2022-01-01 RX ORDER — SIMVASTATIN 20 MG
TABLET ORAL
Qty: 90 TABLET | Refills: 3 | Status: SHIPPED | OUTPATIENT
Start: 2022-01-01

## 2022-01-01 RX ORDER — ALBUTEROL SULFATE 90 UG/1
2 AEROSOL, METERED RESPIRATORY (INHALATION) 4 TIMES DAILY PRN
Qty: 1 EACH | Refills: 0 | Status: SHIPPED | OUTPATIENT
Start: 2022-01-01

## 2022-01-01 RX ORDER — INSULIN GLARGINE 100 [IU]/ML
10 INJECTION, SOLUTION SUBCUTANEOUS ONCE
Status: COMPLETED | OUTPATIENT
Start: 2022-01-01 | End: 2022-01-01

## 2022-01-01 RX ORDER — MAGNESIUM HYDROXIDE 1200 MG/15ML
LIQUID ORAL CONTINUOUS PRN
Status: COMPLETED | OUTPATIENT
Start: 2022-01-01 | End: 2022-01-01

## 2022-01-01 RX ORDER — ALBUTEROL SULFATE 90 UG/1
2 AEROSOL, METERED RESPIRATORY (INHALATION) 4 TIMES DAILY PRN
Status: DISCONTINUED | OUTPATIENT
Start: 2022-01-01 | End: 2022-01-01

## 2022-01-01 RX ORDER — ONDANSETRON 2 MG/ML
4 INJECTION INTRAMUSCULAR; INTRAVENOUS
Status: ACTIVE | OUTPATIENT
Start: 2022-01-01 | End: 2022-01-01

## 2022-01-01 RX ORDER — ACETAMINOPHEN 650 MG/1
650 SUPPOSITORY RECTAL EVERY 6 HOURS PRN
Status: DISCONTINUED | OUTPATIENT
Start: 2022-01-01 | End: 2022-01-01 | Stop reason: HOSPADM

## 2022-01-01 RX ORDER — POTASSIUM CHLORIDE 20 MEQ/1
20 TABLET, EXTENDED RELEASE ORAL DAILY
Qty: 90 TABLET | Refills: 3 | Status: SHIPPED | OUTPATIENT
Start: 2022-01-01

## 2022-01-01 RX ORDER — GUAIFENESIN 600 MG/1
600 TABLET, EXTENDED RELEASE ORAL 2 TIMES DAILY
Qty: 14 TABLET | Refills: 0 | Status: SHIPPED | OUTPATIENT
Start: 2022-01-01 | End: 2022-01-01

## 2022-01-01 RX ORDER — INSULIN LISPRO 100 [IU]/ML
INJECTION, SOLUTION INTRAVENOUS; SUBCUTANEOUS
Qty: 15 ML | Refills: 6 | OUTPATIENT
Start: 2022-01-01

## 2022-01-01 RX ORDER — METRONIDAZOLE 500 MG/100ML
500 INJECTION, SOLUTION INTRAVENOUS EVERY 8 HOURS
Status: DISCONTINUED | OUTPATIENT
Start: 2022-01-01 | End: 2022-01-01

## 2022-01-01 RX ORDER — SIMVASTATIN 40 MG
80 TABLET ORAL NIGHTLY
Status: DISCONTINUED | OUTPATIENT
Start: 2022-01-01 | End: 2022-01-01 | Stop reason: CLARIF

## 2022-01-01 RX ORDER — ALBUTEROL SULFATE 2.5 MG/3ML
2.5 SOLUTION RESPIRATORY (INHALATION)
Status: DISCONTINUED | OUTPATIENT
Start: 2022-01-01 | End: 2022-01-01 | Stop reason: HOSPADM

## 2022-01-01 RX ORDER — POTASSIUM CHLORIDE 20 MEQ/1
20 TABLET, EXTENDED RELEASE ORAL 2 TIMES DAILY
Status: DISCONTINUED | OUTPATIENT
Start: 2022-01-01 | End: 2022-01-01 | Stop reason: HOSPADM

## 2022-01-01 RX ORDER — PROPOFOL 10 MG/ML
INJECTION, EMULSION INTRAVENOUS PRN
Status: DISCONTINUED | OUTPATIENT
Start: 2022-01-01 | End: 2022-01-01 | Stop reason: SDUPTHER

## 2022-01-01 RX ORDER — CLINDAMYCIN PHOSPHATE 900 MG/50ML
900 INJECTION INTRAVENOUS ONCE
Status: COMPLETED | OUTPATIENT
Start: 2022-01-01 | End: 2022-01-01

## 2022-01-01 RX ORDER — FUROSEMIDE 40 MG/1
TABLET ORAL
Qty: 90 TABLET | Refills: 3 | Status: SHIPPED | OUTPATIENT
Start: 2022-01-01

## 2022-01-01 RX ORDER — MORPHINE SULFATE 2 MG/ML
2 INJECTION, SOLUTION INTRAMUSCULAR; INTRAVENOUS EVERY 4 HOURS PRN
Status: DISCONTINUED | OUTPATIENT
Start: 2022-01-01 | End: 2022-01-01

## 2022-01-01 RX ORDER — PREDNISONE 1 MG/1
2.5 TABLET ORAL DAILY
Status: DISCONTINUED | OUTPATIENT
Start: 2022-01-01 | End: 2022-01-01 | Stop reason: HOSPADM

## 2022-01-01 RX ORDER — OSELTAMIVIR PHOSPHATE 30 MG/1
30 CAPSULE ORAL DAILY
Status: DISCONTINUED | OUTPATIENT
Start: 2022-01-01 | End: 2022-01-01 | Stop reason: HOSPADM

## 2022-01-01 RX ORDER — LIDOCAINE HYDROCHLORIDE 10 MG/ML
INJECTION, SOLUTION EPIDURAL; INFILTRATION; INTRACAUDAL; PERINEURAL PRN
Status: DISCONTINUED | OUTPATIENT
Start: 2022-01-01 | End: 2022-01-01 | Stop reason: SDUPTHER

## 2022-01-01 RX ORDER — MORPHINE SULFATE 2 MG/ML
2 INJECTION, SOLUTION INTRAMUSCULAR; INTRAVENOUS
Status: DISCONTINUED | OUTPATIENT
Start: 2022-01-01 | End: 2022-01-01 | Stop reason: HOSPADM

## 2022-01-01 RX ORDER — ONDANSETRON 4 MG/1
4 TABLET, ORALLY DISINTEGRATING ORAL EVERY 8 HOURS PRN
Qty: 20 TABLET | Refills: 0 | Status: SHIPPED | OUTPATIENT
Start: 2022-01-01 | End: 2022-01-01 | Stop reason: CLARIF

## 2022-01-01 RX ORDER — CHLORHEXIDINE GLUCONATE 0.12 MG/ML
15 RINSE ORAL 2 TIMES DAILY
Status: DISCONTINUED | OUTPATIENT
Start: 2022-01-01 | End: 2022-01-01

## 2022-01-01 RX ORDER — FUROSEMIDE 10 MG/ML
20 INJECTION INTRAMUSCULAR; INTRAVENOUS ONCE
Status: CANCELLED | OUTPATIENT
Start: 2022-01-01

## 2022-01-01 RX ORDER — MORPHINE SULFATE 4 MG/ML
2 INJECTION, SOLUTION INTRAMUSCULAR; INTRAVENOUS ONCE
Status: COMPLETED | OUTPATIENT
Start: 2022-01-01 | End: 2022-01-01

## 2022-01-01 RX ORDER — INSULIN GLARGINE 100 [IU]/ML
20 INJECTION, SOLUTION SUBCUTANEOUS ONCE
Status: COMPLETED | OUTPATIENT
Start: 2022-01-01 | End: 2022-01-01

## 2022-01-01 RX ORDER — INSULIN LISPRO 100 [IU]/ML
0-16 INJECTION, SOLUTION INTRAVENOUS; SUBCUTANEOUS
Status: DISCONTINUED | OUTPATIENT
Start: 2022-01-01 | End: 2022-01-01

## 2022-01-01 RX ORDER — MORPHINE SULFATE 2 MG/ML
2 INJECTION, SOLUTION INTRAMUSCULAR; INTRAVENOUS
Status: DISCONTINUED | OUTPATIENT
Start: 2022-01-01 | End: 2022-01-01

## 2022-01-01 RX ORDER — OSELTAMIVIR PHOSPHATE 30 MG/1
30 CAPSULE ORAL DAILY
Qty: 2 CAPSULE | Refills: 0 | Status: SHIPPED | OUTPATIENT
Start: 2022-01-01 | End: 2022-01-01

## 2022-01-01 RX ORDER — FENTANYL CITRATE 50 UG/ML
50 INJECTION, SOLUTION INTRAMUSCULAR; INTRAVENOUS ONCE
Status: COMPLETED | OUTPATIENT
Start: 2022-01-01 | End: 2022-01-01

## 2022-01-01 RX ORDER — LIRAGLUTIDE 6 MG/ML
1.8 INJECTION SUBCUTANEOUS DAILY
Qty: 18 ML | Refills: 5 | Status: SHIPPED | OUTPATIENT
Start: 2022-01-01 | End: 2022-01-01

## 2022-01-01 RX ORDER — OSELTAMIVIR PHOSPHATE 75 MG/1
75 CAPSULE ORAL 2 TIMES DAILY
Status: DISCONTINUED | OUTPATIENT
Start: 2022-01-01 | End: 2022-01-01 | Stop reason: DRUGHIGH

## 2022-01-01 RX ORDER — METRONIDAZOLE 500 MG/100ML
500 INJECTION, SOLUTION INTRAVENOUS ONCE
Status: COMPLETED | OUTPATIENT
Start: 2022-01-01 | End: 2022-01-01

## 2022-01-01 RX ORDER — GABAPENTIN 100 MG/1
CAPSULE ORAL
Qty: 90 CAPSULE | Refills: 0 | Status: SHIPPED | OUTPATIENT
Start: 2022-01-01 | End: 2022-09-25

## 2022-01-01 RX ORDER — PEN NEEDLE, DIABETIC 31 GX5/16"
NEEDLE, DISPOSABLE MISCELLANEOUS
Qty: 270 EACH | Refills: 3 | Status: SHIPPED | OUTPATIENT
Start: 2022-01-01

## 2022-01-01 RX ORDER — CALCIUM GLUCONATE 94 MG/ML
1000 INJECTION, SOLUTION INTRAVENOUS ONCE
Status: DISCONTINUED | OUTPATIENT
Start: 2022-01-01 | End: 2022-01-01

## 2022-01-01 RX ORDER — METHOCARBAMOL 500 MG/1
500 TABLET, FILM COATED ORAL EVERY 6 HOURS
Status: DISCONTINUED | OUTPATIENT
Start: 2022-01-01 | End: 2022-01-01

## 2022-01-01 RX ORDER — ALBUTEROL SULFATE 2.5 MG/3ML
2.5 SOLUTION RESPIRATORY (INHALATION)
Status: DISCONTINUED | OUTPATIENT
Start: 2022-01-01 | End: 2022-01-01

## 2022-01-01 RX ORDER — OLANZAPINE 5 MG/1
5 TABLET, ORALLY DISINTEGRATING ORAL ONCE
Status: COMPLETED | OUTPATIENT
Start: 2022-01-01 | End: 2022-01-01

## 2022-01-01 RX ORDER — AZITHROMYCIN 250 MG/1
TABLET, FILM COATED ORAL
Qty: 1 PACKET | Refills: 0 | Status: SHIPPED | OUTPATIENT
Start: 2022-01-01 | End: 2022-01-01 | Stop reason: CLARIF

## 2022-01-01 RX ORDER — SODIUM CHLORIDE 9 MG/ML
25 INJECTION, SOLUTION INTRAVENOUS PRN
Status: DISCONTINUED | OUTPATIENT
Start: 2022-01-01 | End: 2022-01-01 | Stop reason: HOSPADM

## 2022-01-01 RX ORDER — FENTANYL CITRATE 50 UG/ML
INJECTION, SOLUTION INTRAMUSCULAR; INTRAVENOUS PRN
Status: DISCONTINUED | OUTPATIENT
Start: 2022-01-01 | End: 2022-01-01 | Stop reason: SDUPTHER

## 2022-01-01 RX ORDER — IPRATROPIUM BROMIDE AND ALBUTEROL SULFATE 2.5; .5 MG/3ML; MG/3ML
1 SOLUTION RESPIRATORY (INHALATION)
Status: DISCONTINUED | OUTPATIENT
Start: 2022-01-01 | End: 2022-01-01

## 2022-01-01 RX ORDER — MAGNESIUM HYDROXIDE 1200 MG/15ML
LIQUID ORAL CONTINUOUS PRN
Status: DISCONTINUED | OUTPATIENT
Start: 2022-01-01 | End: 2022-01-01 | Stop reason: HOSPADM

## 2022-01-01 RX ORDER — BUPIVACAINE HYDROCHLORIDE 5 MG/ML
5 INJECTION, SOLUTION PERINEURAL ONCE
Status: COMPLETED | OUTPATIENT
Start: 2022-01-01 | End: 2022-01-01

## 2022-01-01 RX ORDER — GUAIFENESIN 600 MG/1
600 TABLET, EXTENDED RELEASE ORAL 2 TIMES DAILY
Status: DISCONTINUED | OUTPATIENT
Start: 2022-01-01 | End: 2022-01-01 | Stop reason: HOSPADM

## 2022-01-01 RX ORDER — KETOROLAC TROMETHAMINE 30 MG/ML
15 INJECTION, SOLUTION INTRAMUSCULAR; INTRAVENOUS ONCE
Status: COMPLETED | OUTPATIENT
Start: 2022-01-01 | End: 2022-01-01

## 2022-01-01 RX ORDER — CLINDAMYCIN PHOSPHATE 600 MG/50ML
600 INJECTION INTRAVENOUS EVERY 6 HOURS
Status: DISCONTINUED | OUTPATIENT
Start: 2022-01-01 | End: 2022-01-01

## 2022-01-01 RX ORDER — PREDNISONE 1 MG/1
3 TABLET ORAL DAILY
Status: DISCONTINUED | OUTPATIENT
Start: 2022-01-01 | End: 2022-01-01 | Stop reason: DRUGHIGH

## 2022-01-01 RX ORDER — CALCIUM POLYCARBOPHIL 625 MG 625 MG/1
625 TABLET ORAL DAILY
COMMUNITY

## 2022-01-01 RX ORDER — INSULIN GLARGINE 100 [IU]/ML
14 INJECTION, SOLUTION SUBCUTANEOUS 2 TIMES DAILY
Status: DISCONTINUED | OUTPATIENT
Start: 2022-01-01 | End: 2022-01-01 | Stop reason: HOSPADM

## 2022-01-01 RX ORDER — ACETAMINOPHEN 325 MG/1
650 TABLET ORAL EVERY 6 HOURS PRN
Status: DISCONTINUED | OUTPATIENT
Start: 2022-01-01 | End: 2022-01-01 | Stop reason: HOSPADM

## 2022-01-01 RX ORDER — INSULIN LISPRO 100 [IU]/ML
INJECTION, SOLUTION INTRAVENOUS; SUBCUTANEOUS
Qty: 1 PEN | Refills: 3
Start: 2022-01-01

## 2022-01-01 RX ORDER — AMIODARONE HYDROCHLORIDE 100 MG/1
TABLET ORAL
Qty: 90 TABLET | Refills: 3 | OUTPATIENT
Start: 2022-01-01

## 2022-01-01 RX ORDER — ONDANSETRON 2 MG/ML
INJECTION INTRAMUSCULAR; INTRAVENOUS
Status: COMPLETED
Start: 2022-01-01 | End: 2022-01-01

## 2022-01-01 RX ORDER — SODIUM CHLORIDE, SODIUM LACTATE, POTASSIUM CHLORIDE, CALCIUM CHLORIDE 600; 310; 30; 20 MG/100ML; MG/100ML; MG/100ML; MG/100ML
INJECTION, SOLUTION INTRAVENOUS CONTINUOUS PRN
Status: DISCONTINUED | OUTPATIENT
Start: 2022-01-01 | End: 2022-01-01 | Stop reason: SDUPTHER

## 2022-01-01 RX ORDER — CALCIUM GLUCONATE 20 MG/ML
1000 INJECTION, SOLUTION INTRAVENOUS ONCE
Status: COMPLETED | OUTPATIENT
Start: 2022-01-01 | End: 2022-01-01

## 2022-01-01 RX ORDER — LIRAGLUTIDE 6 MG/ML
1.8 INJECTION SUBCUTANEOUS DAILY
Qty: 27 ML | Refills: 3 | Status: SHIPPED | OUTPATIENT
Start: 2022-01-01

## 2022-01-01 RX ORDER — DEXTROSE MONOHYDRATE 100 MG/ML
INJECTION, SOLUTION INTRAVENOUS CONTINUOUS PRN
Status: DISCONTINUED | OUTPATIENT
Start: 2022-01-01 | End: 2022-01-01 | Stop reason: SDUPTHER

## 2022-01-01 RX ORDER — POLYETHYLENE GLYCOL 3350 17 G/17G
17 POWDER, FOR SOLUTION ORAL DAILY PRN
Status: DISCONTINUED | OUTPATIENT
Start: 2022-01-01 | End: 2022-01-01 | Stop reason: HOSPADM

## 2022-01-01 RX ORDER — ALBUTEROL SULFATE 2.5 MG/3ML
2.5 SOLUTION RESPIRATORY (INHALATION) EVERY 4 HOURS PRN
Qty: 30 EACH | Refills: 1 | Status: SHIPPED | OUTPATIENT
Start: 2022-01-01

## 2022-01-01 RX ORDER — INSULIN GLARGINE 100 [IU]/ML
12 INJECTION, SOLUTION SUBCUTANEOUS 2 TIMES DAILY
Status: DISCONTINUED | OUTPATIENT
Start: 2022-01-01 | End: 2022-01-01

## 2022-01-01 RX ORDER — LOPERAMIDE HYDROCHLORIDE 2 MG/1
2 CAPSULE ORAL PRN
Status: DISCONTINUED | OUTPATIENT
Start: 2022-01-01 | End: 2022-01-01 | Stop reason: HOSPADM

## 2022-01-01 RX ORDER — MORPHINE SULFATE 4 MG/ML
4 INJECTION, SOLUTION INTRAMUSCULAR; INTRAVENOUS
Status: DISCONTINUED | OUTPATIENT
Start: 2022-01-01 | End: 2022-01-01

## 2022-01-01 RX ORDER — SCOLOPAMINE TRANSDERMAL SYSTEM 1 MG/1
1 PATCH, EXTENDED RELEASE TRANSDERMAL ONCE
Status: DISCONTINUED | OUTPATIENT
Start: 2022-01-01 | End: 2022-01-01 | Stop reason: HOSPADM

## 2022-01-01 RX ORDER — SODIUM CHLORIDE 9 MG/ML
INJECTION, SOLUTION INTRAVENOUS PRN
Status: CANCELLED | OUTPATIENT
Start: 2022-01-01

## 2022-01-01 RX ORDER — TRIAMCINOLONE ACETONIDE 40 MG/ML
80 INJECTION, SUSPENSION INTRA-ARTICULAR; INTRAMUSCULAR ONCE
Status: COMPLETED | OUTPATIENT
Start: 2022-01-01 | End: 2022-01-01

## 2022-01-01 RX ORDER — ALBUTEROL SULFATE 2.5 MG/3ML
2.5 SOLUTION RESPIRATORY (INHALATION) EVERY 4 HOURS
Status: DISCONTINUED | OUTPATIENT
Start: 2022-01-01 | End: 2022-01-01 | Stop reason: HOSPADM

## 2022-01-01 RX ORDER — OXYCODONE HYDROCHLORIDE 5 MG/1
2.5 TABLET ORAL ONCE
Status: COMPLETED | OUTPATIENT
Start: 2022-01-01 | End: 2022-01-01

## 2022-01-01 RX ORDER — ATORVASTATIN CALCIUM 10 MG/1
10 TABLET, FILM COATED ORAL NIGHTLY
Status: DISCONTINUED | OUTPATIENT
Start: 2022-01-01 | End: 2022-01-01 | Stop reason: HOSPADM

## 2022-01-01 RX ORDER — ONDANSETRON 2 MG/ML
4 INJECTION INTRAMUSCULAR; INTRAVENOUS EVERY 6 HOURS PRN
Status: DISCONTINUED | OUTPATIENT
Start: 2022-01-01 | End: 2022-01-01 | Stop reason: HOSPADM

## 2022-01-01 RX ORDER — AMIODARONE HYDROCHLORIDE 200 MG/1
100 TABLET ORAL DAILY
Status: DISCONTINUED | OUTPATIENT
Start: 2022-01-01 | End: 2022-01-01 | Stop reason: HOSPADM

## 2022-01-01 RX ORDER — DEXAMETHASONE SODIUM PHOSPHATE 10 MG/ML
INJECTION INTRAMUSCULAR; INTRAVENOUS PRN
Status: DISCONTINUED | OUTPATIENT
Start: 2022-01-01 | End: 2022-01-01 | Stop reason: SDUPTHER

## 2022-01-01 RX ORDER — AMLODIPINE BESYLATE 5 MG/1
5 TABLET ORAL DAILY
Status: DISCONTINUED | OUTPATIENT
Start: 2022-01-01 | End: 2022-01-01 | Stop reason: HOSPADM

## 2022-01-01 RX ORDER — AMLODIPINE BESYLATE 5 MG/1
TABLET ORAL
Qty: 90 TABLET | Refills: 3 | Status: SHIPPED | OUTPATIENT
Start: 2022-01-01

## 2022-01-01 RX ADMIN — METRONIDAZOLE 500 MG: 500 INJECTION, SOLUTION INTRAVENOUS at 10:17

## 2022-01-01 RX ADMIN — SODIUM CHLORIDE: 9 INJECTION, SOLUTION INTRAVENOUS at 21:35

## 2022-01-01 RX ADMIN — IPRATROPIUM BROMIDE AND ALBUTEROL SULFATE 1 AMPULE: .5; 3 SOLUTION RESPIRATORY (INHALATION) at 09:19

## 2022-01-01 RX ADMIN — HEPARIN SODIUM 5000 UNITS: 5000 INJECTION INTRAVENOUS; SUBCUTANEOUS at 23:33

## 2022-01-01 RX ADMIN — SODIUM CHLORIDE, POTASSIUM CHLORIDE, SODIUM LACTATE AND CALCIUM CHLORIDE 1000 ML: 600; 310; 30; 20 INJECTION, SOLUTION INTRAVENOUS at 11:18

## 2022-01-01 RX ADMIN — METRONIDAZOLE 500 MG: 500 INJECTION, SOLUTION INTRAVENOUS at 06:51

## 2022-01-01 RX ADMIN — SODIUM CHLORIDE, POTASSIUM CHLORIDE, SODIUM LACTATE AND CALCIUM CHLORIDE: 600; 310; 30; 20 INJECTION, SOLUTION INTRAVENOUS at 14:29

## 2022-01-01 RX ADMIN — SODIUM CHLORIDE, PRESERVATIVE FREE 10 ML: 5 INJECTION INTRAVENOUS at 19:45

## 2022-01-01 RX ADMIN — ACETAMINOPHEN 650 MG: 325 TABLET ORAL at 17:52

## 2022-01-01 RX ADMIN — Medication 100 MCG: at 00:22

## 2022-01-01 RX ADMIN — GUAIFENESIN 600 MG: 600 TABLET, EXTENDED RELEASE ORAL at 09:27

## 2022-01-01 RX ADMIN — SODIUM CHLORIDE, POTASSIUM CHLORIDE, SODIUM LACTATE AND CALCIUM CHLORIDE: 600; 310; 30; 20 INJECTION, SOLUTION INTRAVENOUS at 20:12

## 2022-01-01 RX ADMIN — INSULIN LISPRO 4 UNITS: 100 INJECTION, SOLUTION INTRAVENOUS; SUBCUTANEOUS at 08:20

## 2022-01-01 RX ADMIN — SODIUM CHLORIDE, POTASSIUM CHLORIDE, SODIUM LACTATE AND CALCIUM CHLORIDE: 600; 310; 30; 20 INJECTION, SOLUTION INTRAVENOUS at 16:30

## 2022-01-01 RX ADMIN — SODIUM CHLORIDE, PRESERVATIVE FREE 10 ML: 5 INJECTION INTRAVENOUS at 08:25

## 2022-01-01 RX ADMIN — GLYCOPYRROLATE 0.2 MG: 0.2 INJECTION INTRAMUSCULAR; INTRAVENOUS at 05:17

## 2022-01-01 RX ADMIN — ALBUTEROL SULFATE 2.5 MG: 2.5 SOLUTION RESPIRATORY (INHALATION) at 19:51

## 2022-01-01 RX ADMIN — INSULIN GLARGINE 10 UNITS: 100 INJECTION, SOLUTION SUBCUTANEOUS at 11:30

## 2022-01-01 RX ADMIN — APIXABAN 2.5 MG: 2.5 TABLET, FILM COATED ORAL at 09:22

## 2022-01-01 RX ADMIN — TRIAMCINOLONE ACETONIDE 80 MG: 200 INJECTION, SUSPENSION INTRA-ARTICULAR; INTRAMUSCULAR at 10:12

## 2022-01-01 RX ADMIN — ACETAMINOPHEN 650 MG: 325 TABLET ORAL at 19:01

## 2022-01-01 RX ADMIN — HEPARIN SODIUM 5000 UNITS: 5000 INJECTION INTRAVENOUS; SUBCUTANEOUS at 06:27

## 2022-01-01 RX ADMIN — LOPERAMIDE HYDROCHLORIDE 2 MG: 2 CAPSULE ORAL at 11:52

## 2022-01-01 RX ADMIN — FENTANYL CITRATE 50 MCG: 50 INJECTION, SOLUTION INTRAMUSCULAR; INTRAVENOUS at 22:19

## 2022-01-01 RX ADMIN — METRONIDAZOLE 500 MG: 500 INJECTION, SOLUTION INTRAVENOUS at 06:47

## 2022-01-01 RX ADMIN — CLINDAMYCIN PHOSPHATE 900 MG: 900 INJECTION, SOLUTION INTRAVENOUS at 12:57

## 2022-01-01 RX ADMIN — SODIUM CHLORIDE, PRESERVATIVE FREE 20 MG: 5 INJECTION INTRAVENOUS at 12:27

## 2022-01-01 RX ADMIN — METRONIDAZOLE 500 MG: 500 INJECTION, SOLUTION INTRAVENOUS at 15:44

## 2022-01-01 RX ADMIN — ACETAMINOPHEN 1000 MG: 160 SOLUTION ORAL at 09:12

## 2022-01-01 RX ADMIN — PROPOFOL 20 MCG/KG/MIN: 10 INJECTION, EMULSION INTRAVENOUS at 03:13

## 2022-01-01 RX ADMIN — SODIUM CHLORIDE: 9 INJECTION, SOLUTION INTRAVENOUS at 22:05

## 2022-01-01 RX ADMIN — MORPHINE SULFATE 2 MG: 4 INJECTION INTRAVENOUS at 16:35

## 2022-01-01 RX ADMIN — ALBUTEROL SULFATE 2.5 MG: 2.5 SOLUTION RESPIRATORY (INHALATION) at 00:20

## 2022-01-01 RX ADMIN — ACETAMINOPHEN 650 MG: 325 TABLET ORAL at 09:48

## 2022-01-01 RX ADMIN — FUROSEMIDE 40 MG: 40 TABLET ORAL at 09:26

## 2022-01-01 RX ADMIN — SODIUM CHLORIDE, PRESERVATIVE FREE 10 ML: 5 INJECTION INTRAVENOUS at 09:31

## 2022-01-01 RX ADMIN — CIPROFLOXACIN 400 MG: 2 INJECTION, SOLUTION INTRAVENOUS at 03:10

## 2022-01-01 RX ADMIN — GLYCOPYRROLATE 0.2 MG: 0.2 INJECTION INTRAMUSCULAR; INTRAVENOUS at 22:51

## 2022-01-01 RX ADMIN — METOPROLOL TARTRATE 5 MG: 5 INJECTION INTRAVENOUS at 02:44

## 2022-01-01 RX ADMIN — CIPROFLOXACIN 400 MG: 2 INJECTION, SOLUTION INTRAVENOUS at 03:53

## 2022-01-01 RX ADMIN — PROPOFOL 25 MCG/KG/MIN: 10 INJECTION, EMULSION INTRAVENOUS at 03:08

## 2022-01-01 RX ADMIN — Medication 1 MG: at 06:28

## 2022-01-01 RX ADMIN — INSULIN GLARGINE 20 UNITS: 100 INJECTION, SOLUTION SUBCUTANEOUS at 10:57

## 2022-01-01 RX ADMIN — METRONIDAZOLE 500 MG: 500 INJECTION, SOLUTION INTRAVENOUS at 17:15

## 2022-01-01 RX ADMIN — Medication 50 MCG/HR: at 03:09

## 2022-01-01 RX ADMIN — AMIODARONE HYDROCHLORIDE 100 MG: 200 TABLET ORAL at 09:30

## 2022-01-01 RX ADMIN — MORPHINE SULFATE 2 MG: 2 INJECTION, SOLUTION INTRAMUSCULAR; INTRAVENOUS at 07:30

## 2022-01-01 RX ADMIN — MORPHINE SULFATE 2 MG: 2 INJECTION, SOLUTION INTRAMUSCULAR; INTRAVENOUS at 10:43

## 2022-01-01 RX ADMIN — MORPHINE SULFATE 4 MG: 4 INJECTION INTRAVENOUS at 23:57

## 2022-01-01 RX ADMIN — ESMOLOL HYDROCHLORIDE 5 MG: 10 INJECTION, SOLUTION INTRAVENOUS at 11:46

## 2022-01-01 RX ADMIN — GUAIFENESIN 600 MG: 600 TABLET, EXTENDED RELEASE ORAL at 09:28

## 2022-01-01 RX ADMIN — SODIUM CHLORIDE 1.2 UNITS/HR: 9 INJECTION, SOLUTION INTRAVENOUS at 04:29

## 2022-01-01 RX ADMIN — SODIUM CHLORIDE, POTASSIUM CHLORIDE, SODIUM LACTATE AND CALCIUM CHLORIDE: 600; 310; 30; 20 INJECTION, SOLUTION INTRAVENOUS at 18:41

## 2022-01-01 RX ADMIN — HEPARIN SODIUM 5000 UNITS: 5000 INJECTION INTRAVENOUS; SUBCUTANEOUS at 23:43

## 2022-01-01 RX ADMIN — PROPOFOL 100 MG: 10 INJECTION, EMULSION INTRAVENOUS at 21:29

## 2022-01-01 RX ADMIN — ACETAMINOPHEN 1000 MG: 160 SOLUTION ORAL at 01:17

## 2022-01-01 RX ADMIN — SODIUM CHLORIDE, POTASSIUM CHLORIDE, SODIUM LACTATE AND CALCIUM CHLORIDE 1000 ML: 600; 310; 30; 20 INJECTION, SOLUTION INTRAVENOUS at 09:13

## 2022-01-01 RX ADMIN — CIPROFLOXACIN 400 MG: 2 INJECTION, SOLUTION INTRAVENOUS at 03:08

## 2022-01-01 RX ADMIN — OSELTAMIVIR PHOSPHATE 30 MG: 30 CAPSULE ORAL at 09:29

## 2022-01-01 RX ADMIN — MORPHINE SULFATE 2 MG: 2 INJECTION, SOLUTION INTRAMUSCULAR; INTRAVENOUS at 13:29

## 2022-01-01 RX ADMIN — ROCURONIUM BROMIDE 20 MG: 10 INJECTION, SOLUTION INTRAVENOUS at 11:08

## 2022-01-01 RX ADMIN — MORPHINE SULFATE 2 MG: 2 INJECTION, SOLUTION INTRAMUSCULAR; INTRAVENOUS at 02:30

## 2022-01-01 RX ADMIN — SODIUM CHLORIDE, PRESERVATIVE FREE 10 ML: 5 INJECTION INTRAVENOUS at 09:07

## 2022-01-01 RX ADMIN — CIPROFLOXACIN 400 MG: 2 INJECTION, SOLUTION INTRAVENOUS at 03:59

## 2022-01-01 RX ADMIN — GUAIFENESIN 600 MG: 600 TABLET, EXTENDED RELEASE ORAL at 21:06

## 2022-01-01 RX ADMIN — PROPOFOL 10 MCG/KG/MIN: 10 INJECTION, EMULSION INTRAVENOUS at 14:06

## 2022-01-01 RX ADMIN — METOPROLOL TARTRATE 25 MG: 25 TABLET, FILM COATED ORAL at 21:41

## 2022-01-01 RX ADMIN — METOPROLOL TARTRATE 25 MG: 25 TABLET, FILM COATED ORAL at 09:29

## 2022-01-01 RX ADMIN — ROCURONIUM BROMIDE 10 MG: 10 INJECTION, SOLUTION INTRAVENOUS at 00:47

## 2022-01-01 RX ADMIN — SODIUM CHLORIDE 5.97 UNITS/HR: 9 INJECTION, SOLUTION INTRAVENOUS at 06:41

## 2022-01-01 RX ADMIN — FENTANYL CITRATE 50 MCG: 50 INJECTION, SOLUTION INTRAMUSCULAR; INTRAVENOUS at 01:22

## 2022-01-01 RX ADMIN — SODIUM CHLORIDE, POTASSIUM CHLORIDE, SODIUM LACTATE AND CALCIUM CHLORIDE: 600; 310; 30; 20 INJECTION, SOLUTION INTRAVENOUS at 02:45

## 2022-01-01 RX ADMIN — ONDANSETRON 4 MG: 2 INJECTION INTRAMUSCULAR; INTRAVENOUS at 16:35

## 2022-01-01 RX ADMIN — PROPOFOL 20 MCG/KG/MIN: 10 INJECTION, EMULSION INTRAVENOUS at 20:21

## 2022-01-01 RX ADMIN — CIPROFLOXACIN 400 MG: 2 INJECTION, SOLUTION INTRAVENOUS at 03:57

## 2022-01-01 RX ADMIN — INSULIN LISPRO 4 UNITS: 100 INJECTION, SOLUTION INTRAVENOUS; SUBCUTANEOUS at 19:45

## 2022-01-01 RX ADMIN — INSULIN LISPRO 4 UNITS: 100 INJECTION, SOLUTION INTRAVENOUS; SUBCUTANEOUS at 09:08

## 2022-01-01 RX ADMIN — SODIUM CHLORIDE: 9 INJECTION, SOLUTION INTRAVENOUS at 09:31

## 2022-01-01 RX ADMIN — PANTOPRAZOLE SODIUM 40 MG: 40 TABLET, DELAYED RELEASE ORAL at 09:30

## 2022-01-01 RX ADMIN — INSULIN GLARGINE 14 UNITS: 100 INJECTION, SOLUTION SUBCUTANEOUS at 21:06

## 2022-01-01 RX ADMIN — KETOROLAC TROMETHAMINE 15 MG: 30 INJECTION, SOLUTION INTRAMUSCULAR at 11:28

## 2022-01-01 RX ADMIN — Medication 1 MG: at 01:57

## 2022-01-01 RX ADMIN — SODIUM CHLORIDE, PRESERVATIVE FREE 10 ML: 5 INJECTION INTRAVENOUS at 20:50

## 2022-01-01 RX ADMIN — CHLORHEXIDINE GLUCONATE 15 ML: 1.2 RINSE ORAL at 20:48

## 2022-01-01 RX ADMIN — FUROSEMIDE 20 MG: 10 INJECTION, SOLUTION INTRAMUSCULAR; INTRAVENOUS at 18:31

## 2022-01-01 RX ADMIN — CHLORHEXIDINE GLUCONATE 15 ML: 1.2 RINSE ORAL at 10:24

## 2022-01-01 RX ADMIN — ATORVASTATIN CALCIUM 10 MG: 10 TABLET, FILM COATED ORAL at 21:41

## 2022-01-01 RX ADMIN — ALBUTEROL SULFATE 2.5 MG: 2.5 SOLUTION RESPIRATORY (INHALATION) at 12:37

## 2022-01-01 RX ADMIN — PROPOFOL 10 MCG/KG/MIN: 10 INJECTION, EMULSION INTRAVENOUS at 18:24

## 2022-01-01 RX ADMIN — FUROSEMIDE 40 MG: 40 TABLET ORAL at 09:31

## 2022-01-01 RX ADMIN — METOPROLOL TARTRATE 25 MG: 25 TABLET, FILM COATED ORAL at 21:07

## 2022-01-01 RX ADMIN — SODIUM CHLORIDE, PRESERVATIVE FREE 20 MG: 5 INJECTION INTRAVENOUS at 09:07

## 2022-01-01 RX ADMIN — INSULIN LISPRO 4 UNITS: 100 INJECTION, SOLUTION INTRAVENOUS; SUBCUTANEOUS at 01:16

## 2022-01-01 RX ADMIN — CALCIUM GLUCONATE 1000 MG: 20 INJECTION, SOLUTION INTRAVENOUS at 09:04

## 2022-01-01 RX ADMIN — SODIUM CHLORIDE, PRESERVATIVE FREE 10 ML: 5 INJECTION INTRAVENOUS at 09:28

## 2022-01-01 RX ADMIN — DESMOPRESSIN ACETATE 40 MG: 0.2 TABLET ORAL at 11:26

## 2022-01-01 RX ADMIN — DESMOPRESSIN ACETATE 40 MG: 0.2 TABLET ORAL at 09:12

## 2022-01-01 RX ADMIN — Medication 1 MG: at 17:43

## 2022-01-01 RX ADMIN — INSULIN LISPRO 4 UNITS: 100 INJECTION, SOLUTION INTRAVENOUS; SUBCUTANEOUS at 12:11

## 2022-01-01 RX ADMIN — FENTANYL CITRATE 50 MCG: 50 INJECTION, SOLUTION INTRAMUSCULAR; INTRAVENOUS at 22:14

## 2022-01-01 RX ADMIN — SODIUM CHLORIDE, POTASSIUM CHLORIDE, SODIUM LACTATE AND CALCIUM CHLORIDE: 600; 310; 30; 20 INJECTION, SOLUTION INTRAVENOUS at 23:36

## 2022-01-01 RX ADMIN — SODIUM CHLORIDE, PRESERVATIVE FREE 10 ML: 5 INJECTION INTRAVENOUS at 01:57

## 2022-01-01 RX ADMIN — SODIUM CHLORIDE: 9 INJECTION, SOLUTION INTRAVENOUS at 22:57

## 2022-01-01 RX ADMIN — Medication 1 MG: at 08:19

## 2022-01-01 RX ADMIN — METOPROLOL TARTRATE 25 MG: 25 TABLET, FILM COATED ORAL at 09:25

## 2022-01-01 RX ADMIN — METRONIDAZOLE 500 MG: 500 INJECTION, SOLUTION INTRAVENOUS at 18:01

## 2022-01-01 RX ADMIN — METRONIDAZOLE 500 MG: 500 INJECTION, SOLUTION INTRAVENOUS at 14:44

## 2022-01-01 RX ADMIN — INSULIN GLARGINE 14 UNITS: 100 INJECTION, SOLUTION SUBCUTANEOUS at 09:29

## 2022-01-01 RX ADMIN — IOPAMIDOL 75 ML: 755 INJECTION, SOLUTION INTRAVENOUS at 19:27

## 2022-01-01 RX ADMIN — MORPHINE SULFATE 2 MG: 2 INJECTION, SOLUTION INTRAMUSCULAR; INTRAVENOUS at 08:52

## 2022-01-01 RX ADMIN — DESMOPRESSIN ACETATE 40 MG: 0.2 TABLET ORAL at 08:24

## 2022-01-01 RX ADMIN — SODIUM CHLORIDE, PRESERVATIVE FREE 20 MG: 5 INJECTION INTRAVENOUS at 08:52

## 2022-01-01 RX ADMIN — ACETAMINOPHEN 650 MG: 325 TABLET ORAL at 03:38

## 2022-01-01 RX ADMIN — METRONIDAZOLE 500 MG: 500 INJECTION, SOLUTION INTRAVENOUS at 23:42

## 2022-01-01 RX ADMIN — FENTANYL CITRATE 25 MCG: 50 INJECTION, SOLUTION INTRAMUSCULAR; INTRAVENOUS at 04:41

## 2022-01-01 RX ADMIN — BUPIVACAINE HYDROCHLORIDE 25 MG: 5 INJECTION, SOLUTION PERINEURAL at 10:12

## 2022-01-01 RX ADMIN — CHLORHEXIDINE GLUCONATE 15 ML: 1.2 RINSE ORAL at 11:29

## 2022-01-01 RX ADMIN — Medication 1 MG: at 09:41

## 2022-01-01 RX ADMIN — PANTOPRAZOLE SODIUM 40 MG: 40 TABLET, DELAYED RELEASE ORAL at 06:36

## 2022-01-01 RX ADMIN — ESMOLOL HYDROCHLORIDE 10 MG: 10 INJECTION, SOLUTION INTRAVENOUS at 11:34

## 2022-01-01 RX ADMIN — AMIODARONE HYDROCHLORIDE 100 MG: 200 TABLET ORAL at 09:21

## 2022-01-01 RX ADMIN — ROCURONIUM BROMIDE 10 MG: 10 INJECTION, SOLUTION INTRAVENOUS at 00:29

## 2022-01-01 RX ADMIN — OSELTAMIVIR PHOSPHATE 30 MG: 30 CAPSULE ORAL at 09:26

## 2022-01-01 RX ADMIN — INSULIN GLARGINE 14 UNITS: 100 INJECTION, SOLUTION SUBCUTANEOUS at 09:20

## 2022-01-01 RX ADMIN — POTASSIUM CHLORIDE 20 MEQ: 20 TABLET, EXTENDED RELEASE ORAL at 09:29

## 2022-01-01 RX ADMIN — PROPOFOL 20 MCG/KG/MIN: 10 INJECTION, EMULSION INTRAVENOUS at 11:46

## 2022-01-01 RX ADMIN — Medication 5 MCG/MIN: at 21:19

## 2022-01-01 RX ADMIN — CHLORHEXIDINE GLUCONATE 15 ML: 1.2 RINSE ORAL at 20:29

## 2022-01-01 RX ADMIN — METRONIDAZOLE 500 MG: 500 INJECTION, SOLUTION INTRAVENOUS at 01:11

## 2022-01-01 RX ADMIN — ROCURONIUM BROMIDE 10 MG: 10 INJECTION, SOLUTION INTRAVENOUS at 22:28

## 2022-01-01 RX ADMIN — PROTHROMBIN, COAGULATION FACTOR VII HUMAN, COAGULATION FACTOR IX HUMAN, COAGULATION FACTOR X HUMAN, PROTEIN C, PROTEIN S HUMAN, AND WATER 2000 UNITS: KIT at 00:40

## 2022-01-01 RX ADMIN — INSULIN LISPRO 8 UNITS: 100 INJECTION, SOLUTION INTRAVENOUS; SUBCUTANEOUS at 12:51

## 2022-01-01 RX ADMIN — ROCURONIUM BROMIDE 10 MG: 10 INJECTION, SOLUTION INTRAVENOUS at 23:18

## 2022-01-01 RX ADMIN — CHLORHEXIDINE GLUCONATE 15 ML: 1.2 RINSE ORAL at 21:06

## 2022-01-01 RX ADMIN — INSULIN LISPRO 8 UNITS: 100 INJECTION, SOLUTION INTRAVENOUS; SUBCUTANEOUS at 00:22

## 2022-01-01 RX ADMIN — AMLODIPINE BESYLATE 5 MG: 5 TABLET ORAL at 09:22

## 2022-01-01 RX ADMIN — HEPARIN SODIUM 5000 UNITS: 5000 INJECTION INTRAVENOUS; SUBCUTANEOUS at 06:08

## 2022-01-01 RX ADMIN — APIXABAN 2.5 MG: 2.5 TABLET, FILM COATED ORAL at 09:31

## 2022-01-01 RX ADMIN — Medication 5 MCG/MIN: at 20:44

## 2022-01-01 RX ADMIN — SODIUM CHLORIDE 500 ML: 9 INJECTION, SOLUTION INTRAVENOUS at 01:27

## 2022-01-01 RX ADMIN — ACETAMINOPHEN 1000 MG: 160 SOLUTION ORAL at 01:31

## 2022-01-01 RX ADMIN — Medication 100 MCG: at 01:04

## 2022-01-01 RX ADMIN — HEPARIN SODIUM 5000 UNITS: 5000 INJECTION INTRAVENOUS; SUBCUTANEOUS at 21:52

## 2022-01-01 RX ADMIN — CHLORHEXIDINE GLUCONATE 15 ML: 1.2 RINSE ORAL at 09:05

## 2022-01-01 RX ADMIN — AMLODIPINE BESYLATE 5 MG: 5 TABLET ORAL at 09:27

## 2022-01-01 RX ADMIN — ALBUTEROL SULFATE 2.5 MG: 2.5 SOLUTION RESPIRATORY (INHALATION) at 09:05

## 2022-01-01 RX ADMIN — METHOCARBAMOL TABLETS 500 MG: 500 TABLET, COATED ORAL at 17:19

## 2022-01-01 RX ADMIN — AMLODIPINE BESYLATE 5 MG: 5 TABLET ORAL at 09:30

## 2022-01-01 RX ADMIN — SODIUM CHLORIDE: 9 INJECTION, SOLUTION INTRAVENOUS at 13:22

## 2022-01-01 RX ADMIN — GLYCOPYRROLATE 0.2 MG: 0.2 INJECTION INTRAMUSCULAR; INTRAVENOUS at 14:55

## 2022-01-01 RX ADMIN — Medication 1 MG: at 11:57

## 2022-01-01 RX ADMIN — MORPHINE SULFATE 2 MG: 2 INJECTION, SOLUTION INTRAMUSCULAR; INTRAVENOUS at 18:47

## 2022-01-01 RX ADMIN — METOPROLOL TARTRATE 25 MG: 25 TABLET, FILM COATED ORAL at 09:21

## 2022-01-01 RX ADMIN — INSULIN LISPRO 6 UNITS: 100 INJECTION, SOLUTION INTRAVENOUS; SUBCUTANEOUS at 12:16

## 2022-01-01 RX ADMIN — GUAIFENESIN 600 MG: 600 TABLET, EXTENDED RELEASE ORAL at 21:42

## 2022-01-01 RX ADMIN — GUAIFENESIN 600 MG: 600 TABLET, EXTENDED RELEASE ORAL at 09:22

## 2022-01-01 RX ADMIN — GABAPENTIN 100 MG: 100 CAPSULE ORAL at 14:49

## 2022-01-01 RX ADMIN — FUROSEMIDE 20 MG: 10 INJECTION, SOLUTION INTRAMUSCULAR; INTRAVENOUS at 11:30

## 2022-01-01 RX ADMIN — PREDNISONE 2.5 MG: 5 TABLET ORAL at 09:27

## 2022-01-01 RX ADMIN — Medication 50 MCG/HR: at 03:17

## 2022-01-01 RX ADMIN — SODIUM CHLORIDE, POTASSIUM CHLORIDE, SODIUM LACTATE AND CALCIUM CHLORIDE 1000 ML: 600; 310; 30; 20 INJECTION, SOLUTION INTRAVENOUS at 15:41

## 2022-01-01 RX ADMIN — MORPHINE SULFATE 2 MG: 2 INJECTION, SOLUTION INTRAMUSCULAR; INTRAVENOUS at 16:31

## 2022-01-01 RX ADMIN — SODIUM CHLORIDE, PRESERVATIVE FREE 20 MG: 5 INJECTION INTRAVENOUS at 08:24

## 2022-01-01 RX ADMIN — CLINDAMYCIN PHOSPHATE 600 MG: 600 INJECTION, SOLUTION INTRAVENOUS at 20:01

## 2022-01-01 RX ADMIN — GABAPENTIN 100 MG: 100 CAPSULE ORAL at 08:24

## 2022-01-01 RX ADMIN — PROPOFOL 25 MCG/KG/MIN: 10 INJECTION, EMULSION INTRAVENOUS at 09:51

## 2022-01-01 RX ADMIN — METRONIDAZOLE 500 MG: 500 INJECTION, SOLUTION INTRAVENOUS at 22:09

## 2022-01-01 RX ADMIN — METRONIDAZOLE 500 MG: 500 INJECTION, SOLUTION INTRAVENOUS at 09:37

## 2022-01-01 RX ADMIN — INSULIN LISPRO 3 UNITS: 100 INJECTION, SOLUTION INTRAVENOUS; SUBCUTANEOUS at 17:03

## 2022-01-01 RX ADMIN — GABAPENTIN 100 MG: 100 CAPSULE ORAL at 11:26

## 2022-01-01 RX ADMIN — FENTANYL CITRATE 50 MCG: 50 INJECTION, SOLUTION INTRAMUSCULAR; INTRAVENOUS at 12:46

## 2022-01-01 RX ADMIN — Medication 1 MG: at 20:55

## 2022-01-01 RX ADMIN — SODIUM CHLORIDE: 9 INJECTION, SOLUTION INTRAVENOUS at 00:33

## 2022-01-01 RX ADMIN — INSULIN LISPRO 2 UNITS: 100 INJECTION, SOLUTION INTRAVENOUS; SUBCUTANEOUS at 21:41

## 2022-01-01 RX ADMIN — MORPHINE SULFATE 2 MG: 2 INJECTION, SOLUTION INTRAMUSCULAR; INTRAVENOUS at 20:11

## 2022-01-01 RX ADMIN — OLANZAPINE 5 MG: 5 TABLET, ORALLY DISINTEGRATING ORAL at 11:31

## 2022-01-01 RX ADMIN — Medication 5 MCG/MIN: at 03:04

## 2022-01-01 RX ADMIN — IPRATROPIUM BROMIDE AND ALBUTEROL SULFATE 1 AMPULE: .5; 3 SOLUTION RESPIRATORY (INHALATION) at 13:40

## 2022-01-01 RX ADMIN — Medication 0.2 MG/KG/HR: at 11:46

## 2022-01-01 RX ADMIN — ROCURONIUM BROMIDE 50 MG: 10 INJECTION, SOLUTION INTRAVENOUS at 01:00

## 2022-01-01 RX ADMIN — Medication 1 MG: at 06:18

## 2022-01-01 RX ADMIN — METRONIDAZOLE 500 MG: 500 INJECTION, SOLUTION INTRAVENOUS at 22:49

## 2022-01-01 RX ADMIN — OSELTAMIVIR PHOSPHATE 30 MG: 30 CAPSULE ORAL at 09:21

## 2022-01-01 RX ADMIN — Medication 1 MG: at 14:54

## 2022-01-01 RX ADMIN — CHLORHEXIDINE GLUCONATE 15 ML: 1.2 RINSE ORAL at 21:31

## 2022-01-01 RX ADMIN — APIXABAN 2.5 MG: 2.5 TABLET, FILM COATED ORAL at 21:07

## 2022-01-01 RX ADMIN — METHOCARBAMOL TABLETS 500 MG: 500 TABLET, COATED ORAL at 23:33

## 2022-01-01 RX ADMIN — METHOCARBAMOL TABLETS 500 MG: 500 TABLET, COATED ORAL at 10:16

## 2022-01-01 RX ADMIN — FENTANYL CITRATE 25 MCG: 50 INJECTION, SOLUTION INTRAMUSCULAR; INTRAVENOUS at 05:08

## 2022-01-01 RX ADMIN — Medication 1 MG: at 22:53

## 2022-01-01 RX ADMIN — SODIUM CHLORIDE 500 ML: 9 INJECTION, SOLUTION INTRAVENOUS at 11:31

## 2022-01-01 RX ADMIN — CHLORHEXIDINE GLUCONATE 15 ML: 1.2 RINSE ORAL at 08:52

## 2022-01-01 RX ADMIN — CALCIUM GLUCONATE 1000 MG: 20 INJECTION, SOLUTION INTRAVENOUS at 04:19

## 2022-01-01 RX ADMIN — SODIUM CHLORIDE, PRESERVATIVE FREE 10 ML: 5 INJECTION INTRAVENOUS at 21:06

## 2022-01-01 RX ADMIN — HEPARIN SODIUM 5000 UNITS: 5000 INJECTION INTRAVENOUS; SUBCUTANEOUS at 13:27

## 2022-01-01 RX ADMIN — SODIUM CHLORIDE 1.66 UNITS/HR: 9 INJECTION, SOLUTION INTRAVENOUS at 16:44

## 2022-01-01 RX ADMIN — METRONIDAZOLE 500 MG: 500 INJECTION, SOLUTION INTRAVENOUS at 23:25

## 2022-01-01 RX ADMIN — AMIODARONE HYDROCHLORIDE 100 MG: 200 TABLET ORAL at 09:26

## 2022-01-01 RX ADMIN — ACETAMINOPHEN 1000 MG: 160 SOLUTION ORAL at 18:33

## 2022-01-01 RX ADMIN — OXYCODONE 2.5 MG: 5 TABLET ORAL at 14:22

## 2022-01-01 RX ADMIN — CHLORHEXIDINE GLUCONATE 15 ML: 1.2 RINSE ORAL at 09:15

## 2022-01-01 RX ADMIN — FENTANYL CITRATE 50 MCG: 50 INJECTION, SOLUTION INTRAMUSCULAR; INTRAVENOUS at 21:29

## 2022-01-01 RX ADMIN — APIXABAN 2.5 MG: 2.5 TABLET, FILM COATED ORAL at 21:41

## 2022-01-01 RX ADMIN — SODIUM CHLORIDE, PRESERVATIVE FREE 10 ML: 5 INJECTION INTRAVENOUS at 11:34

## 2022-01-01 RX ADMIN — ALBUTEROL SULFATE 2.5 MG: 2.5 SOLUTION RESPIRATORY (INHALATION) at 16:13

## 2022-01-01 RX ADMIN — BENZOCAINE AND MENTHOL 1 LOZENGE: 15; 2.6 LOZENGE ORAL at 09:22

## 2022-01-01 RX ADMIN — PROPOFOL 20 MCG/KG/MIN: 10 INJECTION, EMULSION INTRAVENOUS at 19:45

## 2022-01-01 RX ADMIN — FENTANYL CITRATE 50 MCG: 50 INJECTION, SOLUTION INTRAMUSCULAR; INTRAVENOUS at 11:06

## 2022-01-01 RX ADMIN — IPRATROPIUM BROMIDE AND ALBUTEROL SULFATE 1 AMPULE: .5; 3 SOLUTION RESPIRATORY (INHALATION) at 21:34

## 2022-01-01 RX ADMIN — SODIUM CHLORIDE, POTASSIUM CHLORIDE, SODIUM LACTATE AND CALCIUM CHLORIDE: 600; 310; 30; 20 INJECTION, SOLUTION INTRAVENOUS at 05:48

## 2022-01-01 RX ADMIN — HEPARIN SODIUM 5000 UNITS: 5000 INJECTION INTRAVENOUS; SUBCUTANEOUS at 14:50

## 2022-01-01 RX ADMIN — IPRATROPIUM BROMIDE AND ALBUTEROL SULFATE 1 AMPULE: .5; 3 SOLUTION RESPIRATORY (INHALATION) at 17:03

## 2022-01-01 RX ADMIN — PREDNISONE 2.5 MG: 5 TABLET ORAL at 09:29

## 2022-01-01 RX ADMIN — ACETAMINOPHEN 1000 MG: 160 SOLUTION ORAL at 10:14

## 2022-01-01 RX ADMIN — SODIUM CHLORIDE 1000 ML: 9 INJECTION, SOLUTION INTRAVENOUS at 16:50

## 2022-01-01 RX ADMIN — SODIUM CHLORIDE, PRESERVATIVE FREE 10 ML: 5 INJECTION INTRAVENOUS at 19:53

## 2022-01-01 RX ADMIN — MORPHINE SULFATE 2 MG: 2 INJECTION, SOLUTION INTRAMUSCULAR; INTRAVENOUS at 04:57

## 2022-01-01 RX ADMIN — METHOCARBAMOL TABLETS 500 MG: 500 TABLET, COATED ORAL at 11:27

## 2022-01-01 RX ADMIN — SODIUM CHLORIDE: 9 INJECTION, SOLUTION INTRAVENOUS at 23:51

## 2022-01-01 RX ADMIN — POTASSIUM CHLORIDE 20 MEQ: 20 TABLET, EXTENDED RELEASE ORAL at 09:28

## 2022-01-01 RX ADMIN — PREDNISONE 2.5 MG: 5 TABLET ORAL at 09:22

## 2022-01-01 RX ADMIN — HEPARIN SODIUM 5000 UNITS: 5000 INJECTION INTRAVENOUS; SUBCUTANEOUS at 08:22

## 2022-01-01 RX ADMIN — APIXABAN 2.5 MG: 2.5 TABLET, FILM COATED ORAL at 09:27

## 2022-01-01 RX ADMIN — INSULIN LISPRO 4 UNITS: 100 INJECTION, SOLUTION INTRAVENOUS; SUBCUTANEOUS at 04:04

## 2022-01-01 RX ADMIN — SODIUM CHLORIDE: 9 INJECTION, SOLUTION INTRAVENOUS at 18:59

## 2022-01-01 RX ADMIN — METOPROLOL TARTRATE 5 MG: 5 INJECTION INTRAVENOUS at 22:15

## 2022-01-01 RX ADMIN — ONDANSETRON 4 MG: 2 INJECTION INTRAMUSCULAR; INTRAVENOUS at 16:50

## 2022-01-01 RX ADMIN — DEXAMETHASONE SODIUM PHOSPHATE 10 MG: 10 INJECTION INTRAMUSCULAR; INTRAVENOUS at 22:33

## 2022-01-01 RX ADMIN — Medication 100 MCG: at 00:48

## 2022-01-01 RX ADMIN — PROPOFOL 20 MCG/KG/MIN: 10 INJECTION, EMULSION INTRAVENOUS at 01:04

## 2022-01-01 RX ADMIN — PROPOFOL 20 MCG/KG/MIN: 10 INJECTION, EMULSION INTRAVENOUS at 03:04

## 2022-01-01 RX ADMIN — HEPARIN SODIUM 5000 UNITS: 5000 INJECTION INTRAVENOUS; SUBCUTANEOUS at 14:13

## 2022-01-01 RX ADMIN — ACETAMINOPHEN 1000 MG: 160 SOLUTION ORAL at 02:06

## 2022-01-01 RX ADMIN — FENTANYL CITRATE 50 MCG: 50 INJECTION, SOLUTION INTRAMUSCULAR; INTRAVENOUS at 11:11

## 2022-01-01 RX ADMIN — ACETAMINOPHEN 1000 MG: 160 SOLUTION ORAL at 11:26

## 2022-01-01 RX ADMIN — FENTANYL CITRATE 50 MCG: 50 INJECTION, SOLUTION INTRAMUSCULAR; INTRAVENOUS at 22:27

## 2022-01-01 RX ADMIN — IPRATROPIUM BROMIDE AND ALBUTEROL SULFATE 1 AMPULE: .5; 3 SOLUTION RESPIRATORY (INHALATION) at 11:41

## 2022-01-01 RX ADMIN — SODIUM CHLORIDE, POTASSIUM CHLORIDE, SODIUM LACTATE AND CALCIUM CHLORIDE 1000 ML: 600; 310; 30; 20 INJECTION, SOLUTION INTRAVENOUS at 03:08

## 2022-01-01 RX ADMIN — POTASSIUM CHLORIDE 20 MEQ: 20 TABLET, EXTENDED RELEASE ORAL at 21:07

## 2022-01-01 RX ADMIN — ROCURONIUM BROMIDE 50 MG: 10 INJECTION, SOLUTION INTRAVENOUS at 21:29

## 2022-01-01 RX ADMIN — SODIUM CHLORIDE, PRESERVATIVE FREE 20 MG: 5 INJECTION INTRAVENOUS at 09:05

## 2022-01-01 RX ADMIN — METRONIDAZOLE 500 MG: 500 INJECTION, SOLUTION INTRAVENOUS at 15:32

## 2022-01-01 RX ADMIN — Medication 25 MCG/HR: at 02:33

## 2022-01-01 RX ADMIN — DEXMEDETOMIDINE HYDROCHLORIDE 0.2 MCG/KG/HR: 4 INJECTION, SOLUTION INTRAVENOUS at 17:16

## 2022-01-01 RX ADMIN — PANTOPRAZOLE SODIUM 40 MG: 40 TABLET, DELAYED RELEASE ORAL at 06:29

## 2022-01-01 RX ADMIN — SODIUM CHLORIDE, PRESERVATIVE FREE 10 ML: 5 INJECTION INTRAVENOUS at 21:03

## 2022-01-01 RX ADMIN — METRONIDAZOLE 500 MG: 500 INJECTION, SOLUTION INTRAVENOUS at 01:13

## 2022-01-01 RX ADMIN — BENZOCAINE AND MENTHOL 1 LOZENGE: 15; 2.6 LOZENGE ORAL at 13:24

## 2022-01-01 RX ADMIN — METRONIDAZOLE 500 MG: 500 INJECTION, SOLUTION INTRAVENOUS at 09:34

## 2022-01-01 RX ADMIN — Medication 50 MCG/HR: at 05:45

## 2022-01-01 RX ADMIN — LIDOCAINE HYDROCHLORIDE 50 MG: 10 INJECTION, SOLUTION EPIDURAL; INFILTRATION; INTRACAUDAL; PERINEURAL at 21:29

## 2022-01-01 RX ADMIN — ALBUTEROL SULFATE 2.5 MG: 2.5 SOLUTION RESPIRATORY (INHALATION) at 00:00

## 2022-01-01 RX ADMIN — MORPHINE SULFATE 2 MG: 2 INJECTION, SOLUTION INTRAMUSCULAR; INTRAVENOUS at 05:24

## 2022-01-01 RX ADMIN — SODIUM CHLORIDE, POTASSIUM CHLORIDE, SODIUM LACTATE AND CALCIUM CHLORIDE 1000 ML: 600; 310; 30; 20 INJECTION, SOLUTION INTRAVENOUS at 17:13

## 2022-01-01 RX ADMIN — ACETAMINOPHEN 1000 MG: 160 SOLUTION ORAL at 18:12

## 2022-01-01 RX ADMIN — FENTANYL CITRATE 50 MCG: 50 INJECTION, SOLUTION INTRAMUSCULAR; INTRAVENOUS at 23:48

## 2022-01-01 RX ADMIN — FUROSEMIDE 40 MG: 40 TABLET ORAL at 09:22

## 2022-01-01 RX ADMIN — METRONIDAZOLE 500 MG: 500 INJECTION, SOLUTION INTRAVENOUS at 16:31

## 2022-01-01 RX ADMIN — FENTANYL CITRATE 25 MCG: 50 INJECTION, SOLUTION INTRAMUSCULAR; INTRAVENOUS at 20:35

## 2022-01-01 RX ADMIN — METOPROLOL TARTRATE 5 MG: 5 INJECTION INTRAVENOUS at 09:07

## 2022-01-01 RX ADMIN — SODIUM CHLORIDE, POTASSIUM CHLORIDE, SODIUM LACTATE AND CALCIUM CHLORIDE: 600; 310; 30; 20 INJECTION, SOLUTION INTRAVENOUS at 10:22

## 2022-01-01 RX ADMIN — ROCURONIUM BROMIDE 50 MG: 10 INJECTION, SOLUTION INTRAVENOUS at 10:24

## 2022-01-01 RX ADMIN — INSULIN LISPRO 4 UNITS: 100 INJECTION, SOLUTION INTRAVENOUS; SUBCUTANEOUS at 16:36

## 2022-01-01 RX ADMIN — METRONIDAZOLE 500 MG: 500 INJECTION, SOLUTION INTRAVENOUS at 10:15

## 2022-01-01 RX ADMIN — SODIUM CHLORIDE 500 ML: 9 INJECTION, SOLUTION INTRAVENOUS at 23:37

## 2022-01-01 RX ADMIN — BENZOCAINE AND MENTHOL 1 LOZENGE: 15; 2.6 LOZENGE ORAL at 10:39

## 2022-01-01 RX ADMIN — SODIUM CHLORIDE, PRESERVATIVE FREE 10 ML: 5 INJECTION INTRAVENOUS at 09:05

## 2022-01-01 RX ADMIN — ONDANSETRON 4 MG: 2 INJECTION INTRAMUSCULAR; INTRAVENOUS at 00:03

## 2022-01-01 RX ADMIN — BENZOCAINE AND MENTHOL 1 LOZENGE: 15; 2.6 LOZENGE ORAL at 17:53

## 2022-01-01 RX ADMIN — FENTANYL CITRATE 25 MCG: 50 INJECTION, SOLUTION INTRAMUSCULAR; INTRAVENOUS at 16:03

## 2022-01-01 RX ADMIN — POTASSIUM CHLORIDE 20 MEQ: 20 TABLET, EXTENDED RELEASE ORAL at 09:22

## 2022-01-01 RX ADMIN — MORPHINE SULFATE 2 MG: 2 INJECTION, SOLUTION INTRAMUSCULAR; INTRAVENOUS at 18:46

## 2022-01-01 RX ADMIN — ALBUTEROL SULFATE 2.5 MG: 2.5 SOLUTION RESPIRATORY (INHALATION) at 11:51

## 2022-01-01 RX ADMIN — FUROSEMIDE 20 MG: 10 INJECTION, SOLUTION INTRAMUSCULAR; INTRAVENOUS at 13:48

## 2022-01-01 RX ADMIN — ACETAMINOPHEN 1000 MG: 160 SOLUTION ORAL at 16:55

## 2022-01-01 RX ADMIN — SODIUM CHLORIDE, PRESERVATIVE FREE 10 ML: 5 INJECTION INTRAVENOUS at 22:00

## 2022-01-01 RX ADMIN — HYDRALAZINE HYDROCHLORIDE 10 MG: 20 INJECTION INTRAMUSCULAR; INTRAVENOUS at 06:11

## 2022-01-01 RX ADMIN — Medication 50 MCG/HR: at 20:16

## 2022-01-01 RX ADMIN — INSULIN GLARGINE 14 UNITS: 100 INJECTION, SOLUTION SUBCUTANEOUS at 09:00

## 2022-01-01 RX ADMIN — Medication 5 MCG/MIN: at 16:34

## 2022-01-01 RX ADMIN — IPRATROPIUM BROMIDE AND ALBUTEROL SULFATE 1 AMPULE: .5; 3 SOLUTION RESPIRATORY (INHALATION) at 16:02

## 2022-01-01 RX ADMIN — SODIUM CHLORIDE, POTASSIUM CHLORIDE, SODIUM LACTATE AND CALCIUM CHLORIDE: 600; 310; 30; 20 INJECTION, SOLUTION INTRAVENOUS at 02:36

## 2022-01-01 RX ADMIN — ALBUTEROL SULFATE 2.5 MG: 2.5 SOLUTION RESPIRATORY (INHALATION) at 04:15

## 2022-01-01 RX ADMIN — ROCURONIUM BROMIDE 10 MG: 10 INJECTION, SOLUTION INTRAVENOUS at 23:49

## 2022-01-01 RX ADMIN — INSULIN LISPRO 8 UNITS: 100 INJECTION, SOLUTION INTRAVENOUS; SUBCUTANEOUS at 04:06

## 2022-01-01 RX ADMIN — SODIUM CHLORIDE, PRESERVATIVE FREE 10 ML: 5 INJECTION INTRAVENOUS at 08:52

## 2022-01-01 RX ADMIN — MORPHINE SULFATE 2 MG: 2 INJECTION, SOLUTION INTRAMUSCULAR; INTRAVENOUS at 21:49

## 2022-01-01 RX ADMIN — METRONIDAZOLE 500 MG: 500 INJECTION, SOLUTION INTRAVENOUS at 17:19

## 2022-01-01 RX ADMIN — Medication 100 MCG/HR: at 09:37

## 2022-01-01 RX ADMIN — SODIUM CHLORIDE, POTASSIUM CHLORIDE, SODIUM LACTATE AND CALCIUM CHLORIDE: 600; 310; 30; 20 INJECTION, SOLUTION INTRAVENOUS at 13:30

## 2022-01-01 RX ADMIN — INSULIN LISPRO 4 UNITS: 100 INJECTION, SOLUTION INTRAVENOUS; SUBCUTANEOUS at 12:50

## 2022-01-01 RX ADMIN — ATORVASTATIN CALCIUM 10 MG: 10 TABLET, FILM COATED ORAL at 21:07

## 2022-01-01 ASSESSMENT — PULMONARY FUNCTION TESTS
PIF_VALUE: 24
PIF_VALUE: 17
PIF_VALUE: 22
PIF_VALUE: 18
PIF_VALUE: 16
PIF_VALUE: 24
PIF_VALUE: 21
PIF_VALUE: 24
PIF_VALUE: 20
PIF_VALUE: 24
PIF_VALUE: 24
PIF_VALUE: 26
PIF_VALUE: 24
PIF_VALUE: 14
PIF_VALUE: 24
PIF_VALUE: 26
PIF_VALUE: 30
PIF_VALUE: 27
PIF_VALUE: 27
PIF_VALUE: 23
PIF_VALUE: 18
PIF_VALUE: 28
PIF_VALUE: 23
PIF_VALUE: 26
PIF_VALUE: 24
PIF_VALUE: 11
PIF_VALUE: 22
PIF_VALUE: 21
PIF_VALUE: 21
PIF_VALUE: 19
PIF_VALUE: 20
PIF_VALUE: 15
PIF_VALUE: 21
PIF_VALUE: 23
PIF_VALUE: 23
PIF_VALUE: 26
PIF_VALUE: 23
PIF_VALUE: 26
PIF_VALUE: 24
PIF_VALUE: 20
PIF_VALUE: 19
PIF_VALUE: 18
PIF_VALUE: 28
PIF_VALUE: 25
PIF_VALUE: 17
PIF_VALUE: 18
PIF_VALUE: 23
PIF_VALUE: 21
PIF_VALUE: 22
PIF_VALUE: 27
PIF_VALUE: 19
PIF_VALUE: 27

## 2022-01-01 ASSESSMENT — PAIN SCALES - GENERAL
PAINLEVEL_OUTOF10: 8
PAINLEVEL_OUTOF10: 10
PAINLEVEL_OUTOF10: 0
PAINLEVEL_OUTOF10: 4
PAINLEVEL_OUTOF10: 0
PAINLEVEL_OUTOF10: 3
PAINLEVEL_OUTOF10: 0
PAINLEVEL_OUTOF10: 10
PAINLEVEL_OUTOF10: 0
PAINLEVEL_OUTOF10: 8
PAINLEVEL_OUTOF10: 0
PAINLEVEL_OUTOF10: 10
PAINLEVEL_OUTOF10: 0
PAINLEVEL_OUTOF10: 3
PAINLEVEL_OUTOF10: 6
PAINLEVEL_OUTOF10: 7
PAINLEVEL_OUTOF10: 0
PAINLEVEL_OUTOF10: 4
PAINLEVEL_OUTOF10: 0

## 2022-01-01 ASSESSMENT — ENCOUNTER SYMPTOMS
COUGH: 0
TROUBLE SWALLOWING: 0
DIARRHEA: 0
BLOOD IN STOOL: 0
VOMITING: 0
ABDOMINAL PAIN: 0
COLOR CHANGE: 0
NAUSEA: 1
COUGH: 0
SINUS PRESSURE: 0
VOMITING: 0
EYE PAIN: 0
CHEST TIGHTNESS: 0
DIARRHEA: 0
WHEEZING: 0
BLOOD IN STOOL: 0
COLOR CHANGE: 0
FACIAL SWELLING: 0
ABDOMINAL PAIN: 0
NAUSEA: 0
RHINORRHEA: 0
EYE PAIN: 0
RESPIRATORY NEGATIVE: 1
CONSTIPATION: 0
SORE THROAT: 0
DIARRHEA: 1
CONSTIPATION: 0
COUGH: 0
RHINORRHEA: 0
SORE THROAT: 0
CONSTIPATION: 0
RESPIRATORY NEGATIVE: 1
SHORTNESS OF BREATH: 0
CHEST TIGHTNESS: 0
BLOOD IN STOOL: 0
CHEST TIGHTNESS: 0
SHORTNESS OF BREATH: 0
SHORTNESS OF BREATH: 0
EYE PAIN: 0
TROUBLE SWALLOWING: 0
RHINORRHEA: 0
ABDOMINAL PAIN: 0
DIARRHEA: 0
CONSTIPATION: 0
VOMITING: 1
SHORTNESS OF BREATH: 0
ABDOMINAL PAIN: 0
FACIAL SWELLING: 0
SHORTNESS OF BREATH: 0
COLOR CHANGE: 0
BACK PAIN: 0
FACIAL SWELLING: 0
BACK PAIN: 0
COUGH: 1
NAUSEA: 0
SINUS PRESSURE: 0
VOMITING: 0
SHORTNESS OF BREATH: 0
ABDOMINAL PAIN: 0
DIARRHEA: 0
ABDOMINAL PAIN: 1
SORE THROAT: 0
DIARRHEA: 0
WHEEZING: 0
NAUSEA: 1

## 2022-01-01 ASSESSMENT — PATIENT HEALTH QUESTIONNAIRE - PHQ9
SUM OF ALL RESPONSES TO PHQ QUESTIONS 1-9: 0
1. LITTLE INTEREST OR PLEASURE IN DOING THINGS: 0
SUM OF ALL RESPONSES TO PHQ QUESTIONS 1-9: 0
SUM OF ALL RESPONSES TO PHQ QUESTIONS 1-9: 0
2. FEELING DOWN, DEPRESSED OR HOPELESS: 0
SUM OF ALL RESPONSES TO PHQ QUESTIONS 1-9: 0
SUM OF ALL RESPONSES TO PHQ9 QUESTIONS 1 & 2: 0

## 2022-01-01 ASSESSMENT — PAIN DESCRIPTION - DESCRIPTORS
DESCRIPTORS: ACHING
DESCRIPTORS: ACHING;CRAMPING;DISCOMFORT
DESCRIPTORS: ACHING
DESCRIPTORS: ACHING

## 2022-01-01 ASSESSMENT — PAIN DESCRIPTION - LOCATION
LOCATION: ABDOMEN

## 2022-01-01 ASSESSMENT — PAIN DESCRIPTION - PAIN TYPE: TYPE: ACUTE PAIN

## 2022-01-01 ASSESSMENT — PAIN DESCRIPTION - ORIENTATION
ORIENTATION: LEFT
ORIENTATION: LOWER;RIGHT;LEFT
ORIENTATION: MID;LOWER
ORIENTATION: MID

## 2022-01-01 ASSESSMENT — PAIN DESCRIPTION - ONSET: ONSET: PROGRESSIVE

## 2022-01-01 ASSESSMENT — PAIN - FUNCTIONAL ASSESSMENT: PAIN_FUNCTIONAL_ASSESSMENT: 0-10

## 2022-01-01 ASSESSMENT — LIFESTYLE VARIABLES
HOW OFTEN DO YOU HAVE A DRINK CONTAINING ALCOHOL: NEVER
AUDIT-C TOTAL SCORE: INCOMPLETE
HOW OFTEN DO YOU HAVE A DRINK CONTAINING ALCOHOL: 0
AUDIT TOTAL SCORE: INCOMPLETE

## 2022-01-01 ASSESSMENT — PAIN DESCRIPTION - FREQUENCY: FREQUENCY: CONTINUOUS

## 2022-02-07 PROBLEM — D69.6 THROMBOCYTOPENIA (HCC): Status: ACTIVE | Noted: 2022-01-01

## 2022-02-07 NOTE — PROGRESS NOTES
Medicare Annual Wellness Visit  Name: Henri Chakraborty Date: 2022   MRN: [id number] Sex: Female   Age: 81 y.o. Ethnicity: Non- / Non    : 1935 Race: White (non-)      Franco Peres is here for Medicare AWV and 3 Month Follow-Up    Screenings for behavioral, psychosocial and functional/safety risks, and cognitive dysfunction are all negative except as indicated below. These results, as well as other patient data from the [address] form, are documented in Flowsheets linked to this Encounter. Allergies   Allergen Reactions    Codeine      Confusion      Erythromycin      GI upset  Received zithromax in past and tolerated    Exenatide Nausea Only    Levofloxacin      GI upset    Verapamil Other (See Comments)     Junctional bradycardia    Clonidine Derivatives Rash     , catapres    Other Rash     Levbid    Penicillins Rash     Received Rocephin in past and tolerated       Prior to Visit Medications    Medication Sig Taking? Authorizing Provider   insulin lispro, 1 Unit Dial, (HUMALOG KWIKPEN) 100 UNIT/ML SOPN Humalog pen 3 times daily with meals per sliding scale. Blood sugar is 70, drink juice. 151-200 =6, 201-250 = 8 units, 251-300 = 10 units.  Yes INGRID Blankenship CNP   simvastatin (ZOCOR) 20 MG tablet TAKE 1 TABLET NIGHTLY Yes INGRID Oropeza CNP   insulin glargine (LANTUS SOLOSTAR) 100 UNIT/ML injection pen INJECT 16 UNITS UNDER THE SKIN TWICE A DAY Yes INGRID Blankenship CNP   metoprolol tartrate (LOPRESSOR) 25 MG tablet Take 1 tablet by mouth 2 times daily Yes Gaurang Cherry DO   albuterol sulfate  (90 Base) MCG/ACT inhaler Inhale 2 puffs into the lungs 4 times daily as needed for Wheezing Yes Gaurang Cherry DO   Insulin Pen Needle (B-D ULTRAFINE III SHORT PEN) 31G X 8 MM MISC USE 7 NEEDLES DAILY Yes Hortencia Davalos MD   predniSONE (DELTASONE) 1 MG tablet TAKE 3 TABLETS DAILY Yes Maria Fernanda COYLE Barlage, APRN - CNP   ELIQUIS 5 MG TABS tablet TAKE 1 TABLET TWICE A DAY Yes INGRID Palencia CNP   amLODIPine (NORVASC) 5 MG tablet Take 1 tablet by mouth daily Yes INGRID Palencia CNP   VICTOZA 18 MG/3ML SOPN SC injection INJECT 1.8 MG INTO THE SKIN DAILY Yes INGRID Gordon CNP   furosemide (LASIX) 40 MG tablet TAKE 1 TABLET DAILY Yes INGRID Palencia CNP   Nutritional Supplements (GLUCOSE MANAGEMENT) TABS 15 grams for bs less than 70 Yes INGRID Hilliard CNP   amiodarone (PACERONE) 100 MG tablet Take 1 tablet by mouth daily Yes INGRID Palencia CNP   nystatin (MYCOSTATIN) 103281 UNIT/GM cream Apply topically 2 times daily. Yes INGRID Gordon CNP   potassium chloride (KLOR-CON M) 20 MEQ extended release tablet Take 1 tablet by mouth 2 times daily Yes INGRID Palencia CNP   albuterol (PROVENTIL) (2.5 MG/3ML) 0.083% nebulizer solution Take 3 mLs by nebulization every 4 hours as needed for Wheezing or Shortness of Breath Yes INGRID Palencia CNP   Polyethylene Glycol 400 (BLINK TEARS) 0.25 % SOLN Place into both eyes as needed (dry eyes)  Yes Historical Provider, MD   acetaminophen (TYLENOL) 500 MG tablet Take 500 mg by mouth daily Yes Historical Provider, MD   omeprazole (PRILOSEC) 20 MG capsule Take 20 mg by mouth Daily  Yes Jim Aguirre   Cholecalciferol (VITAMIN D3) 2000 UNITS CAPS Take 2,000 Units by mouth daily  Yes Historical Provider, MD       Past Medical History:   Diagnosis Date    Allergic rhinitis     Asthma     PFT's, 04/06, actually showed mild restrictive defect.  Atrial fibrillation with RVR ([address]     Hospitalized [facility] 2/8/21-2/11/21    Basal cell carcinoma of cheek 2007    Right cheek    Basal cell carcinoma of leg     right leg    CAD (coronary artery disease)     With 40% stenosis of the LAD, 07/10, ejection fraction 60%.   Repeat heart cath9/14 with 40-50 percent LAD lesion first diagonal 50% lesion rec med Rx    Central retinal artery occlusion     Right side November 2014 status post TPA    Cerebral artery occlusion with cerebral infarction [facility]) 11/24/2014    Cerebrovascular disease     50-79% stenosis on left on ultrasound 11/14    CHF (congestive heart failure) ([facility])     Echo 1/15 moderate MR, severe TR, RVSP 76, grade 2 diastolic dysfunction    Diverticulosis     AVM on colonoscopy, 05/11    Dyslipidemia     Elevated antinuclear antibody (ZAIRA) level     History of    Esophagitis 05/2011    Gastritis/esophagitis on EGD, Dr. Abena Machuca. Repeat EGD6/15 Gastritis    Foraminal stenosis of lumbar region     Moderate  Right L4/L5 neuroforaminal stenosis, Dr Ricci Rao following. MRI 2/16 [place]. Moderate foraminal stenosis mild to moderate central canal stenosis    Hyperlipidemia     Hypertension     IBS (irritable bowel syndrome)     GI consultation with Dr. Dagoberto Sevilla, GI specialist in [facility], felt that she probably has chronic functional diarrhea and recommended empiric Imodium, Pepto-Bismol or Questran first. Sprue test Neg 2011    Iron deficiency anemia     Percent sat iron 5, January 2015, ferritin 40 1 /15, FOBT positive  EGD 6/15  Gastritis, IV iron 9/15x3 effective,  colonoscopy deferred by Dr. Abena Machuca. --Prior colonoscopy 2011 with severe diverticulosis and telangiectasia. Trial iron solution in [place] juice 12/16    Iron deficiency anemia due to chronic blood loss 09/08/2015    Junctional bradycardia     Symptomatic, resolved with discontinuation of Verapamil, 05/09. 1.1) Echocardiogram: LAE, LVH, EF 50%, mild MR, diastolic. 1.2) Dr. Cano [facility] evaluation. 1.3.) Persantine stress test negative, 05/09.  Migraine     Mild CAD     cath 10/15    Mitral regurgitation     Moderate to severe on echocardiogram September 2015.   Right pressure 76 grade 2 diastolic dysfunction,  echo 29/24 grade 2 diastolic dysfunction mild to moderate MR RVSP 56 aortic sclerosis    Obesity     CHICO (obstructive sleep apnea)     CPAP 14 2/15 initiation  AHI 7  mild CHICO intolerant CPAP    Osteoarthritis     PMR (polymyalgia rheumatica) (HCC)     Pneumonia     Premature atrial contractions     Pulmonary hypertension (HCC)     -Moderate on echo November 2014    Restrictive lung disease     Mild on PFTs 11/14    Type II or unspecified type diabetes mellitus without mention of complication, not stated as uncontrolled     Vitreous floaters        Past Surgical History:   Procedure Laterality Date    APPENDECTOMY  1952    CARDIAC CATHETERIZATION  2014    CATARACT REMOVAL Bilateral 08/10    CHOLECYSTECTOMY      COLONOSCOPY  05/16/2011    COLONOSCOPY N/A 9/26/2019    severe diverticulosis, benign rectal polyp, Dr. Angel Luis Zhang Right 2/6/2019    Cysto with right stent insertion and villareal catheter performed by Irina Arthur MD at [facility] Right 2/27/2019    CYSTO right stent removal  Right Ureteroscopy Holmium Laser right stent exchange performed by Irina Arthur MD at [facility], DIAGNOSTIC      EYE SURGERY      HYSTERECTOMY  Approx 1983    MALIGNANT SKIN LESION EXCISION Right 12/10 and 04/11    Basal CellRemoved from right neck    MALIGNANT SKIN LESION EXCISION Right 02/2012    Basal Cell Removed from right leg    OTHER SURGICAL HISTORY  09/06/2011    capsule endoscopy    OTHER SURGICAL HISTORY  3/22/16    right L4 and L5 TFE    OTHER SURGICAL HISTORY Right 4/26/16    L4, L5 TFE    UPPER GASTROINTESTINAL ENDOSCOPY  05/16/2011    UPPER GASTROINTESTINAL ENDOSCOPY  6/22/15    mild gastritis (Dr. Navdeep De Oliveira)    UPPER GASTROINTESTINAL ENDOSCOPY N/A 9/26/2019    mild to moderate inflammation, Dr. Navdeep De Oliveira       Family History   Problem Relation Age of Onset    Uterine Cancer Mother     Heart Disease Father     High Blood Pressure Father     Stroke Sister         from a vascular malformation    Heart Attack Son         age 48       CareTeam (Including outside providers/suppliers regularly involved in providing care):   Patient Care Team:  INGRID Mcguire CNP as PCP - General (Nurse Practitioner)  INGRID Mcguire CNP as PCP - [facility] Empaneled Provider  Eboni Saini MD as Consulting Physician (Urology)  Gayathri Melvin MD as Consulting Physician (Pulmonary Disease)  James Salvador MD as Consulting Physician (Hematology and Oncology)  Chan Abbott DO as Consulting Physician (Cardiology)  INGRID Grant CNP (Family Nurse Practitioner)    Wt Readings from Last 3 Encounters:   02/07/22 187 lb 9.6 oz (85.1 kg)   12/28/21 196 lb (88.9 kg)   12/23/21 195 lb (88.5 kg)     Vitals:    02/07/22 1531   BP: 126/72   Site: Left Upper Arm   Position: Sitting   Cuff Size: Large Adult   Pulse: 70   Weight: 187 lb 9.6 oz (85.1 kg)   Height: 4' 11\" (1.499 m)     Body mass index is 37.89 kg/m². Patient's complete Health Risk Assessment and screening values have been reviewed and are found in Flowsheets. The following problems were reviewed today and where indicated follow up appointments were made and/or referrals ordered. Positive Risk Factor Screenings with Interventions:          General Health and ACP:  General  In general, how would you say your health is?: Fair  In the past 7 days, have you experienced any of the following?  New or Increased Pain, New or Increased Fatigue, Loneliness, Social Isolation, Stress or Anger?: None of These  Do you get the social and emotional support that you need?: Yes  Do you have a Living Will?: Yes  Advance Directives     Power of [address] [place] [address] Will ACP-Advance Directive ACP-Power of     Not on File Not on File Not on File Not on File      General Health Risk Interventions:  · n/a    Health Habits/Nutrition:  Health Habits/Nutrition  Do you exercise for at least 20 minutes 2-3 times per week?: (!) No  Have you lost any weight without trying in the past 3 months?: (!) Yes  Do you eat only one meal per day?: No  Have you seen the dentist within the past year?: Yes  Body mass index: (!) 37.89  Health Habits/Nutrition Interventions:  · Inadequate physical activity:  educational materials provided to promote increased physical activity  · Nutritional issues:  educational materials for healthy, well-balanced diet provided. Patient reports she lost 10 lbs since Christmas 2021 but has gained 3 lbs back.     Hearing/Vision:  No exam data present  Hearing/Vision  Do you or your family notice any trouble with your hearing that hasn't been managed with hearing aids?: No  Do you have difficulty driving, watching TV, or doing any of your daily activities because of your eyesight?: (!) Yes  Have you had an eye exam within the past year?: Yes  Hearing/Vision Interventions:  · Vision concerns:  patient encouraged to make appointment with his/her eye specialist    Safety:  Safety  Do you have working smoke detectors?: Yes  Have all throw rugs been removed or fastened?: (!) No  Do you have non-slip mats or surfaces in all bathtubs/showers?: Yes  Do all of your stairways have a railing or banister?: (!) No  Are your doorways, halls and stairs free of clutter?: Yes  Do you always fasten your seatbelt when you are in a car?: Yes  Safety Interventions:  · Home safety tips provided     Personalized Preventive Plan   Current Health Maintenance Status  Immunization History   Administered Date(s) Administered    SARATHID-19, Mario Alberto Diego, Primary or Immunocompromised, PF, 100mcg/0.5mL 01/21/2021, 02/18/2021, 11/16/2021    DT (pediatric) 03/11/2010    Influenza Virus Vaccine 10/21/2010, 10/05/2011, 10/19/2012    Influenza, High Dose (Fluzone 65 yrs and older) 10/14/2013, 10/09/2014, 10/02/2015, 09/02/2016, 10/24/2017, 10/26/2018    Influenza, Quadv, adjuvanted, 65 yrs +, IM, PF (Fluad) 09/15/2020, 10/12/2021    Influenza, Triv, inactivated, subunit, adjuvanted, IM (Fluad 65 yrs and older) 10/03/2019    Pneumococcal Conjugate 13-valent (Diego Riggs) 04/02/2015   

## 2022-02-07 NOTE — PATIENT INSTRUCTIONS
Personalized Preventive Plan for Jag Maldonado - 2/7/2022  Medicare offers a range of preventive health benefits. Some of the tests and screenings are paid in full while other may be subject to a deductible, co-insurance, and/or copay. Some of these benefits include a comprehensive review of your medical history including lifestyle, illnesses that may run in your family, and various assessments and screenings as appropriate. After reviewing your medical record and screening and assessments performed today your provider may have ordered immunizations, labs, imaging, and/or referrals for you. A list of these orders (if applicable) as well as your Preventive Care list are included within your After Visit Summary for your review. Other Preventive Recommendations:    · A preventive eye exam performed by an eye specialist is recommended every 1-2 years to screen for glaucoma; cataracts, macular degeneration, and other eye disorders. · A preventive dental visit is recommended every 6 months. · Try to get at least 150 minutes of exercise per week or 10,000 steps per day on a pedometer . · Order or download the FREE \"Exercise & Physical Activity: Your Everyday Guide\" from [employer]. Call 1-867.939.3331 or search [employer] online. · You need 6165-9787 mg of calcium and 2413-5552 IU of vitamin D per day. It is possible to meet your calcium requirement with diet alone, but a vitamin D supplement is usually necessary to meet this goal.  · When exposed to the sun, use a sunscreen that protects against both UVA and UVB radiation with an SPF of 30 or greater. Reapply every 2 to 3 hours or after sweating, drying off with a towel, or swimming. · Always wear a seat belt when traveling in a car. Always wear a helmet when riding a bicycle or motorcycle.

## 2022-02-08 NOTE — PROGRESS NOTES
02/07/22  Marylouise Leventhal  1935      Chief Complaint:   1. Routine general medical examination at a health care facility    2. Essential hypertension    3. Controlled type 2 diabetes mellitus without complication, with long-term current use of insulin ([address]    4. Type 2 diabetes with nephropathy ([address]    5. Diastolic dysfunction    6. Iron deficiency anemia secondary to blood loss (chronic)    7. PMR (polymyalgia rheumatica) (HCC)    8. Dyslipidemia    9. Neural foraminal stenosis of lumbar spine    10. Pulmonary HTN ([address]    11. Severe obesity (BMI 35.0-39. 9) with comorbidity ([facility]    12. Vitamin D deficiency    13. Mild intermittent asthma without complication    14. Atrial fibrillation, unspecified type ([address]    15. Stage 3a chronic kidney disease ([address]    16. Thrombocytopenia ([facility]        HPI:  Pleasant 81year-old patient in for annual wellness visit and 3-month follow-up. Patient has been working hard on her diet. She lost 10 pounds since Angy but over the last few weeks gained 3 a bit back. Appetite has not been the best.  She denies any problems with bowel or bladder. Diabetes has been well controlled. Continues to follow with the diabetic nurse practitioner. Blood pressure stable in office, 126/72. She denies any worsening of shortness of breath. No signs of fluid overload. Continues on low-dose prednisone for PMR. This has been well controlled. Previously we have tried weaning her down further on the steroids with recurrence. Eczema has been stable. Her back and leg pain has been significantly improved. Continues on anticoagulation for her atrial fib. Denies any chest pain heart palpitations. No recent asthma exacerbations.     Allergies   Allergen Reactions    Codeine      Confusion      Erythromycin      GI upset  Received zithromax in past and tolerated    Exenatide Nausea Only    Levofloxacin      GI upset    Verapamil Other (See Comments)     Junctional bradycardia    Clonidine Derivatives Rash     2009, catapres    Other Rash     Levbid    Penicillins Rash     Received Rocephin in past and tolerated       Past Medical History:   Diagnosis Date    Allergic rhinitis     Asthma     PFT's, 04/06, actually showed mild restrictive defect.  Atrial fibrillation with RVR ([facility]     Hospitalized [facility] 2/8/21-2/11/21    Basal cell carcinoma of cheek 2007    Right cheek    Basal cell carcinoma of leg     right leg    CAD (coronary artery disease)     With 40% stenosis of the LAD, 07/10, ejection fraction 60%. 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 Cheryl Delgadillo. Repeat EGD6/15 Gastritis    Foraminal stenosis of lumbar region     Moderate  Right L4/L5 neuroforaminal stenosis, Dr Lacy Ventura following. MRI 2/16 [place]. Moderate foraminal stenosis mild to moderate central canal stenosis    Hyperlipidemia     Hypertension     IBS (irritable bowel syndrome)     GI consultation with Dr. Genesis Goodman, GI specialist in [facility], felt that she probably has chronic functional diarrhea and recommended empiric Imodium, Pepto-Bismol or Questran first. Sprue test Neg 2011    Iron deficiency anemia     Percent sat iron 5, January 2015, ferritin 40 1 /15, FOBT positive  EGD 6/15  Gastritis, IV iron 9/15x3 effective,  colonoscopy deferred by Dr. Cheryl Delgadillo. --Prior colonoscopy 2011 with severe diverticulosis and telangiectasia. capsule endoscopy    OTHER SURGICAL HISTORY  3/22/16    right L4 and L5 TFE    OTHER SURGICAL HISTORY Right 4/26/16    L4, L5 TFE    UPPER GASTROINTESTINAL ENDOSCOPY  05/16/2011    UPPER GASTROINTESTINAL ENDOSCOPY  6/22/15    mild gastritis (Dr. Maggy Navarro)    UPPER GASTROINTESTINAL ENDOSCOPY N/A 9/26/2019    mild to moderate inflammation, Dr. Maggy Navarro       Current Outpatient Medications on File Prior to Visit   Medication Sig Dispense Refill    insulin lispro, 1 Unit Dial, (HUMALOG KWIKPEN) 100 UNIT/ML SOPN Humalog pen 3 times daily with meals per sliding scale. Blood sugar is 70, drink juice. 151-200 =6, 201-250 = 8 units, 251-300 = 10 units. 1 pen 3    simvastatin (ZOCOR) 20 MG tablet TAKE 1 TABLET NIGHTLY 90 tablet 3    insulin glargine (LANTUS SOLOSTAR) 100 UNIT/ML injection pen INJECT 16 UNITS UNDER THE SKIN TWICE A DAY 30 mL 3    metoprolol tartrate (LOPRESSOR) 25 MG tablet Take 1 tablet by mouth 2 times daily 180 tablet 0    albuterol sulfate  (90 Base) MCG/ACT inhaler Inhale 2 puffs into the lungs 4 times daily as needed for Wheezing 1 each 0    Insulin Pen Needle (B-D ULTRAFINE III SHORT PEN) 31G X 8 MM MISC USE 7 NEEDLES DAILY 650 each 3    predniSONE (DELTASONE) 1 MG tablet TAKE 3 TABLETS DAILY 270 tablet 3    ELIQUIS 5 MG TABS tablet TAKE 1 TABLET TWICE A  tablet 3    amLODIPine (NORVASC) 5 MG tablet Take 1 tablet by mouth daily 90 tablet 3    VICTOZA 18 MG/3ML SOPN SC injection INJECT 1.8 MG INTO THE SKIN DAILY 18 mL 5    furosemide (LASIX) 40 MG tablet TAKE 1 TABLET DAILY 90 tablet 3    Nutritional Supplements (GLUCOSE MANAGEMENT) TABS 15 grams for bs less than 70 100 tablet 3    amiodarone (PACERONE) 100 MG tablet Take 1 tablet by mouth daily 90 tablet 3    nystatin (MYCOSTATIN) 023293 UNIT/GM cream Apply topically 2 times daily.  1 Tube 0    potassium chloride (KLOR-CON M) 20 MEQ extended release tablet Take 1 tablet by mouth 2 times daily 180 tablet 3    albuterol (PROVENTIL) (2.5 MG/3ML) 0.083% nebulizer solution Take 3 mLs by nebulization every 4 hours as needed for Wheezing or Shortness of Breath 30 each 1    Polyethylene Glycol 400 (BLINK TEARS) 0.25 % SOLN Place into both eyes as needed (dry eyes)       acetaminophen (TYLENOL) 500 MG tablet Take 500 mg by mouth daily      omeprazole (PRILOSEC) 20 MG capsule Take 20 mg by mouth Daily  30 capsule 3    Cholecalciferol (VITAMIN D3) 2000 UNITS CAPS Take 2,000 Units by mouth daily        No current facility-administered medications on file prior to visit. Social History     Socioeconomic History    Marital status:      Spouse name: Jose Daniel Roberto Number of children: 3    Years of education: 15    Highest education level: Some college, no degree   Occupational History    Not on file   Tobacco Use    Smoking status: Never Smoker    Smokeless tobacco: Never Used    Tobacco comment: amish rrt 5/28/2019   Vaping Use    Vaping Use: Never used   Substance and Sexual Activity    Alcohol use: No     Alcohol/week: 0.0 standard drinks    Drug use: No    Sexual activity: Not on file   Other Topics Concern    Not on file   Social History Narrative    9/25/2019 No SDOH needs identified. She is receiving Meals on Wheels. Social Determinants of Health     Financial Resource Strain:     Difficulty of Paying Living Expenses: Not on file   Food Insecurity:     Worried About Running Out of Food in the Last Year: Not on file    Ray of Food in the Last Year: Not on file   Transportation Needs:     Lack of Transportation (Medical): Not on file    Lack of Transportation (Non-Medical):  Not on file   Physical Activity:     Days of Exercise per Week: Not on file    Minutes of Exercise per Session: Not on file   Stress:     Feeling of Stress : Not on file   Social Connections:     Frequency of Communication with Friends and Family: Not on file    Frequency of Social Gatherings with Friends and Family: Not on file    Attends [employer] Services: Not on file    Active Member of Clubs or Organizations: Not on file    Attends Club or Organization Meetings: Not on file    Marital Status: Not on file   Intimate Partner Violence:     Fear of Current or Ex-Partner: Not on file    Emotionally Abused: Not on file    Physically Abused: Not on file    Sexually Abused: Not on file   Housing Stability:     Unable to Pay for Housing in the Last Year: Not on file    Number of [place] in the Last Year: Not on file    Unstable Housing in the Last Year: Not on file       Review of Systems   Constitutional: Positive for appetite change. Negative for activity change, chills, fatigue, fever and unexpected weight change. HENT: Negative for congestion, dental problem, ear discharge, ear pain, facial swelling, hearing loss, postnasal drip, rhinorrhea, sinus pressure, sore throat and trouble swallowing. Eyes: Negative for pain and visual disturbance. Respiratory: Negative for cough, chest tightness, shortness of breath and wheezing. Cardiovascular: Negative for chest pain, palpitations and leg swelling. Gastrointestinal: Negative for abdominal pain, blood in stool, constipation, diarrhea, nausea and vomiting. Endocrine: Negative for cold intolerance, heat intolerance and polyuria. Genitourinary: Negative for difficulty urinating. Musculoskeletal: Positive for arthralgias. Negative for gait problem, myalgias, neck pain and neck stiffness. Skin: Negative for color change, rash and wound. Neurological: Negative for dizziness, tremors, seizures, weakness, light-headedness, numbness and headaches. Psychiatric/Behavioral: Negative for confusion and hallucinations. The patient is not nervous/anxious. Physical Exam  Vitals and nursing note reviewed. Constitutional:       General: She is not in acute distress. Appearance: Normal appearance. She is well-developed. She is not diaphoretic.    HENT: Head: Normocephalic and atraumatic. Right Ear: External ear normal.      Left Ear: External ear normal.   Eyes:      General:         Right eye: No discharge. Left eye: No discharge. Neck:      Trachea: No tracheal deviation. Cardiovascular:      Rate and Rhythm: Normal rate and regular rhythm. Heart sounds: Normal heart sounds. No murmur heard. No friction rub. No gallop. Pulmonary:      Effort: Pulmonary effort is normal. No respiratory distress. Breath sounds: Normal breath sounds. No stridor. No wheezing, rhonchi or rales. Chest:      Chest wall: No tenderness. Abdominal:      General: Bowel sounds are normal. There is no distension. Palpations: Abdomen is soft. Tenderness: There is no abdominal tenderness. Musculoskeletal:         General: No swelling. Right lower leg: Edema present. Left lower leg: Edema (+1 edema to bilateral lower extremities) present. Skin:     General: Skin is warm and dry. Capillary Refill: Capillary refill takes less than 2 seconds. Coloration: Skin is not jaundiced or pale. Findings: No rash. Neurological:      General: No focal deficit present. Mental Status: She is alert and oriented to person, place, and time. Cranial Nerves: No cranial nerve deficit. Sensory: No sensory deficit. Coordination: Coordination normal.   Psychiatric:         Mood and Affect: Mood normal.         Behavior: Behavior normal.         Thought Content: Thought content normal.         Judgment: Judgment normal.     Normal strength and range of motion of toes, feet, and ankles bilaterally. No joint deformity. No cyanosis or clubbing. Decreased sensation at all 22 test points with the 10 gram filament. Absent sensation over the heels bilateral.  Does have some calluses present dorsalis pedis pulses intact bilaterally. Capillary refill at the toes was less than 2 seconds. Vibratory sensation intact bilaterally.  Light touch sensation intact bilaterally. Hair growth present on feet and toes bilaterally. No skin breakdown, erythema, rub spots, blisters, scaling, or ulcers. No corns. Toenails thin and not ingrown. No evidence of fungal infection. Vitals:    02/07/22 1531   BP: 126/72   Site: Left Upper Arm   Position: Sitting   Cuff Size: Large Adult   Pulse: 70   Weight: 187 lb 9.6 oz (85.1 kg)   Height: 4' 11\" (1.499 m)       Assessment:  1. Routine general medical examination at a health care facility  -  DIABETES FOOT EXAM  - Basic Metabolic Panel; Future    2. Essential hypertension  Stable, continue on current antihypertensive regimen. 3. Controlled type 2 diabetes mellitus without complication, with long-term current use of insulin ([facility]  4. Type 2 diabetes with nephropathy ([facility])  Hemoglobin A1c 7.3. Continues to follow with the diabetic nurse practitioner. Continue current medication regimen. Nephropathy has been stable off of Neurontin    5. Diastolic dysfunction  Stable no signs of fluid overload. Continues on Lasix daily    6. Iron deficiency anemia secondary to blood loss (chronic)  Serial lab follow-up stable. Follow with PCP for routine labs. Previously tolerated IV infusions    7. PMR (polymyalgia rheumatica) (HCC)  Stable on 3 mg of prednisone daily. Previous trials to wean her steroid unsuccessful. 8. Dyslipidemia  Continue on Zocor, continue to work on diet    9. Neural foraminal stenosis of lumbar spine  Stable at present    10. Pulmonary HTN ([facility] [address]  Stable following with cardiology    11. Severe obesity (BMI 35.0-39. 9) with comorbidity ([facility] [address]  Recent weight loss of 10 pounds. Continue to work on diet and increase activity    12. Vitamin D deficiency  Stable on supplemental vitamin D. Most recent stable    13. Mild intermittent asthma without complication  No recent exacerbations, stable    14. Atrial fibrillation, unspecified type (HCC)  Stable on amiodarone, Eliquis and metoprolol.   Continues with routine TSH and PFTs. No recent changes. Follows with cardiology    15. Stage 3a chronic kidney disease (HCC)  Stable avoid NSAIDs. Serial lab follow-up    16. Thrombocytopenia ([address]  Labs stable      Plan:  As noted above. Follow up for routine visit. Call sooner with concerns prior.     Electronically signed by INGRID Wilson CNP on 2/7/2022 at 8:27 PM

## 2022-02-24 NOTE — PROGRESS NOTES
CC: follow for chronic diastolic HF    HPI:  Is here for routine follow-up. Remains in normal sinus rhythm. No chest pain or angina. No significant dyspnea on exertion. Reports occasional palpitations early in the morning which lasts one few seconds and does not bother her too much. No lower extremity edema. Blood pressure better controlled after adding Norvasc, home readings range 076-552 mm hg systolic. Past Medical History:   has a past medical history of Allergic rhinitis, Asthma, Atrial fibrillation with RVR ([address] Basal cell carcinoma of cheek, Basal cell carcinoma of leg, CAD (coronary artery disease), Central retinal artery occlusion, Cerebral artery occlusion with cerebral infarction [facility]), Cerebrovascular disease, CHF (congestive heart failure) ([facility] [address] Diverticulosis, Dyslipidemia, Elevated antinuclear antibody (ZAIRA) level, Esophagitis, Foraminal stenosis of lumbar region, Hyperlipidemia, Hypertension, IBS (irritable bowel syndrome), Iron deficiency anemia, Iron deficiency anemia due to chronic blood loss, Junctional bradycardia, Migraine, Mild CAD, Mitral regurgitation, Obesity, CHICO (obstructive sleep apnea), Osteoarthritis, PMR (polymyalgia rheumatica) ([address] Pneumonia, Premature atrial contractions, Pulmonary hypertension ([address] Restrictive lung disease, Type II or unspecified type diabetes mellitus without mention of complication, not stated as uncontrolled, and Vitreous floaters. Past Surgical History:   has a past surgical history that includes Appendectomy (1952); Hysterectomy (Approx 1983); Cataract removal (Bilateral, 08/10); malignant skin lesion excision (Right, 12/10 and 04/11); malignant skin lesion excision (Right, 02/2012); Colonoscopy (05/16/2011); other surgical history (09/06/2011); Cholecystectomy; Endoscopy, colon, diagnostic; eye surgery; Cardiac catheterization (2014); other surgical history (3/22/16); other surgical history (Right, 4/26/16);  Cystoscopy (Right, 2/6/2019); Cystoscopy (Right, 2/27/2019); Colonoscopy (N/A, 9/26/2019); Upper gastrointestinal endoscopy (05/16/2011); Upper gastrointestinal endoscopy (6/22/15); and Upper gastrointestinal endoscopy (N/A, 9/26/2019). Home Medications:  Prior to Admission medications    Medication Sig Start Date End Date Taking? Authorizing Provider   diphenhydrAMINE-APAP, sleep, (TYLENOL PM EXTRA STRENGTH PO) Take 1 tablet by mouth at bedtime   Yes Historical Provider, MD   insulin lispro, 1 Unit Dial, (HUMALOG KWIKPEN) 100 UNIT/ML SOPN Humalog pen 3 times daily with meals per sliding scale. Blood sugar is 70, drink juice. 151-200 =6, 201-250 = 8 units, 251-300 = 10 units. 2/7/22  Yes INGRID Herrera CNP   simvastatin (ZOCOR) 20 MG tablet TAKE 1 TABLET NIGHTLY 2/2/22  Yes INGRID Butler CNP   insulin glargine (LANTUS SOLOSTAR) 100 UNIT/ML injection pen INJECT 16 UNITS UNDER THE SKIN TWICE A DAY 1/24/22  Yes INGRID Herrera CNP   metoprolol tartrate (LOPRESSOR) 25 MG tablet Take 1 tablet by mouth 2 times daily 12/27/21  Yes Gaurang Cherry DO   predniSONE (DELTASONE) 1 MG tablet TAKE 3 TABLETS DAILY 8/20/21  Yes INGRID Butler CNP   ELIQUIS 5 MG TABS tablet TAKE 1 TABLET TWICE A DAY 8/20/21  Yes INGRID Butler CNP   amLODIPine (NORVASC) 5 MG tablet Take 1 tablet by mouth daily 8/2/21  Yes INGRID Butler CNP   VICTOZA 18 MG/3ML SOPN SC injection INJECT 1.8 MG INTO THE SKIN DAILY 5/13/21  Yes INGRID Herrera CNP   furosemide (LASIX) 40 MG tablet TAKE 1 TABLET DAILY 5/13/21  Yes INGRID Butler CNP   amiodarone (PACERONE) 100 MG tablet Take 1 tablet by mouth daily 3/23/21  Yes INGRID Butler CNP   nystatin (MYCOSTATIN) 205966 UNIT/GM cream Apply topically 2 times daily.  1/4/21  Yes Carla Summers, APRN - CNP   potassium chloride (KLOR-CON M) 20 MEQ extended release tablet Take 1 tablet by mouth 2 times daily 12/18/20  Yes Avis Heimlich Barlage, APRN - CNP   Polyethylene Glycol 400 (BLINK TEARS) 0.25 % SOLN Place into both eyes as needed (dry eyes)    Yes Historical Provider, MD   omeprazole (PRILOSEC) 20 MG capsule Take 20 mg by mouth Daily  1/14/15  Yes Nilsa Montague   Cholecalciferol (VITAMIN D3) 2000 UNITS CAPS Take 2,000 Units by mouth daily    Yes Historical Provider, MD   albuterol sulfate  (90 Base) MCG/ACT inhaler Inhale 2 puffs into the lungs 4 times daily as needed for Wheezing  Patient not taking: Reported on 2/24/2022 12/27/21   Gaurang Cherry,    Insulin Pen Needle (B-D ULTRAFINE III SHORT PEN) 31G X 8 MM MISC USE 7 NEEDLES DAILY 11/8/21   Laureano Hale MD   Nutritional Supplements (GLUCOSE MANAGEMENT) TABS 15 grams for bs less than 70 4/7/21   INGRID Hilliard CNP   albuterol (PROVENTIL) (2.5 MG/3ML) 0.083% nebulizer solution Take 3 mLs by nebulization every 4 hours as needed for Wheezing or Shortness of Breath  Patient not taking: Reported on 2/24/2022 12/15/20   INGRID Lyles CNP       Allergies:  Codeine, Erythromycin, Exenatide, Levofloxacin, Verapamil, Clonidine derivatives, Other, and Penicillins    Social History:   reports that she has never smoked. She has never used smokeless tobacco. She reports that she does not drink alcohol and does not use drugs. REVIEW OF SYSTEMS:    Constitutional: there has been no unanticipated weight loss. There's been No change in energy level, No change in activity level. Eyes: No visual changes or diplopia. No scleral icterus. ENT: No Headaches, hearing loss or vertigo. No mouth sores or sore throat. Cardiovascular: as hpi  Respiratory: as hpi  Gastrointestinal: No abdominal pain, appetite loss, blood in stools. No change in bowel or bladder habits. Genitourinary: No dysuria, trouble voiding, or hematuria. Musculoskeletal:  No gait disturbance, No weakness or joint complaints. Integumentary: No rash or pruritis.   Neurological: No headache, diplopia, change in muscle strength, numbness or tingling. No change in gait, balance, coordination, mood, affect, memory, mentation, behavior. Psychiatric: No new anxiety or depression. Endocrine: No temperature intolerance. No excessive thirst, fluid intake, or urination. No tremor. Hematologic/Lymphatic: No abnormal bruising or bleeding, blood clots or swollen lymph nodes. Allergic/Immunologic: No nasal congestion or hives. Physical Exam:  BP (!) 146/79 (Site: Left Upper Arm, Position: Sitting, Cuff Size: Large Adult)   Pulse 61   Ht 5' (1.524 m)   Wt 183 lb (83 kg)   BMI 35.74 kg/m²      General appearance: alert and cooperative with exam  HEENT: Head: Normocephalic, no lesions, without obvious abnormality. Eyes: PERRL, EOMI  Ears: Not obvious deformations or lack of hearing  Neck: no carotid bruit, no JVD  Lungs: clear to auscultation bilaterally  Heart: regular rate and rhythm, S1, S2 normal, no murmur, click, rub or gallop  Abdomen: soft, non-tender; bowel sounds normal; no masses,  no organomegaly  Extremities: extremities normal, atraumatic, no cyanosis or edema  Neurologic: Mental status: Alert, oriented, thought content appropriate  Skin: WNL for age and condition, no obvious rashes or leasions      Cardiac Data:  EKG 2/24/2022: NSR, NS ST-T abnormality (no new change)     Echo 2/9/21  Left ventricle is normal in size Global left ventricular systolic function  is normal Estimated ejection fraction is 55-60 % . Grade II (moderate) left ventricular diastolic dysfunction. Left atrial dilatation. Right atrium is mildly dilated . Aortic valve is sclerotic but opens well. No aortic insufficiency. Mitral annular calcification is seen. Mild mitral regurgitation. No obvious valvular abnormality. Mild tricuspid regurgitation. No pulmonary hypertension. Estimated right ventricular systolic pressure is 94HTTR.       Cardiac cath 2015  Minimal CAD       Labs:   Lab Results   Component Value Date    CHOL 184 08/25/2021    TRIG 140 08/25/2021    HDL 73 08/25/2021    LDLCHOLESTEROL 83 08/25/2021    VLDL NOT REPORTED 08/25/2021    CHOLHDLRATIO 2.5 08/25/2021       Lab Results   Component Value Date     10/27/2021    K 4.2 10/27/2021     10/27/2021    CO2 29 10/27/2021    BUN 18 10/27/2021    CREATININE 1.02 (H) 10/27/2021    GLUCOSE 186 (H) 10/27/2021    CALCIUM 10.0 10/27/2021    PROT 7.1 08/25/2021    LABALBU 4.1 08/25/2021    BILITOT 0.57 08/25/2021    ALKPHOS 79 08/25/2021    AST 14 08/25/2021    ALT 11 08/25/2021    LABGLOM 52 (L) 10/27/2021    GFRAA >60 10/27/2021         IMPRESSION & RECOMMENDATIONS:  · Atrial fibrillation, paroxysmal, currently in NSR, CHADS-VASc: 5. Continue metoprolol and Eliquis. Maintained on low dose amiodarone (100 mg qd) after recent admission for AF with RVR in 02/21. Will obtain tsh/ast/alt q 6 months and PFT annually. · HTN- controlled at home, office readings are always high, continue norvasc and lopressor, counseled regarding low salt diet. · Chronic HFpEF- stable currently, no signs of volume overload on examination, continue current dose lasix, and can take extra lasix prn. · HLP- at goal. Continue statin. · DM2- per pcp    · CHICO- not on cpap. Recommended compliance. The patient is to continue heart healthy diet, weight loss and exercise as tolerated. Patient's medications and side effects were discussed. Medication refills were provided if needed. Follow up appointment timing was discussed. All questions and concerns were addressed to patient's satisfaction. The patient is to follow up in 6 months or sooner if necessary. Thank you for allowing me to participate in the care of this patient, please do not hesitate to call if you have any questions. Gonsalo Burdick MD   [facility]  [facility]. [facility]  [phone]

## 2022-03-06 NOTE — ED PROVIDER NOTES
[facility]  eMERGENCY dEPARTMENT eNCOUnter      Pt Name: Yaquelin Leonard  MRN: [id number]  [place] 1935  Date of evaluation: 3/6/2022      CHIEF COMPLAINT       Chief Complaint   Patient presents with    Emesis     started last night    Diarrhea         HISTORY OF PRESENT ILLNESS    Yaquelin Leonard is a 81 y.o. female who presents with nausea vomiting and diarrhea, low-grade fever associated with this this started out Friday or late Thursday with harsh cough no chest pain or shortness of breath but patient says she is prone to pneumonia she has not had been vaccinated for Covid. There is no abdominal pain no leg swelling last emesis was just on arrival    REVIEW OF SYSTEMS         Review of Systems   Constitutional: Positive for fatigue and fever. Negative for chills. HENT: Negative for congestion and ear pain. Eyes: Negative for pain and visual disturbance. Respiratory: Positive for cough. Negative for shortness of breath. Cardiovascular: Negative for chest pain, palpitations and leg swelling. Gastrointestinal: Positive for diarrhea, nausea and vomiting. Negative for abdominal pain, blood in stool and constipation. Endocrine: Negative for polydipsia and polyuria. Genitourinary: Negative for difficulty urinating, dysuria, frequency, vaginal bleeding and vaginal discharge. Musculoskeletal: Negative for back pain, joint swelling, myalgias, neck pain and neck stiffness. Skin: Negative for rash. Neurological: Negative for dizziness, weakness and headaches. Hematological: Negative for adenopathy. Does not bruise/bleed easily. Psychiatric/Behavioral: Negative for confusion, self-injury and suicidal ideas.          PAST MEDICAL HISTORY    has a past medical history of Allergic rhinitis, Asthma, Atrial fibrillation with RVR ([facility] [address] Basal cell carcinoma of cheek, Basal cell carcinoma of leg, CAD (coronary artery disease), Central retinal artery occlusion, Cerebral artery occlusion with cerebral infarction [facility]), Cerebrovascular disease, CHF (congestive heart failure) ([facility] [address] Diverticulosis, Dyslipidemia, Elevated antinuclear antibody (ZAIRA) level, Esophagitis, Foraminal stenosis of lumbar region, Hyperlipidemia, Hypertension, IBS (irritable bowel syndrome), Iron deficiency anemia, Iron deficiency anemia due to chronic blood loss, Junctional bradycardia, Migraine, Mild CAD, Mitral regurgitation, Obesity, CHICO (obstructive sleep apnea), Osteoarthritis, PMR (polymyalgia rheumatica) ([facility] [address] Pneumonia, Premature atrial contractions, Pulmonary hypertension ([facility] Restrictive lung disease, Type II or unspecified type diabetes mellitus without mention of complication, not stated as uncontrolled, and Vitreous floaters. SURGICAL HISTORY      has a past surgical history that includes Appendectomy (1952); Hysterectomy (Approx 1983); Cataract removal (Bilateral, 08/10); malignant skin lesion excision (Right, 12/10 and 04/11); malignant skin lesion excision (Right, 02/2012); Colonoscopy (05/16/2011); other surgical history (09/06/2011); Cholecystectomy; Endoscopy, colon, diagnostic; eye surgery; Cardiac catheterization (2014); other surgical history (3/22/16); other surgical history (Right, 4/26/16); Cystoscopy (Right, 2/6/2019); Cystoscopy (Right, 2/27/2019); Colonoscopy (N/A, 9/26/2019); Upper gastrointestinal endoscopy (05/16/2011); Upper gastrointestinal endoscopy (6/22/15); and Upper gastrointestinal endoscopy (N/A, 9/26/2019).     CURRENT MEDICATIONS       Previous Medications    ALBUTEROL (PROVENTIL) (2.5 MG/3ML) 0.083% NEBULIZER SOLUTION    Take 3 mLs by nebulization every 4 hours as needed for Wheezing or Shortness of Breath    ALBUTEROL SULFATE  (90 BASE) MCG/ACT INHALER    Inhale 2 puffs into the lungs 4 times daily as needed for Wheezing    AMIODARONE (PACERONE) 100 MG TABLET    Take 1 tablet by mouth daily    AMLODIPINE (NORVASC) 5 MG TABLET    Take 1 tablet by mouth daily EXAMINATION:   ONE XRAY VIEW OF THE CHEST       3/6/2022 4:40 pm       COMPARISON:   04/07/2021       HISTORY:   ORDERING SYSTEM PROVIDED HISTORY: Cough and fever   TECHNOLOGIST PROVIDED HISTORY:   Cough and fever   Reason for Exam: Cough, fever, diarrhea, emesis       FINDINGS:   Stable reticular opacities.  Mild-moderate cardiomegaly. No lung infiltrate   or consolidation.  No definite pneumothorax or pleural effusion.           Impression   No acute abnormality           ED BEDSIDE ULTRASOUND:       LABS:  Labs Reviewed   COMPREHENSIVE METABOLIC PANEL - Abnormal; Notable for the following components:       Result Value    Glucose 235 (*)     CREATININE 1.15 (*)     Chloride 96 (*)     GFR Non- 45 (*)     GFR  54 (*)     All other components within normal limits   CBC WITH AUTO DIFFERENTIAL - Abnormal; Notable for the following components:    RDW 17.4 (*)     Seg Neutrophils 79 (*)     Lymphocytes 7 (*)     Eosinophils % 0 (*)     Immature Granulocytes 1 (*)     Segs Absolute 8.63 (*)     Absolute Lymph # 0.78 (*)     Absolute Mono # 1.32 (*)     All other components within normal limits   MYOGLOBIN, SERUM - Abnormal; Notable for the following components:    Myoglobin 133 (*)     All other components within normal limits   LACTATE, SEPSIS - Abnormal; Notable for the following components:    Lactic Acid, Sepsis 2.0 (*)     All other components within normal limits   IMMATURE PLATELET FRACTION - Abnormal; Notable for the following components:    Platelet, Immature Fraction 21.7 (*)     All other components within normal limits   COVID-19, RAPID   RAPID INFLUENZA A/B ANTIGENS   CULTURE, BLOOD 1   CULTURE, BLOOD 1   LIPASE   AMYLASE   URINALYSIS WITH REFLEX TO CULTURE   LACTATE, SEPSIS           EMERGENCY DEPARTMENT COURSE:   Vitals:    Vitals:    03/06/22 1607 03/06/22 1830   BP: (!) 162/46 (!) 131/45   SpO2: 94% 96%     -------------------------  BP: (!) 131/45,  ,  , Re-evaluation Notes    Resting more comfortable, patient has influenza A family says the last 3 times she has had influenza A she is developed pneumonia and had to be hospitalized for antibiotics they requested some antibiotics to had this off we also discussed Tamiflu they said they do make a decision whether to take it and they will accept a prescription for that. CRITICAL CARE:   None        CONSULTS:      PROCEDURES:  None    FINAL IMPRESSION      1. Nausea vomiting and diarrhea    2. Influenza A          DISPOSITION/PLAN   DISPOSITION discharge    Condition on Disposition    Stable    PATIENT REFERRED TO:  INGRID Armijo - CNP  [facility] [id number] [phone]    In 3 days        DISCHARGE MEDICATIONS:  New Prescriptions    AZITHROMYCIN (ZITHROMAX) 250 MG TABLET    Take 2 tablets (500 mg) on Day 1, followed by 1 tablet (250 mg) once daily on Days 2 through 5. ONDANSETRON (ZOFRAN ODT) 4 MG DISINTEGRATING TABLET    Take 1 tablet by mouth every 8 hours as needed for Nausea    OSELTAMIVIR (TAMIFLU) 75 MG CAPSULE    Take 1 capsule by mouth 2 times daily for 5 days       (Please note that portions of this note were completed with a voice recognition program.  Efforts were made to edit the dictations but occasionally words are mis-transcribed.)    Laura Joy MD,, MD, F.A.A.E.M.   Attending Emergency Physician                          Laura Joy MD  03/06/22 1377

## 2022-03-07 PROBLEM — J10.1 INFLUENZA A: Status: ACTIVE | Noted: 2022-01-01

## 2022-03-07 PROBLEM — E87.6 HYPOKALEMIA: Status: RESOLVED | Noted: 2019-02-09 | Resolved: 2022-01-01

## 2022-03-07 PROBLEM — R09.02 HYPOXIA: Status: ACTIVE | Noted: 2022-01-01

## 2022-03-07 PROBLEM — K57.32 SIGMOID DIVERTICULITIS: Status: RESOLVED | Noted: 2019-05-30 | Resolved: 2022-01-01

## 2022-03-07 PROBLEM — N20.0 NEPHROLITHIASIS: Status: RESOLVED | Noted: 2019-02-05 | Resolved: 2022-01-01

## 2022-03-07 PROBLEM — N20.0 RIGHT KIDNEY STONE: Status: RESOLVED | Noted: 2019-02-06 | Resolved: 2022-01-01

## 2022-03-07 NOTE — PROGRESS NOTES
Physical Therapy    Facility/Department: [id number] [facility]  Initial Assessment    NAME: Isi Borges  : 1935  MRN: [id number]    Date of Service: 3/7/2022    Discharge Recommendations:  Home with assist PRN        Assessment   Prognosis: Good  Decision Making: Low Complexity  No Skilled PT: At baseline function  REQUIRES PT FOLLOW UP: No  Activity Tolerance  Activity Tolerance: Patient Tolerated treatment well       Patient Diagnosis(es): The primary encounter diagnosis was Nausea vomiting and diarrhea. Diagnoses of Influenza A, Influenza with respiratory manifestation other than pneumonia, and Hypoxia were also pertinent to this visit. has a past medical history of Allergic rhinitis, Asthma, Atrial fibrillation with RVR ([facility] [address] Basal cell carcinoma of cheek, Basal cell carcinoma of leg, CAD (coronary artery disease), Central retinal artery occlusion, Cerebral artery occlusion with cerebral infarction [facility]), Cerebrovascular disease, CHF (congestive heart failure) ([address] Diverticulosis, Dyslipidemia, Elevated antinuclear antibody (ZAIRA) level, Esophagitis, Foraminal stenosis of lumbar region, Hyperlipidemia, Hypertension, Hypokalemia, IBS (irritable bowel syndrome), Iron deficiency anemia, Iron deficiency anemia due to chronic blood loss, Junctional bradycardia, Migraine, Mild CAD, Mitral regurgitation, Nephrolithiasis, Obesity, CHICO (obstructive sleep apnea), Osteoarthritis, PMR (polymyalgia rheumatica) ([facility]), Pneumonia, Premature atrial contractions, Pulmonary HTN ([address] Pulmonary hypertension ([address] Restrictive lung disease, Right kidney stone, Sigmoid diverticulitis, Type II or unspecified type diabetes mellitus without mention of complication, not stated as uncontrolled, and Vitreous floaters. has a past surgical history that includes Appendectomy (); Hysterectomy (Approx );  Cataract removal (Bilateral, 08/10); malignant skin lesion excision (Right, 12/10 and ); malignant skin lesion excision (Right, 02/2012); Colonoscopy (05/16/2011); other surgical history (09/06/2011); Cholecystectomy; Endoscopy, colon, diagnostic; eye surgery; Cardiac catheterization (2014); other surgical history (3/22/16); other surgical history (Right, 4/26/16); Cystoscopy (Right, 2/6/2019); Cystoscopy (Right, 2/27/2019); Colonoscopy (N/A, 9/26/2019); Upper gastrointestinal endoscopy (05/16/2011); Upper gastrointestinal endoscopy (6/22/15); and Upper gastrointestinal endoscopy (N/A, 9/26/2019). Restrictions     Vision/Hearing        Subjective  General  Chart Reviewed: Yes  Patient assessed for rehabilitation services?: Yes  Response To Previous Treatment: Not applicable  Family / Caregiver Present: No  Follows Commands: Within Functional Limits  Pain Screening  Patient Currently in Pain: No  Vital Signs  Patient Currently in Pain: No       Orientation  Orientation  Overall Orientation Status: Within Normal Limits  Social/Functional History  Social/Functional History  Lives With: Spouse  Type of Home: House  Home Layout: One level  Home Access: Stairs to enter with rails  Entrance Stairs - Number of Steps: 3  Home Equipment: 4 wheeled walker  Receives Help From: Family  ADL Assistance: Independent  Ambulation Assistance: Independent  Transfer Assistance: Independent  IADL Comments: Was independent at home with RW.  No history of falls  Cognition        Objective     Observation/Palpation  Observation: In bed, on O2, 94% SpO2                Bed mobility  Rolling to Left: Independent  Rolling to Right: Independent  Supine to Sit: Independent  Sit to Supine: Independent  Scooting: Independent  Transfers  Sit to Stand: Supervision  Stand to sit: Supervision  Comment: already moving much better than yesterday  Ambulation  Ambulation?: Yes  Ambulation 1  Surface: level tile  Device: Rolling Walker  Assistance: Supervision  Quality of Gait: stable  Gait Deviations: Slow Amy  Distance: 15 ft x2     Balance  Sitting - Static: Good  Sitting - Dynamic: Good  Standing - Static: Good  Standing - Dynamic: Fair        Plan   Safety Devices  Type of devices: Left in bed,Call light within reach    G-Code       OutComes Score                                                  AM-PAC Score             Goals  Short term goals  Time Frame for Short term goals: 1 day  Short term goal 1: Assess functional status       Therapy Time   Individual Concurrent Group Co-treatment   Time In 1400         Time Out 1413         Minutes 13                 [place], [place]

## 2022-03-07 NOTE — H&P
HOSPITALIST ADMISSION H&P      REASON FOR ADMISSION:  Hypoxia -- influenza A  ESTIMATED LENGTH OF STAY: >2 midnights, 3-4 days    ATTENDING/ADMITTING PHYSICIAN: Abran Retana MD  PCP: INGRID Wilson CNP    HISTORY OF PRESENT ILLNESS:      The patient is a 81 y.o. female patient of INGRID Wilson CNP who presents from the ER with c/o nausea, vomiting, diarrhea, fevers, cough, chest congestion, generalized weakness, and fatigue for the past 3 days. She denies any chest pain, shortness of breath, or abdominal pain. She reports her daughter had a similar illness prior to her. She has underlying diastolic CHF and asthma. In ER, she was found to be hypoxic at 86% on room air and then later in the \"high 70's\" on room air per report. She tested positive for influenza A. Covid-19 negative. CTA of the chest showed no PE, but mild pulmonary edema. WBC 10.9, lactic acid 1.9. EKG showed sinus rhythm with nonspecific ST/T wave abnormality. DMII -- A1C 7.3 -- glucose 235 in ER    CKD stage 3a -- stable    polymyalgia rheumatica -- on daily prednisone    CHICO -- noncompliant with cpap    PAF -- in NSR, on eliquis    Hypertension -- stable     See below for additional PMH. Patient vmgc-cpytjhknkr-tupgctbh-available records reviewed, including, but not limited to ER records, imaging results, lab results, office records, personal records, and OARRS -- no signs of abuse or diversion. Past Medical History:   Diagnosis Date    Allergic rhinitis     Asthma     PFT's, 04/06, actually showed mild restrictive defect.  Atrial fibrillation with RVR ([address]     Hospitalized [facility] 2/8/21-2/11/21    Basal cell carcinoma of cheek 2007    Right cheek    Basal cell carcinoma of leg     right leg    CAD (coronary artery disease)     With 40% stenosis of the LAD, 07/10, ejection fraction 60%.   Repeat heart cath9/14 with 40-50 percent LAD lesion first diagonal 50% lesion rec med Rx    Central retinal artery occlusion     Right side November 2014 status post TPA    Cerebral artery occlusion with cerebral infarction [facility]) 11/24/2014    Cerebrovascular disease     50-79% stenosis on left on ultrasound 11/14    CHF (congestive heart failure) ([facility])     Echo 1/15 moderate MR, severe TR, RVSP 76, grade 2 diastolic dysfunction    Diverticulosis     AVM on colonoscopy, 05/11    Dyslipidemia     Elevated antinuclear antibody (ZAIRA) level     History of    Esophagitis 05/2011    Gastritis/esophagitis on EGD, Dr. Radha Vogel. Repeat EGD6/15 Gastritis    Foraminal stenosis of lumbar region     Moderate  Right L4/L5 neuroforaminal stenosis, Dr Martínez following. MRI 2/16 [place]. Moderate foraminal stenosis mild to moderate central canal stenosis    Hyperlipidemia     Hypertension     Hypokalemia 2/9/2019    IBS (irritable bowel syndrome)     GI consultation with Dr. Jonel Summers, GI specialist in [facility], felt that she probably has chronic functional diarrhea and recommended empiric Imodium, Pepto-Bismol or Questran first. Sprue test Neg 2011    Iron deficiency anemia     Percent sat iron 5, January 2015, ferritin 40 1 /15, FOBT positive  EGD 6/15  Gastritis, IV iron 9/15x3 effective,  colonoscopy deferred by Dr. Radha Vogel. --Prior colonoscopy 2011 with severe diverticulosis and telangiectasia. Trial iron solution in [place] juice 12/16    Iron deficiency anemia due to chronic blood loss 09/08/2015    Junctional bradycardia     Symptomatic, resolved with discontinuation of Verapamil, 05/09. 1.1) Echocardiogram: LAE, LVH, EF 50%, mild MR, diastolic. 1.2) Dr. Jolynn Dial evaluation. 1.3.) Persantine stress test negative, 05/09.  Migraine     Mild CAD     cath 10/15    Mitral regurgitation     Moderate to severe on echocardiogram September 2015.   Right pressure 76 grade 2 diastolic dysfunction,  echo 81/86 grade 2 diastolic dysfunction mild to moderate MR RVSP 56 aortic sclerosis    Nephrolithiasis 2/5/2019    Obesity     CHICO (obstructive sleep apnea)     CPAP 14 2/15 initiation  AHI 7  mild CHICO intolerant CPAP    Osteoarthritis     PMR (polymyalgia rheumatica) (HCC)     Pneumonia     Premature atrial contractions     Pulmonary hypertension (HCC)     -Moderate on echo November 2014    Restrictive lung disease     Mild on PFTs 11/14    Right kidney stone 2/6/2019    Sigmoid diverticulitis 5/30/2019    Type II or unspecified type diabetes mellitus without mention of complication, not stated as uncontrolled     Vitreous floaters            Past Surgical History:   Procedure Laterality Date    APPENDECTOMY  1952    CARDIAC CATHETERIZATION  2014    CATARACT REMOVAL Bilateral 08/10    CHOLECYSTECTOMY      COLONOSCOPY  05/16/2011    COLONOSCOPY N/A 9/26/2019    severe diverticulosis, benign rectal polyp, Dr. Sarah Beth Lagos Right 2/6/2019    Cysto with right stent insertion and villareal catheter performed by Suzanna Marques MD at [facility] Right 2/27/2019    CYSTO right stent removal  Right Ureteroscopy Holmium Laser right stent exchange performed by Suzanna Marques MD at [facility], [address] [place] [address] Right 12/10 and 04/11    Basal CellRemoved from right neck    MALIGNANT SKIN LESION EXCISION Right 02/2012    Basal Cell Removed from right leg    OTHER SURGICAL HISTORY  09/06/2011    capsule endoscopy    OTHER SURGICAL HISTORY  3/22/16    right L4 and L5 TFE    OTHER SURGICAL HISTORY Right 4/26/16    L4, L5 TFE    UPPER GASTROINTESTINAL ENDOSCOPY  05/16/2011    UPPER GASTROINTESTINAL ENDOSCOPY  6/22/15    mild gastritis (Dr. Sung Kingsley)    UPPER GASTROINTESTINAL ENDOSCOPY N/A 9/26/2019    mild to moderate inflammation, Dr. Sung Kingsley       Allergies:    Codeine, Erythromycin, Exenatide, Levofloxacin, Verapamil, Clonidine derivatives, Other, and Penicillins    Social History:    reports that she has never smoked.  She has never used smokeless tobacco. She reports that she does not drink alcohol and does not use drugs. Family History:   family history includes Heart Attack in her son; Heart Disease in her father; High Blood Pressure in her father; Stroke in her sister; Uterine Cancer in her mother. REVIEW OF SYSTEMS:  See HPI and problem list; otherwise no other new complaints with respect to eyes, ENT, neck, pulmonary, coronary, chest, GI, , endocrine, musculoskeletal, hematologic, lymphatic, allergic/immunologic, neurologic, psychiatric, or skin. Code status: patient/family wishes for Full Code at this time. PHYSICAL EXAM:  Vitals:  BP (!) 120/52   Pulse 77   Temp 99.7 °F (37.6 °C) (Tympanic)   Resp 18   SpO2 95%     General: awake, alert and cooperative  HEENT: Mucosa Pink, Moist, EMOI, External nose normal, Normocephalic, Atraumatic and Neck with full ROM  Neck: Supple, Carotid Pulses Present, No Masses, Tenderness, Nodularity and No Lymphadenopathy  Chest/Lungs: Rhonchi Present and Respirations even and unlabored, with occasional moist nonproductive cough noted  Cardiac: Regular Rate and Rhythm and Pedal Pulses Palpable Bilaterally  GI/Abdomen:  Bowel Sounds Present and Soft, Non-tender, without Guarding or Rebound Tenderness  : adult brief in place and Not examined  Extremities/Musculoskeletal: BLE with trace edema and Generalized weakness  Skin: No Cyanosis and Skin warm and dry  Neuro: gait impairment at baseline, Alert and Oriented, to Person, to Time, to Place, to Situation and No Localizing Signs/Symptoms  Psychiatric: Normal mood and affect      LABS:    CBC with Differential:    Lab Results   Component Value Date    WBC 10.9 03/06/2022    RBC 4.70 03/06/2022    HGB 12.2 03/06/2022    HCT 39.8 03/06/2022    PLT See Reflexed IPF Result 03/06/2022    MCV 84.7 03/06/2022    MCH 26.0 03/06/2022    MCHC 30.7 03/06/2022    RDW 17.4 03/06/2022    METASPCT 1 02/12/2019    LYMPHOPCT 7 03/06/2022    MONOPCT 12 03/06/2022    MYELOPCT 1 01/14/2015    BASOPCT 1 03/06/2022    MONOSABS 1.32 03/06/2022    LYMPHSABS 0.78 03/06/2022    EOSABS <0.03 03/06/2022    BASOSABS 0.08 03/06/2022    DIFFTYPE NOT REPORTED 11/30/2021     CMP:    Lab Results   Component Value Date     03/06/2022    K 3.9 03/06/2022    CL 96 03/06/2022    CO2 25 03/06/2022    BUN 15 03/06/2022    CREATININE 1.15 03/06/2022    GFRAA 54 03/06/2022    LABGLOM 45 03/06/2022    GLUCOSE 235 03/06/2022    PROT 7.1 03/06/2022    LABALBU 4.1 03/06/2022    CALCIUM 9.4 03/06/2022    BILITOT 0.61 03/06/2022    ALKPHOS 86 03/06/2022    AST 18 03/06/2022    ALT 13 03/06/2022       ASSESSMENT:      Patient Active Problem List   Diagnosis    Dyslipidemia    Osteoarthritis    Esophagitis    Vitamin D deficiency    PMR (polymyalgia rheumatica) (HCC)    Central artery occlusion of retina    Diastolic dysfunction    Pulmonary HTN (HCC)    Iron deficiency anemia due to chronic blood loss    Type 2 diabetes with nephropathy (HCC)    CHICO- intolerant CPAP    Essential hypertension    Lumbar radiculopathy    Neural foraminal stenosis of lumbar spine    Spinal stenosis at L4-L5 level    Mitral regurgitation    Frequent PVCs    Gait abnormality    Asthma    Multifocal atrial tachycardia (HCC)    Severe obesity (BMI 35.0-39. 9) with comorbidity ([facility]    Stage 3a chronic kidney disease ([facility]    Thrombocytopenia ([facility]    Influenza A    Hypoxia     Patient qwpz-bhhlyrcpaw-wcagwsmp-available records reviewed, including, but not limited to,  ER reports---labs--imaging--EKGs----office records---personal notes    Arely Sanz             85  WF  [david Javed NP--IM;  ty Dean, Urology; DC Cardiology---TCC]  FULL CODE    SUPPLEMENTAL OXYGEN---3 liters     Freestyle Matt--[facility]  ELIQUIS     COVID--19--NEGATIVE--3.6.2022    INFLUENZA A--[+]--positive    Anti-infectives:  Tamiflu    Influenza A---3.6.2022  Hypoxia due to Influenza A---3.6.2022  Nausea--vomiting--diarrhea--cough---fever---chills---fatigue---3.6.2022         CTA chest----pulmonary---3.6.2022---no PE--mild pulmonary edema---mild cardiomegaly         CXR--3.6.2022---[-]         EKG---3.6.2022---NSR--77---NSSTTCs--slight T inversion lateral leads  BLOOD CULTURE--3.6.2022---1/2--[+]---Staph epidermidis = contaminant    Atrial fibrillation---history---PAF       Atrial  fibrillation---RVR----2.8.2021---recurrent---converted to NSR----2.9.2021 à atrial fibrillation---                      2.10.2021 à SR with brief paroxysmal atrial fibrillation                EKG---2.10.2021--atrial fibrillation--PVCs--nonspecific T wave changes---QTc ~ 673        EKG----2.9.2021--#2--NSR---75--nonspecific T-wave changes        EKG--2.9.2021--#1----atrial fibrillation--100        MI ruled out---2.9.2021         EKG---2.8.2021---atrial fibrillation---RVR---121--NSSTTCs---diffuse nonspecific T-wave changes                     EKG----8.2.2020--SR---82--PACs--NSSTTCs--T abnormality             EKG---5.27.2019---NSR--89--NSSTTCs           RVR--2.10.2019           EKG---2.12.2019---atrial fibrillation---85---PVCs---QTc = 473--NSSTTCs especially inferiorly           EKG---2.11.2019--SR--80---PACs--NSSTTCs           EKG---2.10. 2019--atrial fibrillation--RVR--116--NSSTTCs           EKG---2.9.2019---sinus tachycardia----105---NSSTTCs           2D ECHO---10.19.2018---BLAKE--LVH--globally NLVSF--                     NRVSF--TONIA without stenosis--mild-to-moderate MR---                     trivial TR--RVSP  ~ 40 mm Hg--mild pulmonary HTN---                     LVEF > 55%    CHF--chronic diastolic        2D UWH----[id number]---HDPRNWJX NLVSF--no WMA----LVEF ~ 55-60%       Acute-on-chronic diastolic----5.27.2019       2D ECHO---5.28.2019---LA mildly dilated--mild LVH--NLVSF--no segmental                       WMA--RA dilatation--NRVSF--MAC--TONIA but opens well---                      moderate TR---RVSP ~ 67 mm Hg-- severe pulmonary hypertension--                      Grade II moderate DD----LVEF ~ 60%  CKD--Stage 3a  Pulmonary hypertension----mild  Hypertension  Hyperlipidemia  Diabetes Mellitus Type 2  Asthma   Pulmonary HTN  ASCVD        EKG---2.5.2019--sinus tachycardia--102--PACs--[facility]        Cardiac catheterization--9.26.2014---0% LM--40-50% LAD--40% proximal LAD---                                50% D1--10-20% LCx--10-20%---10-20% RCA  Morbid obesity  CHICO--obstructive sleep apnea---CPAP intolerant   PMR---polymyalgia rheumatica---daily prednisone  BLE edema   Chronic BLE pain   Gait-balance instability  Hypokalemia   PMH:  central retinal occlusion, DD, esophagitis, lumbar radiculopathy,              lumbar spinal stenosis, iron deficiency anemia---chronic GI blood              loss, OA, frequent PVCs, MR, Vitamin D deficiency, obstructing              right  renal calculi---2. 5.2019, UTI--- 2.5.2019---fever--leukocytosis,             lactic acidosis----2. 5.2019,  TEJ---2. 5.2019 superposed on CKD--Stage 3,              hypokalemia---2. 5.2019, bronchitis---cough----POA--2. 6.2019,              elevated troponin---flat peak---renal, migraine headaches, increased             ZAIRA, OA--BCC right cheek, irritable bowel syndrome,   elevated lactic acid--             5.27.2019--sepsis ruled out, urinary retention----villareal--2019, TEJ--2019,             sigmoid diverticulitis---5.24.2019, E coli UTI---2.8.2021   PSH:  see above, appendectomy---1952, hysterectomy---cervix unknown---            1983, bilateral cataract---IOL--2010, BCC-right neck--[id number],              BCC right leg--2012, colonoscopy--2011, capsule endoscopy---2011,             laparoscopic cholecystectomy--2003, eye--type?, bronchitis--2014,              EGD---2011--2015 mild gastritis, right             L4-5 TFE  x 2--2016, cystoscopy--- right JJ stent---2. 6.2019    Allergies:        codeine, erythromycin, , verapamil, Ender Wang  Intolerances:  exenatide---nausea, Levaquin--levofloxacin--GI upset    Attending Supervising Physician's Attestation Statement  I performed a history and physical examination on the patient and discussed the management with the nurse practitioner. I reviewed and agree with the findings and plan as documented in her note . Electronically signed by Jordi Adrian on 3/7/22 at 4:50 PM EST        PLAN:    1. Hypoxia -- influenza A -- telemetry monitoring, tamiflu, mednebs, supplemental oxygen prn, RT protocols, supportive care, mucinex, zofran prn, consider steroids if wheezing develops  2. Diastolic CHF -- CT showing mild pulmonary edema -- will continue home lasix for now, monitor I&O, daily weight, and fluid balance  3. DMII -- with hyperglycemia -- carb controlled diet, insulins, monitor and titrate prn  4. CKD stage 3a -- stable -- monitor I&O and renal function  5. PAF -- in NSR -- con't home medications and monitor  6. Hypertension -- stable, con't home medications and monitor  7. Home medications reviewed  8.  See orders    Note that over 50 minutes was spent in evaluation of the patient, review of the chart and pertinent records, discussion with family/staff, etc.    INGRID Whatley - CNP, FNP-BC  6:16 AM  3/7/2022

## 2022-03-07 NOTE — PLAN OF CARE
Problem:  Activity:  Goal: Energy level will increase  Description: Energy level will increase  Outcome: Ongoing     Problem: Physical Regulation:  Goal: Ability to maintain clinical measurements within normal limits will improve  Description: Ability to maintain clinical measurements within normal limits will improve  Outcome: Ongoing     Problem: Health Behavior:  Goal: Adoption of positive health habits will improve  Description: Adoption of positive health habits will improve  Outcome: Ongoing     Problem: Discharge Planning:  Goal: Discharged to appropriate level of care  Description: Discharged to appropriate level of care  Outcome: Ongoing

## 2022-03-07 NOTE — ED PROVIDER NOTES
ATTENDING  ADDENDUM     Care of this patient was assumed from Dr. Mindy Jiang- the patient was seen for Emesis (started last night) and Diarrhea  . The patient's initial evaluation and plan have been discussed with the prior provider who initially evaluated the patient. Nursing Notes, Past Medical Hx, Past Surgical Hx, Social Hx, Allergies, and Family Hx were all reviewed. Reevaluation: Patient had been admitted for nausea vomiting diarrhea but also was found to have influenza A. Patient had had increased oxygen requirements and has desaturated into the 80s when she came in she has second episode of desaturation here in the emergency department she was in the high 70s when she was placed back on oxygen she went right back to 90%. She feels congested and nauseated still we will go ahead and give her some Zofran and an albuterol treatment and then go ahead admit her to the hospital.      DIFFERENTIAL DIAGNOSIS/ MDM:           Follow Exit Care instructions closely. I have reviewed the disposition diagnosis with the patient and or their family/guardian. I have answered their questions and given discharge instructions. They voiced understanding of these instructions and did not have any further questions or complaints. DIAGNOSTIC RESULTS     RADIOLOGY:   Non-plain film images such as CT, Ultrasound and MRI are read by the radiologist. Plain radiographic images are visualized and preliminarily interpreted by the emergency physician with the below findings:  CT CHEST PULMONARY EMBOLISM W CONTRAST   Final Result   No evidence of pulmonary embolism. Mild pulmonary edema. Mild cardiomegaly. XR CHEST PORTABLE   Final Result   No acute abnormality. LABS:  Results for orders placed or performed during the hospital encounter of 03/06/22   COVID-19, Rapid    Specimen: Nasopharyngeal Swab   Result Value Ref Range    Specimen Description . NASOPHARYNGEAL SWAB     SARS-CoV-2, Rapid Not Detected Not Detected   RAPID INFLUENZA A/B ANTIGENS    Specimen: Nasopharyngeal Swab   Result Value Ref Range    Specimen Description . NASOPHARYNGEAL SWAB     Direct Exam POSITIVE for Influenza A Antigen     Direct Exam       NEGATIVE FOR INFLUENZA B ANTIGEN. * A negative result does not exclude Influenza infection. Negative results should be confirmed by PCR testing. Culture, Blood 1    Specimen: Blood   Result Value Ref Range    Specimen Description . BLOOD 10 ML RT AC     Culture NO GROWTH <24 HRS    Culture, Blood 1    Specimen: Blood   Result Value Ref Range    Specimen Description . BLOOD 10 ML IV      Culture NO GROWTH <24 HRS    Comprehensive Metabolic Panel   Result Value Ref Range    Glucose 235 (H) 70 - 99 mg/dL    BUN 15 8 - 23 mg/dL    CREATININE 1.15 (H) 0.50 - 0.90 mg/dL    Bun/Cre Ratio 13 9 - 20    Calcium 9.4 8.6 - 10.4 mg/dL    Sodium 136 135 - 144 mmol/L    Potassium 3.9 3.7 - 5.3 mmol/L    Chloride 96 (L) 98 - 107 mmol/L    CO2 25 20 - 31 mmol/L    Anion Gap 15 9 - 17 mmol/L    Alkaline Phosphatase 86 35 - 104 U/L    ALT 13 5 - 33 U/L    AST 18 <32 U/L    Total Bilirubin 0.61 0.3 - 1.2 mg/dL    Total Protein 7.1 6.4 - 8.3 g/dL    Albumin 4.1 3.5 - 5.2 g/dL    Albumin/Globulin Ratio 1.4 1.0 - 2.5    GFR Non- 45 (L) >60 mL/min    GFR  54 (L) >60 mL/min    GFR Comment         CBC with Auto Differential   Result Value Ref Range    WBC 10.9 3.5 - 11.3 k/uL    RBC 4.70 3.95 - 5.11 m/uL    Hemoglobin 12.2 11.9 - 15.1 g/dL    Hematocrit 39.8 36.3 - 47.1 %    MCV 84.7 82.6 - 102.9 fL    MCH 26.0 25.2 - 33.5 pg    MCHC 30.7 25.2 - 33.5 g/dL    RDW 17.4 (H) 11.8 - 14.4 %    Platelets See Reflexed IPF Result 138 - 453 k/uL    NRBC Automated 0.0 0.0 per 100 WBC    Seg Neutrophils 79 (H) 36 - 65 %    Lymphocytes 7 (L) 24 - 43 %    Monocytes 12 3 - 12 %    Eosinophils % 0 (L) 1 - 4 %    Basophils 1 0 - 2 %    Immature Granulocytes 1 (H) 0 %    Segs Absolute 8.63 (H) 1.50 - 8.10 k/uL    Absolute Lymph # 0.78 (L) 1.10 - 3.70 k/uL    Absolute Mono # 1.32 (H) 0.10 - 1.20 k/uL    Absolute Eos # <0.03 0.00 - 0.44 k/uL    Basophils Absolute 0.08 0.00 - 0.20 k/uL    Absolute Immature Granulocyte 0.11 0.00 - 0.30 k/uL   Lipase   Result Value Ref Range    Lipase 21 13 - 60 U/L   Myoglobin, Serum   Result Value Ref Range    Myoglobin 133 (H) 25 - 58 ng/mL   Urinalysis with Reflex to Culture    Specimen: Urine   Result Value Ref Range    Glucose, Ur NEGATIVE NEGATIVE    Bilirubin Urine NEGATIVE NEGATIVE    Ketones, Urine NEGATIVE NEGATIVE    Specific Gravity, UA 1.025 1.010 - 1.025    Urine Hgb 1+ (A) NEGATIVE    pH, UA 5.0 5.0 - 6.0    Protein, UA NEGATIVE NEGATIVE    Urobilinogen, Urine Normal Normal    Nitrite, Urine NEGATIVE NEGATIVE    Leukocyte Esterase, Urine NEGATIVE NEGATIVE   Amylase   Result Value Ref Range    Amylase 34 28 - 100 U/L   Lactate, Sepsis   Result Value Ref Range    Lactic Acid, Sepsis 2.0 (H) 0.5 - 1.9 mmol/L   Lactate, Sepsis   Result Value Ref Range    Lactic Acid, Sepsis 1.9 0.5 - 1.9 mmol/L   Immature Platelet Fraction   Result Value Ref Range    Platelet, Immature Fraction 21.7 (H) 1.1 - 10.3 %    Platelet, Fluorescence 155 138 - 453 k/uL   Microscopic Urinalysis   Result Value Ref Range    -          WBC, UA None 0 - 4 /HPF    RBC, UA 0 TO 4 0 - 4 /HPF    Epithelial Cells UA 0 TO 4 0 - 5 /HPF    Bacteria, UA TRACE (A) None    Amorphous, UA 2+ (A) None   EKG 12 Lead   Result Value Ref Range    Ventricular Rate 77 BPM    Atrial Rate 77 BPM    P-R Interval 172 ms    QRS Duration 80 ms    Q-T Interval 396 ms    QTc Calculation (Bazett) 448 ms    P Axis -18 degrees    R Axis 42 degrees    T Axis 125 degrees       ABNORMAL LABS:  Labs Reviewed   COMPREHENSIVE METABOLIC PANEL - Abnormal; Notable for the following components:       Result Value    Glucose 235 (*)     CREATININE 1.15 (*)     Chloride 96 (*)     GFR Non- 45 (*)     GFR  54 (*) All other components within normal limits   CBC WITH AUTO DIFFERENTIAL - Abnormal; Notable for the following components:    RDW 17.4 (*)     Seg Neutrophils 79 (*)     Lymphocytes 7 (*)     Eosinophils % 0 (*)     Immature Granulocytes 1 (*)     Segs Absolute 8.63 (*)     Absolute Lymph # 0.78 (*)     Absolute Mono # 1.32 (*)     All other components within normal limits   MYOGLOBIN, SERUM - Abnormal; Notable for the following components:    Myoglobin 133 (*)     All other components within normal limits   URINALYSIS WITH REFLEX TO CULTURE - Abnormal; Notable for the following components:    Urine Hgb 1+ (*)     All other components within normal limits   LACTATE, SEPSIS - Abnormal; Notable for the following components:    Lactic Acid, Sepsis 2.0 (*)     All other components within normal limits   IMMATURE PLATELET FRACTION - Abnormal; Notable for the following components:    Platelet, Immature Fraction 21.7 (*)     All other components within normal limits   MICROSCOPIC URINALYSIS - Abnormal; Notable for the following components:    Bacteria, UA TRACE (*)     Amorphous, UA 2+ (*)     All other components within normal limits   COVID-19, RAPID   RAPID INFLUENZA A/B ANTIGENS   CULTURE, BLOOD 1   CULTURE, BLOOD 1   LIPASE   AMYLASE   LACTATE, SEPSIS        EKG:      EMERGENCY DEPARTMENT COURSE:   Vitals:    Vitals:    03/06/22 1830 03/06/22 1832 03/06/22 1858 03/06/22 1900   BP: (!) 131/45   113/63   Pulse: 77      Resp:       Temp:       TempSrc:       SpO2: 96% 94% 97% 97%     -------------------------  BP: 113/63, Temp: 99.7 °F (37.6 °C), Pulse: 77, Resp: 18          FINAL IMPRESSION      1. Nausea vomiting and diarrhea    2. Influenza A    3. Influenza with respiratory manifestation other than pneumonia    4. Hypoxia          DISPOSITION/PLAN   DISPOSITION Decision To Admit 03/07/2022 12:01:35 AM    I have reviewed the disposition diagnosis with the patient and or their family/guardian.   I have answered their questions and given discharge instructions. They voiced understanding of these instructions and did not have any further questions or complaints. Condition on Disposition    Stable    PATIENT REFERRED TO:  No follow-up provider specified.     DISCHARGE MEDICATIONS:  Current Discharge Medication List          (Please note that portions of this note were completed with a voice recognition program.  Efforts were made to edit the dictations but occasionally words are mis-transcribed.)    Marika Boggs MD,, MD  Attending Emergency Physician       Marika Boggs MD  03/07/22 0005

## 2022-03-07 NOTE — FLOWSHEET NOTE
rounding in PCU. Assessment: Patient was doing OK. Daughter was present. Good family support and good Hindu connection. Made a call to the patient's Hindu and left a voicemail. Intervention: Engaged in conversation and active listening. Prayed with Patient. Outcome: Patient expressed appreciation for visit and offer of continued prayer. Plan: Chaplains are available on site or on call 24/7 for spiritual and emotional support. 03/07/22 1127   Encounter Summary   Services provided to: Patient   Referral/Consult From: [address] of [address] [place] [address] Rd Yes   Continue Visiting   (3-6-22)   Complexity of Encounter Low   Length of Encounter 15 minutes   Spiritual Assessment Completed Yes   Spiritual/Gnosticism   Type Spiritual support   Assessment Calm; Approachable;Peaceful   Intervention Active listening;Prayer   Outcome Engaged in conversation;Expressed gratitude     Electronically signed by Gerardo Rhoades on 3/7/2022 at 11:32 AM

## 2022-03-08 NOTE — PLAN OF CARE
Problem: Activity:  Goal: Ability to return to normal activity level will improve  Description: Ability to return to normal activity level will improve  3/8/2022 1114 by Serena North RN  Outcome: Ongoing  3/7/2022 2248 by Jackie Canela RN  Outcome: Ongoing     Problem: Health Behavior:  Goal: Compliance with immunization standards will improve  Description: Compliance with immunization standards will improve  3/8/2022 1114 by Serena North RN  Outcome: Ongoing  3/7/2022 2248 by Jackie Canela RN  Outcome: Ongoing     Problem: Physical Regulation:  Goal: Complications related to the disease process, condition or treatment will be avoided or minimized  Description: Complications related to the disease process, condition or treatment will be avoided or minimized  3/8/2022 1114 by Serena North RN  Outcome: Ongoing  3/7/2022 2248 by Jackie Canela RN  Outcome: Ongoing     Problem:  Activity:  Goal: Energy level will increase  Description: Energy level will increase  3/8/2022 1114 by Serena North RN  Outcome: Ongoing  3/7/2022 2248 by Jackie Canela RN  Outcome: Ongoing

## 2022-03-08 NOTE — TELEPHONE ENCOUNTER
Patient currently admitted.   Let us wait till she is discharged to make sure there is no medication changes

## 2022-03-08 NOTE — PROGRESS NOTES
03/08/2022    MCHC 30.5 03/08/2022    RDW 17.6 03/08/2022    METASPCT 1 02/12/2019    LYMPHOPCT 20 03/08/2022    MONOPCT 11 03/08/2022    MYELOPCT 1 01/14/2015    BASOPCT 1 03/08/2022    MONOSABS 0.70 03/08/2022    LYMPHSABS 1.28 03/08/2022    EOSABS 0.03 03/08/2022    BASOSABS 0.03 03/08/2022    DIFFTYPE NOT REPORTED 11/30/2021     BMP:    Lab Results   Component Value Date     03/08/2022    K 4.3 03/08/2022     03/08/2022    CO2 See Paper Report 03/08/2022    BUN 11 03/08/2022    LABALBU 4.1 03/06/2022    CREATININE 0.92 03/08/2022    CALCIUM See Paper Report 03/08/2022    GFRAA >60 03/08/2022    LABGLOM 58 03/08/2022    GLUCOSE 106 03/08/2022           Physical Exam:  Vitals: BP (!) 133/52   Pulse 64   Temp 99.8 °F (37.7 °C) (Tympanic)   Resp 20   Ht 4' 11\" (1.499 m)   Wt 183 lb (83 kg)   SpO2 95%   BMI 36.96 kg/m²   24 hour intake/output:    Intake/Output Summary (Last 24 hours) at 3/8/2022 1834  Last data filed at 3/8/2022 0854  Gross per 24 hour   Intake 360 ml   Output --   Net 360 ml     Last 3 weights: Wt Readings from Last 3 Encounters:   03/08/22 183 lb (83 kg)   02/24/22 183 lb (83 kg)   02/07/22 187 lb 9.6 oz (85.1 kg)     HEENT: Normocephalic and Atraumatic---O2 NC  Neck: Supple, No Masses, Tenderness, Nodularity and No Lymphadenopathy  Chest/Lungs: Distant Breath Sounds  Cardiac: Regular Rate and Rhythm  GI/Abdomen: Bowel Sounds Present and Soft, Non-tender, without Guarding or Rebound Tenderness  : Not examined  EXT/Skin: No Edema, No Cyanosis and No Clubbing  Neuro: alert----generalzied weakness--- and No Localizing Signs/Symptoms      Assessment:    Principal Problem:    Influenza A  Active Problems:    Hypoxia    Nausea vomiting and diarrhea  Resolved Problems:    * No resolved hospital problems.  BRIAN Sánchez             85  WF  [david Arana NP--IM;  ty Christianson, Urology; DC Cardiology---[facility]]  FULL CODE    SUPPLEMENTAL OXYGEN---3 liters     Freestyle Matt--[facility]  LIU BYRD--19--NEGATIVE--3.6.2022    INFLUENZA A--[+]--positive    Anti-infectives:  Tamiflu    Influenza A---3.6.2022  Hypoxia due to Influenza A---3.6.2022  Nausea--vomiting--diarrhea--cough---fever---chills---fatigue---3.6.2022         CXR---3.8.2022---mild increase vascular congestion--hazy density lung base         CTA chest----pulmonary---3.6.2022---no PE--mild pulmonary edema---mild cardiomegaly         CXR--3.6.2022---[-]         EKG---3.6.2022---NSR--77---NSSTTCs--slight T inversion lateral leads  BLOOD CULTURE--3.6.2022---1/2--[+]---Staph epidermidis = contaminant    Atrial fibrillation---history---PAF       Atrial  fibrillation---RVR----2.8.2021---recurrent---converted to NSR----2.9.2021 à atrial fibrillation---                      2.10.2021 à SR with brief paroxysmal atrial fibrillation                EKG---2.10.2021--atrial fibrillation--PVCs--nonspecific T wave changes---QTc ~ 673        EKG----2.9.2021--#2--NSR---75--nonspecific T-wave changes        EKG--2.9.2021--#1----atrial fibrillation--100        MI ruled out---2.9.2021         EKG---2.8.2021---atrial fibrillation---RVR---121--NSSTTCs---diffuse nonspecific T-wave changes                     EKG----8.2.2020--SR---82--PACs--NSSTTCs--T abnormality             EKG---5.27.2019---NSR--89--NSSTTCs           RVR--2.10.2019           EKG---2.12.2019---atrial fibrillation---85---PVCs---QTc = 473--NSSTTCs especially inferiorly           EKG---2.11.2019--SR--80---PACs--NSSTTCs           EKG---2.10. 2019--atrial fibrillation--RVR--116--NSSTTCs           EKG---2.9.2019---sinus tachycardia----105---NSSTTCs           2D ECHO---10.19.2018---BLAKE--LVH--globally NLVSF--                     NRVSF--TONIA without stenosis--mild-to-moderate MR---                     trivial TR--RVSP  ~ 40 mm Hg--mild pulmonary HTN---                     LVEF > 55%    CHF--chronic diastolic        2D RFAM----1.64.7034---[id number] NLVSF--no WMA----LVEF ~ 55-60% Acute-on-chronic diastolic----5.27.2019       2D ECHO---5.28.2019---LA mildly dilated--mild LVH--NLVSF--no segmental                       WMA--RA dilatation--NRVSF--MAC--TONIA but opens well---                      moderate TR---RVSP ~ 67 mm Hg-- severe pulmonary hypertension--                      Grade II moderate DD----LVEF ~ 60%  CKD--Stage 3a  Pulmonary hypertension----mild  Hypertension  Hyperlipidemia  Diabetes Mellitus Type 2  Asthma   Pulmonary HTN  ASCVD        EKG---2.5.2019--sinus tachycardia--102--PACs--[facility]        Cardiac catheterization--9.26.2014---0% LM--40-50% LAD--40% proximal LAD---                                50% D1--10-20% LCx--10-20%---10-20% RCA  Morbid obesity  CHICO--obstructive sleep apnea---CPAP intolerant   PMR---polymyalgia rheumatica---daily prednisone  BLE edema   Chronic BLE pain   Gait-balance instability  Hypokalemia   PMH:  central retinal occlusion, DD, esophagitis, lumbar radiculopathy,              lumbar spinal stenosis, iron deficiency anemia---chronic GI blood              loss, OA, frequent PVCs, MR, Vitamin D deficiency, obstructing              right  renal calculi---2. 5.2019, UTI--- 2.5.2019---fever--leukocytosis,             lactic acidosis----2. 5.2019,  TEJ---2. 5.2019 superposed on CKD--Stage 3,              hypokalemia---2. 5.2019, bronchitis---cough----POA--2. 6.2019,              elevated troponin---flat peak---renal, migraine headaches, increased             ZAIRA, OA--BCC right cheek, irritable bowel syndrome,   elevated lactic acid--             5.27.2019--sepsis ruled out, urinary retention----villareal--2019, TEJ--2019,             sigmoid diverticulitis---5.24.2019, E coli UTI---2.8.2021   PSH:  see above, appendectomy---1952, hysterectomy---cervix unknown---            1983, bilateral cataract---IOL--2010, BCC-right neck--[id number],              [facility] right leg--2012, colonoscopy--2011, capsule endoscopy---2011,             laparoscopic cholecystectomy--2003, eye--type?, bronchitis--2014,              EGD---2011--2015 mild gastritis, right             L4-5 TFE  x 2--2016, cystoscopy--- right JJ stent---2. 6.2019    Allergies:        codeine, erythromycin, , verapamil,                          clonidine---rash, penicillin--rash, Levbid--rash  Intolerances:  exenatide---nausea, Levaquin--levofloxacin--GI upset        Plan:  1. Influenza A----cont'd Tamiflu  2. Supplemental oxygen--adjust to keep O2 sat 90-95%  3. CHF---IV Lasix  4.   See orders     Electronically signed by Kei Smith on 3/8/2022 at 6:34 PM    Hospitalist

## 2022-03-08 NOTE — PROGRESS NOTES
Patient called out stating her blood sugar per her [employer] system is 76. Patient given orange juice and peanut butter crackers; will continue to monitor.

## 2022-03-08 NOTE — PROGRESS NOTES
Incentive Spirometry education and demonstration given by Respiratory Therapy. Pt achieving 500 mL at time of instruction. Incentive Spirometer left at bedside and   Patient instructed to do a minimum of 10 breaths every hour.       Anna Cali RCP  12:50 PM

## 2022-03-08 NOTE — FLOWSHEET NOTE
rounding in PCU. Assessment: This was a follow up visit. Patient feeling better every day. Patient was alone today but expecting family visits tonight. Intervention: Engaged in conversation and active listening. Prayed with Patient. Outcome: Patient expressed appreciation for visit and offer of continued prayer. Plan: Chaplains are available on site or on call 24/7 for spiritual and emotional support.        03/08/22 1453   Encounter Summary   Services provided to: Patient   Referral/Consult From: Rounding   Continue Visiting   (3-8-22)   Complexity of Encounter Low   Length of Encounter 30 minutes   Spiritual/Quaker   Type Spiritual support   Assessment Approachable;Calm;Peaceful   Intervention Active listening;Prayer   Outcome Engaged in conversation;Expressed gratitude     Electronically signed by Rudolph Reese on 3/8/2022 at 2:54 PM

## 2022-03-08 NOTE — PROGRESS NOTES
Bronchodilator order(s) to two equivalent RT bronchodilator orders with one order with TID Frequency and one order with Frequency of every 4 hours PRN wheezing or increased work of breathing using Per Protocol order mode. 11-13 - enter or revise RT Bronchodilator order(s) to one equivalent RT bronchodilator order with QID Frequency and an Albuterol order with Frequency of every 4 hours PRN wheezing or increased work of breathing using Per Protocol order mode. Greater than 13 - enter or revise RT Bronchodilator order(s) to one equivalent RT bronchodilator order with every 4 hours Frequency and an Albuterol order with Frequency of every 2 hours PRN wheezing or increased work of breathing using Per Protocol order mode. RT to enter RT Home Evaluation for COPD & MDI Assessment order using Per Protocol order mode.     Electronically signed by Han Fabian RCP on 3/8/2022 at 12:51 PM

## 2022-03-08 NOTE — PLAN OF CARE
Jorge A Daivs RN  Outcome: Ongoing  3/7/2022 1231 by Laci Alexander RN  Outcome: Ongoing     Problem: Respiratory:  Goal: Respiratory status will improve  Description: Respiratory status will improve  3/7/2022 2248 by Joreg A Davis RN  Outcome: Ongoing  3/7/2022 1231 by Laci Alexander RN  Outcome: Ongoing     Problem: Sensory:  Goal: General experience of comfort will improve  Description: General experience of comfort will improve  3/7/2022 2248 by Jorge A Davis RN  Outcome: Ongoing  3/7/2022 1231 by Laci Alexander RN  Outcome: Ongoing     Problem: Discharge Planning:  Goal: Discharged to appropriate level of care  Description: Discharged to appropriate level of care  3/7/2022 2248 by Jorge A Davis RN  Outcome: Ongoing  3/7/2022 1231 by Laci Alexander RN  Outcome: Ongoing     Problem: Falls - Risk of:  Goal: Will remain free from falls  Description: Will remain free from falls  Outcome: Ongoing  Goal: Absence of physical injury  Description: Absence of physical injury  Outcome: Ongoing

## 2022-03-09 NOTE — PLAN OF CARE
Problem: Discharge Planning:  Goal: Discharged to appropriate level of care  Description: Discharged to appropriate level of care  Outcome: Met This Shift   Patient lives at home with her  and is independent. Pt denies any home health needs at present time. Pt does require home o2, list provided and pt chose [facility], information faxed. Pt denies any other needs at present time.

## 2022-03-09 NOTE — PROGRESS NOTES
CLINICAL PHARMACY NOTE: MEDS TO BEDS    Total # of Prescriptions Filled: 2   The following medications were delivered to the patient:  · Oseltamivir 30mg  · Mucus Relief 600mg    Additional Documentation:

## 2022-03-09 NOTE — DISCHARGE INSTR - DIET

## 2022-03-09 NOTE — PROGRESS NOTES
Hospitalist Progress Note    Patient:  Gonzalo Toussaint     YOB: 1935    MRN: [id number]   Admit date: 3/6/2022     Acct: [de-identified]     PCP: INGRID Corrigan CNP    CC--Interval History:   Influenza A----on Tamiflu and supplemental oxygen---will need home supplemental oxygen---home----3.9.2022    Nausea---vomiting-----diarrhea resolved    PAF--currently SR    CHF--chronic diastolic stable---has rec'd IV Lasix this hospitalization    CKD--Stage 3a---stable    Blood culture 1/2----S epi = contaminant----no intervention indicated    DM2----BG = 90---some lower blood glucose levels, but would anticipate will be able to use home regimen due to dietary changes    PMR---polymyalgia rheumatica----prednisone    See note below     All other ROS negative except noted in HPI    Diet:  ADULT DIET; Regular; 4 carb choices (60 gm/meal);  Low Fat/Low Chol/High Fiber/HIPOLITO    Medications:  Scheduled Meds:   albuterol  2.5 mg Nebulization Q4H    amiodarone  100 mg Oral Daily    amLODIPine  5 mg Oral Daily    furosemide  40 mg Oral Daily    metoprolol tartrate  25 mg Oral BID    potassium chloride  20 mEq Oral BID    atorvastatin  10 mg Oral Nightly    Liraglutide  1.8 mg SubCUTAneous Daily    pantoprazole  40 mg Oral QAM AC    insulin glargine  14 Units SubCUTAneous BID    insulin lispro  0-18 Units SubCUTAneous TID WC    insulin lispro  0-9 Units SubCUTAneous Nightly    sodium chloride flush  5-40 mL IntraVENous 2 times per day    guaiFENesin  600 mg Oral BID    apixaban  2.5 mg Oral BID    oseltamivir  30 mg Oral Daily    predniSONE  2.5 mg Oral Daily     Continuous Infusions:   dextrose      sodium chloride       PRN Meds:loperamide, polyvinyl alcohol-povidone, glucose, dextrose, glucagon (rDNA), dextrose, sodium chloride flush, sodium chloride, ondansetron **OR** ondansetron, polyethylene glycol, acetaminophen **OR** acetaminophen, sodium chloride nebulizer, albuterol, Benzocaine-Menthol, phenol    Objective:  Labs:  CBC with Differential:    Lab Results   Component Value Date    WBC 5.3 03/09/2022    RBC 4.07 03/09/2022    HGB 10.6 03/09/2022    HCT 35.5 03/09/2022    PLT See Reflexed IPF Result 03/09/2022    MCV 87.2 03/09/2022    MCH 26.0 03/09/2022    MCHC 29.9 03/09/2022    RDW 17.5 03/09/2022    METASPCT 1 02/12/2019    LYMPHOPCT 39 03/09/2022    MONOPCT 10 03/09/2022    MYELOPCT 1 01/14/2015    BASOPCT 0 03/09/2022    MONOSABS 0.51 03/09/2022    LYMPHSABS 2.09 03/09/2022    EOSABS 0.08 03/09/2022    BASOSABS <0.03 03/09/2022    DIFFTYPE NOT REPORTED 11/30/2021     BMP:    Lab Results   Component Value Date     03/09/2022    K 3.8 03/09/2022     03/09/2022    CO2 31 03/09/2022    BUN 9 03/09/2022    LABALBU 4.1 03/06/2022    CREATININE 0.95 03/09/2022    CALCIUM 8.8 03/09/2022    GFRAA >60 03/09/2022    LABGLOM 56 03/09/2022    GLUCOSE 90 03/09/2022           Physical Exam:  Vitals: BP (!) 135/58   Pulse 57   Temp 98.4 °F (36.9 °C) (Tympanic)   Resp 20   Ht 4' 11\" (1.499 m)   Wt 183 lb 4.8 oz (83.1 kg)   SpO2 97%   BMI 37.02 kg/m²   24 hour intake/output:    Intake/Output Summary (Last 24 hours) at 3/9/2022 1521  Last data filed at 3/9/2022 1308  Gross per 24 hour   Intake 370 ml   Output --   Net 370 ml     Last 3 weights: Wt Readings from Last 3 Encounters:   03/09/22 183 lb 4.8 oz (83.1 kg)   02/24/22 183 lb (83 kg)   02/07/22 187 lb 9.6 oz (85.1 kg)     HEENT: O2 NC----, Normocephalic and Atraumatic  Neck: Supple, No Masses, Tenderness, Nodularity and No Lymphadenopathy  Chest/Lungs: occasional coughing with expiratory wheeze [before med neb] and Distant Breath Sounds  Cardiac: Regular Rate and Rhythm  GI/Abdomen:  Bowel Sounds Present and Soft, Non-tender, without Guarding or Rebound Tenderness  : Not examined  EXT/Skin: No Cyanosis, No Clubbing and Edema Present---trivial   Neuro: alert---generalized weakness--- and No Localizing Signs/Symptoms      Assessment:    Principal Problem:    Influenza A  Active Problems:    Hypoxia    Nausea vomiting and diarrhea  Resolved Problems:    * No resolved hospital problems. Montse Ernandezdyce,  [address]  WF  [david Campbell NP--IM;  ty Hernandez, Urology; DC Cardiology---[facility]]  FULL CODE    SUPPLEMENTAL OXYGEN---2-3 liters     Freestyle Matt--[facility]  ELIQUIS     COVID--19--NEGATIVE--3.6.2022    INFLUENZA A--[+]--positive    Anti-infectives:  Tamiflu    Influenza A---3.6.2022  Hypoxia due to Influenza A---3.6.2022  Nausea--vomiting--diarrhea--cough---fever---chills---fatigue---3.6.2022         CXR---3.8.2022---mild increase vascular congestion--hazy density lung base         CTA chest----pulmonary---3.6.2022---no PE--mild pulmonary edema---mild cardiomegaly         CXR--3.6.2022---[-]         EKG---3.6.2022---NSR--77---NSSTTCs--slight T inversion lateral leads  BLOOD CULTURE--3.6.2022---1/2--[+]---Staph epidermidis = contaminant    Atrial fibrillation---history---PAF       Atrial  fibrillation---RVR----2.8.2021---recurrent---converted to NSR----2.9.2021 à atrial fibrillation---                      2.10.2021 à SR with brief paroxysmal atrial fibrillation                EKG---2.10.2021--atrial fibrillation--PVCs--nonspecific T wave changes---QTc ~ 673        EKG----2.9.2021--#2--NSR---75--nonspecific T-wave changes        EKG--2.9.2021--#1----atrial fibrillation--100        MI ruled out---2.9.2021         EKG---2.8.2021---atrial fibrillation---RVR---121--NSSTTCs---diffuse nonspecific T-wave changes                     EKG----8.2.2020--SR---82--PACs--NSSTTCs--T abnormality             EKG---5.27.2019---NSR--89--NSSTTCs           RVR--2.10.2019           EKG---2.12.2019---atrial fibrillation---85---PVCs---QTc = 473--NSSTTCs especially inferiorly           EKG---2.11.2019--SR--80---PACs--NSSTTCs           EKG---2.10. 2019--atrial fibrillation--RVR--116--NSSTTCs EKG---2.9.2019---sinus tachycardia----105---[facility]           2D ECHO---10.19.2018---BLAKE--LVH--globally NLVSF--                     NRVSF--TONIA without stenosis--mild-to-moderate MR---                     trivial TR--RVSP  ~ 40 mm Hg--mild pulmonary HTN---                     LVEF > 55%    CHF--chronic diastolic        2D BXJS----2.13.1844---EJYKTNVL NLVSF--no WMA----LVEF ~ 55-60%       Acute-on-chronic diastolic----5.27.2019       2D ECHO---5.28.2019---LA mildly dilated--mild LVH--NLVSF--no segmental                       WMA--RA dilatation--NRVSF--MAC--TONIA but opens well---                      moderate TR---RVSP ~ 67 mm Hg-- severe pulmonary hypertension--                      Grade II moderate DD----LVEF ~ 60%  CKD--Stage 3a  Pulmonary hypertension----mild  Hypertension  Hyperlipidemia  Diabetes Mellitus Type 2  Asthma   Pulmonary HTN  ASCVD        EKG---2.5.2019--sinus tachycardia--102--PACs--[facility]        Cardiac catheterization--9.26.2014---0% LM--40-50% LAD--40% proximal LAD---                                50% D1--10-20% LCx--10-20%---10-20% RCA  Morbid obesity  CHICO--obstructive sleep apnea---CPAP intolerant   PMR---polymyalgia rheumatica---daily prednisone  BLE edema   Chronic BLE pain   Gait-balance instability  Hypokalemia   PMH:  central retinal occlusion, DD, esophagitis, lumbar radiculopathy,              lumbar spinal stenosis, iron deficiency anemia---chronic GI blood              loss, OA, frequent PVCs, MR, Vitamin D deficiency, obstructing              right  renal calculi---2. 5.2019, UTI--- 2.5.2019---fever--leukocytosis,             lactic acidosis----2. 5.2019,  TEJ---2. 5.2019 superposed on CKD--Stage 3,              hypokalemia---2. 5.2019, bronchitis---cough----POA--2. 6.2019,              elevated troponin---flat peak---renal, migraine headaches, increased             ZAIRA, OA--BCC right cheek, irritable bowel syndrome,   elevated lactic acid--             5.27.2019--sepsis ruled out, urinary retention----villareal--2019, TEJ--2019,             sigmoid diverticulitis---5.24.2019, E coli UTI---2.8.2021   PSH:  see above, appendectomy---1952, hysterectomy---cervix unknown---            1983, bilateral cataract---IOL--2010, [facility]-right neck--[id number],              [facility] right leg--2012, colonoscopy--2011, capsule endoscopy---2011,             laparoscopic cholecystectomy--2003, eye--type?, bronchitis--2014,              EGD---2011--2015 mild gastritis, right             L4-5 TFE  x 2--2016, cystoscopy--- right JJ stent---2. 6.2019    Allergies:        codeine, erythromycin, , verapamil,                          clonidine---rash, penicillin--rash, Levbid--rash  Intolerances:  exenatide---nausea, Levaquin--levofloxacin--GI upset    Patient ambulatory within the home, hence, will required both a concentrator and portable tanks for mobility. Plan:  1. Home----3.9.2022  2. Medications reviewed  3. Supplemental oxygen----see above   4. DM2----home regimen  5. Influenza A----Tamiflu--finish course  6. Follow up Regulo Smith  7.      See orders     Electronically signed by Kevin Diaz on 3/9/2022 at [facility] PM    Hospitalist

## 2022-03-09 NOTE — PLAN OF CARE
Problem:  Activity:  Goal: Energy level will increase  Description: Energy level will increase  3/8/2022 1920 by Lazaro Silver RN  Outcome: Ongoing  3/8/2022 1114 by Shira Muse RN  Outcome: Ongoing  Goal: Ability to return to normal activity level will improve  Description: Ability to return to normal activity level will improve  3/8/2022 1920 by Lazaro Silver RN  Outcome: Ongoing  3/8/2022 1114 by Shira Muse RN  Outcome: Ongoing     Problem: Fluid Volume:  Goal: Maintenance of adequate hydration will improve  Description: Maintenance of adequate hydration will improve  3/8/2022 1920 by Lazaro Silver RN  Outcome: Ongoing  3/8/2022 1114 by Shira Muse RN  Outcome: Ongoing     Problem: Health Behavior:  Goal: Adoption of positive health habits will improve  Description: Adoption of positive health habits will improve  3/8/2022 1920 by Lazaro Silver RN  Outcome: Ongoing  3/8/2022 1114 by Shira Muse RN  Outcome: Ongoing  Goal: Compliance with immunization standards will improve  Description: Compliance with immunization standards will improve  3/8/2022 1920 by Lazaro Silver RN  Outcome: Ongoing  3/8/2022 1114 by Shira Muse RN  Outcome: Ongoing     Problem: Physical Regulation:  Goal: Complications related to the disease process, condition or treatment will be avoided or minimized  Description: Complications related to the disease process, condition or treatment will be avoided or minimized  3/8/2022 1920 by Lazaro Silver RN  Outcome: Ongoing  3/8/2022 1114 by Shira Muse RN  Outcome: Ongoing  Goal: Ability to maintain clinical measurements within normal limits will improve  Description: Ability to maintain clinical measurements within normal limits will improve  3/8/2022 1920 by Lazaro Silver RN  Outcome: Ongoing  3/8/2022 1114 by Shira Muse RN  Outcome: Ongoing  Goal: Signs and symptoms of infection will decrease  Description: Signs and symptoms of infection will decrease  3/8/2022 1920 by Jackie Canela RN  Outcome: Ongoing  3/8/2022 1114 by Serena North RN  Outcome: Ongoing     Problem: Respiratory:  Goal: Respiratory status will improve  Description: Respiratory status will improve  3/8/2022 1920 by Jackie Canela RN  Outcome: Ongoing  3/8/2022 1114 by Serena North RN  Outcome: Ongoing     Problem: Sensory:  Goal: General experience of comfort will improve  Description: General experience of comfort will improve  3/8/2022 1920 by Jackie Canela RN  Outcome: Ongoing  3/8/2022 1114 by Serena North RN  Outcome: Ongoing     Problem: Discharge Planning:  Goal: Discharged to appropriate level of care  Description: Discharged to appropriate level of care  3/8/2022 1920 by Jackie Canela RN  Outcome: Ongoing  3/8/2022 1114 by Serena North RN  Outcome: Ongoing     Problem: Falls - Risk of:  Goal: Will remain free from falls  Description: Will remain free from falls  3/8/2022 1920 by Jackie Canela RN  Outcome: Ongoing  3/8/2022 1114 by Serena North RN  Outcome: Ongoing  Goal: Absence of physical injury  Description: Absence of physical injury  3/8/2022 1920 by Jackie Canela RN  Outcome: Ongoing  3/8/2022 1114 by Serena North RN  Outcome: Ongoing

## 2022-03-10 NOTE — DISCHARGE SUMMARY
[facility] [address]                 [address] [place] [address], [address] [place] [address]                               DISCHARGE SUMMARY    PATIENT NAME: Kasandra Shoemaker                 :        1935  MED REC NO:   [id number]                             ROOM:       0217  ACCOUNT NO:   [de-identified]                           ADMIT DATE: 2022  PROVIDER:     Kamille Simmons. Henri Greco MD                  DISCHARGE DATE:  2022    ATTENDING PHYSICIAN OF HOSPITALIZATION:  Ilia Flores MD    PERSONAL CARE PROVIDER:  Damián Romero, nurse practitioner, Internal  Medicine. DIAGNOSES:  1. Influenza A, 2022, treated with Tamiflu. 2.  Hypoxia due to influenza A, 2022, requiring home supplemental  oxygen. 3.  Nausea, vomiting, diarrhea, cough, fever, chills, fatigue,  2022, due to influenza A. Chest x-ray, 2022, mild vascular  congestion, hazy density lung base, received Lasix. CTA chest,  pulmonary, 2022, no pulmonary embolus, mild pulmonary edema, mild  cardiomegaly. Chest x-ray, 2022, unremarkable study. EKG,  2022, normal sinus rhythm, rate 77, nonspecific ST-T changes,  slight T-wave inversion in lateral leads. Blood culture, 2022,   positive for Staph epidermidis considered to be a contaminant. No  intervention indicated. 4.  Atrial fibrillation history, paroxysmal atrial fibrillation,  currently in a sinus rhythm. 5.  Congestive heart failure, chronic diastolic. Most recent 2D echo,  _____, globally normal left ventricular systolic function, no wall  motion abnormality, LVEF 55% to 60%. 6.  Chronic kidney disease, stage IIIA, stable. 7.  Pulmonary hypertension, mild. 8.  Hypertension. 9.  Hyperlipidemia. 10.  Diabetes mellitus type 2, some lower blood glucose levels,  corrected. 11.  Asthma. 12.  Pulmonary hypertension. 13.  Coronary artery disease.   Cardiac catheterization, 2014, 0%  LM, 40% to 50% LAD, 40% proximal LAD, 50% D1, 10% to 20% left  circumflex, 10% to 20% RCA, morbid obesity. 14.  Obstructive sleep apnea, CPAP intolerant. 15.  Polymyalgia rheumatica, daily prednisone. 16.  Bilateral lower extremity edema, mild. 17.  Chronic bilateral lower extremity pain. 18.  Gait and balance instability. Other medical problems set forth in the progress note of 03/09/2022,  incorporated for reference herein. HISTORY OF PRESENT ILLNESS AND HOSPITAL COURSE:  This is an 50-year-old  white female contracted influenza A, 03/06/2022, had hypoxia, was  treated with Tamiflu together with supplemental oxygen. The patient  initial oxygen requirement was 3 liters by nasal cannula, subsequently  down to 2 to 3 liters nasal cannula at the time of discharge. The  patient requires home supplemental oxygen. The patient being ambulatory  within the home, hence requiring both the concentrator together with  portable tanks for mobility. She has chronic CHF for which she requires diuretics. The patient  received diuretics during this hospitalization. chronic  kidney disease, stage IIIA. Pulmonary hypertension, mild. Hypertension, blood pressure 133/52. Diabetes mellitus type 2, blood  glucose level 90 at the time of discharge. Underlying asthma, further  contributing to the patient's influenza A issues, having some wheezing  earlier during the hospitalization. coronary artery  disease, stable. No complaints of chest pain. The patient has known  obstructive sleep apnea but is CPAP intolerant. Polymyalgia rheumatica---daily steroids. LABORATORY DATA:  Around the time of discharge, white cell count 5.3,  hemoglobin 10.6, hematocrit 35.5, and platelets 599,925. Sodium 143,  potassium 3.8, chloride 105, CO2 of 31, BUN 9, creatinine 0.95, glucose  90, calcium 8.8 and GFR of 56. DISCHARGE INSTRUCTIONS/FOLLOWUP:  Discharged to home on 03/09/2022. DIET:  Cardiac, diabetic.     ACTIVITY:  As tolerated. CONDITION AT DISCHARGE:  Fair, improved. MEDICATIONS:  NEW:  Mucinex (guaifenesin) 600 mg p.o. b.i.d.; Tamiflu 30 mg daily for  two additional days, then stop; supplemental oxygen as set forth above. FOLLOWING MEDICATIONS FOR WHICH CHANGES HAVE OCCURRED:  Apixaban 2.5 mg  p.o. b.i.d. based on renal function and age. FOLLOWING MEDICATIONS CONTINUED WITHOUT CHANGE:  Albuterol 0.083%  nebulizer every 4 hours p.r.n. shortness of breath, wheezing; albuterol  HFA 2 inhalations four times a day as needed for wheezing, shortness of  breath; amiodarone (Pacerone) 100 mg p.o. daily; amlodipine (Norvasc) 5  mg p.o. daily; Blink Tears (polyethylene glycol 400) 0.25% solution both  eyes as needed for dry eyes; Lasix (furosemide) 40 mg p.o. daily;  glucose management tabs 15 gm for blood glucose level less than 70 OTC;  lispro insulin correction dosing; Lantus insulin _____ units  subcutaneously twice daily; Lopressor (metoprolol tartrate) 25 mg p.o.  twice daily; nystatin (Mycostatin powder) topically twice daily to  affected areas; omeprazole (Prilosec) 20 mg p.o. daily; potassium  chloride 20 mEq p.o. daily; prednisone 3 mg p.o. daily; simvastatin  (Zocor) 20 mg p.o. daily; Tylenol PM Extra Strength one p.o. at bedtime;  Victoza (liraglutide) 1.8 mg subcutaneously daily; vitamin D3 2000 units  p.o. daily. Follow up with the patient's personal physician, Baljeet Del Castillo,  nurse practitioner, Internal Medicine. Any aspect of the patient's care not discussed in the chart and/or  dictation will be addressed and treated as an outpatient. The patient's medications have been reviewed including, but not limited  to, pre-hospital, hospital and discharge medications. The patient  and/or the patient's personal representatives were specifically advised  the only medications to be taken are those set forth in the discharge  orders and no other medications should be taken.   Any prior medications  not on the discharge orders are specifically discontinued.         Devendra Smith MD    D: 03/09/2022 18:11:07       T: 03/09/2022 18:14:20     [email or web address]  Job#: [id number]     Doc#: [id number]    CC:

## 2022-03-10 NOTE — CARE COORDINATION
[facility] Transitions Initial Follow Up Call    Call within 2 business days of discharge: Yes    Patient: Maria E Johnson Patient : 1935   MRN: <[id number]>  Reason for Admission: EMESIS DIARRHEA  INFLUENZA A  Discharge Date: 3/9/22 RARS: Readmission Risk Score: 12.3 ( )      Last Discharge [facility]       Complaint Diagnosis Description Type Department Provider    3/6/22 Emesis; Diarrhea Hypoxia . .. ED to Hosp-Admission (Discharged) (ADMITTED) Leona Negrete Si. .. Spoke with: Transitions of Care Initial Call: spoke to J[facility] the role of Care Transition Nurse and the Transition program, patient is agreeable to follow up calls Post discharge from the [facility] states she is \" doing fairly well\" . Denies nausea vomiting or diarrhea. She has a slight productive cough. Phlegm is yellow in color. Denies fever. Short of breath on exertion. Uses oxygen 3lnc. Pt has a poor appetite. Glucose level dropped during the night. Pt states she got up and ate a cup of pudding. Blood sugar now is 134. Bowel and bladder WNL. Ambulates independently in the home. 1111f medication reconciliation completed. Pt is taking medications as directed. Uses nebulizer. Does not use albuterol inhaler. Discussed f/u appts. Pt states she had to cancel labs and appt with Radha Zarate. She has an appt with her PCP on 3/15/22. Daughter to transport. No HHC or DME needs at this time. Will continue to follow. Patient is aware of when to contact MD with any new or worsening symptoms. Advised to contact PCP  with any health concerns for early outpatient intervention in an effort to avoid hospitalization. Report any worsening symptoms to PCP and/or Call 911 and/or GO TO EMERGENCY ROOM if symptoms are severe. Expresses understanding. Transitions of Care Initial Call    Was this an external facility discharge?  No Discharge Facility: n/a    Challenges to be reviewed by the provider   Additional needs identified to be addressed with provider: No  none             Method of communication with provider : none      Advance Care Planning:   Does patient have an Advance Directive: reviewed and current. Was this a readmission? No  Patient stated reason for admission: flu like symptoms   Cough  N/v/d  Patients top risk factors for readmission: medical condition-influenza A    Care Transition Nurse (CTN) contacted the patient by telephone to perform post hospital discharge assessment. Verified name and  with patient as identifiers. Provided introduction to self, and explanation of the CTN role. CTN reviewed discharge instructions, medical action plan and red flags with patient who verbalized understanding. Patient given an opportunity to ask questions and does not have any further questions or concerns at this time. Were discharge instructions available to patient? Yes. Reviewed appropriate site of care based on symptoms and resources available to patient including: PCP, When to call 911 and [address]. The patient agrees to contact the PCP office for questions related to their healthcare. Medication reconciliation was performed with patient, who verbalizes understanding of administration of home medications. Advised obtaining a 90-day supply of all daily and as-needed medications. Covid Risk Education     Educated patient about risk for severe COVID-19 due to risk factors according to CDC guidelines. LPN CC reviewed discharge instructions, medical action plan and red flag symptoms with the patient who verbalized understanding. Discussed COVID vaccination status: Yes. Education provided on COVID-19 vaccination as appropriate. Discussed exposure protocols and quarantine with CDC Guidelines. Patient was given an opportunity to verbalize any questions and concerns and agrees to contact LPN CC or health care provider for questions related to their healthcare.     Reviewed and educated patient on any new and changed medications related to discharge diagnosis. Was patient discharged with a pulse oximeter? No Discussed and confirmed pulse oximeter discharge instructions and when to notify provider or seek emergency care. LPN CC provided contact information. Plan for follow-up call in 5-7 days based on severity of symptoms and risk factors. Plan for next call: symptom management-cough n/v/d  fever dyspnea  weakness  self management-check glucose levels as directed  follow up appointment-with PCP  medication management-take all medications as directed        Facility: [facility] [address]    Non-face-to-face services provided:  Obtained and reviewed discharge summary and/or continuity of care documents    Care Transitions 24 Hour Call    Do you have all of your prescriptions and are they filled?: Yes  Post Acute Services: Home Health (Comment:  [facility])  Patient DME: Jorge A Villalpando you have support at home?: Partner/Spouse/SO  Are you an active caregiver in your home?: No  Care Transitions Interventions         Follow Up  Future Appointments   Date Time Provider Loyda Foster   3/15/2022  2:00 PM INGRID Dwyer CNP [facility]   6/8/2022  1:00 PM INGRID Dwyer CNP [facility]   8/25/2022  1:00 PM MD MICHELLE Cuellar [facility]   9/19/2022  9:00 AM [facility] VASCULAR ULTRASOUND RM 1 MD [facility] [place]   9/21/2022 10:00 AM MD CRICKET Jeffers DP       Zack Wahl, [address] [facility] Care Transitions Nurse   [phone]

## 2022-03-15 PROBLEM — E11.22 TYPE 2 DIABETES MELLITUS WITH CHRONIC KIDNEY DISEASE (HCC): Status: ACTIVE | Noted: 2022-01-01

## 2022-03-16 NOTE — CARE COORDINATION
[facility] Transitions Follow Up Call    3/16/2022    Patient: Paulo Shearer  Patient : 1935   MRN: <[id number]>  Reason for Admission: Influenza A   Discharge Date: 3/9/22 RARS: Readmission Risk Score: 12.3 ( )         Spoke with: Subsequent call. Spoke to Mol. Mol states she is getting better but still not well. No fever or chills. Dyspnea with exertion. Uses oxygen intermittently. pt states her pulse ox is 97% but if she removes her oxygen it goes down to  84%. Pt states she continues to have congestion a productive cough. Phlegm is thick at times . Appetite is good. FBS today 148. Pt was seen by PCP yesterday and new med orders obtained for Zithromax . Pt is taking all medications as prescribed. PCP wrote orders for [facility] referral. Mol has not been contacted yet today from [facility]. She is unsure if she needs them. Reviewed upcoming appt with Cardiology on 3/24/22. Pt verbalized understanding. Will continue to follow. Patient is aware of when to contact MD with any new or worsening symptoms. Advised to contact PCP  with any health concerns for early outpatient intervention in an effort to avoid hospitalization. Report any worsening symptoms to PCP and/or Call 911 and/or GO TO EMERGENCY ROOM if symptoms are severe. Expresses understanding. Care Transitions Follow Up Call    Needs to be reviewed by the provider   Additional needs identified to be addressed with provider: No  none             Method of communication with provider : none      Care Transition Nurse (CTN) contacted the patient by telephone to follow up after admission on **. Verified name and  with patient as identifiers. Addressed changes since last contact: none  Discussed follow-up appointments. If no appointment was previously scheduled, appointment scheduling offered: No.   Is follow up appointment scheduled within 7 days of discharge? Yes.     Advance Care Planning:   Does patient have an Advance Directive: reviewed and current. CTN reviewed discharge instructions, medical action plan and red flags with patient and discussed any barriers to care and/or understanding of plan of care after discharge. Discussed appropriate site of care based on symptoms and resources available to patient including: PCP, Specialist, Home health, When to call 911 and [address]. The patient agrees to contact the PCP office for questions related to their healthcare. Patients top risk factors for readmission: medical condition-influenza A  Interventions to address risk factors: Obtained and reviewed discharge summary and/or continuity of care documents      Non-[facility] follow up appointment(s): n/a    CTN provided contact information for future needs. Plan for follow-up call in 7-10 days based on severity of symptoms and risk factors. Plan for next call: symptom management-dyspnea  fever chills cough congstion  self management-check glucose levels daily  follow up appointment-with Cardiology  medication management-complete Zithromax as directed          Care Transitions Subsequent and Final Call    Subsequent and Final Calls  Do you currently have any active services?: Yes  Are you currently active with any services?: Home Health  Care Transitions Interventions  Other Interventions:            Follow Up  Future Appointments   Date Time Provider Loyda Foster   3/24/2022  1:30 PM MD MICHELLE Up RASHAD   6/8/2022  1:00 PM INGRID Calzada - CNP JUD [facility]   8/25/2022  1:00 PM MD MICHELLE Up DP   9/19/2022  9:00  S [facility] VASCULAR ULTRASOUND RM 1 MDHZ VASCLAB STV [place]   9/21/2022 10:00 AM [facility]       Luis Alberto Bynum, [address] [facility] Care Transitions Nurse   [phone]

## 2022-03-17 NOTE — TELEPHONE ENCOUNTER
Last appt: 3/15/2022  Next appt: 6/8/2022    [facility] called in saying that they spoke with patient and daughter and they are refusing care at this time.

## 2022-03-17 NOTE — PROGRESS NOTES
Transition Care Office Visit    Date of Face-to-Face: 3/15/2022  Persons at visit: Daughter  Date of interactive contact/ attempted contact with the patient and/or caregiver: 3/10/2022-See transitional care telephone note. HPI 81year-old patient being seen for post hospital follow-up from [facility] where she was admitted 3/6/2022 through 3/9/2022 + for influenza A with hypoxia requiring supplemental home oxygen. Patient was treated with Tamiflu and supplemental oxygen. Patient's requirements of oxygen was down to 3 L at discharge. Was sent home on portable tanks along with concentrator. She did require some diuretics for acute on chronic CHF during hospitalization. Chronic kidney disease remained stable she denies any chest pain no shortness of breath. History of sleep apnea intolerant to CPAP. On low-dose steroids for polymyalgia rheumatica. Multiple previous trials to further decrease steroid on tolerable. Patient did undergo CT of chest, showed mild pulmonary edema mild cardiomegaly, negative PE follow-up chest x-ray on 3/9/2022. Showed stable cardiomegaly with vascular congestion with bibasilar opacities. Since discharge patient feels she is starting to regain strength however she is developing a worsening cough with thick green sputum. Using Mucinex with mild relief. Continues with weakness. We discussed physical therapy. Patient currently homebound due to inability to walk far distances or leave the home without assistance. She denies any chest pain. Leg swelling stable. No orthopnea    Activity: activity as tolerated  Any medication changes since post-hospitalization phone call? No  Any treatment changes since post-hospitalization phone call? No  Any further education needed on medications/treatment plan?  No    Current Medications   Outpatient Medications Marked as Taking for the 3/15/22 encounter (Office Visit) with INGRID Corrigan - GAURAV   Medication Sig Dispense Refill    albuterol (PROVENTIL) (2.5 MG/3ML) 0.083% nebulizer solution Take 3 mLs by nebulization every 4 hours as needed for Wheezing or Shortness of Breath 30 each 1    azithromycin (ZITHROMAX) 250 MG tablet Take 2 tablets (500 mg) on Day 1, followed by 1 tablet (250 mg) once daily on Days 2 through 5. 1 packet 0    [] guaiFENesin (MUCINEX) 600 MG extended release tablet Take 1 tablet by mouth 2 times daily for 7 days 14 tablet 0    Oxygen Concentrator 3 liters nasal cannula continuously at home. Needs portable tank also   DX:  Hypoxia R09.02 1 each 0    apixaban (ELIQUIS) 2.5 MG TABS tablet Take 1 tablet by mouth 2 times daily 30 tablet 0    diphenhydrAMINE-APAP, sleep, (TYLENOL PM EXTRA STRENGTH PO) Take 1 tablet by mouth at bedtime      insulin lispro, 1 Unit Dial, (HUMALOG KWIKPEN) 100 UNIT/ML SOPN Humalog pen 3 times daily with meals per sliding scale. Blood sugar is 70, drink juice. 151-200 =6, 201-250 = 8 units, 251-300 = 10 units. 1 pen 3    simvastatin (ZOCOR) 20 MG tablet TAKE 1 TABLET NIGHTLY 90 tablet 3    insulin glargine (LANTUS SOLOSTAR) 100 UNIT/ML injection pen INJECT 16 UNITS UNDER THE SKIN TWICE A DAY 30 mL 3    metoprolol tartrate (LOPRESSOR) 25 MG tablet Take 1 tablet by mouth 2 times daily 180 tablet 0    Insulin Pen Needle (B-D ULTRAFINE III SHORT PEN) 31G X 8 MM MISC USE 7 NEEDLES DAILY 650 each 3    predniSONE (DELTASONE) 1 MG tablet TAKE 3 TABLETS DAILY 270 tablet 3    amLODIPine (NORVASC) 5 MG tablet Take 1 tablet by mouth daily 90 tablet 3    VICTOZA 18 MG/3ML SOPN SC injection INJECT 1.8 MG INTO THE SKIN DAILY 18 mL 5    furosemide (LASIX) 40 MG tablet TAKE 1 TABLET DAILY 90 tablet 3    Nutritional Supplements (GLUCOSE MANAGEMENT) TABS 15 grams for bs less than 70 100 tablet 3    amiodarone (PACERONE) 100 MG tablet Take 1 tablet by mouth daily 90 tablet 3    nystatin (MYCOSTATIN) 740775 UNIT/GM cream Apply topically 2 times daily.  1 Tube 0    potassium chloride (KLOR-CON M) 20 MEQ extended release tablet Take 1 tablet by mouth 2 times daily 180 tablet 3    Polyethylene Glycol 400 (BLINK TEARS) 0.25 % SOLN Place into both eyes as needed (dry eyes)       omeprazole (PRILOSEC) 20 MG capsule Take 20 mg by mouth Daily  30 capsule 3    Cholecalciferol (VITAMIN D3) 2000 UNITS CAPS Take 2,000 Units by mouth daily          Medication Reconciliation  Provider has reviewed medication record and it has been modified as necessary to accurately depict current medications since hospitalization. Safia Mack has been instructed on these changes. Social History     Socioeconomic History    Marital status:      Spouse name: Corby Forrest Number of children: 3    Years of education: 15    Highest education level: Some college, no degree   Occupational History     Employer: [employer]   Tobacco Use    Smoking status: Never Smoker    Smokeless tobacco: Never Used    Tobacco comment: amish rrt 5/28/2019   Vaping Use    Vaping Use: Never used   Substance and Sexual Activity    Alcohol use: No     Alcohol/week: 0.0 standard drinks    Drug use: No    Sexual activity: Not Currently   Other Topics Concern    None   Social History Narrative    9/25/2019 No SDOH needs identified. She is receiving Meals on Wheels. Social Determinants of Health     Financial Resource Strain:     Difficulty of Paying Living Expenses: Not on file   Food Insecurity:     Worried About Running Out of Food in the Last Year: Not on file    Ray of Food in the Last Year: Not on file   Transportation Needs:     Lack of Transportation (Medical): Not on file    Lack of Transportation (Non-Medical):  Not on file   Physical Activity:     Days of Exercise per Week: Not on file    Minutes of Exercise per Session: Not on file   Stress:     Feeling of Stress : Not on file   Social Connections:     Frequency of Communication with Friends and Family: Not on file    Frequency of Social Gatherings with Friends and Family: Not on file    Attends Jew Services: Not on file    Active Member of Clubs or Organizations: Not on file    Attends Club or Organization Meetings: Not on file    Marital Status: Not on file   Intimate Partner Violence:     Fear of Current or Ex-Partner: Not on file    Emotionally Abused: Not on file    Physically Abused: Not on file    Sexually Abused: Not on file   Housing Stability:     Unable to Pay for Housing in the Last Year: Not on file    Number of [place] in the Last Year: Not on file    Unstable Housing in the Last Year: Not on file       Past Medical History:   Diagnosis Date    Allergic rhinitis     Asthma     PFT's, 04/06, actually showed mild restrictive defect.  Atrial fibrillation with RVR ([facility] [address]     Hospitalized [facility] 2/8/21-2/11/21    Basal cell carcinoma of cheek 2007    Right cheek    Basal cell carcinoma of leg     right leg    CAD (coronary artery disease)     With 40% stenosis of the LAD, 07/10, ejection fraction 60%. 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 Vance Bishop. Repeat EGD6/15 Gastritis    Foraminal stenosis of lumbar region     Moderate  Right L4/L5 neuroforaminal stenosis, Dr Cyndy Lanier following. MRI 2/16 [place].   Moderate foraminal stenosis mild to moderate central canal stenosis    Hyperlipidemia     Hypertension     Hypokalemia 2/9/2019    IBS (irritable bowel syndrome)     [facility], felt that she probably has chronic functional diarrhea and recommended empiric Imodium, Pepto-Bismol or Questran first. Sprue test Neg 2011    Iron deficiency anemia     Percent sat iron 5, January 2015, ferritin 40 1 /15, FOBT positive  EGD 6/15  Gastritis, IV iron 9/15x3 effective,  colonoscopy deferred by Dr. Lion Mercado. --Prior colonoscopy 2011 with severe diverticulosis and telangiectasia. Trial iron solution in [place] juice 12/16    Iron deficiency anemia due to chronic blood loss 09/08/2015    Junctional bradycardia     Symptomatic, resolved with discontinuation of Verapamil, 05/09. 1.1) Echocardiogram: LAE, LVH, EF 50%, mild MR, diastolic. 1.2) Dr. Rosa Aleman evaluation. 1.3.) Persantine stress test negative, 05/09.  Migraine     Mild CAD     cath 10/15    Mitral regurgitation     Moderate to severe on echocardiogram September 2015.   Right pressure 76 grade 2 diastolic dysfunction,  echo 66/17 grade 2 diastolic dysfunction mild to moderate MR RVSP 56 aortic sclerosis    Nephrolithiasis 2/5/2019    Obesity     CHICO (obstructive sleep apnea)     CPAP 14 2/15 initiation  AHI 7  mild CHICO intolerant CPAP    Osteoarthritis     PMR (polymyalgia rheumatica) (HCC)     Pneumonia     Premature atrial contractions     Pulmonary HTN ([address] 1/10/2015    Pulmonary hypertension (HCC)     -Moderate on echo November 2014    Restrictive lung disease     Mild on PFTs 11/14    Right kidney stone 2/6/2019    Sigmoid diverticulitis 5/30/2019    Type II or unspecified type diabetes mellitus without mention of complication, not stated as uncontrolled     Vitreous floaters        Family History   Problem Relation Age of Onset    Uterine Cancer Mother     Heart Disease Father     High Blood Pressure Father     Stroke Sister         from a vascular malformation    Heart Attack Son         age 48       Updated and reviewed pertinent [facility]    Allergies   Allergen Reactions    Codeine      Confusion      Erythromycin      GI upset  Received zithromax in past and tolerated    Exenatide Nausea Only    Levofloxacin      GI upset    Verapamil Other (See Comments)     Junctional bradycardia    Clonidine Derivatives Rash     2009, catapres    Other Rash     Levbid    Penicillins Rash     Received Rocephin in past and tolerated       ROS   General: Denies fever, chills, night sweats, or recent weight changes. Skin: Denies any rashes/wounds. HEENT: Denies any headaches. Denies any blurred vision, double vision, or eye pain. Denies any tinnitus, vertigo, or dizziness. Denies discharge, stuffiness, obstruction, or epistaxis. Denies any hoarseness, dysphagia/ pain. Cardiovascular: Denies any chest pain, shortness of breath, dyspnea on exertion, orthopnea, or palpations. Respiratory: Productive cough, green sputum dyspnea on exertion  Gastrointestinal: Denies any nausea, vomiting, diarrhea, constipation, or melena. Genitourinary: Denies any dysuria, hematuria, frequency, urgency, or hesitancy. Endocrine:  Denies any heat or cold intolerances, polydipsia, polyuria, or polyphagia. Musculoskeletal: Denies any arthritis, arthralagias, myalgias, positive muscle weakness. Neuropsychiatric: Denies any depression, syncope, tremors, seizures, anxiety or nervousness. Physical Exam:  Vitals /70 (Site: Left Upper Arm, Position: Sitting, Cuff Size: Large Adult)   Pulse 60   Temp 96.9 °F (36.1 °C) (Temporal)   Resp 16   Ht 4' 11\" (1.499 m)   Wt 182 lb (82.6 kg)   SpO2 98%   BMI 36.76 kg/m²   General Appearance: Alert, no distress; vital signs were reviewed today  Head: Normocephalic, atraumatic.   Eyes:  Sclera clear, no drainage  Ears:  External auditory canals patent, no drainage  Nose:  Septum midline, mucosa normal, no drainage or sinus tenderness  Throat: pharynx moist and pink, no exudate  Neck: Supple, trachea midline, no adenopathy; Thyroid: No enlargement/tenderness/nodules;  Back: Symmetric, no tenderness  Lungs: Chest rises and falls symmetrically, no use of accessory muscles, Lungs clear throughout, diminished in bases  Chest wall: No tenderness  Heart[de-identified] Regular rate and rhythm, S1 and S2 normal, no murmur or gallop  Abdomen: Nontender, bowel sounds active in all 4 quadrants, no masses, obese  Extremities: Extremities without clubbing,cyanosis, +1 ankle, pretibial edema, chronic  Skin: Skin color normal, no rashes or lesions  Neurological: Cranial nerves II-XII appears grossly intact- no focal deficit, generalized weakness  Psychiatric: mood and affect within normal limits    Assessment/Plan    1. Influenza A  Completed Tamiflu, concern for development of pneumonia with now productive cough. Last chest x-ray showed bibasilar opacities. Will start Zithromax. - albuterol (PROVENTIL) (2.5 MG/3ML) 0.083% nebulizer solution; Take 3 mLs by nebulization every 4 hours as needed for Wheezing or Shortness of Breath  Dispense: 30 each; Refill: 1  - Basic Metabolic Panel; Future  - CBC with Auto Differential; Future  - XR CHEST STANDARD (2 VW); Future  - azithromycin (ZITHROMAX) 250 MG tablet; Take 2 tablets (500 mg) on Day 1, followed by 1 tablet (250 mg) once daily on Days 2 through 5. Dispense: 1 packet; Refill: 0  - External Referral To Home Health    2. Mild intermittent asthma with acute exacerbation  Antibiotic as directed, nebulizers as directed  - albuterol (PROVENTIL) (2.5 MG/3ML) 0.083% nebulizer solution; Take 3 mLs by nebulization every 4 hours as needed for Wheezing or Shortness of Breath  Dispense: 30 each; Refill: 1  - External Referral To Home Health    3. Hypoxia  Continue on supplemental oxygen keep oxygen sats greater than 88%  - albuterol (PROVENTIL) (2.5 MG/3ML) 0.083% nebulizer solution;  Take 3 mLs by nebulization every 4 hours as needed for Wheezing or Shortness of Breath  Dispense: 30 each; Refill: 1  - External Referral To Home Health    4. Atrial fibrillation, unspecified type ([facility] [address]  Stable at present, follows with cardiology, Eliquis was decreased to 2.5 during hospitalization.  - External Referral To Home Health    5. Controlled type 2 diabetes mellitus without complication, with long-term current use of insulin ([facility])  Stable at present, continue on current insulin regimen  - External Referral To Home Health    6. Type 2 diabetes mellitus with stage 3a chronic kidney disease, with long-term current use of insulin ([facility] [address]  Stable  - External Referral To Home Health    7. Diastolic dysfunction  No signs of fluid overload, weigh daily, 2 g sodium diet, Lasix as directed  - External Referral To Home Health    8. Iron deficiency anemia secondary to blood loss (chronic)  Stable, serial lab follow-ups  - External Referral To [facility]a Meredith making of moderate complexity   Hospital records reviewed.    Follow up for routine visit, call sooner with any concerns prior    Electronically signed by INGRID Ram CNP on 3/17/2022 at 2:59 PM

## 2022-03-24 PROBLEM — I48.3 TYPICAL ATRIAL FLUTTER (HCC): Status: ACTIVE | Noted: 2022-01-01

## 2022-03-24 NOTE — PROGRESS NOTES
CC: follow for chronic diastolic HF    HPI:  Is here for posthospitalization follow-up. Apparently earlier this month she was admitted with acute hypoxic respiratory failure with cough and congestion, found to be positive for influenza A, COVID-19 was negative. She is recovering from this slowly. Still has some dyspnea on exertion but significantly improved from before. Denies any chest pain or angina. Remains in normal sinus rhythm today. No significant lower extremity edema or orthopnea. Past Medical History:   has a past medical history of Allergic rhinitis, Asthma, Atrial fibrillation with RVR ([address] Basal cell carcinoma of cheek, Basal cell carcinoma of leg, CAD (coronary artery disease), Central retinal artery occlusion, Cerebral artery occlusion with cerebral infarction [facility]), Cerebrovascular disease, CHF (congestive heart failure) ([address] Diverticulosis, Dyslipidemia, Elevated antinuclear antibody (ZAIRA) level, Esophagitis, Foraminal stenosis of lumbar region, Hyperlipidemia, Hypertension, Hypokalemia, IBS (irritable bowel syndrome), Iron deficiency anemia, Iron deficiency anemia due to chronic blood loss, Junctional bradycardia, Migraine, Mild CAD, Mitral regurgitation, Nephrolithiasis, Obesity, CHICO (obstructive sleep apnea), Osteoarthritis, PMR (polymyalgia rheumatica) (HCC), Pneumonia, Premature atrial contractions, Pulmonary HTN ([address] Pulmonary hypertension ([address] Restrictive lung disease, Right kidney stone, Sigmoid diverticulitis, Type II or unspecified type diabetes mellitus without mention of complication, not stated as uncontrolled, and Vitreous floaters. Past Surgical History:   has a past surgical history that includes Appendectomy (1952); Hysterectomy (Approx 1983); Cataract removal (Bilateral, 08/10); malignant skin lesion excision (Right, 12/10 and 04/11); malignant skin lesion excision (Right, 02/2012);  Colonoscopy (05/16/2011); other surgical history (09/06/2011); Cholecystectomy; Endoscopy, colon, diagnostic; eye surgery; Cardiac catheterization (2014); other surgical history (3/22/16); other surgical history (Right, 4/26/16); Cystoscopy (Right, 2/6/2019); Cystoscopy (Right, 2/27/2019); Colonoscopy (N/A, 9/26/2019); Upper gastrointestinal endoscopy (05/16/2011); Upper gastrointestinal endoscopy (6/22/15); and Upper gastrointestinal endoscopy (N/A, 9/26/2019). Home Medications:  Prior to Admission medications    Medication Sig Start Date End Date Taking? Authorizing Provider   albuterol (PROVENTIL) (2.5 MG/3ML) 0.083% nebulizer solution Take 3 mLs by nebulization every 4 hours as needed for Wheezing or Shortness of Breath 3/15/22  Yes INGRID Santana CNP   Oxygen Concentrator 3 liters nasal cannula continuously at home. Needs portable tank also   DX:  Hypoxia R09.02 3/9/22  Yes Kelby Olivo   apixaban (ELIQUIS) 2.5 MG TABS tablet Take 1 tablet by mouth 2 times daily 3/9/22  Yes Kelby Olivo   diphenhydrAMINE-APAP, sleep, (TYLENOL PM EXTRA STRENGTH PO) Take 1 tablet by mouth at bedtime   Yes Historical Provider, MD   insulin lispro, 1 Unit Dial, (HUMALOG KWIKPEN) 100 UNIT/ML SOPN Humalog pen 3 times daily with meals per sliding scale. Blood sugar is 70, drink juice. 151-200 =6, 201-250 = 8 units, 251-300 = 10 units.  2/7/22  Yes INGRID Vides CNP   simvastatin (ZOCOR) 20 MG tablet TAKE 1 TABLET NIGHTLY 2/2/22  Yes INGRID Santana CNP   insulin glargine (LANTUS SOLOSTAR) 100 UNIT/ML injection pen INJECT 16 UNITS UNDER THE SKIN TWICE A DAY 1/24/22  Yes INGRID Prajapati CNP   metoprolol tartrate (LOPRESSOR) 25 MG tablet Take 1 tablet by mouth 2 times daily 12/27/21  Yes Gaurang Cherry,    albuterol sulfate  (90 Base) MCG/ACT inhaler Inhale 2 puffs into the lungs 4 times daily as needed for Wheezing 12/27/21  Yes Gaurang Cherry, DO   Insulin Pen Needle (B-D ULTRAFINE III SHORT PEN) 31G X 8 MM MISC USE 7 NEEDLES DAILY 11/8/21  Yes Deanna Greenberg MD   predniSONE (DELTASONE) 1 MG tablet TAKE 3 TABLETS DAILY 8/20/21  Yes INGRID Welsh CNP   amLODIPine (NORVASC) 5 MG tablet Take 1 tablet by mouth daily 8/2/21  Yes INGRID Welsh CNP   VICTOZA 18 MG/3ML SOPN SC injection INJECT 1.8 MG INTO THE SKIN DAILY 5/13/21  Yes Carla Drake INGRID Thomas CNP   furosemide (LASIX) 40 MG tablet TAKE 1 TABLET DAILY 5/13/21  Yes INGRID Welsh CNP   Nutritional Supplements (GLUCOSE MANAGEMENT) TABS 15 grams for bs less than 70 4/7/21  Yes Carla Drake INGRID Thomas CNP   amiodarone (PACERONE) 100 MG tablet Take 1 tablet by mouth daily 3/23/21  Yes INGRID Welsh CNP   nystatin (MYCOSTATIN) 526730 UNIT/GM cream Apply topically 2 times daily. 1/4/21  Yes Carla Thomas, APRN - CNP   potassium chloride (KLOR-CON M) 20 MEQ extended release tablet Take 1 tablet by mouth 2 times daily 12/18/20  Yes INGRID Welsh CNP   Polyethylene Glycol 400 (BLINK TEARS) 0.25 % SOLN Place into both eyes as needed (dry eyes)    Yes Historical Provider, MD   omeprazole (PRILOSEC) 20 MG capsule Take 20 mg by mouth Daily  1/14/15  Yes Renato Alter   Cholecalciferol (VITAMIN D3) 2000 UNITS CAPS Take 2,000 Units by mouth daily    Yes Historical Provider, MD       Allergies:  Codeine, Erythromycin, Exenatide, Levofloxacin, Verapamil, Clonidine derivatives, Other, and Penicillins    Social History:   reports that she has never smoked. She has never used smokeless tobacco. She reports that she does not drink alcohol and does not use drugs. REVIEW OF SYSTEMS:    Constitutional: there has been mild decline in capacity since last month due to influenza  Eyes: No visual changes or diplopia. No scleral icterus. ENT: No Headaches, hearing loss or vertigo. No mouth sores or sore throat. Cardiovascular: as hpi  Respiratory: as hpi  Gastrointestinal: No abdominal pain, appetite loss, blood in stools.  No change in bowel or bladder habits. Genitourinary: No dysuria, trouble voiding, or hematuria. Musculoskeletal:  No gait disturbance, No weakness or joint complaints. Integumentary: No rash or pruritis. Neurological: No headache, diplopia, change in muscle strength, numbness or tingling. No change in gait, balance, coordination, mood, affect, memory, mentation, behavior. Psychiatric: No new anxiety or depression. Endocrine: No temperature intolerance. No excessive thirst, fluid intake, or urination. No tremor. Hematologic/Lymphatic: No abnormal bruising or bleeding, blood clots or swollen lymph nodes. Allergic/Immunologic: No nasal congestion or hives. Physical Exam:  BP (!) 162/80   Pulse 59   Ht 4' 11\" (1.499 m)   Wt 183 lb (83 kg)   BMI 36.96 kg/m²      General appearance: alert and cooperative with exam  HEENT: Head: Normocephalic, no lesions, without obvious abnormality. Eyes: PERRL, EOMI  Ears: Not obvious deformations or lack of hearing  Neck: no carotid bruit, no JVD  Lungs: clear to auscultation bilaterally  Heart: regular rate and rhythm, S1, S2 normal, no murmur, click, rub or gallop  Abdomen: soft, non-tender; bowel sounds normal; no masses,  no organomegaly  Extremities: extremities normal, atraumatic, 1+ bilateral ankle edema  Neurologic: Mental status: Alert, oriented, thought content appropriate  Skin: WNL for age and condition, no obvious rashes or leasions      Cardiac Data:  EKG 3/6/2020: NSR, NS ST-T abnormality (no new change)     Echo 2/9/21  Left ventricle is normal in size Global left ventricular systolic function  is normal Estimated ejection fraction is 55-60 % . Grade II (moderate) left ventricular diastolic dysfunction. Left atrial dilatation. Right atrium is mildly dilated . Aortic valve is sclerotic but opens well. No aortic insufficiency. Mitral annular calcification is seen. Mild mitral regurgitation. No obvious valvular abnormality. Mild tricuspid regurgitation.   No pulmonary hypertension. Estimated right ventricular systolic pressure is 53IJPT. Cardiac cath 2015  Minimal CAD       Labs:   Lab Results   Component Value Date    CHOL 184 08/25/2021    TRIG 140 08/25/2021    HDL 73 08/25/2021    LDLCHOLESTEROL 83 08/25/2021    VLDL NOT REPORTED 08/25/2021    CHOLHDLRATIO 2.5 08/25/2021       Lab Results   Component Value Date     03/09/2022    K 3.8 03/09/2022     03/09/2022    CO2 31 03/09/2022    BUN 9 03/09/2022    CREATININE 0.95 (H) 03/09/2022    GLUCOSE 90 03/09/2022    CALCIUM 8.8 03/09/2022    PROT 7.1 03/06/2022    LABALBU 4.1 03/06/2022    BILITOT 0.61 03/06/2022    ALKPHOS 86 03/06/2022    AST 18 03/06/2022    ALT 13 03/06/2022    LABGLOM 56 (L) 03/09/2022    GFRAA >60 03/09/2022         IMPRESSION & RECOMMENDATIONS:  · Atrial fibrillation, paroxysmal, currently in NSR, CHADS-VASc: 5. Continue metoprolol and Eliquis. Maintained on low dose amiodarone (100 mg qd) after recent admission for AF with RVR in 02/21. Will obtain tsh/ast/alt q 6 months and PFT annually. · HTN- controlled at home, office readings are always high, continue norvasc and lopressor, counseled regarding low salt diet. · Chronic HFpEF- stable currently, no signs of volume overload on examination, continue current dose lasix, and can take extra lasix prn. · HLP- at goal. Continue statin. · DM2- per pcp    · CHICO- not on cpap. Recommended compliance. The patient is to continue heart healthy diet, weight loss and exercise as tolerated. Patient's medications and side effects were discussed. Medication refills were provided if needed. Follow up appointment timing was discussed. All questions and concerns were addressed to patient's satisfaction. The patient is to follow up in 6 months or sooner if necessary. Thank you for allowing me to participate in the care of this patient, please do not hesitate to call if you have any questions.     MD L[facility]  [facility]  [phone]

## 2022-03-25 NOTE — CARE COORDINATION
[facility] Transitions Follow Up Call    3/25/2022    Patient: Gonzalo Toussaint  Patient : 1935   MRN: <[id number]>  Reason for Admission: Influenza A   Discharge Date: 3/9/22 RARS: Readmission Risk Score: 12.3 ( )         Spoke with: Subsequent transitional call. Spoke to : Delia Harvey. Delia Harvey states she feels much better. She has oxygen at home, and uses at HS and PRN. No dyspnea at this time. Pulse ox is 95% on room air. Pt states she no fever or chills. Productive cough with green yellow phlegm. Appetite is good. . She woke up with low blood sugar today- 54. She took glucose tabs and ate a piece of lunch meat to bring glucose back to normal. Discussed eating a small snack at bedtime to keep blood sugar stable. Takes medications as prescribed. Completed Zithromax. Pt was seen by PCP on 3/15/22 and Cardiology on 3/24/22. Next appt with PCP in . Pt declined [facility] referral. No new issues or concerns. Final call. Patient is aware of when to contact MD with any new or worsening symptoms. Advised to contact PCP  with any health concerns for early outpatient intervention in an effort to avoid hospitalization. Report any worsening symptoms to PCP and/or Call 911 and/or GO TO EMERGENCY ROOM if symptoms are severe. Expresses understanding. Care Transitions Follow Up Call    Needs to be reviewed by the provider   Additional needs identified to be addressed with provider: No  none             Method of communication with provider : none      Care Transition Nurse (CTN) contacted the patient by telephone to follow up after admission on3/6/22 . Verified name and  with patient as identifiers. Addressed changes since last contact: none  Discussed follow-up appointments. If no appointment was previously scheduled, appointment scheduling offered: No.   Is follow up appointment scheduled within 7 days of discharge? Yes.     Advance Care Planning:   Does patient have an Advance Directive: reviewed and current. CTN reviewed discharge instructions, medical action plan and red flags with patient and discussed any barriers to care and/or understanding of plan of care after discharge. Discussed appropriate site of care based on symptoms and resources available to patient including: PCP  Specialist  When to call [address] The patient agrees to contact the PCP office for questions related to their healthcare. Patients top risk factors for readmission: medical condition-influenza A  Interventions to address risk factors: Obtained and reviewed discharge summary and/or continuity of care documents      Non-[facility] follow up appointment(s): n/a    CTN provided contact information for future needs. No further follow-up call indicated based on severity of symptoms and risk factors. Plan for next call: n/a          Care Transitions Subsequent and Final Call    Subsequent and Final Calls  Are you currently active with any services?: Home Health  Care Transitions Interventions  Other Interventions:            Follow Up  Future Appointments   Date Time Provider Loyda Foster   6/8/2022  1:00 PM INGRID Wilson - CNP [facility]   8/25/2022  1:00 PM MD MICHELLE Seymour DP   9/19/2022  9:00  [facility] VASCULAR ULTRASOUND RM 1 [facility] [place]   9/21/2022 10:00 AM MD CRICKET Christine [facility]       Emma Malcolm, [address] [facility] Care Transitions Nurse   [phone]

## 2022-03-31 NOTE — PROGRESS NOTES
[address], [address]  [facility] [address]  [phone]        HISTORY:    David Erickson presents today for evaluation and management of:  Chief Complaint   Patient presents with    Diabetes     follow up       HPI    Interval History:      Current Diabetic Medications  Victoza 1.8 mg daily, Lantus 14 units twice daily Humalog per sliding scale 4 times daily    151-200 =6, 201-250 = 8 units, 251-300 = 10 units. (she does admit to taking 6 units for before meals when blood sugar is less than 150 but not less than 100)  DKA episodes: 0      05/10/21   At previous visit insulin was adjusted. Mary Anne Love states she will sometimes take 12 to 14 units of Lantus based on her average glucose for the day.  She has also added about 4 units to her current sliding scale.  Denies any signs or symptoms of hypoglycemia.   Diet: Low-carb  Exercise: none  BS testing: Uses CGM daily with success and is adherent to cgm regimen.   Issues: denies      08/17/21   At previous visit dm counseling was provided. Denies any s/s of hyper/hypoglycemia. Still concerned about occasional high blood sugars after meals.   Diet: Low-carb  Exercise: none  BS testing: Uses CGM daily with success and is adherent to cgm regimen.   Issues: denies     12/13/21   David Erickson is a 81 y.o. female patient who presents to clinic today for follow up on her diabetes. she has a history of HTN, ckd, hyperlipidemia, and obesity which contributes to her diabetes. At previous visit diabetes counseling was provided. she denies any signs or symptoms of hyper/hypoglycemia. she states they are taking their medications as prescribed without any adverse effects.    Diet: low carb  Exercise: none  BS testing: uses cgm daily with success and is adherent to cgm therapy  Issues: denies  Diabetic foot exam up-to-date: Yes  Diabetic retinal exam up-to-date: Yes  Hypoglycemia as needed treatment: glucose tabs    03/31/22   Fang Plata is a 81 y.o. female patient who presents to clinic today for her diabetes. she has a history of HTN, ckd, hyperlipidemia, and obesity which contributes to her diabetes. At previous visit insulin was adjusted. she denies any current signs or symptoms of hyper/hypoglycemia. she states they are taking their medications as prescribed without any adverse effects. She was recently admitted for influenza and is now on oxygen. Her blood sugars have remained stable. Diet: low carb  Exercise: none  BS testing: uses cgm daily with success and is adherent to cgm therapy  Issues: denies  Diabetic foot exam up-to-date: Yes  Diabetic retinal exam up-to-date: Yes  Hypoglycemia as needed treatment: glucose tabs    High cholesterol-  Takes zocor and denies any adverse effects with its use.  Watches diet and exercise.      Hypertension-  Takes Norvasc Lopressor  and denies any adverse effects with their use. Watches diet and exercise. Denies any chest pain, dizziness or edema.  Follows with cardiology:Yes.      Obesity- Working on weight loss. Past Medical History:   Diagnosis Date    Allergic rhinitis     Asthma     PFT's, 04/06, actually showed mild restrictive defect.  Atrial fibrillation with RVR ([address]     Hospitalized [facility] 2/8/21-2/11/21    Basal cell carcinoma of cheek 2007    Right cheek    Basal cell carcinoma of leg     right leg    CAD (coronary artery disease)     With 40% stenosis of the LAD, 07/10, ejection fraction 60%.   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 Eduard Frausto. Repeat EGD6/15 Gastritis    Foraminal stenosis of lumbar region     Moderate  Right L4/L5 neuroforaminal stenosis, Dr Simms Neighbours following. MRI 2/16 [place]. Moderate foraminal stenosis mild to moderate central canal stenosis    Hyperlipidemia     Hypertension     Hypokalemia 2/9/2019    IBS (irritable bowel syndrome)     GI consultation with Dr. Yves Hills, GI specialist in [facility], felt that she probably has chronic functional diarrhea and recommended empiric Imodium, Pepto-Bismol or Questran first. Sprue test Neg 2011    Iron deficiency anemia     Percent sat iron 5, January 2015, ferritin 40 1 /15, FOBT positive  EGD 6/15  Gastritis, IV iron 9/15x3 effective,  colonoscopy deferred by Dr. Eduard Frausto. --Prior colonoscopy 2011 with severe diverticulosis and telangiectasia. Trial iron solution in [place] juice 12/16    Iron deficiency anemia due to chronic blood loss 09/08/2015    Junctional bradycardia     Symptomatic, resolved with discontinuation of Verapamil, 05/09. 1.1) Echocardiogram: LAE, LVH, EF 50%, mild MR, diastolic. 1.2) Dr. Diane Cords evaluation. 1.3.) Persantine stress test negative, 05/09.  Migraine     Mild CAD     cath 10/15    Mitral regurgitation     Moderate to severe on echocardiogram September 2015.   Right pressure 76 grade 2 diastolic dysfunction,  echo 66/70 grade 2 diastolic dysfunction mild to moderate MR RVSP 56 aortic sclerosis    Nephrolithiasis 2/5/2019    Obesity     CHICO (obstructive sleep apnea)     CPAP 14 2/15 initiation  AHI 7  mild CHICO intolerant CPAP    Osteoarthritis     PMR (polymyalgia rheumatica) (HCC)     Pneumonia     Premature atrial contractions     Pulmonary HTN ([address] 1/10/2015    Pulmonary hypertension (HCC)     -Moderate on echo November 2014    Restrictive lung disease     Mild on PFTs 11/14    Right kidney stone 2/6/2019    Sigmoid diverticulitis 5/30/2019    Type II or unspecified type diabetes mellitus without mention of complication, not stated as uncontrolled     Vitreous floaters      Family History   Problem Relation Age of Onset    Uterine Cancer Mother     Heart Disease Father     High Blood Pressure Father     Stroke Sister         from a vascular malformation    Heart Attack Son         age 48     Social History     Tobacco Use    Smoking status: Never Smoker    Smokeless tobacco: Never Used    Tobacco comment: amish rrt 5/28/2019   Vaping Use    Vaping Use: Never used   Substance Use Topics    Alcohol use: No     Alcohol/week: 0.0 standard drinks    Drug use: No     Allergies   Allergen Reactions    Codeine      Confusion      Erythromycin      GI upset  Received zithromax in past and tolerated    Exenatide Nausea Only    Levofloxacin      GI upset    Verapamil Other (See Comments)     Junctional bradycardia    Clonidine Derivatives Rash     2009, catapres    Other Rash     Levbid    Penicillins Rash     Received Rocephin in past and tolerated       MEDICATIONS:  Current Outpatient Medications   Medication Sig Dispense Refill    insulin lispro, 1 Unit Dial, (HUMALOG KWIKPEN) 100 UNIT/ML SOPN Humalog pen 3 times daily with meals per sliding scale. Blood sugar is 70, drink juice. = 3 units,  151-200 =6, 201-250 = 8 units, 251-300 = 10 units. 1 pen 3    apixaban (ELIQUIS) 5 MG TABS tablet Take 1 tablet by mouth 2 times daily 180 tablet 3    albuterol (PROVENTIL) (2.5 MG/3ML) 0.083% nebulizer solution Take 3 mLs by nebulization every 4 hours as needed for Wheezing or Shortness of Breath 30 each 1    Oxygen Concentrator 3 liters nasal cannula continuously at home.  Needs portable tank also   DX:  Hypoxia R09.02 1 each 0    simvastatin (ZOCOR) 20 MG tablet TAKE 1 TABLET NIGHTLY 90 tablet 3    insulin glargine (LANTUS SOLOSTAR) 100 UNIT/ML injection pen INJECT 16 UNITS UNDER THE SKIN TWICE A DAY (Patient taking differently: INJECT 12 UNITS UNDER THE SKIN TWICE A DAY) 30 mL 3    metoprolol tartrate (LOPRESSOR) 25 MG tablet Take 1 tablet by mouth 2 times daily 180 tablet 0    predniSONE (DELTASONE) 1 MG tablet TAKE 3 TABLETS DAILY 270 tablet 3    amLODIPine (NORVASC) 5 MG tablet Take 1 tablet by mouth daily 90 tablet 3    VICTOZA 18 MG/3ML SOPN SC injection INJECT 1.8 MG INTO THE SKIN DAILY 18 mL 5    furosemide (LASIX) 40 MG tablet TAKE 1 TABLET DAILY 90 tablet 3    Nutritional Supplements (GLUCOSE MANAGEMENT) TABS 15 grams for bs less than 70 100 tablet 3    amiodarone (PACERONE) 100 MG tablet Take 1 tablet by mouth daily 90 tablet 3    nystatin (MYCOSTATIN) 159978 UNIT/GM cream Apply topically 2 times daily. 1 Tube 0    potassium chloride (KLOR-CON M) 20 MEQ extended release tablet Take 1 tablet by mouth 2 times daily (Patient taking differently: Take 20 mEq by mouth daily ) 180 tablet 3    Polyethylene Glycol 400 (BLINK TEARS) 0.25 % SOLN Place into both eyes as needed (dry eyes)       omeprazole (PRILOSEC) 20 MG capsule Take 20 mg by mouth Daily  30 capsule 3    Cholecalciferol (VITAMIN D3) 2000 UNITS CAPS Take 2,000 Units by mouth daily       albuterol sulfate  (90 Base) MCG/ACT inhaler Inhale 2 puffs into the lungs 4 times daily as needed for Wheezing 1 each 0    diphenhydrAMINE-APAP, sleep, (TYLENOL PM EXTRA STRENGTH PO) Take 1 tablet by mouth at bedtime      Insulin Pen Needle (B-D ULTRAFINE III SHORT PEN) 31G X 8 MM MISC USE 7 NEEDLES DAILY 650 each 3     No current facility-administered medications for this visit. Review ofSymptoms:  Review of Systems   Constitutional: Positive for fatigue. Negative for unexpected weight change. Eyes: Negative for visual disturbance. Respiratory: Negative. Negative for shortness of breath.     Cardiovascular: Negative for chest pain and leg swelling. Gastrointestinal: Negative for abdominal pain and diarrhea. Endocrine: Negative for polydipsia, polyphagia and polyuria. Genitourinary: Negative. Musculoskeletal: Negative. Skin: Negative for rash and wound. Neurological: Negative for dizziness, tremors, seizures and headaches. Psychiatric/Behavioral: Negative. Negative for confusion and decreased concentration. Theremainder of a complete 14-point review of systems is negative. Vital Signs: BP (!) 142/60   Pulse 60   Resp 18   Ht 4' 11\" (1.499 m)   Wt 184 lb (83.5 kg)   SpO2 99%   PF (!) 2 L/min Comment: 2.5NC  BMI 37.16 kg/m²      Wt Readings from Last 3 Encounters:   03/31/22 184 lb (83.5 kg)   03/24/22 183 lb (83 kg)   03/15/22 182 lb (82.6 kg)     Body mass index is 37.16 kg/m².   LABS:  Hemoglobin A1C   Date Value Ref Range Status   03/24/2022 7.3 (H) 4.0 - 6.0 % Final   11/30/2021 7.3 (H) 4.0 - 6.0 % Final     Lab Results   Component Value Date    LABMICR CANNOT BE CALCULATED 08/25/2021     Lab Results   Component Value Date     03/24/2022    K 4.1 03/24/2022     03/24/2022    CO2 31 03/24/2022    BUN 16 03/24/2022    CREATININE 1.16 (H) 03/24/2022    GLUCOSE 144 (H) 03/24/2022    CALCIUM 9.3 03/24/2022    PROT 7.1 03/06/2022    LABALBU 4.1 03/06/2022    BILITOT 0.61 03/06/2022    ALKPHOS 86 03/06/2022    AST 18 03/06/2022    ALT 13 03/06/2022    LABGLOM 44 (L) 03/24/2022    GFRAA 54 (L) 03/24/2022     Lab Results   Component Value Date    CHOL 184 08/25/2021    CHOL 185 06/02/2020    CHOL 137 07/20/2019     Lab Results   Component Value Date    TRIG 140 08/25/2021    TRIG 196 (H) 06/02/2020    TRIG 126 07/20/2019     Lab Results   Component Value Date    HDL 73 08/25/2021    HDL 67 06/02/2020    HDL 66 07/20/2019     Lab Results   Component Value Date    LDLCHOLESTEROL 83 08/25/2021    LDLCHOLESTEROL 79 06/02/2020    LDLCHOLESTEROL 46 07/20/2019     Lab Results   Component Value Date    VLDL NOT REPORTED 08/25/2021    VLDL NOT REPORTED (H) 06/02/2020    VLDL NOT REPORTED 07/20/2019     Lab Results   Component Value Date    MAXO 2.5 08/25/2021    CHOLHDLRATIO 2.8 06/02/2020    CHOLHDLRATIO 2.1 07/20/2019           Physical Exam  Constitutional:       Appearance: She is well-developed. Eyes:      Pupils: Pupils are equal, round, and reactive to light. Neck:      Thyroid: No thyroid mass, thyromegaly or thyroid tenderness. Cardiovascular:      Rate and Rhythm: Normal rate and regular rhythm. Heart sounds: Normal heart sounds. Pulmonary:      Effort: Pulmonary effort is normal.      Breath sounds: Normal breath sounds. Abdominal:      General: Bowel sounds are normal.      Palpations: Abdomen is soft. Skin:     General: Skin is warm and dry. Comments: Negative for open/nonhealing wounds. Negative for lipohypertrophy. Neurological:      Mental Status: She is alert and oriented to person, place, and time. ASSESSMENT/PLAN:     Diagnosis Orders   1. Type 2 diabetes with nephropathy (HCC)  Hemoglobin A1C    insulin lispro, 1 Unit Dial, (HUMALOG KWIKPEN) 100 UNIT/ML SOPN    Basic Metabolic Panel   2. Dyslipidemia     3. Essential hypertension     4. BMI 37.0-37.9, adult       Orders Placed This Encounter   Procedures    Hemoglobin A1C    Basic Metabolic Panel     Orders Placed This Encounter   Medications    insulin lispro, 1 Unit Dial, (HUMALOG KWIKPEN) 100 UNIT/ML SOPN     Sig: Humalog pen 3 times daily with meals per sliding scale. Blood sugar is 70, drink juice. = 3 units,  151-200 =6, 201-250 = 8 units, 251-300 = 10 units. Dispense:  1 pen     Refill:  3     Requested Prescriptions     Signed Prescriptions Disp Refills    insulin lispro, 1 Unit Dial, (HUMALOG KWIKPEN) 100 UNIT/ML SOPN 1 pen 3     Sig: Humalog pen 3 times daily with meals per sliding scale. Blood sugar is 70, drink juice.  = 3 units,  151-200 =6, 201-250 = 8 units, 251-300 = 10 units.       1. Type 2 diabetes with nephropathy (HCC)  - Stable  HbA1C goal is less than 7%. - Fasting blood glucose goal is 70-120mg/dl and postprandial blood sugar goal is less than 180 mg/dl. - Labs reviewed including most recent A1c, microalbumin and kidney function. Repeat labs due in 3 months.    -We discussed in great detail dietary modifications they can make to better improve their blood sugars. -follow up diabetes education completed, all questions answered. CGM report downloaded and reviewed, scanned to media tab. Time in range 87% and hypoglycemia 1%. Predicted A1c per CGM report 6.7%. Patient Instructions   Humalog pen 3 times daily with meals per sliding scale. Blood sugar is 70, drink juice. = 3 units,  151-200 =6, 201-250 = 8 units, 251-300 = 10 units. Be sure to check blood sugar before meals and insulin          Discussed signs and symptoms of hyper/hypoglycemia and how to treat. Encouraged 150 minutes of physical activity per week. Follow a low carbohydrate diet. Encouraged at least 7 hours of sleep. The patient was informed of the goals of diabetes management. This can only be accomplished by watching their diet and exercise levels. We certainly use medicines to help attain these goals. The consequences of not controlling blood sugars were discussed. These include blindness, heart disease, stroke, kidney disease, and possibly need for dialysis. They were told to be careful with their foot care as diabetics often have nerve damage, infections and risk for limb amutations . They also need a dilated eye exam yearly. We discussed the issues of diet, exercise, medication, complication avoidance, reviewed the signs and symptoms of diabetes, hypoglycemic episodes, significance of HbA1C.       - Hemoglobin A1C; Future  - insulin lispro, 1 Unit Dial, (HUMALOG KWIKPEN) 100 UNIT/ML SOPN; Humalog pen 3 times daily with meals per sliding scale. Blood sugar is 70, drink juice.  = 3 units, 151-200 =6, 201-250 = 8 units, 251-300 = 10 units. Dispense: 1 pen; Refill: 3  - Basic Metabolic Panel; Future    2. Dyslipidemia  stable, lipid panel reviewed, continue current medications. Diet and exercise      3. Essential hypertension   stable, continue current medications. Diet and exercise Seek emergent care if chest pain develops. 4. BMI 37.0-37.9, adult  Reduce calories and increase physical activity to achieve a slow and steady weight loss to improve blood pressure, cholesterol and diabetes. Answered all patient questions. Agrees to follow plan of care and to follow up in 3 months, sooner if needed. Call office if unexplained blood sugars less than 70 occur or above 400. Call office or access [facility] with any further questions or concerns. Be sure to bring glucometer/food log at next appointment. Total time spent reviewing chart, labs, counseling patient and documenting on the date of the encounter: 30 min.       Electronically signed by INGIRD Elizabeth CNP on 3/31/2022 at 12:30 PM      (Please note that portions of this note were completed with a voice-recognition program. Efforts were made to edit the dictation but occasionally words are mis-transcribed.)

## 2022-04-06 PROBLEM — J10.1 INFLUENZA A: Status: RESOLVED | Noted: 2022-01-01 | Resolved: 2022-01-01

## 2022-04-13 NOTE — TELEPHONE ENCOUNTER
Please advise. Current CDC guidelines indicate for Moderna booster that adults age 48 and older can receive at least 4 months after the first booster.

## 2022-05-02 NOTE — TELEPHONE ENCOUNTER
Last appt: 3/15/2022  Next appt: 6/8/2022    Patient called in wanting to know what Colincornelius Barb thinks she should do with her oxygen tank. She has not used it for three weeks now and is wanting it out of the house. She wasn't sure if Darrel Johansen thinks that taking the oxygen tank out of the house is a good idea or not.

## 2022-05-02 NOTE — TELEPHONE ENCOUNTER
If not using she can call the company that she went through to pick it up as long as her pulse ox is staying above 90% with activity

## 2022-05-05 NOTE — TELEPHONE ENCOUNTER
Spoke with patient. She reports her SPO2 stays above 90% even with activity. She states it is usually 95% on RA. She called to have the empty tanks picked up but she still has the concentrator. She states she will hold onto it for at least a few more weeks.

## 2022-05-13 NOTE — TELEPHONE ENCOUNTER
Refill request     Medication pended if agreeable    Last Appt:  3/15/2022  Next Appt:   6/8/2022  Med verified in Epic    Patient aware crystal is out until Monday.

## 2022-06-08 PROBLEM — N18.30 CHRONIC RENAL DISEASE, STAGE III (HCC): Status: ACTIVE | Noted: 2022-01-01

## 2022-06-08 NOTE — PROGRESS NOTES
06/08/22  Sheng Malhotra  1935      Chief Complaint:   1. Type 2 diabetes with nephropathy (HCC)    2. Stage 3 chronic kidney disease, unspecified whether stage 3a or 3b CKD ([facility]    3. Thrombocytopenia ([facility]    4. Diabetic nephropathy associated with type 2 diabetes mellitus ([facility]    5. Mild intermittent asthma with acute exacerbation    6. Atrial fibrillation, unspecified type ([facility]    7. Diastolic dysfunction    8. Iron deficiency anemia secondary to blood loss (chronic)    9. PMR (polymyalgia rheumatica) (HCC)    10. Essential hypertension    11. Pulmonary HTN ([facility]    12. Dyslipidemia    13. Severe obesity (BMI 35.0-39. 9) with comorbidity ([facility]    14. Vitamin D deficiency    15. Localized edema    16. Cat bite, initial encounter    17. Menopause        HPI:  81year-old patient in with her daughter for 3-month follow-up of above chronic health conditions. She denies any acute concerns at present however on exam noted to have area to her left hand where she was bit by her daughter's cat. States this has been a week and a half ago there is no redness. Discussed updating her tetanus which she is in agreement. Feels her swelling to her lower extremities has been stable. Following with diabetic nurse practitioner for her diabetes. Denies any hypoglycemic episodes. States she feels her blood sugars have been fairly well stable. Denies any chest discomfort shortness of breath or heart palpitations. Continues on her Eliquis for the atrial fibs. Is having increased muscle pain wondering if her PMR is flaring up. We have tried to decrease her off of her prednisone previous trials on successful. We discussed her going back up to 4 mg daily and seeing if this controls the pain. She does have intermittent episodes of loose BMs. We discussed getting her set up with fiber Gummies.       Allergies   Allergen Reactions    Codeine      Confusion      Erythromycin      GI upset  Received zithromax in past and tolerated    Exenatide Nausea Only    Levofloxacin      GI upset    Verapamil Other (See Comments)     Junctional bradycardia    Clonidine Derivatives Rash     2009, catapres    Other Rash     Levbid    Penicillins Rash     Received Rocephin in past and tolerated       Past Medical History:   Diagnosis Date    Allergic rhinitis     Asthma     PFT's, 04/06, actually showed mild restrictive defect.  Atrial fibrillation with RVR ([facility]     Hospitalized [facility] 2/8/21-2/11/21    Basal cell carcinoma of cheek 2007    Right cheek    Basal cell carcinoma of leg     right leg    CAD (coronary artery disease)     With 40% stenosis of the LAD, 07/10, ejection fraction 60%. 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 Umanzor Jesus. Repeat EGD6/15 Gastritis    Foraminal stenosis of lumbar region     Moderate  Right L4/L5 neuroforaminal stenosis, Dr Mark Joaquin following. MRI 2/16 [place].   Moderate foraminal stenosis mild to moderate central canal stenosis    Hyperlipidemia     Hypertension     Hypokalemia 2/9/2019    IBS (irritable bowel syndrome)     GI consultation with Dr. Sherie Rodriguez, GI specialist in [facility], felt that she probably has chronic functional diarrhea and recommended empiric Imodium, Pepto-Bismol or Questran first. Sprue test Neg 2011    Iron deficiency anemia     Percent sat iron 5, January 2015, ferritin 40 1 /15, FOBT positive  EGD 6/15  Gastritis, IV iron 9/15x3 effective,  colonoscopy deferred by Dr. Zulema Diaz. --Prior colonoscopy 2011 with severe diverticulosis and telangiectasia. Trial iron solution in [place] juice 12/16    Iron deficiency anemia due to chronic blood loss 09/08/2015    Junctional bradycardia     Symptomatic, resolved with discontinuation of Verapamil, 05/09. 1.1) Echocardiogram: LAE, LVH, EF 50%, mild MR, diastolic. 1.2) Dr. Mardeen Litten evaluation. 1.3.) Persantine stress test negative, 05/09.  Migraine     Mild CAD     cath 10/15    Mitral regurgitation     Moderate to severe on echocardiogram September 2015.   Right pressure 76 grade 2 diastolic dysfunction,  echo 79/81 grade 2 diastolic dysfunction mild to moderate MR RVSP 56 aortic sclerosis    Nephrolithiasis 2/5/2019    Obesity     CHICO (obstructive sleep apnea)     CPAP 14 2/15 initiation  AHI 7  mild CHICO intolerant CPAP    Osteoarthritis     PMR (polymyalgia rheumatica) (HCC)     Pneumonia     Premature atrial contractions     Pulmonary HTN ([address] 1/10/2015    Pulmonary hypertension (HCC)     -Moderate on echo November 2014    Restrictive lung disease     Mild on PFTs 11/14    Right kidney stone 2/6/2019    Sigmoid diverticulitis 5/30/2019    Type II or unspecified type diabetes mellitus without mention of complication, not stated as uncontrolled     Vitreous floaters        Past Surgical History:   Procedure Laterality Date    APPENDECTOMY  1952    CARDIAC CATHETERIZATION  2014    CATARACT REMOVAL Bilateral 08/10    CHOLECYSTECTOMY      COLONOSCOPY  05/16/2011    COLONOSCOPY N/A 9/26/2019    severe diverticulosis, benign rectal polyp, Dr. Cami Weber Right 2/6/2019    Cysto with right stent insertion and villareal catheter performed by Nick Acosta MD at [address] [place] [address] Right 2/27/2019    CYSTO right stent removal  Right Ureteroscopy Holmium Laser right stent exchange performed by Nick Acosta MD at [address] [place] [address] ENDOSCOPY, COLON, DIAGNOSTIC      EYE SURGERY      HYSTERECTOMY (CERVIX STATUS UNKNOWN)  Approx 1983    MALIGNANT SKIN LESION EXCISION Right 12/10 and 04/11    Basal CellRemoved from right neck    MALIGNANT SKIN LESION EXCISION Right 02/2012    Basal Cell Removed from right leg    OTHER SURGICAL HISTORY  09/06/2011    capsule endoscopy    OTHER SURGICAL HISTORY  3/22/16    right L4 and L5 TFE    OTHER SURGICAL HISTORY Right 4/26/16    L4, L5 TFE    UPPER GASTROINTESTINAL ENDOSCOPY  05/16/2011    UPPER GASTROINTESTINAL ENDOSCOPY  6/22/15    mild gastritis (Dr. Sheela Baeza)    UPPER GASTROINTESTINAL ENDOSCOPY N/A 9/26/2019    mild to moderate inflammation, Dr. Sheela Baeza       Current Outpatient Medications on File Prior to Visit   Medication Sig Dispense Refill    furosemide (LASIX) 40 MG tablet TAKE 1 TABLET DAILY 90 tablet 3    Liraglutide (VICTOZA) 18 MG/3ML SOPN SC injection Inject 1.8 mg into the skin daily 18 mL 5    Insulin Pen Needle (B-D ULTRAFINE III SHORT PEN) 31G X 8 MM MISC USE 7 DAILY 270 each 3    potassium chloride (KLOR-CON M20) 20 MEQ extended release tablet Take 1 tablet by mouth daily 90 tablet 3    amiodarone (PACERONE) 100 MG tablet Take 1 tablet by mouth daily 90 tablet 3    metoprolol tartrate (LOPRESSOR) 25 MG tablet Take 1 tablet by mouth 2 times daily 180 tablet 3    albuterol sulfate  (90 Base) MCG/ACT inhaler Inhale 2 puffs into the lungs 4 times daily as needed for Wheezing 1 each 0    insulin lispro, 1 Unit Dial, (HUMALOG KWIKPEN) 100 UNIT/ML SOPN Humalog pen 3 times daily with meals per sliding scale. Blood sugar is 70, drink juice. = 3 units,  151-200 =6, 201-250 = 8 units, 251-300 = 10 units.  1 pen 3    apixaban (ELIQUIS) 5 MG TABS tablet Take 1 tablet by mouth 2 times daily 180 tablet 3    albuterol (PROVENTIL) (2.5 MG/3ML) 0.083% nebulizer solution Take 3 mLs by nebulization every 4 hours as needed for Wheezing or Shortness of Breath 30 each 1    Oxygen Concentrator 3 liters nasal cannula continuously at home. Needs portable tank also   DX:  Hypoxia R09.02 1 each 0    diphenhydrAMINE-APAP, sleep, (TYLENOL PM EXTRA STRENGTH PO) Take 1 tablet by mouth at bedtime      simvastatin (ZOCOR) 20 MG tablet TAKE 1 TABLET NIGHTLY 90 tablet 3    insulin glargine (LANTUS SOLOSTAR) 100 UNIT/ML injection pen INJECT 16 UNITS UNDER THE SKIN TWICE A DAY (Patient taking differently: INJECT 12 UNITS UNDER THE SKIN TWICE A DAY) 30 mL 3    predniSONE (DELTASONE) 1 MG tablet TAKE 3 TABLETS DAILY 270 tablet 3    amLODIPine (NORVASC) 5 MG tablet Take 1 tablet by mouth daily 90 tablet 3    Nutritional Supplements (GLUCOSE MANAGEMENT) TABS 15 grams for bs less than 70 100 tablet 3    nystatin (MYCOSTATIN) 772234 UNIT/GM cream Apply topically 2 times daily. 1 Tube 0    Polyethylene Glycol 400 (BLINK TEARS) 0.25 % SOLN Place into both eyes as needed (dry eyes)       omeprazole (PRILOSEC) 20 MG capsule Take 20 mg by mouth Daily  30 capsule 3    Cholecalciferol (VITAMIN D3) 2000 UNITS CAPS Take 2,000 Units by mouth daily        No current facility-administered medications on file prior to visit. Social History     Socioeconomic History    Marital status:      Spouse name: Malcom Arthur Number of children: 3    Years of education: 15    Highest education level: Some college, no degree   Occupational History     Employer: [employer]   Tobacco Use    Smoking status: Never Smoker    Smokeless tobacco: Never Used    Tobacco comment: amish rrt 5/28/2019   Vaping Use    Vaping Use: Never used   Substance and Sexual Activity    Alcohol use: No     Alcohol/week: 0.0 standard drinks    Drug use: No    Sexual activity: Not Currently   Other Topics Concern    Not on file   Social History Narrative    9/25/2019 No SDOH needs identified. She is receiving Meals on Wheels.       Social Determinants of Health     Financial Resource Strain:     Difficulty of Paying Living Expenses: Not on file   Food Insecurity:     Worried About [address] in the Last Year: Not on file    Ray of Food in the Last Year: Not on file   Transportation Needs:     Lack of Transportation (Medical): Not on file    Lack of Transportation (Non-Medical): Not on file   Physical Activity:     Days of Exercise per Week: Not on file    Minutes of Exercise per Session: Not on file   Stress:     Feeling of Stress : Not on file   Social Connections:     Frequency of Communication with Friends and Family: Not on file    Frequency of Social Gatherings with Friends and Family: Not on file    Attends Sikh Services: Not on file    Active Member of [address] [place] [address] or Organizations: Not on file    Attends Club or Organization Meetings: Not on file    Marital Status: Not on file   Intimate Partner Violence:     Fear of Current or Ex-Partner: Not on file    Emotionally Abused: Not on file    Physically Abused: Not on file    Sexually Abused: Not on file   Housing Stability:     Unable to Pay for Housing in the Last Year: Not on file    Number of [place] in the Last Year: Not on file    Unstable Housing in the Last Year: Not on file       Review of Systems   Constitutional: Negative for activity change, appetite change, chills, fatigue, fever and unexpected weight change. HENT: Negative for congestion, dental problem, ear discharge, ear pain, facial swelling, hearing loss, postnasal drip, rhinorrhea, sinus pressure, sore throat and trouble swallowing. Eyes: Negative for pain and visual disturbance. Respiratory: Negative for cough, chest tightness, shortness of breath and wheezing. Cardiovascular: Negative for chest pain, palpitations and leg swelling. Gastrointestinal: Negative for abdominal pain, blood in stool, constipation, diarrhea, nausea and vomiting. Intermittent loose stools   Endocrine: Negative for cold intolerance, heat intolerance and polyuria.    Genitourinary: Negative for Behavior: Behavior normal.         Thought Content: Thought content normal.         Judgment: Judgment normal.       Vitals:    06/08/22 1303   BP: 134/70   Site: Left Upper Arm   Position: Sitting   Cuff Size: Large Adult   Pulse: 60   Resp: 16   SpO2: 97%   Weight: 177 lb 3.2 oz (80.4 kg)   Height: 4' 11\" (1.499 m)       Assessment:  1. Type 2 diabetes with nephropathy ([facility])  Last hemoglobin A1c 7.1. Work on diet continues to follow with the diabetic nurse practitioner continue on current drug regimen  - Comprehensive Metabolic Panel; Future  - Microalbumin, Ur; Future  - Hemoglobin A1C; Future    2. Stage 3 chronic kidney disease, unspecified whether stage 3a or 3b CKD ([facility]  Stable at present avoid nephrotoxic drugs    3. Thrombocytopenia ([address]  Most recent labs stable    4. Diabetic nephropathy associated with type 2 diabetes mellitus ([address]  Suggest she starts back up on her gabapentin. Previously this helped controlled her neuropathy. States good through the night pain is worse through the day. Suggest starting the 100 mg in the a.m.    5. Mild intermittent asthma without acute exacerbation  Stable at present    6. Atrial fibrillation, unspecified type ([address]  Rate controlled, follows with cardiology, on kidney dosed Eliquis. - TSH; Future    7. Diastolic dysfunction  Stable, no signs of fluid overload at present. Continue on diuretics as directed 2 g sodium diet, weigh daily    8. Iron deficiency anemia secondary to blood loss (chronic)  Serial lab follow-ups  - CBC with Auto Differential; Future    9. PMR (polymyalgia rheumatica) ([facility])  Increase prednisone back up to 4 mg daily call with an update in 4 weeks    10. Essential hypertension  Stable at present    11. Pulmonary HTN ([address]  Stable follows with pulmonology, cardiology    12. Dyslipidemia  Continue on Zocor, continue to work on diet remain active  - Lipid Panel; Future    13. Severe obesity (BMI 35.0-39. 9) with comorbidity ([address]  Work on diet increase activity  - TSH; Future    14. Vitamin D deficiency  Continue on supplemental vitamin D, serial lab follow-ups  - Vitamin D 25 Hydroxy; Future    15. Localized edema  Stable at present    16. Cat bite, initial encounter  Greater than 1 week no signs of infection, update tetanus  - Tdap, BOOSTRIX, (age 8 yrs+), IM    17. Menopause  - DEXA AXIAL SKELETON W VERTEBRAL FX ASST; Future      Plan:  As noted above. Follow up for routine visit. Call sooner with concerns prior.     Electronically signed by INGRID Chaudhary CNP on 6/8/2022 at 1:48 PM

## 2022-06-08 NOTE — PROGRESS NOTES
[address] INTERNAL  [facility]  [address] [facility], [address]  [place] [address] [facility]  [phone]        HISTORY:    Kaitlin Dubois presents today for evaluation and management of:  Chief Complaint   Patient presents with    Diabetes     3 month appt. *prednisone was increased*       HPI    Interval History:    Current Diabetic Medications  Victoza 1.8 mg daily, Lantus 14 units twice daily Humalog per sliding scale 4 times daily   = 3 units,  151-200 =6, 201-250 = 8 units, 251-300 = 10 units. DKA episodes: 0    03/31/22   Kaitlin Dubois is a 81 y.o. female patient who presents to clinic today for her diabetes. she has a history of HTN, ckd, hyperlipidemia, and obesity which contributes to her diabetes. At previous visit insulin was adjusted. she denies any current signs or symptoms of hyper/hypoglycemia. she states they are taking their medications as prescribed without any adverse effects. She was recently admitted for influenza and is now on oxygen. Her blood sugars have remained stable. Diet: low carb  Exercise: none  BS testing: uses cgm daily with success and is adherent to cgm therapy  Issues: denies  Diabetic foot exam up-to-date: Yes  Diabetic retinal exam up-to-date: Yes  Hypoglycemia as needed treatment: glucose tabs        06/08/22   Kaitlin Dubois is a 81 y.o. female patient who presents to clinic today for her diabetes. she has a history of HTN, ckd, hyperlipidemia, and obesity which contributes to her diabetes. At previous visit insulin was adjusted. she denies any current signs or symptoms of hyper/hypoglycemia. she states they are taking their medications as prescribed without any adverse effects.    Diet: low carb  Exercise: none  BS testing: uses cgm daily with success and is adherent to cgm therapy  Issues: denies  Diabetic foot exam up-to-date: Yes  Diabetic retinal exam up-to-date: Yes  Hypoglycemia as needed treatment: glucose tabs      High cholesterol-  Takes zocor and denies any adverse effects with its use.  Watches diet and exercise.      Hypertension-  Takes Norvasc Lopressor lasix and denies any adverse effects with their use. Watches diet and exercise. Denies any chest pain, dizziness or edema.  Follows with cardiology:Yes.      Obesity- Working on weight loss. Past Medical History:   Diagnosis Date    Allergic rhinitis     Asthma     PFT's, 04/06, actually showed mild restrictive defect.  Atrial fibrillation with RVR ([facility] [address]     Hospitalized [facility] 2/8/21-2/11/21    Basal cell carcinoma of cheek 2007    Right cheek    Basal cell carcinoma of leg     right leg    CAD (coronary artery disease)     With 40% stenosis of the LAD, 07/10, ejection fraction 60%. 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 Landon Leon. Repeat EGD6/15 Gastritis    Foraminal stenosis of lumbar region     Moderate  Right L4/L5 neuroforaminal stenosis, Dr Corey Brooks following. MRI 2/16 [place].   Moderate foraminal stenosis mild to moderate central canal stenosis    Hyperlipidemia     Hypertension     Hypokalemia 2/9/2019    IBS (irritable bowel syndrome)     GI consultation with Dr. Ghazal Gregg, GI specialist in [facility], felt that she probably has chronic functional diarrhea and recommended empiric Imodium, Pepto-Bismol or Questran first. Sprue test Neg 2011  Iron deficiency anemia     Percent sat iron 5, January 2015, ferritin 40 1 /15, FOBT positive  EGD 6/15  Gastritis, IV iron 9/15x3 effective,  colonoscopy deferred by Dr. Neeru Ochoa. --Prior colonoscopy 2011 with severe diverticulosis and telangiectasia. Trial iron solution in [place] juice 12/16    Iron deficiency anemia due to chronic blood loss 09/08/2015    Junctional bradycardia     Symptomatic, resolved with discontinuation of Verapamil, 05/09. 1.1) Echocardiogram: LAE, LVH, EF 50%, mild MR, diastolic. 1.2) Dr. Saeed Betsy evaluation. 1.3.) Persantine stress test negative, 05/09.  Migraine     Mild CAD     cath 10/15    Mitral regurgitation     Moderate to severe on echocardiogram September 2015.   Right pressure 76 grade 2 diastolic dysfunction,  echo 83/79 grade 2 diastolic dysfunction mild to moderate MR RVSP 56 aortic sclerosis    Nephrolithiasis 2/5/2019    Obesity     CHICO (obstructive sleep apnea)     CPAP 14 2/15 initiation  AHI 7  mild CHICO intolerant CPAP    Osteoarthritis     PMR (polymyalgia rheumatica) (HCC)     Pneumonia     Premature atrial contractions     Pulmonary HTN ([address] 1/10/2015    Pulmonary hypertension (HCC)     -Moderate on echo November 2014    Restrictive lung disease     Mild on PFTs 11/14    Right kidney stone 2/6/2019    Sigmoid diverticulitis 5/30/2019    Type II or unspecified type diabetes mellitus without mention of complication, not stated as uncontrolled     Vitreous floaters      Family History   Problem Relation Age of Onset    Uterine Cancer Mother     Heart Disease Father     High Blood Pressure Father     Stroke Sister         from a vascular malformation    Heart Attack Son         age 48     Social History     Tobacco Use    Smoking status: Never Smoker    Smokeless tobacco: Never Used    Tobacco comment: syant rrt 5/28/2019   Vaping Use    Vaping Use: Never used   Substance Use Topics    Alcohol use: No     Alcohol/week: 0.0 standard drinks    Drug use: No     Allergies   Allergen Reactions    Codeine      Confusion      Erythromycin      GI upset  Received zithromax in past and tolerated    Exenatide Nausea Only    Levofloxacin      GI upset    Verapamil Other (See Comments)     Junctional bradycardia    Clonidine Derivatives Rash     2009, catapres    Other Rash     Levbid    Penicillins Rash     Received Rocephin in past and tolerated       MEDICATIONS:  Current Outpatient Medications   Medication Sig Dispense Refill    furosemide (LASIX) 40 MG tablet TAKE 1 TABLET DAILY 90 tablet 3    Liraglutide (VICTOZA) 18 MG/3ML SOPN SC injection Inject 1.8 mg into the skin daily 18 mL 5    potassium chloride (KLOR-CON M20) 20 MEQ extended release tablet Take 1 tablet by mouth daily 90 tablet 3    amiodarone (PACERONE) 100 MG tablet Take 1 tablet by mouth daily 90 tablet 3    metoprolol tartrate (LOPRESSOR) 25 MG tablet Take 1 tablet by mouth 2 times daily 180 tablet 3    albuterol sulfate  (90 Base) MCG/ACT inhaler Inhale 2 puffs into the lungs 4 times daily as needed for Wheezing 1 each 0    insulin lispro, 1 Unit Dial, (HUMALOG KWIKPEN) 100 UNIT/ML SOPN Humalog pen 3 times daily with meals per sliding scale. Blood sugar is 70, drink juice. = 3 units,  151-200 =6, 201-250 = 8 units, 251-300 = 10 units.  1 pen 3    apixaban (ELIQUIS) 5 MG TABS tablet Take 1 tablet by mouth 2 times daily 180 tablet 3    albuterol (PROVENTIL) (2.5 MG/3ML) 0.083% nebulizer solution Take 3 mLs by nebulization every 4 hours as needed for Wheezing or Shortness of Breath 30 each 1    diphenhydrAMINE-APAP, sleep, (TYLENOL PM EXTRA STRENGTH PO) Take 1 tablet by mouth at bedtime      simvastatin (ZOCOR) 20 MG tablet TAKE 1 TABLET NIGHTLY 90 tablet 3    insulin glargine (LANTUS SOLOSTAR) 100 UNIT/ML injection pen INJECT 16 UNITS UNDER THE SKIN TWICE A DAY (Patient taking differently: INJECT 12 UNITS UNDER THE SKIN TWICE A DAY) 30 mL 3    predniSONE (DELTASONE) 1 MG tablet TAKE 3 TABLETS DAILY 270 tablet 3    amLODIPine (NORVASC) 5 MG tablet Take 1 tablet by mouth daily 90 tablet 3    Nutritional Supplements (GLUCOSE MANAGEMENT) TABS 15 grams for bs less than 70 100 tablet 3    nystatin (MYCOSTATIN) 339456 UNIT/GM cream Apply topically 2 times daily. 1 Tube 0    Polyethylene Glycol 400 (BLINK TEARS) 0.25 % SOLN Place into both eyes as needed (dry eyes)       omeprazole (PRILOSEC) 20 MG capsule Take 20 mg by mouth Daily  30 capsule 3    Cholecalciferol (VITAMIN D3) 2000 UNITS CAPS Take 2,000 Units by mouth daily       Insulin Pen Needle (B-D ULTRAFINE III SHORT PEN) 31G X 8 MM MISC USE 7 DAILY 270 each 3    Oxygen Concentrator 3 liters nasal cannula continuously at home. Needs portable tank also   DX:  Hypoxia R09.02 1 each 0     No current facility-administered medications for this visit. Review ofSymptoms:  Review of Systems   Constitutional: Positive for fatigue. Negative for unexpected weight change. Eyes: Negative for visual disturbance. Respiratory: Negative. Negative for shortness of breath. Cardiovascular: Negative for chest pain and leg swelling. Gastrointestinal: Negative for abdominal pain and diarrhea. Endocrine: Negative for polydipsia, polyphagia and polyuria. Genitourinary: Negative. Musculoskeletal: Negative. Skin: Negative for rash and wound. Neurological: Negative for dizziness, tremors, seizures and headaches. Psychiatric/Behavioral: Negative. Negative for confusion and decreased concentration. Theremainder of a complete 14-point review of systems is negative. Vital Signs: BP (!) 142/60   Pulse 64   Resp 14   Ht 4' 11\" (1.499 m)   Wt 177 lb (80.3 kg)   BMI 35.75 kg/m²      Wt Readings from Last 3 Encounters:   06/08/22 177 lb (80.3 kg)   06/08/22 177 lb 3.2 oz (80.4 kg)   03/31/22 184 lb (83.5 kg)     Body mass index is 35.75 kg/m².   LABS:  Hemoglobin A1C   Date Value Ref Range Status 06/06/2022 7.1 (H) 4.0 - 6.0 % Final   03/24/2022 7.3 (H) 4.0 - 6.0 % Final     Lab Results   Component Value Date    LABMICR CANNOT BE CALCULATED 08/25/2021     Lab Results   Component Value Date     06/06/2022    K 4.3 06/06/2022     06/06/2022    CO2 29 06/06/2022    BUN 17 06/06/2022    CREATININE 1.05 (H) 06/06/2022    GLUCOSE 150 (H) 06/06/2022    CALCIUM 9.7 06/06/2022    PROT 7.1 03/06/2022    LABALBU 4.1 03/06/2022    BILITOT 0.61 03/06/2022    ALKPHOS 86 03/06/2022    AST 18 03/06/2022    ALT 13 03/06/2022    LABGLOM 50 (L) 06/06/2022    GFRAA >60 06/06/2022     Lab Results   Component Value Date    CHOL 184 08/25/2021    CHOL 185 06/02/2020    CHOL 137 07/20/2019     Lab Results   Component Value Date    TRIG 140 08/25/2021    TRIG 196 (H) 06/02/2020    TRIG 126 07/20/2019     Lab Results   Component Value Date    HDL 73 08/25/2021    HDL 67 06/02/2020    HDL 66 07/20/2019     Lab Results   Component Value Date    LDLCHOLESTEROL 83 08/25/2021    LDLCHOLESTEROL 79 06/02/2020    LDLCHOLESTEROL 46 07/20/2019     Lab Results   Component Value Date    VLDL NOT REPORTED 08/25/2021    VLDL NOT REPORTED (H) 06/02/2020    VLDL NOT REPORTED 07/20/2019     Lab Results   Component Value Date    CHOLHDLRATIO 2.5 08/25/2021    CHOLHDLRATIO 2.8 06/02/2020    CHOLHDLRATIO 2.1 07/20/2019           Physical Exam  Constitutional:       Appearance: She is well-developed. Eyes:      Pupils: Pupils are equal, round, and reactive to light. Neck:      Thyroid: No thyroid mass, thyromegaly or thyroid tenderness. Cardiovascular:      Rate and Rhythm: Normal rate and regular rhythm. Heart sounds: Normal heart sounds. Pulmonary:      Effort: Pulmonary effort is normal.      Breath sounds: Normal breath sounds. Abdominal:      General: Bowel sounds are normal.      Palpations: Abdomen is soft. Skin:     General: Skin is warm and dry. Comments: Negative for open/nonhealing wounds.  Negative for lipohypertrophy. Neurological:      Mental Status: She is alert and oriented to person, place, and time. ASSESSMENT/PLAN:     Diagnosis Orders   1. Type 2 diabetes with nephropathy ([address]     2. Dyslipidemia     3. Essential hypertension     4. BMI 35.0-35.9,adult       No orders of the defined types were placed in this encounter. No orders of the defined types were placed in this encounter. Requested Prescriptions      No prescriptions requested or ordered in this encounter       1. Type 2 diabetes with nephropathy (HCC)  - Stable  HbA1C goal is less than 7%. - Fasting blood glucose goal is 70-120mg/dl and postprandial blood sugar goal is less than 180 mg/dl. - Labs reviewed including most recent A1c, microalbumin and kidney function. Repeat labs due in 3 months.    -We discussed in great detail dietary modifications they can make to better improve their blood sugars. -follow up diabetes education completed, all questions answered. CGM report downloaded and reviewed, scanned to media tab. Time in range 79% and hypoglycemia 0%. Predicted A1c per CGM report 6.9%. -doing well no changes. Will monitor glucose levels with extra prednisone for a few weeks    Discussed signs and symptoms of hyper/hypoglycemia and how to treat. Encouraged 150 minutes of physical activity per week. Follow a low carbohydrate diet. Encouraged at least 7 hours of sleep. The patient was informed of the goals of diabetes management. This can only be accomplished by watching their diet and exercise levels. We certainly use medicines to help attain these goals. The consequences of not controlling blood sugars were discussed. These include blindness, heart disease, stroke, kidney disease, and possibly need for dialysis. They were told to be careful with their foot care as diabetics often have nerve damage, infections and risk for limb amutations . They also need a dilated eye exam yearly.  We discussed the issues of diet, exercise, medication, complication avoidance, reviewed the signs and symptoms of diabetes, hypoglycemic episodes, significance of HbA1C.         2. Dyslipidemia  stable, lipid panel reviewed, continue current medications. Diet and exercise      3. Essential hypertension   stable, continue current medications. Diet and exercise Seek emergent care if chest pain develops. 4. BMI 35.0-35.9,adult  Reduce calories and increase physical activity to achieve a slow and steady weight loss to improve blood pressure, cholesterol and diabetes. Answered all patient questions. Agrees to follow plan of care and to follow up in 3 months, sooner if needed. Call office if unexplained blood sugars less than 70 occur or above 400. Call office or access [place] with any further questions or concerns. Be sure to bring glucometer/food log at next appointment. Total time spent reviewing chart, labs, counseling patient and documenting on the date of the encounter: 30 min.       Electronically signed by INGRID Watkins CNP on 6/8/2022 at 2:17 PM      (Please note that portions of this note were completed with a voice-recognition program. Efforts were made to edit the dictation but occasionally words are mis-transcribed.)

## 2022-06-13 NOTE — TELEPHONE ENCOUNTER
Patient was  in office last week  And you both agreed she no longer needed her oxygen.   Please fax Dirk approval to remove from home

## 2022-06-22 NOTE — TELEPHONE ENCOUNTER
Spoke with patient she said it is a 1 mg tablet that she takes. She takes 2 mg the morning and 2 mg at lunch and if she misses her morning dose she takes all 4 tablets at lunch time.

## 2022-08-16 NOTE — PROGRESS NOTES
Orthopaedic Progress Note      CHIEF COMPLAINT: Left knee pain    HISTORY OF PRESENT ILLNESS:       Ms. Connie Prince  is a 81 y.o. female  who presents with left knee pain. Patient was last seen in December 2021. She did have a corticosteroid injection at that time. Patient states that it is helping improve her pain and swelling. Patient is interested in another injection today. Patient states that after she gets the steroid injection does elevate her blood sugars to 500. She would like both injections but we will hold off on the right knee injection today have her come back in 2 weeks for the injection due to the increased blood sugars. Has continued pain with prolonged walking and standing. Past Medical History:    Past Medical History:   Diagnosis Date    Allergic rhinitis     Asthma     PFT's, 04/06, actually showed mild restrictive defect. Atrial fibrillation with RVR ([facility] [address]     Hospitalized [facility] 2/8/21-2/11/21    Basal cell carcinoma of cheek 2007    Right cheek    Basal cell carcinoma of leg     right leg    CAD (coronary artery disease)     With 40% stenosis of the LAD, 07/10, ejection fraction 60%. 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 Kamaljit Dey. Repeat EGD6/15 Gastritis    Foraminal stenosis of lumbar region     Moderate  Right L4/L5 neuroforaminal stenosis, Dr Sara Henson following. MRI 2/16 [place].   Moderate foraminal stenosis mild to moderate central canal stenosis    Hyperlipidemia     Hypertension     Hypokalemia 2/9/2019    IBS (irritable bowel syndrome)     [facility] felt that she probably has chronic functional diarrhea and recommended empiric Imodium, Pepto-Bismol or Questran first. Sprue test Neg 2011    Iron deficiency anemia     Percent sat iron 5, January 2015, ferritin 40 1 /15, FOBT positive  EGD 6/15  Gastritis, IV iron 9/15x3 effective,  colonoscopy deferred by Dr. Merry Casas. --Prior colonoscopy 2011 with severe diverticulosis and telangiectasia. Trial iron solution in [place] juice 12/16    Iron deficiency anemia due to chronic blood loss 09/08/2015    Junctional bradycardia     Symptomatic, resolved with discontinuation of Verapamil, 05/09. 1.1) Echocardiogram: LAE, LVH, EF 50%, mild MR, diastolic. 1.2) Dr. Ace Constant evaluation. 1.3.) Persantine stress test negative, 05/09. Migraine     Mild CAD     cath 10/15    Mitral regurgitation     Moderate to severe on echocardiogram September 2015.   Right pressure 76 grade 2 diastolic dysfunction,  echo 73/19 grade 2 diastolic dysfunction mild to moderate MR RVSP 56 aortic sclerosis    Nephrolithiasis 2/5/2019    Obesity     CHICO (obstructive sleep apnea)     CPAP 14 2/15 initiation  AHI 7  mild CHICO intolerant CPAP    Osteoarthritis     PMR (polymyalgia rheumatica) (HCC)     Pneumonia     Premature atrial contractions     Pulmonary HTN ([facility] [address] 1/10/2015    Pulmonary hypertension (HCC)     -Moderate on echo November 2014    Restrictive lung disease     Mild on PFTs 11/14    Right kidney stone 2/6/2019    Sigmoid diverticulitis 5/30/2019    Type II or unspecified type diabetes mellitus without mention of complication, not stated as uncontrolled     Vitreous floaters        Past Surgical History:    Past Surgical History:   Procedure Laterality Date    [address]  2014    CATARACT REMOVAL Bilateral 08/10    CHOLECYSTECTOMY      COLONOSCOPY  05/16/2011    COLONOSCOPY N/A 9/26/2019    severe diverticulosis, benign rectal polyp, Dr. Red Mi Right 2/6/2019    Cysto with right stent insertion and villareal catheter performed by Stefany Perry MD at [place] Right 2/27/2019    CYSTO right stent removal  Right Ureteroscopy Holmium Laser right stent exchange performed by Stefany Perry MD at [place] [address], [place], [address] (CERVIX STATUS UNKNOWN)  Approx 1983    MALIGNANT SKIN LESION EXCISION Right 12/10 and 04/11    Basal CellRemoved from right neck    MALIGNANT SKIN LESION EXCISION Right 02/2012    Basal Cell Removed from right leg    OTHER SURGICAL HISTORY  09/06/2011    capsule endoscopy    OTHER SURGICAL HISTORY  3/22/16    right L4 and L5 TFE    OTHER SURGICAL HISTORY Right 4/26/16    L4, L5 TFE    UPPER GASTROINTESTINAL ENDOSCOPY  05/16/2011    UPPER GASTROINTESTINAL ENDOSCOPY  6/22/15    mild gastritis (Dr. Ny Velasco)    UPPER GASTROINTESTINAL ENDOSCOPY N/A 9/26/2019    mild to moderate inflammation, Dr. Ny Velasco         Current  Medications:  Current Outpatient Medications   Medication Sig Dispense Refill    VICTOZA 18 MG/3ML SOPN SC injection INJECT 1.8 MG INTO THE SKIN DAILY 27 mL 3    amLODIPine (NORVASC) 5 MG tablet TAKE 1 TABLET DAILY 90 tablet 3    gabapentin (NEURONTIN) 100 MG capsule TAKE 1 CAPSULE DAILY 90 capsule 0    Misc.  Devices MISC D/C home oxygen 1 each 0    furosemide (LASIX) 40 MG tablet TAKE 1 TABLET DAILY 90 tablet 3    Insulin Pen Needle (B-D ULTRAFINE III SHORT PEN) 31G X 8 MM MISC USE 7 DAILY 270 each 3    potassium chloride (KLOR-CON M20) 20 MEQ extended release tablet Take 1 tablet by mouth daily 90 tablet 3    amiodarone (PACERONE) 100 MG tablet Take 1 tablet by mouth daily 90 tablet 3    metoprolol tartrate (LOPRESSOR) 25 MG tablet Take 1 tablet by mouth 2 times daily 180 tablet 3    albuterol sulfate  (90 Base) MCG/ACT inhaler Inhale 2 puffs into the lungs 4 times daily as needed for Wheezing 1 each 0    insulin lispro, 1 Unit Dial, (HUMALOG KWIKPEN) 100 UNIT/ML SOPN Humalog pen 3 times daily with meals per sliding scale. Blood sugar is 70, drink juice. = 3 units,  151-200 =6, 201-250 = 8 units, 251-300 = 10 units. 1 pen 3    apixaban (ELIQUIS) 5 MG TABS tablet Take 1 tablet by mouth 2 times daily 180 tablet 3    albuterol (PROVENTIL) (2.5 MG/3ML) 0.083% nebulizer solution Take 3 mLs by nebulization every 4 hours as needed for Wheezing or Shortness of Breath 30 each 1    Oxygen Concentrator 3 liters nasal cannula continuously at home. Needs portable tank also   DX:  Hypoxia R09.02 1 each 0    diphenhydrAMINE-APAP, sleep, (TYLENOL PM EXTRA STRENGTH PO) Take 1 tablet by mouth at bedtime      simvastatin (ZOCOR) 20 MG tablet TAKE 1 TABLET NIGHTLY 90 tablet 3    insulin glargine (LANTUS SOLOSTAR) 100 UNIT/ML injection pen INJECT 16 UNITS UNDER THE SKIN TWICE A DAY (Patient taking differently: INJECT 12 UNITS UNDER THE SKIN TWICE A DAY) 30 mL 3    predniSONE (DELTASONE) 1 MG tablet TAKE 3 TABLETS DAILY 270 tablet 3    Nutritional Supplements (GLUCOSE MANAGEMENT) TABS 15 grams for bs less than 70 100 tablet 3    nystatin (MYCOSTATIN) 848525 UNIT/GM cream Apply topically 2 times daily. 1 Tube 0    Polyethylene Glycol 400 0.25 % SOLN Place into both eyes as needed (dry eyes)       omeprazole (PRILOSEC) 20 MG capsule Take 20 mg by mouth Daily  30 capsule 3    Cholecalciferol (VITAMIN D3) 2000 UNITS CAPS Take 2,000 Units by mouth daily        No current facility-administered medications for this visit.        Allergies:  Codeine, Erythromycin, Exenatide, Levofloxacin, Verapamil, Clonidine derivatives, Other, and Penicillins    Social History:   Social History     Tobacco Use   Smoking Status Never   Smokeless Tobacco Never   Tobacco Comments    syant rrt 5/28/2019     Social History     Substance and Sexual Activity Alcohol Use No    Alcohol/week: 0.0 standard drinks     Social History     Substance and Sexual Activity   Drug Use No       Family History:  Family History   Problem Relation Age of Onset    Uterine Cancer Mother     Heart Disease Father     High Blood Pressure Father     Stroke Sister         from a vascular malformation    Heart Attack Son         age 48       REVIEW OF SYSTEMS:  Constitutional: Denies any fever, chills. Musculoskeletal: Positive for left knee pain. PHYSICAL EXAM:  [unfilled]  General appearance:  Alert and oriented x 3. No apparent distress, appears stated age, calm and cooperative. Musculoskeletal: Left lower extremity was examined. Skin is intact no rashes lesions or drainage. No redness warmth or cellulitis. Crepitus noted with range of motion of the knee. Pain to palpation over the medial and lateral joint line. Mild joint effusion noted. Denies calf pain to palpation. Toe flexion and extension is intact. Sensation intact neurovascularly intact to the left foot. Good stability to varus and valgus stress testing. Good range of motion of the left knee. Olivet Copping DATA:  CBC:   Lab Results   Component Value Date/Time    WBC 10.5 03/24/2022 01:08 PM    HGB 11.2 03/24/2022 01:08 PM    PLT See Reflexed IPF Result 03/24/2022 01:08 PM     BMP:    Lab Results   Component Value Date/Time     06/06/2022 09:18 AM    K 4.3 06/06/2022 09:18 AM     06/06/2022 09:18 AM    CO2 29 06/06/2022 09:18 AM    BUN 17 06/06/2022 09:18 AM    CREATININE 1.05 06/06/2022 09:18 AM    CALCIUM 9.7 06/06/2022 09:18 AM    GLUCOSE 150 06/06/2022 09:18 AM     PT/INR:    Lab Results   Component Value Date/Time    PROTIME 12.9 02/09/2021 05:27 AM    INR 1.0 02/09/2021 05:27 AM     Troponin:    Lab Results   Component Value Date/Time    TROPONINI 0.02 11/26/2014 12:17 AM     No results for input(s): LIPASE, AMYLASE in the last 72 hours.   No results for input(s): AST, ALT, BILIDIR, BILITOT, ALKPHOS in the last 72 hours. Uric Acid:  No components found for: URIC  Urine Culture:  No components found for: CURINE    Radiology:   No results found. X-rays personally reviewed by me 3 views of the left and right knee does show osteoarthritis to bilateral knee joints. No acute fractures noted. Diagnosis Orders   1. Arthritis of left knee  triamcinolone acetonide (KENALOG-40) injection 80 mg    bupivacaine (MARCAINE) 0.5 % injection 25 mg    IL ARTHROCENTESIS ASPIR&/INJ MAJOR JT/BURSA W/O US            PLAN:  Vivek Carmona at this time we will give a left knee corticosteroid injection today. We will see her back in 2 weeks for a right knee corticosteroid injection due to her rising blood sugars after the injection. Activities as tolerated weightbearing as tolerated. We will see patient back in 2 weeks        Procedures    IL ARTHROCENTESIS ASPIR&/INJ MAJOR JT/BURSA W/O US        Procedure: Knee was prepped in sterile fashion with alcohol. A 22-gauge needle was introduced into the left inferolateral portal of the knee with 5 mL half percent Marcaine 80 mg of Kenalog were injected. Hemostasis achieved dry sterile bandage applied. Patient tolerated procedure well.       Electronically signed by INGRID Ramachandran CNP on 8/16/2022 at 1:16 PM

## 2022-08-20 PROBLEM — K57.20 PERFORATED DIVERTICULUM OF LARGE INTESTINE: Status: ACTIVE | Noted: 2022-01-01

## 2022-08-20 NOTE — ACP (ADVANCE CARE PLANNING)
orders.     Length of ACP Conversation in minutes:      Conversation Outcomes:  [x] ACP discussion completed  [] Existing advance directive reviewed with patient; no changes to patient's previously recorded wishes  [] New Advance Directive completed  [] Portable Do Not Rescitate prepared for Provider review and signature  [] POLST/POST/MOLST/MOST prepared for Provider review and signature      Follow-up plan:    [] Schedule follow-up conversation to continue planning  [x] Referred individual to Provider for additional questions/concerns   [] Advised patient/agent/surrogate to review completed ACP document and update if needed with changes in condition, patient preferences or care setting    [] This note routed to one or more involved healthcare providers

## 2022-08-20 NOTE — ED PROVIDER NOTES
[facility] ED  Emergency Department Encounter  Emergency Medicine Resident     Pt Saul Osborne  MRN: [id number]  [place] 1935  Date of evaluation: 8/20/22  PCP:  INGRID Hall CNP      CHIEF COMPLAINT       Chief Complaint   Patient presents with    Abdominal Pain       HISTORY OF PRESENT ILLNESS  (Location/Symptom, Timing/Onset, Context/Setting, Quality, Duration, Modifying Factors, Severity.)      Preethi Hawkins is a 81 y.o. female who presents with abdominal pain ongoing today. Patient presented to [facility] emergency department with this abdominal pain and underwent evaluation. CT scan at that time demonstrated diverticulosis, diverticulitis and pneumoperitoneum. She was started on clindamycin and Flagyl which she received prior to arrival.  Has been getting pain meds medications which have been improving her pain. Received half a liter of normal saline. Currently complaining of lower bilateral quadrant pain that does not radiate and was improved with her pain medications. Last meal was dinner last night.     PAST MEDICAL / SURGICAL / SOCIAL / FAMILY HISTORY      has a past medical history of Allergic rhinitis, Asthma, Atrial fibrillation with RVR (HCC), Basal cell carcinoma of cheek, Basal cell carcinoma of leg, CAD (coronary artery disease), Central retinal artery occlusion, Cerebral artery occlusion with cerebral infarction [facility]), Cerebrovascular disease, CHF (congestive heart failure) ([facility] [address] Diverticulosis, Dyslipidemia, Elevated antinuclear antibody (ZAIRA) level, Esophagitis, Foraminal stenosis of lumbar region, Hyperlipidemia, Hypertension, Hypokalemia, IBS (irritable bowel syndrome), Iron deficiency anemia, Iron deficiency anemia due to chronic blood loss, Junctional bradycardia, Migraine, Mild CAD, Mitral regurgitation, Nephrolithiasis, Obesity, CHICO (obstructive sleep apnea), Osteoarthritis, PMR (polymyalgia rheumatica) ([facility] [address] Pneumonia, Premature atrial contractions, Pulmonary HTN ([facility] [address] Pulmonary hypertension ([facility] [address] Restrictive lung disease, Right kidney stone, Sigmoid diverticulitis, Type II or unspecified type diabetes mellitus without mention of complication, not stated as uncontrolled, and Vitreous floaters. has a past surgical history that includes Appendectomy (1952); Hysterectomy (Approx 1983); Cataract removal (Bilateral, 08/10); malignant skin lesion excision (Right, 12/10 and 04/11); malignant skin lesion excision (Right, 02/2012); Colonoscopy (05/16/2011); other surgical history (09/06/2011); Cholecystectomy; Endoscopy, colon, diagnostic; eye surgery; Cardiac catheterization (2014); other surgical history (3/22/16); other surgical history (Right, 4/26/16); Cystoscopy (Right, 2/6/2019); Cystoscopy (Right, 2/27/2019); Colonoscopy (N/A, 9/26/2019); Upper gastrointestinal endoscopy (05/16/2011); Upper gastrointestinal endoscopy (6/22/15); and Upper gastrointestinal endoscopy (N/A, 9/26/2019). Social History     Socioeconomic History    Marital status:      Spouse name: Carter Colon    Number of children: 3    Years of education: 12    Highest education level: Some college, no degree   Occupational History     Employer: [employer]   Tobacco Use    Smoking status: Never    Smokeless tobacco: Never    Tobacco comments:     amish rrt 5/28/2019   Vaping Use    Vaping Use: Never used   Substance and Sexual Activity    Alcohol use: No     Alcohol/week: 0.0 standard drinks    Drug use: No    Sexual activity: Not Currently   Other Topics Concern    Not on file   Social History Narrative    9/25/2019 No SDOH needs identified. She is receiving Meals on Wheels.       Social Determinants of Health     Financial Resource Strain: Not on file   Food Insecurity: Not on file   Transportation Needs: Not on file   Physical Activity: Not on file   Stress: Not on file   Social Connections: Not on file   Intimate Partner Violence: Not on file   Housing Stability: Not on file       Family History   Problem Relation Age of Onset    Uterine Cancer Mother     Heart Disease Father     High Blood Pressure Father     Stroke Sister         from a vascular malformation    Heart Attack Son         age 48       Allergies:  Codeine, Erythromycin, Exenatide, Levofloxacin, Verapamil, Clonidine derivatives, Other, and Penicillins    Home Medications:  Prior to Admission medications    Medication Sig Start Date End Date Taking? Authorizing Provider   polycarbophil (FIBERCON) 625 MG tablet Take 625 mg by mouth daily   Yes Historical Provider, MD   VICTOZA 18 MG/3ML SOPN SC injection INJECT 1.8 MG INTO THE SKIN DAILY 7/20/22  Yes INGRID Cifuentes CNP   amLODIPine (NORVASC) 5 MG tablet TAKE 1 TABLET DAILY 7/5/22  Yes INGRID Cifuentes CNP   gabapentin (NEURONTIN) 100 MG capsule TAKE 1 CAPSULE DAILY 6/27/22 9/25/22 Yes INGRID Cifuentes CNP   furosemide (LASIX) 40 MG tablet TAKE 1 TABLET DAILY 5/31/22  Yes INGRID Cifuentes CNP   Insulin Pen Needle (B-D ULTRAFINE III SHORT PEN) 31G X 8 MM MISC USE 7 DAILY 4/26/22  Yes INGRID Cifuentes CNP   potassium chloride (KLOR-CON M20) 20 MEQ extended release tablet Take 1 tablet by mouth daily 4/13/22  Yes INGRID Cifuentes CNP   amiodarone (PACERONE) 100 MG tablet Take 1 tablet by mouth daily 4/13/22  Yes INGRID Cifuentes CNP   metoprolol tartrate (LOPRESSOR) 25 MG tablet Take 1 tablet by mouth 2 times daily 4/2/22  Yes INGRID Cifuentes CNP   insulin lispro, 1 Unit Dial, (HUMALOG KWIKPEN) 100 UNIT/ML SOPN Humalog pen 3 times daily with meals per sliding scale. Blood sugar is 70, drink juice. = 3 units,  151-200 =6, 201-250 = 8 units, 251-300 = 10 units.  3/31/22  Yes Carla Ware APRN - CNP   apixaban (ELIQUIS) 5 MG TABS tablet Take 1 tablet by mouth 2 times daily 3/24/22  Yes Jumana Connors MD   diphenhydrAMINE-APAP, sleep, (TYLENOL PM EXTRA STRENGTH PO) Take 1 tablet by mouth at bedtime   Yes Historical Provider, MD   simvastatin (ZOCOR) 20 MG tablet TAKE 1 TABLET NIGHTLY 2/2/22  Yes INGRID Paula CNP   insulin glargine (LANTUS SOLOSTAR) 100 UNIT/ML injection pen INJECT 16 UNITS UNDER THE SKIN TWICE A DAY  Patient taking differently: INJECT 12 UNITS UNDER THE SKIN TWICE A DAY 1/24/22  Yes INGRID Lira CNP   predniSONE (DELTASONE) 1 MG tablet TAKE 3 TABLETS DAILY 8/20/21  Yes INGRID Paula CNP   Nutritional Supplements (GLUCOSE MANAGEMENT) TABS 15 grams for bs less than 70 4/7/21  Yes INGRID Hilliard CNP   nystatin (MYCOSTATIN) 812643 UNIT/GM cream Apply topically 2 times daily. 1/4/21  Yes INGRID Lira CNP   Polyethylene Glycol 400 0.25 % SOLN Place into both eyes as needed (dry eyes)    Yes Historical Provider, MD   omeprazole (PRILOSEC) 20 MG capsule Take 20 mg by mouth Daily  1/14/15  Yes Nory Brito   Cholecalciferol (VITAMIN D3) 2000 UNITS CAPS Take 2,000 Units by mouth daily    Yes Historical Provider, MD   albuterol sulfate  (90 Base) MCG/ACT inhaler Inhale 2 puffs into the lungs 4 times daily as needed for Wheezing 3/31/22   INGRID Paula CNP   albuterol (PROVENTIL) (2.5 MG/3ML) 0.083% nebulizer solution Take 3 mLs by nebulization every 4 hours as needed for Wheezing or Shortness of Breath 3/15/22   INGRID Paula CNP       REVIEW OF SYSTEMS    (2-9 systems for level 4, 10 or more for level 5)      Review of Systems   Constitutional:  Negative for appetite change. HENT:  Negative for facial swelling. Respiratory:  Negative for shortness of breath. Cardiovascular:  Negative for chest pain. Gastrointestinal:  Positive for abdominal pain and nausea. Musculoskeletal:  Negative for back pain. Skin:  Negative for wound. Allergic/Immunologic:        + medication allergies   Neurological:  Negative for headaches.    Hematological:         + AC Psychiatric/Behavioral:  Negative for confusion. PHYSICAL EXAM   (up to 7 for level 4, 8 or more for level 5)      INITIAL VITALS:   BP (!) 158/113   Pulse 67   Temp 98.4 °F (36.9 °C) (Oral)   Resp 26   SpO2 94%     Physical Exam  Constitutional:       General: She is not in acute distress. HENT:      Head: Normocephalic and atraumatic. Right Ear: External ear normal.      Left Ear: External ear normal.      Nose: Nose normal.   Eyes:      Conjunctiva/sclera: Conjunctivae normal.   Cardiovascular:      Rate and Rhythm: Normal rate. Pulses: Normal pulses. Pulmonary:      Effort: Pulmonary effort is normal. No respiratory distress. Abdominal:      General: Abdomen is flat. There is no distension. Palpations: Abdomen is soft. Tenderness: There is abdominal tenderness in the right lower quadrant and left lower quadrant. There is rebound. There is no guarding. Hernia: There is no hernia in the umbilical area. Musculoskeletal:         General: No swelling. Cervical back: No tenderness. Skin:     General: Skin is warm. Capillary Refill: Capillary refill takes less than 2 seconds. Neurological:      Mental Status: She is alert and oriented to person, place, and time. Psychiatric:         Mood and Affect: Mood normal.       DIFFERENTIAL  DIAGNOSIS     PLAN (LABS / IMAGING / EKG):  Orders Placed This Encounter   Procedures    Telemetry monitoring - 72 hour duration    Inpatient consult to General Surgery    ADMIT TO INPATIENT       MEDICATIONS ORDERED:  Orders Placed This Encounter   Medications    morphine injection 2 mg    ondansetron (ZOFRAN) injection 4 mg       DDX: perforation    DIAGNOSTIC RESULTS / EMERGENCY DEPARTMENT COURSE / MDM       EMERGENCY DEPARTMENT COURSE:    ED Course as of 08/20/22 1730   Sat Aug 20, 2022   1711 Consulted general surgery, they are aware. Patient given additional dose of pain medication and zofran. Plan for admission.   [ML]      ED

## 2022-08-20 NOTE — ED PROVIDER NOTES
[address]      Pt Name: Karol Gonzalez  MRN: [id number]  [place] 1935  Date of evaluation: 8/20/2022      CHIEF COMPLAINT       Chief Complaint   Patient presents with    Abdominal Pain     Started at 10pm last night. Last time I had this pain I had a kidney stone, UTI and diverticulitus. Nausea. HISTORY OF PRESENT ILLNESS      The patient presents with suprapubic pain that started this morning. She states its similar to when she had diverticulitis and UTIs before. She is also had kidney stones. She denies nausea or vomiting. She denies diarrhea. She denies blood in her stool or emesis. She denied hematuria. The pain has been constant all morning long. REVIEW OF SYSTEMS       All systems reviewed and negative unless noted in HPI. The patient denies fever or constitutional symptoms. Denies vision change. Denies any sore throat or rhinorrhea. Denies any neck pain or stiffness. Denies chest pain or shortness of breath. No nausea,  vomiting or diarrhea. Suprapubic pain. History of UTIs and kidney stones. Denies musculoskeletal injury or pain. Denies any weakness, numbness or focal neurologic deficit. Denies any skin rash or edema. No recent psychiatric issues. No easy bruising or bleeding. History of diabetes.        PAST MEDICAL HISTORY    has a past medical history of Allergic rhinitis, Asthma, Atrial fibrillation with RVR ([address] Basal cell carcinoma of cheek, Basal cell carcinoma of leg, CAD (coronary artery disease), Central retinal artery occlusion, Cerebral artery occlusion with cerebral infarction [facility]), Cerebrovascular disease, CHF (congestive heart failure) ([address] Diverticulosis, Dyslipidemia, Elevated antinuclear antibody (ZAIRA) level, Esophagitis, Foraminal stenosis of lumbar region, Hyperlipidemia, Hypertension, Hypokalemia, IBS (irritable bowel syndrome), Iron deficiency anemia, Iron deficiency anemia due to chronic blood loss, Junctional bradycardia, Migraine, Mild CAD, Mitral regurgitation, Nephrolithiasis, Obesity, CHICO (obstructive sleep apnea), Osteoarthritis, PMR (polymyalgia rheumatica) (HCC), Pneumonia, Premature atrial contractions, Pulmonary HTN ([address] Pulmonary hypertension ([address] Restrictive lung disease, Right kidney stone, Sigmoid diverticulitis, Type II or unspecified type diabetes mellitus without mention of complication, not stated as uncontrolled, and Vitreous floaters. SURGICAL HISTORY      has a past surgical history that includes Appendectomy (1952); Hysterectomy (Approx 1983); Cataract removal (Bilateral, 08/10); malignant skin lesion excision (Right, 12/10 and 04/11); malignant skin lesion excision (Right, 02/2012); Colonoscopy (05/16/2011); other surgical history (09/06/2011); Cholecystectomy; Endoscopy, colon, diagnostic; eye surgery; Cardiac catheterization (2014); other surgical history (3/22/16); other surgical history (Right, 4/26/16); Cystoscopy (Right, 2/6/2019); Cystoscopy (Right, 2/27/2019); Colonoscopy (N/A, 9/26/2019); Upper gastrointestinal endoscopy (05/16/2011); Upper gastrointestinal endoscopy (6/22/15); and Upper gastrointestinal endoscopy (N/A, 9/26/2019).     CURRENT MEDICATIONS       Previous Medications    ALBUTEROL (PROVENTIL) (2.5 MG/3ML) 0.083% NEBULIZER SOLUTION    Take 3 mLs by nebulization every 4 hours as needed for Wheezing or Shortness of Breath    ALBUTEROL SULFATE  (90 BASE) MCG/ACT INHALER    Inhale 2 puffs into the lungs 4 times daily as needed for Wheezing    AMIODARONE (PACERONE) 100 MG TABLET    Take 1 tablet by mouth daily    AMLODIPINE (NORVASC) 5 MG TABLET    TAKE 1 TABLET DAILY    APIXABAN (ELIQUIS) 5 MG TABS TABLET    Take 1 tablet by mouth 2 times daily    CHOLECALCIFEROL (VITAMIN D3) 2000 UNITS CAPS    Take 2,000 Units by mouth daily     DIPHENHYDRAMINE-APAP, SLEEP, (TYLENOL PM EXTRA STRENGTH PO)    Take 1 tablet by mouth at bedtime FUROSEMIDE (LASIX) 40 MG TABLET    TAKE 1 TABLET DAILY    GABAPENTIN (NEURONTIN) 100 MG CAPSULE    TAKE 1 CAPSULE DAILY    INSULIN GLARGINE (LANTUS SOLOSTAR) 100 UNIT/ML INJECTION PEN    INJECT 16 UNITS UNDER THE SKIN TWICE A DAY    INSULIN LISPRO, 1 UNIT DIAL, (HUMALOG KWIKPEN) 100 UNIT/ML SOPN    Humalog pen 3 times daily with meals per sliding scale. Blood sugar is 70, drink juice. = 3 units,  151-200 =6, 201-250 = 8 units, 251-300 = 10 units. INSULIN PEN NEEDLE (B-D ULTRAFINE III SHORT PEN) 31G X 8 MM MISC    USE 7 DAILY    METOPROLOL TARTRATE (LOPRESSOR) 25 MG TABLET    Take 1 tablet by mouth 2 times daily    MISC. DEVICES MISC    D/C home oxygen    NUTRITIONAL SUPPLEMENTS (GLUCOSE MANAGEMENT) TABS    15 grams for bs less than 70    NYSTATIN (MYCOSTATIN) 515321 UNIT/GM CREAM    Apply topically 2 times daily. OMEPRAZOLE (PRILOSEC) 20 MG CAPSULE    Take 20 mg by mouth Daily     OXYGEN CONCENTRATOR    3 liters nasal cannula continuously at home. Needs portable tank also   DX:  Hypoxia R09.02    POLYCARBOPHIL (FIBERCON) 625 MG TABLET    Take 625 mg by mouth daily    POLYETHYLENE GLYCOL 400 0.25 % SOLN    Place into both eyes as needed (dry eyes)     POTASSIUM CHLORIDE (KLOR-CON M20) 20 MEQ EXTENDED RELEASE TABLET    Take 1 tablet by mouth daily    PREDNISONE (DELTASONE) 1 MG TABLET    TAKE 3 TABLETS DAILY    SIMVASTATIN (ZOCOR) 20 MG TABLET    TAKE 1 TABLET NIGHTLY    VICTOZA 18 MG/3ML SOPN SC INJECTION    INJECT 1.8 MG INTO THE SKIN DAILY       ALLERGIES     is allergic to codeine, erythromycin, exenatide, levofloxacin, verapamil, clonidine derivatives, other, and penicillins. FAMILY HISTORY     She indicated that her mother is . She indicated that her father is . She indicated that her sister is . She indicated that the status of her son is unknown.     family history includes Heart Attack in her son;  Heart Disease in her father; High Blood Pressure in her father; Stroke in her sister; Uterine Cancer in her mother. SOCIAL HISTORY      reports that she has never smoked. She has never used smokeless tobacco. She reports that she does not drink alcohol and does not use drugs. PHYSICAL EXAM     INITIAL VITALS:  height is 4' 11\" (1.499 m) and weight is 176 lb (79.8 kg). Her tympanic temperature is 98.9 °F (37.2 °C). Her blood pressure is 161/56 (abnormal) and her pulse is 64. Her respiration is 16 and oxygen saturation is 96%. The patient is alert and oriented, in mild distress secondary to pain. HEENT is atraumatic. Pupils are PERRL at 4 mm. Mucous membranes moist.    Neck is supple. Heart sounds regular rate and rhythm with no gallops, murmurs, or rubs. Lungs clear, no wheezes, rales or rhonchi. Abdomen: Tenderness and guarding noted in suprapubic area only. No distention or tympany. Musculoskeletal exam shows no evidence of trauma. Normal distal pulses in all extremities. Skin: no rash or edema. Neurological exam reveals cranial nerves 2 through 12 grossly intact. Patient has equal  and normal deep tendon reflexes. Psychiatric: Appropriate. Lymphatics.:  No lymphadenopathy. DIFFERENTIAL DIAGNOSIS/ MDM:     UTI, diverticulitis, renal colic    DIAGNOSTIC RESULTS     EKG: All EKG's are interpreted by the Emergency Department Physician who either signs or Co-signs this chart in the absence of a cardiologist.    Sinus 68 with no ischemia. Nonspecific ST change. No change from March 2022. Axis 45, , QRS 86, . RADIOLOGY:   I reviewed the radiologist interpretations:  CT ABDOMEN PELVIS WO CONTRAST Additional Contrast? None   Final Result   1. Moderate colonic diverticulosis with suspected acute to subacute   diverticulitis in the proximal sigmoid colon.   There is associated   perforation with small pneumoperitoneum, suspected multifocal portal venous   gas, adjacent peritonitis, and a 1.8 cm x 3.1 cm x 1.9 cm abscess in the   small bowel mesentery. Perforated colonic malignancy could appear similar   but is considered less likely, and follow-up with colonoscopy following   treatment should be considered. Critical results were called by Dr. Xin Stanley MD to Dr. Ivy Kathleen on 8/20/2022 at 12:25.   2. Minimal intrahepatic and mild extrahepatic biliary dilation possibly   related to apparent stones in the distal common bile duct. Consider MRCP   especially if there are clinical findings of cholestasis. 3. Increased size of a 3.5 cm simple appearing cystic lesion arising from the   pancreatic head and uncinate process, most likely an intraductal papillary   mucinous neoplasm or other mucinous neoplasm. Given growth, endoscopic fine   needle aspiration is technically recommended per the ACR recommendations for   incidental pancreatic cysts. CT ABDOMEN PELVIS WO CONTRAST Additional Contrast? None (Final result)  Result time 08/20/22 12:26:04  Procedure changed from [facility] Additional Contrast? None  Final result by Gabbie Anne MD (08/20/22 12:26:04)                Impression:    1. Moderate colonic diverticulosis with suspected acute to subacute   diverticulitis in the proximal sigmoid colon. There is associated   perforation with small pneumoperitoneum, suspected multifocal portal venous   gas, adjacent peritonitis, and a 1.8 cm x 3.1 cm x 1.9 cm abscess in the   small bowel mesentery. Perforated colonic malignancy could appear similar   but is considered less likely, and follow-up with colonoscopy following   treatment should be considered. Critical results were called by Dr. Xin Stanley MD to Dr. Ivy Kathleen on 8/20/2022 at 12:25.   2. Minimal intrahepatic and mild extrahepatic biliary dilation possibly   related to apparent stones in the distal common bile duct. Consider MRCP   especially if there are clinical findings of cholestasis.    3. Increased size of a 3.5 cm simple appearing cystic lesion arising from the   pancreatic head and uncinate process, most likely an intraductal papillary   mucinous neoplasm or other mucinous neoplasm. Given growth, endoscopic fine   needle aspiration is technically recommended per the ACR recommendations for   incidental pancreatic cysts. Narrative:    EXAMINATION:   CT OF THE ABDOMEN AND PELVIS WITHOUT CONTRAST     8/20/2022 12:01 pm     TECHNIQUE:   CT of the abdomen and pelvis was performed without the administration of   intravenous contrast. Multiplanar reformatted images are provided for review. Automated exposure control, iterative reconstruction, and/or weight based   adjustment of the mA/kV was utilized to reduce the radiation dose to as low   as reasonably achievable. COMPARISON:   Abdomen and pelvis CT 07/23/2019, abdomen MRI 07/02/2019     HISTORY:   ORDERING SYSTEM PROVIDED HISTORY: suprapubic pain   TECHNOLOGIST PROVIDED HISTORY:   suprapubic pain     Reason for Exam: Lower abdomen pain since last evening. History of kidney   stones. No GI related symptoms. Hysterectomy, gallbladder and appendix   removed. FINDINGS:   Lack of intravenous contrast administration, partially limiting evaluation of   the soft tissues and vasculature. LOWER CHEST:  Similar appearance of subpleural reticulations and groundglass   in each lung base with no significant traction bronchiolectasis nor   honeycombing, consistent with minimal to mild interstitial fibrotic changes   of indeterminate pattern. Mild cardiomegaly. No pleural nor pericardial   effusions. Mild atherosclerotic calcification in the right coronary artery. ORGANS:  Surgically absent gallbladder. Minimal intrahepatic and mild   extrahepatic biliary dilation with apparent stones in the distal common bile   duct.   3.2 cm x 3.5 cm x 3.1 cm simple appearing cystic lesion arising from   the junction of the pancreatic head and uncinate process (3.0 cm x 2.7 cm x 2.2 cm on 07/02/2019). Moderate pancreatic atrophy. Mild splenic atrophy. Partial congenital malrotation the right kidney. Normal appearance of the   liver, adrenal glands, and left kidney. GI/BOWEL:  Tiny sliding hiatal hernia. Otherwise normal course and caliber   of the stomach, small bowel, colon, and rectum without obstruction. No   visualization of the appendix, reportedly surgically absent. Mild stool. Moderate colonic diverticulosis. Focal wall thickening in the proximal   sigmoid colon with pericolonic stranding involving the sigmoid mesocolon and   nearby greater omentum. PELVIS:  Surgically absent uterus and likely ovaries. Normal appearance of   the urinary bladder. PERITONEUM/RETROPERITONEUM:  Mild to moderate systemic atherosclerotic   calcifications. Possible multifocal portal venous gas within the small bowel   mesentery. No abdominal nor pelvic lymphadenopathy. Trace free   intraperitoneal fluid. Multifocal small free intraperitoneal gas. 1.8 cm x   3.1 cm x 1.9 cm loculated gas and fluid collection in the central small bowel   mesentery. Peritoneal thickening in the left lower quadrant. SOFT TISSUES/BONES:  Similar appearance of subpleural nodular soft tissue   density in the bilateral upper quadrant anterior abdominal wall likely   related to medicinal injections. Laxity of the anterior and lateral   abdominal wall musculature. Tiny fat containing umbilical hernia. Eventration of the inferior right rectus sheath laterally without true   herniation given intact appearance of the right external oblique aponeurosis. No inguinal lymphadenopathy. Diffuse bony demineralization. No acute   fractures nor suspicious bony lesions.                      LABS:  Results for orders placed or performed during the hospital encounter of 08/20/22   CBC with Auto Differential   Result Value Ref Range    WBC 21.3 (H) 3.5 - 11.3 k/uL    RBC 5.58 (H) 3.95 - 5.11 m/uL Hemoglobin 14.5 11.9 - 15.1 g/dL    Hematocrit 45.5 36.3 - 47.1 %    MCV 81.5 (L) 82.6 - 102.9 fL    MCH 26.0 25.2 - 33.5 pg    MCHC 31.9 25.2 - 33.5 g/dL    RDW 17.5 (H) 11.8 - 14.4 %    Platelets See Reflexed IPF Result 138 - 453 k/uL    MPV Abnormal 8.1 - 13.5 fL    NRBC Automated 0.0 0.0 per 100 WBC    Seg Neutrophils 91 (H) 36 - 65 %    Lymphocytes 3 (L) 24 - 43 %    Monocytes 5 3 - 12 %    Eosinophils % 0 (L) 1 - 4 %    Basophils 0 0 - 2 %    Immature Granulocytes 1 (H) 0 %    Segs Absolute 19.40 (H) 1.50 - 8.10 k/uL    Absolute Lymph # 0.73 (L) 1.10 - 3.70 k/uL    Absolute Mono # 0.99 0.10 - 1.20 k/uL    Absolute Eos # <0.03 0.00 - 0.44 k/uL    Basophils Absolute 0.04 0.00 - 0.20 k/uL    Absolute Immature Granulocyte 0.15 0.00 - 0.30 k/uL   Comprehensive Metabolic Panel   Result Value Ref Range    Glucose 218 (H) 70 - 99 mg/dL    BUN 29 (H) 8 - 23 mg/dL    Creatinine 1.21 (H) 0.50 - 0.90 mg/dL    Bun/Cre Ratio 24 (H) 9 - 20    Calcium 9.3 8.6 - 10.4 mg/dL    Sodium 136 135 - 144 mmol/L    Potassium 4.5 3.7 - 5.3 mmol/L    Chloride 98 98 - 107 mmol/L    CO2 26 20 - 31 mmol/L    Anion Gap 12 9 - 17 mmol/L    Alkaline Phosphatase 115 (H) 35 - 104 U/L    ALT 22 5 - 33 U/L    AST 26 <32 U/L    Total Bilirubin 0.89 0.3 - 1.2 mg/dL    Total Protein 7.4 6.4 - 8.3 g/dL    Albumin 4.1 3.5 - 5.2 g/dL    Albumin/Globulin Ratio 1.2 1.0 - 2.5    GFR Non-African American 42 (L) >60 mL/min    GFR  51 (L) >60 mL/min    GFR Comment         Lipase   Result Value Ref Range    Lipase 13 13 - 60 U/L   Amylase   Result Value Ref Range    Amylase 26 (L) 28 - 100 U/L   Urinalysis with Reflex to Culture    Specimen: Urine, clean catch   Result Value Ref Range    Glucose, Ur NEGATIVE NEGATIVE    Bilirubin Urine NEGATIVE NEGATIVE    Ketones, Urine NEGATIVE NEGATIVE    Specific [place], UA 1.010 1.010 - 1.025    Urine Hgb NEGATIVE NEGATIVE    pH, UA 6.0 5.0 - 6.0    Protein, UA NEGATIVE NEGATIVE    Urobilinogen, Urine Normal Normal    Nitrite, Urine NEGATIVE NEGATIVE    Leukocyte Esterase, Urine NEGATIVE NEGATIVE   Immature Platelet Fraction   Result Value Ref Range    Platelet, Immature Fraction 21.6 (H) 1.1 - 10.3 %    Platelet, Fluorescence 237 138 - 453 k/uL   Microscopic Urinalysis   Result Value Ref Range    WBC, UA 0 TO 4 0 - 4 /HPF    RBC, UA 0 TO 4 0 - 4 /HPF    Epithelial Cells UA 0 TO 4 0 - 5 /HPF    Bacteria, UA None None   Lactic Acid   Result Value Ref Range    Lactic Acid 1.7 0.5 - 2.2 mmol/L   Troponin   Result Value Ref Range    Troponin, High Sensitivity 18 (H) 0 - 14 ng/L   EKG 12 Lead   Result Value Ref Range    Ventricular Rate 68 BPM    Atrial Rate 68 BPM    P-R Interval 142 ms    QRS Duration 86 ms    Q-T Interval 414 ms    QTc Calculation (Bazett) 440 ms    P Axis 60 degrees    R Axis 45 degrees    T Axis 66 degrees         EMERGENCY DEPARTMENT COURSE:   Vitals:    Vitals:    08/20/22 1215 08/20/22 1230 08/20/22 1258 08/20/22 1300   BP: (!) 145/48 (!) 156/56  (!) 161/56   Pulse:       Resp:       Temp:   98.9 °F (37.2 °C)    TempSrc:   Tympanic    SpO2: 94% 94%  96%   Weight:       Height:         -------------------------  BP: (!) 161/56, Temp: 98.9 °F (37.2 °C), Heart Rate: 64, Resp: 16      Re-evaluation Notes    The patient received medicine for pain as well as an antibiotics. The local surgeon does not feel comfortable taking the patient due to her underlying medical issues. The patient has been accepted by the surgeon at [facility] 4 emergency physician at [facility]. [facility] ED has accepted the patient to be sent there directly. The patient is transferred via ground ambulance in stable condition. CONSULTS:    46  [facility]:  Perforated diverticulum with abscess. 12 [facility]  Dr. Sujit Fletcher in ED to review chart. Feels pt should be transferred. [facility] contacted. [phone]  Access spoke with Dr. Aziza Stockton. Send to ED for evaluation.   The ED attending has a full arrest and a trauma right now. Please call back in 15 minutes. 5  Dr. Xenia Mohs from [facility]. [facility] ED states they are on transfer bypass. [facility] in [facility] has no beds. [address] [facility] [place] contacted. Will check on beds at [address] [facility]  Discussed with Dr. Xenia Mohs at [place].  Will accept pt for transfer. FINAL IMPRESSION      1. Diverticulitis of large intestine with perforation and abscess without bleeding          DISPOSITION/PLAN   DISPOSITION Decision To Transfer 08/20/2022 12:50:06 PM      Condition on Disposition    stable    PATIENT REFERRED TO:  No follow-up provider specified.     DISCHARGE MEDICATIONS:  New Prescriptions    No medications on file       (Please note that portions of this note were completed with a voice recognition program.  Efforts were made to edit the dictations but occasionally words are mis-transcribed.)    Madelaine Brizuela MD,, MD   Attending Emergency Physician          Elan Natarajan MD  08/20/22 [id number]

## 2022-08-20 NOTE — H&P
General Surgery H&P    PATIENT NAME: Harjinder Handy OF BIRTH: 1935    ADMISSION DATE: 8/20/2022  4:20 PM      TODAY'S DATE: 8/20/2022    CHIEF COMPLAINT:  Abdominal pain      HISTORY OF PRESENT ILLNESS:  The patient is a 81 y.o. female  who presents with abdominal pain was started at 10 PM last night. Patient initially thought this was a kidney stone is similar to pain she had with this in the past.  She initially presented to [facility] emergency department and was found to have a perforated diverticular disease with free air in the abdomen. She started on IV antibiotics, given IV fluids, and transferred to [facility] for further evaluation. Her pain has been about the same since it started, she this has been accompanied by nausea, no vomiting. She is received a few pain meds as well as antiemetics with good relief. She states she has had episodes of diverticular disease in the past however does not necessitate admission or IV antibiotics. Past Medical History:        Diagnosis Date    Allergic rhinitis     Asthma     PFT's, 04/06, actually showed mild restrictive defect. Atrial fibrillation with RVR ([address]     Hospitalized [facility] 2/8/21-2/11/21    Basal cell carcinoma of cheek 2007    Right cheek    Basal cell carcinoma of leg     right leg    CAD (coronary artery disease)     With 40% stenosis of the LAD, 07/10, ejection fraction 60%.   Repeat heart cath9/14 with 40-50 percent LAD lesion first diagonal 50% lesion rec med Rx    Central retinal artery occlusion     Right side November 2014 status post TPA    Cerebral artery occlusion with cerebral infarction ([address] 11/24/2014    Cerebrovascular disease     50-79% stenosis on left on ultrasound 11/14    CHF (congestive heart failure) ([facility])     Echo 1/15 moderate MR, severe TR, RVSP 76, grade 2 diastolic dysfunction    Diverticulosis     AVM on colonoscopy, 05/11    Dyslipidemia     Elevated antinuclear antibody (ZAIRA) level History of    Esophagitis 05/2011    Gastritis/esophagitis on EGD, Dr. Ny Velasco. Repeat EGD6/15 Gastritis    Foraminal stenosis of lumbar region     Moderate  Right L4/L5 neuroforaminal stenosis, Dr Mojica Moment following. MRI 2/16 [place]. Moderate foraminal stenosis mild to moderate central canal stenosis    Hyperlipidemia     Hypertension     Hypokalemia 2/9/2019    IBS (irritable bowel syndrome)     GI consultation with Dr. Geoffrey Oden, GI specialist in [facility], felt that she probably has chronic functional diarrhea and recommended empiric Imodium, Pepto-Bismol or Questran first. Sprue test Neg 2011    Iron deficiency anemia     Percent sat iron 5, January 2015, ferritin 40 1 /15, FOBT positive  EGD 6/15  Gastritis, IV iron 9/15x3 effective,  colonoscopy deferred by Dr. Ny Velasco. --Prior colonoscopy 2011 with severe diverticulosis and telangiectasia. Trial iron solution in [place] juice 12/16    Iron deficiency anemia due to chronic blood loss 09/08/2015    Junctional bradycardia     Symptomatic, resolved with discontinuation of Verapamil, 05/09. 1.1) Echocardiogram: LAE, LVH, EF 50%, mild MR, diastolic. 1.2) Dr. Paez Shoulder evaluation. 1.3.) Persantine stress test negative, 05/09. Migraine     Mild CAD     cath 10/15    Mitral regurgitation     Moderate to severe on echocardiogram September 2015.   Right pressure 76 grade 2 diastolic dysfunction,  echo 38/22 grade 2 diastolic dysfunction mild to moderate MR RVSP 56 aortic sclerosis    Nephrolithiasis 2/5/2019    Obesity     CHICO (obstructive sleep apnea)     CPAP 14 2/15 initiation  AHI 7  mild CHICO intolerant CPAP    Osteoarthritis     PMR (polymyalgia rheumatica) (HCC)     Pneumonia     Premature atrial contractions     Pulmonary HTN ([address] 1/10/2015    Pulmonary hypertension (HCC)     -Moderate on echo November 2014    Restrictive lung disease     Mild on PFTs 11/14    Right kidney stone 2/6/2019    Sigmoid diverticulitis 5/30/2019    Type II or unspecified type diabetes mellitus without mention of complication, not stated as uncontrolled     Vitreous floaters        Past Surgical History:        Procedure Laterality Date    [address]  2014    CATARACT REMOVAL Bilateral 08/10    CHOLECYSTECTOMY      COLONOSCOPY  05/16/2011    COLONOSCOPY N/A 9/26/2019    severe diverticulosis, benign rectal polyp, Dr. Carmen Carl Right 2/6/2019    Cysto with right stent insertion and villareal catheter [facility] Sai [facility]brahim [facility] Right 2/27/2019    CYSTO right stent removal  Right Ureteroscopy Holmium Laser right stent exchange performed by Sai Ibrahim MD at [facility] 1, COLON, DIAGNOSTIC      EYE SURGERY      HYSTERECTOMY (CERVIX STATUS UNKNOWN)  Approx 1983    MALIGNANT SKIN LESION EXCISION Right 12/10 and 04/11    Basal CellRemoved from right neck    MALIGNANT SKIN LESION EXCISION Right 02/2012    Basal Cell Removed from right leg    OTHER SURGICAL HISTORY  09/06/2011    capsule endoscopy    OTHER SURGICAL HISTORY  3/22/16    right L4 and L5 TFE    OTHER SURGICAL HISTORY Right 4/26/16    L4, L5 TFE    UPPER GASTROINTESTINAL ENDOSCOPY  05/16/2011    UPPER GASTROINTESTINAL ENDOSCOPY  6/22/15    mild gastritis (Dr. Riky Mcclendon)    UPPER GASTROINTESTINAL ENDOSCOPY N/A 9/26/2019    mild to moderate inflammation, Dr. Riky Mcclendon       Medications Prior to Admission:   Medications Prior to Admission: polycarbophil (FIBERCON) 625 MG tablet, Take 625 mg by mouth daily  VICTOZA 18 MG/3ML SOPN SC injection, INJECT 1.8 MG INTO THE SKIN DAILY  amLODIPine (NORVASC) 5 MG tablet, TAKE 1 TABLET DAILY  gabapentin (NEURONTIN) 100 MG capsule, TAKE 1 CAPSULE DAILY  furosemide (LASIX) 40 MG tablet, TAKE 1 TABLET DAILY  Insulin Pen Needle (B-D ULTRAFINE III SHORT PEN) 31G X 8 MM MISC, USE 7 DAILY  potassium chloride (KLOR-CON M20) 20 MEQ extended release tablet, Take 1 tablet by mouth daily  amiodarone (PACERONE) 100 MG tablet, Take 1 tablet by mouth daily  metoprolol tartrate (LOPRESSOR) 25 MG tablet, Take 1 tablet by mouth 2 times daily  insulin lispro, 1 Unit Dial, (HUMALOG KWIKPEN) 100 UNIT/ML SOPN, Humalog pen 3 times daily with meals per sliding scale. Blood sugar is 70, drink juice. = 3 units,  151-200 =6, 201-250 = 8 units, 251-300 = 10 units. apixaban (ELIQUIS) 5 MG TABS tablet, Take 1 tablet by mouth 2 times daily  diphenhydrAMINE-APAP, sleep, (TYLENOL PM EXTRA STRENGTH PO), Take 1 tablet by mouth at bedtime  simvastatin (ZOCOR) 20 MG tablet, TAKE 1 TABLET NIGHTLY  insulin glargine (LANTUS SOLOSTAR) 100 UNIT/ML injection pen, INJECT 16 UNITS UNDER THE SKIN TWICE A DAY (Patient taking differently: INJECT 12 UNITS UNDER THE SKIN TWICE A DAY)  predniSONE (DELTASONE) 1 MG tablet, TAKE 3 TABLETS DAILY  Nutritional Supplements (GLUCOSE MANAGEMENT) TABS, 15 grams for bs less than 70  nystatin (MYCOSTATIN) 758300 UNIT/GM cream, Apply topically 2 times daily.   Polyethylene Glycol 400 0.25 % SOLN, Place into both eyes as needed (dry eyes)   omeprazole (PRILOSEC) 20 MG capsule, Take 20 mg by mouth Daily   Cholecalciferol (VITAMIN D3) 2000 UNITS CAPS, Take 2,000 Units by mouth daily   albuterol sulfate  (90 Base) MCG/ACT inhaler, Inhale 2 puffs into the lungs 4 times daily as needed for Wheezing  albuterol (PROVENTIL) (2.5 MG/3ML) 0.083% nebulizer solution, Take 3 mLs by nebulization every 4 hours as needed for Wheezing or Shortness of Breath    Allergies:  Codeine, Erythromycin, Exenatide, Levofloxacin, Verapamil, Clonidine derivatives, Other, and Penicillins    Social History:   Social History     Socioeconomic History    Marital status:      Spouse name: Gordon Smith    Number of children: 3    Years of education: 12    Highest education level: Some college, no degree   Occupational History     Employer: [employer]   Tobacco Use    Smoking status: Never    Smokeless tobacco: Never    Tobacco comments:     amish rrt 5/28/2019   Vaping Use output data in the 24 hours ending 08/20/22 6698    Physical Exam  Constitutional:       General: She is not in acute distress. Appearance: Normal appearance. She is normal weight. She is not ill-appearing, toxic-appearing or diaphoretic. HENT:      Head: Normocephalic and atraumatic. Nose: Nose normal.      Mouth/Throat:      Mouth: Mucous membranes are moist.      Pharynx: Oropharynx is clear. Eyes:      Conjunctiva/sclera: Conjunctivae normal.   Cardiovascular:      Rate and Rhythm: Normal rate and regular rhythm. Pulses: Normal pulses. Heart sounds: Normal heart sounds. Pulmonary:      Effort: Pulmonary effort is normal.      Breath sounds: Normal breath sounds. Abdominal:      General: Abdomen is flat. Bowel sounds are normal.      Palpations: Abdomen is soft. Tenderness: There is abdominal tenderness in the right lower quadrant, suprapubic area and left lower quadrant. Musculoskeletal:      Cervical back: Normal range of motion. Skin:     General: Skin is warm and dry. Capillary Refill: Capillary refill takes less than 2 seconds. Neurological:      Mental Status: She is alert and oriented to person, place, and time.    Psychiatric:         Mood and Affect: Mood normal.         Behavior: Behavior normal.       Labs Reviewed   CBC WITH AUTO DIFFERENTIAL - Abnormal; Notable for the following components:       Result Value    WBC 23.2 (*)     MCV 80.2 (*)     RDW 17.0 (*)     Seg Neutrophils 92 (*)     Lymphocytes 5 (*)     Eosinophils % 0 (*)     Segs Absolute 21.34 (*)     All other components within normal limits   BASIC METABOLIC PANEL W/ REFLEX TO MG FOR LOW K - Abnormal; Notable for the following components:    Glucose 133 (*)     BUN 27 (*)     Creatinine 1.20 (*)     Calcium 8.3 (*)     GFR Non- 43 (*)     GFR  52 (*)     All other components within normal limits   HEPATIC FUNCTION PANEL - Abnormal; Notable for the following lesion arising from the junction of the pancreatic head and uncinate process (3.0 cm x 2.7 cm x 2.2 cm on 07/02/2019). Moderate pancreatic atrophy. Mild splenic atrophy. Partial congenital malrotation the right kidney. Normal appearance of the liver, adrenal glands, and left kidney. GI/BOWEL:  Tiny sliding hiatal hernia. Otherwise normal course and caliber of the stomach, small bowel, colon, and rectum without obstruction. No visualization of the appendix, reportedly surgically absent. Mild stool. Moderate colonic diverticulosis. Focal wall thickening in the proximal sigmoid colon with pericolonic stranding involving the sigmoid mesocolon and nearby greater omentum. PELVIS:  Surgically absent uterus and likely ovaries. Normal appearance of the urinary bladder. PERITONEUM/RETROPERITONEUM:  Mild to moderate systemic atherosclerotic calcifications. Possible multifocal portal venous gas within the small bowel mesentery. No abdominal nor pelvic lymphadenopathy. Trace free intraperitoneal fluid. Multifocal small free intraperitoneal gas. 1.8 cm x 3.1 cm x 1.9 cm loculated gas and fluid collection in the central small bowel mesentery. Peritoneal thickening in the left lower quadrant. SOFT TISSUES/BONES:  Similar appearance of subpleural nodular soft tissue density in the bilateral upper quadrant anterior abdominal wall likely related to medicinal injections. Laxity of the anterior and lateral abdominal wall musculature. Tiny fat containing umbilical hernia. Eventration of the inferior right rectus sheath laterally without true herniation given intact appearance of the right external oblique aponeurosis. No inguinal lymphadenopathy. Diffuse bony demineralization. No acute fractures nor suspicious bony lesions. 1. Moderate colonic diverticulosis with suspected acute to subacute diverticulitis in the proximal sigmoid colon.   There is associated perforation with small pneumoperitoneum, suspected multifocal portal venous gas, adjacent peritonitis, and a 1.8 cm x 3.1 cm x 1.9 cm abscess in the small bowel mesentery. Perforated colonic malignancy could appear similar but is considered less likely, and follow-up with colonoscopy following treatment should be considered. Critical results were called by Dr. Kishan Green MD to Dr. Samm Goodman on 8/20/2022 at 12:25. 2. Minimal intrahepatic and mild extrahepatic biliary dilation possibly related to apparent stones in the distal common bile duct. Consider MRCP especially if there are clinical findings of cholestasis. 3. Increased size of a 3.5 cm simple appearing cystic lesion arising from the pancreatic head and uncinate process, most likely an intraductal papillary mucinous neoplasm or other mucinous neoplasm. Given growth, endoscopic fine needle aspiration is technically recommended per the ACR recommendations for incidental pancreatic cysts. ASSESSMENT   Active Problems:    Perforated diverticulum of large intestine  Resolved Problems:    * No resolved hospital problems. *      PLAN    Admit to stepdown  N.p.o.  IV fluids  Continue IV antibiotics, patient is allergic to penicillin with a reaction of hives at age 13, she has not had any penicillins since then. Medication reconciliation given the numerous medications patient is on, will convert some of these IV  Repeat labs at 1800 this evening      ------------------------------------------------------------------  Nieves Aleman MD   PGY-2 Emergency Medicine  Trauma/General Surgery Service  [place], [place].  8/20/2022 at 6:44 PM         Attending Note    Discussed findings of free air and leukocytosis in patient with likely Hinchey 3 to 4 diverticulitis.   Have recommended operation with likely diverting colostomy The patient and her daughter asked appropriate question about recovery time and likelihood of intensive care stay and is the ostomy would be be permanent  I have reviewed the above TECSS note(s) and I either performed the key elements of the medical history and physical exam or was present with the resident when the key elements of the medical history and physical exam were performed.  I have discussed the findings, established the care plan and recommendations with Residents Camila Mccoy and Dona Saucedo MD  8/20/2022  8:28 PM

## 2022-08-20 NOTE — ED PROVIDER NOTES
[facility]     Emergency Department     Faculty Attestation    I performed a history and physical examination of the patient and discussed management with the resident. I reviewed the residents note and agree with the documented findings and plan of care. Any areas of disagreement are noted on the chart. I was personally present for the key portions of any procedures. I have documented in the chart those procedures where I was not present during the key portions. I have reviewed the emergency nurses triage note. I agree with the chief complaint, past medical history, past surgical history, allergies, medications, social and family history as documented unless otherwise noted below. For Physician Assistant/ Nurse Practitioner cases/documentation I have personally evaluated this patient and have completed at least one if not all key elements of the E/M (history, physical exam, and MDM). Additional findings are as noted. I have personally seen and evaluated the patient. I find the patient's history and physical exam are consistent with the NP/PA documentation. I agree with the care provided, treatment rendered, disposition and follow-up plan. This patient was evaluated in the Emergency Department for symptoms described in the history of present illness. The patient was evaluated in the context of the global COVID-19 pandemic, which necessitated consideration that the patient might be at risk for infection with the SARS-CoV-2 virus that causes COVID-19. Institutional protocols and algorithms that pertain to the evaluation of patients at risk for COVID-19 are in a state of rapid change based on information released by regulatory bodies including the CDC and federal and state organizations. These policies and algorithms were followed during the patient's care in the ED. 71-year-old female transferred from [facility] for perforated diverticulitis.   Hemodynamically stable,

## 2022-08-20 NOTE — ANESTHESIA PRE PROCEDURE
Department of Anesthesiology  Preprocedure Note       Name:  Melania Armas   Age:  81 y.o.  :  1935                                          MRN:  [id number]         Date:  2022      Surgeon: Batsheva Lane):  Ramona Raygoza MD    Procedure: LAPAROTOMY EXPLORATORY WITH BOWEL RESECTION AND OSTEOMY CREATION ,    Medications prior to admission:   Prior to Admission medications    Medication Sig Start Date End Date Taking? Authorizing Provider   polycarbophil (FIBERCON) 625 MG tablet Take 625 mg by mouth daily    Historical Provider, MD   VICTOZA 18 MG/3ML SOPN SC injection INJECT 1.8 MG INTO THE SKIN DAILY 22   [facility], APRN - CNP   amLODIPine (NORVASC) 5 MG tablet TAKE 1 TABLET DAILY 22   [facility], APRN - CNP   gabapentin (NEURONTIN) 100 MG capsule TAKE 1 CAPSULE DAILY 22  [facility] - C[facility]   furosemide (LASIX) 40 MG tablet TAKE 1 TABLET DAILY 22   [facility] - CNP   Insulin Pen Needle (B-D ULTRAFINE III SHORT PEN) 31G X 8 MM MISC USE 7 DAILY 22   [facility] - C[facility]   potassium chloride (KLOR-CON M20) 20 MEQ extended release tablet Take 1 tablet by mouth daily 22   [facility], APRN - CNP   amiodarone (PACERONE) 100 MG tablet Take 1 tablet by mouth daily 22   [facility], APRN - CNP   metoprolol tartrate (LOPRESSOR) 25 MG tablet Take 1 tablet by mouth 2 times daily 22   [facility] APR[facility] - CNP   albuterol sulfate  (90 Base) MCG/ACT inhaler Inhale 2 puffs into the lungs 4 times daily as needed for Wheezing 3/31/22   [facility] INGRID - CNP   insulin lispro, 1 Unit Dial, (HUMALOG KWIKPEN) 100 UNIT/ML SOPN Humalog pen 3 times daily with meals per sliding scale. Blood sugar is 70, drink juice. = 3 units,  151-200 =6, 201-250 = 8 units, 251-300 = 10 units.  3/31/22   Crystal Idalia Guest, APRN - CNP   apixaban (ELIQUIS) 5 MG TABS tablet Take 1 tablet by mouth 2 times daily 3/24/22   Elisa Leon MD   albuterol (PROVENTIL) (2.5 MG/3ML) 0.083% nebulizer solution Take 3 mLs by nebulization every 4 hours as needed for Wheezing or Shortness of Breath 3/15/22   INGRID Morales CNP   diphenhydrAMINE-APAP, sleep, (TYLENOL PM EXTRA STRENGTH PO) Take 1 tablet by mouth at bedtime    Historical Provider, MD   simvastatin (ZOCOR) 20 MG tablet TAKE 1 TABLET NIGHTLY 2/2/22   INGRID Morales CNP   insulin glargine (LANTUS SOLOSTAR) 100 UNIT/ML injection pen INJECT 16 UNITS UNDER THE SKIN TWICE A DAY  Patient taking differently: INJECT 12 UNITS UNDER THE SKIN TWICE A DAY 1/24/22   INGRID Goddard CNP   predniSONE (DELTASONE) 1 MG tablet TAKE 3 TABLETS DAILY 8/20/21   INGRID Morales CNP   Nutritional Supplements (GLUCOSE MANAGEMENT) TABS 15 grams for bs less than 70 4/7/21   INGRID Hilliard CNP   nystatin (MYCOSTATIN) 271442 UNIT/GM cream Apply topically 2 times daily. 1/4/21   INGRID Goddard CNP   Polyethylene Glycol 400 0.25 % SOLN Place into both eyes as needed (dry eyes)     Historical Provider, MD   omeprazole (PRILOSEC) 20 MG capsule Take 20 mg by mouth Daily  1/14/15   [place] No   Cholecalciferol (VITAMIN D3) 2000 UNITS CAPS Take 2,000 Units by mouth daily     Historical Provider, MD       Current medications:    No current facility-administered medications for this visit. No current outpatient medications on file.      Facility-Administered Medications Ordered in Other Visits   Medication Dose Route Frequency Provider Last Rate Last Admin    sodium chloride flush 0.9 % injection 5-40 mL  5-40 mL IntraVENous 2 times per day Syeda Joaquin MD        sodium chloride flush 0.9 % injection 5-40 mL  5-40 mL IntraVENous PRN Syeda Joaquin MD        0.9 % sodium chloride infusion   IntraVENous PRN Syeda Joaquin MD        metronidazole (FLAGYL) 500 mg in 0.9% NaCl 100 mL IVPB premix  500 mg IntraVENous [address], MD        clindamycin (CLEOCIN) 600 mg in dextrose 5 % 50 mL IVPB  600 mg IntraVENous Q6H Syeda Joaquin MD        [facility] injection 4 mg  4 mg IntraVENous Q6H PRN Syeda Joaquin MD        morphine (PF) injection 2 mg  2 mg IntraVENous Q4H PRN Syeda Joaquin MD   2 mg at 08/20/22 1847    Or    morphine injection 4 mg  4 mg IntraVENous Q4H PRN Syeda Joaquin MD        lactated ringers infusion   IntraVENous Continuous Syeda Joaquin  mL/hr at 08/20/22 1841 New Bag at 08/20/22 1841    glucose chewable tablet 16 g  4 tablet Oral PRN Syeda Joaquin MD        dextrose bolus 10% 125 mL  125 mL IntraVENous PRN Syeda Joaquin MD        Or    dextrose bolus 10% 250 mL  250 mL IntraVENous PRN Syeda Joaquin MD        glucagon (rDNA) injection 1 mg  1 mg SubCUTAneous PRN Syeda Joaquin MD        dextrose 10 % infusion   IntraVENous Continuous PRN Syeda Joaquin MD        insulin lispro (HUMALOG) injection vial 0-16 Units  0-16 Units SubCUTAneous TID WC Steffany Harris MD        insulin lispro (HUMALOG) injection vial 0-4 Units  0-4 Units SubCUTAneous Nightly Steffany Harris MD           Allergies:     Allergies   Allergen Reactions    Codeine      Confusion      Erythromycin      GI upset  Received zithromax in past and tolerated    Exenatide Nausea Only    Levofloxacin      GI upset    Verapamil Other (See Comments)     Junctional bradycardia    Clonidine Derivatives Rash     2009, catapres    Other Rash     Levbid    Penicillins Rash     Received Rocephin in past and tolerated       Problem List:    Patient Active Problem List   Diagnosis Code    Dyslipidemia E78.5    Osteoarthritis M19.90    Esophagitis K20.90    Vitamin D deficiency E55.9    PMR (polymyalgia rheumatica) (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 9) with comorbidity (HCC) E66.01    Stage 3a chronic kidney disease (HCC) N18.31    Thrombocytopenia (HCC) D69.6    Hypoxia R09.02    Nausea vomiting and diarrhea R11.2, R19.7    Type 2 diabetes mellitus with chronic kidney disease E11.22    Typical atrial flutter I48.3    Chronic renal disease, stage III ([address] [091908] N18.30    Perforated diverticulum of large intestine K57.20       Past Medical History:        Diagnosis Date    Allergic rhinitis     Asthma     PFT's, 04/06, actually showed mild restrictive defect.  Atrial fibrillation with RVR ([facility] [address]     Hospitalized [facility] 2/8/21-2/11/21    Basal cell carcinoma of cheek 2007    Right cheek    Basal cell carcinoma of leg     right leg    CAD (coronary artery disease)     With 40% stenosis of the LAD, 07/10, ejection fraction 60%. Repeat heart cath9/14 with 40-50 percent LAD lesion first diagonal 50% lesion rec med Rx    Central retinal artery occlusion     Right side November 2014 status post TPA    Cerebral artery occlusion with cerebral infarction ([facility] [address] 11/24/2014    Cerebrovascular disease     50-79% stenosis on left on ultrasound 11/14    CHF (congestive heart failure) ([facility])     Echo 1/15 moderate MR, severe TR, RVSP 76, grade 2 diastolic dysfunction    Diverticulosis     AVM on colonoscopy, 05/11    Dyslipidemia     Elevated antinuclear antibody (ZAIRA) level     History of    Esophagitis 05/2011    Gastritis/esophagitis on EGD, Dr. Ny Velasco. Repeat EGD6/15 Gastritis    Foraminal stenosis of lumbar region     Moderate  Right L4/L5 neuroforaminal stenosis, Dr Galina gomez. MRI 2/16 [place].   Moderate foraminal stenosis mild to moderate central canal stenosis    Hyperlipidemia     Hypertension     Hypokalemia 2/9/2019    IBS (irritable bowel syndrome)     GI consultation with Dr. Damien Abarca, GI specialist in [facility], felt that she probably has chronic functional diarrhea and recommended empiric Imodium, Pepto-Bismol or Questran first. Sprue test Neg 2011    Iron deficiency anemia     Percent sat iron 5, January 2015, ferritin 40 1 /15, FOBT positive  EGD 6/15  Gastritis, IV iron 9/15x3 effective,  colonoscopy deferred by Dr. Airam Lees. --Prior colonoscopy 2011 with severe diverticulosis and telangiectasia. Trial iron solution in [place] juice 12/16    Iron deficiency anemia due to chronic blood loss 09/08/2015    Junctional bradycardia     Symptomatic, resolved with discontinuation of Verapamil, 05/09. 1.1) Echocardiogram: LAE, LVH, EF 50%, mild MR, diastolic. 1.2) Dr. Perdue Jimmy evaluation. 1.3.) Persantine stress test negative, 05/09.  Migraine     Mild CAD     cath 10/15    Mitral regurgitation     Moderate to severe on echocardiogram September 2015.   Right pressure 76 grade 2 diastolic dysfunction,  echo 60/02 grade 2 diastolic dysfunction mild to moderate MR RVSP 56 aortic sclerosis    Nephrolithiasis 2/5/2019    Obesity     CHICO (obstructive sleep apnea)     CPAP 14 2/15 initiation  AHI 7  mild CHCIO intolerant CPAP    Osteoarthritis     PMR (polymyalgia rheumatica) (HCC)     Pneumonia     Premature atrial contractions     Pulmonary HTN ([facility] [address] 1/10/2015    Pulmonary hypertension (HCC)     -Moderate on echo November 2014    Restrictive lung disease     Mild on PFTs 11/14    Right kidney stone 2/6/2019    Sigmoid diverticulitis 5/30/2019    Type II or unspecified type diabetes mellitus without mention of complication, not stated as uncontrolled     Vitreous floaters        Past Surgical History:        Procedure Laterality Date    APPENDECTOMY  1952    CARDIAC CATHETERIZATION  2014    CATARACT REMOVAL Bilateral 08/10    CHOLECYSTECTOMY      COLONOSCOPY  05/16/2011    COLONOSCOPY N/A 9/26/2019    severe diverticulosis, benign rectal polyp, Dr. Jean Mota Right 2/6/2019    Cysto with right stent insertion and villareal catheter performed by Arlene Velasco MD at [address] [place] [address] Right 2/27/2019    CYSTO right stent removal  Right Ureteroscopy Holmium Laser right stent exchange performed by Arlene Vel[facility]co MD at [facility], DIAGNOSTIC      EYE SURGERY      HYSTERECTOMY (CERVIX STATUS UNKNOWN)  Approx 1983    MALIGNANT SKIN LESION EXCISION Right 12/10 and 04/11    Basal CellRemoved from right neck    MALIGNANT SKIN LESION EXCISION Right 02/2012    Basal Cell Removed from right leg    OTHER SURGICAL HISTORY  09/06/2011    capsule endoscopy    OTHER SURGICAL HISTORY  3/22/16    right L4 and L5 TFE    OTHER SURGICAL HISTORY Right 4/26/16    L4, L5 TFE    UPPER GASTROINTESTINAL ENDOSCOPY  05/16/2011    UPPER GASTROINTESTINAL ENDOSCOPY  6/22/15    mild gastritis (Dr. Airam Lees)    UPPER GASTROINTESTINAL ENDOSCOPY N/A 9/26/2019    mild to moderate inflammation, Dr. Airam Lees       Social History:    Social History     Tobacco Use    Smoking status: Never    Smokeless tobacco: Never    Tobacco comments:     amish oswald 5/28/2019   Substance Use Topics    Alcohol use: No     Alcohol/week: 0.0 standard drinks                                Counseling given: Not Answered  Tobacco comments: amish oswald 5/28/2019      Vital Signs (Current): There were no vitals filed for this visit.                                            BP Readings from Last 3 Encounters:   08/20/22 (!) 135/53   08/20/22 (!) 125/47   08/16/22 126/84       NPO Status:                                                                                 BMI:   Wt Readings from Last 3 Encounters:   08/20/22 179 lb 0.2 oz (81.2 kg)   08/20/22 176 lb (79.8 kg)   08/16/22 177 lb (80.3 kg)     There is no height or weight on file to calculate BMI.    CBC: Lab Results   Component Value Date/Time    WBC 23.2 08/20/2022 05:57 PM    RBC 4.70 08/20/2022 05:57 PM    HGB 12.3 08/20/2022 05:57 PM    HCT 37.7 08/20/2022 05:57 PM    MCV 80.2 08/20/2022 05:57 PM    RDW 17.0 08/20/2022 05:57 PM     08/20/2022 05:57 PM       CMP:   Lab Results   Component Value Date/Time     08/20/2022 05:57 PM    K 4.1 08/20/2022 05:57 PM     08/20/2022 05:57 PM    CO2 25 08/20/2022 05:57 PM    BUN 27 08/20/2022 05:57 PM    CREATININE 1.20 08/20/2022 05:57 PM    GFRAA 52 08/20/2022 05:57 PM    LABGLOM 43 08/20/2022 05:57 PM    GLUCOSE 133 08/20/2022 05:57 PM    PROT 5.8 08/20/2022 05:57 PM    CALCIUM 8.3 08/20/2022 05:57 PM    BILITOT 1.62 08/20/2022 05:57 PM    ALKPHOS 120 08/20/2022 05:57 PM     08/20/2022 05:57 PM     08/20/2022 05:57 PM       POC Tests: No results for input(s): POCGLU, POCNA, POCK, POCCL, POCBUN, POCHEMO, POCHCT in the last 72 hours.     Coags:   Lab Results   Component Value Date/Time    PROTIME 12.9 02/09/2021 05:27 AM    INR 1.0 02/09/2021 05:27 AM    APTT 27.2 07/20/2019 08:29 AM       HCG (If Applicable): No results found for: PREGTESTUR, PREGSERUM, HCG, HCGQUANT     ABGs: No results found for: PHART, PO2ART, ZVL7UNJ, VGR4YVF, BEART, S9WARXMI     Type & Screen (If Applicable):  No results found for: LABABO, [address]    Anesthesia Evaluation  Patient summary reviewed and Nursing notes reviewed no history of anesthetic complications:   Airway: Mallampati: II  TM distance: >3 FB   Neck ROM: full  Mouth opening: > = 3 FB   Dental: normal exam         Pulmonary:normal exam  breath sounds clear to auscultation  (+) sleep apnea: on noncompliant,  asthma: exercise-induced asthma,                            Cardiovascular:    (+) hypertension: no interval change, valvular problems/murmurs: MR, CAD:, dysrhythmias:, CHF: diastolic, hyperlipidemia      ECG reviewed  Rhythm: regular  Rate: normal           Beta Blocker:  Dose within 24 Hrs      ROS comment: EKG 8/20: Normal sinus rhythm  Nonspecific ST and T wave abnormality  Abnormal ECG    ECHO 2/9/21: EF55-60%. Grade II diastolic dysfunction.         Neuro/Psych:   (+) CVA:, neuromuscular disease:, headaches: migraine headaches,             GI/Hepatic/Renal:   (+) GERD: well controlled, renal disease: CRI,          ROS comment: Perforated diverticulum. Endo/Other:    (+) DiabetesType II DM, using insulin, blood dyscrasia: anticoagulation therapy, arthritis: OA., .                  ROS comment: Polymyalgia rheumatica. Pt took eliquis this morning. No need to reverse per surgery team.  Abdominal:             Vascular: negative vascular ROS. Other Findings:             Anesthesia Plan      general     ASA 3 - emergent       Induction: intravenous. arterial line  MIPS: Postoperative opioids intended and Prophylactic antiemetics administered. Anesthetic plan and risks discussed with patient. Plan discussed with CRNA.                     Jasson Morgan MD   8/20/2022

## 2022-08-20 NOTE — ED TRIAGE NOTES
Pt transferred from [address] [place] [address] 7 ER for a perforated bowel.  Pt alert and oriented on arrival

## 2022-08-21 NOTE — BRIEF OP NOTE
Brief Postoperative Note      Patient: Reinier Mackey  YOB: 1935  MRN: [id number]    Date of Procedure: 8/20/2022    Pre-Op Diagnosis: Perforated bowel ([address] [K63.1]    Post-Op Diagnosis: Same       Procedure(s):  LAPAROTOMY EXPLORATORY, RESECTION OF SIGMOID COLON, LYSIS OF ADHESIONS, APPLICATION OF GEL FOAM SOAKED IN THROMBIN, SPLEENECTOMY, WOUND VAC PLACEMENT    Surgeon(s):  Enedelia Aleman MD    Assistant:  Resident: Otis Flaherty MD; Lucas Burroughs DO    Anesthesia: General    Estimated Blood Loss (mL): 199     Complications: Other: splenectomy    Specimens:   ID Type Source Tests Collected by Time Destination   1 : INITIAL VERAS INSERTION Urine Urine, indwelling catheter CULTURE, URINE Enedelia Aleman MD 8/20/2022 2222    2 : CULTURES FROM ABDOMEN Swab Abdomen CULTURE, ANAEROBIC AND AEROBIC Enedelia Aleman MD 8/20/2022 2222    A : PART OF SIGMOID Tissue Abdomen SURGICAL PATHOLOGY Enedelia Aleman MD 8/21/2022 0018    B : SPLEEN Tissue Abdomen SURGICAL PATHOLOGY Enedelia Aleman MD 8/21/2022 2007        Implants:  * No implants in log *      Drains:   Negative Pressure Wound Therapy Abdomen Medial (Active)   Dressing Type Other (Comment) 08/21/22 0112       Urinary Catheter 08/20/22 2 Way (Active)       Findings: perforated sigmoid found within field of chronically indurated diverticulosis with multiple adhesions . Splenic flexure had tethering adhesions to spleen and mobilization resulted in avulsion of spleen from short gastric vessels. Splenectomy performed. Due to persistent oozing, even after attempted reversal of Elequis with [facility], decision was made to stage operation until reversal complete.  Ab Thera placed and patient taken to ICU intubated       Electronically signed by Enedelia Aleman MD on 8/21/2022 at 1:45 AM

## 2022-08-21 NOTE — PLAN OF CARE
Problem: Discharge Planning  Goal: Discharge to home or other facility with appropriate resources  Outcome: Progressing  Flowsheets (Taken 8/20/2022 1837 by Arielle Chacon RN)  Discharge to home or other facility with appropriate resources:   Identify barriers to discharge with patient and caregiver   Identify discharge learning needs (meds, wound care, etc)   Refer to discharge planning if patient needs post-hospital services based on physician order or complex needs related to functional status, cognitive ability or social support system   Arrange for interpreters to assist at discharge as needed   Arrange for needed discharge resources and transportation as appropriate     Problem: Safety - Adult  Goal: Free from fall injury  Outcome: Progressing     Problem: ABCDS Injury Assessment  Goal: Absence of physical injury  Outcome: Progressing     Problem: Gastrointestinal - Adult  Goal: Minimal or absence of nausea and vomiting  Outcome: Progressing     Problem: Metabolic/Fluid and Electrolytes - Adult  Goal: Electrolytes maintained within normal limits  Outcome: Progressing  Goal: Hemodynamic stability and optimal renal function maintained  Outcome: Progressing  Goal: Glucose maintained within prescribed range  Outcome: Progressing     Problem: Hematologic - Adult  Goal: Maintains hematologic stability  Outcome: Progressing

## 2022-08-21 NOTE — PROGRESS NOTES
PROGRESS NOTE          PATIENT NAME: Eric Jules  MEDICAL RECORD NO. [id number]  DATE: 8/21/2022  SURGEON: Dr. Cruz Pouch: INGRID Doan - CNP    HD: # 1    ASSESSMENT    Patient Active Problem List   Diagnosis    Dyslipidemia    Osteoarthritis    Esophagitis    Vitamin D deficiency    PMR (polymyalgia rheumatica) (HCC)    Central artery occlusion of retina    Diastolic dysfunction    Type 2 diabetes with nephropathy (HCC)    CHICO- intolerant CPAP    Essential hypertension    Lumbar radiculopathy    Neural foraminal stenosis of lumbar spine    Spinal stenosis at L4-L5 level    Mitral regurgitation    Frequent PVCs    Gait abnormality    Asthma    Multifocal atrial tachycardia (HCC)    Severe obesity (BMI 35.0-39. 9) with comorbidity ([address]    Stage 3a chronic kidney disease (HCC)    Thrombocytopenia (HCC)    Hypoxia    Nausea vomiting and diarrhea    Type 2 diabetes mellitus with chronic kidney disease    Typical atrial flutter    Chronic renal disease, stage III (HCC) [931939]    Perforated diverticulum of large intestine       MEDICAL DECISION MAKING AND PLAN    Neurologic:   - Sedation: propofol  - Pain control: fentanyl    Cardiovascular:  No pressors  Extensive cardiac hx, holding amio and Eliquis  Scheduled lopressor  Trend trop  ECHO to be done in am  - HR: 57 - 82  - MAP: 58 - 75  - MAP goal >65  - LA 2.05 (3.24<1.55)    Hematology:  Splenectomy  - Hg 9.5 (12.3)  - Plt 148 (237)  - DVT proph: holding  - s/p kcentra    Pulmonary:  - Vent settings: Mode PRVC /  / RR 22 / PEEP 5 / FiO2 40%  - ABG: pH 7.091 / pCO2 70.7 / pO2 159 / HCO3 18.5  - PF: 397  - Increase TV to 390 and repeat  - CXR: see below   - Maintain oxygen sats >92%  - Pulmonary toilet    Renal/Fluid/Electrolyte  - BUN / Cr: 27 / 1.03 (27 / 1.20)  - Na / K: 137 /  4.9 (139 / 4.1)  - Mag / Phos: 1.8 / 5.8  - IV Fluids: NS @ 100/hr  - I/O: 5154 / 580  - UOP: 0.3 cc/kg/hr overnight     GI/Nutrition  - Bowel regimen: holding pending completion of ostomy  - Ulcer Prophylaxis: pepcid  - Diet: strict NPO    ID  - WBC: 124.7 (21.3)  - Tmax: 98.3  - Antimicrobials: cipro, flagyl  - splenectomy vaccines at d/c    MSK/Skin:  - wounds: midline    Endocrine:   - Glu 158-249  - insulin gtt     Discharge Needs:  TBD    DISPOSITION:  [x] To remain ICU    Chief Complaint: \"diverticulitis perf\"    SUBJECTIVE    Kathaleen Legacy is has slightly improved since yesterday. LA downtrending, no pressors. Does have widened pulse pressure this am.  Optimize vent today, cont to resus as needed, cautious with fluid given cardiac hx, repeat ECHO. Timing on return to OR tbd, likely tomorrow. OBJECTIVE  VITALS: Temp: Temp: 97.9 °F (36.6 °C)Temp  Av.6 °F (37 °C)  Min: 97.7 °F (36.5 °C)  Max: 100.2 °F (09.3 °C) BP Systolic (31TIX), YOY:373 , Min:100 , IRB:937   Diastolic (30JKS), UDF:11, Min:35, Max:113   Pulse Pulse  Av.9  Min: 57  Max: 75 Resp Resp  Av.2  Min: 12  Max: 26 Pulse ox SpO2  Av %  Min: 88 %  Max: 100 %  GENERAL: intubated, sedated, no distress  NEURO: sedated  HEENT: no signs of trauma, LIANA  : villareal  LUNGS: clear to ausculation, without wheezes, rales or rhonci  HEART: normal rate and regular rhythm  ABDOMEN: soft, non-tender, non-distended, bowel sounds present in all 4 quadrants, and no guarding or peritoneal signs present  EXTREMITY: no cyanosis, clubbing or edema    No intake/output data recorded. Drain/tube output:   In: 5154.2 [I.V.:4904.2]  Out: 560 [Urine:260]    LAB:  CBC:   Recent Labs     22  1114 22  1757 22  0026 22  0455   WBC 21.3* 23.2*  --  24.7*   HGB 14.5 12.3 9.4* 9.5*   HCT 45.5 37.7 29.1* 31.0*   MCV 81.5* 80.2*  --  85.2   PLT See Reflexed IPF Result 188  --  See Reflexed IPF Result     BMP:   Recent Labs     22  1114 22  1757 22  0026 22  0455    136 139 137   K 4.5 4.1 3.9 4.9   CL 98 100  --  107   CO2 26 25  --  21 BUN 29* 27*  --  27*   CREATININE 1.21* 1.20*  --  1.03*   GLUCOSE 218* 133*  --  288*     COAGS:   Recent Labs     08/20/22  1114 08/20/22  1757 08/21/22  0455   PROT 7.4 5.8* 4.3*   INR  --  1.1  --        RADIOLOGY:  CT ABDOMEN PELVIS WO CONTRAST Additional Contrast? None    Result Date: 8/20/2022  EXAMINATION: CT OF THE ABDOMEN AND PELVIS WITHOUT CONTRAST 8/20/2022 12:01 pm TECHNIQUE: CT of the abdomen and pelvis was performed without the administration of intravenous contrast. Multiplanar reformatted images are provided for review. Automated exposure control, iterative reconstruction, and/or weight based adjustment of the mA/kV was utilized to reduce the radiation dose to as low as reasonably achievable. COMPARISON: Abdomen and pelvis CT 07/23/2019, abdomen MRI 07/02/2019 HISTORY: ORDERING SYSTEM PROVIDED HISTORY: suprapubic pain TECHNOLOGIST PROVIDED HISTORY: suprapubic pain Reason for Exam: Lower abdomen pain since last evening. History of kidney stones. No GI related symptoms. Hysterectomy, gallbladder and appendix removed. FINDINGS: Lack of intravenous contrast administration, partially limiting evaluation of the soft tissues and vasculature. LOWER CHEST:  Similar appearance of subpleural reticulations and groundglass in each lung base with no significant traction bronchiolectasis nor honeycombing, consistent with minimal to mild interstitial fibrotic changes of indeterminate pattern. Mild cardiomegaly. No pleural nor pericardial effusions. Mild atherosclerotic calcification in the right coronary artery. ORGANS:  Surgically absent gallbladder. Minimal intrahepatic and mild extrahepatic biliary dilation with apparent stones in the distal common bile duct. 3.2 cm x 3.5 cm x 3.1 cm simple appearing cystic lesion arising from the junction of the pancreatic head and uncinate process (3.0 cm x 2.7 cm x 2.2 cm on 07/02/2019). Moderate pancreatic atrophy. Mild splenic atrophy.  Partial congenital malrotation the right kidney. Normal appearance of the liver, adrenal glands, and left kidney. GI/BOWEL:  Tiny sliding hiatal hernia. Otherwise normal course and caliber of the stomach, small bowel, colon, and rectum without obstruction. No visualization of the appendix, reportedly surgically absent. Mild stool. Moderate colonic diverticulosis. Focal wall thickening in the proximal sigmoid colon with pericolonic stranding involving the sigmoid mesocolon and nearby greater omentum. PELVIS:  Surgically absent uterus and likely ovaries. Normal appearance of the urinary bladder. PERITONEUM/RETROPERITONEUM:  Mild to moderate systemic atherosclerotic calcifications. Possible multifocal portal venous gas within the small bowel mesentery. No abdominal nor pelvic lymphadenopathy. Trace free intraperitoneal fluid. Multifocal small free intraperitoneal gas. 1.8 cm x 3.1 cm x 1.9 cm loculated gas and fluid collection in the central small bowel mesentery. Peritoneal thickening in the left lower quadrant. SOFT TISSUES/BONES:  Similar appearance of subpleural nodular soft tissue density in the bilateral upper quadrant anterior abdominal wall likely related to medicinal injections. Laxity of the anterior and lateral abdominal wall musculature. Tiny fat containing umbilical hernia. Eventration of the inferior right rectus sheath laterally without true herniation given intact appearance of the right external oblique aponeurosis. No inguinal lymphadenopathy. Diffuse bony demineralization. No acute fractures nor suspicious bony lesions. 1. Moderate colonic diverticulosis with suspected acute to subacute diverticulitis in the proximal sigmoid colon. There is associated perforation with small pneumoperitoneum, suspected multifocal portal venous gas, adjacent peritonitis, and a 1.8 cm x 3.1 cm x 1.9 cm abscess in the small bowel mesentery.   Perforated colonic malignancy could appear similar but is considered less

## 2022-08-21 NOTE — TELEPHONE ENCOUNTER
Please have pt increase lantus to 14 units bid from 12 units bid for 3 days or until her blood sugars are less than 200. Then resume normal dosing. 5

## 2022-08-21 NOTE — PROGRESS NOTES
Comprehensive Nutrition Assessment    Type and Reason for Visit:  Initial (Vent Check)    Nutrition Recommendations/Plan:   Continue NPO. Monitor plans for nutrition. If nutrition support requested, suggest TF of Immune Enhancing formula goal 45 mL/hr with current rate of propofol (1749 kcals + propofol, 101 gm pro). Monitor plan of care. Malnutrition Assessment:  Malnutrition Status:  Insufficient data (08/21/22 1356)    Context:  Acute Illness     Findings of the 6 clinical characteristics of malnutrition:  Energy Intake:  Mild decrease in energy intake  Weight Loss:  No significant weight loss     Body Fat Loss:  Unable to assess   Muscle Mass Loss:  Unable to assess  Fluid Accumulation:  No significant fluid accumulation   Strength:  Not Performed    Nutrition Assessment:    Pt admitted with c/o abdominal pain - pt with perforated diverticulitis. S/p ex lap, resection of sigmoid colon, NICK, spleenectomy, and wound vac placement. Pt currently intubated and sedated. PMH: CAD, CHD, DM, HTN, IBS. Labs reviewed: Phos 5.8 mg/dL, Glucose 192-313 mg/dL. Meds include: Insulin Drip. Will monitor plans for nutrition. Nutrition Related Findings:    Labs/Meds reviewed. Hypoactive bowel sounds. Last BM 8/19. Wound Type: Surgical Incision, Wound Vac (to abdomen)       Current Nutrition Intake & Therapies:    Average Meal Intake: NPO  Average Supplements Intake: NPO  Diet NPO  Additional Calorie Sources:  Propofol at 4.9 mL/hr = 129 kcals/day    Anthropometric Measures:  Height: 4' 11\" (149.9 cm)  Ideal Body Weight (IBW): 95 lbs (43 kg)    Admission Body Weight: 179 lb 0.2 oz (81.2 kg)  Current Body Weight: 179 lb 0.2 oz (81.2 kg), 188.4 % IBW.  Weight Source: Bed Scale  Current BMI (kg/m2): 36.1  Usual Body Weight: 177 lb 3.2 oz (80.4 kg) (6/8/22 bed scale per chart review)  % Weight Change (Calculated): 1                    BMI Categories: Obese Class 2 (BMI 35.0 -39.9)    Estimated Daily Nutrient Needs:  Energy Requirements Based On: Kcal/kg  Weight Used for Energy Requirements: Current  Energy (kcal/day): 1228-9425 kcals/day  Weight Used for Protein Requirements: Ideal  Protein (g/day): 65-90 gm pro/day  Fluid (ml/day): per MD    Nutrition Diagnosis:   Inadequate oral intake related to impaired respiratory function, altered GI function as evidenced by NPO or clear liquid status due to medical condition    Nutrition Interventions:   Food and/or Nutrient Delivery: Continue NPO (Monitor plans for nutrition.)  Nutrition Education/Counseling: No recommendation at this time  Coordination of Nutrition Care: Continue to monitor while inpatient  Plan of Care discussed with: RN    Goals:  Previous Goal Met:  (Goal Set)  Goals: Meet at least 75% of estimated needs, prior to discharge       Nutrition Monitoring and Evaluation:   Behavioral-Environmental Outcomes: None Identified  Food/Nutrient Intake Outcomes: Diet Advancement/Tolerance  Physical Signs/Symptoms Outcomes: Biochemical Data, GI Status, Fluid Status or Edema, Hemodynamic Status, Nutrition Focused Physical Findings, Skin, Weight    Discharge Planning:     Too soon to determine     Pily Regalado, [address] [place] [address],   Contact: [phone]

## 2022-08-21 NOTE — PROGRESS NOTES
08/21/22 1338   Ventilator Settings   Vt (Set, mL) (S)  390 mL  (Increased per , repeat ABG in 1 hour)

## 2022-08-21 NOTE — PROGRESS NOTES
Pharmacy Note     Renal Dose Adjustment    Liang Johnson is a 81 y.o. female. Pharmacist assessment of renally cleared medications. Recent Labs     08/20/22  1114 08/20/22  1757   BUN 29* 27*       Recent Labs     08/20/22  1114 08/20/22  1757   CREATININE 1.21* 1.20*       Estimated Creatinine Clearance: 33 mL/min (A) (based on SCr of 1.2 mg/dL (H)). Estimated CrCl using Ideal Body Weight: 24 mL/min (based on IBW 45.5 kg)    Height:   Ht Readings from Last 1 Encounters:   08/20/22 4' 11\" (1.499 m)     Weight:  Wt Readings from Last 1 Encounters:   08/20/22 179 lb 0.2 oz (81.2 kg)       The following medication dose has been adjusted based upon renal function per P&T Guidelines:             Ciprofloxacin 400 mg IV every 12 hours changed to every 24 hours.     [facility]  8/21/2022 2:04 AM

## 2022-08-21 NOTE — ANESTHESIA POSTPROCEDURE EVALUATION
Department of Anesthesiology  Postprocedure Note    Patient: Sha Foley  MRN: [id number]  YOB: 1935  Date of evaluation: 8/21/2022      Procedure Summary     Date: 08/20/22 Room / Location: [address]    Anesthesia Start: 2122 Anesthesia Stop: 08/21/22 0147    Procedure: LAPAROTOMY EXPLORATORY, RESECTION OF SIGMOID COLON, LYSIS OF ADHESIONS, APPLICATION OF GEL FOAM SOAKED IN THROMBIN, SPLEENECTOMY, WOUND VAC PLACEMENT Diagnosis:       Perforated bowel ([address]      (Perforated bowel ([address] [K63.1])    Surgeons: Emanuel Islas MD Responsible Provider: Joaquin Javier MD    Anesthesia Type: general ASA Status: 3 - Emergent          Anesthesia Type: No value filed.     Davey Phase I:      Davey Phase II:        Anesthesia Post Evaluation    Patient location during evaluation: ICU  Patient participation: complete - patient cannot participate  Level of consciousness: sedated and ventilated  Airway patency: patent  Nausea & Vomiting: no nausea and no vomiting  Complications: no  Cardiovascular status: hemodynamically stable  Respiratory status: ventilator  Hydration status: euvolemic  Comments: BP (!) 134/49   Pulse 68   Temp 97.7 °F (36.5 °C) (Oral)   Resp 19   Ht 4' 11\" (1.499 m)   Wt 179 lb 0.2 oz (81.2 kg)   SpO2 100%   BMI 36.16 kg/m²

## 2022-08-21 NOTE — PROGRESS NOTES
[facility] [address]  PROGRESS NOTE  2  Shift date: 8/20/2022  Shift day: Saturday   Shift # 2    Room # 0281/8209-70   Name: Vangie Bains                [facility]:       Referral: Routine Visit    Admit Date & Time: 8/20/2022  4:20 PM    Assessment:  Vangie Bains is a 81 y.o. female in the hospital due to <principal problem not specified>. Upon entering the room writer observes pt appears calm and is with daughter. Intervention:   complete HCPOA paperwork. Outcome:  Upon concluding the visit pt and daughter appeared calm and expressed gratitude for help with the documents.     Plan:  Chaplains will remain available to offer spiritual and emotional support as needed       08/20/22 2027   Encounter Summary   Service Provided For: Patient and family together   Referral/Consult From: Multi-disciplinary team  (Social Work)   Aguilar Colby   Last Encounter  08/20/22   Complexity of Encounter Moderate   Begin Time 1950   End Time  2027   Total Time Calculated 37 min   Advance Care Planning   Type ACP conversation   Assessment/Intervention/Outcome   Assessment Calm   Intervention Active listening   Outcome Expressed Gratitude       Electronically signed by Chaplain Resident Mark Clement, [address] 8/20/2022 at 8:28 PM   [facility]  [phone]

## 2022-08-21 NOTE — ANESTHESIA PROCEDURE NOTES
Arterial Line:    An arterial line was placed using ultrasound guidance, in the OR for the following indication(s): continuous blood pressure monitoring and blood sampling needed. A 20 gauge (size), 1 and 3/4 inch (length), Arrow (type) catheter was placed, Seldinger technique used, into the left radial artery, secured by tape and Tegaderm. Anesthesia type: General    Events:  patient tolerated procedure well with no complications. Additional notes:  Able to get arterial line with ultrasound guidance.  8/20/2022 9:50 PM8/20/2022 9:55 PM  Anesthesiologist: Jazmyne Millard MD  Performed: Anesthesiologist   Preanesthetic Checklist  Completed: patient identified, IV checked, site marked, risks and benefits discussed, surgical/procedural consents, equipment checked, pre-op evaluation, timeout performed, anesthesia consent given, oxygen available, monitors applied/VS acknowledged, fire risk safety assessment completed and verbalized and blood product R/B/A discussed and consented

## 2022-08-21 NOTE — ACP (ADVANCE CARE PLANNING)
Advance Care Planning     Advance Care Planning Inpatient Note  [facility] Department    Today's Date: 8/20/2022  Unit: STVZ 4B STEPDOWN    Received request from IDT Member, patient, and family. Upon review of chart and communication with care team, patient's decision making abilities are not in question. . Patient, Healthcare Decision Maker, and pt's nurse  was/were present in the room during visit. Goals of ACP Conversation:  Discuss advance care planning documents    Health Care Decision Makers:       Primary Decision Maker: Peyman [place] - [phone]  Summary:  Verified Documents  Completed [address]    Advance Care Planning Documents (Patient Wishes):  Healthcare Power of /Advance Directive Appointment of Health Care Agent     Assessment:  Upon entering the room writer observes pt is seated with her daughter at bedside. Both appear calm. In consultation with nursing staff patient's decision making abilities are not in question. Patient has discussed wishes with agent and is confident the agent will speak on the patient's behalf if necessary. Interventions:  Provided education on documents for clarity and greater understanding  Assisted in the completion of documents according to patient's wishes at this time  Encouraged ongoing ACP conversation with future decision makers and loved ones    Care Preferences Communicated:   No    Outcomes/Plan:  New advance directive completed. Returned original document(s) to patient, as well as copies for distribution to appointed agents  Copy of advance directive given to staff to scan into medical record.   Teach Back Method used to verify the patient's and/or Healthcare Decision Maker's understanding of key information in the advance directive documents    Electronically signed by Chaplain Resident Inna Cho, [address] 8/20/2022 at 8:29 PM   [facility]  [phone]

## 2022-08-21 NOTE — PLAN OF CARE
Problem: Discharge Planning  Goal: Discharge to home or other facility with appropriate resources  8/21/2022 8978 by Anette Castaneda RN  Outcome: Progressing     Problem: Safety - Adult  Goal: Free from fall injury  8/21/2022 0634 by Anette Castaneda RN  Outcome: Progressing     Problem: ABCDS Injury Assessment  Goal: Absence of physical injury  8/21/2022 0634 by Anette Castaneda RN  Outcome: Progressing     Problem: Gastrointestinal - Adult  Goal: Minimal or absence of nausea and vomiting  8/21/2022 0634 by Anette Castaneda RN  Outcome: Progressing     Problem: Metabolic/Fluid and Electrolytes - Adult  Goal: Electrolytes maintained within normal limits  8/21/2022 0634 by Anette Castaneda RN  Outcome: Progressing     Problem: Metabolic/Fluid and Electrolytes - Adult  Goal: Hemodynamic stability and optimal renal function maintained  8/21/2022 0634 by Anette Castaneda RN  Outcome: Progressing     Problem: Metabolic/Fluid and Electrolytes - Adult  Goal: Glucose maintained within prescribed range  8/21/2022 0634 by Anette Castaneda RN  Outcome: Progressing     Problem: Hematologic - Adult  Goal: Maintains hematologic stability  8/21/2022 0634 by Anette Castaneda RN  Outcome: Progressing     Problem: Safety - Medical Restraint  Goal: Remains free of injury from restraints (Restraint for Interference with Medical Device)  Description: INTERVENTIONS:  1. Determine that other, less restrictive measures have been tried or would not be effective before applying the restraint  2. Evaluate the patient's condition at the time of restraint application  3. Inform patient/family regarding the reason for restraint  4.  Q2H: Monitor safety, psychosocial status, comfort, nutrition and hydration  8/21/2022 0634 by Anette Castaneda RN  Outcome: Progressing     Problem: Pain  Goal: Verbalizes/displays adequate comfort level or baseline comfort level  8/21/2022 0634 by Anette Castaneda RN  Outcome: Progressing

## 2022-08-21 NOTE — OP NOTE
Operative Note      Patient: Perez Childs  YOB: 1935  MRN: [id number]    Date of Procedure: 8/20/2022    Pre-Op Diagnosis: Perforated bowel ([address] [K63.1]    Post-Op Diagnosis:  perforated sigmoid colon with purulence, adhesion, coagulopathic due to anticoagulation       Procedure(s):  LAPAROTOMY EXPLORATORY, RESECTION OF SIGMOID COLON, LYSIS OF ADHESIONS, APPLICATION OF GEL FOAM SOAKED IN THROMBIN, SPLEENECTOMY, WOUND VAC PLACEMENT    Surgeon(s):  Alex York MD    Assistant:   Resident: Yael Abdi MD; Benjamin Gould DO    Anesthesia: General    Estimated Blood Loss (mL): 118     Complications: Bleeding, Other: splenectomy    Specimens:   ID Type Source Tests Collected by Time Destination   1 : INITIAL VERAS INSERTION Urine Urine, indwelling catheter CULTURE, URINE Alex York MD 8/20/2022 2222    2 : CULTURES FROM ABDOMEN Swab Abdomen CULTURE, ANAEROBIC AND AEROBIC Alex York MD 8/20/2022 2222    A : PART OF SIGMOID Tissue Abdomen SURGICAL PATHOLOGY Alex York MD 8/21/2022 0018    B : SPLEEN Tissue Abdomen SURGICAL PATHOLOGY Alex York MD 8/21/2022 2881        Implants:  * No implants in log *      Drains:   Negative Pressure Wound Therapy Abdomen Medial (Active)   Dressing Type Other (Comment) 08/21/22 0112       Urinary Catheter 08/20/22 2 Way (Active)       Findings: chronically indurated sigmoid colon     Detailed Description of Procedure:   Patient taken emergently to OR for findings of leukocytosis and free air in abdomen. Under general anesthetic, patient prepped and draped in sterile manner. Midline incision was created sharply, hemostasis achieved with electrocautery. Adhesions taken down sharply . Lower sigmoid was encased in adhesions, fatty epiploica and scar. Area of perforation identified, an area of uninvolved sigmoid/descending colon proximal to perforated area was isolated and divided.    A portion of distal sigmoid distal to area of perforation was skeletonized mesentery taken with Ligasure. and divided. Multiple adhesions made it difficult for the proximal end to be mobilized for creation of colostomy,  The spleen was deserosalized and a decision was made to remove it, reverse the Elequis, apply Ab Thera and return to OR after reversal complete. Patient taken intubated to TICU in stable condition  Sponge and instrument counts correct.     Electronically signed by Kamilah Sims MD on 8/21/2022 at 1:53 AM  Area of localized peritonitis encountered

## 2022-08-21 NOTE — PLAN OF CARE
Problem: Discharge Planning  Goal: Discharge to home or other facility with appropriate resources  8/21/2022 1333 by Zelalem Leone RN  Outcome: Progressing  8/21/2022 0634 by Marino Monaco RN  Outcome: Progressing  8/21/2022 0320 by Marino Monaco RN  Outcome: Progressing     Problem: Safety - Adult  Goal: Free from fall injury  8/21/2022 1333 by Zelalem Leone RN  Outcome: Progressing  8/21/2022 0634 by Marino Monaco RN  Outcome: Progressing  8/21/2022 3071 by Marino Monaco RN  Outcome: Progressing     Problem: ABCDS Injury Assessment  Goal: Absence of physical injury  8/21/2022 1333 by Zelalem Leone RN  Outcome: Progressing  8/21/2022 0634 by Marino Monaco RN  Outcome: Progressing  8/21/2022 4167 by Marino Monaco RN  Outcome: Progressing     Problem: Gastrointestinal - Adult  Goal: Minimal or absence of nausea and vomiting  8/21/2022 1333 by Zelalem Leone RN  Outcome: Progressing  8/21/2022 0634 by Marino Monaco RN  Outcome: Progressing  8/21/2022 2123 by Marino Monaco RN  Outcome: Progressing     Problem: Metabolic/Fluid and Electrolytes - Adult  Goal: Electrolytes maintained within normal limits  8/21/2022 1333 by Zelalem Leone RN  Outcome: Progressing  8/21/2022 0634 by Marino Monaco RN  Outcome: Progressing  8/21/2022 1766 by Marino Monaco RN  Outcome: Progressing  Goal: Hemodynamic stability and optimal renal function maintained  8/21/2022 1333 by Zelalem Leone RN  Outcome: Progressing  8/21/2022 0634 by Marino Monaco RN  Outcome: Progressing  8/21/2022 2920 by Marino Monaco RN  Outcome: Progressing  Goal: Glucose maintained within prescribed range  8/21/2022 1333 by Zelalem Leone RN  Outcome: Progressing  8/21/2022 0634 by Marino Monaco RN  Outcome: Progressing  8/21/2022 7823 by Marino Monaco RN  Outcome: Progressing     Problem: Hematologic - Adult  Goal: Maintains hematologic stability  8/21/2022 1333 by Zelalem Leone RN  Outcome: Progressing  8/21/2022 0634 by Iker Matute RN  Outcome: Progressing  8/21/2022 9391 by Iker Matute RN  Outcome: Progressing     Problem: Safety - Medical Restraint  Goal: Remains free of injury from restraints (Restraint for Interference with Medical Device)  Description: INTERVENTIONS:  1. Determine that other, less restrictive measures have been tried or would not be effective before applying the restraint  2. Evaluate the patient's condition at the time of restraint application  3. Inform patient/family regarding the reason for restraint  4.  Q2H: Monitor safety, psychosocial status, comfort, nutrition and hydration  8/21/2022 1333 by Lay Bates RN  Outcome: Progressing  8/21/2022 0634 by Iker Matute RN  Outcome: Progressing  8/21/2022 9612 by Iker Matute RN  Outcome: Progressing  Flowsheets  Taken 8/21/2022 0600  Remains free of injury from restraints (restraint for interference with medical device): Every 2 hours: Monitor safety, psychosocial status, comfort, nutrition and hydration  Taken 8/21/2022 0400  Remains free of injury from restraints (restraint for interference with medical device): Every 2 hours: Monitor safety, psychosocial status, comfort, nutrition and hydration  Taken 8/21/2022 0230  Remains free of injury from restraints (restraint for interference with medical device): Every 2 hours: Monitor safety, psychosocial status, comfort, nutrition and hydration     Problem: Pain  Goal: Verbalizes/displays adequate comfort level or baseline comfort level  8/21/2022 1333 by Lay Bates RN  Outcome: Progressing  8/21/2022 0634 by Iker Matute RN  Outcome: Progressing  8/21/2022 0632 by Iker Matute RN  Outcome: Progressing     Problem: Respiratory - Adult  Goal: Achieves optimal ventilation and oxygenation  8/21/2022 1333 by Lay Bates RN  Outcome: Progressing  8/21/2022 0744 by Gordon Morrissey RCP  Outcome: Progressing  Flowsheets (Taken 8/21/2022 1354)  Achieves optimal ventilation and oxygenation:   Assess for changes in respiratory status   Position to facilitate oxygenation and minimize respiratory effort   Assess the need for suctioning and aspirate as needed   Respiratory therapy support as indicated   Assess for changes in mentation and behavior   Oxygen supplementation based on oxygen saturation or arterial blood gases     Problem: Chronic Conditions and Co-morbidities  Goal: Patient's chronic conditions and co-morbidity symptoms are monitored and maintained or improved  Outcome: Progressing

## 2022-08-22 NOTE — PROGRESS NOTES
PROGRESS NOTE          PATIENT NAME: Roby Erazo  MEDICAL RECORD NO. [id number]  DATE: 8/22/2022  SURGEON: Dr. Roman Jennings: Silvia Golden, APRN - CNP    HD: # 2    ASSESSMENT    Patient Active Problem List   Diagnosis    Dyslipidemia    Osteoarthritis    Esophagitis    Vitamin D deficiency    PMR (polymyalgia rheumatica) (HCC)    Central artery occlusion of retina    Diastolic dysfunction    Type 2 diabetes with nephropathy (HCC)    CHICO- intolerant CPAP    Essential hypertension    Lumbar radiculopathy    Neural foraminal stenosis of lumbar spine    Spinal stenosis at L4-L5 level    Mitral regurgitation    Frequent PVCs    Gait abnormality    Asthma    Multifocal atrial tachycardia (HCC)    Severe obesity (BMI 35.0-39. 9) with comorbidity ([address]    Stage 3a chronic kidney disease (HCC)    Thrombocytopenia (HCC)    Hypoxia    Nausea vomiting and diarrhea    Type 2 diabetes mellitus with chronic kidney disease    Typical atrial flutter    Chronic renal disease, stage III ([address] [863350]    Perforated diverticulum of large intestine     [facility] transfer, Diverticulitis, perforation. 8/20: OR for ex lap, neto, splenectomy, abthera placement, got [facility] Form      MEDICAL DECISION MAKING AND PLAN    Neurologic:   - Sedation: propofol 20  - Pain control: fentanyl 50    Cardiovascular:  No pressors  Extensive cardiac hx, holding amio and Eliquis  Scheduled lopressor  Trend trop: 28 (27, 24, 16, 23, 18)  EKG: NSR  ECHO 8/22: EF 65%, evidence of diastolic dys  - HR: 45X-00O  - MAP: 50s-60s  - MAP goal >65  - LA 1.07 (2.05, 3.24, 1.55)    Hematology:  Splenectomy  - Hg 8.0 (9.5, 12.3).  FU 6pm  - Plt 125 (148, 237)  - DVT proph: holding  - s/p kcentra    Pulmonary:  - Vent settings: Mode PRVC /  / RR 22 / PEEP 5 / FiO2 40%  - ABG: pH 7.31 / pCO2 33.7 / pO2 137.9 / HCO3 16.8  - PF: 345  -LA: 1.07  - CXR: FU  - Maintain oxygen sats >92%  - Pulmonary toilet    Renal/Fluid/Electrolyte  - BUN / Cr: 47/2.66 (27 / 1.03, 27 / 1.20)  - Na / K: 138/5.3  - Mag / Phos: 1.7/5.3  - IV Fluids: LR @ 100/hr  - Net: +7.4  - UOP: 0.1 cc/kg/hr   -FeNa: intrinsic    GI/Nutrition  - Bowel regimen: holding pending completion of ostomy  - Ulcer Prophylaxis: pepcid  - Diet: strict NPO  - Discontinuity  -Splenectomy    ID  - WBC: 20.6 (24.7, 21.3)  - Tmax: 37.2  - Antimicrobials: cipro (D2/), flagyl (D3/)  - splenectomy vaccines at d/c    MSK/Skin:  - wounds: midline    Endocrine:   - Glu 252  - restart insulin gtt: 98.2U/24h +8U HDSSI    Discharge Needs:  TBD    DISPOSITION:  [x] To remain ICU. OR for 2nd look, timing TBD. Continue Abx. LFTs, lipase, start levo. Has diastolic heart failure, plan to start amiodarone gtt if in Afib    Chief Complaint: \"diverticulitis perf\"    SUBJECTIVE    Claudia Sublette was seen and examined at bedside, No overnight events. Pt remains oliguric. Intubated, sedated. NPO, IVF. VSS, afebrile. OBJECTIVE  VITALS: Temp: Temp: 98.7 °F (37.1 °C)Temp  Av.7 °F (36.5 °C)  Min: 96.9 °F (36.1 °C)  Max: 98.7 °F (75.5 °C) BP Systolic (92ULT), MQO:034 , Min:98 , UOC:486   Diastolic (96CMQ), PYY:51, Min:29, Max:106   Pulse Pulse  Av.5  Min: 68  Max: 86 Resp Resp  Av.6  Min: 14  Max: 24 Pulse ox SpO2  Av.4 %  Min: 91 %  Max: 100 %  GENERAL: intubated, sedated, no distress  NEURO: sedated  HEENT: no signs of trauma, LIANA  : villareal  LUNGS: mechanically ventilated  HEART: normal rate and regular rhythm  ABDOMEN: soft, non-tender, non-distended, bowel sounds present in all 4 quadrants, and no guarding or peritoneal signs present  EXTREMITY: no cyanosis, clubbing or edema    I/O last 3 completed shifts: In: 9186.5 [I.V.:7305.8; IV Piggyback:1880.7]  Out: 1865 [Urine:565; Emesis/NG output:250; Drains:750; Blood:300]    Drain/tube output:   In: 4132.3 [I.V.:2401.7]  Out: 2506 [Urine:285; Drains:750]    LAB:  CBC:   Recent Labs     22  1757 22  0026 22  0455 08/22/22  0542   WBC 23.2*  --  24.7* 20.6*   HGB 12.3 9.4* 9.5* 8.0*   HCT 37.7 29.1* 31.0* 26.1*   MCV 80.2*  --  85.2 84.7     --  See Reflexed IPF Result See Reflexed IPF Result     BMP:   Recent Labs     08/21/22  0455 08/21/22  2142 08/22/22  0542    137 138   K 4.9 5.1 5.3    106 108*   CO2 21 19* 16*   BUN 27* 39* 47*   CREATININE 1.03* 2.10* 2.66*   GLUCOSE 288* 138* 252*     COAGS:   Recent Labs     08/20/22  1114 08/20/22  1757 08/21/22  0455   PROT 7.4 5.8* 4.3*   INR  --  1.1  --        RADIOLOGY:  CT ABDOMEN PELVIS WO CONTRAST Additional Contrast? None    Result Date: 8/20/2022  1. Moderate colonic diverticulosis with suspected acute to subacute diverticulitis in the proximal sigmoid colon. There is associated perforation with small pneumoperitoneum, suspected multifocal portal venous gas, adjacent peritonitis, and a 1.8 cm x 3.1 cm x 1.9 cm abscess in the small bowel mesentery. Perforated colonic malignancy could appear similar but is considered less likely, and follow-up with colonoscopy following treatment should be considered. Critical results were called by Dr. Miguel Plata MD to Dr. Wilberto Tovar on 8/20/2022 at 12:25. 2. Minimal intrahepatic and mild extrahepatic biliary dilation possibly related to apparent stones in the distal common bile duct. Consider MRCP especially if there are clinical findings of cholestasis. 3. Increased size of a 3.5 cm simple appearing cystic lesion arising from the pancreatic head and uncinate process, most likely an intraductal papillary mucinous neoplasm or other mucinous neoplasm. Given growth, endoscopic fine needle aspiration is technically recommended per the ACR recommendations for incidental pancreatic cysts. XR CHEST PORTABLE    Result Date: 8/22/2022  Pulmonary vascular congestion. No significant change.      XR CHEST PORTABLE    Result Date: 8/21/2022  Cardiomegaly with vascular congestion and mild patchy interstitial change which may represent mild interstitial edema or pneumonitis. ET tube measures 2.8 cm above the dheeraj. Enteric catheter tip in the mid gastric body.             Petra Bernard DO  8/22/2022  10:12 AM

## 2022-08-22 NOTE — PROGRESS NOTES
Physician Progress Note      PATIENTKhurram Griffith  CSN #:                  [id number]  :                       1935  ADMIT DATE:       2022 4:20 PM  DISCH DATE:  RESPONDING  PROVIDER #:        Deland Eisenmenger J MATHEWS          QUERY TEXT:    Pt admitted with perforated diverticulum of the large intestine. Pt noted to   have rise in serum creatine. If possible, please document in the progress   notes and discharge summary if you are evaluating and/or treating any of the   following: The medical record reflects the following:  Risk Factors: HTN DM CKD with perforated diverticulum of the large intestine   s/p OR and spleenectomy  Clinical Indicators: Per EPIC chart CKD 3a. Serum creatine 1.20 on admission   increasing to 2.66 and GFR 42 on admission decreasing to 17. Treatment: IVFB 500 cc x2, labs/monitoring    Defined by Kidney Disease Improving Global Outcomes (KDIGO) clinical practice   guideline for acute kidney injury:  -Increase in SCr by greater than or equal to 0.3 mg/dl within 48 hours; or  -Increase or decrease in SCr to greater than or equal to 1.5 times baseline,   which is known or presumed to have occurred within the prior 7 days; or  -Urine volume < 0.5ml/kg/h for 6 hours    Thank-you,  Paul Zavala RN, [email or web address]  Options provided:  -- Acute kidney injury on CKD 3a  -- Acute kidney injury  -- Acute kidney failure with acute tubular necrosis  -- Other - I will add my own diagnosis  -- Disagree - Not applicable / Not valid  -- Disagree - Clinically unable to determine / Unknown  -- Refer to Clinical Documentation Reviewer    PROVIDER RESPONSE TEXT:    This patient has an Acute kidney injury.     Query created by: Jessica Horowitz on 2022 9:54 AM      Electronically signed by:  Reina Pink 2022 9:59 AM

## 2022-08-22 NOTE — DISCHARGE INSTRUCTIONS
What to do after you leave the hospital:  No lifting above 10lbs for next 2 weeks. No soaking in bathtubs for 4 weeks  Resume activity as tolerated  Wash incision gently with soap and water, pat dry. If steri-strips or surgical glue in place wash gently and leave in place until the glue or strips fall off. (Do not pull/tug)  No operating heavy equipment while using narcotics  F/u in trauma/acute care surgery clinic in 1-2 weeks  Call your doctor for the following:   Chills   Temperature greater than 101   Pain that is not tolerable despite taking pain medicine as ordered   There is increased swelling, redness or warmth at surgical site   There is increased drainage or bleeding from surgical site  No alcoholic beverages, no driving or operating machinery, no making important decisions for 24 hours. Please continue to use your Incentive Spirometer as directed. You can practice 10 deep breaths/hour while awake. Using the [address] will promote the health of your lungs by taking slow, deep breaths in. It is also important in preventing pneumonia or a pneumothorax from developing. General questions or concerns may be called to the trauma nurse line at [phone] and please leave a message. Your doctor will want to closely monitor you. Be sure to go to all of your appointments.

## 2022-08-22 NOTE — PROCEDURES
PROCEDURE NOTE - CENTRAL VENOUS LINE PLACEMENT    PATIENT NAME: Nydia Mccarthy  MEDICAL RECORD NO. [id number]  DATE: 8/22/2022  SURGEON: JOSEFINA Escalera  PRIMARY CARE PHYSICIAN: INGRID Alvarado - GAURAV    PREOPERATIVE DIAGNOSIS:  Need for IV access  POSTOPERATIVE DIAGNOSIS:  Same  PROCEDURE PERFORMED:  Right Internal Jugular Vein Central Line Insertion  SURGEON: Danielle Patel DO  ANESTHESIA:  Local utilizing 1% lidocaine  ESTIMATED BLOOD LOSS:  Less than 25 ml  COMPLICATIONS:  None immediately appreciated. OPERATIVE NOTE PREPARED BY: Kierra Nicholas DO     DISCUSSION:  Nydia Mccarthy is a 81y.o.-year-old female who requires additional IV access and central pressure monitoring. The history and physical examination were reviewed and confirmed. The diagnoses, proposed procedure, risks, possible complications, benefits and alternatives were discussed with the patient or family. She was given the opportunity to ask questions, and once answered, informed consent was obtained. The patient was then prepared for the procedure. PROCEDURE:  A timeout was initiated by the bedside nurse and was confirmed by those present. The patient was placed in a supine position. The skin overlying the Right Internal Jugular Vein was prepped with chlorhexadine and draped in sterile fashion. The skin was infiltrated with local anesthetic. Through the anesthetized region, the introducer needle was inserted into the internal jugular vein returning dark red non pulsatile blood. A guidewire was placed through the center of the needle with no resistance. A small incision made in the skin with a #11 scalpel blade. The dilator was inserted into the skin and vein over guidewire using Seldinger technique. The dilator was then removed and the catheter was placed in the vein over the guidewire using Seldinger technique. The guidewire was then removed and all ports aspirated and flushed appropriately.  The catheter then secured using silk suture and a temporary sterile dressing was applied. No immediate complication was evident. All sponge, instrument and needle counts were correct at the completion of the procedure. Postprocedural chest x-ray was ordered. The patient tolerated the procedure well with no immediate complication evident.      Alka Briseno DO  8/22/22, 6:43 PM

## 2022-08-22 NOTE — PLAN OF CARE
Problem: Respiratory - Adult  Goal: Achieves optimal ventilation and oxygenation  Outcome ongoing  8/22/2022 0009 by Joelle Izaguirre RCP  Flowsheets  Taken 8/22/2022 0009 by Joelle Izaguirre RCP  Achieves optimal ventilation and oxygenation:   Assess for changes in respiratory status   Position to facilitate oxygenation and minimize respiratory effort   Assess the need for suctioning and aspirate as needed   Respiratory therapy support as indicated   Assess for changes in mentation and behavior   Oxygen supplementation based on oxygen saturation or arterial blood gases

## 2022-08-22 NOTE — DISCHARGE INSTR - COC
Continuity of Care Form    Patient Name: Mary Henry   :  1935  MRN:  [id number]    Admit date:  2022  Discharge date:  ***    Code Status Order: Full Code   Advance Directives:     Admitting Physician:  Kati Boyd MD  PCP: INGRID Allen CNP    Discharging Nurse: [facility] Unit/Room#: 1021/1021-01  Discharging Unit Phone Number: ***    Emergency Contact:   Extended Emergency Contact Information  Primary Emergency Contact: [facility]  Address: [address] [place] [address], [place] [address] [place]  Phone: [phone]  Work Phone: [phone]  Mobile Phone: [phone]  Relation: Spouse  Secondary Emergency Contact: Genevieve George  Home Phone: [phone]  Relation: Child  Preferred language: English   needed?  No    Past Surgical History:  Past Surgical History:   Procedure Laterality Date    APPENDECTOMY      CARDIAC CATHETERIZATION      CATARACT REMOVAL Bilateral 2010    CHOLECYSTECTOMY      COLONOSCOPY  2011    COLONOSCOPY N/A 2019    severe diverticulosis, benign rectal polyp, Dr. Mar Manjarrez Right 2019    Cysto with right stent insertion and villareal catheter performed by Paul Pereira MD at [place] [address] Right 2019    CYSTO right stent removal  Right Ureteroscopy Holmium Laser right stent exchange performed by Paul Pereira MD at [facility], COLON, DIAGNOSTIC      EYE SURGERY      HYSTERECTOMY (CERVIX STATUS UNKNOWN)  Approx     LAPAROTOMY  2022    LAPAROTOMY EXPLORATORY WITH BOWEL RESECTION AND OSTEOMY CREATION    LAPAROTOMY N/A 2022    LAPAROTOMY EXPLORATORY, RESECTION OF SIGMOID COLON, LYSIS OF ADHESIONS, APPLICATION OF GEL FOAM SOAKED IN THROMBIN, SPLEENECTOMY, WOUND VAC PLACEMENT performed by Kati Boyd MD at [address] [facility] Right 12/10 and     Basal CellRemoved from right neck    MALIGNANT SKIN LESION EXCISION Right 02/2012    Basal Cell Removed from right leg    OTHER SURGICAL HISTORY  09/06/2011    capsule endoscopy    OTHER SURGICAL HISTORY  03/22/2016    right L4 and L5 TFE    OTHER SURGICAL HISTORY Right 04/26/2016    L4, L5 TFE    UPPER GASTROINTESTINAL ENDOSCOPY  05/16/2011    UPPER GASTROINTESTINAL ENDOSCOPY  06/22/2015    mild gastritis (Dr. Nahomi Villanueva)    UPPER GASTROINTESTINAL ENDOSCOPY N/A 09/26/2019    mild to moderate inflammation, Dr. Nahomi Villanueva       Immunization History:   Immunization History   Administered Date(s) Administered    COVID-19, [address], Primary or Immunocompromised, (age 12y+), IM, 100 mcg/0.5mL 01/21/2021, 02/18/2021, 11/16/2021    DT (pediatric) 03/11/2010    Influenza Virus Vaccine 10/21/2010, 10/05/2011, 10/19/2012    Influenza, FLUAD, (age 72 y+), Adjuvanted 09/15/2020, 10/12/2021    Influenza, High Dose (Fluzone 65 yrs and older) 10/14/2013, 10/09/2014, 10/02/2015, 09/02/2016, 10/24/2017, 10/26/2018    Influenza, Triv, inactivated, subunit, adjuvanted, IM (Fluad 65 yrs and older) 10/03/2019    Pneumococcal Conjugate 13-valent ([id number]) 04/02/2015    Pneumococcal Polysaccharide ([id number]) 11/04/1997, 11/12/2008    Tdap (Boostrix, Adacel) 06/08/2022    Zoster Live (Zostavax) 01/05/2012       Active Problems:  Patient Active Problem List   Diagnosis Code    Dyslipidemia E78.5    Osteoarthritis M19.90    Esophagitis K20.90    Vitamin D deficiency E55.9    PMR (polymyalgia rheumatica) ([facility]) M35.3    Central artery occlusion of retina S45.31    Diastolic dysfunction A08.81    Type 2 diabetes with nephropathy ([facility]) E11.21    CHICO- intolerant CPAP G47.33    Essential hypertension I10    Lumbar radiculopathy M54.16    Neural foraminal stenosis of lumbar spine M48.061    Spinal stenosis at L4-L5 level M48.061    Mitral regurgitation I34.0    Frequent PVCs I49.3    Gait abnormality R26.9    Asthma J45.909    Multifocal atrial tachycardia (HCC) I47.1    Severe obesity (BMI 35.0-39. 9) with comorbidity ([facility] E66.01    Stage 3a chronic kidney disease (HCC) N18.31    Thrombocytopenia (HCC) D69.6    Hypoxia R09.02    Nausea vomiting and diarrhea R11.2, R19.7    Type 2 diabetes mellitus with chronic kidney disease E11.22    Typical atrial flutter I48. 3    Chronic renal disease, stage III [facility]) [810225] N18.30    Perforated diverticulum of large intestine K57.20       Isolation/Infection:   Isolation            No Isolation          Patient Infection Status       Infection Onset Added Last Indicated Last Indicated By Review Planned Expiration Resolved Resolved By    None active    Resolved    INFLUENZA 22 RAPID INFLUENZA A/B ANTIGENS   22     COVID-19 (Rule Out) 12/15/20 12/15/20 12/15/20 COVID-19 Ambulatory (Ordered)   20 Rule-Out Test Resulted    COVID-19 (Rule Out) 20 COVID-19 Ambulatory (Ordered)   20 Rule-Out Test Resulted            Nurse Assessment:  Last Vital Signs: /63   Pulse 70   Temp 99 °F (37.2 °C) (Axillary)   Resp 19   Ht 4' 11\" (1.499 m)   Wt 179 lb 0.2 oz (81.2 kg)   SpO2 99%   BMI 36.16 kg/m²     Last documented pain score (0-10 scale): Pain Level: 8  Last Weight:   Wt Readings from Last 1 Encounters:   22 179 lb 0.2 oz (81.2 kg)     Mental Status:  {IP PT MENTAL STATUS:}    IV Access:  { [facility] IV ACCESS:651369041}    Nursing Mobility/ADLs:  Walking   {P DME QTFK:600321018}  Transfer  {P DME XWKC:355404751}  Bathing  {[facility] DME PZC}  Dressing  {P DME VMNS:008098079}  Toileting  {P DME SITK:086132781}  Feeding  {P DME YPDC:019110157}  Med Admin  {[facility] DME ARSS:625550157}  Med Delivery   { [facility] MED Delivery:824907039}    Wound Care Documentation and Therapy:  Negative Pressure Wound Therapy Abdomen Medial (Active)   Wound Type Surgical 22   Dressing Type Other (Comment) 22   Target Pressure (mmHg) 125 22   Dressing Status Clean, dry & information only, NOT a DME order):  {EQUIPMENT:316811510}  Other Treatments: ***    Patient's personal belongings (please select all that are sent with patient):  {CHP DME Belongings:332872541}    RN SIGNATURE:  {Esignature:391221469}    CASE MANAGEMENT/SOCIAL WORK SECTION    Inpatient Status Date: ***    Readmission Risk Assessment Score:  Readmission Risk              Risk of Unplanned Readmission:  30           Discharging to Facility/ Agency   Name:   Address:  Phone:  Fax:    Dialysis Facility (if applicable)   Name:  Address:  Dialysis Schedule:  Phone:  Fax:    / signature: {Esignature:578096078}    PHYSICIAN SECTION    Prognosis: Good    Condition at Discharge: Stable    Rehab Potential (if transferring to Rehab): Good    Recommended Labs or Other Treatments After Discharge: ***    Physician Certification: I certify the above information and transfer of Roby Erazo  is necessary for the continuing treatment of the diagnosis listed and that she requires {Admit to Appropriate Level of Care:24590} for less 30 days.      Update Admission H&P: No change in H&P    PHYSICIAN SIGNATURE:  Electronically signed by INGRID Rosales CNP on 8/22/22 at 3:38 PM EDT

## 2022-08-22 NOTE — PLAN OF CARE
Problem: Discharge Planning  Goal: Discharge to home or other facility with appropriate resources  Outcome: Progressing     Problem: Safety - Adult  Goal: Free from fall injury  Outcome: Progressing     Problem: ABCDS Injury Assessment  Goal: Absence of physical injury  Outcome: Progressing  Flowsheets (Taken 8/21/2022 2000 by Michell Dey RN)  Absence of Physical Injury: Implement safety measures based on patient assessment     Problem: Gastrointestinal - Adult  Goal: Minimal or absence of nausea and vomiting  Outcome: Progressing     Problem: Metabolic/Fluid and Electrolytes - Adult  Goal: Electrolytes maintained within normal limits  Outcome: Progressing     Problem: Metabolic/Fluid and Electrolytes - Adult  Goal: Hemodynamic stability and optimal renal function maintained  Outcome: Progressing     Problem: Metabolic/Fluid and Electrolytes - Adult  Goal: Glucose maintained within prescribed range  Outcome: Progressing     Problem: Hematologic - Adult  Goal: Maintains hematologic stability  Outcome: Progressing     Problem: Safety - Medical Restraint  Goal: Remains free of injury from restraints (Restraint for Interference with Medical Device)  Description: INTERVENTIONS:  1. Determine that other, less restrictive measures have been tried or would not be effective before applying the restraint  2. Evaluate the patient's condition at the time of restraint application  3. Inform patient/family regarding the reason for restraint  4.  Q2H: Monitor safety, psychosocial status, comfort, nutrition and hydration  Outcome: Progressing  Flowsheets  Taken 8/22/2022 0600 by Cony Saldana RN  Remains free of injury from restraints (restraint for interference with medical device): Every 2 hours: Monitor safety, psychosocial status, comfort, nutrition and hydration  Taken 8/22/2022 0400 by Cony Saldana RN  Remains free of injury from restraints (restraint for interference with medical device): Every 2 hours: Monitor safety, psychosocial status, comfort, nutrition and hydration  Taken 8/22/2022 0200 by Collette Estevez RN  Remains free of injury from restraints (restraint for interference with medical device): Every 2 hours: Monitor safety, psychosocial status, comfort, nutrition and hydration  Taken 8/22/2022 0000 by Collette Estevez RN  Remains free of injury from restraints (restraint for interference with medical device): Every 2 hours: Monitor safety, psychosocial status, comfort, nutrition and hydration  Taken 8/21/2022 2200 by Marcio Meek RN  Remains free of injury from restraints (restraint for interference with medical device): Every 2 hours: Monitor safety, psychosocial status, comfort, nutrition and hydration  Taken 8/21/2022 2000 by Marcio Meek RN  Remains free of injury from restraints (restraint for interference with medical device): Every 2 hours: Monitor safety, psychosocial status, comfort, nutrition and hydration     Problem: Pain  Goal: Verbalizes/displays adequate comfort level or baseline comfort level  Outcome: Progressing     Problem: Respiratory - Adult  Goal: Achieves optimal ventilation and oxygenation  8/22/2022 0628 by Collette Estevez RN  Outcome: Progressing     Problem: Chronic Conditions and Co-morbidities  Goal: Patient's chronic conditions and co-morbidity symptoms are monitored and maintained or improved  Outcome: Progressing     Problem: Nutrition Deficit:  Goal: Optimize nutritional status  Outcome: Progressing     Problem: Skin/Tissue Integrity  Goal: Absence of new skin breakdown  Description: 1. Monitor for areas of redness and/or skin breakdown  2. Assess vascular access sites hourly  3. Every 4-6 hours minimum:  Change oxygen saturation probe site  4. Every 4-6 hours:  If on nasal continuous positive airway pressure, respiratory therapy assess nares and determine need for appliance change or resting period.   Outcome: Progressing

## 2022-08-23 NOTE — PLAN OF CARE
Problem: Discharge Planning  Goal: Discharge to home or other facility with appropriate resources  8/23/2022 0827 by Niru Sandoval RN  Outcome: Progressing  8/22/2022 2116 by Justyna Dozier RN  Outcome: Progressing  8/22/2022 2015 by Niru Sandoval RN  Outcome: Progressing  8/22/2022 2004 by Justyna Dozier RN  Outcome: Progressing  Flowsheets (Taken 8/22/2022 2000)  Discharge to home or other facility with appropriate resources: Identify barriers to discharge with patient and caregiver     Problem: Safety - Adult  Goal: Free from fall injury  8/23/2022 0827 by Niru Sandoval RN  Outcome: Progressing  8/22/2022 2116 by Justyna Dozier RN  Outcome: Progressing  8/22/2022 2015 by Niru Sandoval RN  Outcome: Progressing  8/22/2022 2004 by Justyna Dozier RN  Outcome: Progressing  Flowsheets (Taken 8/22/2022 2000)  Free From Fall Injury: Instruct family/caregiver on patient safety     Problem: ABCDS Injury Assessment  Goal: Absence of physical injury  8/23/2022 0827 by Niru Sandoval RN  Outcome: Progressing  8/22/2022 2116 by Justyna Dozier RN  Outcome: Progressing  8/22/2022 2015 by Niru Sandoval RN  Outcome: Progressing  8/22/2022 2004 by Justyna Dozier RN  Outcome: Progressing  Flowsheets (Taken 8/22/2022 2000)  Absence of Physical Injury: Implement safety measures based on patient assessment     Problem: Gastrointestinal - Adult  Goal: Minimal or absence of nausea and vomiting  8/23/2022 0827 by Niru Sandoval RN  Outcome: Progressing  8/22/2022 2116 by Justyna Dozier RN  Outcome: Progressing  8/22/2022 2015 by Niru Sandoval RN  Outcome: Progressing  8/22/2022 2004 by Justyna Dozier RN  Outcome: Progressing     Problem: Metabolic/Fluid and Electrolytes - Adult  Goal: Electrolytes maintained within normal limits  8/23/2022 0827 by Niru Sandoval RN  Outcome: Progressing  8/22/2022 2116 by Justyna Dozier RN  Outcome: Progressing  8/22/2022 2015 by Niru Sandoval RN  Outcome: Progressing  8/22/2022 2004 by Shannan Johnson RN  Outcome: Progressing  Flowsheets (Taken 8/22/2022 2000)  Electrolytes maintained within normal limits: Monitor labs and assess patient for signs and symptoms of electrolyte imbalances  Goal: Hemodynamic stability and optimal renal function maintained  8/23/2022 0827 by Lay Bates RN  Outcome: Progressing  8/22/2022 2116 by Shannan Johnson RN  Outcome: Progressing  8/22/2022 2015 by Lay Bates RN  Outcome: Progressing  8/22/2022 2004 by Shannan Johnson RN  Outcome: Progressing  Flowsheets (Taken 8/22/2022 2000)  Hemodynamic stability and optimal renal function maintained: Monitor labs and assess for signs and symptoms of volume excess or deficit  Goal: Glucose maintained within prescribed range  8/23/2022 0827 by Lay Bates RN  Outcome: Progressing  8/22/2022 2116 by Shannan Johnson RN  Outcome: Progressing  8/22/2022 2015 by Lay Bates RN  Outcome: Progressing  8/22/2022 2004 by Shannan Johnson RN  Outcome: Progressing  Flowsheets (Taken 8/22/2022 2000)  Glucose maintained within prescribed range: Monitor blood glucose as ordered     Problem: Hematologic - Adult  Goal: Maintains hematologic stability  8/23/2022 0827 by Lay Bates RN  Outcome: Progressing  8/22/2022 2116 by Shannan Johnson RN  Outcome: Progressing  8/22/2022 2015 by Lay Bates RN  Outcome: Progressing  8/22/2022 2004 by Shannan Johnson RN  Outcome: Progressing  Flowsheets (Taken 8/22/2022 2000)  Maintains hematologic stability: Assess for signs and symptoms of bleeding or hemorrhage     Problem: Safety - Medical Restraint  Goal: Remains free of injury from restraints (Restraint for Interference with Medical Device)  Description: INTERVENTIONS:  1. Determine that other, less restrictive measures have been tried or would not be effective before applying the restraint  2. Evaluate the patient's condition at the time of restraint application  3.  Inform patient/family regarding the reason for restraint  4.  Q2H: Monitor safety, psychosocial status, comfort, nutrition and hydration  8/23/2022 0827 by Niru Sandoval RN  Outcome: Progressing  Flowsheets  Taken 8/23/2022 0600 by Justyna Dozier RN  Remains free of injury from restraints (restraint for interference with medical device): Every 2 hours: Monitor safety, psychosocial status, comfort, nutrition and hydration  Taken 8/23/2022 0400 by Justyna Dozier RN  Remains free of injury from restraints (restraint for interference with medical device): Every 2 hours: Monitor safety, psychosocial status, comfort, nutrition and hydration  Taken 8/23/2022 0200 by Justyna Dozier RN  Remains free of injury from restraints (restraint for interference with medical device): Every 2 hours: Monitor safety, psychosocial status, comfort, nutrition and hydration  Taken 8/23/2022 0000 by Justyna Dozier RN  Remains free of injury from restraints (restraint for interference with medical device): Every 2 hours: Monitor safety, psychosocial status, comfort, nutrition and hydration  Taken 8/22/2022 2200 by Justyna Dozier RN  Remains free of injury from restraints (restraint for interference with medical device): Every 2 hours: Monitor safety, psychosocial status, comfort, nutrition and hydration  8/22/2022 2116 by Justyna Dozier RN  Outcome: Progressing  8/22/2022 2015 by Niru Sandoval RN  Outcome: Progressing  8/22/2022 2004 by Justyna Dozier RN  Outcome: Progressing  Flowsheets  Taken 8/22/2022 2000 by Justyna Dozier RN  Remains free of injury from restraints (restraint for interference with medical device): Every 2 hours: Monitor safety, psychosocial status, comfort, nutrition and hydration  Taken 8/22/2022 1800 by Niru Sandoval RN  Remains free of injury from restraints (restraint for interference with medical device): Every 2 hours: Monitor safety, psychosocial status, comfort, nutrition and hydration  Taken 8/22/2022 1400 by Niru Sandoval RN  Remains Progressing  8/22/2022 2015 by Alys Burkitt, RN  Outcome: Progressing  8/22/2022 2004 by Sully Bliss RN  Outcome: Progressing  Flowsheets (Taken 8/22/2022 2000)  Care Plan - Patient's Chronic Conditions and Co-Morbidity Symptoms are Monitored and Maintained or Improved: Monitor and assess patient's chronic conditions and comorbid symptoms for stability, deterioration, or improvement     Problem: Nutrition Deficit:  Goal: Optimize nutritional status  8/23/2022 0827 by Alys Burkitt, RN  Outcome: Progressing  8/22/2022 2116 by Sully Bliss RN  Outcome: Progressing  8/22/2022 2015 by Alys Burkitt, RN  Outcome: Progressing  8/22/2022 2004 by Sully Bliss RN  Outcome: Progressing     Problem: Skin/Tissue Integrity  Goal: Absence of new skin breakdown  Description: 1. Monitor for areas of redness and/or skin breakdown  2. Assess vascular access sites hourly  3. Every 4-6 hours minimum:  Change oxygen saturation probe site  4. Every 4-6 hours:  If on nasal continuous positive airway pressure, respiratory therapy assess nares and determine need for appliance change or resting period.   8/23/2022 0827 by Alys Burkitt, RN  Outcome: Progressing  8/22/2022 2116 by Sully Bilss RN  Outcome: Progressing  8/22/2022 2015 by Alys Burkitt, RN  Outcome: Progressing  8/22/2022 2004 by Sully Bliss RN  Outcome: Progressing

## 2022-08-23 NOTE — PLAN OF CARE
Problem: Safety - Medical Restraint  Goal: Remains free of injury from restraints (Restraint for Interference with Medical Device)  Description: INTERVENTIONS:  1. Determine that other, less restrictive measures have been tried or would not be effective before applying the restraint  2. Evaluate the patient's condition at the time of restraint application  3. Inform patient/family regarding the reason for restraint  4.  Q2H: Monitor safety, psychosocial status, comfort, nutrition and hydration  8/23/2022 1842 by Evaristo Harvey RN  Outcome: Progressing  Flowsheets (Taken 8/23/2022 1800)  Remains free of injury from restraints (restraint for interference with medical device): Every 2 hours: Monitor safety, psychosocial status, comfort, nutrition and hydration  8/23/2022 0827 by Evaristo Harvey RN  Outcome: Progressing  Flowsheets  Taken 8/23/2022 0800 by Evaristo Harvey RN  Remains free of injury from restraints (restraint for interference with medical device): Every 2 hours: Monitor safety, psychosocial status, comfort, nutrition and hydration  Taken 8/23/2022 0600 by Zelda Villa RN  Remains free of injury from restraints (restraint for interference with medical device): Every 2 hours: Monitor safety, psychosocial status, comfort, nutrition and hydration  Taken 8/23/2022 0400 by Zelda Villa RN  Remains free of injury from restraints (restraint for interference with medical device): Every 2 hours: Monitor safety, psychosocial status, comfort, nutrition and hydration  Taken 8/23/2022 0200 by Zelda Villa RN  Remains free of injury from restraints (restraint for interference with medical device): Every 2 hours: Monitor safety, psychosocial status, comfort, nutrition and hydration  Taken 8/23/2022 0000 by Zelda Villa RN  Remains free of injury from restraints (restraint for interference with medical device): Every 2 hours: Monitor safety, psychosocial status, comfort, nutrition and hydration  Taken 8/22/2022 2200 by Shannan Johnson RN  Remains free of injury from restraints (restraint for interference with medical device): Every 2 hours: Monitor safety, psychosocial status, comfort, nutrition and hydration

## 2022-08-23 NOTE — OP NOTE
Operative Note      Patient: Yasmeen Beckford  YOB: 1935  MRN: [id number]    Date of Procedure: 8/23/2022    Pre-Op Diagnosis: PERFORATED BOWEL    Post-Op Diagnosis: Same       Procedure(s):  RE-EXPLORATION LAPAROTOMY, COLOSTOMY CREATION, CLOSURE OF ABDOMINAL WOUND    Surgeon(s):  Farzaneh Graves MD    Assistant:   * No surgical staff found *    Anesthesia: General    Estimated Blood Loss (mL): Minimal    Complications: None    Specimens:   * No specimens in log *    Implants:  * No implants in log *      Drains:   Negative Pressure Wound Therapy Abdomen Medial (Active)   Wound Type Surgical 08/23/22 0800   Dressing Type Other (Comment) 08/23/22 0800   Target Pressure (mmHg) 125 08/23/22 0800   Dressing Status Clean, dry & intact 08/23/22 0800   Drainage Amount Scant 08/23/22 0800   Drainage Description Serosanguinous 08/23/22 0800   Output (ml) 10 ml 08/22/22 2000   Wound Assessment Other (Comment) 08/23/22 0800   Sadaf-wound Assessment Other (Comment) 08/23/22 0800   Odor None 08/23/22 0800       NG/OG/NJ/NE Tube Nasogastric (Active)   Surrounding Skin Clean, dry & intact 08/23/22 1400   Securement device Tape 08/23/22 1400   Status Suction-low intermittent 08/23/22 1400   Placement Verified External Catheter Length 08/23/22 1400   NG/OG/NJ/NE External Measurement (cm) 52 cm 08/23/22 1400   Drainage Appearance Bile 08/23/22 1400   Output (mL) 0 ml 08/22/22 2000       Urinary Catheter 08/20/22 [place] (Active)   Catheter Indications Need for fluid volume management of the critically ill patient in a critical care setting 08/23/22 0800   Site Assessment No urethral drainage 08/23/22 0800   Urine Color Courtney 08/23/22 0800   Urine Appearance Clear 08/23/22 0800   Urine Odor Malodorous 08/22/22 0400   Collection Container Standard 08/23/22 0800   Securement Method Leg strap 08/23/22 0800   Catheter Care Completed Yes 08/23/22 0800   Catheter Best Practices  Drainage tube clipped to bed;Catheter secured to thigh; Bag below bladder;Bag not on floor 08/23/22 0800   Status Draining 08/23/22 0800   Output (mL) 30 mL 08/23/22 0900       Findings: non    Detailed Description of Procedure:   Under general endotracheal anesthesia, patient prepped and draped in a sterile manner, and Ab Thera removed. Stapled off proximal sigmoid mobilized and brought though LLQ ostomy site. After irrigation with irracept, midline incision closed with loop PDS and skin approximated with staples. Colostomy matured. Patient returned to TICU intubated and in good condition.     Electronically signed by Kati Boyd MD on 8/23/2022 at 2:53 PM

## 2022-08-23 NOTE — ANESTHESIA PRE PROCEDURE
Department of Anesthesiology  Preprocedure Note       Name:  Vincent Cintron   Age:  81 y.o.  :  1935                                          MRN:  [id number]         Date:  2022      Surgeon: Bárbara Parker):  Alcon Burroughs MD    Procedure: RE-EXPLORATION LAPAROTOMY, POSSIBLE COLOSTOMY CREATION, POSSIBLE CLOSURE    Medications prior to admission:   Prior to Admission medications    Medication Sig Start Date End Date Taking? Authorizing Provider   polycarbophil (FIBERCON) 625 MG tablet Take 625 mg by mouth daily    Historical Provider, MD   VICTOZA 18 MG/3ML SOPN SC injection INJECT 1.8 MG INTO THE SKIN DAILY 22   [facility], APRN - CNP   amLODIPine (NORVASC) 5 MG tablet TAKE 1 TABLET DAILY 22   [facility] C[facility]   gabapentin (NEURONTIN) 100 MG capsule TAKE 1 CAPSULE DAILY 22  [facility] CNP   furosemide (LASIX) 40 MG tablet TAKE 1 TABLET DAILY 22   [facility], APRN - CNP   Insulin Pen Needle (B-D ULTRAFINE III SHORT PEN) 31G X 8 MM MISC USE 7 DAILY 22   [facility], APRN - CNP   potassium chloride (KLOR-CON M20) 20 MEQ extended release tablet Take 1 tablet by mouth daily 22   [facility] CNP   amiodarone (PACERONE) 100 MG tablet Take 1 tablet by mouth daily 22   [facility] - CNP   metoprolol tartrate (LOPRESSOR) 25 MG tablet Take 1 tablet by mouth 2 times daily 22   [facility], APRN - CNP   albuterol sulfate  (90 Base) MCG/ACT inhaler Inhale 2 puffs into the lungs 4 times daily as needed for Wheezing 3/31/22   [facility], APRN - CNP   insulin lispro, 1 Unit Dial, (HUMALOG KWIKPEN) 100 UNIT/ML SOPN Humalog pen 3 times daily with meals per sliding scale. Blood sugar is 70, drink juice. = 3 units,  151-200 =6, 201-250 = 8 units, 251-300 = 10 units.  3/31/22   INGRID Cates CNP   apixaban (ELIQUIS) 5 MG TABS tablet Take 1 tablet by mouth 2 times daily 3/24/22   Howie Cervantes MD   albuterol (PROVENTIL) (2.5 MG/3ML) 0.083% nebulizer solution Take 3 mLs by nebulization every 4 hours as needed for Wheezing or Shortness of Breath 3/15/22   INGRID Sneed CNP   diphenhydrAMINE-APAP, sleep, (TYLENOL PM EXTRA STRENGTH PO) Take 1 tablet by mouth at bedtime    Historical Provider, MD   simvastatin (ZOCOR) 20 MG tablet TAKE 1 TABLET NIGHTLY 2/2/22   [facility]RID Sneed CNP   insulin glargine (LANTUS SOLOSTAR) 100 UNIT/ML injection pen INJECT 16 UNITS UNDER THE SKIN TWICE A DAY  Patient taking differently: INJECT 12 UNITS UNDER THE SKIN TWICE A DAY 1/24/22   INGRID Ryan CNP   predniSONE (DELTASONE) 1 MG tablet TAKE 3 TABLETS DAILY 8/20/21   INGRID Sneed CNP   Nutritional Supplements (GLUCOSE MANAGEMENT) TABS 15 grams for bs less than 70 4/7/21   INGRID Hilliard CNP   nystatin (MYCOSTATIN) 852788 UNIT/GM cream Apply topically 2 times daily. 1/4/21   INGRID Ryan CNP   Polyethylene Glycol 400 0.25 % SOLN Place into both eyes as needed (dry eyes)     Historical Provider, MD   omeprazole (PRILOSEC) 20 MG capsule Take 20 mg by mouth Daily  1/14/15   [facility]   Cholecalciferol (VITAMIN D3) 2000 UNITS CAPS Take 2,000 Units by mouth daily     Historical Provider, MD       Current medications:    No current facility-administered medications for this visit. No current outpatient medications on file.      Facility-Administered Medications Ordered in Other Visits   Medication Dose Route Frequency Provider Last Rate Last Admin    calcium gluconate 1000 mg in sodium chloride 50 mL  1,000 mg IntraVENous Once Jennifer Youngblood MD 50 mL/hr at 08/23/22 0909 Rate Verify at 08/23/22 0909    norepinephrine (LEVOPHED) 16 mg in sodium chloride 0.9 % 250 mL infusion  1-40 mcg/min IntraVENous Continuous INGRID Cruz CNP 5.6 mL/hr at 08/23/22 0909 6 mcg/min at 08/23/22 0909    vasopressin 20 Units in dextrose 5 % 100 mL infusion  0.04 Units/min IntraVENous Continuous Birch Haus, APRN - CNP        insulin regular (HUMULIN R;NOVOLIN R) 100 Units in sodium chloride 0.9 % 100 mL infusion  1-50 Units/hr IntraVENous Continuous Birch Haus, APRN - CNP 2.4 mL/hr at 08/23/22 0909 2.43 Units/hr at 08/23/22 0909    ciprofloxacin (CIPRO) IVPB 400 mg  400 mg IntraVENous Q24H Humberto Natarajan MD   Stopped at 08/23/22 0508    chlorhexidine (PERIDEX) 0.12 % solution 15 mL  15 mL Mouth/Throat BID Humberto Natraajan MD   15 mL at 08/23/22 0905    famotidine (PEPCID) 20 mg in sodium chloride (PF) 10 mL injection  20 mg IntraVENous Daily Humberto Natarajan MD   20 mg at 08/23/22 3318    propofol injection  5-50 mcg/kg/min IntraVENous Continuous Humberto Natarajan MD 12.2 mL/hr at 08/23/22 0909 25 mcg/kg/min at 08/23/22 0909    fentaNYL 50 mcg/mL 50 mL infusion   mcg/hr IntraVENous Continuous Humberto Natarajan MD 1.5 mL/hr at 08/23/22 0909 75 mcg/hr at 08/23/22 0909    glucose chewable tablet 16 g  4 tablet Oral PRN Humberto Natarajan MD        dextrose bolus 10% 125 mL  125 mL IntraVENous PRN Humberto Natarajan MD        Or    dextrose bolus 10% 250 mL  250 mL IntraVENous PRN Humberto Natarajan MD        glucagon (rDNA) injection 1 mg  1 mg SubCUTAneous PRN Humberto Natarajan MD        dextrose 10 % infusion   IntraVENous Continuous PRN Humberto Natarajan MD        sodium chloride flush 0.9 % injection 5-40 mL  5-40 mL IntraVENous 2 times per day Humberto Natarajan MD   10 mL at 08/23/22 0905    sodium chloride flush 0.9 % injection 5-40 mL  5-40 mL IntraVENous PRN Humberto Natarajan MD        0.9 % sodium chloride infusion   IntraVENous PRN Humberto Natarajan MD        metronidazole (FLAGYL) 500 mg in 0.9% NaCl 100 mL IVPB premix  500 mg IntraVENous Q8H Humberto Natarajan MD   Stopped at 08/23/22 0751    ondansetron (ZOFRAN) injection 4 mg  4 mg IntraVENous Q6H PRN Humberto Natarajan MD        lactated ringers infusion   IntraVENous Continuous Humberto Natarajan MD fibrillation with RVR ([facility] [address]     Hospitalized [facility] 2/8/21-2/11/21    Basal cell carcinoma of cheek 2007    Right cheek    Basal cell carcinoma of leg     right leg    CAD (coronary artery disease)     With 40% stenosis of the LAD, 07/10, ejection fraction 60%. Repeat heart cath9/14 with 40-50 percent LAD lesion first diagonal 50% lesion rec med Rx    Central retinal artery occlusion     Right side November 2014 status post TPA    Cerebral artery occlusion with cerebral infarction ([facility] [address] 11/24/2014    Cerebrovascular disease     50-79% stenosis on left on ultrasound 11/14    CHF (congestive heart failure) ([facility])     Echo 1/15 moderate MR, severe TR, RVSP 76, grade 2 diastolic dysfunction    Diverticulosis     AVM on colonoscopy, 05/11    Dyslipidemia     Elevated antinuclear antibody (ZAIRA) level     History of    Esophagitis 05/2011    Gastritis/esophagitis on EGD, Dr. Ruslan Fuentes. Repeat EGD6/15 Gastritis    Foraminal stenosis of lumbar region     Moderate  Right L4/L5 neuroforaminal stenosis, Dr Ana Benitez following. MRI 2/16 [place]. Moderate foraminal stenosis mild to moderate central canal stenosis    Hyperlipidemia     Hypertension     Hypokalemia 2/9/2019    IBS (irritable bowel syndrome)     GI consultation with Dr. Tana Paz, GI specialist in [facility], felt that she probably has chronic functional diarrhea and recommended empiric Imodium, Pepto-Bismol or Questran first. Sprue test Neg 2011    Iron deficiency anemia     Percent sat iron 5, January 2015, ferritin 40 1 /15, FOBT positive  EGD 6/15  Gastritis, IV iron 9/15x3 effective,  colonoscopy deferred by Dr. Ruslan Fuentes. --Prior colonoscopy 2011 with severe diverticulosis and telangiectasia. Trial iron solution in [place] juice 12/16    Iron deficiency anemia due to chronic blood loss 09/08/2015    Junctional bradycardia     Symptomatic, resolved with discontinuation of Verapamil, 05/09. 1.1) Echocardiogram: LAE, LVH, EF 50%, mild MR, diastolic. 1.2) Dr. Galvan [facility] evaluation. 1.3.) Persantine stress test negative, 05/09.  Migraine     Mild CAD     cath 10/15    Mitral regurgitation     Moderate to severe on echocardiogram September 2015.   Right pressure 76 grade 2 diastolic dysfunction,  echo 62/48 grade 2 diastolic dysfunction mild to moderate MR RVSP 56 aortic sclerosis    Nephrolithiasis 2/5/2019    Obesity     CHICO (obstructive sleep apnea)     CPAP 14 2/15 initiation  AHI 7  mild CHIOC intolerant CPAP    Osteoarthritis     PMR (polymyalgia rheumatica) (HCC)     Pneumonia     Premature atrial contractions     Pulmonary HTN ([address] 1/10/2015    Pulmonary hypertension (HCC)     -Moderate on echo November 2014    Restrictive lung disease     Mild on PFTs 11/14    Right kidney stone 2/6/2019    Sigmoid diverticulitis 5/30/2019    Type II or unspecified type diabetes mellitus without mention of complication, not stated as uncontrolled     Vitreous floaters        Past Surgical History:        Procedure Laterality Date    APPENDECTOMY  1952    CARDIAC CATHETERIZATION  2014    CATARACT REMOVAL Bilateral 08/2010    CHOLECYSTECTOMY      COLONOSCOPY  05/16/2011    COLONOSCOPY N/A 09/26/2019    severe diverticulosis, benign rectal polyp, Dr. Tri Soliman Right 02/06/2019    Cysto with right stent insertion and villareal catheter performed by Jessica Crouch MD at [address] [place] [address] Right 02/27/2019    CYSTO right stent removal  Right Ureteroscopy Holmium Laser right stent exchange performed by Jessica Crouch MD at [facility], DIAGNOSTIC      EYE SURGERY      HYSTERECTOMY (CERVIX STATUS UNKNOWN)  Approx 1983    LAPAROTOMY  08/20/2022    LAPAROTOMY EXPLORATORY WITH BOWEL RESECTION AND OSTEOMY CREATION    LAPAROTOMY N/A 8/20/2022    LAPAROTOMY EXPLORATORY, RESECTION OF SIGMOID COLON, LYSIS OF ADHESIONS, APPLICATION OF GEL FOAM SOAKED IN THROMBIN, SPLEENECTOMY, WOUND VAC PLACEMENT performed by Lior Nevarez MD at [facility] OR    MALIGNANT SKIN LESION EXCISION Right 12/10 and 04/11    Basal CellRemoved from right neck    MALIGNANT SKIN LESION EXCISION Right 02/2012    Basal Cell Removed from right leg    OTHER SURGICAL HISTORY  09/06/2011    capsule endoscopy    OTHER SURGICAL HISTORY  03/22/2016    right L4 and L5 TFE    OTHER SURGICAL HISTORY Right 04/26/2016    L4, L5 TFE    UPPER GASTROINTESTINAL ENDOSCOPY  05/16/2011    UPPER GASTROINTESTINAL ENDOSCOPY  06/22/2015    mild gastritis (Dr. Carina Douglas)    UPPER GASTROINTESTINAL ENDOSCOPY N/A 09/26/2019    mild to moderate inflammation, Dr. Carina Douglas       Social History:    Social History     Tobacco Use    Smoking status: Never    Smokeless tobacco: Never    Tobacco comments:     amish rrt 5/28/2019   Substance Use Topics    Alcohol use: No     Alcohol/week: 0.0 standard drinks                                Counseling given: Not Answered  Tobacco comments: amish rrt 5/28/2019      Vital Signs (Current): There were no vitals filed for this visit.                                            BP Readings from Last 3 Encounters:   08/23/22 (!) 159/39   08/20/22 (!) 125/47   08/16/22 126/84       NPO Status:                                                                                 BMI:   Wt Readings from Last 3 Encounters:   08/20/22 179 lb 0.2 oz (81.2 kg)   08/20/22 176 lb (79.8 kg)   08/16/22 177 lb (80.3 kg)     There is no height or weight on file to calculate BMI.    CBC:   Lab Results   Component Value Date/Time    WBC 23.0 08/23/2022 04:04 AM    RBC 2.83 08/23/2022 04:04 AM    HGB 7.3 08/23/2022 04:04 AM    HCT 23.7 08/23/2022 04:04 AM    MCV 83.7 08/23/2022 04:04 AM    RDW 17.3 08/23/2022 04:04 AM    PLT See Reflexed IPF Result 08/23/2022 04:04 AM       CMP:   Lab Results   Component Value Date/Time     08/23/2022 04:04 AM    K 4.8 08/23/2022 04:04 AM     08/23/2022 04:04 AM    CO2 16 08/23/2022 04:04 AM    BUN 65 08/23/2022 04:04 AM    CREATININE 3.32 08/23/2022 04:04 AM    GFRAA 16 08/23/2022 04:04 AM    LABGLOM 13 08/23/2022 04:04 AM    GLUCOSE 147 08/23/2022 04:04 AM    PROT 4.3 08/22/2022 03:45 PM    CALCIUM 7.7 08/23/2022 04:04 AM    BILITOT 1.09 08/22/2022 03:45 PM    ALKPHOS 141 08/22/2022 03:45 PM    AST 36 08/22/2022 03:45 PM     08/22/2022 03:45 PM       POC Tests:   Recent Labs     08/23/22  0650   POCGLU 164*       Coags:   Lab Results   Component Value Date/Time    PROTIME 11.0 08/22/2022 10:53 AM    INR 1.0 08/22/2022 10:53 AM    APTT 27.2 07/20/2019 08:29 AM       HCG (If Applicable): No results found for: PREGTESTUR, PREGSERUM, HCG, HCGQUANT     ABGs:   Lab Results   Component Value Date/Time    PHART 7.378 08/21/2022 12:26 AM    PO2ART 264.0 08/21/2022 12:26 AM    IXH5TEF 32.1 08/21/2022 12:26 AM    QGU0BAL 18.5 08/21/2022 12:26 AM    U7XBAIEL 99.8 08/21/2022 12:26 AM        Type & Screen (If Applicable):  No results found for: LABABO, [address]    Anesthesia Evaluation  Patient summary reviewed and Nursing notes reviewed no history of anesthetic complications:   Airway: Mallampati: Unable to assess / NA  TM distance: >3 FB   Neck ROM: full  Mouth opening: > = 3 FB   Dental: normal exam         Pulmonary:normal exam  breath sounds clear to auscultation  (+) pneumonia:  sleep apnea: on noncompliant,  asthma: exercise-induced asthma,                            Cardiovascular:    (+) hypertension: no interval change, valvular problems/murmurs: MR, CAD:, dysrhythmias:, CHF: diastolic, hyperlipidemia      ECG reviewed  Rhythm: regular  Rate: normal           Beta Blocker:  Dose within 24 Hrs      ROS comment: EKG 8/20: Normal sinus rhythm  Nonspecific ST and T wave abnormality  Abnormal ECG    ECHO 2/9/21: EF55-60%.  Grade II diastolic dysfunction.         Neuro/Psych:   (+) CVA:, neuromuscular disease:, headaches: migraine headaches,             GI/Hepatic/Renal:   (+) GERD: well controlled, renal disease: CRI,          ROS comment: Perforated diverticulum. Endo/Other:    (+) DiabetesType II DM, using insulin, blood dyscrasia: anticoagulation therapy, arthritis: OA., .                  ROS comment: Polymyalgia rheumatica. Pt took eliquis this morning. No need to reverse per surgery team.  Abdominal:             Vascular: negative vascular ROS. Other Findings:             Anesthesia Plan      general     ASA 3       Induction: intravenous and inhalational.  arterial line  MIPS: Postoperative opioids intended and Prophylactic antiemetics administered. Plan discussed with CRNA.                     Ana Azul MD   8/23/2022

## 2022-08-23 NOTE — PROGRESS NOTES
PROGRESS NOTE          PATIENT NAME: Eric Jules  MEDICAL RECORD NO. [id number]  DATE: 8/23/2022  SURGEON: Dr. Cruz Pouch: Josy Sandoval, INGRID - CNP    HD: # 3    ASSESSMENT    Patient Active Problem List   Diagnosis    Dyslipidemia    Osteoarthritis    Esophagitis    Vitamin D deficiency    PMR (polymyalgia rheumatica) (HCC)    Central artery occlusion of retina    Diastolic dysfunction    Type 2 diabetes with nephropathy (HCC)    CHICO- intolerant CPAP    Essential hypertension    Lumbar radiculopathy    Neural foraminal stenosis of lumbar spine    Spinal stenosis at L4-L5 level    Mitral regurgitation    Frequent PVCs    Gait abnormality    Asthma    Multifocal atrial tachycardia (HCC)    Severe obesity (BMI 35.0-39. 9) with comorbidity ([address]    Stage 3a chronic kidney disease (HCC)    Thrombocytopenia (HCC)    Hypoxia    Nausea vomiting and diarrhea    Type 2 diabetes mellitus with chronic kidney disease    Typical atrial flutter    Chronic renal disease, stage III ([address] [673204]    Perforated diverticulum of large intestine     [place] transfer, Diverticulitis, perforation.      8/20: OR for ex lap, neto, splenectomy, abthera placement, got [place], discontinuity      MEDICAL DECISION MAKING AND PLAN    Neurologic:   - Sedation: propofol 25  - Pain control: fentanyl 75    Cardiovascular:  Pressors: 9 Levo,   Extensive cardiac hx, holding amio and Eliquis  Scheduled lopressor  Trend trop: 25 (28, 27, 24)  EKG: NSR  ECHO 8/22: EF 65%, evidence of diastolic dys  - HR: 83N-19D  - MAP: 60s-70s  - MAP goal >65  - LA 0.99 (1.07, 2.05, 3.24, 1.55)    Hematology:  Splenectomy  - Hg 7.3 (8.0, 9.5, 12.3)  - Plt 148 (125, 148, 237)  - DVT proph: holding  - s/p kcentra    Pulmonary:  - Vent settings: Mode PRVC /  / RR 22 / PEEP 8 / FiO2 40%  - ABG: pH 7.3 / pCO2 36.1 / pO2 119.0/ HCO3 17.6  - PF: 298  -LA: 0.99  - CXR:pulm edema  - Maintain oxygen sats >92%  - Pulmonary toilet    Renal/Fluid/Electrolyte  - BUN / Cr: 65/3.32 (47/2.66, 27 / 1.03, 27 / 1.20)  - Na / K: 135/4.8  - Mag / Phos: 2.1/6.1  - IV Fluids: LR @ 100/hr  - Net: +10.8  - UOP: 0.2 cc/kg/hr   -FeNa: intrinsic    GI/Nutrition  - Bowel regimen: holding pending completion of ostomy  - Ulcer Prophylaxis: pepcid  - Diet: strict NPO  - Discontinuity  -Splenectomy    ID  - WBC: 23.0 (20.6, 24.7, 21.3)  - Tmax: 36.9  - Antimicrobials: cipro (D3/), flagyl (D4/)  - splenectomy vaccines at d/c    MSK/Skin:  - wounds: midline    Endocrine:   - Glu 147  - restart insulin gtt: 39.5U/24h +8U HDSSI    Discharge Needs:  TBD    DISPOSITION:  [x] To remain ICU. OR for 2nd look today. Continue Abx. Has diastolic heart failure, plan to start amiodarone gtt if in Afib. Wean pressers as able. Post op. Chief Complaint: \"diverticulitis perf\"    Silviano Rodriguez was seen and examined at bedside, No overnight events. Pt remains oliguric. Intubated, sedated. NPO, IVF. VSS, afebrile. OBJECTIVE  VITALS: Temp: Temp: 98.4 °F (36.9 °C)Temp  Av.9 °F (37.2 °C)  Min: 98.4 °F (36.9 °C)  Max: 99 °F (14.4 °C) BP Systolic (75ORN), UI , Min:122 , MQR:319   Diastolic (19WGS), TJZ:67, Min:39, Max:90   Pulse Pulse  Av.2  Min: 61  Max: 74 Resp Resp  Av.8  Min: 0  Max: 24 Pulse ox SpO2  Av.2 %  Min: 97 %  Max: 99 %  GENERAL: intubated, sedated, no distress  NEURO: sedated  HEENT: no signs of trauma, LIANA  : villareal  LUNGS: mechanically ventilated  HEART: normal rate and regular rhythm  ABDOMEN: soft, non-tender, non-distended, bowel sounds present in all 4 quadrants, and no guarding or peritoneal signs present  EXTREMITY: no cyanosis, clubbing or edema    I/O last 3 completed shifts: In: 5150.7 [I.V.:3353.4; IV Piggyback:1797.3]  Out: 2449 [Urine:602; Emesis/NG output:50; Drains:610]    Drain/tube output:   In: 6520 [I.V.:2695.3]  Out: 46 [Urine:502; Drains:160]    LAB:  CBC:   Recent Labs     22  3601 08/22/22  0542 08/22/22  1954 08/23/22  0404   WBC 24.7* 20.6*  --  23.0*   HGB 9.5* 8.0* 7.3* 7.3*   HCT 31.0* 26.1* 24.7* 23.7*   MCV 85.2 84.7  --  83.7   PLT See Reflexed IPF Result See Reflexed IPF Result  --  See Reflexed IPF Result     BMP:   Recent Labs     08/21/22  2142 08/22/22  0542 08/23/22  0404    138 135   K 5.1 5.3 4.8    108* 105   CO2 19* 16* 16*   BUN 39* 47* 65*   CREATININE 2.10* 2.66* 3.32*   GLUCOSE 138* 252* 147*     COAGS:   Recent Labs     08/20/22  1757 08/21/22  0455 08/22/22  1053 08/22/22  1545   PROT 5.8* 4.3*  --  4.3*   INR 1.1  --  1.0  --        RADIOLOGY:  XR CHEST PORTABLE    Result Date: 8/23/2022  Findings most compatible with pulmonary edema. Support tubes and lines as above. XR CHEST PORTABLE    Result Date: 8/22/2022  Line and tubes as above. Pulmonary edema. XR CHEST PORTABLE    Result Date: 8/22/2022  Pulmonary vascular congestion. No significant change.             Jesus Stewart DO  8/23/2022  11:11 AM

## 2022-08-23 NOTE — PLAN OF CARE
Problem: Discharge Planning  Goal: Discharge to home or other facility with appropriate resources  8/22/2022 2015 by Nikolas Peterson RN  Outcome: Progressing  8/22/2022 2004 by Nora Garsia RN  Outcome: Progressing  8/22/2022 0628 by Syd Concepcion RN  Outcome: Progressing     Problem: Safety - Adult  Goal: Free from fall injury  8/22/2022 2015 by Nikolas Peterson RN  Outcome: Progressing  8/22/2022 2004 by Nora Garsia RN  Outcome: Progressing  8/22/2022 0628 by Syd Concepcion RN  Outcome: Progressing     Problem: ABCDS Injury Assessment  Goal: Absence of physical injury  8/22/2022 2015 by Nikolas Peterson RN  Outcome: Progressing  8/22/2022 2004 by Nora Garsia RN  Outcome: Progressing  8/22/2022 0628 by Syd Concepcion RN  Outcome: Progressing  Flowsheets (Taken 8/21/2022 2000 by Micah Sharif RN)  Absence of Physical Injury: Implement safety measures based on patient assessment     Problem: Gastrointestinal - Adult  Goal: Minimal or absence of nausea and vomiting  8/22/2022 2015 by Nikolas Peterson RN  Outcome: Progressing  8/22/2022 2004 by Nora Garsia RN  Outcome: Progressing  8/22/2022 0628 by Syd Concepcion RN  Outcome: Progressing     Problem: Metabolic/Fluid and Electrolytes - Adult  Goal: Electrolytes maintained within normal limits  8/22/2022 2015 by Nikolas Peterson RN  Outcome: Progressing  8/22/2022 2004 by Nora Garsia RN  Outcome: Progressing  8/22/2022 0628 by Syd Concepcion RN  Outcome: Progressing  Goal: Hemodynamic stability and optimal renal function maintained  8/22/2022 2015 by Nikolas Peterson RN  Outcome: Progressing  8/22/2022 2004 by Nora Garsia RN  Outcome: Progressing  8/22/2022 0628 by Syd Concepcion RN  Outcome: Progressing  Goal: Glucose maintained within prescribed range  8/22/2022 2015 by Nikolas Peterson RN  Outcome: Progressing  8/22/2022 2004 by Nora Garsia RN  Outcome: Progressing  8/22/2022 0628 by Syd Concepcion RN  Outcome: Progressing Problem: Hematologic - Adult  Goal: Maintains hematologic stability  8/22/2022 2015 by Selvin Martínez RN  Outcome: Progressing  8/22/2022 2004 by Lexie Braun RN  Outcome: Progressing  8/22/2022 0628 by Gasper Carroll RN  Outcome: Progressing     Problem: Safety - Medical Restraint  Goal: Remains free of injury from restraints (Restraint for Interference with Medical Device)  Description: INTERVENTIONS:  1. Determine that other, less restrictive measures have been tried or would not be effective before applying the restraint  2. Evaluate the patient's condition at the time of restraint application  3. Inform patient/family regarding the reason for restraint  4.  Q2H: Monitor safety, psychosocial status, comfort, nutrition and hydration  8/22/2022 2015 by Selvin Martínez RN  Outcome: Progressing  8/22/2022 2004 by Lexie Braun RN  Outcome: Progressing  Flowsheets  Taken 8/22/2022 2000 by Lexie Braun RN  Remains free of injury from restraints (restraint for interference with medical device): Every 2 hours: Monitor safety, psychosocial status, comfort, nutrition and hydration  Taken 8/22/2022 1800 by Selvin Martínez RN  Remains free of injury from restraints (restraint for interference with medical device): Every 2 hours: Monitor safety, psychosocial status, comfort, nutrition and hydration  Taken 8/22/2022 1400 by Selvin Martínez RN  Remains free of injury from restraints (restraint for interference with medical device): Every 2 hours: Monitor safety, psychosocial status, comfort, nutrition and hydration  Taken 8/22/2022 1200 by Selvin Martínez RN  Remains free of injury from restraints (restraint for interference with medical device): Every 2 hours: Monitor safety, psychosocial status, comfort, nutrition and hydration  Taken 8/22/2022 1000 by Selvin Martínez RN  Remains free of injury from restraints (restraint for interference with medical device): Every 2 hours: Monitor safety, psychosocial status, comfort, nutrition and hydration  Taken 8/22/2022 0800 by Ra Edwards RN  Remains free of injury from restraints (restraint for interference with medical device): Every 2 hours: Monitor safety, psychosocial status, comfort, nutrition and hydration  8/22/2022 0628 by Lindalee Merlin, RN  Outcome: Progressing  Flowsheets  Taken 8/22/2022 0600 by Lindalee Merlin, RN  Remains free of injury from restraints (restraint for interference with medical device): Every 2 hours: Monitor safety, psychosocial status, comfort, nutrition and hydration  Taken 8/22/2022 0400 by Lindalee Merlin, RN  Remains free of injury from restraints (restraint for interference with medical device): Every 2 hours: Monitor safety, psychosocial status, comfort, nutrition and hydration  Taken 8/22/2022 0200 by Lindalee Merlin, RN  Remains free of injury from restraints (restraint for interference with medical device): Every 2 hours: Monitor safety, psychosocial status, comfort, nutrition and hydration  Taken 8/22/2022 0000 by Lindalee Merlin, RN  Remains free of injury from restraints (restraint for interference with medical device): Every 2 hours: Monitor safety, psychosocial status, comfort, nutrition and hydration  Taken 8/21/2022 2200 by Citlaly Ruiz RN  Remains free of injury from restraints (restraint for interference with medical device): Every 2 hours: Monitor safety, psychosocial status, comfort, nutrition and hydration  Taken 8/21/2022 2000 by Citlaly Ruiz RN  Remains free of injury from restraints (restraint for interference with medical device): Every 2 hours: Monitor safety, psychosocial status, comfort, nutrition and hydration     Problem: Pain  Goal: Verbalizes/displays adequate comfort level or baseline comfort level  8/22/2022 2015 by Ra Edwards RN  Outcome: Progressing  8/22/2022 2004 by Carson Delvalle RN  Outcome: Progressing  Flowsheets (Taken 8/22/2022 2000)  Verbalizes/displays adequate comfort level or baseline comfort level: Administer analgesics based on type and severity of pain and evaluate response  8/22/2022 0628 by Osmar Winkler RN  Outcome: Progressing     Problem: Respiratory - Adult  Goal: Achieves optimal ventilation and oxygenation  8/22/2022 2015 by Felisha Calzada RN  Outcome: Progressing  8/22/2022 2004 by Venus Rothman RN  Outcome: Progressing  8/22/2022 1647 by Mitzy Bronson RCP  Outcome: Progressing  8/22/2022 0628 by Osmar Winkler RN  Outcome: Progressing     Problem: Chronic Conditions and Co-morbidities  Goal: Patient's chronic conditions and co-morbidity symptoms are monitored and maintained or improved  8/22/2022 2015 by Felisha Calzada RN  Outcome: Progressing  8/22/2022 2004 by Venus Rothman RN  Outcome: Progressing  8/22/2022 0628 by Osmar Winkler RN  Outcome: Progressing     Problem: Nutrition Deficit:  Goal: Optimize nutritional status  8/22/2022 2015 by Felisha Calzada RN  Outcome: Progressing  8/22/2022 2004 by Venus Rothman RN  Outcome: Progressing  8/22/2022 0628 by Osmar Winkler RN  Outcome: Progressing     Problem: Skin/Tissue Integrity  Goal: Absence of new skin breakdown  Description: 1. Monitor for areas of redness and/or skin breakdown  2. Assess vascular access sites hourly  3. Every 4-6 hours minimum:  Change oxygen saturation probe site  4. Every 4-6 hours:  If on nasal continuous positive airway pressure, respiratory therapy assess nares and determine need for appliance change or resting period.   8/22/2022 2015 by Felisha Calzada RN  Outcome: Progressing  8/22/2022 2004 by Venus Rothman RN  Outcome: Progressing  8/22/2022 4770 by Osmar Winkler RN  Outcome: Progressing

## 2022-08-23 NOTE — CONSULTS
Physical Medicine & Rehabilitation  Consult Note      Admitting Physician:  Vandana James MD    Primary Care Provider:  INGRID Cote CNP     Reason for Consult:  Acute Inpatient Rehabilitation    Chief Complaint:  Abdominal pain    History of Present Illness:  Referring Provider is requesting an evaluation for appropriate placement upon discharge from acute care. History from chart review and patient's daughter. Karolyn Amaro is a 81 y.o. female with history of CVA, atrial fibrillation, CAD, CHF, HTN, HLD, type 2 diabetes, asthma, CHICO, pulmonary hypertension, IBS, migraines, and nephrolithiasis admitted to [facility] on 8/20/2022. She initially presented to [facility] ED with abdominal pain for 1 day. She was found to have perforated diverticular disease with free air in the abdomen. She was transferred to [facility] for further management. She underwent ex-lap with resection of sigmoid colon, lysis of adhesions, splenectomy, and wound vac placement on 8/20/22 (Dr. Tito Guzman). She then had re-exploration laparotomy with colostomy creation and closure of abdominal wound on 8/23/22 (Dr. Tito Guzman). Upon evaluation, she is intubated and sedated. Daughter present at bedside. Review of Systems:  Review of Systems   Unable to perform ROS: Intubated      Premorbid function:  Independent    Current function:    PT                          OT                                              SLP:      Past Medical History:        Diagnosis Date    Allergic rhinitis     Asthma     PFT's, 04/06, actually showed mild restrictive defect. Atrial fibrillation with RVR ([address]     Hospitalized [facility] 2/8/21-2/11/21    Basal cell carcinoma of cheek 2007    Right cheek    Basal cell carcinoma of leg     right leg    CAD (coronary artery disease)     With 40% stenosis of the LAD, 07/10, ejection fraction 60%.   Repeat heart cath9/14 with 40-50 percent LAD lesion first diagonal 50% lesion rec med Rx Central retinal artery occlusion     Right side November 2014 status post TPA    Cerebral artery occlusion with cerebral infarction ([facility] 11/24/2014    Cerebrovascular disease     50-79% stenosis on left on ultrasound 11/14    CHF (congestive heart failure) ([facility] [address]     Echo 1/15 moderate MR, severe TR, RVSP 76, grade 2 diastolic dysfunction    Diverticulosis     AVM on colonoscopy, 05/11    Dyslipidemia     Elevated antinuclear antibody (ZAIRA) level     History of    Esophagitis 05/2011    Gastritis/esophagitis on EGD, Dr. Ioana Mijares. Repeat EGD6/15 Gastritis    Foraminal stenosis of lumbar region     Moderate  Right L4/L5 neuroforaminal stenosis, Dr Romayne Boehringer following. MRI 2/16 [place]. Moderate foraminal stenosis mild to moderate central canal stenosis    Hyperlipidemia     Hypertension     Hypokalemia 2/9/2019    IBS (irritable bowel syndrome)     GI consultation with Dr. Kevin Shah, GI specialist in [facility], felt that she probably has chronic functional diarrhea and recommended empiric Imodium, Pepto-Bismol or Questran first. Sprue test Neg 2011    Iron deficiency anemia     Percent sat iron 5, January 2015, ferritin 40 1 /15, FOBT positive  EGD 6/15  Gastritis, IV iron 9/15x3 effective,  colonoscopy deferred by Dr. Ioana Mijares. --Prior colonoscopy 2011 with severe diverticulosis and telangiectasia. Trial iron solution in [place] juice 12/16    Iron deficiency anemia due to chronic blood loss 09/08/2015    Junctional bradycardia     Symptomatic, resolved with discontinuation of Verapamil, 05/09. 1.1) Echocardiogram: LAE, LVH, EF 50%, mild MR, diastolic. 1.2) Dr. Mandy Alexandre evaluation. 1.3.) Persantine stress test negative, 05/09. Migraine     Mild CAD     cath 10/15    Mitral regurgitation     Moderate to severe on echocardiogram September 2015.   Right pressure 76 grade 2 diastolic dysfunction,  echo 58/87 grade 2 diastolic dysfunction mild to moderate MR RVSP 56 aortic sclerosis    Nephrolithiasis 2/5/2019    Obesity OTHER SURGICAL HISTORY  03/22/2016    right L4 and L5 TFE    OTHER SURGICAL HISTORY Right 04/26/2016    L4, L5 TFE    UPPER GASTROINTESTINAL ENDOSCOPY  05/16/2011    UPPER GASTROINTESTINAL ENDOSCOPY  06/22/2015    mild gastritis (Dr. Miguel Angel Metcalf)    UPPER GASTROINTESTINAL ENDOSCOPY N/A 09/26/2019    mild to moderate inflammation, Dr. Miguel Angel Metcalf       Allergies:     Allergies   Allergen Reactions    Codeine      Confusion      Erythromycin      GI upset  Received zithromax in past and tolerated    Exenatide Nausea Only    Levofloxacin      GI upset    Verapamil Other (See Comments)     Junctional bradycardia    Clonidine Derivatives Rash     2009, catapres    Other Rash     Levbid    Penicillins Rash     Received Rocephin in past and tolerated        Current Medications:   Current Facility-Administered Medications: [START ON 8/25/2022] ciprofloxacin (CIPRO) IVPB 400 mg, 400 mg, IntraVENous, Q24H  metronidazole (FLAGYL) 500 mg in 0.9% NaCl 100 mL IVPB premix, 500 mg, IntraVENous, Q8H  acetaminophen (TYLENOL) 160 MG/5ML solution 1,000 mg, 1,000 mg, Oral, Q8H  methocarbamol (ROBAXIN) tablet 500 mg, 500 mg, Oral, Q6H  gabapentin (NEURONTIN) capsule 100 mg, 100 mg, Per NG tube, Daily  atorvastatin (LIPITOR) tablet 40 mg, 40 mg, Oral, Daily  norepinephrine (LEVOPHED) 16 mg in sodium chloride 0.9 % 250 mL infusion, 1-40 mcg/min, IntraVENous, Continuous  [START ON 8/25/2022] insulin lispro (HUMALOG) injection vial 0-16 Units, 0-16 Units, SubCUTAneous, Q4H  heparin (porcine) injection 5,000 Units, 5,000 Units, SubCUTAneous, 3 times per day  chlorhexidine (PERIDEX) 0.12 % solution 15 mL, 15 mL, Mouth/Throat, BID  famotidine (PEPCID) 20 mg in sodium chloride (PF) 10 mL injection, 20 mg, IntraVENous, Daily  propofol injection, 5-50 mcg/kg/min, IntraVENous, Continuous  fentaNYL 50 mcg/mL 50 mL infusion,  mcg/hr, IntraVENous, Continuous  glucose chewable tablet 16 g, 4 tablet, Oral, PRN  dextrose bolus 10% 125 mL, 125 mL, IntraVENous, PRN assess hearing. Mucous membranes pink and moist.  NG tube in place. RESP: Intubated. Normal breath sounds with no wheezing, rales, or rhonchi. Respirations WNL and unlabored. CV: Regular rate and rhythm. No murmurs, rubs, or gallops. ABD: Soft, non-distended, BS+ and equal.  Dressing in place. NEURO:  Sedated. Unable to assess speech. Unable to assess sensation. MSK:  Muscle bulk is normal bilaterally. Moving left foot spontaneously. Bilateral wrist restraints in place. LIMBS:  Edema in bilateral upper limbs. SKIN: Warm and dry with good turgor. PSYCH: Unable to assess. Diagnostics:    CBC:   Recent Labs     08/23/22  0404 08/23/22  1448 08/24/22  0425   WBC 23.0* 20.1* 21.9*   RBC 2.83* 3.31* 3.21*   HGB 7.3* 8.4* 8.5*   HCT 23.7* 27.8* 26.3*   MCV 83.7 84.0 81.9*   RDW 17.3* 17.0* 17.2*   PLT See Reflexed IPF Result See Reflexed IPF Result See Reflexed IPF Result     BMP:   Recent Labs     08/23/22  0404 08/23/22  1448 08/23/22  1947 08/24/22  0425    138 137 136   K 4.8 4.6 4.7 5.0    108* 106 106   CO2 16* 15* 16* 16*   PHOS 6.1* 6.0*  --  6.5*   BUN 65* 66* 68* 68*   CREATININE 3.32* 3.42* 3.18* 3.19*   GLUCOSE 147* 161* 160* 177*      HbA1c:   Lab Results   Component Value Date    LABA1C 7.6 (H) 08/20/2022     BNP: No results for input(s): BNP in the last 72 hours. PT/INR:   Recent Labs     08/22/22  1053   PROTIME 11.0   INR 1.0     APTT: No results for input(s): APTT in the last 72 hours. CARDIAC ENZYMES: No results for input(s): CKMB, CKMBINDEX, TROPONINT in the last 72 hours. Invalid input(s): CKTOTAL;3  FASTING LIPID PANEL:  Lab Results   Component Value Date    CHOL 184 08/25/2021    HDL 73 08/25/2021    TRIG 140 08/25/2021     LIVER PROFILE:   Recent Labs     08/22/22  1545   AST 36*   *   BILIDIR 0.97*   BILITOT 1.09   ALKPHOS 141*       Radiology:  XR CHEST PORTABLE   Final Result   Cardiomegaly and mild vascular congestion with small effusions.   Support tubes and lines as above. XR CHEST PORTABLE   Final Result   Stable cardiomegaly with resolving central pulmonary congestion. Increasing right basilar atelectasis or infiltrate medially. Slowly resolving left basilar opacity and decreasing left pleural effusion         XR CHEST PORTABLE   Final Result   Findings most compatible with pulmonary edema. Support tubes and lines as   above. XR CHEST PORTABLE   Final Result   Line and tubes as above. Pulmonary edema. XR CHEST PORTABLE   Final Result   Pulmonary vascular congestion. No significant change. XR CHEST PORTABLE   Final Result   Cardiomegaly with vascular congestion and mild patchy interstitial change   which may represent mild interstitial edema or pneumonitis. ET tube measures 2.8 cm above the dheeraj. Enteric catheter tip in the mid gastric body. XR CHEST PORTABLE    (Results Pending)         Impression:    Debility secondary to perforated diverticular disease s/p ex-lap with resection of sigmoid colon, lysis of adhesions, splenectomy, and wound vac placement on 8/20; s/p re-exploration laparotomy with colostomy creation and closure of abdominal wound on 8/23  TEJ  Elevated liver function tests  Anemia  Leukocytosis  Atrial fibrillation  CAD  CHF  HTN  HLD  Type 2 diabetes  Asthma  CHICO  Pulmonary hypertension  IBS  Migraines  History of CVA  History of nephrolithiasis  Morbid obesity    Recommendations:    Diagnosis:  Debility secondary to perforated diverticular disease  Therapy: PT/OT evaluations not completed yet  Medical Necessity: As above  Support: Lives with spouse, daughter can provide 24-hour supervision/assistance  Rehab Recommendation: Will follow up once extubated and therapy evaluations completed. DVT Prophylaxis: Heparin    It was my pleasure to evaluate Karolyn Amaro today. Please call with questions.     Yi Anders MD

## 2022-08-23 NOTE — BRIEF OP NOTE
Brief Postoperative Note      Patient: Jose Cruz You  YOB: 1935  MRN: [id number]    Date of Procedure: 8/23/2022    Pre-Op Diagnosis: PERFORATED BOWEL    Post-Op Diagnosis: Same       Procedure(s):  RE-EXPLORATION LAPAROTOMY, COLOSTOMY CREATION, CLOSURE OF ABDOMINAL WOUND    Surgeon(s):  Jose E Hernández MD    Assistant:  Cori Mack DO PGY4    Anesthesia: General    Estimated Blood Loss (mL): 25  1u pRBC  700ml crystalloid    Complications: None    Specimens:   * No specimens in log *    Implants:  * No implants in log *      Drains:   Negative Pressure Wound Therapy Abdomen Medial (Active)   Wound Type Surgical 08/23/22 0800   Dressing Type Other (Comment) 08/23/22 0800   Target Pressure (mmHg) 125 08/23/22 0800   Dressing Status Clean, dry & intact 08/23/22 0800   Drainage Amount Scant 08/23/22 0800   Drainage Description Serosanguinous 08/23/22 0800   Output (ml) 10 ml 08/22/22 2000   Wound Assessment Other (Comment) 08/23/22 0800   Sadaf-wound Assessment Other (Comment) 08/23/22 0800   Odor None 08/23/22 0800       NG/OG/NJ/NE Tube Nasogastric (Active)   Surrounding Skin Clean, dry & intact 08/23/22 0800   Securement device Tape 08/23/22 0800   Status Suction-low intermittent 08/23/22 0800   Placement Verified External Catheter Length 08/23/22 0800   NG/OG/NJ/NE External Measurement (cm) 52 cm 08/23/22 0800   Drainage Appearance Bile 08/23/22 0800   Output (mL) 0 ml 08/22/22 2000       Urinary Catheter 08/20/22 Carson (Active)   Catheter Indications Need for fluid volume management of the critically ill patient in a critical care setting 08/23/22 0800   Site Assessment No urethral drainage 08/23/22 0800   Urine Color Courtney 08/23/22 0800   Urine Appearance Clear 08/23/22 0800   Urine Odor Malodorous 08/22/22 0400   Collection Container Standard 08/23/22 0800   Securement Method Leg strap 08/23/22 0800   Catheter Care Completed Yes 08/23/22 0800   Catheter Best Practices  Drainage tube clipped to bed;Catheter secured to thigh; Bag below bladder;Bag not on floor 08/23/22 0800   Status Draining 08/23/22 0800   Output (mL) 30 mL 08/23/22 0900       Findings: Healthy bowel, no bleeding from splenic hilum     Electronically signed by Mikhail Jovel DO on 8/23/2022 at 12:55 PM        Attending Note      I have reviewed the above TECSS note and I either performed the key elements of the procedure or was present with the resident when the key elements of the procedure were performed. I have discussed the findings, established the care plan and recommendations with resident.     Lonzo Prader, MD  8/23/2022  2:47 PM Ambulatory

## 2022-08-23 NOTE — CONSULTS
Consult noted. Attempted to see patient this morning, but she was already in OR. Will follow up for initial evaluation.       Zoey Dominguez MD

## 2022-08-24 NOTE — PLAN OF CARE
Problem: Safety - Adult  Goal: Free from fall injury  Outcome: Progressing  Flowsheets (Taken 8/24/2022 0730)  Free From Fall Injury: Instruct family/caregiver on patient safety     Problem: ABCDS Injury Assessment  Goal: Absence of physical injury  Outcome: Progressing  Flowsheets (Taken 8/24/2022 0730)  Absence of Physical Injury: Implement safety measures based on patient assessment     Problem: Gastrointestinal - Adult  Goal: Minimal or absence of nausea and vomiting  Outcome: Progressing  Flowsheets (Taken 8/24/2022 0730)  Minimal or absence of nausea and vomiting: Administer IV fluids as ordered to ensure adequate hydration     Problem: Metabolic/Fluid and Electrolytes - Adult  Goal: Electrolytes maintained within normal limits  Outcome: Progressing  Flowsheets (Taken 8/24/2022 0730)  Electrolytes maintained within normal limits: Monitor labs and assess patient for signs and symptoms of electrolyte imbalances  Goal: Hemodynamic stability and optimal renal function maintained  Outcome: Progressing  Flowsheets (Taken 8/24/2022 0730)  Hemodynamic stability and optimal renal function maintained: Monitor labs and assess for signs and symptoms of volume excess or deficit  Goal: Glucose maintained within prescribed range  Outcome: Progressing  Flowsheets (Taken 8/24/2022 0730)  Glucose maintained within prescribed range: Monitor blood glucose as ordered     Problem: Hematologic - Adult  Goal: Maintains hematologic stability  Outcome: Progressing  Flowsheets (Taken 8/24/2022 0730)  Maintains hematologic stability: Assess for signs and symptoms of bleeding or hemorrhage     Problem: Respiratory - Adult  Goal: Achieves optimal ventilation and oxygenation  Outcome: Progressing  Flowsheets (Taken 8/24/2022 0730)  Achieves optimal ventilation and oxygenation: Assess for changes in respiratory status     Problem: Safety - Medical Restraint  Goal: Remains free of injury from restraints (Restraint for Interference with Medical Device)  Description: INTERVENTIONS:  1. Determine that other, less restrictive measures have been tried or would not be effective before applying the restraint  2. Evaluate the patient's condition at the time of restraint application  3. Inform patient/family regarding the reason for restraint  4.  Q2H: Monitor safety, psychosocial status, comfort, nutrition and hydration  Outcome: Progressing  Flowsheets  Taken 8/24/2022 1100 by Garrett Fowler RN  Remains free of injury from restraints (restraint for interference with medical device): Every 2 hours: Monitor safety, psychosocial status, comfort, nutrition and hydration  Taken 8/24/2022 0900 by Garrett Fowler RN  Remains free of injury from restraints (restraint for interference with medical device): Every 2 hours: Monitor safety, psychosocial status, comfort, nutrition and hydration  Taken 8/24/2022 0700 by Garrett Fowler RN  Remains free of injury from restraints (restraint for interference with medical device): Every 2 hours: Monitor safety, psychosocial status, comfort, nutrition and hydration  Taken 8/24/2022 0600 by Jodi Hinton RN  Remains free of injury from restraints (restraint for interference with medical device): Every 2 hours: Monitor safety, psychosocial status, comfort, nutrition and hydration  Taken 8/24/2022 0400 by Jodi Hinton RN  Remains free of injury from restraints (restraint for interference with medical device): Every 2 hours: Monitor safety, psychosocial status, comfort, nutrition and hydration  Taken 8/24/2022 0200 by Jodi Hinton RN  Remains free of injury from restraints (restraint for interference with medical device): Every 2 hours: Monitor safety, psychosocial status, comfort, nutrition and hydration     Problem: Pain  Goal: Verbalizes/displays adequate comfort level or baseline comfort level  Outcome: Progressing     Problem: Chronic Conditions and Co-morbidities  Goal: Patient's chronic conditions and co-morbidity symptoms are monitored and maintained or improved  Outcome: Progressing  Flowsheets (Taken 8/24/2022 9357)  Care Plan - Patient's Chronic Conditions and Co-Morbidity Symptoms are Monitored and Maintained or Improved: Monitor and assess patient's chronic conditions and comorbid symptoms for stability, deterioration, or improvement     Problem: Skin/Tissue Integrity  Goal: Absence of new skin breakdown  Description: 1. Monitor for areas of redness and/or skin breakdown  2. Assess vascular access sites hourly  3. Every 4-6 hours minimum:  Change oxygen saturation probe site  4. Every 4-6 hours:  If on nasal continuous positive airway pressure, respiratory therapy assess nares and determine need for appliance change or resting period.   Outcome: Progressing

## 2022-08-24 NOTE — ANESTHESIA POSTPROCEDURE EVALUATION
Department of Anesthesiology  Postprocedure Note    Patient: Vangie Bains  MRN: [id number]  YOB: 1935  Date of evaluation: 8/24/2022      Procedure Summary     Date: 08/23/22 Room / Location: [address]    Anesthesia Start: 1022 Anesthesia Stop: 1320    Procedure: RE-EXPLORATION LAPAROTOMY, COLOSTOMY CREATION, CLOSURE OF ABDOMINAL WOUND Diagnosis:       Perforated bowel ([address]      (PERFORATED BOWEL)    Surgeons: Vern Blakely MD Responsible Provider: Barbie Nixon MD    Anesthesia Type: general ASA Status: 3          Anesthesia Type: No value filed.     Davey Phase I:      Davey Phase II:        Anesthesia Post Evaluation    Patient location during evaluation: ICU  Patient participation: complete - patient cannot participate  Level of consciousness: sedated and ventilated  Pain score: 1  Airway patency: patent  Nausea & Vomiting: no nausea and no vomiting  Complications: no  Cardiovascular status: hemodynamically stable  Respiratory status: acceptable  Hydration status: euvolemic

## 2022-08-24 NOTE — PLAN OF CARE
Problem: Hematologic - Adult  Goal: Maintains hematologic stability  8/24/2022 1629 by Sammy Reid RCP  Outcome: Progressing     Problem: Respiratory - Adult  Goal: Achieves optimal ventilation and oxygenation  8/24/2022 1629 by Sammy Reid RCP  Outcome: Progressing

## 2022-08-24 NOTE — PLAN OF CARE
Problem: Safety - Medical Restraint  Goal: Remains free of injury from restraints (Restraint for Interference with Medical Device)  Description: INTERVENTIONS:  1. Determine that other, less restrictive measures have been tried or would not be effective before applying the restraint  2. Evaluate the patient's condition at the time of restraint application  3. Inform patient/family regarding the reason for restraint  4.  Q2H: Monitor safety, psychosocial status, comfort, nutrition and hydration  8/23/2022 2142 by Xiao Chavez RN  Outcome: Not Progressing  Flowsheets (Taken 8/23/2022 2000)  Remains free of injury from restraints (restraint for interference with medical device): Every 2 hours: Monitor safety, psychosocial status, comfort, nutrition and hydration  8/23/2022 1842 by Kristan Paniagua RN  Outcome: Progressing  Flowsheets (Taken 8/23/2022 1800)  Remains free of injury from restraints (restraint for interference with medical device): Every 2 hours: Monitor safety, psychosocial status, comfort, nutrition and hydration  8/23/2022 0827 by Kristan Paniagua RN  Outcome: Progressing  Flowsheets  Taken 8/23/2022 0800 by Kristan Paniagua RN  Remains free of injury from restraints (restraint for interference with medical device): Every 2 hours: Monitor safety, psychosocial status, comfort, nutrition and hydration  Taken 8/23/2022 0600 by Joel Blake RN  Remains free of injury from restraints (restraint for interference with medical device): Every 2 hours: Monitor safety, psychosocial status, comfort, nutrition and hydration  Taken 8/23/2022 0400 by Joel Blake RN  Remains free of injury from restraints (restraint for interference with medical device): Every 2 hours: Monitor safety, psychosocial status, comfort, nutrition and hydration  Taken 8/23/2022 0200 by Joel Blake RN  Remains free of injury from restraints (restraint for interference with medical device): Every 2 hours: Monitor safety, psychosocial status, comfort, nutrition and hydration  Taken 8/23/2022 0000 by Natasha Major RN  Remains free of injury from restraints (restraint for interference with medical device): Every 2 hours: Monitor safety, psychosocial status, comfort, nutrition and hydration  Taken 8/22/2022 2200 by Natasha Major RN  Remains free of injury from restraints (restraint for interference with medical device): Every 2 hours: Monitor safety, psychosocial status, comfort, nutrition and hydration     Problem: Safety - Medical Restraint  Goal: Remains free of injury from restraints (Restraint for Interference with Medical Device)  Description: INTERVENTIONS:  1. Determine that other, less restrictive measures have been tried or would not be effective before applying the restraint  2. Evaluate the patient's condition at the time of restraint application  3. Inform patient/family regarding the reason for restraint  4.  Q2H: Monitor safety, psychosocial status, comfort, nutrition and hydration  8/23/2022 2142 by Estelita Pineda RN  Outcome: Not Progressing  Flowsheets (Taken 8/23/2022 2000)  Remains free of injury from restraints (restraint for interference with medical device): Every 2 hours: Monitor safety, psychosocial status, comfort, nutrition and hydration  8/23/2022 1842 by Vale Kimbrough RN  Outcome: Progressing  Flowsheets (Taken 8/23/2022 1800)  Remains free of injury from restraints (restraint for interference with medical device): Every 2 hours: Monitor safety, psychosocial status, comfort, nutrition and hydration  8/23/2022 0827 by Vale Kimbrough RN  Outcome: Progressing  Flowsheets  Taken 8/23/2022 0800 by Vale Kimbrough RN  Remains free of injury from restraints (restraint for interference with medical device): Every 2 hours: Monitor safety, psychosocial status, comfort, nutrition and hydration  Taken 8/23/2022 0600 by Natasha Major RN  Remains free of injury from restraints (restraint for interference with medical device): Every 2 hours: Monitor safety, psychosocial status, comfort, nutrition and hydration  Taken 8/23/2022 0400 by Oneil Ferrera RN  Remains free of injury from restraints (restraint for interference with medical device): Every 2 hours: Monitor safety, psychosocial status, comfort, nutrition and hydration  Taken 8/23/2022 0200 by Oneil Ferrera RN  Remains free of injury from restraints (restraint for interference with medical device): Every 2 hours: Monitor safety, psychosocial status, comfort, nutrition and hydration  Taken 8/23/2022 0000 by Oneil Ferrera RN  Remains free of injury from restraints (restraint for interference with medical device): Every 2 hours: Monitor safety, psychosocial status, comfort, nutrition and hydration  Taken 8/22/2022 2200 by Oneil Ferrera RN  Remains free of injury from restraints (restraint for interference with medical device): Every 2 hours: Monitor safety, psychosocial status, comfort, nutrition and hydration

## 2022-08-24 NOTE — PROGRESS NOTES
Comprehensive Nutrition Assessment    Type and Reason for Visit:  Reassess    Nutrition Recommendations/Plan:   Start nutrition support as able; if/when TF warranted, suggest Immune Enhancing Formula goal 45 mL/hr (or Nepro w/CarbSteady goal 35 mL/hr if Renal formula needed). If PN needed, suggest custom solution starting w/ 150 g Dex, 50 g AA at 41.7 mL/hr. Will follow/monitor care plans. Malnutrition Assessment:  Malnutrition Status:  Insufficient data (08/21/22 1356)    Context:  Acute Illness     Findings of the 6 clinical characteristics of malnutrition:  Energy Intake:  Mild decrease in energy intake (Comment)  Weight Loss:  No significant weight loss     Body Fat Loss:  Unable to assess     Muscle Mass Loss:  Unable to assess    Fluid Accumulation:  No significant fluid accumulation     Strength:  Not Performed    Nutrition Assessment:    Chart reviewed. POD 1 s/p re-exploration laparotomy, colostomy creation, closure of abdominal wound. Pt remains on vent. No nutrition support at present. No ostomy output yet. Labs reviewed: BUN 68 mg/dL, CR 3.19 mg/dL, Phos 6.5 mg/dL, K 5 mmol/L, Glu 147-199 mg/dL. Meds reviewed. Nutrition Related Findings:    colostomy Wound Type: Surgical Incision, Wound Vac (to abdomen)       Current Nutrition Intake & Therapies:    Diet NPO  Additional Calorie Sources:  Propofol at 4.9 mL/hr = 129 kcals/day    Anthropometric Measures:  Height: 4' 11\" (149.9 cm)  Ideal Body Weight (IBW): 95 lbs (43 kg)    Admission Body Weight: 179 lb 0.2 oz (81.2 kg)  Current Body Weight: 204 lb 9.4 oz (92.8 kg), 188.4 % IBW.  Weight Source: Bed Scale  Current BMI (kg/m2): 41.3  Usual Body Weight: 177 lb 3.2 oz (80.4 kg) (6/8/22 bed scale per chart review)  % Weight Change (Calculated): 1                    BMI Categories: Obese Class 3 (BMI 40.0 or greater)    Estimated Daily Nutrient Needs:  Energy Requirements Based On: Kcal/kg  Weight Used for Energy Requirements: Admission  Energy (kcal/day): 0949-2840 kcals/day  Weight Used for Protein Requirements: Ideal  Protein (g/day): 65-90 g pro/day  Method Used for Fluid Requirements: Other (Comment)  Fluid (ml/day): per MD    Nutrition Diagnosis:   Inadequate oral intake related to impaired respiratory function, altered GI function as evidenced by NPO or clear liquid status due to medical condition    Nutrition Interventions:   Food and/or Nutrient Delivery: Start nutrition support as able; if/when TF warranted, suggest Immune Enhancing Formula goal 45 mL/hr (or Nepro w/CarbSteady goal 35 mL/hr if Renal formula needed). If PN needed, suggest custom solution starting w/ 150 g Dex, 50 g AA at 41.7 mL/hr. Nutrition Education/Counseling: No recommendation at this time  Coordination of Nutrition Care: Continue to monitor while inpatient    Goals:  Previous Goal Met: No Progress toward Goal(s)  Goals: Meet at least 75% of estimated needs, prior to discharge       Nutrition Monitoring and Evaluation:   Behavioral-Environmental Outcomes: None Identified  Food/Nutrient Intake Outcomes: Diet Advancement/Tolerance  Physical Signs/Symptoms Outcomes: Biochemical Data, GI Status, Fluid Status or Edema, Nutrition Focused Physical Findings, Skin    Discharge Planning:     Too soon to determine     [address], LD  Contact: [phone]

## 2022-08-24 NOTE — FLOWSHEET NOTE
08/23/22 2115   Treatment Team Notification   Reason for Communication Abnormal vitals   Team Member Name Dr. Matilda Paulson Team Role Resident   Method of Communication Secure Message   Response En route   Notification Time 2113     Pt HR was in the 50s at shift change, notified Dr. Kimberlee Rubi and stopped precedex, restarted propofol, notified that HR is now dipping into the mid 40s, Dr. Lowry Many to bedside, states it is likely still the precedex that has not worn off yet, but will continue to closely monitor HR throughout the night.

## 2022-08-24 NOTE — PROGRESS NOTES
PROGRESS NOTE          PATIENT NAME: Dee Henry  MEDICAL RECORD NO. [id number]  DATE: 8/24/2022  SURGEON: Dr. Young Felix: Ziyad Mitchell, APRN - CNP    HD: # 4    ASSESSMENT    Patient Active Problem List   Diagnosis    Dyslipidemia    Osteoarthritis    Esophagitis    Vitamin D deficiency    PMR (polymyalgia rheumatica) (HCC)    Central artery occlusion of retina    Diastolic dysfunction    Type 2 diabetes with nephropathy (HCC)    CHICO- intolerant CPAP    Essential hypertension    Lumbar radiculopathy    Neural foraminal stenosis of lumbar spine    Spinal stenosis at L4-L5 level    Mitral regurgitation    Frequent PVCs    Gait abnormality    Asthma    Multifocal atrial tachycardia (HCC)    Severe obesity (BMI 35.0-39. 9) with comorbidity ([address]    Stage 3a chronic kidney disease (HCC)    Thrombocytopenia (HCC)    Hypoxia    Nausea vomiting and diarrhea    Type 2 diabetes mellitus with chronic kidney disease    Typical atrial flutter    Chronic renal disease, stage III ([address] [769201]    Perforated diverticulum of large intestine     [place] transfer, Diverticulitis, perforation. 8/20: OR for ex lap, neto, splenectomy, abthera placement, got [place], discontinuity      MEDICAL DECISION MAKING AND PLAN    Neurologic:   - Sedation: propofol 10  - Pain control: fentanyl 100    Cardiovascular:  Pressors: 2 Levo,   Extensive cardiac hx, holding amio and Eliquis  Scheduled lopressor  Trend trop: 21 (25, 28, 27, 24)  EKG: NSR  ECHO 8/22: EF 65%, evidence of diastolic dys  - HR: 23Q-88N  - MAP: 60s-80s  - MAP goal >65    Hematology:  Splenectomy  - Hg 8.5 (7.3, 8.0, 9.5, 12.3)  - Plt 148 (132, 125, 148, 237)  - DVT proph: heparin 5k q8h  - s/p kcentra    Pulmonary:  - Vent settings: Mode PRVC /  / RR 22 / PEEP 10 / FiO2 40%  - ABG: pH 7.28 / pCO2 36.9 / pO2 111.6/ HCO3 17.3.  Decreased FiO2to 30% and PEEP to 8   -F/U rABG   - PF: 279  -LA: 0.90  - CXR: pulm edema, b/l vascular congestion  - Maintain oxygen sats >92%  - Pulmonary toilet    Renal/Fluid/Electrolyte  - BUN / Cr: 68/3.19 (65/3.32, 47/2.66, 27 / 1.03, 27 / 1.20)  - Na / K: 136/5.0  - Mag / Phos: 2.1/6.5  - IV Fluids: LR @ 100/hr  - Net: +12.4  - UOP: 0.4 cc/kg/hr   -FeNa: intrinsic    GI/Nutrition  - Bowel regimen: holding  - Ulcer Prophylaxis: pepcid  - Diet: pending ROBF  - Colostomy: pending output  -Splenectomy    ID  - WBC: 21.9 (23.0, 20.6, 24.7, 21.3)  - Tmax: 36.4  - Antimicrobials: cipro (D4/), flagyl (D5/). Both end  (POD4)  - splenectomy vaccines at d/c    MSK/Skin:  - wounds: midline    Endocrine:   - Glu 177  - restart insulin gtt: 44.7U/24h     Discharge Needs:  TBD    DISPOSITION:  [x] To remain ICU. Continue Abx. Has diastolic heart failure, plan to start amiodarone gtt if in Afib. Wean pressers as able. SBT with possible extubation. Cr down trending, continue to monitor    Chief Complaint: \"diverticulitis perf\"    Gallo Segovia was seen and examined at bedside, No overnight events. UOP improved but still marginal. Intubated, sedated. NPO, IVF. No ostomy output yet. VSS, afebrile. OBJECTIVE  VITALS: Temp: Temp: 97.3 °F (36.3 °C)Temp  Av.6 °F (35.9 °C)  Min: 94.5 °F (34.7 °C)  Max: 97.5 °F (44.8 °C) BP Systolic (74HOH), XXL:548 , Min:127 , QTP:801   Diastolic (32SLK), XUA:12, Min:37, Max:54   Pulse Pulse  Av.7  Min: 46  Max: 77 Resp Resp  Av.4  Min: 14  Max: 27 Pulse ox SpO2  Av %  Min: 98 %  Max: 100 %  GENERAL: intubated, sedated, no distress  NEURO: sedated  HEENT: no signs of trauma, LIANA  : villareal  LUNGS: mechanically ventilated  HEART: normal rate and regular rhythm  ABDOMEN: soft, non-tender, non-distended, bowel sounds present in all 4 quadrants, and no guarding or peritoneal signs present  EXTREMITY: no cyanosis, clubbing or edema    I/O last 3 completed shifts: In: 6026.6 [I.V.:5029.1;  Blood:250; IV Piggyback:747.5]  Out: 2295 [Urine:1157; Emesis/NG output:150; Drains:10; Blood:50]    Drain/tube output: In: 3962 [I.V.:3126.6; Blood:250]  Out: 1100 [Urine:900]    LAB:  CBC:   Recent Labs     08/23/22  0404 08/23/22  1448 08/24/22  0425   WBC 23.0* 20.1* 21.9*   HGB 7.3* 8.4* 8.5*   HCT 23.7* 27.8* 26.3*   MCV 83.7 84.0 81.9*   PLT See Reflexed IPF Result See Reflexed IPF Result See Reflexed IPF Result     BMP:   Recent Labs     08/23/22  1448 08/23/22  1947 08/24/22  0425    137 136   K 4.6 4.7 5.0   * 106 106   CO2 15* 16* 16*   BUN 66* 68* 68*   CREATININE 3.42* 3.18* 3.19*   GLUCOSE 161* 160* 177*     COAGS:   Recent Labs     08/22/22  1053 08/22/22  1545   PROT  --  4.3*   INR 1.0  --        RADIOLOGY:  XR CHEST PORTABLE    Result Date: 8/24/2022  Cardiomegaly and mild vascular congestion with small effusions. Support tubes and lines as above. XR CHEST PORTABLE    Result Date: 8/23/2022  Stable cardiomegaly with resolving central pulmonary congestion. Increasing right basilar atelectasis or infiltrate medially. Slowly resolving left basilar opacity and decreasing left pleural effusion     XR CHEST PORTABLE    Result Date: 8/23/2022  Findings most compatible with pulmonary edema. Support tubes and lines as above. XR CHEST PORTABLE    Result Date: 8/22/2022  Line and tubes as above. Pulmonary edema.               Jhonatan Duncan DO  8/24/2022  9:57 AM

## 2022-08-25 NOTE — PROGRESS NOTES
Order obtain for extubation. SpO2 of 97 on 40% FiO2. Patient extubated and placed on 2 liters/min via nasal cannula. Post extubation SpO2 is 96% with HR  85 bpm and RR 18 breaths/min. Patient had moderate cough that was non-productive. Extubation Well tolerated by patient. .   Breath Sounds: Clear    Fish Holland RCP   12:40 PM

## 2022-08-25 NOTE — PROGRESS NOTES
CXR: stable, b/l vascular congestion  - Maintain oxygen sats >92%  - Pulmonary toilet    Renal/Fluid/Electrolyte  - BUN / Cr: 68/3.13 (68/3.19, 65/3.32, 47/2.66, 27 / 1.03, 27 / 1.20)  - Na / K: 135/4.8  - Mag / Phos: 2.1/5.7  - IV Fluids: LR @ 100/hr capped at 100 from all sources  - Net: +13.6  - UOP: 0.3 cc/kg/hr   -FeNa: intrinsic    GI/Nutrition  - Bowel regimen: holding  - Ulcer Prophylaxis: pepcid  - Diet: start TF at 25/hr  - Colostomy: no function yet  -Splenectomy    ID  - WBC: 21.7 (21.9, 23.0)  - Tmax: 37.3  - Antimicrobials: cipro (D5/), flagyl (D6/). Both end  (POD4)  - splenectomy vaccines at d/c    MSK/Skin:  - wounds: midline    Endocrine:   - Glu 177  - Discontinued insulin gtt  afternoon: 19U/24h, 8U HDSSI     Discharge Needs:  TBD    DISPOSITION:  [x] To remain ICU. Continue Abx. Has diastolic heart failure, plan to start amiodarone gtt if in Afib. Wean pressers as able. SBT with work toward extubation. Cr down trending, continue to monitor. Start lantus. Start zyprexa    Chief Complaint: \"diverticulitis perf\"    Yina End was seen and examined at bedside, No overnight events. UOP remains low. Intubated, sedated. NPO, IVF. No ostomy output yet. On pressers, afebrile. OBJECTIVE  VITALS: Temp: Temp: 99 °F (37.2 °C)Temp  Av.8 °F (37.1 °C)  Min: 98.2 °F (36.8 °C)  Max: 99.1 °F (58.6 °C) BP Systolic (95SEL), WAO:693 , Min:108 , JWH:855   Diastolic (66YLO), XEQ:15, Min:39, Max:94   Pulse Pulse  Av.7  Min: 63  Max: 93 Resp Resp  Av.9  Min: 14  Max: 28 Pulse ox SpO2  Av.5 %  Min: 95 %  Max: 100 %  GENERAL: intubated, sedated, no distress  NEURO: sedated  HEENT: no signs of trauma, LIANA  : villareal  LUNGS: mechanically ventilated  HEART: normal rate and regular rhythm  ABDOMEN: soft, non-tender, non-distended, LLQ colostomy with dark pink stoma with bowel sweat in bag  EXTREMITY: no cyanosis, clubbing or edema      I/O last 3 completed shifts:   In: 4037.9 [I.V.:3221.5; IV Piggyback:816.4]  Out: 7037 [Urine:1175; Emesis/NG output:430]    Drain/tube output:  In: 2803.2 [I.V.:2086.8]  Out: 604 [Urine:715]    LAB:  CBC:   Recent Labs     08/23/22  1448 08/24/22  0425 08/25/22  0414   WBC 20.1* 21.9* 21.7*   HGB 8.4* 8.5* 7.1*   HCT 27.8* 26.3* 22.4*   MCV 84.0 81.9* 83.6   PLT See Reflexed IPF Result See Reflexed IPF Result See Reflexed IPF Result     BMP:   Recent Labs     08/23/22  1947 08/24/22  0425 08/25/22  0414    136 135   K 4.7 5.0 4.8    106 105   CO2 16* 16* 17*   BUN 68* 68* 68*   CREATININE 3.18* 3.19* 3.13*   GLUCOSE 160* 177* 272*     COAGS:   Recent Labs     08/22/22  1053 08/22/22  1545   PROT  --  4.3*   INR 1.0  --        RADIOLOGY:  XR CHEST PORTABLE    Result Date: 8/24/2022  Cardiomegaly and mild vascular congestion with small effusions. Support tubes and lines as above. XR CHEST PORTABLE    Result Date: 8/23/2022  Stable cardiomegaly with resolving central pulmonary congestion. Increasing right basilar atelectasis or infiltrate medially.  Slowly resolving left basilar opacity and decreasing left pleural effusion          Angela Haskins, DO  8/25/2022  7:30 AM

## 2022-08-25 NOTE — PLAN OF CARE
Problem: Discharge Planning  Goal: Discharge to home or other facility with appropriate resources  8/24/2022 2306 by Laura Espinal RN  Outcome: Progressing  8/24/2022 1513 by Liz Steen RN  Outcome: Progressing  Flowsheets (Taken 8/24/2022 0730)  Discharge to home or other facility with appropriate resources: Identify barriers to discharge with patient and caregiver     Problem: Safety - Adult  Goal: Free from fall injury  8/24/2022 2306 by Laura Espinal RN  Outcome: Progressing  8/24/2022 1513 by Liz Steen RN  Outcome: Progressing  Flowsheets (Taken 8/24/2022 0730)  Free From Fall Injury: Instruct family/caregiver on patient safety     Problem: ABCDS Injury Assessment  Goal: Absence of physical injury  8/24/2022 2306 by Laura Espinal RN  Outcome: Progressing  8/24/2022 1513 by Liz Steen RN  Outcome: Progressing  Flowsheets (Taken 8/24/2022 0730)  Absence of Physical Injury: Implement safety measures based on patient assessment     Problem: Gastrointestinal - Adult  Goal: Minimal or absence of nausea and vomiting  8/24/2022 2306 by Laura Espinal RN  Outcome: Progressing  8/24/2022 1513 by Liz Steen RN  Outcome: Progressing  Flowsheets (Taken 8/24/2022 0730)  Minimal or absence of nausea and vomiting: Administer IV fluids as ordered to ensure adequate hydration     Problem: Metabolic/Fluid and Electrolytes - Adult  Goal: Electrolytes maintained within normal limits  8/24/2022 2306 by Laura Espinal RN  Outcome: Progressing  8/24/2022 1513 by Liz Steen RN  Outcome: Progressing  Flowsheets (Taken 8/24/2022 0730)  Electrolytes maintained within normal limits: Monitor labs and assess patient for signs and symptoms of electrolyte imbalances  Goal: Hemodynamic stability and optimal renal function maintained  8/24/2022 2306 by Laura Espinal RN  Outcome: Progressing  8/24/2022 1513 by Liz Steen RN  Outcome: Progressing  Flowsheets (Taken 8/24/2022 0730)  Hemodynamic stability and optimal renal function maintained: Monitor labs and assess for signs and symptoms of volume excess or deficit  Goal: Glucose maintained within prescribed range  8/24/2022 2306 by Suki Dickens RN  Outcome: Progressing  8/24/2022 1513 by Odalys Ray RN  Outcome: Progressing  Flowsheets (Taken 8/24/2022 0730)  Glucose maintained within prescribed range: Monitor blood glucose as ordered     Problem: Hematologic - Adult  Goal: Maintains hematologic stability  8/24/2022 2306 by Suki Dickens RN  Outcome: Progressing  8/24/2022 1629 by Shayla Jackson RCP  Outcome: Progressing  8/24/2022 1513 by Odalys Ray RN  Outcome: Progressing  Flowsheets (Taken 8/24/2022 0730)  Maintains hematologic stability: Assess for signs and symptoms of bleeding or hemorrhage     Problem: Pain  Goal: Verbalizes/displays adequate comfort level or baseline comfort level  8/24/2022 2306 by Suki Dickens RN  Outcome: Progressing  Flowsheets (Taken 8/24/2022 1600 by Odalys Ray RN)  Verbalizes/displays adequate comfort level or baseline comfort level: Assess pain using appropriate pain scale  8/24/2022 1513 by Odalys Ray RN  Outcome: Progressing  Flowsheets (Taken 8/24/2022 0800)  Verbalizes/displays adequate comfort level or baseline comfort level: Administer analgesics based on type and severity of pain and evaluate response     Problem: Respiratory - Adult  Goal: Achieves optimal ventilation and oxygenation  8/24/2022 2306 by Suki Dickens RN  Outcome: Progressing  8/24/2022 1629 by Shayla Jackson RCP  Outcome: Progressing  8/24/2022 1513 by Odalys Ray RN  Outcome: Progressing  Flowsheets (Taken 8/24/2022 0730)  Achieves optimal ventilation and oxygenation: Assess for changes in respiratory status     Problem: Chronic Conditions and Co-morbidities  Goal: Patient's chronic conditions and co-morbidity symptoms are monitored and maintained or improved  8/24/2022 2306 by Surya Stark (confusion, delirium, dementia, or psychosis) are managed with adequate functional status: Assess for contributors to thought disturbance, including medications, impaired vision or hearing, underlying metabolic abnormalities, dehydration, psychiatric diagnoses, notify LIP     Problem: Nutrition Deficit:  Goal: Optimize nutritional status  8/24/2022 2306 by Darin Wells RN  Outcome: Not Progressing  8/24/2022 1513 by Tam Dominguez RN  Outcome: Progressing

## 2022-08-25 NOTE — PLAN OF CARE
Problem: Safety - Adult  Goal: Free from fall injury  8/25/2022 1255 by Chemo Bliss RN  Outcome: Progressing  Flowsheets (Taken 8/25/2022 0745)  Free From Fall Injury: Instruct family/caregiver on patient safety  8/24/2022 2306 by Izabella Maciel RN  Outcome: Progressing     Problem: Discharge Planning  Goal: Discharge to home or other facility with appropriate resources  8/25/2022 1255 by Chemo Bliss RN  Outcome: Progressing  8/24/2022 2306 by Izabella Maciel RN  Outcome: Progressing     Problem: ABCDS Injury Assessment  Goal: Absence of physical injury  8/25/2022 1255 by Chemo Bliss RN  Outcome: Progressing  Flowsheets (Taken 8/25/2022 0745)  Absence of Physical Injury: Implement safety measures based on patient assessment  8/24/2022 2306 by Izabella Maciel RN  Outcome: Progressing     Problem: Gastrointestinal - Adult  Goal: Minimal or absence of nausea and vomiting  8/25/2022 1255 by Chemo Bliss RN  Outcome: Progressing  8/24/2022 2306 by Izabella Maciel RN  Outcome: Progressing     Problem: Metabolic/Fluid and Electrolytes - Adult  Goal: Electrolytes maintained within normal limits  8/25/2022 1255 by Chemo Bliss RN  Outcome: Progressing  8/24/2022 2306 by Izabella Maciel RN  Outcome: Progressing  Goal: Hemodynamic stability and optimal renal function maintained  8/25/2022 1255 by Chemo Bliss RN  Outcome: Progressing  8/24/2022 2306 by Izabella Maciel RN  Outcome: Progressing  Goal: Glucose maintained within prescribed range  8/25/2022 1255 by Chemo Bliss RN  Outcome: Progressing  8/24/2022 2306 by Izabella Maciel RN  Outcome: Progressing     Problem: Hematologic - Adult  Goal: Maintains hematologic stability  8/25/2022 1255 by Chemo Bliss RN  Outcome: Progressing  8/24/2022 2306 by Izabella Maciel RN  Outcome: Progressing     Problem: Respiratory - Adult  Goal: Achieves optimal ventilation and oxygenation  8/25/2022 1255 by Chemo Bliss

## 2022-08-25 NOTE — PLAN OF CARE
Problem: Discharge Planning  Goal: Discharge to home or other facility with appropriate resources  8/25/2022 1351 by Ramon Costello RN  Outcome: Progressing  8/25/2022 1255 by Ramon Costello RN  Outcome: Progressing     Problem: Safety - Adult  Goal: Free from fall injury  8/25/2022 1351 by Ramon Costello RN  Outcome: Progressing  8/25/2022 1255 by Ramon Costello RN  Outcome: Progressing  Flowsheets (Taken 8/25/2022 0745)  Free From Fall Injury: Instruct family/caregiver on patient safety     Problem: ABCDS Injury Assessment  Goal: Absence of physical injury  8/25/2022 1351 by Ramon Costello RN  Outcome: Progressing  8/25/2022 1255 by Ramon Costello RN  Outcome: Progressing  Flowsheets (Taken 8/25/2022 0745)  Absence of Physical Injury: Implement safety measures based on patient assessment     Problem: Gastrointestinal - Adult  Goal: Minimal or absence of nausea and vomiting  8/25/2022 1351 by Ramon Costello RN  Outcome: Progressing  8/25/2022 1255 by Ramon Costello RN  Outcome: Progressing     Problem: Metabolic/Fluid and Electrolytes - Adult  Goal: Electrolytes maintained within normal limits  8/25/2022 1351 by Ramon Costello RN  Outcome: Progressing  8/25/2022 1255 by Ramon Costello RN  Outcome: Progressing  Goal: Hemodynamic stability and optimal renal function maintained  8/25/2022 1351 by Ramon Costello RN  Outcome: Progressing  8/25/2022 1255 by Ramon Costello RN  Outcome: Progressing  Goal: Glucose maintained within prescribed range  8/25/2022 1351 by Ramon Costello RN  Outcome: Progressing  8/25/2022 1255 by Ramon Costello RN  Outcome: Progressing     Problem: Hematologic - Adult  Goal: Maintains hematologic stability  8/25/2022 1351 by Ramon Costello RN  Outcome: Progressing  8/25/2022 1255 by Ramon Costello RN  Outcome: Progressing     Problem: Respiratory - Adult  Goal: Achieves optimal ventilation and oxygenation  8/25/2022 1351 by Ramon Costello RN  Outcome: Progressing  8/25/2022 1255 by Apurva Solis RN  Outcome: Progressing     Problem: Pain  Goal: Verbalizes/displays adequate comfort level or baseline comfort level  8/25/2022 1351 by Apurva Solis RN  Outcome: Progressing  8/25/2022 1255 by Apurva Solis RN  Outcome: Progressing     Problem: Chronic Conditions and Co-morbidities  Goal: Patient's chronic conditions and co-morbidity symptoms are monitored and maintained or improved  8/25/2022 1351 by Apurva Solis RN  Outcome: Progressing  8/25/2022 1255 by Apurva Solis RN  Outcome: Progressing     Problem: Nutrition Deficit:  Goal: Optimize nutritional status  8/25/2022 1351 by Apurva Solis RN  Outcome: Progressing  8/25/2022 1255 by Apurva Solis RN  Outcome: Progressing     Problem: Skin/Tissue Integrity  Goal: Absence of new skin breakdown  Description: 1. Monitor for areas of redness and/or skin breakdown  2. Assess vascular access sites hourly  3. Every 4-6 hours minimum:  Change oxygen saturation probe site  4. Every 4-6 hours:  If on nasal continuous positive airway pressure, respiratory therapy assess nares and determine need for appliance change or resting period. 8/25/2022 1351 by Apurva Solis RN  Outcome: Progressing  8/25/2022 1255 by Apurva Solis RN  Outcome: Progressing     Problem: Confusion  Goal: Confusion, delirium, dementia, or psychosis is improved or at baseline  Description: INTERVENTIONS:  1. Assess for possible contributors to thought disturbance, including medications, impaired vision or hearing, underlying metabolic abnormalities, dehydration, psychiatric diagnoses, and notify attending LIP  2. [place] high risk fall precautions, as indicated  3. Provide frequent short contacts to provide reality reorientation, refocusing and direction  4. Decrease environmental stimuli, including noise as appropriate  5.  Monitor and intervene to maintain adequate nutrition, hydration, elimination, sleep and activity  6. If unable to ensure safety without constant attention obtain sitter and review sitter guidelines with assigned personnel  7.  Initiate Psychosocial CNS and Spiritual Care consult, as indicated  8/25/2022 1351 by Donald Rm RN  Outcome: Progressing  8/25/2022 1255 by Donald Rm, RN  Outcome: Progressing

## 2022-08-26 NOTE — ACP (ADVANCE CARE PLANNING)
Dr. Vern Raymond at bedside to round. Spoke to patient's daughter at bedside as it was reported that patient stated she wanted to die today. Patient's daughter stated that patient has mentioned not wanting a breathing tube back in. 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 When patient was asked if she would want a breathing tube placed she shook her head no. When informed that not having a breathing tube in could result in her death and if she understood that, she shook her head yes. Daughter states she is going to call other family members but at this time patient will be made [facility] following patient and daughter's wishes.

## 2022-08-26 NOTE — PROGRESS NOTES
Physical Medicine & Rehabilitation  Progress Note        Admitting Physician:  Morgan Fowler MD    Primary Care Provider:  INGRID Nance - CNP     Chief Complaint:  Abdominal pain    Brief History: This is a follow up to the initial consult on Ms. Claudia Dias who is a 81 y.o. female admitted to [facility] on 8/20/2022 with Abdominal Pain    She initially presented to [place] ED with abdominal pain for 1 day. She was found to have perforated diverticular disease with free air in the abdomen. She was transferred to [facility] for further management. She underwent ex-lap with resection of sigmoid colon, lysis of adhesions, splenectomy, and wound vac placement on 8/20/22 (Dr. Matheus Lilly). She then had re-exploration laparotomy with colostomy creation and closure of abdominal wound on 8/23/22 (Dr. Matheus Lilly). Extubated 8/25/22. Subjective:  Upon evaluation, patient is lethargic and unable to provide any history. Daughter present at bedside.     ROS:  Review of Systems   Unable to perform ROS: Other   - Patient is lethargic today    Rehabilitation:    PT                          OT                                              SLP      Current Medications:   Current Facility-Administered Medications: fentaNYL (SUBLIMAZE) injection 25 mcg, 25 mcg, IntraVENous, Q4H PRN  ipratropium-albuterol (DUONEB) nebulizer solution 1 ampule, 1 ampule, Inhalation, Q4H WA  gabapentin (NEURONTIN) capsule 100 mg, 100 mg, Per NG tube, Daily  scopolamine (TRANSDERM-SCOP) transdermal patch 1 patch, 1 patch, TransDERmal, Once  ciprofloxacin (CIPRO) IVPB 400 mg, 400 mg, IntraVENous, Q24H  metronidazole (FLAGYL) 500 mg in 0.9% NaCl 100 mL IVPB premix, 500 mg, IntraVENous, Q8H  acetaminophen (TYLENOL) 160 MG/5ML solution 1,000 mg, 1,000 mg, Oral, Q8H  atorvastatin (LIPITOR) tablet 40 mg, 40 mg, Oral, Daily  insulin lispro (HUMALOG) injection vial 0-16 Units, 0-16 Units, SubCUTAneous, Q4H  heparin (porcine) injection 5,000 Units, 5,000 Units, SubCUTAneous, 3 times per day  glucose chewable tablet 16 g, 4 tablet, Oral, PRN  dextrose bolus 10% 125 mL, 125 mL, IntraVENous, PRN **OR** dextrose bolus 10% 250 mL, 250 mL, IntraVENous, PRN  glucagon (rDNA) injection 1 mg, 1 mg, SubCUTAneous, PRN  dextrose 10 % infusion, , IntraVENous, Continuous PRN  sodium chloride flush 0.9 % injection 5-40 mL, 5-40 mL, IntraVENous, 2 times per day  sodium chloride flush 0.9 % injection 5-40 mL, 5-40 mL, IntraVENous, PRN  0.9 % sodium chloride infusion, , IntraVENous, PRN  ondansetron (ZOFRAN) injection 4 mg, 4 mg, IntraVENous, Q6H PRN  lactated ringers infusion, , IntraVENous, Continuous  albuterol sulfate HFA (PROVENTIL;VENTOLIN;PROAIR) 108 (90 Base) MCG/ACT inhaler 2 puff, 2 puff, Inhalation, 4x Daily PRN    Objective:  BP (!) 163/59   Pulse 88   Temp 98.7 °F (37.1 °C) (Oral)   Resp 13   Ht 4' 11\" (1.499 m)   Wt 204 lb 9.4 oz (92.8 kg)   SpO2 98%   BMI 41.32 kg/m²       GEN: Well developed, well nourished, no acute distress  HEENT: NCAT. Eyes closed throughout evaluation. Hearing grossly intact. Mucous membranes pink and moist.  NG tube in place. RESP: Normal breath sounds with no wheezing, rales, or rhonchi. Some increased respiratory effort. On nasal cannula oxygen. CV: Regular rate and rhythm. No murmurs, rubs, or gallops. ABD: Soft, non-distended, BS+ and equal.  NEURO:  Lethargic. Unable to assess speech. Sensation to light touch grossly intact. MSK:  Muscle bulk is normal bilaterally. Able to  with bilateral hands. Does not give \"thumbs up. \"  No movement noted in bilateral feet. LIMBS: Edema present in all limbs. SKIN: Warm and dry with good turgor. PSYCH:  Unable to assess.     Diagnostics:     CBC:   Recent Labs     08/24/22  0425 08/25/22  0414 08/26/22  0404   WBC 21.9* 21.7* 28.9*   RBC 3.21* 2.68* 2.76*   HGB 8.5* 7.1* 7.0*   HCT 26.3* 22.4* 24.0*   MCV 81.9* 83.6 87.0   RDW 17.2* 17.8* 18.1*   PLT See Reflexed IPF Result See Reflexed IPF Result See Reflexed IPF Result     BMP:   Recent Labs     08/24/22  0425 08/25/22  0414 08/26/22  0404    135 141   K 5.0 4.8 5.1    105 110*   CO2 16* 17* 17*   PHOS 6.5* 5.7* 5.4*   BUN 68* 68* 70*   CREATININE 3.19* 3.13* 3.02*     BNP: No results for input(s): BNP in the last 72 hours. PT/INR: No results for input(s): PROTIME, INR in the last 72 hours. APTT: No results for input(s): APTT in the last 72 hours. CARDIAC ENZYMES: No results for input(s): CKMB, CKMBINDEX, TROPONINT in the last 72 hours. Invalid input(s): CKTOTAL;3  FASTING LIPID PANEL:  Lab Results   Component Value Date    CHOL 184 08/25/2021    HDL 73 08/25/2021    TRIG 140 08/25/2021     LIVER PROFILE: No results for input(s): AST, ALT, ALB, BILIDIR, BILITOT, ALKPHOS in the last 72 hours. Impression:     Debility secondary to perforated diverticular disease s/p ex-lap with resection of sigmoid colon, lysis of adhesions, splenectomy, and wound vac placement on 8/20; s/p re-exploration laparotomy with colostomy creation and closure of abdominal wound on 8/23  TEJ  Elevated liver function tests  Anemia  Leukocytosis  Atrial fibrillation  CAD  CHF  HTN  HLD  Type 2 diabetes  Asthma  CHICO  Pulmonary hypertension  IBS  Migraines  History of CVA  History of nephrolithiasis  Morbid obesity     Recommendations:     Diagnosis:  Debility secondary to perforated diverticular disease  Therapy: PT/OT evaluations not completed yet  Medical Necessity: As above  Support: Lives with spouse, daughter can provide 24-hour supervision/assistance  Rehab Recommendation: Will follow up once therapy evaluations are completed.   DVT Prophylaxis: Heparin      Electronically signed by Shireen Rosales MD on 8/26/2022 at 3:56 PM

## 2022-08-26 NOTE — PROGRESS NOTES
Comprehensive Nutrition Assessment    Type and Reason for Visit:  Reassess    Nutrition Recommendations/Plan:   Continue current TF, Immune Enhancing at 25 mL/hr to provide 900 kcal, 56 g pro. Recommend goal rate of Immune Enhancing at 45 mL/hr to provide 1620 kcal, 101 g pro. Monitor TF tolerance  Monitor labs, wt, plan of care     Malnutrition Assessment:  Malnutrition Status:  Insufficient data (08/21/22 1356)    Context:  Acute Illness       Nutrition Assessment:    Chart reviewed. Pt extubated 8/25. Started on Immune Enhancing 25 mL/hr yesterday per MD, RN reports pt tolerating well. Labs reviewed: Phos 5.4 mg/dL, glucose 244-281 mg/dL. Meds reviewed: LR at 50 mL/hr, Humalog. Nutrition Related Findings:    + colostomy. generalized edema noted. Wound Type: Surgical Incision, Wound Vac (to abdomen)       Current Nutrition Intake & Therapies:    Average Meal Intake: NPO  Average Supplements Intake: NPO  Current Tube Feeding (TF) Orders:  Feeding Route: Nasogastric  Formula: Immune Enhancing  Schedule: Continuous  Feeding Regimen: 25 mL/hr  Additives/Modulars: None  Water Flushes: 30 mL/hr alexis 4 hours  Current TF & Flush Orders Provides: 900 kcal, 56 g pro  Goal TF & Flush Orders Provides: Immune Enhancing at 45 mL/hr = 1620 kcal, 101 g    Anthropometric Measures:  Height: 4' 11\" (149.9 cm)  Ideal Body Weight (IBW): 95 lbs (43 kg)    Admission Body Weight: 179 lb 0.2 oz (81.2 kg)  Current Body Weight: 204 lb 9.4 oz (92.8 kg), 188.4 % IBW.  Weight Source: Bed Scale  Current BMI (kg/m2): 41.3  Usual Body Weight: 177 lb 3.2 oz (80.4 kg) (6/8/22 bed scale per chart review)  % Weight Change (Calculated): 1                    BMI Categories: Obese Class 3 (BMI 40.0 or greater)    Estimated Daily Nutrient Needs:  Energy Requirements Based On: Kcal/kg  Weight Used for Energy Requirements: Admission  Energy (kcal/day): 1440-2490 kcals/day  Weight Used for Protein Requirements: Ideal  Protein (g/day): 65-90 g

## 2022-08-26 NOTE — PLAN OF CARE
Problem: Discharge Planning  Goal: Discharge to home or other facility with appropriate resources  8/26/2022 1947 by Keila Franco RN  Outcome: Not Progressing  8/26/2022 1942 by Keila Franco RN  Outcome: Progressing  8/26/2022 1301 by Susan Yoo RN  Outcome: Progressing     Problem: Safety - Adult  Goal: Free from fall injury  8/26/2022 1947 by Keila Franco RN  Outcome: Not Progressing  8/26/2022 1942 by Keila Franco RN  Outcome: Progressing  8/26/2022 1301 by Susan Yoo RN  Outcome: Progressing     Problem: ABCDS Injury Assessment  Goal: Absence of physical injury  8/26/2022 1947 by Keila Franco RN  Outcome: Not Progressing  8/26/2022 1942 by Keila Franco RN  Outcome: Progressing  8/26/2022 1301 by Susan Yoo RN  Outcome: Progressing     Problem: Gastrointestinal - Adult  Goal: Minimal or absence of nausea and vomiting  8/26/2022 1947 by Keila Franco RN  Outcome: Not Progressing  8/26/2022 1942 by Keila Franco RN  Outcome: Progressing  8/26/2022 1301 by Susan Yoo RN  Outcome: Progressing     Problem: Metabolic/Fluid and Electrolytes - Adult  Goal: Electrolytes maintained within normal limits  8/26/2022 1947 by Keila Franco RN  Outcome: Not Progressing  8/26/2022 1942 by Keila Franco RN  Outcome: Progressing  8/26/2022 1301 by Susan Yoo RN  Outcome: Progressing  Goal: Hemodynamic stability and optimal renal function maintained  8/26/2022 1947 by Keila Franco RN  Outcome: Not Progressing  8/26/2022 1942 by Keila Franco RN  Outcome: Progressing  8/26/2022 1301 by Susan Yoo RN  Outcome: Progressing  Goal: Glucose maintained within prescribed range  8/26/2022 1947 by Keila Franco RN  Outcome: Not Progressing  8/26/2022 1942 by Keila Franco RN  Outcome: Progressing  8/26/2022 1301 by Susan Yoo RN  Outcome: Progressing     Problem: Hematologic - Adult  Goal: Maintains hematologic stability  8/26/2022 1947 by Keila Franco RN  Outcome: Not Progressing  8/26/2022 1942 by Annia Pereira RN  Outcome: Progressing  8/26/2022 1301 by Francine Wells RN  Outcome: Progressing     Problem: Pain  Goal: Verbalizes/displays adequate comfort level or baseline comfort level  8/26/2022 1947 by Annia Pereira RN  Outcome: Not Progressing  8/26/2022 1942 by Annia Pereira RN  Outcome: Progressing  8/26/2022 1301 by Francine Wells RN  Outcome: Progressing     Problem: Respiratory - Adult  Goal: Achieves optimal ventilation and oxygenation  8/26/2022 1947 by Annia Pereira RN  Outcome: Not Progressing  8/26/2022 1942 by Annia Pereira RN  Outcome: Progressing  8/26/2022 1301 by Francine Wells RN  Outcome: Progressing     Problem: Chronic Conditions and Co-morbidities  Goal: Patient's chronic conditions and co-morbidity symptoms are monitored and maintained or improved  8/26/2022 1947 by Annia Pereira RN  Outcome: Not Progressing  8/26/2022 1942 by Annia Pereira RN  Outcome: Progressing  8/26/2022 1301 by Francine Wells RN  Outcome: Progressing     Problem: Nutrition Deficit:  Goal: Optimize nutritional status  8/26/2022 1947 by Annia Pereira RN  Outcome: Not Progressing  8/26/2022 1942 by Annia Pereira RN  Outcome: Progressing  8/26/2022 1301 by Francine Wells RN  Outcome: Progressing  Flowsheets (Taken 8/26/2022 1201 by Adeola Ann RD)  Nutrient intake appropriate for improving, restoring, or maintaining nutritional needs:   Assess nutritional status and recommend course of action   Recommend, monitor, and adjust tube feedings and TPN/PPN based on assessed needs     Problem: Skin/Tissue Integrity  Goal: Absence of new skin breakdown  Description: 1. Monitor for areas of redness and/or skin breakdown  2. Assess vascular access sites hourly  3. Every 4-6 hours minimum:  Change oxygen saturation probe site  4. Every 4-6 hours:  If on nasal continuous positive airway pressure, respiratory therapy assess nares and determine need for appliance change or resting period.   8/26/2022 1947 by Sander Horn RN  Outcome: Not Progressing  8/26/2022 1942 by Sander Horn RN  Outcome: Progressing  8/26/2022 1301 by Geni Valerio RN  Outcome: Progressing     Problem: Confusion  Goal: Confusion, delirium, dementia, or psychosis is improved or at baseline  Description: INTERVENTIONS:  1. Assess for possible contributors to thought disturbance, including medications, impaired vision or hearing, underlying metabolic abnormalities, dehydration, psychiatric diagnoses, and notify attending LIP  2. [place] high risk fall precautions, as indicated  3. Provide frequent short contacts to provide reality reorientation, refocusing and direction  4. Decrease environmental stimuli, including noise as appropriate  5. Monitor and intervene to maintain adequate nutrition, hydration, elimination, sleep and activity  6. If unable to ensure safety without constant attention obtain sitter and review sitter guidelines with assigned personnel  7.  Initiate Psychosocial CNS and Spiritual Care consult, as indicated  8/26/2022 1947 by Sander Horn RN  Outcome: Not Progressing  8/26/2022 1942 by Sander Horn RN  Outcome: Progressing  8/26/2022 1301 by Geni Valerio RN  Outcome: Progressing

## 2022-08-26 NOTE — PLAN OF CARE
Problem: Gastrointestinal - Adult  Goal: Minimal or absence of nausea and vomiting  Outcome: Progressing     Problem: Metabolic/Fluid and Electrolytes - Adult  Goal: Electrolytes maintained within normal limits  Outcome: Progressing     Problem: Hematologic - Adult  Goal: Maintains hematologic stability  Outcome: Progressing

## 2022-08-26 NOTE — PROGRESS NOTES
PROGRESS NOTE          PATIENT NAME: Dee Henry  MEDICAL RECORD NO. [id number]  DATE: 2022  SURGEON: Dr. Min Greenhouse: INGRID Dhillon - CNP    HD: # 6    ASSESSMENT    Patient Active Problem List   Diagnosis    Dyslipidemia    Osteoarthritis    Esophagitis    Vitamin D deficiency    PMR (polymyalgia rheumatica) (HCC)    Central artery occlusion of retina    Diastolic dysfunction    Type 2 diabetes with nephropathy (HCC)    CHICO- intolerant CPAP    Essential hypertension    Lumbar radiculopathy    Neural foraminal stenosis of lumbar spine    Spinal stenosis at L4-L5 level    Mitral regurgitation    Frequent PVCs    Gait abnormality    Asthma    Multifocal atrial tachycardia (HCC)    Severe obesity (BMI 35.0-39. 9) with comorbidity ([address]    Stage 3a chronic kidney disease (HCC)    Thrombocytopenia (HCC)    Hypoxia    Nausea vomiting and diarrhea    Type 2 diabetes mellitus with chronic kidney disease    Typical atrial flutter    Chronic renal disease, stage III ([address] [394732]    Perforated diverticulum of large intestine     [place] transfer, Diverticulitis, perforation. : OR for ex lap, neto, splenectomy, abthera placement, got [place], discontinuity      MEDICAL DECISION MAKING AND PLAN    Neurologic:   - Sedation: none  - Pain control:  tylenol, kelly 100 QD, ketamine    Cardiovascular:  Pressors: none  Extensive cardiac hx, holding amio and Eliquis.  Restarted lipitor  Trend trop: (21, 25, 28, 27, 24)  EKG: NSR  ECHO : EF 65%, evidence of diastolic dysfunction  - HR: 80s  - MAP: 50s-60s  -SBP: 130s-150s  - MAP goal >65    Hematology:  Splenectomy  - Hg 7.0 (7.1, 8.5, 7.3, 8.0, 9.5, 12.3)  - Plt 169 (146)   - DVT proph: heparin 5k q8h  - s/p kcentra    Pulmonary:  - Extubated   -Simple mask 4L  -AB.23/45.4/92/18.8  -LA: 1.54  - CXR: similar, b/l vascular congestion  - Maintain oxygen sats >92%  - Pulmonary toilet    Renal/Fluid/Electrolyte  - BUN / Cr: 70/3.02 (68/3.13, 68/3.19, 65/3.32, 47/2.66, 27 / 1.03, 27 / 1.20)  - Na / K: 141/5.1  - Mag / Phos: 2.1/5.7  - IV Fluids: LR @ 100/hr capped at 100 from all sources  - Net: +14.2  - UOP: 0.6 cc/kg/hr   -FeNa: intrinsic    GI/Nutrition  - Bowel regimen: holding  - Ulcer Prophylaxis: pepcid  - Diet: start TF at 25/hr. ADAT to goal 45/hr  - Colostomy: no function yet  -Splenectomy    ID  - WBC: 28.9 (21.7, 21.9, 23.0)  - Tmax: 37.4  - Antimicrobials: Completed cipro (D5/5), flagyl (D7/8)  - splenectomy vaccines at d/c  -F/U UA    MSK/Skin:  - wounds: midline    Endocrine:   - Glu 253  - Lantus 10, 32U HDSSI. Discharge Needs:  TBD    DISPOSITION:  [x] To remain ICU. Continue Abx. Cr down trending, continue to monitor. Start lantus. Pulm toilet. F/U UA, CXR. Advance diet to goal.       UPDATE:   Spoke with daughter at bedside. She sees her mother is struggling to breath after extubation. She asked her mother in the presence of the [facility] surgical team and Dr. Pepe Waggoner whether she would want to be re-intubated, and her mother indicated that she does not want to be re-intubated even if it meant respiratory failure and eventual death. Daughter is POA. She requested at this time her mother be made DNR-CCA. She would like time to contact family prior to making DNR-CC. At this time Pt is DNR-CCA, will not be intubated if she has respiratory failure. Chief Complaint: \"diverticulitis perf\"    Apoorva Victoria was seen and examined at bedside, No overnight events. Extubated yesterday, on 4L simple mask. UOP adequate. Tolerating TF at 25/hr. No ostomy output yet. VSS, afebrile.      OBJECTIVE  VITALS: Temp: Temp: 99 °F (37.2 °C)Temp  Av.9 °F (37.2 °C)  Min: 98.4 °F (36.9 °C)  Max: 99.3 °F (46.1 °C) BP Systolic (52BTM), WTT:021 , Min:126 , EEC:151   Diastolic (61OFL), APH:33, Min:36, Max:66   Pulse Pulse  Av.2  Min: 69  Max: 104 Resp Resp  Av.6  Min: 10  Max: 25 Pulse ox SpO2  Av.6 %  Min: 77 %  Max: 100 %  GENERAL: awakes to stimulation, appears tired  NEURO: follows commands  HEENT: no signs of trauma, LIANA  : villareal  LUNGS: simple mask 4L  HEART: normal rate and regular rhythm  ABDOMEN: soft, non-tender, non-distended, LLQ colostomy with dark pink stoma with bowel sweat in bag  EXTREMITY: no cyanosis, clubbing or edema      I/O last 3 completed shifts: In: 3668.6 [I.V.:2520.7; NG/GT:350; IV Piggyback:797.9]  Out: 2150 [Urine:1882; Emesis/NG output:268]    Drain/tube output:  In: 2569.5 [I.V.:1724.9; NG/GT:350]  Out: 4719 [Urine:1405]    LAB:  CBC:   Recent Labs     08/24/22 0425 08/25/22  0414 08/26/22  0404   WBC 21.9* 21.7* 28.9*   HGB 8.5* 7.1* 7.0*   HCT 26.3* 22.4* 24.0*   MCV 81.9* 83.6 87.0   PLT See Reflexed IPF Result See Reflexed IPF Result See Reflexed IPF Result     BMP:   Recent Labs     08/24/22 0425 08/25/22  0414 08/26/22  0404    135 141   K 5.0 4.8 5.1    105 110*   CO2 16* 17* 17*   BUN 68* 68* 70*   CREATININE 3.19* 3.13* 3.02*   GLUCOSE 177* 272* 281*     COAGS:   No results for input(s): APTT, PROT, INR in the last 72 hours. RADIOLOGY:  XR CHEST PORTABLE    Result Date: 8/25/2022  No significant interval change. XR ABDOMEN FOR NG/OG/NE TUBE PLACEMENT    Result Date: 8/25/2022  Unremarkable limited KUB. NG tube tip projects at the central abdomen, overlying the expected location of the stomach.             Sky Hermosillo DO  8/26/2022  7:04 AM

## 2022-08-27 NOTE — PROGRESS NOTES
ICU PROGRESS NOTE        PATIENT NAME: Selene Resendez  MEDICAL RECORD NO. [id number]  DATE: 8/27/2022    PRIMARY CARE PHYSICIAN: INGRID Sneed - CNP    HD: # 7    ASSESSMENT    Patient Active Problem List   Diagnosis    Dyslipidemia    Osteoarthritis    Esophagitis    Vitamin D deficiency    PMR (polymyalgia rheumatica) (HCC)    Central artery occlusion of retina    Diastolic dysfunction    Type 2 diabetes with nephropathy (HCC)    CHICO- intolerant CPAP    Essential hypertension    Lumbar radiculopathy    Neural foraminal stenosis of lumbar spine    Spinal stenosis at L4-L5 level    Mitral regurgitation    Frequent PVCs    Gait abnormality    Asthma    Multifocal atrial tachycardia (HCC)    Severe obesity (BMI 35.0-39. 9) with comorbidity ([facility]    Stage 3a chronic kidney disease (HCC)    Thrombocytopenia (HCC)    Hypoxia    Nausea vomiting and diarrhea    Type 2 diabetes mellitus with chronic kidney disease    Typical atrial flutter    Chronic renal disease, stage III ([address] [[id number]]    Perforated diverticulum of large intestine           MEDICAL DECISION MAKING AND PLAN  NEURO:   -Comfort care  -Ativan, morphine, glycopyrrolate, tylenol, gabapentin     2. CV:  -SBPs: 120s-160s  -MAP: 60s-70s  -HR: 60s-80s    3. HEME:   -No labs checked    4. RESP:   -Intermittently on NC oxygen   -Sonorous respirations    5. GI  -Diet: NPO  -NG tube in place    6. RENAL   -UOP:  0.7 mL/kg/hr   -IVF: 0-100/hr, LR   -Net: +14L   -No labs checked    7. MSK:    -Weight bearing status: as tolerated   -PT/OT  8. ID  -Tmax: 38.6  -WBC: none   -Abx: none    9. ENDO   -BG: not checked  -Insulin: none    10. Lines  - R IJ CVC, PIV X2, NG, Carson    11. PPX:  -DVT: none  -GI: none    12. CONSULTS   -PMR   -Hospice    13.  DISPO:    [facility]   -Hospice consulted   -Transfer to [facility]      Chief Complaint: \"None\"    SUBJECTIVE    Selene Resendez was at a prolonged complicated ICU course after perforated diverticulum. Patient's been to the OR multiple times for ex lap, Alberts's procedure, followed by a repeat ex lap with a colostomy creation. Patient was intubated, extubated a few days ago. Has had increased work of breathing since, lots of secretions. Patient and family made it clear yesterday that they did not want reintubation. CODE STATUS changes secondary to DNR CCA. Overnight family decided to change CODE STATUS to DNR CC to make patient comfortable. The plan will be to transfer patient out of ICU today. Hospice consulted.       OBJECTIVE  VITALS: Temp: Temp: 98.9 °F (37.2 °C)Temp  Av.8 °F (37.7 °C)  Min: 98.9 °F (37.2 °C)  Max: 101.5 °F (24.8 °C) BP Systolic (59WGA), NKS:408 , Min:121 , CQI:097   Diastolic (69ZWC), VAU:99, Min:35, Max:157   Pulse Pulse  Av.1  Min: 81  Max: 100 Resp Resp  Av  Min: 12  Max: 20 Pulse ox SpO2  Av.7 %  Min: 89 %  Max: 98 %    VENT INFORMATION:  Lab Results   Component Value Date/Time    FIO2 45.0 2022 05:23 PM    MODE PRVC 2022 05:40 AM     ICP No data recorded CVP No data recorded    GENERAL: Lying in bed, appears in no acute distress, does not follow commands, does not respond  NEURO: Does not follow commands, does not respond verbally, eyes closed  HEENT: NG tube in place, mouth open, sonorous respirations  : deferred  LUNGS: Equal chest rise and fall, mild increased work of breathing  HEART: normal rate and regular rhythm  ABDOMEN: soft, non-tender and non-distended  EXTERMITY: no cyanosis, generalized edema      Drain/tube output:  n/a    LAB:  CBC:   Recent Labs     224 22  0404   WBC 21.7* 28.9*   HGB 7.1* 7.0*   HCT 22.4* 24.0*   MCV 83.6 87.0   PLT See Reflexed IPF Result See Reflexed IPF Result       BMP:   Recent Labs     224 22  0404    141   K 4.8 5.1    110*   CO2 17* 17*   BUN 68* 70*   CREATININE 3.13* 3.02*   GLUCOSE 272* 281*           RADIOLOGY:  No new imaging      Lio HERBERT MD [place]  8/27/22, 3:43 PM

## 2022-08-27 NOTE — CODE DOCUMENTATION
Family at bedside this AM.  Yesterday, family made patient DNR-CCA until further family could gather with the intention of making DNR-CC today. This AM, family is here and wish to proceed. Discussed CODE STATUS with the family and the patient's POA who is her daughter. Per their wishes, CODE STATUS changed to DNR CC. Filled out DNR paperwork, POA signed, and placed in chart. Deepti Story, DO - PGY3  Surgery Department

## 2022-08-27 NOTE — PROGRESS NOTES
Verified with Dr. Rahel Price if ATB need to given since Code status changes. Also the trumpet in right nare removed and NT suction. SP02 is greater than before. Updated RT that it was removed.

## 2022-08-27 NOTE — PROGRESS NOTES
Pt daughter spoke to house supervisor regarding pt transfer plans. She is not welcoming to the idea. Will follow up with plan. Update: Transfer order is still effective but the Bed assignment went away.

## 2022-08-27 NOTE — PLAN OF CARE
Patient transferred from TICU to floor after code status changed to [facility]. Hospice has been previously consulted but has yet to see patient and family. Discussed hospice with family at bedside. Will have hospice see patient, hopefully today.     Jason Nielson MD

## 2022-08-27 NOTE — PLAN OF CARE
Patient's family does not want to transfer patient to another facility for hospice care. They would like her to remain here at [facility]. Patient will remain admitted to our service. We will consult palliative care for symptom management.     Louis Foley MD

## 2022-08-27 NOTE — PROGRESS NOTES
ICU PROGRESS NOTE        PATIENT NAME: Shelli Albarado  MEDICAL RECORD NO. [id number]  DATE: 8/27/2022    PRIMARY CARE PHYSICIAN: Tuyet Negrete, APRN - CNP    HD: # 7    ASSESSMENT    Patient Active Problem List   Diagnosis    Dyslipidemia    Osteoarthritis    Esophagitis    Vitamin D deficiency    PMR (polymyalgia rheumatica) (HCC)    Central artery occlusion of retina    Diastolic dysfunction    Type 2 diabetes with nephropathy (HCC)    CHICO- intolerant CPAP    Essential hypertension    Lumbar radiculopathy    Neural foraminal stenosis of lumbar spine    Spinal stenosis at L4-L5 level    Mitral regurgitation    Frequent PVCs    Gait abnormality    Asthma    Multifocal atrial tachycardia (HCC)    Severe obesity (BMI 35.0-39. 9) with comorbidity ([facility] [address]    Stage 3a chronic kidney disease (HCC)    Thrombocytopenia (HCC)    Hypoxia    Nausea vomiting and diarrhea    Type 2 diabetes mellitus with chronic kidney disease    Typical atrial flutter    Chronic renal disease, stage III ([facility] [address] [[id number]]    Perforated diverticulum of large intestine           MEDICAL DECISION MAKING AND PLAN  NEURO:   -Comfort care  -Ativan, morphine, glycopyrrolate, tylenol, gabapentin     2. CV:  -SBPs: 120s-160s  -MAP: 60s-70s  -HR: 60s-80s    3. HEME:   -No labs checked    4. RESP:   -Intermittently on NC oxygen   -Sonorous respirations    5. GI  -Diet: NPO  -NG tube in place    6. RENAL   -UOP:  0.7 mL/kg/hr   -IVF: 0-100/hr, LR   -Net: +14L   -No labs checked    7. MSK:    -Weight bearing status: as tolerated   -PT/OT  8. ID  -Tmax: 38.6  -WBC: none   -Abx: none    9. ENDO   -BG: not checked  -Insulin: none    10. Lines  - R IJ CVC, PIV X2, NG, Carson    11. PPX:  -DVT: none  -GI: none    12. CONSULTS   -PMR   -Hospice    13.  DISPO:    [facility]   -Hospice consulted   -Transfer to [facility]      Chief Complaint: \"None\"    SUBJECTIVE    Shelli Albarado was at a prolonged complicated ICU course after perforated diverticulum. Patient's been to the OR multiple times for ex lap, Alberts's procedure, followed by a repeat ex lap with a colostomy creation. Patient was intubated, extubated a few days ago. Has had increased work of breathing since, lots of secretions. Patient and family made it clear yesterday that they did not want reintubation. CODE STATUS changes secondary to DNR CCA. Overnight family decided to change CODE STATUS to DNR CC to make patient comfortable. The plan will be to transfer patient out of ICU today. Hospice consulted.       OBJECTIVE  VITALS: Temp: Temp: 98.9 °F (37.2 °C)Temp  Av.5 °F (37.5 °C)  Min: 98.7 °F (37.1 °C)  Max: 101.5 °F (94.7 °C) BP Systolic (52BEY), OVX:683 , Min:121 , JZJ:444   Diastolic (16HKS), KIZ:64, Min:35, Max:157   Pulse Pulse  Av.9  Min: 81  Max: 100 Resp Resp  Av.8  Min: 12  Max: 20 Pulse ox SpO2  Av.3 %  Min: 89 %  Max: 99 %    VENT INFORMATION:  Lab Results   Component Value Date/Time    FIO2 45.0 2022 05:23 PM    MODE PRVC 2022 05:40 AM     ICP No data recorded CVP No data recorded    GENERAL: Lying in bed, appears in no acute distress, does not follow commands, does not respond  NEURO: Does not follow commands, does not respond verbally, eyes closed  HEENT: NG tube in place, mouth open, sonorous respirations  : deferred  LUNGS: Equal chest rise and fall, mild increased work of breathing  HEART: normal rate and regular rhythm  ABDOMEN: soft, non-tender and non-distended  EXTERMITY: no cyanosis, generalized edema      Drain/tube output:  n/a    LAB:  CBC:   Recent Labs     224 22  0404   WBC 21.7* 28.9*   HGB 7.1* 7.0*   HCT 22.4* 24.0*   MCV 83.6 87.0   PLT See Reflexed IPF Result See Reflexed IPF Result     BMP:   Recent Labs     224 22  0404    141   K 4.8 5.1    110*   CO2 17* 17*   BUN 68* 70*   CREATININE 3.13* 3.02*   GLUCOSE 272* 281*         RADIOLOGY:  No new imaging      [address] [facility] Aide Dumont DO  8/27/22, 10:18 AM

## 2022-08-27 NOTE — CARE COORDINATION
Placed call to pt's daughter, Tomas Ellis, to f/u on transition choices, left message, call back requested. Per trauma rounds, pt to have ST/PT/OT evals, may require SNF.
Pt is intubated, sedated. Will go back to the OR, timing to be determined. Nephrology to be consulted. Per trauma rounds, pt will need a PMR consult. 0737 - Per Dr. Yoselin Jett, nephrology not to be consulted.
Transitional planning note: patient remains intubated and sedated. Met with patient's daughter Eulice Blizzard at the bedside to discuss transitional planning. Discussed possible acute inpatient rehab hospital vs SNF for transitional planning and explained difference.  Provided [place] SNF list and ARU list.
Transitional planning-talked with  Alok Maloney, daughter Florina Malcolm, and grand daughter Hernando Roe. They are  wanting hospice, but want patient to stay at the hospital, not comfortable with moving her. Requesting to talk with a doctor for a recommendation on what they should do. PS Dr. Gwendolyn Sullivan. [phone] she will let the resident know, states they are busy-will come when he can.    [phone] Dr. Mallorie Masters called-he talked with family-they are not wanting her moved. He states he talked with Dr. Kevin Cheek is OK with patient staying-he will consult palliative.
would be agreeable to go to SNF if needed vs home with home care

## 2022-08-28 NOTE — DEATH NOTES
Death Pronouncement Note  Patient's Name: Johanna Ho   Patient's YOB: 1935  MRN Number: [id number]    Admitting Provider: Miguel Arnold MD  Attending Provider: Miguel Arnold MD    Patient was examined and the following were absent: Pulses, Blood Pressure, and Respiratory effort    I declared the patient dead on 8/28/2022 at 8:46 AM. I confirmed this at [phone].     Preliminary Cause of Death: Acute respiratory failure [facility])     Electronically signed by Anabella Cantu MD on 8/28/22 at 8:56 AM EDT

## 2022-08-28 NOTE — PLAN OF CARE
Problem: Discharge Planning  Goal: Discharge to home or other facility with appropriate resources  Outcome: Progressing     Problem: Safety - Adult  Goal: Free from fall injury  Outcome: Progressing     Problem: ABCDS Injury Assessment  Goal: Absence of physical injury  Outcome: Progressing     Problem: Gastrointestinal - Adult  Goal: Minimal or absence of nausea and vomiting  Outcome: Progressing     Problem: Metabolic/Fluid and Electrolytes - Adult  Goal: Electrolytes maintained within normal limits  Outcome: Progressing  Goal: Hemodynamic stability and optimal renal function maintained  Outcome: Progressing  Goal: Glucose maintained within prescribed range  Outcome: Progressing     Problem: Hematologic - Adult  Goal: Maintains hematologic stability  Outcome: Progressing     Problem: Respiratory - Adult  Goal: Achieves optimal ventilation and oxygenation  Outcome: Progressing     Problem: Pain  Goal: Verbalizes/displays adequate comfort level or baseline comfort level  Outcome: Progressing     Problem: Chronic Conditions and Co-morbidities  Goal: Patient's chronic conditions and co-morbidity symptoms are monitored and maintained or improved  Outcome: Progressing     Problem: Nutrition Deficit:  Goal: Optimize nutritional status  Outcome: Progressing     Problem: Skin/Tissue Integrity  Goal: Absence of new skin breakdown  Description: 1. Monitor for areas of redness and/or skin breakdown  2. Assess vascular access sites hourly  3. Every 4-6 hours minimum:  Change oxygen saturation probe site  4. Every 4-6 hours:  If on nasal continuous positive airway pressure, respiratory therapy assess nares and determine need for appliance change or resting period. Outcome: Progressing     Problem: Confusion  Goal: Confusion, delirium, dementia, or psychosis is improved or at baseline  Description: INTERVENTIONS:  1.  Assess for possible contributors to thought disturbance, including medications, impaired vision or hearing, underlying metabolic abnormalities, dehydration, psychiatric diagnoses, and notify attending LIP  2. [place] high risk fall precautions, as indicated  3. Provide frequent short contacts to provide reality reorientation, refocusing and direction  4. Decrease environmental stimuli, including noise as appropriate  5. Monitor and intervene to maintain adequate nutrition, hydration, elimination, sleep and activity  6. If unable to ensure safety without constant attention obtain sitter and review sitter guidelines with assigned personnel  7.  Initiate Psychosocial CNS and Spiritual Care consult, as indicated  Outcome: Progressing

## 2022-08-28 NOTE — CONSULTS
Was Consulted to see patient for symptom management, but it was noted that patient passed away at [address] [facility] today.

## 2022-08-28 NOTE — DEATH NOTES
Death Pronouncement Note  Patient's Name: Larry Arora   Patient's YOB: 1935  MRN Number: [id number]    Admitting Provider: Luly Og MD  Attending Provider: Luly Og MD    Patient was examined and the following were absent: Pulses, Blood Pressure, and Respiratory effort    I declared the patient dead on 8/28/2022 at 8:46 AM. I confirmed this at [phone].     Preliminary Cause of Death: Acute respiratory failure ([address] days  Perforated viscus started 9 days ago  Laparotomy with sigmoid resection for perforated viscous  Septic shock  Laparotomy for colostomy creation and abdominal wall closure   Resolution septic shock with extubation  Progressive respiratory insufficiency with wish not to be re-intubated     Electronically signed by Delilah Garcia MD on 8/28/22 at 8:56 AM EDT

## 2022-08-28 NOTE — DISCHARGE SUMMARY
TRAUMA DISCHARGE SUMMARY:    PATIENT NAME:  Vincent Cintron  YOB: 1935  MEDICAL RECORD NO. [id number]  DATE: 22  PRIMARY CARE PHYSICIAN: INGRID Perez CNP  ADMIT DATE: 2022   DISPOSITION:       DISCHARGE DATE:   2022 0846  ADMITTING DIAGNOSIS:   <principal problem not specified>  1. Colon perforation ([address]    2. Perforated bowel ([address]      DIAGNOSIS:   Active Problems:    Perforated diverticulum of large intestine  Resolved Problems:    * No resolved hospital problems. *      CONSULTANTS:  IP CONSULT TO GENERAL SURGERY  IP CONSULT TO SPIRITUAL SERVICES  IP CONSULT TO DIETITIAN  IP CONSULT TO PHYSICAL MEDICINE REHAB  IP CONSULT TO DIETITIAN  IP CONSULT TO HOSPICE  IP CONSULT TO PALLIATIVE CARE    PROCEDURES:   Procedure(s):  RE-EXPLORATION LAPAROTOMY, COLOSTOMY CREATION, CLOSURE OF ABDOMINAL WOUND (N/A)    HOSPITAL COURSE:   Vincent Cintron is a 81 y.o. female who was admitted on 22 with abdominal pain. She was found to have a perforated diverticula at an outside hospital with pneumoperitoneum and transferred to [facility] for further evaluation. The night of her admission she was the OR for exploratory laparotomy. During this she also required splenectomy for bleeding as she was on anticoagulation, she was left in discontinuity a wound VAC was placed and she was taken to the surgical intensive care unit for stabilization. Subsequently she went back to the operating room for reexploration, colostomy creation, closure of the abdominal wound. Though her abdominal status was improving her overall status including her respiratory status was declining. Patient and family decided not to have her reintubated if she were to be extubated. She was extubated. Per family decision she was subsequently made comfort care arrest and then comfort care. She was transferred out of the ICU to the floor on 820 2022.   Both palliative care and hospice were consulted, however patient's family did not want her to be moved and therefore we institute comfort care measures here at [facility]. [facility]. Plan of care once consulted yesterday. Patient's daughter was at her bedside this morning. PHYSICAL EXAMINATION:        Death was noted by nursing team at [id number] and confirmed by myself at [phone] on 8/28/2022. No spontaneous movement, no heart sounds when auscultated for 1 minute. LABS:     Recent Labs     08/26/22  0404   WBC 28.9*   HGB 7.0*   HCT 24.0*   PLT See Reflexed IPF Result      K 5.1   *   CO2 17*   BUN 70*   CREATININE 3.02*       DIAGNOSTIC TESTS:    CT ABDOMEN PELVIS WO CONTRAST Additional Contrast? None    Result Date: 8/20/2022  EXAMINATION: CT OF THE ABDOMEN AND PELVIS WITHOUT CONTRAST 8/20/2022 12:01 pm TECHNIQUE: CT of the abdomen and pelvis was performed without the administration of intravenous contrast. Multiplanar reformatted images are provided for review. Automated exposure control, iterative reconstruction, and/or weight based adjustment of the mA/kV was utilized to reduce the radiation dose to as low as reasonably achievable. COMPARISON: Abdomen and pelvis CT 07/23/2019, abdomen MRI 07/02/2019 HISTORY: ORDERING SYSTEM PROVIDED HISTORY: suprapubic pain TECHNOLOGIST PROVIDED HISTORY: suprapubic pain Reason for Exam: Lower abdomen pain since last evening. History of kidney stones. No GI related symptoms. Hysterectomy, gallbladder and appendix removed. FINDINGS: Lack of intravenous contrast administration, partially limiting evaluation of the soft tissues and vasculature. LOWER CHEST:  Similar appearance of subpleural reticulations and groundglass in each lung base with no significant traction bronchiolectasis nor honeycombing, consistent with minimal to mild interstitial fibrotic changes of indeterminate pattern. Mild cardiomegaly. No pleural nor pericardial effusions.   Mild atherosclerotic calcification in the right coronary artery. ORGANS:  Surgically absent gallbladder. Minimal intrahepatic and mild extrahepatic biliary dilation with apparent stones in the distal common bile duct. 3.2 cm x 3.5 cm x 3.1 cm simple appearing cystic lesion arising from the junction of the pancreatic head and uncinate process (3.0 cm x 2.7 cm x 2.2 cm on 07/02/2019). Moderate pancreatic atrophy. Mild splenic atrophy. Partial congenital malrotation the right kidney. Normal appearance of the liver, adrenal glands, and left kidney. GI/BOWEL:  Tiny sliding hiatal hernia. Otherwise normal course and caliber of the stomach, small bowel, colon, and rectum without obstruction. No visualization of the appendix, reportedly surgically absent. Mild stool. Moderate colonic diverticulosis. Focal wall thickening in the proximal sigmoid colon with pericolonic stranding involving the sigmoid mesocolon and nearby greater omentum. PELVIS:  Surgically absent uterus and likely ovaries. Normal appearance of the urinary bladder. PERITONEUM/RETROPERITONEUM:  Mild to moderate systemic atherosclerotic calcifications. Possible multifocal portal venous gas within the small bowel mesentery. No abdominal nor pelvic lymphadenopathy. Trace free intraperitoneal fluid. Multifocal small free intraperitoneal gas. 1.8 cm x 3.1 cm x 1.9 cm loculated gas and fluid collection in the central small bowel mesentery. Peritoneal thickening in the left lower quadrant. SOFT TISSUES/BONES:  Similar appearance of subpleural nodular soft tissue density in the bilateral upper quadrant anterior abdominal wall likely related to medicinal injections. Laxity of the anterior and lateral abdominal wall musculature. Tiny fat containing umbilical hernia. Eventration of the inferior right rectus sheath laterally without true herniation given intact appearance of the right external oblique aponeurosis. No inguinal lymphadenopathy. Diffuse bony demineralization.   No acute fractures nor suspicious bony lesions. 1. Moderate colonic diverticulosis with suspected acute to subacute diverticulitis in the proximal sigmoid colon. There is associated perforation with small pneumoperitoneum, suspected multifocal portal venous gas, adjacent peritonitis, and a 1.8 cm x 3.1 cm x 1.9 cm abscess in the small bowel mesentery. Perforated colonic malignancy could appear similar but is considered less likely, and follow-up with colonoscopy following treatment should be considered. Critical results were called by Dr. Beth Watkins MD to Dr. Zackery Martins on 8/20/2022 at 12:25. 2. Minimal intrahepatic and mild extrahepatic biliary dilation possibly related to apparent stones in the distal common bile duct. Consider MRCP especially if there are clinical findings of cholestasis. 3. Increased size of a 3.5 cm simple appearing cystic lesion arising from the pancreatic head and uncinate process, most likely an intraductal papillary mucinous neoplasm or other mucinous neoplasm. Given growth, endoscopic fine needle aspiration is technically recommended per the ACR recommendations for incidental pancreatic cysts. XR CHEST PORTABLE    Result Date: 8/21/2022  EXAMINATION: ONE XRAY VIEW OF THE CHEST 8/21/2022 2:54 am COMPARISON: 03/09/2022 HISTORY: ORDERING SYSTEM PROVIDED HISTORY: intubated TECHNOLOGIST PROVIDED HISTORY: intubated FINDINGS: The endotracheal tube measures 2.8 cm above the dheeraj. The enteric catheter side hole is seen just beyond the GE junction with the tip in the mid gastric body. Cardiac size is mildly enlarged. Vascular congestion. Prominent interstitial patchy opacities are noted. Osseous structures appear similar. Cardiomegaly with vascular congestion and mild patchy interstitial change which may represent mild interstitial edema or pneumonitis. ET tube measures 2.8 cm above the dheeraj. Enteric catheter tip in the mid gastric body.          Louis Foley MD  8/28/2022, 1:14 PM        Attending Note      I have reviewed the above TECSS note(s) and I either performed the key elements of the medical history and physical exam or was present with the resident when the key elements of the medical history and physical exam were performed. I have discussed the findings, established the care plan and recommendations with Resident. Marton Homans, MD  8/28/2022  2:00 PM

## 2022-08-31 NOTE — PROGRESS NOTES
Physician Progress Note      PATIENTLapablito Melendez  [facility] #:                  [id number]  :                       1935  ADMIT DATE:       2022 4:20 PM  [address] [place] DATE:        2022 12:52 PM  RESPONDING  PROVIDER #:        Liam Kingsley APRN - CNP          QUERY TEXT:    Pt admitted with perforated colon. Pt noted to have leukocytosis, fever and   tachycardia with elevated lactic acid. If possible, please document if you are   evaluating and /or treating any of the following: The medical record reflects the following:  Risk Factors: Perforated colon  Clinical Indicators: Lactic acid 2.0, WBC 23.2 HR 67 RR 26 Temp 36.9 on   admission. Temp spike to 37.6 on  and to 38.6 on 22. WBC increased to   28.9 on 22. Treatment: IV Cleocin, Cipro, and Flagyl. ICU, labs/monitoring. Chucho Corbett RN, DHRUV [email or web address]  Options provided:  -- Sepsis, not present on admission  -- Sepsis, present on admission  -- Sepsis was ruled out  -- Other - I will add my own diagnosis  -- Disagree - Not applicable / Not valid  -- Disagree - Clinically unable to determine / Unknown  -- Refer to Clinical Documentation Reviewer    PROVIDER RESPONSE TEXT:    This patient has sepsis which was present on admission. Query created by: Mary Kaiser on 2022 8:49 AM      QUERY TEXT:    Pt admitted with perforated colon. Pt noted to have hypotension requiring   pressor support. If possible, please document in the progress notes and   discharge summary if you are evaluating and/or treating any of the following: The medical record reflects the following:  Risk Factors: Age 81 with perforated bowel  Clinical Indicators: Levophed Gtt -22 and Vasopressin Gtt -.    /50, 98/51 Hgb 12.0 on admission down to 7.3 on 22. Perforated   bowel, WBC 23.1 Lactic acid 2.0 with TEJ.   Treatment: ICU, Levophed Gtt, Vasopressin Gtt,  IVFB 500 cc x3, LR FB x 1 L, IVF @ 100, Rt IJ central line, labs/monitoring    Thank-you,  Sung Loaiza RN, CDS  [email or web address]  Options provided:  -- Septic Shock  -- Hypovolemic Shock  -- Hemorrhagic Shock  -- Other - I will add my own diagnosis  -- Disagree - Not applicable / Not valid  -- Disagree - Clinically unable to determine / Unknown  -- Refer to Clinical Documentation Reviewer    PROVIDER RESPONSE TEXT:    This patient is in septic shock.     Query created by: Sindhu No on 8/31/2022 9:05 AM      Electronically signed by:  Shalonda Gatica CNP 8/31/2022 1:44 PM

## (undated) DEVICE — 100% SILICONE FOLEY CATHETER,5-10 ML, 2-WAY: Brand: DOVER

## (undated) DEVICE — GOWN,SIRUS,NONRNF,SETINSLV,XL,20/CS: Brand: MEDLINE

## (undated) DEVICE — DRAPE, SLUSH XL, 44X66, STERILE: Brand: MEDLINE

## (undated) DEVICE — SINGLE ACTION PUMPING SYSTEM

## (undated) DEVICE — SEALER REPROC LAPSCP DIV L18CM OPN TISS LIGASURE RENEWAL

## (undated) DEVICE — GLOVE SURG SZ 65 THK91MIL LTX FREE SYN POLYISOPRENE

## (undated) DEVICE — Device: Brand: SENSURA MIO

## (undated) DEVICE — SOLUTION IV 1000ML 0.9% SOD CHL PH 5 INJ USP VIAFLX PLAS

## (undated) DEVICE — COVER OR TBL W40XL90IN ABSRB STD AND GRIPPY BK SAHARA

## (undated) DEVICE — 450 ML BOTTLE OF 0.05% CHLORHEXIDINE GLUCONATE IN 99.95% STERILE WATER FOR IRRIGATION, USP AND APPLICATOR.: Brand: IRRISEPT ANTIMICROBIAL WOUND LAVAGE

## (undated) DEVICE — POUCH DRNGE FLX BND INTEGR RAIL CLMP DISP EZ CTCH

## (undated) DEVICE — SUTURE PDS II SZ 0 L60IN ABSRB VLT L65MM TP-1 1/2 CIR Z991G

## (undated) DEVICE — Device

## (undated) DEVICE — GOWN,AURORA,NONRNF,XL,30/CS: Brand: MEDLINE

## (undated) DEVICE — DRAINBAG,ANTI-REFLUX TOWER,L/F,2000ML,LL: Brand: MEDLINE

## (undated) DEVICE — COVER LT HNDL BLU PLAS

## (undated) DEVICE — URETERAL ACCESS SHEATH SET: Brand: NAVIGATOR

## (undated) DEVICE — YANKAUER,POOLE TIP,STERILE,50/CS: Brand: MEDLINE

## (undated) DEVICE — SOLUTION PREP POVIDONE IOD FOR SKIN MUCOUS MEM PRIOR TO

## (undated) DEVICE — FORCEPS BX L240CM JAW DIA2.4MM ORNG L CAP W/ NDL DISP RAD

## (undated) DEVICE — Z DUP USE 2565107 PACK SURG PROC LEG CYSTO T-DRAPE REINF TBL CVR HND TWL

## (undated) DEVICE — CONNECTOR TBNG AUX H2O JET DISP FOR OLY 160/180 SER

## (undated) DEVICE — TOWEL,OR,DSP,ST,NATURAL,DLX,4/PK,20PK/CS: Brand: MEDLINE

## (undated) DEVICE — INSTRUMENT REPROC SEAL/DIVIDE OPEN LIGASURE IMPACT CRV LG JAW NANO-COAT 18CM

## (undated) DEVICE — GLOVE ORANGE PI 7 1/2   MSG9075

## (undated) DEVICE — SUTURE VCRL SZ 3-0 L27IN ABSRB UD L26MM SH 1/2 CIR J416H

## (undated) DEVICE — GLOVE ORANGE PI 8   MSG9080

## (undated) DEVICE — SUTURE VCRL SZ 2-0 L18IN ABSRB VLT L26MM SH 1/2 CIR J775D

## (undated) DEVICE — SOLUTION SCRB 4OZ 10% POVIDONE IOD ANTIMIC BTL

## (undated) DEVICE — CYSTO/BLADDER IRRIGATION SET, REGULATING CLAMP

## (undated) DEVICE — POUCH INSTR W40XL26IN BUTT FLD COLL FLTR DRNGE PRT

## (undated) DEVICE — GLOVE ORANGE PI 7   MSG9070

## (undated) DEVICE — PACK SURG ABD SVMMC

## (undated) DEVICE — GOWN,AURORA,NONREINFORCED,LARGE: Brand: MEDLINE

## (undated) DEVICE — CANNULA NSL AD L2IN ETCO2 SAMP SFT CRUSH RESIST FEM AIRLFE

## (undated) DEVICE — KIT NEG PRSS FOR NONLIN OR UPTO 90CM LIN INCIS PREVENA +

## (undated) DEVICE — ADAPTER URO SCP UROLOK LL

## (undated) DEVICE — SOLUTION IV IRRIG WATER 1000ML POUR BRL 2F7114

## (undated) DEVICE — SUTURE VCRL SZ 2-0 L27IN ABSRB UD L26MM SH 1/2 CIR J417H

## (undated) DEVICE — BITE BLOCK W/VELCRO STRAP

## (undated) DEVICE — 1200CC GUARDIAN II: Brand: GUARDIAN

## (undated) DEVICE — NITINOL STONE RETRIEVAL BASKET: Brand: ZERO TIP

## (undated) DEVICE — STAPLER INT CUT LN 51MM STPL 51MM BLU CRV HD B FRM

## (undated) DEVICE — STAPLER INT L100MM CUT LN L98MM STPL LN L102MM BLU B FRM 8

## (undated) DEVICE — GARMENT,MEDLINE,DVT,INT,CALF,LG, GEN2: Brand: MEDLINE

## (undated) DEVICE — GARMENT,MEDLINE,DVT,INT,CALF,MED, GEN2: Brand: MEDLINE

## (undated) DEVICE — APPLICATOR MEDICATED 26 CC SOLUTION HI LT ORNG CHLORAPREP

## (undated) DEVICE — JELLY LUBRICATING 4OZ FLIP TOP TB E Z

## (undated) DEVICE — SPONGE LAP W18XL18IN WHT COT 4 PLY FLD STRUNG RADPQ DISP ST

## (undated) DEVICE — KIT DSG ABTHERA SENSATRAC

## (undated) DEVICE — CANISTER NEG PRSS 1000ML W/ GEL INFOVAC

## (undated) DEVICE — CONTAINER SPEC 4OZ POLYPR WHT SCR TOP PEEL OPN PCH FOR ASEP

## (undated) DEVICE — STAPLER INT L75MM CUT LN L73MM STPL LN L77MM BLU B FRM 8

## (undated) DEVICE — EXTRA LARGE VISCERA RETAINER: Brand: VISCERA RETAINER, FISH

## (undated) DEVICE — RELOAD STPL L75MM OPN H3.8MM CLS 1.5MM WIRE DIA0.2MM REG

## (undated) DEVICE — Z INACTIVE USE 2660664 SOLUTION IRRIG 3000ML 0.9% SOD CHL USP UROMATIC PLAS CONT

## (undated) DEVICE — RELOAD STPL L100MM OPN H4.5MM CLS H2MM WIRE DIA0.23MM THCK

## (undated) DEVICE — RELOAD STPL L100MM OPN H3.8MM CLS H1.5MM WIRE DIA0.2MM BLU

## (undated) DEVICE — MERCY DEFIANCE ENDO KIT: Brand: MEDLINE INDUSTRIES, INC.

## (undated) DEVICE — BASIC SINGLE BASIN 1-LF: Brand: MEDLINE INDUSTRIES, INC.

## (undated) DEVICE — GLOVE SURG SZ 6 THK91MIL LTX FREE SYN POLYISOPRENE ANTI

## (undated) DEVICE — 60 ML SYRINGE REGULAR TIP: Brand: MONOJECT

## (undated) DEVICE — STAPLER INT CUT LN 40MM STPL 51MM GRN CRV HD B FRM

## (undated) DEVICE — TOTAL TRAY, 16FR 10ML SIL FOLEY, URN: Brand: MEDLINE

## (undated) DEVICE — DRESSING NEG PRESSURE WND VAC

## (undated) DEVICE — PREVENA INCISION MANAGEMENT SYSTEM- PEEL & PLACE DRESSING: Brand: PREVENA™ PEEL & PLACE™

## (undated) DEVICE — STRAP,CATHETER,ELASTIC,HOOK&LOOP: Brand: MEDLINE